# Patient Record
Sex: MALE | Race: WHITE | NOT HISPANIC OR LATINO | Employment: OTHER | URBAN - METROPOLITAN AREA
[De-identification: names, ages, dates, MRNs, and addresses within clinical notes are randomized per-mention and may not be internally consistent; named-entity substitution may affect disease eponyms.]

---

## 2017-01-10 DIAGNOSIS — F40.10 SOCIAL PHOBIA: Primary | ICD-10-CM

## 2017-01-24 ENCOUNTER — TELEPHONE (OUTPATIENT)
Dept: FAMILY MEDICINE | Facility: CLINIC | Age: 67
End: 2017-01-24

## 2017-01-24 NOTE — TELEPHONE ENCOUNTER
"Triaged call from patient who reports having a couple episodes lasting about 10 mins of his fingertips turning \"blue\" and cold . No pain with this. First episode was Saturday. He had another episode today.      He does have vascular risk factors. And COPD.  Denies any CP or SOB.  No other symptoms noted except that he states he recovered from \"flu\" about 5 days ago.  Stated at that time he had some chills, cough, tired and no appetite.      He has not been outside. Warm indoor temp he reports. Does not use oxygen.    Patient lives alone but has brother who could drive him to ED if he has another episode of fingers turning \"blue\"  or if he had any CP or SOB he is to call 911.    Plan appt for an evaluation in morning with .  He is not in any distress at present and the fingers appear \"normal\"  We reviewed that he does have some circulatory risk factors that warrant this being checked out.    Stacy Menon RN       "

## 2017-01-25 ENCOUNTER — OFFICE VISIT (OUTPATIENT)
Dept: FAMILY MEDICINE | Facility: CLINIC | Age: 67
End: 2017-01-25
Payer: COMMERCIAL

## 2017-01-25 VITALS
DIASTOLIC BLOOD PRESSURE: 82 MMHG | HEART RATE: 62 BPM | RESPIRATION RATE: 12 BRPM | BODY MASS INDEX: 25.17 KG/M2 | OXYGEN SATURATION: 99 % | SYSTOLIC BLOOD PRESSURE: 132 MMHG | TEMPERATURE: 98.3 F | WEIGHT: 165.5 LBS

## 2017-01-25 DIAGNOSIS — J44.9 CHRONIC OBSTRUCTIVE PULMONARY DISEASE, UNSPECIFIED COPD TYPE (H): ICD-10-CM

## 2017-01-25 DIAGNOSIS — D64.9 ANEMIA, UNSPECIFIED TYPE: Primary | ICD-10-CM

## 2017-01-25 DIAGNOSIS — R20.9 COLD EXTREMITIES: ICD-10-CM

## 2017-01-25 DIAGNOSIS — F17.210 CIGARETTE NICOTINE DEPENDENCE WITHOUT COMPLICATION: ICD-10-CM

## 2017-01-25 LAB
BASOPHILS # BLD AUTO: 0 10E9/L (ref 0–0.2)
BASOPHILS NFR BLD AUTO: 0.4 %
DIFFERENTIAL METHOD BLD: ABNORMAL
EOSINOPHIL # BLD AUTO: 0.2 10E9/L (ref 0–0.7)
EOSINOPHIL NFR BLD AUTO: 2.9 %
ERYTHROCYTE [DISTWIDTH] IN BLOOD BY AUTOMATED COUNT: 19.9 % (ref 10–15)
FOLATE SERPL-MCNC: 14.6 NG/ML
HCT VFR BLD AUTO: 34.4 % (ref 40–53)
HGB BLD-MCNC: 10.2 G/DL (ref 13.3–17.7)
IRON SATN MFR SERPL: 4 % (ref 15–46)
IRON SERPL-MCNC: 20 UG/DL (ref 35–180)
LDH SERPL L TO P-CCNC: 219 U/L (ref 85–227)
LYMPHOCYTES # BLD AUTO: 1.9 10E9/L (ref 0.8–5.3)
LYMPHOCYTES NFR BLD AUTO: 24 %
MCH RBC QN AUTO: 21.7 PG (ref 26.5–33)
MCHC RBC AUTO-ENTMCNC: 29.7 G/DL (ref 31.5–36.5)
MCV RBC AUTO: 73 FL (ref 78–100)
MONOCYTES # BLD AUTO: 0.7 10E9/L (ref 0–1.3)
MONOCYTES NFR BLD AUTO: 9.4 %
NEUTROPHILS # BLD AUTO: 4.9 10E9/L (ref 1.6–8.3)
NEUTROPHILS NFR BLD AUTO: 63.3 %
PLATELET # BLD AUTO: 330 10E9/L (ref 150–450)
RBC # BLD AUTO: 4.69 10E12/L (ref 4.4–5.9)
RETICS # AUTO: 38.7 10E9/L (ref 25–95)
RETICS/RBC NFR AUTO: 0.8 % (ref 0.5–2)
TIBC SERPL-MCNC: 450 UG/DL (ref 240–430)
TSH SERPL DL<=0.005 MIU/L-ACNC: 0.7 MU/L (ref 0.4–4)
VIT B12 SERPL-MCNC: 308 PG/ML (ref 193–986)
WBC # BLD AUTO: 7.7 10E9/L (ref 4–11)

## 2017-01-25 PROCEDURE — 82746 ASSAY OF FOLIC ACID SERUM: CPT | Performed by: FAMILY MEDICINE

## 2017-01-25 PROCEDURE — 99214 OFFICE O/P EST MOD 30 MIN: CPT | Performed by: FAMILY MEDICINE

## 2017-01-25 PROCEDURE — 85045 AUTOMATED RETICULOCYTE COUNT: CPT | Performed by: FAMILY MEDICINE

## 2017-01-25 PROCEDURE — 83540 ASSAY OF IRON: CPT | Performed by: FAMILY MEDICINE

## 2017-01-25 PROCEDURE — 84443 ASSAY THYROID STIM HORMONE: CPT | Performed by: FAMILY MEDICINE

## 2017-01-25 PROCEDURE — 82607 VITAMIN B-12: CPT | Performed by: FAMILY MEDICINE

## 2017-01-25 PROCEDURE — 83615 LACTATE (LD) (LDH) ENZYME: CPT | Performed by: FAMILY MEDICINE

## 2017-01-25 PROCEDURE — 36415 COLL VENOUS BLD VENIPUNCTURE: CPT | Performed by: FAMILY MEDICINE

## 2017-01-25 PROCEDURE — 83550 IRON BINDING TEST: CPT | Performed by: FAMILY MEDICINE

## 2017-01-25 PROCEDURE — 85025 COMPLETE CBC W/AUTO DIFF WBC: CPT | Performed by: FAMILY MEDICINE

## 2017-01-25 PROCEDURE — 85060 BLOOD SMEAR INTERPRETATION: CPT | Performed by: FAMILY MEDICINE

## 2017-01-25 NOTE — PROGRESS NOTES
SUBJECTIVE:                                                    Tevin Guidry is a 66 year old male who presents to clinic today for the following health issues:      Fingertips turn Blue       Duration: last Saturday, 2 episodes    Description (location/character/radiation): all of fingers    Intensity:  mild    Accompanying signs and symptoms: just turns blue indoor and outdoor, NO numbness or tingling     History (similar episodes/previous evaluation): None    Precipitating or alleviating factors: none    Therapies tried and outcome: None       Finger tips were blue. Happened last Saturday - both hands.  Yesterday - right hand.   No pain, may be somewhat colder.     Having some cold symptoms.     Mild copd and CAD as per patient.     Had blood transfusion in 2014 after heart surgery.     Quit smoking for 20 yrs and now for last one year smoking again. Smoking around 3-4 cigs per day. Dentist already did referral for quit smoking plan.     Problem list and histories reviewed & adjusted, as indicated.  Additional history: as documented    Problem list, Medication list, Allergies, and Medical/Social/Surgical histories reviewed in EPIC and updated as appropriate.      Social History     Social History     Marital Status: Single     Spouse Name: N/A     Number of Children: 0     Years of Education: N/A     Social History Main Topics     Smoking status: Former Smoker     Smokeless tobacco: Never Used      Comment: quit smoking over 20 years ago     Alcohol Use: No      Comment: quit 35 years ago     Drug Use: Yes      Comment: occionally use of pot     Sexual Activity:     Partners: Female     Other Topics Concern      Service No     Blood Transfusions No     Exercise No     Seat Belt Yes     Self-Exams No     Parent/Sibling W/ Cabg, Mi Or Angioplasty Before 65f 55m? No     Social History Narrative     Allergies   Allergen Reactions     Codeine      No Known Allergies      Patient Active Problem List    Diagnosis     Social phobia     Hypertension goal BP (blood pressure) < 140/90     HYPERLIPIDEMIA LDL GOAL <130     Tubular adenoma of colon     Advanced directives, counseling/discussion     CAD, multiple vessel     CAD (coronary artery disease)     Cigarette nicotine dependence without complication     Health Care Home     S/P CABG (coronary artery bypass graft)     Chronic obstructive pulmonary disease, unspecified COPD type (H)     Anemia, unspecified type     Reviewed medications, social history and  past medical and surgical history.    Review of system: for general, respiratory, CVS, GI and psychiatry negative except for noted above.     EXAM:  /82 mmHg  Pulse 62  Temp(Src) 98.3  F (36.8  C) (Oral)  Resp 12  Wt 165 lb 8 oz (75.07 kg)  SpO2 99%  Constitutional: healthy, alert and no distress   Psychiatric: mentation appears normal and affect normal/bright  JOINT/EXTREMITIES: both upper and lower extremities - fingers and toes are colder than rest of the body. Pulses intact and symmetrical. No skin color changes.     ASSESSMENT / PLAN:  (D64.9) Anemia, unspecified type  (primary encounter diagnosis)  Comment: unclear etiology. No previous workup. Can contribute to his concerns. Anemia workup today.   Plan: CBC with platelets and differential,         RETICULOCYTE COUNT, BLOOD MORPHOLOGY         PATHOLOGIST REVIEW, Iron and iron binding         capacity, Vitamin B12, Folate, Lactate         Dehydrogenase             (R68.89) Cold extremities  Comment:  Check thyroid and anemia workup as above. He has copd, cad. Peripheral arterial disease can not be ruled out and if symptoms persist, would refer him to vascular medicine department.   Plan: TSH with free T4 reflex             (J44.9) Chronic obstructive pulmonary disease, unspecified COPD type (H)  Comment: stable.   Plan:  Albuterol as needed.     (F17.210) Cigarette nicotine dependence without complication  Comment: restarted smokiing again.  Discussed due to CAD, copd and other health issues, not an ideal thing.   Plan: he understood. Working with his dentist and going to start quit plan.

## 2017-01-25 NOTE — NURSING NOTE
"Chief Complaint   Patient presents with     Finger     finger tips turning blue        Initial /82 mmHg  Pulse 62  Temp(Src) 98.3  F (36.8  C) (Oral)  Resp 12  Wt 165 lb 8 oz (75.07 kg)  SpO2 99% Estimated body mass index is 25.17 kg/(m^2) as calculated from the following:    Height as of 10/27/16: 5' 8\" (1.727 m).    Weight as of this encounter: 165 lb 8 oz (75.07 kg).  BP completed using cuff size: regular    SMA Geovanny    "

## 2017-01-25 NOTE — Clinical Note
[facility]   [address] [place]   [address]  [phone]    January 31, 2017      Tevin Lyons  [address] [facility] [address] [facility] [address]              Dear Mr. Lyons,    Please see me or Dr Quiroz in the clinic to discuss next step as your blood tests showed iron deficiency anemia and there is a concern of some blood loss. You may need even further workup to find out if there is any cause of it.  We can talk more about it when we see you in the clinic.    Results for orders placed or performed in visit on 01/25/17   CBC with platelets and differential   Result Value Ref Range    WBC 7.7 4.0 - 11.0 10e9/L    RBC Count 4.69 4.4 - 5.9 10e12/L    Hemoglobin 10.2 (L) 13.3 - 17.7 g/dL    Hematocrit 34.4 (L) 40.0 - 53.0 %    MCV 73 (L) 78 - 100 fl    MCH 21.7 (L) 26.5 - 33.0 pg    MCHC 29.7 (L) 31.5 - 36.5 g/dL    RDW 19.9 (H) 10.0 - 15.0 %    Platelet Count 330 150 - 450 10e9/L    Diff Method Automated Method     % Neutrophils 63.3 %    % Lymphocytes 24.0 %    % Monocytes 9.4 %    % Eosinophils 2.9 %    % Basophils 0.4 %    Absolute Neutrophil 4.9 1.6 - 8.3 10e9/L    Absolute Lymphocytes 1.9 0.8 - 5.3 10e9/L    Absolute Monocytes 0.7 0.0 - 1.3 10e9/L    Absolute Eosinophils 0.2 0.0 - 0.7 10e9/L    Absolute Basophils 0.0 0.0 - 0.2 10e9/L   RETICULOCYTE COUNT   Result Value Ref Range    % Retic 0.8 0.5 - 2.0 %    Absolute Retic 38.7 25 - 95 10e9/L   BLOOD MORPHOLOGY PATHOLOGIST REVIEW   Result Value Ref Range    Copath Report       Patient Name: TEVIN LYONS  MR#: [id number]  Specimen #: [id number]  Collected: 1/25/2017  Received: 1/26/2017  Reported: 1/26/2017 11:48  Ordering Phy(s): LEONARDO REBOLLAR    For improved result formatting, select 'View Enhanced Report Format'  under Linked Documents section.    TEST(S):  Peripheral Smear Morphology    FINAL DIAGNOSIS:  Peripheral Smear Morphology:  - Moderate microcytic hypochromic anemia with anisopoikilocytosis    COMMENT:  The findings  along with the results of iron studies are indicative of  iron deficiency.  Sources of low grade bleeding may need to be  investigated.    Electronically signed out by:    Jasson Corral M.D., PhD    CLINICAL HISTORY:  66 year old male.    PERIPHERAL BLOOD DATA:  PERIPHERAL BLOOD DATA (Date: 01/25/2017)  Patient Value (Reference Range >18 year old male)  7.77    WBC         (4.0-11.0 x 10*9/L)  4.63    RBC         (4.4-5.9 x 10*12/L)  10.2    HGB         (13.3-17.7 g/dL)  33.9    HCT         (40.0-53.0 %)  73.2    MCV          (78-100fL)  22.0    MCH         (26.5-33.0 pg)  30.1    MCHC       (31.5-36.5 g/dL)  19.6    RDW         (10.0-15.0 %)  33.10    PLT         (150-450 x 10*9/L)    PERIPHERAL BLOOD DIFFERENTIAL - Manual 200 cells.  Relative counts  59.5%  Neutrophils  31.0%  Lymphocytes  4.5%  Monocytes  4.0%  Eosinophils  1.0%  Basophils    Absolute counts  4.6   Neutrophils  (Ref normal 1.6 - 8.3 x 10*9/L)  2.4   Lymphocytes  (Ref normal 0.8 - 5.3 x 10*9/L)  0.3   Monocytes  (Ref normal 0 -1.3 x 10*9/L)  0.3   Eosinophils  (Ref normal 0 - 0.7 x 10*9/L)  0.07   Basophils  (Ref normal 0 - 0.2 x 10*9/L)    PERIPHERAL BLOOD MORPHOLOGY  The red blood cells are normal in number, microcytic and hypochromic.  There is anisopoikilocytosis with elliptocytes, irregularly contracted  cells, codocytes and rare spherocytes.  There is no increased  polychromasia.  No rouleaux formation is noted.  No intracellular  organisms are identified.    The white blood cells are normal in number, morphology and absolute  diff erential count.  No intracellular organisms are identified.    The platelets are normal in number with normal morphology and occasional  larger well granulated forms.    CPT Codes:  A: 05922-TGKX    TESTING LAB LOCATION:  [facility], [address] [place] [address] [phone]    COLLECTION SITE:  Client:  [facility],  [place]  Location:  [facility] (B)     Iron and iron binding capacity   Result Value Ref Range    Iron 20 (L) 35 - 180 ug/dL    Iron Binding Cap 450 (H) 240 - 430 ug/dL    Iron Saturation Index 4 (L) 15 - 46 %   Vitamin B12   Result Value Ref Range    Vitamin B12 308 193 - 986 pg/mL   Folate   Result Value Ref Range    Folate 14.6 >5.4 ng/mL   Lactate Dehydrogenase   Result Value Ref Range    Lactate Dehydrogenase 219 85 - 227 U/L   TSH with free T4 reflex   Result Value Ref Range    TSH 0.70 0.40 - 4.00 mU/L           Sincerely,    Joel Nair MD/nr

## 2017-01-26 LAB — COPATH REPORT: NORMAL

## 2017-02-21 ENCOUNTER — OFFICE VISIT (OUTPATIENT)
Dept: FAMILY MEDICINE | Facility: CLINIC | Age: 67
End: 2017-02-21
Payer: COMMERCIAL

## 2017-02-21 VITALS
WEIGHT: 164.25 LBS | OXYGEN SATURATION: 99 % | HEART RATE: 56 BPM | DIASTOLIC BLOOD PRESSURE: 84 MMHG | BODY MASS INDEX: 24.89 KG/M2 | HEIGHT: 68 IN | TEMPERATURE: 97.7 F | SYSTOLIC BLOOD PRESSURE: 134 MMHG

## 2017-02-21 DIAGNOSIS — I10 HYPERTENSION GOAL BP (BLOOD PRESSURE) < 140/90: ICD-10-CM

## 2017-02-21 DIAGNOSIS — Z95.1 S/P CABG (CORONARY ARTERY BYPASS GRAFT): ICD-10-CM

## 2017-02-21 DIAGNOSIS — R82.998 PINK-COLORED URINE: ICD-10-CM

## 2017-02-21 DIAGNOSIS — I25.119 CORONARY ARTERY DISEASE INVOLVING NATIVE HEART WITH ANGINA PECTORIS, UNSPECIFIED VESSEL OR LESION TYPE (H): ICD-10-CM

## 2017-02-21 DIAGNOSIS — J44.9 CHRONIC OBSTRUCTIVE PULMONARY DISEASE, UNSPECIFIED COPD TYPE (H): ICD-10-CM

## 2017-02-21 DIAGNOSIS — E78.5 HYPERLIPIDEMIA LDL GOAL <130: ICD-10-CM

## 2017-02-21 DIAGNOSIS — R20.9 COLD EXTREMITIES: ICD-10-CM

## 2017-02-21 DIAGNOSIS — D50.9 IRON DEFICIENCY ANEMIA, UNSPECIFIED IRON DEFICIENCY ANEMIA TYPE: Primary | ICD-10-CM

## 2017-02-21 LAB
ALBUMIN UR-MCNC: NEGATIVE MG/DL
APPEARANCE UR: CLEAR
BILIRUB UR QL STRIP: NEGATIVE
COLOR UR AUTO: YELLOW
GLUCOSE UR STRIP-MCNC: NEGATIVE MG/DL
HGB UR QL STRIP: NEGATIVE
KETONES UR STRIP-MCNC: NEGATIVE MG/DL
LEUKOCYTE ESTERASE UR QL STRIP: NEGATIVE
NITRATE UR QL: NEGATIVE
PH UR STRIP: 6.5 PH (ref 5–7)
SP GR UR STRIP: 1.01 (ref 1–1.03)
URN SPEC COLLECT METH UR: NORMAL
UROBILINOGEN UR STRIP-ACNC: 0.2 EU/DL (ref 0.2–1)

## 2017-02-21 PROCEDURE — 81003 URINALYSIS AUTO W/O SCOPE: CPT | Performed by: FAMILY MEDICINE

## 2017-02-21 PROCEDURE — 99214 OFFICE O/P EST MOD 30 MIN: CPT | Performed by: FAMILY MEDICINE

## 2017-02-21 NOTE — PROGRESS NOTES
SUBJECTIVE:                                                    Tevin Guidry is a 66 year old male who presents to clinic today for the following health issues:    Anemia results      Duration: January 2017    Description (location/character/radiation): fingertips were turning blue a few times and had lab work done and states he has anemia and would like to go over results.    Accompanying signs and symptoms: none    History (similar episodes/previous evaluation): mother has hx of anemia       Patient is working with his dentist and using nicorette gum to help him quit smoking. Currently he is smoking less than half a pack daily, which is down from his normal.     The last episode of his fingertips turning blue was a few weeks ago.  Denies blood or black tarry stools.  He has a few episodes where he has noticed pink urine.   Denies coughing up blood, no abdominal pain, upset stomach, acid reflux, or calf pain with exercise.   Patient used the Nitro once in the past for SOB without change in sx. Has not had chest pain.   Had colonoscopy last year with several polyps.     Problem list and histories reviewed & adjusted, as indicated.  Additional history: as documented    Patient Active Problem List   Diagnosis     Social phobia     Hypertension goal BP (blood pressure) < 140/90     HYPERLIPIDEMIA LDL GOAL <130     Tubular adenoma of colon     Advanced directives, counseling/discussion     CAD, multiple vessel     CAD (coronary artery disease)     Cigarette nicotine dependence without complication     Health Care Home     S/P CABG (coronary artery bypass graft)     Chronic obstructive pulmonary disease, unspecified COPD type (H)     Anemia, unspecified type     Past Surgical History   Procedure Laterality Date     Surgical history of -        right clavicular fracture with ORIF     Hc colonoscopy thru stoma, diagnostic  2010     polyps due 2011     Davinci bypass artery coronary N/A 9/29/2014     Procedure: DAVINCI  BYPASS ARTERY CORONARY;  Surgeon: Lam Solis MD;  Location:  OR       Social History   Substance Use Topics     Smoking status: Former Smoker     Smokeless tobacco: Never Used      Comment: quit smoking over 20 years ago     Alcohol use No      Comment: quit 35 years ago     Family History   Problem Relation Age of Onset     CANCER Mother      esog/stomach     CANCER Father      lung     Family History Negative Brother      Hypertension Brother          Current Outpatient Prescriptions   Medication Sig Dispense Refill     albuterol (PROAIR HFA, PROVENTIL HFA, VENTOLIN HFA) 108 (90 BASE) MCG/ACT inhaler Inhale 2 puffs into the lungs every 6 hours as needed for shortness of breath / dyspnea or wheezing 1 Inhaler 1     lisinopril (PRINIVIL,ZESTRIL) 10 MG tablet Take 1 tablet (10 mg) by mouth daily 90 tablet 3     metoprolol (TOPROL-XL) 50 MG 24 hr tablet Take 1 tablet (50 mg) by mouth daily 90 tablet 3     atorvastatin (LIPITOR) 40 MG tablet Take 1 tablet (40 mg) by mouth daily 90 tablet 3     PARoxetine (PAXIL) 40 MG tablet Take 1 tablet (40 mg) by mouth every morning 90 tablet 1     IBUPROFEN PO        aspirin 81 MG tablet Take 1 tablet (81 mg) by mouth daily 90 tablet 3     nitroglycerin (NITROSTAT) 0.4 MG SL tablet Place 1 tablet (0.4 mg) under the tongue every 5 minutes as needed for chest pain 25 tablet 3     Multiple Vitamin (MULTI-VITAMIN) per tablet Take 1 tablet by mouth daily.         Allergies   Allergen Reactions     Codeine      No Known Allergies      Recent Labs   Lab Test  01/25/17   0909  10/27/16   1028  04/25/16   1050  10/23/15   0850   09/30/14   0427   09/29/14   1735   09/28/14   0709   07/11/12   1503   A1C   --    --    --    --    --    --    --    --    --   5.9   --   6.0   LDL   --   75   --   85   --   39   --    --    --    --    < >   --    HDL   --   55   --   43   --   51   --    --    --    --    < >   --    TRIG   --   98   --   137   --   97   --    --    --    --    " < >   --    ALT   --    --   17  15   --    --    --   15   --   16   < >  10   CR   --    --   0.84  0.84   < >  0.84   < >  0.83   < >  0.76   < >  0.82   GFRESTIMATED   --    --   >90  Non  GFR Calc    >90  Non  GFR Calc     < >  >90  Non  GFR Calc     < >  >90  Non  GFR Calc     < >  >90  Non  GFR Calc     < >  >90   GFRESTBLACK   --    --   >90   GFR Calc    >90   GFR Calc     < >  >90   GFR Calc     < >  >90   GFR Calc     < >  >90   GFR Calc     < >  >90   POTASSIUM   --    --   4.6  4.2   < >  4.3   < >  3.9   < >  3.8   < >  3.6   TSH  0.70   --    --    --    --    --    --    --    --    --    --    --     < > = values in this interval not displayed.      BP Readings from Last 3 Encounters:   02/21/17 134/84   01/25/17 132/82   10/27/16 120/70    Wt Readings from Last 3 Encounters:   02/21/17 74.5 kg (164 lb 4 oz)   01/25/17 75.1 kg (165 lb 8 oz)   10/27/16 73.9 kg (163 lb)            ROS:  See above.    This document serves as a record of the services and decisions personally performed and made by Preethi Quiroz MD. It was created on his/her behalf by Shakila Tyler, trained medical scribe. The creation of this document is based the provider's statements to the medical scribes.    Scribe Shakila Tyler, February 21, 2017  OBJECTIVE:                                                    /84 (BP Location: Left arm, Patient Position: Chair, Cuff Size: Adult Regular)  Pulse 56  Temp 97.7  F (36.5  C) (Oral)  Ht 1.727 m (5' 8\")  Wt 74.5 kg (164 lb 4 oz)  SpO2 99%  BMI 24.97 kg/m2  Body mass index is 24.97 kg/(m^2).  GENERAL:  alert and no distress  RESP: lungs clear to auscultation - no rales, rhonchi or wheezes  CV: regular rate and rhythm, normal S1 S2, no S3 or S4, no murmur, click or rub  .  Diagnostic Test Results:  Results for orders " placed or performed in visit on 01/25/17   CBC with platelets and differential   Result Value Ref Range    WBC 7.7 4.0 - 11.0 10e9/L    RBC Count 4.69 4.4 - 5.9 10e12/L    Hemoglobin 10.2 (L) 13.3 - 17.7 g/dL    Hematocrit 34.4 (L) 40.0 - 53.0 %    MCV 73 (L) 78 - 100 fl    MCH 21.7 (L) 26.5 - 33.0 pg    MCHC 29.7 (L) 31.5 - 36.5 g/dL    RDW 19.9 (H) 10.0 - 15.0 %    Platelet Count 330 150 - 450 10e9/L    Diff Method Automated Method     % Neutrophils 63.3 %    % Lymphocytes 24.0 %    % Monocytes 9.4 %    % Eosinophils 2.9 %    % Basophils 0.4 %    Absolute Neutrophil 4.9 1.6 - 8.3 10e9/L    Absolute Lymphocytes 1.9 0.8 - 5.3 10e9/L    Absolute Monocytes 0.7 0.0 - 1.3 10e9/L    Absolute Eosinophils 0.2 0.0 - 0.7 10e9/L    Absolute Basophils 0.0 0.0 - 0.2 10e9/L   RETICULOCYTE COUNT   Result Value Ref Range    % Retic 0.8 0.5 - 2.0 %    Absolute Retic 38.7 25 - 95 10e9/L   BLOOD MORPHOLOGY PATHOLOGIST REVIEW   Result Value Ref Range    Copath Report       Patient Name: DICK LYONS  MR#: [id number]  Specimen #: [id number]  Collected: 1/25/2017  Received: 1/26/2017  Reported: 1/26/2017 11:48  Ordering Phy(s): LEONARDO REBOLLAR    For improved result formatting, select 'View Enhanced Report Format'  under Linked Documents section.    TEST(S):  Peripheral Smear Morphology    FINAL DIAGNOSIS:  Peripheral Smear Morphology:  - Moderate microcytic hypochromic anemia with anisopoikilocytosis    COMMENT:  The findings along with the results of iron studies are indicative of  iron deficiency.  Sources of low grade bleeding may need to be  investigated.    Electronically signed out by:    Jasson Corral M.D., PhD    CLINICAL HISTORY:  66 year old male.    PERIPHERAL BLOOD DATA:  PERIPHERAL BLOOD DATA (Date: 01/25/2017)  Patient Value (Reference Range >18 year old male)  7.77    WBC         (4.0-11.0 x 10*9/L)  4.63    RBC         (4.4-5.9 x 10*12/L)  10.2    HGB         (13.3-17.7 g/dL)  33.9    HCT         (40.0-53.0  %)  73.2    MCV          (78-100fL)  22.0    MCH         (26.5-33.0 pg)  30.1    MCHC       (31.5-36.5 g/dL)  19.6    RDW         (10.0-15.0 %)  33.10    PLT         (150-450 x 10*9/L)    PERIPHERAL BLOOD DIFFERENTIAL - Manual 200 cells.  Relative counts  59.5%  Neutrophils  31.0%  Lymphocytes  4.5%  Monocytes  4.0%  Eosinophils  1.0%  Basophils    Absolute counts  4.6   Neutrophils  (Ref normal 1.6 - 8.3 x 10*9/L)  2.4   Lymphocytes  (Ref normal 0.8 - 5.3 x 10*9/L)  0.3   Monocytes  (Ref normal 0 -1.3 x 10*9/L)  0.3   Eosinophils  (Ref normal 0 - 0.7 x 10*9/L)  0.07   Basophils  (Ref normal 0 - 0.2 x 10*9/L)    PERIPHERAL BLOOD MORPHOLOGY  The red blood cells are normal in number, microcytic and hypochromic.  There is anisopoikilocytosis with elliptocytes, irregularly contracted  cells, codocytes and rare spherocytes.  There is no increased  polychromasia.  No rouleaux formation is noted.  No intracellular  organisms are identified.    The white blood cells are normal in number, morphology and absolute  diff erential count.  No intracellular organisms are identified.    The platelets are normal in number with normal morphology and occasional  larger well granulated forms.    CPT Codes:  A: 92432-EGDS    TESTING LAB LOCATION:  [address] [address] [phone]    COLLECTION SITE:  Client:  [facility]  Location:  [facility] (B)     Iron and iron binding capacity   Result Value Ref Range    Iron 20 (L) 35 - 180 ug/dL    Iron Binding Cap 450 (H) 240 - 430 ug/dL    Iron Saturation Index 4 (L) 15 - 46 %   Vitamin B12   Result Value Ref Range    Vitamin B12 308 193 - 986 pg/mL   Folate   Result Value Ref Range    Folate 14.6 >5.4 ng/mL   Lactate Dehydrogenase   Result Value Ref Range    Lactate Dehydrogenase 219 85 - 227 U/L   TSH with free T4 reflex   Result Value Ref Range    TSH 0.70 0.40 - 4.00 mU/L         ASSESSMENT/PLAN:                                                    1. Iron deficiency anemia, unspecified iron deficiency anemia type  unclear etiology with uncertain prognosis, requires further workup. He will schedule a colonoscopy and EGD to evaluate for GI source. UA today to evaluate for urinary source given his history of pink urine.  Also, will schedule with hematology for further evaluation. He will change to enteric coated ASA as this could be causing some gastritis, which could contribute to his anemia.   - GASTROENTEROLOGY ADULT REF PROCEDURE ONLY  - ONC/HEME ADULT REFERRAL  - *UA reflex to Microscopic and Culture ([facility] (except [place] and [place])    2. Cold extremities  Anemia, cold weather, smoking could have been contributing. No recurrent episodes. No claudication sx.     3. Coronary artery disease involving native heart with angina pectoris, unspecified vessel or lesion type (H)  Stable. Not needing Nitro. Follow up with cardiology scheduled. Will change to enteric coated ASA.   - aspirin 81 MG EC tablet; Take 1 tablet (81 mg) by mouth daily  Dispense: 90 tablet; Refill: 3    4. S/P CABG (coronary artery bypass graft)  Stable.    5. Hypertension goal BP (blood pressure) < 140/90  Stable. Continues on lisinopril.     6. Hyperlipidemia LDL goal <130  Stable. Continues on atorvastatin.     7. Chronic obstructive pulmonary disease, unspecified COPD type (H)  He is enrolled in a program at the  of  with his dentist for smoking cessation. Currently using nicorette gum. Does not find albuterol helpful. Denies significant shortness of breath or frequent episodes of bronchitis.     8. Pink-colored urine  Previous episodes of pink-colored urine. Checking urine sample today for hematuria.   - *UA reflex to Microscopic and Culture ([facility] (except [place] and [place])      Patient Instructions   1. Schedule a colonoscopy and upper  endoscopy.  2. Schedule with Hematology  3. You may need to see your cardiologist before completing the colonoscopy/upper endoscopy.   4. Change from the regular baby aspirin to the EC Aspirin, which will help provide some more protection for your stomach lining.             The information in this document, created by the medical scribe for me, accurately reflects the services I personally performed and the decisions made by me. I have reviewed and approved this document for accuracy prior to leaving the patient care area. 02/21/17    Preethi Quiroz MD  [facility] [place]

## 2017-02-21 NOTE — MR AVS SNAPSHOT
After Visit Summary   2/21/2017    Tevin Guidry    MRN: [id number]           Patient Information     Date Of Birth          1950        Visit Information        Provider Department      2/21/2017 11:20 AM Preethi Quiroz MD [facility]        Today's Diagnoses     Iron deficiency anemia, unspecified iron deficiency anemia type    -  1    Cold extremities        Coronary artery disease involving native heart with angina pectoris, unspecified vessel or lesion type (H)        S/P CABG (coronary artery bypass graft)        Hypertension goal BP (blood pressure) < 140/90        Hyperlipidemia LDL goal <130        Chronic obstructive pulmonary disease, unspecified COPD type (H)        Pink-colored urine          Care Instructions    1. Schedule a colonoscopy and upper endoscopy.  2. Schedule with Hematology  3. You may need to see your cardiologist before completing the colonoscopy/upper endoscopy.   4. Change from the regular baby aspirin to the EC Aspirin, which will help provide some more protection for your stomach lining.                 Follow-ups after your visit        Additional Services     GASTROENTEROLOGY ADULT REF PROCEDURE ONLY       Last Lab Result: Creatinine (mg/dL)       Date                     Value                 04/25/2016               0.84             ----------  There is no height or weight on file to calculate BMI.      Patient will be contacted to schedule procedure.     Please be aware that coverage of these services is subject to the terms and limitations of your health insurance plan.  Call member services at your health plan with any benefit or coverage questions.  Any procedures must be performed at a [facility] facility OR coordinated by your clinic's referral office.    Please bring the following with you to your appointment:    (1) Any X-Rays, CTs or MRIs which have been performed.  Contact the facility where they were done to arrange for  prior  to your scheduled appointment.    (2) List of current medications   (3) This referral request   (4) Any documents/labs given to you for this referral            ONC/HEME ADULT REFERRAL       Your provider has referred you to: [facility]: [facility] [phone]   [email or web address]    Please be aware that coverage of these services is subject to the terms and limitations of your health insurance plan.  Call member services at your health plan with any benefit or coverage questions.      Please bring the following with you to your appointment:    (1) Any X-Rays, CTs or MRIs which have been performed.  Contact the facility where they were done to arrange for  prior to your scheduled appointment.   (2) List of current medications  (3) This referral request   (4) Any documents/labs given to you for this referral                  Your next 10 appointments already scheduled     Mar 28, 2017 10:00 AM CDT   (Arrive by 9:45 AM)   Return Visit with Casa Evans MD   [facility] ([facility] [place])    [address] [place] [address] [phone]              Who to contact     If you have questions or need follow up information about today's clinic visit or your schedule please contact [facility] directly at [phone].  Normal or non-critical lab and imaging results will be communicated to you by MyChart, letter or phone within 4 business days after the clinic has received the results. If you do not hear from us within 7 days, please contact the clinic through MyChart or phone. If you have a critical or abnormal lab result, we will notify you by phone as soon as possible.  Submit refill requests through [employer] or call your pharmacy and they will forward the refill request to us. Please allow 3 business days for your refill to be completed.  "         Additional Information About Your Visit        MyChart Information     Thatgamecompany lets you send messages to your doctor, view your test results, renew your prescriptions, schedule appointments and more. To sign up, go to [email or web address] . Click on \"Log in\" on the left side of the screen, which will take you to the Welcome page. Then click on \"Sign up Now\" on the right side of the page.     You will be asked to enter the access code listed below, as well as some personal information. Please follow the directions to create your username and password.     Your access code is: [email or web address]  Expires: 2017 11:56 AM     Your access code will  in 90 days. If you need help or a new code, please call your [facility] clinic or [phone].        Care EveryWhere ID     This is your Care EveryWhere ID. This could be used by other organizations to access your [facility] medical records  [id number]        Your Vitals Were     Pulse Temperature Height Pulse Oximetry BMI (Body Mass Index)       56 97.7  F (36.5  C) (Oral) 5' 8\" (1.727 m) 99% 24.97 kg/m2        Blood Pressure from Last 3 Encounters:   17 134/84   17 132/82   10/27/16 120/70    Weight from Last 3 Encounters:   17 164 lb 4 oz (74.5 kg)   17 165 lb 8 oz (75.1 kg)   10/27/16 163 lb (73.9 kg)              We Performed the Following     *UA reflex to Microscopic and Culture ([facility] (except [place] and [place])     GASTROENTEROLOGY ADULT REF PROCEDURE ONLY     ONC/HEME ADULT REFERRAL          Today's Medication Changes          These changes are accurate as of: 17 11:56 AM.  If you have any questions, ask your nurse or doctor.               These medicines have changed or have updated prescriptions.        Dose/Directions    * aspirin 81 MG tablet   This may have changed:  Another medication with the same name was added. Make sure you understand how and when to take each.   Used for:  " Coronary artery disease involving native artery of transplanted heart without angina pectoris   Changed by:  Casa Evans MD        Dose:  81 mg   Take 1 tablet (81 mg) by mouth daily   Quantity:  90 tablet   Refills:  3       * aspirin 81 MG EC tablet   This may have changed:  You were already taking a medication with the same name, and this prescription was added. Make sure you understand how and when to take each.   Used for:  Coronary artery disease involving native heart with angina pectoris, unspecified vessel or lesion type (H)   Changed by:  Preethi Quiroz MD        Dose:  81 mg   Take 1 tablet (81 mg) by mouth daily   Quantity:  90 tablet   Refills:  3       * Notice:  This list has 2 medication(s) that are the same as other medications prescribed for you. Read the directions carefully, and ask your doctor or other care provider to review them with you.         Where to get your medicines      These medications were sent to [facility] [address]  [address] [facility] [address]     Phone:  [phone]     aspirin 81 MG EC tablet                Primary Care Provider Office Phone # Fax #    Preethi Quiroz [phone]       [facility] [address]  [facility] [address]        Thank you!     Thank you for choosing [facility]  for your care. Our goal is always to provide you with excellent care. Hearing back from our patients is one way we can continue to improve our services. Please take a few minutes to complete the written survey that you may receive in the mail after your visit with us. Thank you!             Your Updated Medication List - Protect others around you: Learn how to safely use, store and throw away your medicines at www.disposemymeds.org.          This list is accurate as of: 2/21/17 11:56 AM.  Always use your most recent med list.                   Brand Name Dispense Instructions for use     albuterol 108 (90 BASE) MCG/ACT Inhaler    PROAIR HFA/PROVENTIL HFA/VENTOLIN HFA    1 Inhaler    Inhale 2 puffs into the lungs every 6 hours as needed for shortness of breath / dyspnea or wheezing       * aspirin 81 MG tablet     90 tablet    Take 1 tablet (81 mg) by mouth daily       * aspirin 81 MG EC tablet     90 tablet    Take 1 tablet (81 mg) by mouth daily       atorvastatin 40 MG tablet    LIPITOR    90 tablet    Take 1 tablet (40 mg) by mouth daily       IBUPROFEN PO          lisinopril 10 MG tablet    PRINIVIL/ZESTRIL    90 tablet    Take 1 tablet (10 mg) by mouth daily       metoprolol 50 MG 24 hr tablet    TOPROL-XL    90 tablet    Take 1 tablet (50 mg) by mouth daily       Multi-vitamin Tabs tablet   Generic drug:  multivitamin, therapeutic with minerals      Take 1 tablet by mouth daily.       nitroglycerin 0.4 MG sublingual tablet    NITROSTAT    25 tablet    Place 1 tablet (0.4 mg) under the tongue every 5 minutes as needed for chest pain       PARoxetine 40 MG tablet    PAXIL    90 tablet    Take 1 tablet (40 mg) by mouth every morning       * Notice:  This list has 2 medication(s) that are the same as other medications prescribed for you. Read the directions carefully, and ask your doctor or other care provider to review them with you.

## 2017-02-21 NOTE — NURSING NOTE
"Chief Complaint   Patient presents with     Results     Anemia       Initial /84 (BP Location: Left arm, Patient Position: Chair, Cuff Size: Adult Regular)  Pulse 56  Temp 97.7  F (36.5  C) (Oral)  Ht 5' 8\" (1.727 m)  Wt 164 lb 4 oz (74.5 kg)  SpO2 99%  BMI 24.97 kg/m2 Estimated body mass index is 24.97 kg/(m^2) as calculated from the following:    Height as of this encounter: 5' 8\" (1.727 m).    Weight as of this encounter: 164 lb 4 oz (74.5 kg).  Medication Reconciliation: complete     Angie Collins CMA      "

## 2017-02-21 NOTE — PATIENT INSTRUCTIONS
1. Schedule a colonoscopy and upper endoscopy.  2. Schedule with Hematology  3. You may need to see your cardiologist before completing the colonoscopy/upper endoscopy.   4. Change from the regular baby aspirin to the EC Aspirin, which will help provide some more protection for your stomach lining.

## 2017-02-21 NOTE — LETTER
[facility]   [address] [place] [address]  [phone]      February 24, 2017      Tevin Guidry  [address] [facility] [address]          Dear Mr. Guidry,    The results of your recent lab tests were within normal limits. Enclosed is a copy of these results.  If you have any further questions or problems, please contact our office.    Results for orders placed or performed in visit on 02/21/17   *UA reflex to Microscopic and Culture ([facility] and [facility] (except [place] and [place])   Result Value Ref Range    Color Urine Yellow     Appearance Urine Clear     Glucose Urine Negative NEG mg/dL    Bilirubin Urine Negative NEG    Ketones Urine Negative NEG mg/dL    Specific Gravity Urine 1.010 1.003 - 1.035    Blood Urine Negative NEG    pH Urine 6.5 5.0 - 7.0 pH    Protein Albumin Urine Negative NEG mg/dL    Urobilinogen Urine 0.2 0.2 - 1.0 EU/dL    Nitrite Urine Negative NEG    Leukocyte Esterase Urine Negative NEG    Source Midstream Urine        Sincerely,      Preethi Quiroz MD/nr

## 2017-03-01 ENCOUNTER — CARE COORDINATION (OUTPATIENT)
Dept: ONCOLOGY | Facility: CLINIC | Age: 67
End: 2017-03-01

## 2017-03-01 NOTE — PROGRESS NOTES
I called and spoke with the patient introducing myself and my role at care coordinator. Patient di not have any questions and was familiar with Jessica and knew our location. Will follow as needed for care coordination. Tran Lan

## 2017-03-02 ENCOUNTER — HOSPITAL ENCOUNTER (OUTPATIENT)
Facility: CLINIC | Age: 67
Setting detail: SPECIMEN
Discharge: HOME | End: 2017-03-02
Attending: INTERNAL MEDICINE | Admitting: INTERNAL MEDICINE
Payer: COMMERCIAL

## 2017-03-02 ENCOUNTER — ONCOLOGY VISIT (OUTPATIENT)
Dept: ONCOLOGY | Facility: CLINIC | Age: 67
End: 2017-03-02
Attending: INTERNAL MEDICINE
Payer: COMMERCIAL

## 2017-03-02 VITALS
SYSTOLIC BLOOD PRESSURE: 130 MMHG | RESPIRATION RATE: 16 BRPM | OXYGEN SATURATION: 97 % | HEART RATE: 62 BPM | BODY MASS INDEX: 26.03 KG/M2 | TEMPERATURE: 97.4 F | WEIGHT: 171.2 LBS | DIASTOLIC BLOOD PRESSURE: 66 MMHG

## 2017-03-02 DIAGNOSIS — D50.9 IRON DEFICIENCY ANEMIA, UNSPECIFIED IRON DEFICIENCY ANEMIA TYPE: ICD-10-CM

## 2017-03-02 DIAGNOSIS — D64.9 ANEMIA, UNSPECIFIED TYPE: Primary | ICD-10-CM

## 2017-03-02 LAB
BILIRUB SERPL-MCNC: 0.2 MG/DL (ref 0.2–1.3)
CREAT SERPL-MCNC: 0.73 MG/DL (ref 0.66–1.25)
FERRITIN SERPL-MCNC: 5 NG/ML (ref 26–388)
GFR SERPL CREATININE-BSD FRML MDRD: NORMAL ML/MIN/1.7M2
LDH SERPL L TO P-CCNC: 178 U/L (ref 85–227)

## 2017-03-02 PROCEDURE — 83615 LACTATE (LD) (LDH) ENZYME: CPT | Performed by: INTERNAL MEDICINE

## 2017-03-02 PROCEDURE — 82728 ASSAY OF FERRITIN: CPT | Performed by: INTERNAL MEDICINE

## 2017-03-02 PROCEDURE — 82565 ASSAY OF CREATININE: CPT | Performed by: INTERNAL MEDICINE

## 2017-03-02 PROCEDURE — 84165 PROTEIN E-PHORESIS SERUM: CPT | Performed by: INTERNAL MEDICINE

## 2017-03-02 PROCEDURE — 82668 ASSAY OF ERYTHROPOIETIN: CPT | Performed by: INTERNAL MEDICINE

## 2017-03-02 PROCEDURE — 99203 OFFICE O/P NEW LOW 30 MIN: CPT | Performed by: INTERNAL MEDICINE

## 2017-03-02 PROCEDURE — 83010 ASSAY OF HAPTOGLOBIN QUANT: CPT | Performed by: INTERNAL MEDICINE

## 2017-03-02 PROCEDURE — 00000402 ZZHCL STATISTIC TOTAL PROTEIN: Performed by: INTERNAL MEDICINE

## 2017-03-02 PROCEDURE — 82247 BILIRUBIN TOTAL: CPT | Performed by: INTERNAL MEDICINE

## 2017-03-02 ASSESSMENT — PAIN SCALES - GENERAL: PAINLEVEL: NO PAIN (0)

## 2017-03-02 NOTE — PROGRESS NOTES
"Tevin Guidry is a 66 year old male who presents for:  Chief Complaint   Patient presents with     Oncology Clinic Visit     MICHELLE        Initial Vitals:  /66 (BP Location: Left arm, Patient Position: Chair, Cuff Size: Adult Regular)  Pulse 62  Temp 97.4  F (36.3  C) (Oral)  Resp 16  Wt 77.7 kg (171 lb 3.2 oz)  SpO2 97%  BMI 26.03 kg/m2 Estimated body mass index is 26.03 kg/(m^2) as calculated from the following:    Height as of 2/21/17: 1.727 m (5' 8\").    Weight as of this encounter: 77.7 kg (171 lb 3.2 oz).. Body surface area is 1.93 meters squared. BP completed using cuff size: regular  No Pain (0) No LMP for male patient. Allergies and medications reviewed.     Medications: Medication refills not needed today.  Pharmacy name entered into ClassLink:    [facility] [address]    Comments: New patient MICHELLE    5 minutes for nursing intake (face to face time)   Zee Wu MA    DISCHARGE PLAN:  Next appointments: See patient instruction section-- Jill  Departure Mode: Ambulatory  Accompanied by: Self  15 minutes for nursing discharge (face to face time)   Vielka Black, RN    Make sure EGD and colonoscopy is scheduled at [facility].--Called [facility] & was able to schedule Pt while he was here in clinic.  Labs today.--drawn by MARIAMA Malin  Follow up after EGD and colonoscopy.     Endoscopy Department  [facility]  Floor 1, [address], Room [address] [place] [address]  Appointments: [phone]        4/6/2017 Thu 11:00 AM 11:00 A 30 [facility] [[id number]] MARGRET COLLIER [[id number]] RETURN [157] Return - Review Scans     "

## 2017-03-02 NOTE — PROGRESS NOTES
Mr. Guidry,    Ferritin is low. Ferritin is the storage form of iron. Other blood tests are normal.    Josette Rubin MD

## 2017-03-02 NOTE — PROGRESS NOTES
Medical Assistant Note:  Tevin Guidry presents today for labs.    Patient seen by provider today: Yes: .   present during visit today: Not Applicable.    Concerns: No Concerns.    Procedure:  Lab draw site: LAC, Needle type: BF, Gauge: 21.    Post Assessment:  Labs drawn without difficulty: Yes.    Discharge Plan:  Patient discharged in stable condition accompanied by: self.  Departure Mode: Ambulatory.    Dea Barrios

## 2017-03-02 NOTE — MR AVS SNAPSHOT
After Visit Summary   3/2/2017    Tevin Guidry    MRN: [id number]           Patient Information     Date Of Birth          1950        Visit Information        Provider Department      3/2/2017 11:00 AM Josette Rubin MD [facility]        Today's Diagnoses     Anemia, unspecified type    -  1    Iron deficiency anemia, unspecified iron deficiency anemia type          Care Instructions    Make sure EGD and colonoscopy is scheduled at [facility].  Labs today.  Follow up after EGD and colonoscopy.    Thursday, March 30, 2017 check-in at 12:30pm for 2pm  Endoscopy Department  [facility]  Floor 1, [address], Room 1[address]301  [address] [place], [place] ?[address]  Appointments: [phone]            Follow-ups after your visit        Your next 10 appointments already scheduled     Mar 28, 2017 10:00 AM CDT   (Arrive by 9:45 AM)   Return Visit with Casa Evans MD   [facility] ([facility] [place])    [address]  3rd Floor  [place] [facility] [address] [phone]            Mar 30, 2017   Procedure with Jie Onofre MD   [facility] [place], Endoscopy ([facility])    [address] [address]   [phone]           The [facility] is located on the corner of [address] and [address] on the [facility]. It is easily accessible from virtually any point in the [place] [facility] [place] area, via [address] and [address].            Apr 06, 2017 11:00 AM CDT   Return Visit with Josette Rubin MD   [facility] ([facility])    [facility]  [address]  [facility] [address]   [phone]              Who to contact     If you have questions or need follow up information about today's clinic visit or your schedule please contact [facility] directly  "at [phone].  Normal or non-critical lab and imaging results will be communicated to you by MyChart, letter or phone within 4 business days after the clinic has received the results. If you do not hear from us within 7 days, please contact the clinic through ciValuehart or phone. If you have a critical or abnormal lab result, we will notify you by phone as soon as possible.  Submit refill requests through LoopIt or call your pharmacy and they will forward the refill request to us. Please allow 3 business days for your refill to be completed.          Additional Information About Your Visit        ciValuehart Information     LoopIt lets you send messages to your doctor, view your test results, renew your prescriptions, schedule appointments and more. To sign up, go to [email or web address] . Click on \"Log in\" on the left side of the screen, which will take you to the Welcome page. Then click on \"Sign up Now\" on the right side of the page.     You will be asked to enter the access code listed below, as well as some personal information. Please follow the directions to create your username and password.     Your access code is: [email or web address]  Expires: 2017 11:56 AM     Your access code will  in 90 days. If you need help or a new code, please call your [facility] clinic or [phone].        Care EveryWhere ID     This is your Care EveryWhere ID. This could be used by other organizations to access your [facility] medical records  [id number]        Your Vitals Were     Pulse Temperature Respirations Pulse Oximetry BMI (Body Mass Index)       62 97.4  F (36.3  C) (Oral) 16 97% 26.03 kg/m2        Blood Pressure from Last 3 Encounters:   17 130/66   17 134/84   17 132/82    Weight from Last 3 Encounters:   17 77.7 kg (171 lb 3.2 oz)   17 74.5 kg (164 lb 4 oz)   17 75.1 kg (165 lb 8 oz)              We Performed the Following     Bilirubin  total     Creatinine     Erythropoietin "     Ferritin     Haptoglobin     Lactate Dehydrogenase     Protein electrophoresis        Primary Care Provider Office Phone # Fax #    Preethi Quiroz [phone]       [facility] [address] [facility] [address]        Thank you!     Thank you for choosing [facility]  for your care. Our goal is always to provide you with excellent care. Hearing back from our patients is one way we can continue to improve our services. Please take a few minutes to complete the written survey that you may receive in the mail after your visit with us. Thank you!             Your Updated Medication List - Protect others around you: Learn how to safely use, store and throw away your medicines at www.disposemymeds.org.          This list is accurate as of: 3/2/17 12:17 PM.  Always use your most recent med list.                   Brand Name Dispense Instructions for use    albuterol 108 (90 BASE) MCG/ACT Inhaler    PROAIR HFA/PROVENTIL HFA/VENTOLIN HFA    1 Inhaler    Inhale 2 puffs into the lungs every 6 hours as needed for shortness of breath / dyspnea or wheezing       aspirin 81 MG tablet     90 tablet    Take 1 tablet (81 mg) by mouth daily       atorvastatin 40 MG tablet    LIPITOR    90 tablet    Take 1 tablet (40 mg) by mouth daily       lisinopril 10 MG tablet    PRINIVIL/ZESTRIL    90 tablet    Take 1 tablet (10 mg) by mouth daily       metoprolol 50 MG 24 hr tablet    TOPROL-XL    90 tablet    Take 1 tablet (50 mg) by mouth daily       Multi-vitamin Tabs tablet   Generic drug:  multivitamin, therapeutic with minerals      Take 1 tablet by mouth daily.       nitroglycerin 0.4 MG sublingual tablet    NITROSTAT    25 tablet    Place 1 tablet (0.4 mg) under the tongue every 5 minutes as needed for chest pain       PARoxetine 40 MG tablet    PAXIL    90 tablet    Take 1 tablet (40 mg) by mouth every morning

## 2017-03-02 NOTE — PATIENT INSTRUCTIONS
Make sure EGD and colonoscopy is scheduled at [facility].  Labs today.  Follow up after EGD and colonoscopy.    Thursday, March 30, 2017 check-in at 12:30pm for 2pm  Endoscopy Department  [facility]  Floor 1, [address], Room [address] [place] [address]  Appointments: [phone]

## 2017-03-03 LAB
ALBUMIN SERPL ELPH-MCNC: 3.8 G/DL (ref 3.7–5.1)
ALPHA1 GLOB SERPL ELPH-MCNC: 0.3 G/DL (ref 0.2–0.4)
ALPHA2 GLOB SERPL ELPH-MCNC: 0.7 G/DL (ref 0.5–0.9)
B-GLOBULIN SERPL ELPH-MCNC: 0.9 G/DL (ref 0.6–1)
EPO SERPL-ACNC: 42
GAMMA GLOB SERPL ELPH-MCNC: 0.8 G/DL (ref 0.7–1.6)
HAPTOGLOB SERPL-MCNC: 228 MG/DL (ref 35–175)
M PROTEIN SERPL ELPH-MCNC: 0 G/DL
PROT PATTERN SERPL ELPH-IMP: NORMAL

## 2017-03-03 NOTE — PROGRESS NOTES
This consult has been requested by Dr. Preethi Quiroz for anemia.      Mr. Tevin Guidry is a 66-year-old gentleman with multiple medical problems, including coronary artery disease and COPD, who has chronic anemia. His labs are reviewed and summarized below.   1.  On 2008, hemoglobin of 14.2 with MCV of 90.   2.  On 2014, hemoglobin of 11.3 with MCV of 79.  Since then, multiple CBCs have revealed anemia.    3.   On 2017:  -Hemoglobin of 10.2 with MCV of 73.  White count of 7.7 and platelets of 330.    -Retic count 0.8%.   -Peripheral blood smear reveals moderate microcytic hypochromic anemia with anisopoikilocytosis.   -Iron of 20 with percent saturation of 4%.   -Folate of 14.6.   -Vitamin B12 of 308.   -TSH of 0.70.   4. Colonoscopy on 2016 had revealed multiple colon polyps.  Pathology revealed tubular adenoma.      The patient says that in 2017, he had a few episodes where his fingers turned blue.  At that time he was not having any infection.  I asked the patient if he was out in the cold.  He does not remember being out in the cold.  Anyway, his symptoms resolved after a few minutes on their own.  He has not had those episodes in February or March.      REVIEW OF SYSTEMS:  Overall, he feels pretty well.  No headache.  No dizziness.  No neck pain.  No chest pain.  No shortness of breath.  No nausea or vomiting.  Appetite is fairly good.  No fever.  No recent weight loss.      ALLERGIES:  Reviewed.      MEDICATIONS:  Reviewed.      PAST MEDICAL HISTORY:   1.  COPD.   2.  Coronary artery disease, status post bypass surgery and stent placement.   3.  Hyperlipidemia.   4.  Hypertension.   5.  Social phobia/social anxiety.      SOCIAL HISTORY:   -He has been a smoker for the last 50 years.  The patient says that he has cut down significantly.    -He quit alcohol 30 years ago.      FAMILY HISTORY:    -Father  of lung cancer at the age of 59.  He was a smoker.    -Mother  of stomach  cancer at the age of 60.    -He has 2 brothers, no sisters.      PHYSICAL EXAMINATION:   GENERAL:  He is alert and oriented x3.   VITAL SIGNS:  Reviewed.   EYES:  No icterus.   THROAT:  No ulcer or thrush.   NECK:  Supple.  No lymphadenopathy or thyromegaly.   AXILLAE:  No lymphadenopathy.   LUNGS:  Good air entry bilaterally.  No wheezing.   HEART:  Regular.   ABDOMEN:  Soft and nontender.  No mass.   EXTREMITIES:  No edema.  No calf swelling or tenderness.   SKIN:  No rash.      LABORATORY DATA:  Reviewed.      ASSESSMENT:   1.  A 66-year-old gentleman with microcytic anemia secondary to iron deficiency.   2.  Iron deficiency.   3.  Multiple other medical problems, including coronary artery disease and chronic obstructive pulmonary disease.      RECOMMENDATIONS:    1. I discussed with him regarding anemia.  Labs were reviewed.  I explained to the patient that he has been anemic for the last few years.  He has microcytic anemia.  Labs reveal iron deficiency.  Iron deficiency is causing microcytic anemia.   Discussed regarding iron deficiency.  Typically this happens with decreased oral iron intake, decreased absorption of iron or bleeding.  I asked about his eating habits.  The patient eats fairly normally. He eats red meat and other iron-rich foods.  Discussed any abdominal problems.  The patient denies having any abdominal symptoms.  He never had any abdominal surgery.  Based on the history, his iron absorption should be normal.  Discussed regarding any bleeding.  Denies any bleeding.     I told the patient that we should rule out GI bleed because of iron deficiency.  We will get an EGD and colonoscopy.  This will be scheduled at the [facility].  If those come back normal, we will get a capsule endoscopy.  The patient is agreeable for this plan.      He is on baby aspirin.  That can sometimes cause gastric erosion and GI bleed.      2.  Discussed regarding treatment.  Treatment would be  iron replacement.  He has never been on oral iron.  We can start him on oral iron.  I told the patient that I want the GI workup to be done before they start the oral iron.  He is agreeable for it.      3.  He had a few questions, which were all answered.  I will see him back in the clinic after the GI workup.  Today we will get some labs, including ferritin, LDH, creatinine and bilirubin.      Thanks for the consult.         MARGRET COLLIER MD             D: 2017 15:12   T: 2017 16:40   MT: LIZBETH      Name:     DICK LYONS   MRN:      [id number]        Account:      [id number]   :      1950           Visit Date:   2017      Document: [id number]

## 2017-03-27 ENCOUNTER — OFFICE VISIT (OUTPATIENT)
Dept: FAMILY MEDICINE | Facility: CLINIC | Age: 67
End: 2017-03-27
Payer: COMMERCIAL

## 2017-03-27 ENCOUNTER — PRE VISIT (OUTPATIENT)
Dept: CARDIOLOGY | Facility: CLINIC | Age: 67
End: 2017-03-27

## 2017-03-27 ENCOUNTER — TELEPHONE (OUTPATIENT)
Dept: GASTROENTEROLOGY | Facility: CLINIC | Age: 67
End: 2017-03-27

## 2017-03-27 VITALS
SYSTOLIC BLOOD PRESSURE: 116 MMHG | OXYGEN SATURATION: 95 % | WEIGHT: 167 LBS | TEMPERATURE: 98.1 F | DIASTOLIC BLOOD PRESSURE: 72 MMHG | BODY MASS INDEX: 25.39 KG/M2 | HEART RATE: 48 BPM

## 2017-03-27 DIAGNOSIS — D12.6 TUBULAR ADENOMA OF COLON: ICD-10-CM

## 2017-03-27 DIAGNOSIS — E78.5 HYPERLIPIDEMIA LDL GOAL <130: ICD-10-CM

## 2017-03-27 DIAGNOSIS — R79.89 LOW SERUM CALCIUM: ICD-10-CM

## 2017-03-27 DIAGNOSIS — I10 HYPERTENSION GOAL BP (BLOOD PRESSURE) < 140/90: ICD-10-CM

## 2017-03-27 DIAGNOSIS — I25.119 CORONARY ARTERY DISEASE INVOLVING NATIVE HEART WITH ANGINA PECTORIS, UNSPECIFIED VESSEL OR LESION TYPE (H): ICD-10-CM

## 2017-03-27 DIAGNOSIS — J44.9 CHRONIC OBSTRUCTIVE PULMONARY DISEASE, UNSPECIFIED COPD TYPE (H): ICD-10-CM

## 2017-03-27 DIAGNOSIS — Z95.1 S/P CABG (CORONARY ARTERY BYPASS GRAFT): Primary | ICD-10-CM

## 2017-03-27 DIAGNOSIS — D50.9 IRON DEFICIENCY ANEMIA, UNSPECIFIED IRON DEFICIENCY ANEMIA TYPE: ICD-10-CM

## 2017-03-27 DIAGNOSIS — Z01.818 PREOPERATIVE EXAMINATION: Primary | ICD-10-CM

## 2017-03-27 DIAGNOSIS — Z12.11 ENCOUNTER FOR SCREENING COLONOSCOPY: Primary | ICD-10-CM

## 2017-03-27 DIAGNOSIS — Z95.1 S/P CABG (CORONARY ARTERY BYPASS GRAFT): ICD-10-CM

## 2017-03-27 LAB — HGB BLD-MCNC: 10.3 G/DL (ref 13.3–17.7)

## 2017-03-27 PROCEDURE — 99215 OFFICE O/P EST HI 40 MIN: CPT | Performed by: FAMILY MEDICINE

## 2017-03-27 PROCEDURE — 80048 BASIC METABOLIC PNL TOTAL CA: CPT | Performed by: FAMILY MEDICINE

## 2017-03-27 PROCEDURE — 36415 COLL VENOUS BLD VENIPUNCTURE: CPT | Performed by: FAMILY MEDICINE

## 2017-03-27 PROCEDURE — 93000 ELECTROCARDIOGRAM COMPLETE: CPT | Performed by: FAMILY MEDICINE

## 2017-03-27 PROCEDURE — 85018 HEMOGLOBIN: CPT | Performed by: FAMILY MEDICINE

## 2017-03-27 ASSESSMENT — ANXIETY QUESTIONNAIRES
2. NOT BEING ABLE TO STOP OR CONTROL WORRYING: NOT AT ALL
1. FEELING NERVOUS, ANXIOUS, OR ON EDGE: NOT AT ALL
GAD7 TOTAL SCORE: 0
5. BEING SO RESTLESS THAT IT IS HARD TO SIT STILL: NOT AT ALL
3. WORRYING TOO MUCH ABOUT DIFFERENT THINGS: NOT AT ALL
IF YOU CHECKED OFF ANY PROBLEMS ON THIS QUESTIONNAIRE, HOW DIFFICULT HAVE THESE PROBLEMS MADE IT FOR YOU TO DO YOUR WORK, TAKE CARE OF THINGS AT HOME, OR GET ALONG WITH OTHER PEOPLE: NOT DIFFICULT AT ALL
7. FEELING AFRAID AS IF SOMETHING AWFUL MIGHT HAPPEN: NOT AT ALL
6. BECOMING EASILY ANNOYED OR IRRITABLE: NOT AT ALL

## 2017-03-27 ASSESSMENT — PATIENT HEALTH QUESTIONNAIRE - PHQ9: 5. POOR APPETITE OR OVEREATING: NOT AT ALL

## 2017-03-27 NOTE — PROGRESS NOTES
"[facility] [place]  [address] [place] [id number] [phone]  Dept: [phone]    PRE-OP EVALUATION:  Today's date: 3/27/2017    Tevin Guidry (: 1950) presents for pre-operative evaluation assessment as requested by Dr. Onofre.  He requires evaluation and anesthesia risk assessment prior to undergoing surgery/procedure for treatment of anemia.  Proposed procedure: Colonscopy and Endoscopy     Date of Surgery/ Procedure: 17  Time of Surgery/ Procedure: 12:30 pm  Hospital/Surgical Facility: [facility]   Primary Physician: Preethi Quiroz  Type of Anesthesia Anticipated: General    Patient has a Health Care Directive or Living Will:  NO    1. YES - Do you have a history of heart attack, stroke, stent, bypass or surgery on an artery in the head, neck, heart or legs? multivessel CAD ( 2 vessel at least ) S/p MI in 2014 S/p Hybrid robotic assisted CABG with LIMa to LAD and PCI with ALENA to RCA distal to proximal, completed one yr of plavix in 2015 and put on asa 325 mg, seen by PCP for preventive care in oct 2015 and noted to have increased ELENA so seen by regular cardiologist in dec 2015 and assessed to have no indication of further ischemic evaluation at the time, PFT'S showed mild COPD . Reports has \" 6 stents\"   2. NO - Do you ever have any pain or discomfort in your chest?  3. NO - Do you have a history of  Heart Failure?  4. NO - Are you troubled by shortness of breath when: walking on the level, up a slight hill or at night?  5. NO - Do you currently have a cold, bronchitis or other respiratory infection?  6. NO - Do you have a cough, shortness of breath or wheezing?  7. NO - Do you sometimes get pains in the calves of your legs when you walk?  8. NO - Do you or anyone in your family have previous history of blood clots?  9. NO - Do you or does anyone in your family have a serious bleeding problem such as prolonged bleeding following surgeries or cuts?  10. " YES - Have you ever had problems with anemia or been told to take iron pills?  11. NO - Have you had any abnormal blood loss such as black, tarry or bloody stools, or abnormal vaginal bleeding?  12. YES - Have you ever had a blood transfusion?  13. NO - Have you or any of your relatives ever had problems with anesthesia?  14. NO - Do you have sleep apnea, excessive snoring or daytime drowsiness?  15. NO - Do you have any prosthetic heart valves?  16. NO - Do you have prosthetic joints?  17. NO - Is there any chance that you may be pregnant?      HPI:                                                      Brief HPI related to upcoming procedure: Tevin Guidry is a 66 year old male who presents today for pre-op for EGD and colonoscopy to evaluate his anemia further.       HYPERTENSION - Patient has HTN, currently denies any symptoms referable to elevated blood pressure. Specifically denies chest pain, palpitations, dyspnea, orthopnea, PND or peripheral edema. Blood pressure readings have been in normal range. Current medication regimen is as listed below. Patient denies any side effects of medication.                                                                                                                                                               HYPERLIPIDEMIA - Patient has  Hyperlipidemia requiring medication for treatment with recent good control. Patient reports no problems or side effects with the medication.                                                                                                                                                       .  Lab Results   Component Value Date    CHOL 150 10/27/2016     Lab Results   Component Value Date    HDL 55 10/27/2016     Lab Results   Component Value Date    LDL 75 10/27/2016     Lab Results   Component Value Date    TRIG 98 10/27/2016     Lab Results   Component Value Date    CHOLHDLRATIO 3.6 10/23/2015        COPD - Patient has COPD. Patient  has been doing well overall.                                                                                                    .  CAD - Patient has CAD. Patient denies recent chest pain or NTG use.    He spoke with his cardiologist, who recommended he stay on the ASA 81mg daily prior to his procedure.      MEDICAL HISTORY:                                                      Patient Active Problem List    Diagnosis Date Noted     Health Care Home 03/13/2017     Priority: Medium     No longer active with [facility] case management effective 5/31/16.         Anemia, unspecified type 01/25/2017     Priority: Medium     Chronic obstructive pulmonary disease, unspecified COPD type (H) 12/08/2015     Priority: Medium     PFTs 12/8/15       S/P CABG (coronary artery bypass graft) 10/22/2015     Priority: Medium     Cigarette nicotine dependence without complication 10/14/2014     Priority: Medium     CAD (coronary artery disease) 09/30/2014     Priority: Medium     CAD, multiple vessel 09/26/2014     Priority: Medium     Advanced directives, counseling/discussion 08/20/2013     Priority: Medium     Tubular adenoma of colon 07/11/2012     Priority: Medium     9/6/13 colonoscopy revealed 4 polyps. 2 year follow up colonoscopy recommended       HYPERLIPIDEMIA LDL GOAL <130 05/09/2010     Priority: Medium     Hypertension goal BP (blood pressure) < 140/90      Priority: Medium     Social phobia 03/02/2005     Priority: Medium      Past Medical History:   Diagnosis Date     Colon polyps     multiple may need colectomy (adenoma)     Hyperlipidemia LDL goal <130      Hypertension goal BP (blood pressure) < 140/90      Social phobia      Past Surgical History:   Procedure Laterality Date     DAVINCI BYPASS ARTERY CORONARY N/A 9/29/2014    Procedure: DAVINCI BYPASS ARTERY CORONARY;  Surgeon: Lam Solis MD;  Location: [facility] OR      COLONOSCOPY THRU STOMA, DIAGNOSTIC  2010    polyps due 2011      SURGICAL HISTORY OF -       right clavicular fracture with ORIF     Current Outpatient Prescriptions   Medication Sig Dispense Refill     albuterol (PROAIR HFA, PROVENTIL HFA, VENTOLIN HFA) 108 (90 BASE) MCG/ACT inhaler Inhale 2 puffs into the lungs every 6 hours as needed for shortness of breath / dyspnea or wheezing 1 Inhaler 1     lisinopril (PRINIVIL,ZESTRIL) 10 MG tablet Take 1 tablet (10 mg) by mouth daily 90 tablet 3     metoprolol (TOPROL-XL) 50 MG 24 hr tablet Take 1 tablet (50 mg) by mouth daily 90 tablet 3     atorvastatin (LIPITOR) 40 MG tablet Take 1 tablet (40 mg) by mouth daily 90 tablet 3     PARoxetine (PAXIL) 40 MG tablet Take 1 tablet (40 mg) by mouth every morning 90 tablet 1     aspirin 81 MG tablet Take 1 tablet (81 mg) by mouth daily 90 tablet 3     nitroglycerin (NITROSTAT) 0.4 MG SL tablet Place 1 tablet (0.4 mg) under the tongue every 5 minutes as needed for chest pain 25 tablet 3     Multiple Vitamin (MULTI-VITAMIN) per tablet Take 1 tablet by mouth daily.         OTC products: None, except as noted above    Allergies   Allergen Reactions     Codeine      No Known Allergies       Latex Allergy: NO    Social History   Substance Use Topics     Smoking status: Former Smoker     Smokeless tobacco: Never Used      Comment: quit smoking over 20 years ago     Alcohol use No      Comment: quit 35 years ago     History   Drug Use     Yes     Comment: occionally use of pot       REVIEW OF SYSTEMS:                                                    10 point ROS is negative except as noted above.    This document serves as a record of the services and decisions personally performed and made by Preethi Quiroz MD. It was created on her behalf by Morena Fabian, a trained medical scribe. The creation of this document is based on the provider's statements to the medical scribe.  Morena Fabian March 27, 2017 3:02 PM    EXAM:                                                    /72  Pulse (!) 48   Temp 98.1  F (36.7  C) (Oral)  Wt 167 lb (75.8 kg)  SpO2 95%  BMI 25.39 kg/m2    GENERAL APPEARANCE: healthy, alert and no distress     EYES: EOMI,  PERRL     HENT: ear canals and TM's normal and nose and mouth without ulcers or lesions     NECK: no adenopathy, no asymmetry, masses, or scars and thyroid normal to palpation     RESP: lungs clear to auscultation - no rales, rhonchi or wheezes     CV: regular rates and rhythm, normal S1 S2, no S3 or S4 and no murmur, click or rub     ABDOMEN:  soft, nontender, no HSM or masses and bowel sounds normal     MS: extremities normal- no gross deformities noted      PSYCH: mentation appears normal. and affect normal/bright     LYMPHATICS: No axillary, cervical, or supraclavicular nodes    DIAGNOSTICS:                                                      EKG: sinus bradycardia, precordial T waves unchanged from 4/16 EKG, unchanged from previous tracings  Labs Resulted Today:   Results for orders placed or performed in visit on 03/02/17   Ferritin   Result Value Ref Range    Ferritin 5 (L) 26 - 388 ng/mL   Erythropoietin   Result Value Ref Range    Erythropoietin 42 (H)    Lactate Dehydrogenase   Result Value Ref Range    Lactate Dehydrogenase 178 85 - 227 U/L   Haptoglobin   Result Value Ref Range    Haptoglobin 228 (H) 35 - 175 mg/dL   Protein electrophoresis   Result Value Ref Range    Albumin Fraction 3.8 3.7 - 5.1 g/dL    Alpha 1 Fraction 0.3 0.2 - 0.4 g/dL    Alpha 2 Fraction 0.7 0.5 - 0.9 g/dL    Beta Fraction 0.9 0.6 - 1.0 g/dL    Gamma Fraction 0.8 0.7 - 1.6 g/dL    Monoclonal Peak 0.0 0.0 g/dL    ELP Interpretation:       Essentially normal electrophoretic pattern.  No monoclonal protein seen.   Pathologic significance requires clinical correlation.  Francoise Maxwell M.D., Ph.D.       Creatinine   Result Value Ref Range    Creatinine 0.73 0.66 - 1.25 mg/dL    GFR Estimate >90  Non  GFR Calc   >60 mL/min/1.7m2    GFR Estimate If Black >90  African  American GFR Calc   >60 mL/min/1.7m2   Bilirubin  total   Result Value Ref Range    Bilirubin Total 0.2 0.2 - 1.3 mg/dL       Recent Labs   Lab Test  03/02/17   1150  01/25/17   0909  04/25/16   1050  10/23/15   0850   10/07/14   1319   09/29/14   1735   09/28/14   0709   07/11/12   1503   HGB   --   10.2*  10.4*   --    < >   --    < >  10.4*   < >  11.7*   < >   --    PLT   --   330  341   --    < >   --    < >  278   < >  284   < >   --    INR   --    --    --    --    --   1.11   --   1.24*   --   0.98   < >   --    NA   --    --   143  141   < >   --    < >  141   < >  138   < >  145*   POTASSIUM   --    --   4.6  4.2   < >   --    < >  3.9   < >  3.8   < >  3.6   CR  0.73   --   0.84  0.84   < >   --    < >  0.83   < >  0.76   < >  0.82   A1C   --    --    --    --    --    --    --    --    --   5.9   --   6.0    < > = values in this interval not displayed.      IMPRESSION:                                                    Reason for surgery/procedure: Anemia, iron deficiency   Diagnosis/reason for consult: Pre-op EGD and colonoscopy    The proposed surgical procedure is considered LOW risk.    REVISED CARDIAC RISK INDEX  The patient has the following serious cardiovascular risks for perioperative complications such as (MI, PE, VFib and 3  AV Block):  No serious cardiac risks  INTERPRETATION: 1 risks: Class II (low risk - 0.9% complication rate)    The patient has the following additional risks for perioperative complications:  No identified additional risks      ICD-10-CM    1. Preoperative examination Z01.818 EKG 12-lead complete w/read - Clinics     Basic metabolic panel  (Ca, Cl, CO2, Creat, Gluc, K, Na, BUN)     Hemoglobin   2. Iron deficiency anemia, unspecified iron deficiency anemia type D50.9 Plan for EGD and colonoscopy. If no bleeding source, will f/u with hematology and may complete pill endoscopy for further eval.    3. Tubular adenoma of colon D12.6    4. Hypertension goal BP (blood pressure) <  140/90 I10 Controlled    5. Chronic obstructive pulmonary disease, unspecified COPD type (H) J44.9 stable   6. S/P CABG (coronary artery bypass graft) Z95.1 Stable, sees cardiology tomorrow    7. Hyperlipidemia LDL goal <130 E78.5 stable   8. Coronary artery disease involving native heart with angina pectoris, unspecified vessel or lesion type (H) I25.119 stable             RECOMMENDATIONS:                                                      --Consult hospital rounder / IM to assist post-op medical management    Cardiovascular Risk  Patient is already on a Beta Blocker. Continue Betablocker therapy after surgery, using Beta blocker order set as necessary for NPO status.  He will be seeing his cardiologist tomorrow. He does have mild sinus bradycardia and will defer to cardiology whether he should reduce metoprolol dose prior to his procedure. Will continue current dose for now.       Pulmonary Risk  Incentive spirometry post op  Respiratory Therapy (Respiratory Care IP Consult)  post op  NG tube decompression if abdominal distension or significant vomiting       Anemia  Anemia and does not require treatment prior to surgery.  Monitor Hemoglobin postoperatively.      --Patient is to take all scheduled medications on the day of surgery EXCEPT for modifications listed below.    Calcium level was slightly low. I have sent an order for ionized calcium to be added to his labs. If this is unable to be completed, he should return for lab draw to check ionized calcium.     APPROVAL GIVEN to proceed with proposed procedure, without further diagnostic evaluation     The information in this document, created by the medical scribe for me, accurately reflects the services I personally performed and the decisions made by me. I have reviewed and approved this document for accuracy prior to leaving the patient care area.  3/27/2017 3:07 PM         Signed Electronically by: Preethi Quiroz MD, MD    Copy of this evaluation report is  provided to requesting physician.    [facility] Preop Guidelines

## 2017-03-27 NOTE — TELEPHONE ENCOUNTER
Patient scheduled for EGD and Colonoscopy    Indication for procedure. Evaluate for possible source of blood loss anemia    Referring Provider. Preethi Quiroz MD    ? Not Needed    Arrival time verified? Yes, 1230    Facility location verified? Yes, [address] [facility]    Instructions given regarding prep and procedure    Prep Type NPO and Golytely    Are you taking any anticoagulants or blood thinners? Aspirin    Instructions given? Per cardiologist is to stay on 81mg aspirin    Electronic implanted devices? No    Barriers to learning? No    Pre procedure teaching completed? Yes    Transportation from procedure? Brother, Brother will stay with patient after procedure    H&P / Pre op physical completed? Completed on 03/27/17

## 2017-03-27 NOTE — LETTER
[facility]   [address] [place]   [address]  [phone]    March 30, 2017      Tevin Guidry  [address] [facility] [address]              Dear Mr. Guidry,    Your blood sugar was a little high, but likely normal since it was not fasting. Your calcium was just slightly low. I have asked the lab to recheck your calcium level. If they are unable to do so, I recommend returning to clinic for a non-fasting lab visit to recheck your calcium.  Your hemoglobin was about the same as the last check two months ago. Your kidney and liver function were stable.       Results for orders placed or performed in visit on 03/27/17   Basic metabolic panel  (Ca, Cl, CO2, Creat, Gluc, K, Na, BUN)   Result Value Ref Range    Sodium 143 133 - 144 mmol/L    Potassium 4.2 3.4 - 5.3 mmol/L    Chloride 106 94 - 109 mmol/L    Carbon Dioxide 32 20 - 32 mmol/L    Anion Gap 5 3 - 14 mmol/L    Glucose 118 (H) 70 - 99 mg/dL    Urea Nitrogen 16 7 - 30 mg/dL    Creatinine 0.86 0.66 - 1.25 mg/dL    GFR Estimate 88 >60 mL/min/1.7m2    GFR Estimate If Black >90   GFR Calc   >60 mL/min/1.7m2    Calcium 8.4 (L) 8.5 - 10.1 mg/dL   Hemoglobin   Result Value Ref Range    Hemoglobin 10.3 (L) 13.3 - 17.7 g/dL         Sincerely,    Preethi Quiroz MD/nr

## 2017-03-27 NOTE — NURSING NOTE
"Chief Complaint   Patient presents with     Pre-Op Exam       Initial /72  Pulse (!) 48  Temp 98.1  F (36.7  C) (Oral)  Wt 167 lb (75.8 kg)  SpO2 95%  BMI 25.39 kg/m2 Estimated body mass index is 25.39 kg/(m^2) as calculated from the following:    Height as of 2/21/17: 5' 8\" (1.727 m).    Weight as of this encounter: 167 lb (75.8 kg).  Medication Reconciliation: complete       Homa Gore MA     "

## 2017-03-27 NOTE — MR AVS SNAPSHOT
After Visit Summary   3/27/2017    Tevin Guidry    MRN: [id number]           Patient Information     Date Of Birth          1950        Visit Information        Provider Department      3/27/2017 2:40 PM Preethi Quiroz MD [facility]        Today's Diagnoses     Preoperative examination    -  1    Iron deficiency anemia, unspecified iron deficiency anemia type        Tubular adenoma of colon        Hypertension goal BP (blood pressure) < 140/90        Chronic obstructive pulmonary disease, unspecified COPD type (H)        S/P CABG (coronary artery bypass graft)        Hyperlipidemia LDL goal <130        Coronary artery disease involving native heart with angina pectoris, unspecified vessel or lesion type (H)        Preop general physical exam          Care Instructions      Before Your Surgery      Call your surgeon if there is any change in your health. This includes signs of a cold or flu (such as a sore throat, runny nose, cough, rash or fever).    Do not smoke, drink alcohol or take over the counter medicine (unless your surgeon or primary care doctor tells you to) for the 24 hours before and after surgery.    If you take prescribed drugs: Follow your doctor s orders about which medicines to take and which to stop until after surgery.    Eating and drinking prior to surgery: follow the instructions from your surgeon    Take a shower or bath the night before surgery. Use the soap your surgeon gave you to gently clean your skin. If you do not have soap from your surgeon, use your regular soap. Do not shave or scrub the surgery site.  Wear clean pajamas and have clean sheets on your bed.         Follow-ups after your visit        Your next 10 appointments already scheduled     Mar 28, 2017 10:00 AM CDT   (Arrive by 9:45 AM)   Return Visit with Casa Evans MD   [facility] ([facility])    [address] [place] [address]  "[id number]   [phone]            Mar 30, 2017   Procedure with Jie Onofre MD   [facility] [place], Endoscopy ([facility])    [address]  [place] [address] [phone]           The [facility] is located on the corner of [facility] and [facility] on the [facility]. It is easily accessible from virtually any point in the [facility] area, via [address] and [address].            Apr 06, 2017 11:00 AM CDT   Return Visit with Josette Rubin MD   [facility] ([facility])    [facility]  [address]  [facility] [id number] [phone]              Who to contact     If you have questions or need follow up information about today's clinic visit or your schedule please contact [facility] directly at [phone].  Normal or non-critical lab and imaging results will be communicated to you by [employer], letter or phone within 4 business days after the clinic has received the results. If you do not hear from us within 7 days, please contact the clinic through [employer] or phone. If you have a critical or abnormal lab result, we will notify you by phone as soon as possible.  Submit refill requests through [employer] or call your pharmacy and they will forward the refill request to us. Please allow 3 business days for your refill to be completed.          Additional Information About Your Visit        [employer] Information     [employer] lets you send messages to your doctor, view your test results, renew your prescriptions, schedule appointments and more. To sign up, go to [email or web address] . Click on \"Log in\" on the left side of the screen, which will take you to the Welcome page. Then click on \"Sign up Now\" on the right side of the page.     You will be asked to enter the access code listed below, as well as some personal information. Please follow " the directions to create your username and password.     Your access code is: [email or web address]  Expires: 2017 12:56 PM     Your access code will  in 90 days. If you need help or a new code, please call your [facility] clinic or [phone].        Care EveryWhere ID     This is your Care EveryWhere ID. This could be used by other organizations to access your [facility] medical records  [id number]        Your Vitals Were     Pulse Temperature Pulse Oximetry BMI (Body Mass Index)          48 98.1  F (36.7  C) (Oral) 95% 25.39 kg/m2         Blood Pressure from Last 3 Encounters:   17 116/72   17 130/66   17 134/84    Weight from Last 3 Encounters:   17 167 lb (75.8 kg)   17 171 lb 3.2 oz (77.7 kg)   17 164 lb 4 oz (74.5 kg)              We Performed the Following     Basic metabolic panel  (Ca, Cl, CO2, Creat, Gluc, K, Na, BUN)     EKG 12-lead complete w/read - Clinics     Hemoglobin        Primary Care Provider Office Phone # Fax #    Preethi Quiroz [phone]       [facility] [address] [facility] [address]        Thank you!     Thank you for choosing [facility]  for your care. Our goal is always to provide you with excellent care. Hearing back from our patients is one way we can continue to improve our services. Please take a few minutes to complete the written survey that you may receive in the mail after your visit with us. Thank you!             Your Updated Medication List - Protect others around you: Learn how to safely use, store and throw away your medicines at www.disposemymeds.org.          This list is accurate as of: 3/27/17  3:31 PM.  Always use your most recent med list.                   Brand Name Dispense Instructions for use    albuterol 108 (90 BASE) MCG/ACT Inhaler    PROAIR HFA/PROVENTIL HFA/VENTOLIN HFA    1 Inhaler    Inhale 2 puffs into the lungs every 6 hours as needed for shortness of breath / dyspnea or  wheezing       aspirin 81 MG tablet     90 tablet    Take 1 tablet (81 mg) by mouth daily       atorvastatin 40 MG tablet    LIPITOR    90 tablet    Take 1 tablet (40 mg) by mouth daily       lisinopril 10 MG tablet    PRINIVIL/ZESTRIL    90 tablet    Take 1 tablet (10 mg) by mouth daily       metoprolol 50 MG 24 hr tablet    TOPROL-XL    90 tablet    Take 1 tablet (50 mg) by mouth daily       Multi-vitamin Tabs tablet   Generic drug:  multivitamin, therapeutic with minerals      Take 1 tablet by mouth daily.       nitroglycerin 0.4 MG sublingual tablet    NITROSTAT    25 tablet    Place 1 tablet (0.4 mg) under the tongue every 5 minutes as needed for chest pain       PARoxetine 40 MG tablet    PAXIL    90 tablet    Take 1 tablet (40 mg) by mouth every morning

## 2017-03-28 ENCOUNTER — OFFICE VISIT (OUTPATIENT)
Dept: CARDIOLOGY | Facility: CLINIC | Age: 67
End: 2017-03-28
Attending: INTERNAL MEDICINE
Payer: COMMERCIAL

## 2017-03-28 VITALS
HEIGHT: 68 IN | WEIGHT: 169.1 LBS | BODY MASS INDEX: 25.63 KG/M2 | DIASTOLIC BLOOD PRESSURE: 76 MMHG | OXYGEN SATURATION: 95 % | SYSTOLIC BLOOD PRESSURE: 124 MMHG | HEART RATE: 52 BPM

## 2017-03-28 DIAGNOSIS — E78.5 HYPERLIPIDEMIA LDL GOAL <70: ICD-10-CM

## 2017-03-28 LAB
ANION GAP SERPL CALCULATED.3IONS-SCNC: 5 MMOL/L (ref 3–14)
BUN SERPL-MCNC: 16 MG/DL (ref 7–30)
CALCIUM SERPL-MCNC: 8.4 MG/DL (ref 8.5–10.1)
CHLORIDE SERPL-SCNC: 106 MMOL/L (ref 94–109)
CO2 SERPL-SCNC: 32 MMOL/L (ref 20–32)
CREAT SERPL-MCNC: 0.86 MG/DL (ref 0.66–1.25)
GFR SERPL CREATININE-BSD FRML MDRD: 88 ML/MIN/1.7M2
GLUCOSE SERPL-MCNC: 118 MG/DL (ref 70–99)
POTASSIUM SERPL-SCNC: 4.2 MMOL/L (ref 3.4–5.3)
SODIUM SERPL-SCNC: 143 MMOL/L (ref 133–144)

## 2017-03-28 PROCEDURE — 99212 OFFICE O/P EST SF 10 MIN: CPT

## 2017-03-28 PROCEDURE — 99213 OFFICE O/P EST LOW 20 MIN: CPT | Mod: ZP | Performed by: INTERNAL MEDICINE

## 2017-03-28 RX ORDER — ATORVASTATIN CALCIUM 80 MG/1
80 TABLET, FILM COATED ORAL DAILY
Qty: 90 TABLET | Refills: 3 | Status: SHIPPED | OUTPATIENT
Start: 2017-03-28 | End: 2018-06-24

## 2017-03-28 ASSESSMENT — PAIN SCALES - GENERAL: PAINLEVEL: NO PAIN (0)

## 2017-03-28 ASSESSMENT — ANXIETY QUESTIONNAIRES: GAD7 TOTAL SCORE: 0

## 2017-03-28 ASSESSMENT — PATIENT HEALTH QUESTIONNAIRE - PHQ9: SUM OF ALL RESPONSES TO PHQ QUESTIONS 1-9: 0

## 2017-03-28 NOTE — PROGRESS NOTES
CARDIOLOGY NEW OFFICE VISIT    HPI:   Mr. Tevin Guidry is a 65 year old man with PMH significant for CAD s/p hybrid robotic-assisted CABG (LIMA - LAD) and PCI with ALENA to RCA (distal to prox), HTN, and HPL who presents to clinic for routine follow-up appointment.  In September 2014 the patient endorsed ongoing chest pain while mowing the lawn. He was referred for a stress echocardiogram test which revealed a decrease in the EF with stress as well as extensive inferolateral akinesis. He was also found to have ST depression in the lateral leads and ST elevation in AVL. The patient was subsequently referred for a coronary angiogram via the R Femoral Artery which revealed diffuse disease of the RCA (85% in mid RCA and 95% in the distal RCA) and moderate stenosis of the LAD with diffuse plaque. CTS was consulted and he subsequently had a hybrid robotic-assisted CABG (LIMA - LAD) and PCI with ALENA to RCA (distal to prox) in October 2014.    In the interim since last clinic visit the patient was diagnosed with anemia. He is scheduled to undergo EGD/Colonoscopy later this week. He is doing well from a cardiac standpoint. He denies any headaches, dizziness, syncope, angina, significant dyspnea at rest or with exertion, palpitations, orthopnea, PND, abdominal pain, pedal edema, claudication, or any new numbness/weakness.    PAST MEDICAL HISTORY:  Past Medical History:   Diagnosis Date     Colon polyps     multiple may need colectomy (adenoma)     Hyperlipidemia LDL goal <130      Hypertension goal BP (blood pressure) < 140/90      Social phobia        CURRENT MEDICATIONS:  Current Outpatient Prescriptions   Medication Sig Dispense Refill     albuterol (PROAIR HFA, PROVENTIL HFA, VENTOLIN HFA) 108 (90 BASE) MCG/ACT inhaler Inhale 2 puffs into the lungs every 6 hours as needed for shortness of breath / dyspnea or wheezing 1 Inhaler 1     lisinopril (PRINIVIL,ZESTRIL) 10 MG tablet Take 1 tablet (10 mg) by mouth daily 90 tablet  3     metoprolol (TOPROL-XL) 50 MG 24 hr tablet Take 1 tablet (50 mg) by mouth daily 90 tablet 3     atorvastatin (LIPITOR) 40 MG tablet Take 1 tablet (40 mg) by mouth daily 90 tablet 3     PARoxetine (PAXIL) 40 MG tablet Take 1 tablet (40 mg) by mouth every morning 90 tablet 1     aspirin 81 MG tablet Take 1 tablet (81 mg) by mouth daily 90 tablet 3     nitroglycerin (NITROSTAT) 0.4 MG SL tablet Place 1 tablet (0.4 mg) under the tongue every 5 minutes as needed for chest pain 25 tablet 3     Multiple Vitamin (MULTI-VITAMIN) per tablet Take 1 tablet by mouth daily.           PAST SURGICAL HISTORY:  Past Surgical History:   Procedure Laterality Date     DAVINCI BYPASS ARTERY CORONARY N/A 9/29/2014    Procedure: DAVINCI BYPASS ARTERY CORONARY;  Surgeon: Lam Solis MD;  Location: [facility] OR      COLONOSCOPY THRU STOMA, DIAGNOSTIC  2010    polyps due 2011     SURGICAL HISTORY OF -       right clavicular fracture with ORIF       ALLERGIES  Codeine and No known allergies    FAMILY HX:  Family History   Problem Relation Age of Onset     CANCER Mother      esog/stomach     CANCER Father      lung     Family History Negative Brother      Hypertension Brother        SOCIAL HX:  History     Social History     Marital Status: Single     Spouse Name: N/A     Number of Children: 0     Years of Education: N/A     Social History Main Topics     Smoking status: Former Smoker     Smokeless tobacco: Never Used      Comment: quit smoking over 20 years ago     Alcohol Use: No      Comment: quit 35 years ago     Drug Use: Yes      Comment: pot smoked yesterday     Sexual Activity:     Partners: Female     Other Topics Concern      Service No     Blood Transfusions No     Exercise No     Seat Belt Yes     Self-Exams No     Parent/Sibling W/ Cabg, Mi Or Angioplasty Before 65f 55m? No     Social History Narrative       ROS:  Constitutional: No fever, chills, or sweats. No weight gain/loss.   HEENT: No visual  "disturbance  Respiratory: No cough, hemoptysis.   Cardiovascular: As per HPI.   GI: No nausea, vomiting, hematemesis, melena, or hematochezia.   : No urinary frequency, dysuria, or hematuria.   Neuro: No speech disturbance, focal sensory or motor deficit.   Endocrinology: No polyuria / polyphagia.   Musculoskeletal: No myalgia.    PHYSICAL EXAM  /76  Pulse 52  Ht 1.727 m (5' 8\")  Wt 76.7 kg (169 lb 1.6 oz)  SpO2 95%  BMI 25.71 kg/m2  General: alert and in no acute distress  Neck: no audible bruits; no JVD  Cardiovascular: RRR; nl S1 and S2; no audible murmurs, rubs or gallops  Respiratory:  CTA b/l with no rhonchi, rales or wheezing  Gastrointestinal: +BS; abdomen soft, non-tender; no organomegaly, guarding or rebound tenderness  Extremities: no lower extremity edema  Pulses: 2+ DP and PT bilaterally  Skin: no suspicious lesions or rashes  Neurologic: Gross motor and sensory intact    LABS  CBC RESULTS:   Recent Labs   Lab Test  03/27/17   1537  01/25/17   0909   WBC   --   7.7   RBC   --   4.69   HGB  10.3*  10.2*   HCT   --   34.4*   MCV   --   73*   MCH   --   21.7*   MCHC   --   29.7*   RDW   --   19.9*   PLT   --   330       Recent Labs   Lab Test  10/27/16   1028  10/23/15   0850  09/30/14   0427   CHOL  150  155  109   HDL  55  43  51   LDL  75  85  39   TRIG  98  137  97   CHOLHDLRATIO   --   3.6  2.1         Coronary angiogram with PCI (9/29/2014)  LIMA graft injected after the PCI per request of Dr. Solis after the PCI. It has two stenotic areas at the touchdown of mLAD with LOKESH 3 flow flow distally. Possible edematous change from the operation per Dr. Solis    Impression:   1. Successful PCI with ALENA from distal to prox segments of RCA.   2. Good flow of LIMA to LAD with stenosis as noted above.    Davinci Hybrid Coronary Artery Bypass Graft of Left inferior Mamary Artery to LAD(9/29/2014)  Coronary Angiogram (9/26/2014)  LMT with mild to moderate ostial disease.  LAD type 3 vessel, gives rise " to small D1 & large D2. LAD has moderate stenosis with diffuse plaque.   LCX gives rise to small OM1 & OM2, with minimal disease.   LCA gives collaterals to rPL and rPDA.   RCA (dominant) gives rise to PL & PDA. RCA has 85% stenosis in the mid and 95% lesion in the distal RCA.   PL brunch flow is to and fro against the collaterals from the left. PL is diffusely diseased.   Heparin was given for FFR/iFR measurement ([facility]).   iFR(LAD distal) was .83, which was consistent with positive FFR(cutoff line is 0.86).   Impression:   1. Two Vessel Disease (RCA, LAD)   Plan:   1. CABG, LIMA to LAD, SV to dRCA  NICS (9/26/2014)  No hemodynamically significant stenosis of the carotid arteries  US for abdominal Aorta (9/26/2014)  FINDINGS: Aortic dimensions are as follows (AP x transverse):   Suprarenal aorta: 3 cm   Mid infrarenal: 2.4 cm   Inferior infrarenal: 1.7 cm   Right common iliac artery: 1.1 cm   Left common iliac artery: 1.2 cm     ASSESSMENT AND PLAN:   Mr. Tevin Guidry is a 65 year old man with PMH significant for CAD s/p hybrid robotic-assisted CABG (LIMA - LAD) and PCI with ALENA to RCA (distal to prox), HTN, and HPL who presents to clinic for routine follow-up appointment.    CAD  - s/p hybrid robotic-assisted CABG (LIMA - LAD) and PCI with ALENA to RCA (distal to prox)  - no further ischemic evaluation warranted at this time  - continue ASA   - switch Atorvastatin 40 mg daily  - Metoprolol 50mg XL daily  - continue Lisinopril 10mg daily    HTN  - well-controlled on Metoprolol and Lisinopril    HPL  - continue atorvastatin    Signature:  Trevon Talavera MD  Cardiovascular Diseases Fellow    ATTENDING NOTE: Patient has been seen and e me on 3/28/2017. I have reviewed the note. Please refer to it for additional details. I have reviewed today's vital signs, medications, labs, and imaging results. I have reviewed and edited, as necessary, the history, review of systems, physical examination, and assessment and plan. I  have discussed my assessment and plan with the cardiology fellow. Tevin Guidry is a 64 year old male with risk factor profile (+) HTN, (-) DM, (+) hypercholesterolemia, (-) tobacco use, (-) fam Hx premature CAD, no prior documented Hx CAD, reported stable Class I angina, was referred for exercise stress test (2014). He developed ST depression in the lateral leads and ST elevation in aVL during the stress study prompting cessation of the test. He had no symptomsvaluated by. With stress, the LV dilated and there was extensive inferolateral akinesis with a fall in LVEF from 60% to 50%. He was referred directly for coronary angiography (2014) and was found to have moderate ostial LMCA disease, moderate disease in the LAD, mild disease in the LCx, LCx collaterals to RCA, 85% mid RCA, 95% distal RCA. Heparin was given for FFR/iFR measurement ([facility]). The iFR(LAD distal) was 0.83, which was consistent with positive FFR (cutoff line is 0.86).  He underwent CABG by Dr. Solis with AUGUSTE-LAD (Oct 2014) and subsequent PCI of Rt PDA.  The patient is no longer experiencing chest pain but does report ELENA.  This is chronic and may be related to tobacco use.  PFTs showed FVC and FEV1 are within normal limits; FEV1/FVC ratio was reduced.  Patient doing well, no cardiopulmonary symptoms.  Physical exam as documented above.  There is a Grade I/VI KYLIE, early peaking without associated pulsus tardus/brevis consistent with aortic sclerosis, unchanged.  This was identified on ECHO.  His LVEF was 60-65% on ECHO.  Continue ASA, beta blocker, ACE-I, and statin (switch from Lovastatin to Lipitor 40 mg qd).  Patient undergoing GI workup for presumed Fe deficiency anemia.    Casa Evans MD     Cardiovascular Division    ADDENDUM (July 7, 2018)   Recent Labs   Lab Test  06/29/18   1005  10/30/17   1121   10/23/15   0850  09/30/14   0427   CHOL  138  146   < >  155  109   HDL  50  56   < >  43  51   LDL  73  68   < >  85  39    TRIG  76  109   < >  137  97   CHOLHDLRATIO   --    --    --   3.6  2.1    < > = values in this interval not displayed.     Continue Lipitor at 80 mg daily    CC  MY STERN

## 2017-03-28 NOTE — NURSING NOTE
Chief Complaint   Patient presents with     Follow Up For     manage CAD/last visit December 2015/FLP October 2016

## 2017-03-28 NOTE — NURSING NOTE
Chief Complaint   Patient presents with     Follow Up For     manage CAD/last visit December 2015/FLP October 2016       1. Follow up with Dr. Evans in 2 years with and ECHO  2. Maintain a cardiac healthy diet  3. New medication changes       Increase the Lipitor (atorvastatin) to 80 mg every day    Call me if you have muscle cramping   FLP in six weeks, sometime after May 9

## 2017-03-28 NOTE — MR AVS SNAPSHOT
After Visit Summary   3/28/2017    Tevin Guidry    MRN: [id number]           Patient Information     Date Of Birth          1950        Visit Information        Provider Department      3/28/2017 10:00 AM Casa Evans MD [facility]        Today's Diagnoses     Hyperlipidemia LDL goal <70          Care Instructions    PATIENT INSTRUCTIONS:  1. Follow up with Dr. Evans in 2 years with and ECHO  2. Maintain a cardiac healthy diet  3. New medication changes       Increase the Lipitor (atorvastatin) to 80 mg every day    Call me if you have muscle cramping   FLP in six weeks, sometime after May 9    Felicia Rust RN  Nurse Coordinator  Phone number [phone]  Please use [employer] to communicate with your HealthCare Provider      If you have an urgent need after hours (8:00 am to 4:30 pm) please call [phone] and ask for the cardiology fellow on call.              Follow-ups after your visit        Follow-up notes from your care team     Return in about 2 years (around 3/28/2019).      Your next 10 appointments already scheduled     Mar 30, 2017   Procedure with Jie Onofre MD   [facility], [place] ([facility], [facility])    [address] [place] [address]   [phone]           The [facility] is located on the corner of [address] and [address] on the [facility]. It is easily accessible from virtually any point in the [place] area, via [address] and [address].            Apr 06, 2017 11:00 AM CDT   Return Visit with Josette Rubin MD   [facility] ([facility])    [facility]  [address]  [facility] [address]   [phone]              Future tests that were ordered for you today     Open Future Orders        Priority Expected Expires Ordered    Lipid panel reflex to direct LDL Routine 5/9/2017 3/28/2018 3/28/2017  "   ALT Routine 2017 3/28/2018 3/28/2017    AST Routine 2017 3/28/2018 3/28/2017            Who to contact     If you have questions or need follow up information about today's clinic visit or your schedule please contact [facility] directly at [phone].  Normal or non-critical lab and imaging results will be communicated to you by MyChart, letter or phone within 4 business days after the clinic has received the results. If you do not hear from us within 7 days, please contact the clinic through [employer] or phone. If you have a critical or abnormal lab result, we will notify you by phone as soon as possible.  Submit refill requests through [employer] or call your pharmacy and they will forward the refill request to us. Please allow 3 business days for your refill to be completed.          Additional Information About Your Visit        MyChart Information     [employer] lets you send messages to your doctor, view your test results, renew your prescriptions, schedule appointments and more. To sign up, go to [email or web address] . Click on \"Log in\" on the left side of the screen, which will take you to the Welcome page. Then click on \"Sign up Now\" on the right side of the page.     You will be asked to enter the access code listed below, as well as some personal information. Please follow the directions to create your username and password.     Your access code is: [email or web address]  Expires: 2017 12:56 PM     Your access code will  in 90 days. If you need help or a new code, please call your [facility] clinic or [phone].        Care EveryWhere ID     This is your Care EveryWhere ID. This could be used by other organizations to access your [facility] medical records  [id number]        Your Vitals Were     Pulse Height Pulse Oximetry BMI (Body Mass Index)          52 1.727 m (5' 8\") 95% 25.71 kg/m2         Blood Pressure from Last 3 Encounters:   17 124/76   17 116/72   17 " 130/66    Weight from Last 3 Encounters:   03/28/17 76.7 kg (169 lb 1.6 oz)   03/27/17 75.8 kg (167 lb)   03/02/17 77.7 kg (171 lb 3.2 oz)                 Today's Medication Changes          These changes are accurate as of: 3/28/17 10:24 AM.  If you have any questions, ask your nurse or doctor.               These medicines have changed or have updated prescriptions.        Dose/Directions    atorvastatin 80 MG tablet   Commonly known as:  LIPITOR   This may have changed:    - medication strength  - how much to take   Used for:  Hyperlipidemia LDL goal <70   Changed by:  Casa Evans MD        Dose:  80 mg   Take 1 tablet (80 mg) by mouth daily   Quantity:  90 tablet   Refills:  3            Where to get your medicines      These medications were sent to [facility] [address]  [address] [address]     Phone:  [phone]     atorvastatin 80 MG tablet                Primary Care Provider Office Phone # Fax #    Preethi Quiroz [phone]       [facility] [address]  [facility] [address]        Thank you!     Thank you for choosing [facility]  for your care. Our goal is always to provide you with excellent care. Hearing back from our patients is one way we can continue to improve our services. Please take a few minutes to complete the written survey that you may receive in the mail after your visit with us. Thank you!             Your Updated Medication List - Protect others around you: Learn how to safely use, store and throw away your medicines at www.disposemymeds.org.          This list is accurate as of: 3/28/17 10:24 AM.  Always use your most recent med list.                   Brand Name Dispense Instructions for use    albuterol 108 (90 BASE) MCG/ACT Inhaler    PROAIR HFA/PROVENTIL HFA/VENTOLIN HFA    1 Inhaler    Inhale 2 puffs into the lungs every 6 hours as needed for shortness of breath / dyspnea or  wheezing       aspirin 81 MG tablet     90 tablet    Take 1 tablet (81 mg) by mouth daily       atorvastatin 80 MG tablet    LIPITOR    90 tablet    Take 1 tablet (80 mg) by mouth daily       lisinopril 10 MG tablet    PRINIVIL/ZESTRIL    90 tablet    Take 1 tablet (10 mg) by mouth daily       metoprolol 50 MG 24 hr tablet    TOPROL-XL    90 tablet    Take 1 tablet (50 mg) by mouth daily       Multi-vitamin Tabs tablet   Generic drug:  multivitamin, therapeutic with minerals      Take 1 tablet by mouth daily.       nitroglycerin 0.4 MG sublingual tablet    NITROSTAT    25 tablet    Place 1 tablet (0.4 mg) under the tongue every 5 minutes as needed for chest pain       PARoxetine 40 MG tablet    PAXIL    90 tablet    Take 1 tablet (40 mg) by mouth every morning

## 2017-03-28 NOTE — LETTER
3/28/2017      RE: Tevin Guidry  [address] [address]       Dear Colleague,    Thank you for the opportunity to participate in the care of your patient, Tevin Guidry, at the [facility]. Please see a copy of my visit note below.    CARDIOLOGY NEW OFFICE VISIT    HPI:   Mr. Tevin Guidry is a 65 year old man with PMH significant for CAD s/p hybrid robotic-assisted CABG (LIMA - LAD) and PCI with ALENA to RCA (distal to prox), HTN, and HPL who presents to clinic for routine follow-up appointment.  In September 2014 the patient endorsed ongoing chest pain while mowing the lawn. He was referred for a stress echocardiogram test which revealed a decrease in the EF with stress as well as extensive inferolateral akinesis. He was also found to have ST depression in the lateral leads and ST elevation in AVL. The patient was subsequently referred for a coronary angiogram via the R Femoral Artery which revealed diffuse disease of the RCA (85% in mid RCA and 95% in the distal RCA) and moderate stenosis of the LAD with diffuse plaque. CTS was consulted and he subsequently had a hybrid robotic-assisted CABG (LIMA - LAD) and PCI with ALENA to RCA (distal to prox) in October 2014.    In the interim since last clinic visit the patient was diagnosed with anemia. He is scheduled to undergo EGD/Colonoscopy later this week. He is doing well from a cardiac standpoint. He denies any headaches, dizziness, syncope, angina, significant dyspnea at rest or with exertion, palpitations, orthopnea, PND, abdominal pain, pedal edema, claudication, or any new numbness/weakness.    PAST MEDICAL HISTORY:  Past Medical History:   Diagnosis Date     Colon polyps     multiple may need colectomy (adenoma)     Hyperlipidemia LDL goal <130      Hypertension goal BP (blood pressure) < 140/90      Social phobia        CURRENT MEDICATIONS:  Current Outpatient Prescriptions    Medication Sig Dispense Refill     albuterol (PROAIR HFA, PROVENTIL HFA, VENTOLIN HFA) 108 (90 BASE) MCG/ACT inhaler Inhale 2 puffs into the lungs every 6 hours as needed for shortness of breath / dyspnea or wheezing 1 Inhaler 1     lisinopril (PRINIVIL,ZESTRIL) 10 MG tablet Take 1 tablet (10 mg) by mouth daily 90 tablet 3     metoprolol (TOPROL-XL) 50 MG 24 hr tablet Take 1 tablet (50 mg) by mouth daily 90 tablet 3     atorvastatin (LIPITOR) 40 MG tablet Take 1 tablet (40 mg) by mouth daily 90 tablet 3     PARoxetine (PAXIL) 40 MG tablet Take 1 tablet (40 mg) by mouth every morning 90 tablet 1     aspirin 81 MG tablet Take 1 tablet (81 mg) by mouth daily 90 tablet 3     nitroglycerin (NITROSTAT) 0.4 MG SL tablet Place 1 tablet (0.4 mg) under the tongue every 5 minutes as needed for chest pain 25 tablet 3     Multiple Vitamin (MULTI-VITAMIN) per tablet Take 1 tablet by mouth daily.           PAST SURGICAL HISTORY:  Past Surgical History:   Procedure Laterality Date     DAVINCI BYPASS ARTERY CORONARY N/A 9/29/2014    Procedure: DAVINCI BYPASS ARTERY CORONARY;  Surgeon: Lam Solis MD;  Location: [facility] OR      COLONOSCOPY THRU STOMA, DIAGNOSTIC  2010    polyps due 2011     SURGICAL HISTORY OF -       right clavicular fracture with ORIF       ALLERGIES  Codeine and No known allergies    FAMILY HX:  Family History   Problem Relation Age of Onset     CANCER Mother      esog/stomach     CANCER Father      lung     Family History Negative Brother      Hypertension Brother        SOCIAL HX:  History     Social History     Marital Status: Single     Spouse Name: N/A     Number of Children: 0     Years of Education: N/A     Social History Main Topics     Smoking status: Former Smoker     Smokeless tobacco: Never Used      Comment: quit smoking over 20 years ago     Alcohol Use: No      Comment: quit 35 years ago     Drug Use: Yes      Comment: pot smoked yesterday     Sexual Activity:     Partners: Female  "    Other Topics Concern      Service No     Blood Transfusions No     Exercise No     Seat Belt Yes     Self-Exams No     Parent/Sibling W/ Cabg, Mi Or Angioplasty Before 65f 55m? No     Social History Narrative       ROS:  Constitutional: No fever, chills, or sweats. No weight gain/loss.   HEENT: No visual disturbance  Respiratory: No cough, hemoptysis.   Cardiovascular: As per HPI.   GI: No nausea, vomiting, hematemesis, melena, or hematochezia.   : No urinary frequency, dysuria, or hematuria.   Neuro: No speech disturbance, focal sensory or motor deficit.   Endocrinology: No polyuria / polyphagia.   Musculoskeletal: No myalgia.    PHYSICAL EXAM  /76  Pulse 52  Ht 1.727 m (5' 8\")  Wt 76.7 kg (169 lb 1.6 oz)  SpO2 95%  BMI 25.71 kg/m2  General: alert and in no acute distress  Neck: no audible bruits; no JVD  Cardiovascular: RRR; nl S1 and S2; no audible murmurs, rubs or gallops  Respiratory:  CTA b/l with no rhonchi, rales or wheezing  Gastrointestinal: +BS; abdomen soft, non-tender; no organomegaly, guarding or rebound tenderness  Extremities: no lower extremity edema  Pulses: 2+ DP and PT bilaterally  Skin: no suspicious lesions or rashes  Neurologic: Gross motor and sensory intact    LABS  CBC RESULTS:   Recent Labs   Lab Test  03/27/17   1537  01/25/17   0909   WBC   --   7.7   RBC   --   4.69   HGB  10.3*  10.2*   HCT   --   34.4*   MCV   --   73*   MCH   --   21.7*   MCHC   --   29.7*   RDW   --   19.9*   PLT   --   330       Recent Labs   Lab Test  10/27/16   1028  10/23/15   0850  09/30/14   0427   CHOL  150  155  109   HDL  55  43  51   LDL  75  85  39   TRIG  98  137  97   CHOLHDLRATIO   --   3.6  2.1         Coronary angiogram with PCI (9/29/2014)  LIMA graft injected after the PCI per request of Dr. Solis after the PCI. It has two stenotic areas at the touchdown of mLAD with LOKESH 3 flow flow distally. Possible edematous change from the operation per Dr. Solis    Impression:   1. " Successful PCI with ALENA from distal to prox segments of RCA.   2. Good flow of LIMA to LAD with stenosis as noted above.    Davinci Hybrid Coronary Artery Bypass Graft of Left inferior Mamary Artery to LAD(9/29/2014)  Coronary Angiogram (9/26/2014)  LMT with mild to moderate ostial disease.  LAD type 3 vessel, gives rise to small D1 & large D2. LAD has moderate stenosis with diffuse plaque.   LCX gives rise to small OM1 & OM2, with minimal disease.   LCA gives collaterals to rPL and rPDA.   RCA (dominant) gives rise to PL & PDA. RCA has 85% stenosis in the mid and 95% lesion in the distal RCA.   PL brunch flow is to and fro against the collaterals from the left. PL is diffusely diseased.   Heparin was given for FFR/iFR measurement ([facility]cano).   iFR(LAD distal) was .83, which was consistent with positive FFR(cutoff line is 0.86).   Impression:   1. Two Vessel Disease (RCA, LAD)   Plan:   1. CABG, LIMA to LAD, SV to dRCA  NICS (9/26/2014)  No hemodynamically significant stenosis of the carotid arteries  US for abdominal Aorta (9/26/2014)  FINDINGS: Aortic dimensions are as follows (AP x transverse):   Suprarenal aorta: 3 cm   Mid infrarenal: 2.4 cm   Inferior infrarenal: 1.7 cm   Right common iliac artery: 1.1 cm   Left common iliac artery: 1.2 cm     ASSESSMENT AND PLAN:   Mr. Tevin Guidry is a 65 year old man with PMH significant for CAD s/p hybrid robotic-assisted CABG (LIMA - LAD) and PCI with ALENA to RCA (distal to prox), HTN, and HPL who presents to clinic for routine follow-up appointment.    CAD  - s/p hybrid robotic-assisted CABG (LIMA - LAD) and PCI with ALENA to RCA (distal to prox)  - no further ischemic evaluation warranted at this time  - continue ASA   - switch Atorvastatin 40 mg daily  - Metoprolol 50mg XL daily  - continue Lisinopril 10mg daily    HTN  - well-controlled on Metoprolol and Lisinopril    HPL  - continue atorvastatin    Signature:  Trevon Talavera MD  Cardiovascular Diseases  Fellow    ATTENDING NOTE: Patient has been seen and e me on 3/28/2017. I have reviewed the note. Please refer to it for additional details. I have reviewed today's vital signs, medications, labs, and imaging results. I have reviewed and edited, as necessary, the history, review of systems, physical examination, and assessment and plan. I have discussed my assessment and plan with the cardiology fellow. Tevin Guidry is a 64 year old male with risk factor profile (+) HTN, (-) DM, (+) hypercholesterolemia, (-) tobacco use, (-) fam Hx premature CAD, no prior documented Hx CAD, reported stable Class I angina, was referred for exercise stress test (2014). He developed ST depression in the lateral leads and ST elevation in aVL during the stress study prompting cessation of the test. He had no symptomsvaluated by. With stress, the LV dilated and there was extensive inferolateral akinesis with a fall in LVEF from 60% to 50%. He was referred directly for coronary angiography (2014) and was found to have moderate ostial LMCA disease, moderate disease in the LAD, mild disease in the LCx, LCx collaterals to RCA, 85% mid RCA, 95% distal RCA. Heparin was given for FFR/iFR measurement ([facility]cano). The iFR(LAD distal) was 0.83, which was consistent with positive FFR (cutoff line is 0.86).  He underwent CABG by Dr. Solis with AUGUSTE-LAD (Oct 2014) and subsequent PCI of Rt PDA.  The patient is no longer experiencing chest pain but does report ELENA.  This is chronic and may be related to tobacco use.  PFTs showed FVC and FEV1 are within normal limits; FEV1/FVC ratio was reduced.  Patient doing well, no cardiopulmonary symptoms.  Physical exam as documented above.  There is a Grade I/VI KYLIE, early peaking without associated pulsus tardus/brevis consistent with aortic sclerosis, unchanged.  This was identified on ECHO.  His LVEF was 60-65% on ECHO.  Continue ASA, beta blocker, ACE-I, and statin (switch from Lovastatin to Lipitor 40 mg qd).   Patient undergoing GI workup for presumed Fe deficiency anemia.    Casa Evans MD     Cardiovascular Division    [facility]  [facility]

## 2017-03-28 NOTE — PATIENT INSTRUCTIONS
PATIENT INSTRUCTIONS:  1. Follow up with Dr. Evans in 2 years with and ECHO  2. Maintain a cardiac healthy diet  3. New medication changes       Increase the Lipitor (atorvastatin) to 80 mg every day    Call me if you have muscle cramping   FLP in six weeks, sometime after May 9    Felicia Rust RN  Nurse Coordinator  Phone number [phone]  Please use [employer] to communicate with your HealthCare Provider      If you have an urgent need after hours (8:00 am to 4:30 pm) please call [phone] and ask for the cardiology fellow on call.

## 2017-03-29 ENCOUNTER — TELEPHONE (OUTPATIENT)
Dept: FAMILY MEDICINE | Facility: CLINIC | Age: 67
End: 2017-03-29

## 2017-03-29 DIAGNOSIS — R79.89 LOW SERUM CALCIUM: Primary | ICD-10-CM

## 2017-03-29 NOTE — TELEPHONE ENCOUNTER
RN -- pt's labs were stable with exception to slightly low calcium. Please see if the lab is able to add ionized calcium ( I placed the order). If not, please ask Hola to return for a lab visit.  Thanks, Preethi Quiroz M.D.        Results for orders placed or performed in visit on 03/27/17   Basic metabolic panel  (Ca, Cl, CO2, Creat, Gluc, K, Na, BUN)   Result Value Ref Range    Sodium 143 133 - 144 mmol/L    Potassium 4.2 3.4 - 5.3 mmol/L    Chloride 106 94 - 109 mmol/L    Carbon Dioxide 32 20 - 32 mmol/L    Anion Gap 5 3 - 14 mmol/L    Glucose 118 (H) 70 - 99 mg/dL    Urea Nitrogen 16 7 - 30 mg/dL    Creatinine 0.86 0.66 - 1.25 mg/dL    GFR Estimate 88 >60 mL/min/1.7m2    GFR Estimate If Black >90   GFR Calc   >60 mL/min/1.7m2    Calcium 8.4 (L) 8.5 - 10.1 mg/dL   Hemoglobin   Result Value Ref Range    Hemoglobin 10.3 (L) 13.3 - 17.7 g/dL

## 2017-03-29 NOTE — TELEPHONE ENCOUNTER
Per Sarai in laboratory, ionized calcium requires a special tube, so patient needs to return for lab draw and please place ionized calcium as future order.    Calcium ionized placed as future order.    Called patient and reviewed message per below from Dr. Quiroz regarding results and that he will need ot return for lab draw.    Patient verbalized understanding and in agreement with plan.    Lab only appt scheduled for 3/331/17.  Appt date, time and location confirmed with patient.    SARINA CaraballoN, RN

## 2017-03-30 ENCOUNTER — ANESTHESIA EVENT (OUTPATIENT)
Dept: GASTROENTEROLOGY | Facility: CLINIC | Age: 67
End: 2017-03-30
Payer: COMMERCIAL

## 2017-03-30 ENCOUNTER — ANESTHESIA (OUTPATIENT)
Dept: GASTROENTEROLOGY | Facility: CLINIC | Age: 67
End: 2017-03-30
Payer: COMMERCIAL

## 2017-03-30 ENCOUNTER — SURGERY (OUTPATIENT)
Age: 67
End: 2017-03-30

## 2017-03-30 ENCOUNTER — HOSPITAL ENCOUNTER (OUTPATIENT)
Facility: CLINIC | Age: 67
Discharge: HOME | End: 2017-03-30
Attending: INTERNAL MEDICINE | Admitting: INTERNAL MEDICINE
Payer: COMMERCIAL

## 2017-03-30 VITALS
DIASTOLIC BLOOD PRESSURE: 80 MMHG | HEIGHT: 69 IN | WEIGHT: 170.2 LBS | RESPIRATION RATE: 19 BRPM | BODY MASS INDEX: 25.21 KG/M2 | OXYGEN SATURATION: 95 % | TEMPERATURE: 97.5 F | HEART RATE: 71 BPM | SYSTOLIC BLOOD PRESSURE: 124 MMHG

## 2017-03-30 LAB
COLONOSCOPY: NORMAL
UPPER GI ENDOSCOPY: NORMAL

## 2017-03-30 PROCEDURE — 45385 COLONOSCOPY W/LESION REMOVAL: CPT | Performed by: INTERNAL MEDICINE

## 2017-03-30 PROCEDURE — 88342 IMHCHEM/IMCYTCHM 1ST ANTB: CPT | Performed by: INTERNAL MEDICINE

## 2017-03-30 PROCEDURE — 25800025 ZZH RX 258: Performed by: NURSE ANESTHETIST, CERTIFIED REGISTERED

## 2017-03-30 PROCEDURE — 88341 IMHCHEM/IMCYTCHM EA ADD ANTB: CPT | Performed by: INTERNAL MEDICINE

## 2017-03-30 PROCEDURE — 43239 EGD BIOPSY SINGLE/MULTIPLE: CPT | Performed by: INTERNAL MEDICINE

## 2017-03-30 PROCEDURE — 25000128 H RX IP 250 OP 636: Performed by: NURSE ANESTHETIST, CERTIFIED REGISTERED

## 2017-03-30 PROCEDURE — 43235 EGD DIAGNOSTIC BRUSH WASH: CPT | Performed by: INTERNAL MEDICINE

## 2017-03-30 PROCEDURE — 25000125 ZZHC RX 250: Performed by: NURSE ANESTHETIST, CERTIFIED REGISTERED

## 2017-03-30 PROCEDURE — 37000008 ZZH ANESTHESIA TECHNICAL FEE, 1ST 30 MIN: Performed by: INTERNAL MEDICINE

## 2017-03-30 PROCEDURE — 88305 TISSUE EXAM BY PATHOLOGIST: CPT | Performed by: INTERNAL MEDICINE

## 2017-03-30 PROCEDURE — 27210995 ZZH RX 272

## 2017-03-30 PROCEDURE — 45378 DIAGNOSTIC COLONOSCOPY: CPT | Performed by: INTERNAL MEDICINE

## 2017-03-30 PROCEDURE — 40000556 ZZH STATISTIC PERIPHERAL IV START W US GUIDANCE

## 2017-03-30 PROCEDURE — 25000132 ZZH RX MED GY IP 250 OP 250 PS 637: Performed by: NURSE ANESTHETIST, CERTIFIED REGISTERED

## 2017-03-30 PROCEDURE — 37000009 ZZH ANESTHESIA TECHNICAL FEE, EACH ADDTL 15 MIN: Performed by: INTERNAL MEDICINE

## 2017-03-30 RX ORDER — PROPOFOL 10 MG/ML
INJECTION, EMULSION INTRAVENOUS PRN
Status: DISCONTINUED | OUTPATIENT
Start: 2017-03-30 | End: 2017-03-30

## 2017-03-30 RX ORDER — SODIUM CHLORIDE, SODIUM LACTATE, POTASSIUM CHLORIDE, CALCIUM CHLORIDE 600; 310; 30; 20 MG/100ML; MG/100ML; MG/100ML; MG/100ML
INJECTION, SOLUTION INTRAVENOUS CONTINUOUS
Status: DISCONTINUED | OUTPATIENT
Start: 2017-03-30 | End: 2017-03-30 | Stop reason: HOSPADM

## 2017-03-30 RX ORDER — ONDANSETRON 2 MG/ML
INJECTION INTRAMUSCULAR; INTRAVENOUS PRN
Status: DISCONTINUED | OUTPATIENT
Start: 2017-03-30 | End: 2017-03-30

## 2017-03-30 RX ORDER — GLYCOPYRROLATE 0.2 MG/ML
INJECTION, SOLUTION INTRAMUSCULAR; INTRAVENOUS PRN
Status: DISCONTINUED | OUTPATIENT
Start: 2017-03-30 | End: 2017-03-30

## 2017-03-30 RX ORDER — LIDOCAINE HYDROCHLORIDE 20 MG/ML
INJECTION, SOLUTION INFILTRATION; PERINEURAL PRN
Status: DISCONTINUED | OUTPATIENT
Start: 2017-03-30 | End: 2017-03-30

## 2017-03-30 RX ORDER — MEPERIDINE HYDROCHLORIDE 25 MG/ML
12.5 INJECTION INTRAMUSCULAR; INTRAVENOUS; SUBCUTANEOUS
Status: DISCONTINUED | OUTPATIENT
Start: 2017-03-30 | End: 2017-03-30 | Stop reason: HOSPADM

## 2017-03-30 RX ORDER — NALOXONE HYDROCHLORIDE 0.4 MG/ML
.1-.4 INJECTION, SOLUTION INTRAMUSCULAR; INTRAVENOUS; SUBCUTANEOUS
Status: DISCONTINUED | OUTPATIENT
Start: 2017-03-30 | End: 2017-03-30 | Stop reason: HOSPADM

## 2017-03-30 RX ORDER — ONDANSETRON 2 MG/ML
4 INJECTION INTRAMUSCULAR; INTRAVENOUS EVERY 30 MIN PRN
Status: DISCONTINUED | OUTPATIENT
Start: 2017-03-30 | End: 2017-03-30 | Stop reason: HOSPADM

## 2017-03-30 RX ORDER — ONDANSETRON 4 MG/1
4 TABLET, ORALLY DISINTEGRATING ORAL EVERY 30 MIN PRN
Status: DISCONTINUED | OUTPATIENT
Start: 2017-03-30 | End: 2017-03-30 | Stop reason: HOSPADM

## 2017-03-30 RX ORDER — PROPOFOL 10 MG/ML
INJECTION, EMULSION INTRAVENOUS CONTINUOUS PRN
Status: DISCONTINUED | OUTPATIENT
Start: 2017-03-30 | End: 2017-03-30

## 2017-03-30 RX ORDER — EPHEDRINE SULFATE 50 MG/ML
INJECTION, SOLUTION INTRAMUSCULAR; INTRAVENOUS; SUBCUTANEOUS PRN
Status: DISCONTINUED | OUTPATIENT
Start: 2017-03-30 | End: 2017-03-30

## 2017-03-30 RX ORDER — SODIUM CHLORIDE, SODIUM LACTATE, POTASSIUM CHLORIDE, CALCIUM CHLORIDE 600; 310; 30; 20 MG/100ML; MG/100ML; MG/100ML; MG/100ML
INJECTION, SOLUTION INTRAVENOUS CONTINUOUS PRN
Status: DISCONTINUED | OUTPATIENT
Start: 2017-03-30 | End: 2017-03-30

## 2017-03-30 RX ADMIN — LIDOCAINE HYDROCHLORIDE 60 MG: 20 INJECTION, SOLUTION INFILTRATION; PERINEURAL at 13:28

## 2017-03-30 RX ADMIN — PROPOFOL 20 MG: 10 INJECTION, EMULSION INTRAVENOUS at 13:56

## 2017-03-30 RX ADMIN — MIDAZOLAM HYDROCHLORIDE 1 MG: 1 INJECTION, SOLUTION INTRAMUSCULAR; INTRAVENOUS at 13:26

## 2017-03-30 RX ADMIN — GLYCOPYRROLATE 0.2 MG: 0.2 INJECTION, SOLUTION INTRAMUSCULAR; INTRAVENOUS at 13:28

## 2017-03-30 RX ADMIN — SODIUM CHLORIDE, POTASSIUM CHLORIDE, SODIUM LACTATE AND CALCIUM CHLORIDE: 600; 310; 30; 20 INJECTION, SOLUTION INTRAVENOUS at 13:26

## 2017-03-30 RX ADMIN — BENZOCAINE 1 EACH: 220 SPRAY, METERED PERIODONTAL at 13:28

## 2017-03-30 RX ADMIN — ONDANSETRON 4 MG: 2 INJECTION INTRAMUSCULAR; INTRAVENOUS at 13:26

## 2017-03-30 RX ADMIN — PROPOFOL 30 MG: 10 INJECTION, EMULSION INTRAVENOUS at 13:28

## 2017-03-30 RX ADMIN — Medication 5 MG: at 13:56

## 2017-03-30 RX ADMIN — Medication 5 MG: at 13:50

## 2017-03-30 RX ADMIN — PROPOFOL 40 MG: 10 INJECTION, EMULSION INTRAVENOUS at 13:31

## 2017-03-30 RX ADMIN — PROPOFOL 100 MCG/KG/MIN: 10 INJECTION, EMULSION INTRAVENOUS at 13:28

## 2017-03-30 NOTE — OR NURSING
EGD w/ biopsies done  Colonoscopy done with polypectomies   Both done under MAC with VS and medications per CRNA

## 2017-03-30 NOTE — LETTER
April 7, 2017       TO: Tevin Lyons  [address] [place] [address]       Dear Mr. Lyons,    We are writing to inform you of your test results.    Biopsies from the stomach show only inflammation. No infection. No cancer.   Small intestine is normal.   Colons shows 3 adenomatous polyps. Repeat colonoscopy in 5 yr.   These findings are unlikely to account for the iron deficiency. Consider capsule. Recommend follow up with referrnig doctor.       The polyp tissue removed from your colon showed no evidence of cancer, but was identified as adenomatous.  These types of polyps can progress to cancer if left in the colon.    Although your polyp tissue was completely removed, we know that you may develop more polyps in the future.  The results and recommendations regarding a follow-up colonoscopy were sent to your primary physician.  He or she will review our recommendation in the context of your other medical problems.  Please remember that our recommendation is only for individuals who have no symptoms.  If you experience a persistent change in bowel habits, or develop new abdominal pain or bleeding in your stools, please consult your primary care physician.    If you have questions, please feel free to make an appointment with your primary care physician or gastroenterology clinic.      Resulted Orders   Surgical pathology exam   Result Value Ref Range    Copath Report       Patient Name: TEVIN LYONS  MR#: [id number]  Specimen #: [id number]  Collected: 3/30/2017  Received: 3/30/2017  Reported: 4/6/2017 15:05  Ordering Phy(s): ANTONIO REYES    For improved result formatting, select 'View Enhanced Report Format'  under Linked Documents section.    SPECIMEN(S):  A: Stomach body biopsy  B: Stomach biopsy, thickened foldes  C: Duodenal biopsy  D: Sigmoid colon polyp  E: Rectal polyp    FINAL DIAGNOSIS:  A. STOMACH, ANTRUM, BIOPSIES:  - Gastric antral-type mucosa with features of reactive  "gastropathy  - No Helicobacter pylori identified by immunohistochemistry (control  reacts appropriately)  - Negative for intestinal metaplasia or dysplasia    B. STOMACH, FUNDUS, ENLARGED GASTRIC FOLDS, BIOPSIES:  - Gastric body-type mucosa with minimal chronic inflammation, reactive  changes and lamina propria edema  - No Helicobacter pylori identified by immunohistochemistry (control  reacts appropriately)  - Negative for intestinal metaplasia or dysplasia     C. SMALL INTESTINE, DUODENUM, BIOPSIES:  - Duodenal mucosa with no significant histologic abnormality  - No evidence of celiac sprue    D. COLON, SIGMOID POLYP x 3, POLYPECTOMY:  - Tubular adenomas x3  - Negative for high-grade dysplasia and invasive malignancy    E. COLON, RECTAL POLYP, POLYPECTOMY:  - Tubulovillous adenoma  - Negative for high-grade dysplasia and invasive malignancy    I have personally reviewed all specimens and or slides, including the  listed special stains, and used them with my medical judgement to  determine the final diagnosis.    Electronically signed out by:  Liliane Ta M.D., [facility], [place]    CLINICAL HISTORY:  Iron-deficiency anemia    GROSS:  A: The specimen is received in formalin with proper patient  identification, labeled \"stomach body, evaluate for gastritis\".  The  specimen consists of two irregular tan soft tissues, 0.1 cm and 0.3 cm  in greatest dimension which are entirely submitted in cassette A1.    B: The specimen is received in formalin w ith proper patient  identification, labeled \"stomach, thickened folds\".  The specimen  consists of four irregular tan soft tissues averaging 0.2 cm in greatest  dimension which are entirely submitted in cassette B1.    C: The specimen is received in formalin with proper patient  identification, labeled \"duodenum, evaluate for anemia\".  The specimen  consists of two irregular tan soft tissues averaging 0.2 cm in greatest  dimension which are entirely submitted in cassette " "C1.    D: The specimen is received in formalin with proper patient  identification, labeled \"sigmoid colon\".  The specimen consists of three  polypoid tan soft tissues averaging 0.3 cm in greatest dimension which  are entirely submitted in cassette D1.    E: The specimen is received in formalin with proper patient  identification, labeled \"rectal polyp\".  The specimen consists of a 0.7  x 0.5 x 0.5 cm tan-pink polyp with no attached stalk.  Also received is  a 0.3 cm in greatest dimension irregular tan soft tissue.  Th e specimen  is entirely submitted.    Summary of Sections:  E1 - polyp bisected  E2 - soft tissue    (Dictated by: Theodore Messina 3/30/2017 05:02 PM)    MICROSCOPIC:  Microscopic examination was performed.  Synaptophysin stain was  performed on specimen A, highlighting the endocrine cells in the antral  glands.  Control reacts appropriately.    CPT Codes:  A: 45497-MW3, 37983-EMO, 64123-MIN, 30166-ZOWZJ.T, 56819-FHY.P  B: 43123-OL5, 09448-WHZ  C: 35153-JV7  D: 84604-QR7  E: 61518-MG8    TESTING LAB LOCATION:  [facility] [address] [place]   [address] [phone]    COLLECTION SITE:  Client: [facility]  Location: [facility] ([facility])                  It was a pleasure to see you at your recent visit. Please let me know if you have any questions or concerns.     Clinic Staff - [phone] option 3     Sincerely,     Jie Onofre MD  [address] [place] [address], Mail [id number]  [place]  [address].    "

## 2017-03-30 NOTE — ANESTHESIA POSTPROCEDURE EVALUATION
Patient: Tevin VALLEJO Guidry    Procedure(s):   - Wound Class: II-Clean Contaminated   - Wound Class: II-Clean Contaminated    Diagnosis:ANEMIA  Diagnosis Additional Information: No value filed.    Anesthesia Type:  MAC    Note:  Anesthesia Post Evaluation    Patient location during evaluation: PACU  Patient participation: Able to fully participate in evaluation  Level of consciousness: awake  Pain management: adequate  Airway patency: patent  Cardiovascular status: acceptable  Respiratory status: acceptable  Hydration status: acceptable     Anesthetic complications: None          Last vitals:  Vitals:    03/30/17 1242 03/30/17 1436 03/30/17 1438   BP: 141/80 (!) 87/62 106/68   Pulse:   71   Resp: 10 19 17   Temp: 36.9  C (98.4  F)  36.4  C (97.5  F)   SpO2: 98% 94% 96%         Electronically Signed By: Margarito Mario MD  March 30, 2017  3:13 PM

## 2017-03-30 NOTE — ANESTHESIA PREPROCEDURE EVALUATION
Anesthesia Evaluation         Anesthesia Plan      History & Physical Review  History and physical reviewed and following examination; no interval change.    ASA Status:  3 .    NPO Status:  > 6 hours    Plan for MAC with Intravenous induction. Maintenance will be TIVA.  Reason for MAC:  Difficulty with conscious sedation (QS)         Postoperative Care      Consents          ANESTHESIA PREOP EVALUATION    NPO Status:   Yes, NPO  Procedure: Procedure(s):   - Wound Class: II-Clean Contaminated   - Wound Class: II-Clean Contaminated    HPI: Presents for upper and lower GI endoscopy     PMHx/PSHx/ROS:  PAST MEDICAL HISTORY:   Past Medical History:   Diagnosis Date     Colon polyps     multiple may need colectomy (adenoma)     Hyperlipidemia LDL goal <130      Hypertension goal BP (blood pressure) < 140/90      Social phobia        PAST SURGICAL HISTORY:   Past Surgical History:   Procedure Laterality Date     DAVINCI BYPASS ARTERY CORONARY N/A 9/29/2014    Procedure: DAVINCI BYPASS ARTERY CORONARY;  Surgeon: Lam Solis MD;  Location: [facility] OR     HC COLONOSCOPY THRU STOMA, DIAGNOSTIC  2010    polyps due 2011     SURGICAL HISTORY OF -       right clavicular fracture with ORIF       FAMILY HISTORY:   Family History   Problem Relation Age of Onset     CANCER Mother      esog/stomach     CANCER Father      lung     Family History Negative Brother      Hypertension Brother          Past Anes Hx: No personal or family h/o anesthesia problems    Soc Hx:   Tobacco:   EtOH:     Allergies:   Allergies   Allergen Reactions     Codeine      No Known Allergies        Meds:   Prescriptions Prior to Admission   Medication Sig Dispense Refill Last Dose     atorvastatin (LIPITOR) 80 MG tablet Take 1 tablet (80 mg) by mouth daily 90 tablet 3 3/29/2017     albuterol (PROAIR HFA, PROVENTIL HFA, VENTOLIN HFA) 108 (90 BASE) MCG/ACT inhaler Inhale 2 puffs into the lungs every 6 hours as needed for shortness of breath / dyspnea  "or wheezing 1 Inhaler 1 3/30/2017     lisinopril (PRINIVIL,ZESTRIL) 10 MG tablet Take 1 tablet (10 mg) by mouth daily 90 tablet 3 3/30/2017     metoprolol (TOPROL-XL) 50 MG 24 hr tablet Take 1 tablet (50 mg) by mouth daily 90 tablet 3 3/30/2017     PARoxetine (PAXIL) 40 MG tablet Take 1 tablet (40 mg) by mouth every morning 90 tablet 1 3/30/2017     [] polyethylene glycol (GOLYTELY/NULYTELY) 236 G suspension Take 4,000 mLs (4 L) by mouth once for 1 dose Refer to \"Getting Ready for a Colonoscopy\" instruction handout 4000 mL 0      aspirin 81 MG tablet Take 1 tablet (81 mg) by mouth daily 90 tablet 3 3/27/2017     nitroglycerin (NITROSTAT) 0.4 MG SL tablet Place 1 tablet (0.4 mg) under the tongue every 5 minutes as needed for chest pain 25 tablet 3 Taking     Multiple Vitamin (MULTI-VITAMIN) per tablet Take 1 tablet by mouth daily.     Taking       No current outpatient prescriptions on file.       Physical Exam:  VS: T 98.4, P Data Unavailable, /80, R 10, SpO2 98% Weight   Wt Readings from Last 2 Encounters:   17 77.2 kg (170 lb 3.2 oz)   17 76.7 kg (169 lb 1.6 oz)         Airway: MP 2, TM>3FB, Neck full ROM  Dentition: no loose teeth  Heart: RRR  Lungs: CTAB      BMP:  Lab Results   Component Value Date     2017      Lab Results   Component Value Date    POTASSIUM 4.2 2017     Lab Results   Component Value Date    CHLORIDE 106 2017     Lab Results   Component Value Date    TAMMIE 8.4 2017     Lab Results   Component Value Date    CO2 32 2017     Lab Results   Component Value Date    BUN 16 2017     Lab Results   Component Value Date    CR 0.86 2017     Lab Results   Component Value Date     2017        CBC:  Lab Results   Component Value Date    WBC 7.7 2017     Lab Results   Component Value Date    HGB 10.3 2017     Lab Results   Component Value Date    HCT 34.4 2017     Lab Results   Component Value Date     " 01/25/2017        Coags/Type and Screen  Lab Results   Component Value Date    INR 1.11 10/07/2014     No results found for: PT  Type and Screen:      Assessment/Plan:  - ASA 3  - MAC with standard ASA monitors, IV induction, balanced anesthetic  - PIV  - Antibiotics per surgery  - PONV prophylaxis  - Blood products available, possible administration discussed with patient  Margarito Mario MD    3/30/2017  1:09 PM

## 2017-03-30 NOTE — IP AVS SNAPSHOT
MRN:[id number]                      After Visit Summary   3/30/2017    Tevin Guidry    MRN: [id number]           Thank you!     Thank you for choosing [facility] for your care. Our goal is always to provide you with excellent care. Hearing back from our patients is one way we can continue to improve our services. Please take a few minutes to complete the written survey that you may receive in the mail after you visit with us. Thank you!        Patient Information     Date Of Birth          1950        About your hospital stay     You were admitted on:  March 30, 2017 You last received care in the:  [facility], Endoscopy    You were discharged on:  March 30, 2017       Who to Call     For medical emergencies, please call 911.  For non-urgent questions about your medical care, please call your primary care provider or clinic, [phone]  For questions related to your surgery, please call your surgery clinic        Attending Provider     Provider Jie Harris MD Internal Medicine       Primary Care Provider Office Phone # Fax #    Preethi Quiroz [phone]       [address] [address]        Your next 10 appointments already scheduled     Mar 31, 2017 11:45 AM CDT   LAB with [facility]   [facility] ([facility])    [address] [place] [address] [phone]           Patient must bring picture ID.  Patient should be prepared to give a urine specimen  Please do not eat 10-12 hours before your appointment if you are coming in fasting for labs on lipids, cholesterol, or glucose (sugar).  Pregnant women should follow their Care Team instructions. Water with medications is okay. Do not drink coffee or other fluids.   If you have concerns about taking  your medications, please ask at office or if scheduling via [employer], send a message by clicking on Secure Messaging,  Message Your Care Team.            Apr 06, 2017 11:00 AM CDT   Return Visit with Josette Rubin MD   [facility] ([facility])    [facility]  [address]  [place] [id number] [phone]              Further instructions from your care team       [facility] Colonoscopy/Flexible Sigmoidoscopy with Monitored Anesthesia Care  For 24 hours after your procedure  Sedation:  1. Get plenty of rest. A responsible adult must stay with you for at least 24 hours after you leave the hospital.   2. Do not drive or use heavy equipment. If you have weakness or tingling, don't drive or use heavy equipment until this feeling goes away.  3. Do not drink alcohol.  4. Avoid strenuous or risky activities. Ask for help when climbing stairs.   5. You may feel lightheaded. IF so, sit for a few minutes before standing. Have someone help you get up.   6. If you have nausea (feel sick to your stomach): Drink only clear liquids such as apple juice, ginger ale, broth or 7-Up. Rest may also help. Be sure to drink enough fluids. Move to a regular diet as you feel able.  7. You may have a slight fever. Call the doctor if your fever is over 100 F (37.7 C) (taken under the tongue) or lasts longer than 24 hours.  8. You may have a dry mouth, a sore throat, muscle aches or trouble sleeping. These should go away after 24 hours.  9. Do not make important or legal decisions.   Procedural:  1. You may return to your normal diet and medicines.  2. Air was placed in your colon during the exam in order to see it. Walking helps to pass the air.  3. You may take Tylenol (acetaminophen) for pain unless your doctor has told you not to.   Do not take aspirin or ibuprofen (Advil, Motrin, or other anti-inflammatory  drugs) for _____ days.  Call your doctor for any of the following  1. Unusual pain in belly or chest pain not relieved with passing air.  2. More than 1 to 2 Tablespoons of bleeding from your  rectum.  3. Signs of infection (fever, growing tenderness at the surgery site, a large amount of drainage or bleeding, severe pain, foul-smelling drainage, redness, swelling).  4. It has been over 8 to 10 hours since surgery and you are still not able to urinate (pass water).  5. Headache for over 24 hours.  6.   Follow-up:  __x__ We tookr polyps to study. Your doctor will call you with the results within two weeks.  If you have severe pain, bleeding, or shortness of breath, go to an emergency room.  If you have questions, call:  Endoscopy: Monday to Friday, 7 a.m. to 4:30 p.m. [phone] (We may have to call you back)  After hours: Hospital  [phone] (Ask for the GI fellow on call)    [facility] Upper Endoscopy (EGD) with Monitored Anesthesia Care  For 24 hours after your procedure  Sedation:  10. Get plenty of rest. A responsible adult must stay with you for at least 24 hours after you leave the hospital.   11. Do not drive or use heavy equipment. If you have weakness or tingling, don't drive or use heavy equipment until this feeling goes away.  12. Do not drink alcohol.  13. Avoid strenuous or risky activities. Ask for help when climbing stairs.   14. You may feel lightheaded. IF so, sit for a few minutes before standing. Have someone help you get up.   15. If you have nausea (feel sick to your stomach): Drink only clear liquids such as apple juice, ginger ale, broth or 7-Up. Rest may also help. Be sure to drink enough fluids. Move to a regular diet as you feel able.  16. You may have a slight fever. Call the doctor if your fever is over 100 F (37.7 C) (taken under the tongue) or lasts longer than 24 hours.  17. You may have a dry mouth, a sore throat, muscle aches or trouble sleeping. These should go away after 24 hours.  18. Do not make important or legal decisions.   Procedural:  4. Wait one hour before eating or drinking. Start with sips of water. When your gag reflex has returned, you may return to  your normal diet, medicines, and light exercise.  5. Some bloating is normal. You may have large burps or pass air.  6. You may have a sore throat for 2 to 3 days. If so, it may help to:    Avoid hot liquids for 24 hours.    Use sore throat lozenges.    Gargle as need with salt water up to 4 times a day. Mix 1 cup of warm water with 1 teaspoon of salt. Do not swallow.  7. You may take Tylenol (acetaminophen) for pain unless your doctor has told you not to.   Do not take aspirin or ibuprofen (Advil, Motrin, or other anti-inflammatory  drugs) for _____ days.  Call right away if you have:  7. Unusual pain in belly or chest pain not relieved with passing air.  8. Severe throat pain or trouble swallowing.  9. Black stools (tar-like looking bowel movement).  10. Signs of infection (fever, growing tenderness at the surgery site, a large amount of drainage or bleeding, severe pain, foul-smelling drainage, redness, swelling).  11. It has been over 8 to 10 hours since surgery and you are still not able to urinate (pass water).  12. Headache for over 24 hours.  13. Numbness, tingling or weakness the day after surgery (if you had spinal anesthesia).  Follow-up:  ____ We took small tissue or fluid samples. Your doctor will call you with the results within two weeks.  ____If you have severe pain, bleeding, vomit blood, or shortness of breath, go to an emergency room.  If you have questions, call:  Endoscopy: Monday to Friday, 7 a.m. to 4:30 p.m. [phone] (We may have to call you back)  After hours: Hospital  [phone] (Ask for the GI fellow on call)              Pending Results     No orders found from 3/28/2017 to 3/31/2017.            Admission Information     Date & Time Provider Department Dept. Phone    3/30/2017 Jie Onofre MD [facility] [place], Endoscopy [phone]      Your Vitals Were     Blood Pressure Temperature Respirations Height Weight Pulse Oximetry    141/80 98.4  F (36.9  C) (Oral)  "10 1.753 m (5' 9\") 77.2 kg (170 lb 3.2 oz) 98%    BMI (Body Mass Index)                   25.13 kg/m2           [employer] Information     [employer] lets you send messages to your doctor, view your test results, renew your prescriptions, schedule appointments and more. To sign up, go to [email or web address] . Click on \"Log in\" on the left side of the screen, which will take you to the Welcome page. Then click on \"Sign up Now\" on the right side of the page.     You will be asked to enter the access code listed below, as well as some personal information. Please follow the directions to create your username and password.     Your access code is: [email or web address]  Expires: 2017 12:56 PM     Your access code will  in 90 days. If you need help or a new code, please call your [place] clinic or [phone].        Care EveryWhere ID     This is your Care EveryWhere ID. This could be used by other organizations to access your [facility] medical records  [id number]           Review of your medicines      UNREVIEWED medicines. Ask your doctor about these medicines        Dose / Directions    albuterol 108 (90 BASE) MCG/ACT Inhaler   Commonly known as:  PROAIR HFA/PROVENTIL HFA/VENTOLIN HFA   Used for:  Chronic obstructive pulmonary disease, unspecified COPD type (H)        Dose:  2 puff   Inhale 2 puffs into the lungs every 6 hours as needed for shortness of breath / dyspnea or wheezing   Quantity:  1 Inhaler   Refills:  1       aspirin 81 MG tablet   Used for:  Coronary artery disease involving native artery of transplanted heart without angina pectoris        Dose:  81 mg   Take 1 tablet (81 mg) by mouth daily   Quantity:  90 tablet   Refills:  3       atorvastatin 80 MG tablet   Commonly known as:  LIPITOR   Used for:  Hyperlipidemia LDL goal <70        Dose:  80 mg   Take 1 tablet (80 mg) by mouth daily   Quantity:  90 tablet   Refills:  3       lisinopril 10 MG tablet   Commonly known as:  PRINIVIL/ZESTRIL   Used " "for:  Essential hypertension with goal blood pressure less than 140/90        Dose:  10 mg   Take 1 tablet (10 mg) by mouth daily   Quantity:  90 tablet   Refills:  3       metoprolol 50 MG 24 hr tablet   Commonly known as:  TOPROL-XL   Used for:  CAD, multiple vessel        Dose:  50 mg   Take 1 tablet (50 mg) by mouth daily   Quantity:  90 tablet   Refills:  3       Multi-vitamin Tabs tablet   Generic drug:  multivitamin, therapeutic with minerals        Dose:  1 tablet   Take 1 tablet by mouth daily.   Refills:  0       nitroglycerin 0.4 MG sublingual tablet   Commonly known as:  NITROSTAT   Used for:  CAD, multiple vessel        Dose:  0.4 mg   Place 1 tablet (0.4 mg) under the tongue every 5 minutes as needed for chest pain   Quantity:  25 tablet   Refills:  3       PARoxetine 40 MG tablet   Commonly known as:  PAXIL   Used for:  Social phobia        Dose:  40 mg   Take 1 tablet (40 mg) by mouth every morning   Quantity:  90 tablet   Refills:  1       polyethylene glycol 236 G suspension   Commonly known as:  GoLYTELY/NuLYTELY   Used for:  Encounter for screening colonoscopy   Ask about: Should I take this medication?        Dose:  4 L   Take 4,000 mLs (4 L) by mouth once for 1 dose Refer to \"Getting Ready for a Colonoscopy\" instruction handout   Quantity:  4000 mL   Refills:  0                Protect others around you: Learn how to safely use, store and throw away your medicines at www.disposemymeds.org.             Medication List: This is a list of all your medications and when to take them. Check marks below indicate your daily home schedule. Keep this list as a reference.      Medications           Morning Afternoon Evening Bedtime As Needed    albuterol 108 (90 BASE) MCG/ACT Inhaler   Commonly known as:  PROAIR HFA/PROVENTIL HFA/VENTOLIN HFA   Inhale 2 puffs into the lungs every 6 hours as needed for shortness of breath / dyspnea or wheezing                                aspirin 81 MG tablet   Take 1 " "tablet (81 mg) by mouth daily                                atorvastatin 80 MG tablet   Commonly known as:  LIPITOR   Take 1 tablet (80 mg) by mouth daily                                lisinopril 10 MG tablet   Commonly known as:  PRINIVIL/ZESTRIL   Take 1 tablet (10 mg) by mouth daily                                metoprolol 50 MG 24 hr tablet   Commonly known as:  TOPROL-XL   Take 1 tablet (50 mg) by mouth daily                                Multi-vitamin Tabs tablet   Take 1 tablet by mouth daily.   Generic drug:  multivitamin, therapeutic with minerals                                nitroglycerin 0.4 MG sublingual tablet   Commonly known as:  NITROSTAT   Place 1 tablet (0.4 mg) under the tongue every 5 minutes as needed for chest pain                                PARoxetine 40 MG tablet   Commonly known as:  PAXIL   Take 1 tablet (40 mg) by mouth every morning                                  ASK your doctor about these medications           Morning Afternoon Evening Bedtime As Needed    polyethylene glycol 236 G suspension   Commonly known as:  GoLYTELY/NuLYTELY   Take 4,000 mLs (4 L) by mouth once for 1 dose Refer to \"Getting Ready for a Colonoscopy\" instruction handout   Ask about: Should I take this medication?                                  "

## 2017-03-30 NOTE — ANESTHESIA CARE TRANSFER NOTE
Patient: Tevin VALLEJO Guidry    Procedure(s):   - Wound Class: II-Clean Contaminated   - Wound Class: II-Clean Contaminated    Diagnosis: ANEMIA  Diagnosis Additional Information: No value filed.    Anesthesia Type:   MAC     Note:  Airway :Room Air  Patient transferred to:Phase II  Comments: Transferred to: Phase II Endo    Patient vital signs: stable    Airway: none    Spontaneous breathing. Alert and conversing. Monitors applied.    Report to RN.        Vitals: (Last set prior to Anesthesia Care Transfer)    CRNA VITALS  3/30/2017 1351 - 3/30/2017 1425      3/30/2017             Pulse: 78    Ht Rate: 80    SpO2: 99 %    Resp Rate (set): 10                Electronically Signed By: FRANCISCA Cardoza CRNA  March 30, 2017  2:25 PM

## 2017-03-30 NOTE — IP AVS SNAPSHOT
[facility] [place], Endoscopy    [address] [address]    Phone:  [phone]                                       After Visit Summary   3/30/2017    Tevin Guidry    MRN: [id number]           After Visit Summary Signature Page     I have received my discharge instructions, and my questions have been answered. I have discussed any challenges I see with this plan with the nurse or doctor.    ..........................................................................................................................................  Patient/Patient Representative Signature      ..........................................................................................................................................  Patient Representative Print Name and Relationship to Patient    ..................................................               ................................................  Date                                            Time    ..........................................................................................................................................  Reviewed by Signature/Title    ...................................................              ..............................................  Date                                                            Time

## 2017-03-30 NOTE — DISCHARGE INSTRUCTIONS
[facility] Colonoscopy/Flexible Sigmoidoscopy with Monitored Anesthesia Care  For 24 hours after your procedure  Sedation:  1. Get plenty of rest. A responsible adult must stay with you for at least 24 hours after you leave the hospital.   2. Do not drive or use heavy equipment. If you have weakness or tingling, don't drive or use heavy equipment until this feeling goes away.  3. Do not drink alcohol.  4. Avoid strenuous or risky activities. Ask for help when climbing stairs.   5. You may feel lightheaded. IF so, sit for a few minutes before standing. Have someone help you get up.   6. If you have nausea (feel sick to your stomach): Drink only clear liquids such as apple juice, ginger ale, broth or 7-Up. Rest may also help. Be sure to drink enough fluids. Move to a regular diet as you feel able.  7. You may have a slight fever. Call the doctor if your fever is over 100 F (37.7 C) (taken under the tongue) or lasts longer than 24 hours.  8. You may have a dry mouth, a sore throat, muscle aches or trouble sleeping. These should go away after 24 hours.  9. Do not make important or legal decisions.   Procedural:  1. You may return to your normal diet and medicines.  2. Air was placed in your colon during the exam in order to see it. Walking helps to pass the air.  3. You may take Tylenol (acetaminophen) for pain unless your doctor has told you not to.   Do not take aspirin or ibuprofen (Advil, Motrin, or other anti-inflammatory  drugs) for _____ days.  Call your doctor for any of the following  1. Unusual pain in belly or chest pain not relieved with passing air.  2. More than 1 to 2 Tablespoons of bleeding from your rectum.  3. Signs of infection (fever, growing tenderness at the surgery site, a large amount of drainage or bleeding, severe pain, foul-smelling drainage, redness, swelling).  4. It has been over 8 to 10 hours since surgery and you are still not able to urinate (pass water).  5. Headache for over 24  hours.  6.   Follow-up:  __x__ We tookr polyps to study. Your doctor will call you with the results within two weeks.  If you have severe pain, bleeding, or shortness of breath, go to an emergency room.  If you have questions, call:  Endoscopy: Monday to Friday, 7 a.m. to 4:30 p.m. [phone] (We may have to call you back)  After hours: Hospital  [phone] (Ask for the GI fellow on call)    [facility] Upper Endoscopy (EGD) with Monitored Anesthesia Care  For 24 hours after your procedure  Sedation:  10. Get plenty of rest. A responsible adult must stay with you for at least 24 hours after you leave the hospital.   11. Do not drive or use heavy equipment. If you have weakness or tingling, don't drive or use heavy equipment until this feeling goes away.  12. Do not drink alcohol.  13. Avoid strenuous or risky activities. Ask for help when climbing stairs.   14. You may feel lightheaded. IF so, sit for a few minutes before standing. Have someone help you get up.   15. If you have nausea (feel sick to your stomach): Drink only clear liquids such as apple juice, ginger ale, broth or 7-Up. Rest may also help. Be sure to drink enough fluids. Move to a regular diet as you feel able.  16. You may have a slight fever. Call the doctor if your fever is over 100 F (37.7 C) (taken under the tongue) or lasts longer than 24 hours.  17. You may have a dry mouth, a sore throat, muscle aches or trouble sleeping. These should go away after 24 hours.  18. Do not make important or legal decisions.   Procedural:  4. Wait one hour before eating or drinking. Start with sips of water. When your gag reflex has returned, you may return to your normal diet, medicines, and light exercise.  5. Some bloating is normal. You may have large burps or pass air.  6. You may have a sore throat for 2 to 3 days. If so, it may help to:    Avoid hot liquids for 24 hours.    Use sore throat lozenges.    Gargle as need with salt water up to 4 times a  day. Mix 1 cup of warm water with 1 teaspoon of salt. Do not swallow.  7. You may take Tylenol (acetaminophen) for pain unless your doctor has told you not to.   Do not take aspirin or ibuprofen (Advil, Motrin, or other anti-inflammatory  drugs) for _____ days.  Call right away if you have:  7. Unusual pain in belly or chest pain not relieved with passing air.  8. Severe throat pain or trouble swallowing.  9. Black stools (tar-like looking bowel movement).  10. Signs of infection (fever, growing tenderness at the surgery site, a large amount of drainage or bleeding, severe pain, foul-smelling drainage, redness, swelling).  11. It has been over 8 to 10 hours since surgery and you are still not able to urinate (pass water).  12. Headache for over 24 hours.  13. Numbness, tingling or weakness the day after surgery (if you had spinal anesthesia).  Follow-up:  ____ We took small tissue or fluid samples. Your doctor will call you with the results within two weeks.  ____If you have severe pain, bleeding, vomit blood, or shortness of breath, go to an emergency room.  If you have questions, call:  Endoscopy: Monday to Friday, 7 a.m. to 4:30 p.m. [phone] (We may have to call you back)  After hours: Hospital  [phone] (Ask for the GI fellow on call)

## 2017-03-31 DIAGNOSIS — R79.89 LOW SERUM CALCIUM: ICD-10-CM

## 2017-03-31 LAB — CA-I BLD-MCNC: 4.7 MG/DL (ref 4.4–5.2)

## 2017-03-31 PROCEDURE — 36415 COLL VENOUS BLD VENIPUNCTURE: CPT | Performed by: FAMILY MEDICINE

## 2017-03-31 PROCEDURE — 82330 ASSAY OF CALCIUM: CPT | Performed by: FAMILY MEDICINE

## 2017-03-31 NOTE — LETTER
[facility]   [address] [place] [address]  [phone]      April 4, 2017      Tevin VALLEJO Brea  [address] [address]          Dear Mr. Guidry,    The results of your recent lab tests were within normal limits. Enclosed is a copy of these results.  If you have any further questions or problems, please contact our office.    Results for orders placed or performed in visit on 03/31/17   Calcium ionized whole blood   Result Value Ref Range    Calcium Ionized Whole Blood 4.7 4.4 - 5.2 mg/dL         Sincerely,      Preethi Quiroz MD/nr

## 2017-04-06 ENCOUNTER — ONCOLOGY VISIT (OUTPATIENT)
Dept: ONCOLOGY | Facility: CLINIC | Age: 67
End: 2017-04-06
Attending: INTERNAL MEDICINE
Payer: COMMERCIAL

## 2017-04-06 ENCOUNTER — HOSPITAL ENCOUNTER (OUTPATIENT)
Facility: CLINIC | Age: 67
End: 2017-04-06
Attending: INTERNAL MEDICINE | Admitting: INTERNAL MEDICINE

## 2017-04-06 VITALS
WEIGHT: 174.4 LBS | RESPIRATION RATE: 16 BRPM | BODY MASS INDEX: 25.75 KG/M2 | HEART RATE: 61 BPM | SYSTOLIC BLOOD PRESSURE: 126 MMHG | OXYGEN SATURATION: 95 % | DIASTOLIC BLOOD PRESSURE: 81 MMHG | TEMPERATURE: 97.8 F

## 2017-04-06 DIAGNOSIS — D36.9 TUBULAR ADENOMA: ICD-10-CM

## 2017-04-06 DIAGNOSIS — E61.1 IRON DEFICIENCY: ICD-10-CM

## 2017-04-06 DIAGNOSIS — D64.9 ANEMIA, UNSPECIFIED TYPE: Primary | ICD-10-CM

## 2017-04-06 PROCEDURE — 99213 OFFICE O/P EST LOW 20 MIN: CPT | Performed by: INTERNAL MEDICINE

## 2017-04-06 RX ORDER — FERROUS SULFATE 325(65) MG
325 TABLET ORAL 2 TIMES DAILY
Qty: 60 TABLET | Refills: 3 | Status: SHIPPED | OUTPATIENT
Start: 2017-04-06 | End: 2017-09-12

## 2017-04-06 ASSESSMENT — PAIN SCALES - GENERAL: PAINLEVEL: NO PAIN (0)

## 2017-04-06 NOTE — PROGRESS NOTES
"Tevin Guidry is a 66 year old male who presents for:  Chief Complaint   Patient presents with     Oncology Clinic Visit     Tubular Adenoma of colon         Initial Vitals:  /81 (BP Location: Left arm, Patient Position: Chair, Cuff Size: Adult Regular)  Pulse 61  Temp 97.8  F (36.6  C) (Oral)  Resp 16  Wt 79.1 kg (174 lb 6.4 oz)  SpO2 95%  BMI 25.75 kg/m2 Estimated body mass index is 25.75 kg/(m^2) as calculated from the following:    Height as of 3/30/17: 1.753 m (5' 9\").    Weight as of this encounter: 79.1 kg (174 lb 6.4 oz).. Body surface area is 1.96 meters squared. BP completed using cuff size: regular  No Pain (0) No LMP for male patient. Allergies and medications reviewed.     Medications: Medication refills not needed today.  Pharmacy name entered into Med[employer]:    [facility] [address]    Comments: Follow up Tubular Adenoma of colon     5 minutes for nursing intake (face to face time)   Drake Ruiz MA      DISCHARGE PLAN:  1.) Patient to be scheduled for capsule endoscopy at the [facility]. Patient instructed to hold his Aspirin.   2.) Patient to be scheduled for labs and follow up with Dr. Rubin in mid May.   Next appointments: See patient instruction section  Departure Mode: Ambulatory  Accompanied by: self  8 minutes for nursing discharge (face to face time)   Marlene Walden RN      "

## 2017-04-06 NOTE — MR AVS SNAPSHOT
After Visit Summary   4/6/2017    Tevin Guidry    MRN: [id number]           Patient Information     Date Of Birth          1950        Visit Information        Provider Department      4/6/2017 11:00 AM Josette Rubin MD [facility]        Today's Diagnoses     Anemia, unspecified type    -  1    Iron deficiency        Tubular adenoma          Care Instructions    Capsule endoscopy.-[facility]  Start ferrous sulfate twice a day after capsule endoscopy.  FU mid-may with labs.      You are scheduled for a Capsule Endoscopy at [facility] location on April 19, 2017 @ 2:30pm  Check in at 1:30pm  They will mail information packet to you    [phone]         Follow-ups after your visit        Additional Services     GASTROENTEROLOGY ADULT REF PROCEDURE ONLY       Last Lab Result: Creatinine (mg/dL)       Date                     Value                 03/27/2017               0.86             ----------  Body mass index is 25.75 kg/(m^2).     Needed:  No  Language:  English    Patient will be contacted to schedule procedure.     Please be aware that coverage of these services is subject to the terms and limitations of your health insurance plan.  Call member services at your health plan with any benefit or coverage questions.  Any procedures must be performed at a [facility] facility OR coordinated by your clinic's referral office.    Please bring the following with you to your appointment:    (1) Any X-Rays, CTs or MRIs which have been performed.  Contact the facility where they were done to arrange for  prior to your scheduled appointment.    (2) List of current medications   (3) This referral request   (4) Any documents/labs given to you for this referral                  Your next 10 appointments already scheduled     May 22, 2017 11:00 AM CDT   Return Visit with  Oncology Nurse   [facility] ([facility])    [facility]  "[address]  [address]  [place] [address]   [phone]            May 25, 2017 10:30 AM CDT   Return Visit with Josette Rubin MD   [facility] ([facility])    [facility] [place]  [address]  [place] [address]   [phone]              Future tests that were ordered for you today     Open Future Orders        Priority Expected Expires Ordered    Reticulocyte count Routine 5/15/2017 7/6/2017 4/6/2017    Iron and iron binding capacity Routine 5/15/2017 7/6/2017 4/6/2017    Ferritin Routine 5/15/2017 7/6/2017 4/6/2017    CBC with platelets differential Routine 5/15/2017 7/6/2017 4/6/2017            Who to contact     If you have questions or need follow up information about today's clinic visit or your schedule please contact [facility] directly at [phone].  Normal or non-critical lab and imaging results will be communicated to you by 6Roomshart, letter or phone within 4 business days after the clinic has received the results. If you do not hear from us within 7 days, please contact the clinic through Validast or phone. If you have a critical or abnormal lab result, we will notify you by phone as soon as possible.  Submit refill requests through CaseRails or call your pharmacy and they will forward the refill request to us. Please allow 3 business days for your refill to be completed.          Additional Information About Your Visit        CaseRails Information     CaseRails lets you send messages to your doctor, view your test results, renew your prescriptions, schedule appointments and more. To sign up, go to [email or web address] . Click on \"Log in\" on the left side of the screen, which will take you to the Welcome page. Then click on \"Sign up Now\" on the right side of the page.     You will be asked to enter the access code listed below, as well as some personal information. Please follow the directions to create your " username and password.     Your access code is: [email or web address]  Expires: 2017 12:56 PM     Your access code will  in 90 days. If you need help or a new code, please call your [facility] or [phone].        Care EveryWhere ID     This is your Care EveryWhere ID. This could be used by other organizations to access your [facility] medical records  [id number]        Your Vitals Were     Pulse Temperature Respirations Pulse Oximetry BMI (Body Mass Index)       61 97.8  F (36.6  C) (Oral) 16 95% 25.75 kg/m2        Blood Pressure from Last 3 Encounters:   17 126/81   17 124/80   17 124/76    Weight from Last 3 Encounters:   17 79.1 kg (174 lb 6.4 oz)   17 77.2 kg (170 lb 3.2 oz)   17 76.7 kg (169 lb 1.6 oz)              We Performed the Following     GASTROENTEROLOGY ADULT REF PROCEDURE ONLY          Today's Medication Changes          These changes are accurate as of: 17 12:24 PM.  If you have any questions, ask your nurse or doctor.               Start taking these medicines.        Dose/Directions    ferrous sulfate 325 (65 FE) MG tablet   Commonly known as:  IRON   Used for:  Anemia, unspecified type, Iron deficiency        Dose:  325 mg   Take 1 tablet (325 mg) by mouth 2 times daily   Quantity:  60 tablet   Refills:  3            Where to get your medicines      These medications were sent to [facility] [address]  [address] [address]     Phone:  [phone]     ferrous sulfate 325 (65 FE) MG tablet                Primary Care Provider Office Phone # Fax #    Preethi Quiroz [phone]       [facility] [address]  [facility] [address]        Thank you!     Thank you for choosing [facility]  for your care. Our goal is always to provide you with excellent care. Hearing back from our patients is one way we can continue to improve our services. Please take a few  minutes to complete the written survey that you may receive in the mail after your visit with us. Thank you!             Your Updated Medication List - Protect others around you: Learn how to safely use, store and throw away your medicines at www.disposemymeds.org.          This list is accurate as of: 4/6/17 12:24 PM.  Always use your most recent med list.                   Brand Name Dispense Instructions for use    albuterol 108 (90 BASE) MCG/ACT Inhaler    PROAIR HFA/PROVENTIL HFA/VENTOLIN HFA    1 Inhaler    Inhale 2 puffs into the lungs every 6 hours as needed for shortness of breath / dyspnea or wheezing       aspirin 81 MG tablet     90 tablet    Take 1 tablet (81 mg) by mouth daily       atorvastatin 80 MG tablet    LIPITOR    90 tablet    Take 1 tablet (80 mg) by mouth daily       ferrous sulfate 325 (65 FE) MG tablet    IRON    60 tablet    Take 1 tablet (325 mg) by mouth 2 times daily       lisinopril 10 MG tablet    PRINIVIL/ZESTRIL    90 tablet    Take 1 tablet (10 mg) by mouth daily       metoprolol 50 MG 24 hr tablet    TOPROL-XL    90 tablet    Take 1 tablet (50 mg) by mouth daily       Multi-vitamin Tabs tablet   Generic drug:  multivitamin, therapeutic with minerals      Take 1 tablet by mouth daily.       nitroglycerin 0.4 MG sublingual tablet    NITROSTAT    25 tablet    Place 1 tablet (0.4 mg) under the tongue every 5 minutes as needed for chest pain       PARoxetine 40 MG tablet    PAXIL    90 tablet    Take 1 tablet (40 mg) by mouth every morning

## 2017-04-06 NOTE — PATIENT INSTRUCTIONS
Capsule endoscopy.-[facility]  Start ferrous sulfate twice a day after capsule endoscopy.  FU mid-may with labs.      You are scheduled for a Capsule Endoscopy at [facility] location on April 19, 2017 @ 2:30pm  Check in at 1:30pm  They will mail information packet to you    [phone]

## 2017-04-07 LAB — COPATH REPORT: NORMAL

## 2017-04-07 NOTE — PROGRESS NOTES
HEMATOLOGY HISTORY: Mr. Tevin Guidry is a gentleman with chronic anemia.   1. On 02/12/2008, hemoglobin of 14.2 with MCV of 90.   2. On 09/26/2014, hemoglobin of 11.3 with MCV of 79. Since then, multiple CBCs have revealed anemia.   3. On 01/25/2017:  -Hemoglobin of 10.2 with MCV of 73. White count of 7.7 and platelets of 330.   -Retic count 0.8%.   -Peripheral blood smear reveals moderate microcytic hypochromic anemia with anisopoikilocytosis.   -Iron of 20 with percent saturation of 4%.   -Folate of 14.6.   -Vitamin B12 of 308.   -TSH of 0.70.   4. Colonoscopy on 04/28/2016 had revealed multiple colon polyps. Pathology revealed tubular adenoma.   5. On 03/02/2017:  -Ferritin of 5.    -Erythropoietin of 42.    -LDH of 178.    -SPEP did not reveal any monoclonal protein.  6. EGD and colonoscopy was done on 03/30/2017.    -EGD reveals normal esophagus, gastric mucosal atrophy, large gastric fold and normal duodenum. Biopsy of the stomach reveals minimal chronic inflammation.  No dysplasia.  No H. pylori.  Small intestine biopsy does not reveal any celiac sprue.   -Colonoscopy revealed polyps and diverticulosis.   Rectal polyp is tubulovillous adenoma without high-grade dysplasia or invasive carcinoma.  Other polyps are tubular adenoma.     SUBJECTIVE:  Mr. Tevin Guidry is a 66-year-old gentleman who was recently evaluated for anemia.  He has microcytic anemia secondary to iron deficiency.  GI workup was recommended.      EGD and colonoscopy was done on 03/30/2017.  EGD reveals normal esophagus.  There is gastric mucosal atrophy.  There is a large gastric fold.  Normal duodenum.  Colonoscopy revealed polyps and diverticulosis.  Biopsy of the stomach reveals minimal chronic inflammation.  No dysplasia.  No H. pylori.  The small intestine biopsy does not reveal any celiac sprue.  Rectal polyp is tubulovillous adenoma without high-grade dysplasia or invasive carcinoma.  Other polyps are tubular adenoma.      Patient  had multiple labs done on 2017.  Ferritin of 5.  Erythropoietin of 42.  LDH of 178.  Serum protein electrophoresis did not reveal any monoclonal protein.  On 2017, hemoglobin of 10.3.      Overall, he is stable.  No excessive fatigue.  No chest pain.  No shortness of breath.  No bleeding.      ASSESSMENT:   1.  A 66-year-old gentleman with microcytic anemia secondary to iron deficiency.   2.  Iron deficiency.      PLAN:   1.  Labs were reviewed with him.  EGD and colonoscopy reviewed.  GI workup so far does not reveal any evidence of bleeding.   2.  Because he has iron deficiency, discussed regarding capsule endoscopy, as EGD and colonoscopy did not reveal any bleeding.  He is agreeable for it.  Will set him up for capsule endoscopy.   3.  Discussed regarding oral iron.  After capsule endoscopy is done, he will start on oral ferrous sulfate 325 mg twice a day.   4.  Side effects of oral iron including abdominal discomfort, constipation and dark stools were reviewed.   5.  I will see him back in mid May with a CBC and iron studies.  He will return sooner if he has any worsening weakness, chest pain, shortness of breath, bleeding or any other concerns.         MARGRET COLLIER MD          D: 2017 16:52   T: 2017 21:57   MT: MILO      Name:     DICK LYONS   MRN:      [id number]        Account:      [id number]   :      1950           Visit Date:   2017      Document: [id number]

## 2017-04-17 ENCOUNTER — TELEPHONE (OUTPATIENT)
Dept: GASTROENTEROLOGY | Facility: CLINIC | Age: 67
End: 2017-04-17

## 2017-04-17 NOTE — TELEPHONE ENCOUNTER
Patient and ordering provider Dr. Rubin notified that insurance is not going to cover [facility] Endoscopy. Per ordering provider Dr. Rubin, ok to cancel procedure.

## 2017-04-26 ENCOUNTER — TELEPHONE (OUTPATIENT)
Dept: ONCOLOGY | Facility: CLINIC | Age: 67
End: 2017-04-26

## 2017-04-26 NOTE — TELEPHONE ENCOUNTER
Tevin called stating he started taking his iron pills again and wanted to make sure Dr. Rubin is ok with that.  Dr. Rubin would like him to keep taking them and follow up in 1 month with labs prior ..  This is already arranged.

## 2017-05-22 ENCOUNTER — ONCOLOGY VISIT (OUTPATIENT)
Dept: ONCOLOGY | Facility: CLINIC | Age: 67
End: 2017-05-22
Attending: INTERNAL MEDICINE
Payer: COMMERCIAL

## 2017-05-22 ENCOUNTER — HOSPITAL ENCOUNTER (OUTPATIENT)
Facility: CLINIC | Age: 67
Setting detail: SPECIMEN
Discharge: HOME | End: 2017-05-22
Attending: INTERNAL MEDICINE | Admitting: INTERNAL MEDICINE
Payer: COMMERCIAL

## 2017-05-22 DIAGNOSIS — D64.9 ANEMIA, UNSPECIFIED TYPE: ICD-10-CM

## 2017-05-22 DIAGNOSIS — E61.1 IRON DEFICIENCY: ICD-10-CM

## 2017-05-22 LAB
BASOPHILS # BLD AUTO: 0.2 10E9/L (ref 0–0.2)
BASOPHILS NFR BLD AUTO: 2.2 %
DIFFERENTIAL METHOD BLD: ABNORMAL
EOSINOPHIL # BLD AUTO: 0.5 10E9/L (ref 0–0.7)
EOSINOPHIL NFR BLD AUTO: 6.1 %
ERYTHROCYTE [DISTWIDTH] IN BLOOD BY AUTOMATED COUNT: 24.8 % (ref 10–15)
FERRITIN SERPL-MCNC: 19 NG/ML (ref 26–388)
HCT VFR BLD AUTO: 41.4 % (ref 40–53)
HGB BLD-MCNC: 13 G/DL (ref 13.3–17.7)
IMM GRANULOCYTES # BLD: 0 10E9/L (ref 0–0.4)
IMM GRANULOCYTES NFR BLD: 0.1 %
IRON SATN MFR SERPL: 13 % (ref 15–46)
IRON SERPL-MCNC: 49 UG/DL (ref 35–180)
LYMPHOCYTES # BLD AUTO: 2 10E9/L (ref 0.8–5.3)
LYMPHOCYTES NFR BLD AUTO: 27.6 %
MCH RBC QN AUTO: 25.9 PG (ref 26.5–33)
MCHC RBC AUTO-ENTMCNC: 31.4 G/DL (ref 31.5–36.5)
MCV RBC AUTO: 83 FL (ref 78–100)
MONOCYTES # BLD AUTO: 0.6 10E9/L (ref 0–1.3)
MONOCYTES NFR BLD AUTO: 8.3 %
NEUTROPHILS # BLD AUTO: 4.1 10E9/L (ref 1.6–8.3)
NEUTROPHILS NFR BLD AUTO: 55.7 %
NRBC # BLD AUTO: 0 10*3/UL
NRBC BLD AUTO-RTO: 0 /100
PLATELET # BLD AUTO: 249 10E9/L (ref 150–450)
RBC # BLD AUTO: 5.02 10E12/L (ref 4.4–5.9)
RETICS # AUTO: 27.6 10E9/L (ref 25–95)
RETICS/RBC NFR AUTO: 0.6 % (ref 0.5–2)
TIBC SERPL-MCNC: 370 UG/DL (ref 240–430)
WBC # BLD AUTO: 7.4 10E9/L (ref 4–11)

## 2017-05-22 PROCEDURE — 82728 ASSAY OF FERRITIN: CPT | Performed by: INTERNAL MEDICINE

## 2017-05-22 PROCEDURE — 83550 IRON BINDING TEST: CPT | Performed by: INTERNAL MEDICINE

## 2017-05-22 PROCEDURE — 85045 AUTOMATED RETICULOCYTE COUNT: CPT | Performed by: INTERNAL MEDICINE

## 2017-05-22 PROCEDURE — 83540 ASSAY OF IRON: CPT | Performed by: INTERNAL MEDICINE

## 2017-05-22 PROCEDURE — 85025 COMPLETE CBC W/AUTO DIFF WBC: CPT | Performed by: INTERNAL MEDICINE

## 2017-05-22 PROCEDURE — 36415 COLL VENOUS BLD VENIPUNCTURE: CPT

## 2017-05-22 NOTE — PROGRESS NOTES
Medical Assistant Note:  Tevin GENEVIEVE Guidry presents today for lab visit.    Patient seen by provider today: No.   present during visit today: Not Applicable.    Concerns: No Concerns.    Procedure:  Lab draw site: LAC, Needle type: BF, Gauge: 21 g gauze and coban applied.    Post Assessment:  Labs drawn without difficulty: Yes.    Discharge Plan:  Departure Mode: Ambulatory.    Face to Face Time: 5.    Zee Wu MA

## 2017-05-22 NOTE — MR AVS SNAPSHOT
"              After Visit Summary   5/22/2017    Tevin Guidry    MRN: [id number]           Patient Information     Date Of Birth          1950        Visit Information        Provider Department      5/22/2017 11:00 AM Nurse, [facility]        Today's Diagnoses     Anemia, unspecified type        Iron deficiency           Follow-ups after your visit        Your next 10 appointments already scheduled     May 25, 2017 10:30 AM CDT   Return Visit with Josette Rubin MD   [facility] ([facility])    [facility]  [address]  [facility] [id number] [phone]              Who to contact     If you have questions or need follow up information about today's clinic visit or your schedule please contact [facility] directly at [phone].  Normal or non-critical lab and imaging results will be communicated to you by SaveMeetinghart, letter or phone within 4 business days after the clinic has received the results. If you do not hear from us within 7 days, please contact the clinic through MyChart or phone. If you have a critical or abnormal lab result, we will notify you by phone as soon as possible.  Submit refill requests through Ometria or call your pharmacy and they will forward the refill request to us. Please allow 3 business days for your refill to be completed.          Additional Information About Your Visit        SaveMeetinghart Information     Ometria lets you send messages to your doctor, view your test results, renew your prescriptions, schedule appointments and more. To sign up, go to [email or web address] . Click on \"Log in\" on the left side of the screen, which will take you to the Welcome page. Then click on \"Sign up Now\" on the right side of the page.     You will be asked to enter the access code listed below, as well as some personal information. Please follow the directions to create your username and " password.     Your access code is: [email or web address]  Expires: 2017 12:56 PM     Your access code will  in 90 days. If you need help or a new code, please call your [facility] or [phone].        Care EveryWhere ID     This is your Care EveryWhere ID. This could be used by other organizations to access your [place] medical records  [id number]         Blood Pressure from Last 3 Encounters:   17 126/81   17 124/80   17 124/76    Weight from Last 3 Encounters:   17 79.1 kg (174 lb 6.4 oz)   17 77.2 kg (170 lb 3.2 oz)   17 76.7 kg (169 lb 1.6 oz)              We Performed the Following     CBC with platelets differential     Ferritin     Iron and iron binding capacity     Reticulocyte count        Primary Care Provider Office Phone # Fax #    Preethi Quiroz [phone]       [facility] [address] [facility] [address]        Thank you!     Thank you for choosing [facility]  for your care. Our goal is always to provide you with excellent care. Hearing back from our patients is one way we can continue to improve our services. Please take a few minutes to complete the written survey that you may receive in the mail after your visit with us. Thank you!             Your Updated Medication List - Protect others around you: Learn how to safely use, store and throw away your medicines at www.disposemymeds.org.          This list is accurate as of: 17 11:11 AM.  Always use your most recent med list.                   Brand Name Dispense Instructions for use    albuterol 108 (90 BASE) MCG/ACT Inhaler    PROAIR HFA/PROVENTIL HFA/VENTOLIN HFA    1 Inhaler    Inhale 2 puffs into the lungs every 6 hours as needed for shortness of breath / dyspnea or wheezing       aspirin 81 MG tablet     90 tablet    Take 1 tablet (81 mg) by mouth daily       atorvastatin 80 MG tablet    LIPITOR    90 tablet    Take 1 tablet (80 mg) by mouth daily        ferrous sulfate 325 (65 FE) MG tablet    IRON    60 tablet    Take 1 tablet (325 mg) by mouth 2 times daily       lisinopril 10 MG tablet    PRINIVIL/ZESTRIL    90 tablet    Take 1 tablet (10 mg) by mouth daily       metoprolol 50 MG 24 hr tablet    TOPROL-XL    90 tablet    Take 1 tablet (50 mg) by mouth daily       Multi-vitamin Tabs tablet   Generic drug:  multivitamin, therapeutic with minerals      Take 1 tablet by mouth daily.       nitroglycerin 0.4 MG sublingual tablet    NITROSTAT    25 tablet    Place 1 tablet (0.4 mg) under the tongue every 5 minutes as needed for chest pain       PARoxetine 40 MG tablet    PAXIL    90 tablet    Take 1 tablet (40 mg) by mouth every morning

## 2017-05-23 ENCOUNTER — HOSPITAL ENCOUNTER (OUTPATIENT)
Facility: CLINIC | Age: 67
Setting detail: SPECIMEN
End: 2017-05-23
Attending: INTERNAL MEDICINE

## 2017-05-25 ENCOUNTER — ONCOLOGY VISIT (OUTPATIENT)
Dept: ONCOLOGY | Facility: CLINIC | Age: 67
End: 2017-05-25
Attending: INTERNAL MEDICINE
Payer: COMMERCIAL

## 2017-05-25 VITALS
WEIGHT: 170 LBS | OXYGEN SATURATION: 97 % | DIASTOLIC BLOOD PRESSURE: 87 MMHG | BODY MASS INDEX: 25.1 KG/M2 | SYSTOLIC BLOOD PRESSURE: 140 MMHG | TEMPERATURE: 97.7 F | HEART RATE: 53 BPM | RESPIRATION RATE: 16 BRPM

## 2017-05-25 DIAGNOSIS — D64.9 ANEMIA, UNSPECIFIED TYPE: Primary | ICD-10-CM

## 2017-05-25 DIAGNOSIS — E61.1 IRON DEFICIENCY: ICD-10-CM

## 2017-05-25 PROCEDURE — 99211 OFF/OP EST MAY X REQ PHY/QHP: CPT

## 2017-05-25 PROCEDURE — 99213 OFFICE O/P EST LOW 20 MIN: CPT | Performed by: INTERNAL MEDICINE

## 2017-05-25 ASSESSMENT — PAIN SCALES - GENERAL: PAINLEVEL: NO PAIN (0)

## 2017-05-25 NOTE — PATIENT INSTRUCTIONS
Continue oral iron.  Follow up in about 3 months with labs.     Pt will call clinic in August to schedule 3 month f/u. Pt will have labs done at [facility]. Drake Ruiz MA

## 2017-05-25 NOTE — MR AVS SNAPSHOT
After Visit Summary   5/25/2017    Tevin Guidry    MRN: [id number]           Patient Information     Date Of Birth          1950        Visit Information        Provider Department      5/25/2017 10:30 AM Josette Rubin MD [facility]        Today's Diagnoses     Anemia, unspecified type    -  1    Iron deficiency          Care Instructions    Continue oral iron.  Follow up in about 3 months with labs.     Pt will call clinic in August to schedule 3 month f/u. Pt will have labs done at primary [facility]. Drake Ruiz MA            Follow-ups after your visit        Your next 10 appointments already scheduled     Jun 13, 2017 10:40 AM CDT   Office Visit with Preethi Quiroz MD   [facility]    [address] [place] [id number] [phone]           Bring a current list of meds and any records pertaining to this visit.  For Physicals, please bring immunization records and any forms needing to be filled out.  Please arrive 10 minutes early to complete paperwork.              Who to contact     If you have questions or need follow up information about today's clinic visit or your schedule please contact [facility] directly at [phone].  Normal or non-critical lab and imaging results will be communicated to you by MyChart, letter or phone within 4 business days after the clinic has received the results. If you do not hear from us within 7 days, please contact the clinic through Atria Brindavan Powerhart or phone. If you have a critical or abnormal lab result, we will notify you by phone as soon as possible.  Submit refill requests through ReClaims or call your pharmacy and they will forward the refill request to us. Please allow 3 business days for your refill to be completed.          Additional Information About Your Visit        ReClaims Information     ReClaims lets you send messages to your doctor, view your test  "results, renew your prescriptions, schedule appointments and more. To sign up, go to [email or web address] . Click on \"Log in\" on the left side of the screen, which will take you to the Welcome page. Then click on \"Sign up Now\" on the right side of the page.     You will be asked to enter the access code listed below, as well as some personal information. Please follow the directions to create your username and password.     Your access code is: [email or web address]  Expires: 2017 11:15 PM     Your access code will  in 90 days. If you need help or a new code, please call your [place] clinic or [phone].        Care EveryWhere ID     This is your Care EveryWhere ID. This could be used by other organizations to access your [place] medical records  [id number]        Your Vitals Were     Pulse Temperature Respirations Pulse Oximetry BMI (Body Mass Index)       53 97.7  F (36.5  C) (Oral) 16 97% 25.1 kg/m2        Blood Pressure from Last 3 Encounters:   No data found for BP    Weight from Last 3 Encounters:   No data found for Wt              Today, you had the following     No orders found for display       Primary Care Provider Office Phone # Fax #    Preethi uQiroz [phone]       [facility] [address] [place] [address]        Thank you!     Thank you for choosing [facility]  for your care. Our goal is always to provide you with excellent care. Hearing back from our patients is one way we can continue to improve our services. Please take a few minutes to complete the written survey that you may receive in the mail after your visit with us. Thank you!             Your Updated Medication List - Protect others around you: Learn how to safely use, store and throw away your medicines at www.disposemymeds.org.          This list is accurate as of: 17 11:59 PM.  Always use your most recent med list.                   Brand Name Dispense Instructions for use "    albuterol 108 (90 BASE) MCG/ACT Inhaler    PROAIR HFA/PROVENTIL HFA/VENTOLIN HFA    1 Inhaler    Inhale 2 puffs into the lungs every 6 hours as needed for shortness of breath / dyspnea or wheezing       aspirin 81 MG tablet     90 tablet    Take 1 tablet (81 mg) by mouth daily       atorvastatin 80 MG tablet    LIPITOR    90 tablet    Take 1 tablet (80 mg) by mouth daily       ferrous sulfate 325 (65 FE) MG tablet    IRON    60 tablet    Take 1 tablet (325 mg) by mouth 2 times daily       lisinopril 10 MG tablet    PRINIVIL/ZESTRIL    90 tablet    Take 1 tablet (10 mg) by mouth daily       metoprolol 50 MG 24 hr tablet    TOPROL-XL    90 tablet    Take 1 tablet (50 mg) by mouth daily       Multi-vitamin Tabs tablet   Generic drug:  multivitamin, therapeutic with minerals      Take 1 tablet by mouth daily.       nitroglycerin 0.4 MG sublingual tablet    NITROSTAT    25 tablet    Place 1 tablet (0.4 mg) under the tongue every 5 minutes as needed for chest pain       PARoxetine 40 MG tablet    PAXIL    90 tablet    Take 1 tablet (40 mg) by mouth every morning

## 2017-05-25 NOTE — PROGRESS NOTES
"Oncology Rooming Note    May 25, 2017 10:37 AM   Tevin Guidry is a 66 year old male who presents for:    Chief Complaint   Patient presents with     Oncology Clinic Visit     Tubular Adenoma of Colon     Initial Vitals: /87 (BP Location: Left arm, Patient Position: Chair, Cuff Size: Adult Regular)  Pulse 53  Temp 97.7  F (36.5  C) (Oral)  Resp 16  Wt 77.1 kg (170 lb)  SpO2 97%  BMI 25.1 kg/m2 Estimated body mass index is 25.1 kg/(m^2) as calculated from the following:    Height as of 3/30/17: 1.753 m (5' 9\").    Weight as of this encounter: 77.1 kg (170 lb). Body surface area is 1.94 meters squared.  No Pain (0) Comment: Data Unavailable   No LMP for male patient.  Allergies reviewed: Yes  Medications reviewed: Yes    Medications: Medication refills not needed today.  Pharmacy name entered into [facility]:    [facility] [address]    Clinical concerns: Follow-Up Dr. Rubin  was notified.    5 minutes for nursing intake (face to face time)     Drake Ruiz MA            DISCHARGE PLAN:  Next appointments: Pt will call clinic in August to schedule 3 month f/u. Pt will have labs done at primary [facility].   Departure Mode: Ambulatory  Accompanied by: self  5 minutes for nursing discharge (face to face time)   Drake Ruiz MA    "

## 2017-05-26 NOTE — PROGRESS NOTES
HEMATOLOGY HISTORY: Mr. Tevin Guidry is a gentleman with chronic anemia.   1. On 02/12/2008, hemoglobin of 14.2. On 09/26/2014, hemoglobin of 11.3. Since then, multiple CBC have revealed anemia.   2. On 01/25/2017:  -Hemoglobin of 10.2 with MCV of 73. White count of 7.7 and platelets of 330. Retic count 0.8%.   -Peripheral blood smear reveals moderate microcytic hypochromic anemia with anisopoikilocytosis.   -Iron of 20 with percent saturation of 4%.   -Folate of 14.6.   -Vitamin B12 of 308.   -TSH of 0.70.   3. Colonoscopy on 04/28/2016 had revealed multiple colon polyps. Pathology revealed tubular adenoma.   4. On 03/02/2017:  -Ferritin of 5.    -Erythropoietin of 42.    -LDH of 178.    -SPEP did not reveal any monoclonal protein.  5. EGD and colonoscopy was done on 03/30/2017.    -EGD reveals normal esophagus, gastric mucosal atrophy, large gastric fold and normal duodenum. Biopsy of the stomach reveals minimal chronic inflammation.  No dysplasia.  No H. pylori.  Small intestine biopsy does not reveal any celiac sprue.   -Colonoscopy revealed polyps and diverticulosis.   Rectal polyp is tubulovillous adenoma without high-grade dysplasia or invasive carcinoma.  Other polyps are tubular adenoma.   6. Capsule endoscopy not approved by insurance.  7. Iron deficiency anemia responded to oral iron.    SUBJECTIVE:  Mr. Tevin Guidry is a 66-year-old gentleman with iron deficiency anemia.  He had EGD and colonoscopy which did not reveal any evidence of bleeding.  Capsule endoscopy was recommended.  It was not approved by his insurance company.      The patient is currently on oral iron.  He takes it twice a day.  Overall he is tolerating it well.  No abdominal discomfort.  No constipation.  His stools are dark.      The patient says that he has been feeling better since he is on oral iron.  He feels stronger.  No headache.  No dizziness.  No chest pain.  No shortness of breath.  Some nausea.  No vomiting.  No bleeding.       PHYSICAL EXAMINATION:   GENERAL:  The patient was alert and oriented x3.   VITAL SIGNS:  Reviewed.    The rest of the systems not examined.      LABORATORY DATA:  Multiple labs were done on 2017.  Hemoglobin better at 13.  MCV normal at 83.  Iron of 49, percent saturation of 13%, ferritin of 19.      ASSESSMENT:   1.  A 66-year-old gentleman with microcytic anemia secondary to iron deficiency which is responding to oral iron.   2.  Iron deficiency which is improving.  Cause of iron deficiency was not clear.  Esophagogastroduodenoscopy and colonoscopy normal.      PLAN:   1.  Labs were reviewed.  He was happy to know that his hemoglobin, iron and ferritin are all better.  Clinically, he is better. I told the patient he is still mildly anemic and iron deficient.  He will continue on oral iron twice a day.  I will see him in 3 months' time with repeat CBC and iron studies.      2.  I would still recommend doing a capsule endoscopy in the future if approved by his insurance company.      3.  He had a few questions, which were all answered.  I advised him to see a physician if he has blood in the stool, abdominal pain, weight loss or any other concerns.         MARGRET COLLIER MD             D: 2017 11:14   T: 2017 08:20   MT: TAVARES      Name:     DICK LYONS   MRN:      [id number]        Account:      [id number]   :      1950           Visit Date:   2017      Document: [id number]

## 2017-06-19 ENCOUNTER — OFFICE VISIT (OUTPATIENT)
Dept: FAMILY MEDICINE | Facility: CLINIC | Age: 67
End: 2017-06-19
Payer: COMMERCIAL

## 2017-06-19 VITALS
TEMPERATURE: 97.8 F | SYSTOLIC BLOOD PRESSURE: 134 MMHG | BODY MASS INDEX: 24.96 KG/M2 | HEART RATE: 43 BPM | DIASTOLIC BLOOD PRESSURE: 79 MMHG | RESPIRATION RATE: 10 BRPM | OXYGEN SATURATION: 99 % | WEIGHT: 169 LBS

## 2017-06-19 DIAGNOSIS — J44.9 CHRONIC OBSTRUCTIVE PULMONARY DISEASE, UNSPECIFIED COPD TYPE (H): ICD-10-CM

## 2017-06-19 DIAGNOSIS — I10 HYPERTENSION GOAL BP (BLOOD PRESSURE) < 140/90: ICD-10-CM

## 2017-06-19 DIAGNOSIS — F40.10 SOCIAL PHOBIA: ICD-10-CM

## 2017-06-19 DIAGNOSIS — I25.10 CAD, MULTIPLE VESSEL: ICD-10-CM

## 2017-06-19 DIAGNOSIS — E78.5 HYPERLIPIDEMIA LDL GOAL <130: ICD-10-CM

## 2017-06-19 DIAGNOSIS — D64.89 ANEMIA DUE TO OTHER CAUSE: Primary | ICD-10-CM

## 2017-06-19 PROCEDURE — 99214 OFFICE O/P EST MOD 30 MIN: CPT | Performed by: FAMILY MEDICINE

## 2017-06-19 PROCEDURE — 99207 C PAF COMPLETED  NO CHARGE: CPT | Performed by: FAMILY MEDICINE

## 2017-06-19 RX ORDER — NITROGLYCERIN 0.4 MG/1
0.4 TABLET SUBLINGUAL EVERY 5 MIN PRN
Qty: 25 TABLET | Refills: 3 | Status: SHIPPED | OUTPATIENT
Start: 2017-06-19 | End: 2021-09-14

## 2017-06-19 RX ORDER — PAROXETINE 40 MG/1
40 TABLET, FILM COATED ORAL EVERY MORNING
Qty: 90 TABLET | Refills: 1 | Status: SHIPPED | OUTPATIENT
Start: 2017-06-19 | End: 2017-10-30

## 2017-06-19 NOTE — PATIENT INSTRUCTIONS
Schedule a lab visit in August to complete your labs for me, Dr. Rubin and Dr. Evans.   Your next preventive physical is due in November.

## 2017-06-19 NOTE — NURSING NOTE
"Chief Complaint   Patient presents with     RECHECK     Anemia        Initial /79  Pulse (!) 43  Temp 97.8  F (36.6  C) (Oral)  Resp 10  Wt 169 lb (76.7 kg)  SpO2 99%  BMI 24.96 kg/m2 Estimated body mass index is 24.96 kg/(m^2) as calculated from the following:    Height as of 3/30/17: 5' 9\" (1.753 m).    Weight as of this encounter: 169 lb (76.7 kg).  Medication Reconciliation: complete       Homa Gore MA     "

## 2017-06-19 NOTE — MR AVS SNAPSHOT
"              After Visit Summary   6/19/2017    Tevin Guidry    MRN: [id number]           Patient Information     Date Of Birth          1950        Visit Information        Provider Department      6/19/2017 10:20 AM Preethi Quiroz MD [facility]        Today's Diagnoses     Anemia due to other cause    -  1    Hyperlipidemia LDL goal <130        Hypertension goal BP (blood pressure) < 140/90        Social phobia        CAD, multiple vessel        Chronic obstructive pulmonary disease, unspecified COPD type (H)          Care Instructions    Schedule a lab visit in August to complete your labs for me, Dr. Rubin and Dr. Evans.   Your next preventive physical is due in November.           Follow-ups after your visit        Future tests that were ordered for you today     Open Future Orders        Priority Expected Expires Ordered    Comprehensive metabolic panel Routine  6/19/2018 6/19/2017            Who to contact     If you have questions or need follow up information about today's clinic visit or your schedule please contact [facility] directly at [phone].  Normal or non-critical lab and imaging results will be communicated to you by Dot VNhart, letter or phone within 4 business days after the clinic has received the results. If you do not hear from us within 7 days, please contact the clinic through UpCloot or phone. If you have a critical or abnormal lab result, we will notify you by phone as soon as possible.  Submit refill requests through Solum or call your pharmacy and they will forward the refill request to us. Please allow 3 business days for your refill to be completed.          Additional Information About Your Visit        Dot VNhart Information     Solum lets you send messages to your doctor, view your test results, renew your prescriptions, schedule appointments and more. To sign up, go to [email or web address] . Click on \"Log in\" on the left side of the " "screen, which will take you to the Welcome page. Then click on \"Sign up Now\" on the right side of the page.     You will be asked to enter the access code listed below, as well as some personal information. Please follow the directions to create your username and password.     Your access code is: [email or web address]  Expires: 2017 11:15 PM     Your access code will  in 90 days. If you need help or a new code, please call your [facility] or [phone].        Care EveryWhere ID     This is your Care EveryWhere ID. This could be used by other organizations to access your [place] medical records  [id number]        Your Vitals Were     Pulse Temperature Respirations Pulse Oximetry BMI (Body Mass Index)       43 97.8  F (36.6  C) (Oral) 10 99% 24.96 kg/m2        Blood Pressure from Last 3 Encounters:   17 134/79   17 140/87   17 126/81    Weight from Last 3 Encounters:   17 169 lb (76.7 kg)   17 170 lb (77.1 kg)   17 174 lb 6.4 oz (79.1 kg)              We Performed the Following     COPD ACTION PLAN          Where to get your medicines      These medications were sent to [facility] [place] [address]  [address] [id number]     Phone:  [phone]     nitroglycerin 0.4 MG sublingual tablet    PARoxetine 40 MG tablet          Primary Care Provider Office Phone # Fax #    Preethi Quiroz [phone]       [facility] [address]  [facility] [address]        Thank you!     Thank you for choosing [facility]  for your care. Our goal is always to provide you with excellent care. Hearing back from our patients is one way we can continue to improve our services. Please take a few minutes to complete the written survey that you may receive in the mail after your visit with us. Thank you!             Your Updated Medication List - Protect others around you: Learn how to safely use, store " and throw away your medicines at www.disposemymeds.org.          This list is accurate as of: 6/19/17 11:16 AM.  Always use your most recent med list.                   Brand Name Dispense Instructions for use    albuterol 108 (90 BASE) MCG/ACT Inhaler    PROAIR HFA/PROVENTIL HFA/VENTOLIN HFA    1 Inhaler    Inhale 2 puffs into the lungs every 6 hours as needed for shortness of breath / dyspnea or wheezing       aspirin 81 MG tablet     90 tablet    Take 1 tablet (81 mg) by mouth daily       atorvastatin 80 MG tablet    LIPITOR    90 tablet    Take 1 tablet (80 mg) by mouth daily       ferrous sulfate 325 (65 FE) MG tablet    IRON    60 tablet    Take 1 tablet (325 mg) by mouth 2 times daily       lisinopril 10 MG tablet    PRINIVIL/ZESTRIL    90 tablet    Take 1 tablet (10 mg) by mouth daily       metoprolol 50 MG 24 hr tablet    TOPROL-XL    90 tablet    Take 1 tablet (50 mg) by mouth daily       Multi-vitamin Tabs tablet   Generic drug:  multivitamin, therapeutic with minerals      Take 1 tablet by mouth daily.       nitroglycerin 0.4 MG sublingual tablet    NITROSTAT    25 tablet    Place 1 tablet (0.4 mg) under the tongue every 5 minutes as needed for chest pain       PARoxetine 40 MG tablet    PAXIL    90 tablet    Take 1 tablet (40 mg) by mouth every morning

## 2017-06-19 NOTE — PROGRESS NOTES
"  SUBJECTIVE:                                                    Tevin Guidry is a 66 year old male who presents to clinic today for the following health issues:      He is doing well. Tolerating the ferrous sulfate and seeing some improvement in his ferritin. Denies any blood in stool or melena. Notes darker stool since starting iron. Due to see Dr. Collier in August and will have labs then.     5/25/17 clinic visit with Dr. Collier:  ASSESSMENT:   1.  A 66-year-old gentleman with microcytic anemia secondary to iron deficiency which is responding to oral iron.   2.  Iron deficiency which is improving.  Cause of iron deficiency was not clear.  Esophagogastroduodenoscopy and colonoscopy normal.       PLAN:   1.  Labs were reviewed.  He was happy to know that his hemoglobin, iron and ferritin are all better.  Clinically, he is better. I told the patient he is still mildly anemic and iron deficient.  He will continue on oral iron twice a day.  I will see him in 3 months' time with repeat CBC and iron studies.       2.  I would still recommend doing a capsule endoscopy in the future if approved by his insurance company.       3.  He had a few questions, which were all answered.  I advised him to see a physician if he has blood in the stool, abdominal pain, weight loss or any other concerns.           MARGRET COLLIER MD    Clinic on 4/6/17 Dr. Collier:  \"PLAN:   1.  Labs were reviewed with him.  EGD and colonoscopy reviewed.  GI workup so far does not reveal any evidence of bleeding.   2.  Because he has iron deficiency, discussed regarding capsule endoscopy, as EGD and colonoscopy did not reveal any bleeding.  He is agreeable for it.  Will set him up for capsule endoscopy.   3.  Discussed regarding oral iron.  After capsule endoscopy is done, he will start on oral ferrous sulfate 325 mg twice a day.   4.  Side effects of oral iron including abdominal discomfort, constipation and dark stools were reviewed.   5.  I will see " "him back in mid May with a CBC and iron studies.  He will return sooner if he has any worsening weakness, chest pain, shortness of breath, bleeding or any other concerns.\"                       Problem list and histories reviewed & adjusted, as indicated.  Additional history: as documented  Patient Active Problem List   Diagnosis     Social phobia     Hypertension goal BP (blood pressure) < 140/90     HYPERLIPIDEMIA LDL GOAL <130     Tubular adenoma of colon     Advanced directives, counseling/discussion     CAD, multiple vessel     CAD (coronary artery disease)     Cigarette nicotine dependence without complication     S/P CABG (coronary artery bypass graft)     Chronic obstructive pulmonary disease, unspecified COPD type (H)     Anemia, unspecified type     Health Care Home     Past Surgical History:   Procedure Laterality Date     COLONOSCOPY N/A 3/30/2017    Procedure: COLONOSCOPY;  Surgeon: Jie Onofre MD;  Location: [facility]     DAVINCI BYPASS ARTERY CORONARY N/A 9/29/2014    Procedure: DAVINCI BYPASS ARTERY CORONARY;  Surgeon: Lam Solis MD;  Location: [facility]     ESOPHAGOSCOPY, GASTROSCOPY, DUODENOSCOPY (EGD), COMBINED N/A 3/30/2017    Procedure: COMBINED ESOPHAGOSCOPY, GASTROSCOPY, DUODENOSCOPY (EGD);  Surgeon: Jie Onofre MD;  Location: [facility]     HC COLONOSCOPY THRU STOMA, DIAGNOSTIC  2010    polyps due 2011     SURGICAL HISTORY OF -       right clavicular fracture with ORIF       Social History   Substance Use Topics     Smoking status: Former Smoker     Smokeless tobacco: Never Used      Comment: quit smoking over 20 years ago     Alcohol use No      Comment: quit 35 years ago     Family History   Problem Relation Age of Onset     CANCER Mother      esog/stomach     CANCER Father      lung     Family History Negative Brother      Hypertension Brother          Current Outpatient Prescriptions   Medication Sig Dispense Refill     ferrous sulfate (IRON) 325 (65 FE) " MG tablet Take 1 tablet (325 mg) by mouth 2 times daily 60 tablet 3     atorvastatin (LIPITOR) 80 MG tablet Take 1 tablet (80 mg) by mouth daily 90 tablet 3     albuterol (PROAIR HFA, PROVENTIL HFA, VENTOLIN HFA) 108 (90 BASE) MCG/ACT inhaler Inhale 2 puffs into the lungs every 6 hours as needed for shortness of breath / dyspnea or wheezing 1 Inhaler 1     lisinopril (PRINIVIL,ZESTRIL) 10 MG tablet Take 1 tablet (10 mg) by mouth daily 90 tablet 3     metoprolol (TOPROL-XL) 50 MG 24 hr tablet Take 1 tablet (50 mg) by mouth daily 90 tablet 3     PARoxetine (PAXIL) 40 MG tablet Take 1 tablet (40 mg) by mouth every morning 90 tablet 1     aspirin 81 MG tablet Take 1 tablet (81 mg) by mouth daily 90 tablet 3     nitroglycerin (NITROSTAT) 0.4 MG SL tablet Place 1 tablet (0.4 mg) under the tongue every 5 minutes as needed for chest pain 25 tablet 3     Multiple Vitamin (MULTI-VITAMIN) per tablet Take 1 tablet by mouth daily.         Allergies   Allergen Reactions     Codeine      No Known Allergies      Recent Labs   Lab Test  03/27/17   1537  03/02/17   1150  01/25/17   0909  10/27/16   1028  04/25/16   1050  10/23/15   0850   09/30/14   0427   09/29/14   1735   09/28/14   0709   07/11/12   1503   A1C   --    --    --    --    --    --    --    --    --    --    --   5.9   --   6.0   LDL   --    --    --   75   --   85   --   39   --    --    --    --    < >   --    HDL   --    --    --   55   --   43   --   51   --    --    --    --    < >   --    TRIG   --    --    --   98   --   137   --   97   --    --    --    --    < >   --    ALT   --    --    --    --   17  15   --    --    --   15   --   16   < >  10   CR  0.86  0.73   --    --   0.84  0.84   < >  0.84   < >  0.83   < >  0.76   < >  0.82   GFRESTIMATED  88  >90  Non  GFR Calc     --    --   >90  Non  GFR Calc    >90  Non  GFR Calc     < >  >90  Non  GFR Calc     < >  >90  Non  GFR  Calc     < >  >90  Non  GFR Calc     < >  >90   GFRESTBLACK  >90   GFR Calc    >90   GFR Calc     --    --   >90   GFR Calc    >90   GFR Calc     < >  >90   GFR Calc     < >  >90   GFR Calc     < >  >90   GFR Calc     < >  >90   POTASSIUM  4.2   --    --    --   4.6  4.2   < >  4.3   < >  3.9   < >  3.8   < >  3.6   TSH   --    --   0.70   --    --    --    --    --    --    --    --    --    --    --     < > = values in this interval not displayed.      BP Readings from Last 3 Encounters:   06/19/17 134/79   05/25/17 140/87   04/06/17 126/81    Wt Readings from Last 3 Encounters:   06/19/17 169 lb (76.7 kg)   05/25/17 170 lb (77.1 kg)   04/06/17 174 lb 6.4 oz (79.1 kg)        Reviewed and updated as needed this visit by clinical staff  Reviewed and updated as needed this visit by Provider    ROS:  Denies chest pain or shortness of breath.       OBJECTIVE:                                                    /79  Pulse (!) 43  Temp 97.8  F (36.6  C) (Oral)  Resp 10  Wt 169 lb (76.7 kg)  SpO2 99%  BMI 24.96 kg/m2  Body mass index is 24.96 kg/(m^2).  GENERAL: healthy, alert and no distress  RESP: lungs clear to auscultation - no rales, rhonchi or wheezes  CV: regular rate and rhythm, normal S1 S2, no S3 or S4, no murmur, click or rub     Diagnostic Test Results:  No results found for this or any previous visit (from the past 24 hour(s)).     ASSESSMENT/PLAN:                                                    1. Anemia due to other cause  Insurance did not cover pill endoscopy  He denies any episodes of bleeding.   Colonoscopy and EGD were without evidence of source of bleeding.  He has more energy since he started iron supplementation  He will f/u with Dr. Rubin in August.     2. Hyperlipidemia LDL goal <130  Stable on atorvastatin 80mg daily, due for lipid recheck, will return in  August fasting for labs   - Comprehensive metabolic panel; Future    3. Hypertension goal BP (blood pressure) < 140/90  Controlled, no changes today  Continue lisinopril and metoprolol   Mildly bradycardic on metoprolol at the 50mg dose, but feeling well, no complaints. No changes today  - Comprehensive metabolic panel; Future    4. Social phobia  Stable   - PARoxetine (PAXIL) 40 MG tablet; Take 1 tablet (40 mg) by mouth every morning  Dispense: 90 tablet; Refill: 1    5. CAD, multiple vessel  Stable, has not needed to take nitro ever, but bottle   Followed by cardiology  On asa  - nitroglycerin (NITROSTAT) 0.4 MG sublingual tablet; Place 1 tablet (0.4 mg) under the tongue every 5 minutes as needed for chest pain  Dispense: 25 tablet; Refill: 3    6. Chronic obstructive pulmonary disease, unspecified COPD type (H)  Mild, Stable, improved energy since starting ferritin. Does not notice a difference with albuterol.   - COPD ACTION PLAN    Patient Instructions   Schedule a lab visit in August to complete your labs for me, Dr. Rubin and Dr. Evans.   Your next preventive physical is due in November.         Preethi Quiroz MD  [facility]

## 2017-06-19 NOTE — LETTER
My COPD Action Plan   Name: Tevin Guidry    YOB: 1950   Date: 6/19/2017    My doctor: Preethi Quiroz MD, MD   My clinic: [address] [address] [phone]  My Controller Medicine: none   Dose:       My Rescue Medicine: Albuterol (Proair/Ventolin/Proventil) inhaler   Dose:         My COPD Severity: Mild = FeV1 > 80%     Use of Oxygen: Oxygen Not Prescribed         GREEN ZONE       Doing well today      Usual level of activity and exercise    Usual amount of cough and mucus    No shortness of breath    Usual level of health (thinking clearly, sleeping well, feel like eating) Actions:      Take daily medicines    Use oxygen as prescribed    Follow regular exercise and diet plan    Avoid cigarette smoke and other irritants that harm the lungs           YELLOW ZONE          Having a bad day or flare up      Short of breath more than usual    A lot more sputum (mucus) than usual    Sputum looks yellow, green, tan, brown or bloody    More coughing or wheezing    Fever or chills    Less energy; trouble completing activities    Trouble thinking or focusing    Using quick relief inhaler or nebulizer more often    Poor sleep; symptoms wake me up    Do not feel like eating Actions:      Get plenty of rest    Take daily medicines    Use quick relief inhaler every 4 hours    If you use oxygen, call you doctor to see if you should adjust your oxygen    Do breathing exercises or other things to help you relax    Let a loved one, friend or neighbor know you are feeling worse    Call your care team if you have 2 or more symptoms.  Start taking steroids or antibiotics if directed by your care team           RED ZONE       Need medical care now      Severe shortness of breath (feel you can't breathe)    Fever, chills    Not enough breath to do any activity    Trouble coughing up mucus, walking or talking    Blood in mucus    Frequent coughing   Rescue  medicines are not working    Not able to sleep because of breathing    Feel confused or drowsy    Chest pain    Actions:      Call your health care team.  If you cannot reach your care team, call 911 or go to the emergency room.        Electronically signed by: Preethi Quiroz MD, June 19, 2017  Annual Reminders:  Meet with Care Team, Flu Shot every Fall and Pneumonia Shot at least once  Pharmacy:    [facility] [place], [place] - [address] [place] [address]

## 2017-09-06 DIAGNOSIS — I10 HYPERTENSION GOAL BP (BLOOD PRESSURE) < 140/90: ICD-10-CM

## 2017-09-06 DIAGNOSIS — E78.5 HYPERLIPIDEMIA LDL GOAL <130: ICD-10-CM

## 2017-09-06 DIAGNOSIS — D64.9 ANEMIA, UNSPECIFIED TYPE: ICD-10-CM

## 2017-09-06 LAB
ALBUMIN SERPL-MCNC: 3.8 G/DL (ref 3.4–5)
ALP SERPL-CCNC: 113 U/L (ref 40–150)
ALT SERPL W P-5'-P-CCNC: 25 U/L (ref 0–70)
ANION GAP SERPL CALCULATED.3IONS-SCNC: 5 MMOL/L (ref 3–14)
AST SERPL W P-5'-P-CCNC: 19 U/L (ref 0–45)
BILIRUB SERPL-MCNC: 0.5 MG/DL (ref 0.2–1.3)
BUN SERPL-MCNC: 11 MG/DL (ref 7–30)
CALCIUM SERPL-MCNC: 9.3 MG/DL (ref 8.5–10.1)
CHLORIDE SERPL-SCNC: 105 MMOL/L (ref 94–109)
CO2 SERPL-SCNC: 32 MMOL/L (ref 20–32)
CREAT SERPL-MCNC: 0.72 MG/DL (ref 0.66–1.25)
ERYTHROCYTE [DISTWIDTH] IN BLOOD BY AUTOMATED COUNT: 13.8 % (ref 10–15)
FERRITIN SERPL-MCNC: 38 NG/ML (ref 26–388)
GFR SERPL CREATININE-BSD FRML MDRD: >90 ML/MIN/1.7M2
GLUCOSE SERPL-MCNC: 98 MG/DL (ref 70–99)
HCT VFR BLD AUTO: 46.1 % (ref 40–53)
HGB BLD-MCNC: 15.3 G/DL (ref 13.3–17.7)
IRON SATN MFR SERPL: 16 % (ref 15–46)
IRON SERPL-MCNC: 57 UG/DL (ref 35–180)
MCH RBC QN AUTO: 31.2 PG (ref 26.5–33)
MCHC RBC AUTO-ENTMCNC: 33.2 G/DL (ref 31.5–36.5)
MCV RBC AUTO: 94 FL (ref 78–100)
PLATELET # BLD AUTO: 268 10E9/L (ref 150–450)
POTASSIUM SERPL-SCNC: 4.6 MMOL/L (ref 3.4–5.3)
PROT SERPL-MCNC: 7 G/DL (ref 6.8–8.8)
RBC # BLD AUTO: 4.9 10E12/L (ref 4.4–5.9)
SODIUM SERPL-SCNC: 142 MMOL/L (ref 133–144)
TIBC SERPL-MCNC: 356 UG/DL (ref 240–430)
WBC # BLD AUTO: 9.3 10E9/L (ref 4–11)

## 2017-09-06 PROCEDURE — 83540 ASSAY OF IRON: CPT | Performed by: FAMILY MEDICINE

## 2017-09-06 PROCEDURE — 36415 COLL VENOUS BLD VENIPUNCTURE: CPT | Performed by: FAMILY MEDICINE

## 2017-09-06 PROCEDURE — 85027 COMPLETE CBC AUTOMATED: CPT | Performed by: FAMILY MEDICINE

## 2017-09-06 PROCEDURE — 83550 IRON BINDING TEST: CPT | Performed by: FAMILY MEDICINE

## 2017-09-06 PROCEDURE — 82728 ASSAY OF FERRITIN: CPT | Performed by: FAMILY MEDICINE

## 2017-09-06 PROCEDURE — 80053 COMPREHEN METABOLIC PANEL: CPT | Performed by: FAMILY MEDICINE

## 2017-09-12 ENCOUNTER — ONCOLOGY VISIT (OUTPATIENT)
Dept: ONCOLOGY | Facility: CLINIC | Age: 67
End: 2017-09-12
Attending: INTERNAL MEDICINE
Payer: COMMERCIAL

## 2017-09-12 VITALS
SYSTOLIC BLOOD PRESSURE: 132 MMHG | HEART RATE: 50 BPM | TEMPERATURE: 98 F | BODY MASS INDEX: 24.87 KG/M2 | WEIGHT: 168.4 LBS | RESPIRATION RATE: 16 BRPM | DIASTOLIC BLOOD PRESSURE: 77 MMHG | OXYGEN SATURATION: 95 %

## 2017-09-12 DIAGNOSIS — E61.1 IRON DEFICIENCY: ICD-10-CM

## 2017-09-12 DIAGNOSIS — D64.9 ANEMIA, UNSPECIFIED TYPE: Primary | ICD-10-CM

## 2017-09-12 PROCEDURE — 99211 OFF/OP EST MAY X REQ PHY/QHP: CPT

## 2017-09-12 PROCEDURE — 99213 OFFICE O/P EST LOW 20 MIN: CPT | Performed by: INTERNAL MEDICINE

## 2017-09-12 RX ORDER — FERROUS SULFATE 325(65) MG
325 TABLET ORAL
Qty: 60 TABLET | Refills: 3 | COMMUNITY
Start: 2017-09-12 | End: 2018-03-13

## 2017-09-12 ASSESSMENT — PAIN SCALES - GENERAL: PAINLEVEL: NO PAIN (0)

## 2017-09-12 NOTE — PATIENT INSTRUCTIONS
Decrease oral iron to once a day.  Follow up in 6 months with labs.  Done - Mona    AVS given to patient

## 2017-09-12 NOTE — PROGRESS NOTES
"Oncology Rooming Note    September 12, 2017 10:56 AM   Tevin Guidry is a 67 year old male who presents for:    Chief Complaint   Patient presents with     Oncology Clinic Visit     Tubular adenoma of colon     Initial Vitals: /77 (BP Location: Left arm, Patient Position: Sitting, Cuff Size: Adult Regular)  Pulse 50  Temp 98  F (36.7  C) (Oral)  Resp 16  Wt 76.4 kg (168 lb 6.4 oz)  SpO2 95%  BMI 24.87 kg/m2 Estimated body mass index is 24.87 kg/(m^2) as calculated from the following:    Height as of 3/30/17: 1.753 m (5' 9\").    Weight as of this encounter: 76.4 kg (168 lb 6.4 oz). Body surface area is 1.93 meters squared.  No Pain (0) Comment: Data Unavailable   No LMP for male patient.  Allergies reviewed: Yes  Medications reviewed: Yes    Medications: MEDICATION REFILLS NEEDED TODAY. Provider was notified.  MEDICATION REFILL NEEDED ON FERROUS SULFATE  Pharmacy name entered into [facility]:    [facility] [place], [place] - [address]      Clinical concerns: None        4 minutes for nursing intake (face to face time)     Zee Wu MA            "

## 2017-09-12 NOTE — PROGRESS NOTES
HEMATOLOGY HISTORY: Mr. Tevin Guidry is a gentleman with chronic anemia.   1. On 02/12/2008, hemoglobin of 14.2. On 09/26/2014, hemoglobin of 11.3. Since then, multiple CBC have revealed anemia.   2. On 01/25/2017:  -Hemoglobin of 10.2 with MCV of 73. White count of 7.7 and platelets of 330. Retic count 0.8%.   -Peripheral blood smear reveals moderate microcytic hypochromic anemia with anisopoikilocytosis.   -Iron of 20 with percent saturation of 4%.   -Folate of 14.6.   -Vitamin B12 of 308.   -TSH of 0.70.   3. Colonoscopy on 04/28/2016 had revealed multiple colon polyps. Pathology revealed tubular adenoma.   4. On 03/02/2017:  -Ferritin of 5.    -Erythropoietin of 42.    -LDH of 178.    -SPEP did not reveal any monoclonal protein.  5. EGD and colonoscopy was done on 03/30/2017.    -EGD reveals normal esophagus, gastric mucosal atrophy, large gastric fold and normal duodenum. Biopsy of the stomach reveals minimal chronic inflammation.  No dysplasia.  No H. pylori.  Small intestine biopsy does not reveal any celiac sprue.   -Colonoscopy revealed polyps and diverticulosis.   Rectal polyp is tubulovillous adenoma without high-grade dysplasia or invasive carcinoma.  Other polyps are tubular adenoma.   6. Capsule endoscopy not approved by insurance.  7. Iron deficiency anemia responded to oral iron.  On 09/06/2017:  -Hemoglobin of 15.3 with MCV of 94.  -Iron of 57 and ferritin of 38.     SUBJECTIVE:    Mr. Tevin Guidry is a 67-year-old gentleman with iron deficiency anemia.  He had EGD and colonoscopy which did not reveal any evidence of bleeding.  Capsule endoscopy was recommended.  It was not approved by his insurance company.       Patient is on oral iron twice a day.  Overall he is tolerating it well.  No abdominal discomfort.  No constipation.  His stools are dark.       Patient is doing well.  No excessive fatigue. No headache.  No dizziness.  No chest pain.  No shortness of breath.  No vomiting.  No bleeding.        PHYSICAL EXAMINATION:   GENERAL: Alert and oriented x3.   VITAL SIGNS:  Reviewed.    The rest of the systems not examined.       LABORATORY DATA:  Multiple labs were done on 09/06/2017.  -Hemoglobin of 15.3 with MCV of 94.  Normal WBC and platelet.  -Iron of 57 and ferritin of 38.  -CMP is normal.      ASSESSMENT:   A 67-year-old gentleman with iron deficiency anemia which has resolved with oral iron. Cause of iron deficiency not clear.        PLAN:   1.  Patient is doing well.  He is pretty asymptomatic.  Labs were reviewed with him.  CBC and iron studies are normal.  He is not anemic.  There is no iron deficiency. He is on oral iron twice a day.  I'll decrease it to once a day.    2.  I would still recommend doing a capsule endoscopy in the future if approved by his insurance company.       3.  He had a few questions, which were all answered.  I will see him in six months time with labs.  Advised him to see a physicians sooner if he has worsening weakness, chest pain, shortness of breath, abdominal pain, bleeding or any other concerns.

## 2017-09-12 NOTE — MR AVS SNAPSHOT
After Visit Summary   9/12/2017    Tevin Guidry    MRN: [id number]           Patient Information     Date Of Birth          1950        Visit Information        Provider Department      9/12/2017 10:30 AM Josette Rubin MD [facility]        Today's Diagnoses     Anemia, unspecified type    -  1    Iron deficiency          Care Instructions    Decrease oral iron to once a day.  Follow up in 6 months with labs.          Follow-ups after your visit        Your next 10 appointments already scheduled     Mar 06, 2018 10:00 AM CST   Return Visit with  Oncology Nurse   [facility] ([facility])    [facility]  [address]  [place] [address]   [phone]            Mar 13, 2018 10:00 AM CDT   Return Visit with Josette Rubin MD   [facility] ([facility])    [facility]  [address]  [place] [address]   [phone]              Future tests that were ordered for you today     Open Future Orders        Priority Expected Expires Ordered    CBC with platelets Routine 3/12/2018 6/12/2018 9/12/2017    Iron and iron binding capacity Routine 3/12/2018 6/12/2018 9/12/2017    Ferritin Routine 3/12/2018 6/12/2018 9/12/2017            Who to contact     If you have questions or need follow up information about today's clinic visit or your schedule please contact [facility] directly at [phone].  Normal or non-critical lab and imaging results will be communicated to you by MyChart, letter or phone within 4 business days after the clinic has received the results. If you do not hear from us within 7 days, please contact the clinic through [employer] or phone. If you have a critical or abnormal lab result, we will notify you by phone as soon as possible.  Submit refill requests through [employer] or call your pharmacy and they will forward the refill request to  "us. Please allow 3 business days for your refill to be completed.          Additional Information About Your Visit        [employer] Information     [employer] lets you send messages to your doctor, view your test results, renew your prescriptions, schedule appointments and more. To sign up, go to [email or web address] . Click on \"Log in\" on the left side of the screen, which will take you to the Welcome page. Then click on \"Sign up Now\" on the right side of the page.     You will be asked to enter the access code listed below, as well as some personal information. Please follow the directions to create your username and password.     Your access code is: [email or web address]  Expires: 2017 11:22 AM     Your access code will  in 90 days. If you need help or a new code, please call your [facility] clinic or [phone].        Care EveryWhere ID     This is your [facility] EveryWhere ID. This could be used by other organizations to access your [facility] medical records  [id number]        Your Vitals Were     Pulse Temperature Respirations Pulse Oximetry BMI (Body Mass Index)       50 98  F (36.7  C) (Oral) 16 95% 24.87 kg/m2        Blood Pressure from Last 3 Encounters:   17 132/77   17 134/79   17 140/87    Weight from Last 3 Encounters:   17 76.4 kg (168 lb 6.4 oz)   17 76.7 kg (169 lb)   17 77.1 kg (170 lb)                 Today's Medication Changes          These changes are accurate as of: 17 11:22 AM.  If you have any questions, ask your nurse or doctor.               These medicines have changed or have updated prescriptions.        Dose/Directions    ferrous sulfate 325 (65 FE) MG tablet   Commonly known as:  IRON   This may have changed:  when to take this   Used for:  Anemia, unspecified type, Iron deficiency        Dose:  325 mg   Take 1 tablet (325 mg) by mouth daily (with breakfast)   Quantity:  60 tablet   Refills:  3                Primary Care Provider Office Phone # " Fax #    Preethi Quiroz [phone]       [address]  [facility] [address]        Equal Access to Services     DRU FLANAGAN : Hadii leandro chaudhry alberto Espinoza, elmo johnjuan pablo, juan fta katasha renteria, mary dorman [address]. So [facility] [phone].    ATENCIÓN: Si habla español, tiene a gordon disposición servicios gratuitos de asistencia lingüística. Llame al [phone].    We comply with applicable federal civil rights laws and [place] laws. We do not discriminate on the basis of race, color, national origin, age, disability sex, sexual orientation or gender identity.            Thank you!     Thank you for choosing [facility]  for your care. Our goal is always to provide you with excellent care. Hearing back from our patients is one way we can continue to improve our services. Please take a few minutes to complete the written survey that you may receive in the mail after your visit with us. Thank you!             Your Updated Medication List - Protect others around you: Learn how to safely use, store and throw away your medicines at www.disposemymeds.org.          This list is accurate as of: 9/12/17 11:22 AM.  Always use your most recent med list.                   Brand Name Dispense Instructions for use Diagnosis    albuterol 108 (90 BASE) MCG/ACT Inhaler    PROAIR HFA/PROVENTIL HFA/VENTOLIN HFA    1 Inhaler    Inhale 2 puffs into the lungs every 6 hours as needed for shortness of breath / dyspnea or wheezing    Chronic obstructive pulmonary disease, unspecified COPD type (H)       aspirin 81 MG tablet     90 tablet    Take 1 tablet (81 mg) by mouth daily    Coronary artery disease involving native artery of transplanted heart without angina pectoris       atorvastatin 80 MG tablet    LIPITOR    90 tablet    Take 1 tablet (80 mg) by mouth daily    Hyperlipidemia LDL goal <70       ferrous sulfate 325 (65 FE) MG tablet    IRON    60 tablet    Take 1 tablet  (325 mg) by mouth daily (with breakfast)    Anemia, unspecified type, Iron deficiency       lisinopril 10 MG tablet    PRINIVIL/ZESTRIL    90 tablet    Take 1 tablet (10 mg) by mouth daily    Essential hypertension with goal blood pressure less than 140/90       metoprolol 50 MG 24 hr tablet    TOPROL-XL    90 tablet    Take 1 tablet (50 mg) by mouth daily    CAD, multiple vessel       Multi-vitamin Tabs tablet   Generic drug:  multivitamin, therapeutic with minerals      Take 1 tablet by mouth daily.        nitroGLYcerin 0.4 MG sublingual tablet    NITROSTAT    25 tablet    Place 1 tablet (0.4 mg) under the tongue every 5 minutes as needed for chest pain    CAD, multiple vessel       PARoxetine 40 MG tablet    PAXIL    90 tablet    Take 1 tablet (40 mg) by mouth every morning    Social phobia

## 2017-10-30 ENCOUNTER — OFFICE VISIT (OUTPATIENT)
Dept: FAMILY MEDICINE | Facility: CLINIC | Age: 67
End: 2017-10-30
Payer: COMMERCIAL

## 2017-10-30 VITALS
WEIGHT: 173 LBS | HEART RATE: 57 BPM | RESPIRATION RATE: 10 BRPM | TEMPERATURE: 98 F | DIASTOLIC BLOOD PRESSURE: 84 MMHG | SYSTOLIC BLOOD PRESSURE: 148 MMHG | OXYGEN SATURATION: 98 % | HEIGHT: 68 IN | BODY MASS INDEX: 26.22 KG/M2

## 2017-10-30 DIAGNOSIS — D64.9 ANEMIA, UNSPECIFIED TYPE: ICD-10-CM

## 2017-10-30 DIAGNOSIS — F17.210 CIGARETTE NICOTINE DEPENDENCE WITHOUT COMPLICATION: ICD-10-CM

## 2017-10-30 DIAGNOSIS — I25.10 CAD, MULTIPLE VESSEL: ICD-10-CM

## 2017-10-30 DIAGNOSIS — J44.9 CHRONIC OBSTRUCTIVE PULMONARY DISEASE, UNSPECIFIED COPD TYPE (H): ICD-10-CM

## 2017-10-30 DIAGNOSIS — F40.10 SOCIAL PHOBIA: ICD-10-CM

## 2017-10-30 DIAGNOSIS — E78.5 HYPERLIPIDEMIA LDL GOAL <130: ICD-10-CM

## 2017-10-30 DIAGNOSIS — I10 HYPERTENSION GOAL BP (BLOOD PRESSURE) < 140/90: ICD-10-CM

## 2017-10-30 DIAGNOSIS — I25.119 CORONARY ARTERY DISEASE INVOLVING NATIVE HEART WITH ANGINA PECTORIS, UNSPECIFIED VESSEL OR LESION TYPE (H): ICD-10-CM

## 2017-10-30 DIAGNOSIS — Z00.00 MEDICARE ANNUAL WELLNESS VISIT, SUBSEQUENT: Primary | ICD-10-CM

## 2017-10-30 DIAGNOSIS — I10 ESSENTIAL HYPERTENSION WITH GOAL BLOOD PRESSURE LESS THAN 140/90: ICD-10-CM

## 2017-10-30 DIAGNOSIS — Z23 NEED FOR PROPHYLACTIC VACCINATION AND INOCULATION AGAINST INFLUENZA: ICD-10-CM

## 2017-10-30 PROCEDURE — 36415 COLL VENOUS BLD VENIPUNCTURE: CPT | Performed by: FAMILY MEDICINE

## 2017-10-30 PROCEDURE — G0439 PPPS, SUBSEQ VISIT: HCPCS | Performed by: FAMILY MEDICINE

## 2017-10-30 PROCEDURE — 90662 IIV NO PRSV INCREASED AG IM: CPT | Performed by: FAMILY MEDICINE

## 2017-10-30 PROCEDURE — G0008 ADMIN INFLUENZA VIRUS VAC: HCPCS | Performed by: FAMILY MEDICINE

## 2017-10-30 PROCEDURE — 80061 LIPID PANEL: CPT | Performed by: FAMILY MEDICINE

## 2017-10-30 RX ORDER — LISINOPRIL 10 MG/1
10 TABLET ORAL DAILY
Qty: 90 TABLET | Refills: 3 | Status: SHIPPED | OUTPATIENT
Start: 2017-10-30 | End: 2018-12-10

## 2017-10-30 RX ORDER — METOPROLOL SUCCINATE 50 MG/1
50 TABLET, EXTENDED RELEASE ORAL DAILY
Qty: 90 TABLET | Refills: 3 | Status: SHIPPED | OUTPATIENT
Start: 2017-10-30 | End: 2018-11-11

## 2017-10-30 RX ORDER — PAROXETINE 40 MG/1
40 TABLET, FILM COATED ORAL EVERY MORNING
Qty: 90 TABLET | Refills: 3 | Status: SHIPPED | OUTPATIENT
Start: 2017-10-30 | End: 2018-11-11

## 2017-10-30 ASSESSMENT — ANXIETY QUESTIONNAIRES
IF YOU CHECKED OFF ANY PROBLEMS ON THIS QUESTIONNAIRE, HOW DIFFICULT HAVE THESE PROBLEMS MADE IT FOR YOU TO DO YOUR WORK, TAKE CARE OF THINGS AT HOME, OR GET ALONG WITH OTHER PEOPLE: NOT DIFFICULT AT ALL
7. FEELING AFRAID AS IF SOMETHING AWFUL MIGHT HAPPEN: NOT AT ALL
GAD7 TOTAL SCORE: 0
2. NOT BEING ABLE TO STOP OR CONTROL WORRYING: NOT AT ALL
6. BECOMING EASILY ANNOYED OR IRRITABLE: NOT AT ALL
3. WORRYING TOO MUCH ABOUT DIFFERENT THINGS: NOT AT ALL
1. FEELING NERVOUS, ANXIOUS, OR ON EDGE: NOT AT ALL
5. BEING SO RESTLESS THAT IT IS HARD TO SIT STILL: NOT AT ALL

## 2017-10-30 ASSESSMENT — PATIENT HEALTH QUESTIONNAIRE - PHQ9
SUM OF ALL RESPONSES TO PHQ QUESTIONS 1-9: 0
5. POOR APPETITE OR OVEREATING: NOT AT ALL

## 2017-10-30 NOTE — MR AVS SNAPSHOT
After Visit Summary   10/30/2017    Tevin Guidry    MRN: [id number]           Patient Information     Date Of Birth          1950        Visit Information        Provider Department      10/30/2017 10:40 AM Preethi Quiroz MD [facility] [place]        Today's Diagnoses     Medicare annual wellness visit, subsequent    -  1    Hypertension goal BP (blood pressure) < 140/90        Hyperlipidemia LDL goal <130        Chronic obstructive pulmonary disease, unspecified COPD type (H)        Coronary artery disease involving native heart with angina pectoris, unspecified vessel or lesion type (H)        Cigarette nicotine dependence without complication        Anemia, unspecified type        Need for prophylactic vaccination and inoculation against influenza        Social phobia        Essential hypertension with goal blood pressure less than 140/90        CAD, multiple vessel          Care Instructions      Preventive Health Recommendations:   Male Ages 65 and over    Yearly exam:             See your health care provider every year in order to  o   Review health changes.   o   Discuss preventive care.    o   Review your medicines if your doctor has prescribed any.    Talk with your health care provider about whether you should have a test to screen for prostate cancer (PSA).    Every 3 years, have a diabetes test (fasting glucose). If you are at risk for diabetes, you should have this test more often.    Every 5 years, have a cholesterol test. Have this test more often if you are at risk for high cholesterol or heart disease.     Every 10 years, have a colonoscopy. Or, have a yearly FIT test (stool test). These exams will check for colon cancer.    Talk to with your health care provider about screening for Abdominal Aortic Aneurysm if you have a family history of AAA or have a history of smoking.    Shots:     Get a flu shot each year.     Get a tetanus shot every 10 years.     Talk to your  doctor about your pneumonia vaccines. There are now two you should receive - Pneumovax (PPSV 23) and Prevnar (PCV 13).     Talk to your doctor about a shingles vaccine.     Talk to your doctor about the hepatitis B vaccine.  Nutrition:     Eat at least 5 servings of fruits and vegetables each day.     Eat whole-grain bread, whole-wheat pasta and brown rice instead of white grains and rice.     Talk to your provider about Calcium and Vitamin D.   Lifestyle    Exercise for at least 150 minutes a week (30 minutes a day, 5 days a week). This will help you control your weight and prevent disease.     Limit alcohol to one drink per day.     No smoking.     Wear sunscreen to prevent skin cancer.     See your dentist every six months for an exam and cleaning.     See your eye doctor every 1 to 2 years to screen for conditions such as glaucoma, macular degeneration, cataracts, etc   Preventive Health Recommendations:       Male Ages 65 and over    Yearly exam:             See your health care provider every year in order to  o   Review health changes.   o   Discuss preventive care.    o   Review your medicines if your doctor has prescribed any.  Talk with your health care provider about whether you should have a test to screen for prostate cancer (PSA).  Every 3 years, have a diabetes test (fasting glucose). If you are at risk for diabetes, you should have this test more often.  Every 5 years, have a cholesterol test. Have this test more often if you are at risk for high cholesterol or heart disease.   Every 10 years, have a colonoscopy. Or, have a yearly FIT test (stool test). These exams will check for colon cancer.  Talk to with your health care provider about screening for Abdominal Aortic Aneurysm if you have a family history of AAA or have a history of smoking.  Shots:   Get a flu shot each year.   Get a tetanus shot every 10 years.   Talk to your doctor about your pneumonia vaccines. There are now two you should  receive - Pneumovax (PPSV 23) and Prevnar (PCV 13).  Talk to your doctor about a shingles vaccine.   Talk to your doctor about the hepatitis B vaccine.  Nutrition:   Eat at least 5 servings of fruits and vegetables each day.   Eat whole-grain bread, whole-wheat pasta and brown rice instead of white grains and rice.   Talk to your doctor about Calcium and Vitamin D.   Lifestyle  Exercise for at least 150 minutes a week (30 minutes a day, 5 days a week). This will help you control your weight and prevent disease.   Limit alcohol to one drink per day.   No smoking.   Wear sunscreen to prevent skin cancer.   See your dentist every six months for an exam and cleaning.   See your eye doctor every 1 to 2 years to screen for conditions such as glaucoma, macular degeneration and cataracts.          Follow-ups after your visit        Your next 10 appointments already scheduled     Mar 06, 2018 10:00 AM CST   Return Visit with  Oncology Nurse   [facility] ([facility]    [facility] [place]  [address] [facility] [address]  [facility] [address]   [phone]            Mar 13, 2018 10:00 AM CDT   Return Visit with Josette Rubin MD   [facility])    [facility]  [address]  [facility] [address]   [phone]              Who to contact     If you have questions or need follow up information about today's clinic visit or your schedule please contact [facility] directly at [phone].  Normal or non-critical lab and imaging results will be communicated to you by MyChart, letter or phone within 4 business days after the clinic has received the results. If you do not hear from us within 7 days, please contact the clinic through [facility] or phone. If you have a critical or abnormal lab result, we will notify you by phone as soon as possible.  Submit refill requests through [email or web address] or call your pharmacy  "and they will forward the refill request to us. Please allow 3 business days for your refill to be completed.          Additional Information About Your Visit        MyChart Information     [employer] lets you send messages to your doctor, view your test results, renew your prescriptions, schedule appointments and more. To sign up, go to [email or web address] . Click on \"Log in\" on the left side of the screen, which will take you to the Welcome page. Then click on \"Sign up Now\" on the right side of the page.     You will be asked to enter the access code listed below, as well as some personal information. Please follow the directions to create your username and password.     Your access code is: [email or web address]  Expires: 2017 11:22 AM     Your access code will  in 90 days. If you need help or a new code, please call your [facility] clinic or [phone].        Care EveryWhere ID     This is your Care EveryWhere ID. This could be used by other organizations to access your [facility] medical records  [id number]        Your Vitals Were     Pulse Temperature Respirations Height Pulse Oximetry BMI (Body Mass Index)    57 98  F (36.7  C) (Oral) 10 5' 8\" (1.727 m) 98% 26.3 kg/m2       Blood Pressure from Last 3 Encounters:   10/30/17 154/84   17 132/77   17 134/79    Weight from Last 3 Encounters:   10/30/17 173 lb (78.5 kg)   17 168 lb 6.4 oz (76.4 kg)   17 169 lb (76.7 kg)              We Performed the Following     FLU VACCINE, INCREASED ANTIGEN, PRESV FREE, AGE 65+ [54580]     Lipid panel reflex to direct LDL Fasting     Vaccine Administration, Initial [80734]          Where to get your medicines      These medications were sent to [facility] [place], [place] - [address]  [address], [facility] [address]     Phone:  [phone]     lisinopril 10 MG tablet    metoprolol 50 MG 24 hr tablet    PARoxetine 40 MG tablet          Primary Care Provider Office Phone # " Fax #    Preethi Quiroz [phone]       [address]  [facility] [address]        Equal Access to Services     DRU FLANAGAN : Hadii leandro chaudhry tapaneloy Giannaemeli, waalisiada johnjuan pablo, qalata kaluz marinada myra, mary dorman labraedentano lane. So [facility] [phone].    ATENCIÓN: Si habla español, tiene a gordon disposición servicios gratuitos de asistencia lingüística. Llame al [phone].    We comply with applicable federal civil rights laws and [place] laws. We do not discriminate on the basis of race, color, national origin, age, disability, sex, sexual orientation, or gender identity.            Thank you!     Thank you for choosing [facility]  for your care. Our goal is always to provide you with excellent care. Hearing back from our patients is one way we can continue to improve our services. Please take a few minutes to complete the written survey that you may receive in the mail after your visit with us. Thank you!             Your Updated Medication List - Protect others around you: Learn how to safely use, store and throw away your medicines at www.disposemymeds.org.          This list is accurate as of: 10/30/17 11:11 AM.  Always use your most recent med list.                   Brand Name Dispense Instructions for use Diagnosis    albuterol 108 (90 BASE) MCG/ACT Inhaler    PROAIR HFA/PROVENTIL HFA/VENTOLIN HFA    1 Inhaler    Inhale 2 puffs into the lungs every 6 hours as needed for shortness of breath / dyspnea or wheezing    Chronic obstructive pulmonary disease, unspecified COPD type (H)       aspirin 81 MG tablet     90 tablet    Take 1 tablet (81 mg) by mouth daily    Coronary artery disease involving native artery of transplanted heart without angina pectoris       atorvastatin 80 MG tablet    LIPITOR    90 tablet    Take 1 tablet (80 mg) by mouth daily    Hyperlipidemia LDL goal <70       ferrous sulfate 325 (65 FE) MG tablet    IRON    60 tablet    Take 1  tablet (325 mg) by mouth daily (with breakfast)    Anemia, unspecified type, Iron deficiency       lisinopril 10 MG tablet    PRINIVIL/ZESTRIL    90 tablet    Take 1 tablet (10 mg) by mouth daily    Essential hypertension with goal blood pressure less than 140/90       metoprolol 50 MG 24 hr tablet    TOPROL-XL    90 tablet    Take 1 tablet (50 mg) by mouth daily    CAD, multiple vessel       Multi-vitamin Tabs tablet   Generic drug:  multivitamin, therapeutic with minerals      Take 1 tablet by mouth daily.        nitroGLYcerin 0.4 MG sublingual tablet    NITROSTAT    25 tablet    Place 1 tablet (0.4 mg) under the tongue every 5 minutes as needed for chest pain    CAD, multiple vessel       PARoxetine 40 MG tablet    PAXIL    90 tablet    Take 1 tablet (40 mg) by mouth every morning    Social phobia

## 2017-10-30 NOTE — PROGRESS NOTES
SUBJECTIVE:   Tevin Guidry is a 67 year old male who presents for Preventive Visit.  Are you in the first 12 months of your Medicare coverage?  No    Physical   Annual:     Getting at least 3 servings of Calcium per day::  NO    Bi-annual eye exam::  NO    Dental care twice a year::  Yes    Sleep apnea or symptoms of sleep apnea::  None    Diet::  Regular (no restrictions)    Frequency of exercise::  2-3 days/week    Duration of exercise::  Less than 15 minutes    Taking medications regularly::  Yes    Medication side effects::  None    Additional concerns today::  YES    COGNITIVE SCREEN  1) Repeat 3 items (Banana, Sunrise, Chair)    2) Clock draw: NORMAL  3) 3 item recall: Recalls 3 objects  Results: 3 items recalled: COGNITIVE IMPAIRMENT LESS LIKELY    Mini-CogTM Copyright CHAGO Baez. Licensed by the author for use in [facility]; reprinted with permission (kiara@[email or web address]). All rights reserved.      Reviewed and updated as needed this visit by clinical staff  Tobacco  Allergies  Meds  Med Hx  Surg Hx  Fam Hx  Soc Hx      Reviewed and updated as needed this visit by Provider        Social History   Substance Use Topics     Smoking status: Former Smoker     Smokeless tobacco: Never Used      Comment: quit smoking over 20 years ago     Alcohol use No      Comment: quit 35 years ago       The patient does not drink >3 drinks per day nor >7 drinks per week.      Today's PHQ-2 Score:   PHQ-2 ( 1999 Pfizer) 10/30/2017   Q1: Little interest or pleasure in doing things 0   Q2: Feeling down, depressed or hopeless 0   PHQ-2 Score 0   Q1: Little interest or pleasure in doing things Not at all   Q2: Feeling down, depressed or hopeless Not at all   PHQ-2 Score 0   Answers for HPI/ROS submitted by the patient on 10/30/2017   PHQ-2 Score: 0      Pt states that he is continuing to have issues with urge incontinence. He says that it is the same as it was previously and currently denies a referral to  urology.      Do you feel safe in your environment - Yes    Do you have a Health Care Directive?: Yes: Patient states has Advance Directive and will bring in a copy to clinic.    Current providers sharing in care for this patient include:   Patient Care Team:  Preethi Quiroz MD as PCP - General (Family Practice)  Casa Evans MD as MD (Cardiology)      Hearing impairment: No    Ability to successfully perform activities of daily living: Yes, no assistance needed     Fall risk:       Home safety:  none identified  click delete button to remove this line now    The following health maintenance items are reviewed in Epic and correct as of today:  Health Maintenance   Topic Date Due     INFLUENZA VACCINE (SYSTEM ASSIGNED)  09/01/2017     PHQ-9 Q6 MONTHS  09/27/2017     LIPID MONITORING Q1 YEAR  10/27/2017     FALL RISK ASSESSMENT  10/27/2017     COPD ACTION PLAN Q1 YR  06/19/2018     ADVANCE DIRECTIVE PLANNING Q5 YRS  08/20/2018     CMP Q1 YR  09/06/2018     COLONOSCOPY Q2 YR  03/30/2019     TETANUS IMMUNIZATION (SYSTEM ASSIGNED)  05/26/2020     SPIROMETRY ONETIME  Completed     PNEUMOCOCCAL  Completed     AORTIC ANEURYSM SCREENING (SYSTEM ASSIGNED)  Completed     HEPATITIS C SCREENING  Completed     BP Readings from Last 3 Encounters:   10/30/17 148/84   09/12/17 132/77   06/19/17 134/79    Wt Readings from Last 3 Encounters:   10/30/17 173 lb (78.5 kg)   09/12/17 168 lb 6.4 oz (76.4 kg)   06/19/17 169 lb (76.7 kg)             Patient Active Problem List   Diagnosis     Social phobia     Hypertension goal BP (blood pressure) < 140/90     HYPERLIPIDEMIA LDL GOAL <130     Tubular adenoma of colon     Advanced directives, counseling/discussion     CAD, multiple vessel     CAD (coronary artery disease)     Cigarette nicotine dependence without complication     S/P CABG (coronary artery bypass graft)     Chronic obstructive pulmonary disease, unspecified COPD type (H)     Anemia, unspecified type     Health Care  Home     Past Surgical History:   Procedure Laterality Date     COLONOSCOPY N/A 3/30/2017    Procedure: COLONOSCOPY;  Surgeon: Jie Onofre MD;  Location: [facility]     DAVINCI BYPASS ARTERY CORONARY N/A 9/29/2014    Procedure: DAVINCI BYPASS ARTERY CORONARY;  Surgeon: Lam Solis MD;  Location: [facility]     ESOPHAGOSCOPY, GASTROSCOPY, DUODENOSCOPY (EGD), COMBINED N/A 3/30/2017    Procedure: COMBINED ESOPHAGOSCOPY, GASTROSCOPY, DUODENOSCOPY (EGD);  Surgeon: Jie Onofre MD;  Location: [facility]     HC COLONOSCOPY THRU STOMA, DIAGNOSTIC  2010    polyps due 2011     SURGICAL HISTORY OF -       right clavicular fracture with ORIF       Social History   Substance Use Topics     Smoking status: Former Smoker     Smokeless tobacco: Never Used      Comment: quit smoking over 20 years ago     Alcohol use No      Comment: quit 35 years ago     Family History   Problem Relation Age of Onset     CANCER Mother      esog/stomach     CANCER Father      lung     Family History Negative Brother      Hypertension Brother          Current Outpatient Prescriptions   Medication Sig Dispense Refill     PARoxetine (PAXIL) 40 MG tablet Take 1 tablet (40 mg) by mouth every morning 90 tablet 3     lisinopril (PRINIVIL/ZESTRIL) 10 MG tablet Take 1 tablet (10 mg) by mouth daily 90 tablet 3     metoprolol (TOPROL-XL) 50 MG 24 hr tablet Take 1 tablet (50 mg) by mouth daily 90 tablet 3     ferrous sulfate (IRON) 325 (65 FE) MG tablet Take 1 tablet (325 mg) by mouth daily (with breakfast) 60 tablet 3     nitroglycerin (NITROSTAT) 0.4 MG sublingual tablet Place 1 tablet (0.4 mg) under the tongue every 5 minutes as needed for chest pain 25 tablet 3     atorvastatin (LIPITOR) 80 MG tablet Take 1 tablet (80 mg) by mouth daily 90 tablet 3     albuterol (PROAIR HFA, PROVENTIL HFA, VENTOLIN HFA) 108 (90 BASE) MCG/ACT inhaler Inhale 2 puffs into the lungs every 6 hours as needed for shortness of breath / dyspnea or  wheezing 1 Inhaler 1     aspirin 81 MG tablet Take 1 tablet (81 mg) by mouth daily 90 tablet 3     Multiple Vitamin (MULTI-VITAMIN) per tablet Take 1 tablet by mouth daily.         [DISCONTINUED] PARoxetine (PAXIL) 40 MG tablet Take 1 tablet (40 mg) by mouth every morning 90 tablet 1     [DISCONTINUED] lisinopril (PRINIVIL,ZESTRIL) 10 MG tablet Take 1 tablet (10 mg) by mouth daily 90 tablet 3     [DISCONTINUED] metoprolol (TOPROL-XL) 50 MG 24 hr tablet Take 1 tablet (50 mg) by mouth daily 90 tablet 3     Allergies   Allergen Reactions     Codeine      No Known Allergies      Recent Labs   Lab Test  09/06/17   1004  03/27/17   1537   01/25/17   0909  10/27/16   1028  04/25/16   1050  10/23/15   0850   09/30/14   0427   09/28/14   0709   07/11/12   1503   A1C   --    --    --    --    --    --    --    --    --    --   5.9   --   6.0   LDL   --    --    --    --   75   --   85   --   39   --    --    < >   --    HDL   --    --    --    --   55   --   43   --   51   --    --    < >   --    TRIG   --    --    --    --   98   --   137   --   97   --    --    < >   --    ALT  25   --    --    --    --   17  15   --    --    < >  16   < >  10   CR  0.72  0.86   < >   --    --   0.84  0.84   < >  0.84   < >  0.76   < >  0.82   GFRESTIMATED  >90  88   < >   --    --   >90  Non  GFR Calc    >90  Non  GFR Calc     < >  >90  Non  GFR Calc     < >  >90  Non  GFR Calc     < >  >90   GFRESTBLACK  >90  >90  African American GFR Calc     < >   --    --   >90   GFR Calc    >90   GFR Calc     < >  >90   GFR Calc     < >  >90   GFR Calc     < >  >90   POTASSIUM  4.6  4.2   --    --    --   4.6  4.2   < >  4.3   < >  3.8   < >  3.6   TSH   --    --    --   0.70   --    --    --    --    --    --    --    --    --     < > = values in this interval not displayed.        Review of Systems   ROS: 10 point ROS neg  "other than the symptoms noted above in the HPI.    This document serves as a record of the services and decisions personally performed and made by Preethi Quiroz MD. It was created on his/her behalf by Kinsey Booth, trained medical scribe. The creation of this document is based the provider's statements to the medical scribes.    Scribe Kinsey Booth, October 30, 2017    OBJECTIVE:   /84  Pulse 57  Temp 98  F (36.7  C) (Oral)  Resp 10  Ht 5' 8\" (1.727 m)  Wt 173 lb (78.5 kg)  SpO2 98%  BMI 26.3 kg/m2 Estimated body mass index is 26.3 kg/(m^2) as calculated from the following:    Height as of this encounter: 5' 8\" (1.727 m).    Weight as of this encounter: 173 lb (78.5 kg).  Physical Exam  GENERAL: healthy, alert and no distress  EYES: Eyes grossly normal to inspection, PERRL and conjunctivae and sclerae normal  HENT: ear canals and TM's normal, nose and mouth without ulcers or lesions  NECK: no adenopathy, no asymmetry, masses, or scars and thyroid normal to palpation  RESP: lungs clear to auscultation - no rales, rhonchi or wheezes  CV: regular rate and rhythm, normal S1 S2, no S3 or S4, no murmur, click or rub, no peripheral edema and peripheral pulses strong  ABDOMEN: soft, nontender, no hepatosplenomegaly, no masses and bowel sounds normal  MS: no gross musculoskeletal defects noted, no edema  SKIN: no suspicious lesions or rashes  NEURO: Normal strength and tone, mentation intact and speech normal  PSYCH: mentation appears normal, affect normal/bright    ASSESSMENT / PLAN:   1. Medicare annual wellness visit, subsequent  Colonoscopy utd, due in 2018.    2. Hypertension goal BP (blood pressure) < 140/90  Mildly elevated, will have MA recheck. Continues on lisinopril 10mg daily, metoprolol 50mg daily .    3. Hyperlipidemia LDL goal <130  Stable. Continues on atorvastatin 80mg daily.  - Lipid panel reflex to direct LDL Fasting    4. Chronic obstructive pulmonary disease, unspecified COPD " "type (H)  Stable.    5. Coronary artery disease involving native heart with angina pectoris, unspecified vessel or lesion type (H)  Stable.followed by cardiology. On lisinopril, metoprolol, atorvastatin and ASA.     6. Cigarette nicotine dependence without complication  Reports that he quit smoking.    7. Anemia, unspecified type  Improved on iron replacement and feeling better. Energy is increased, sob resolved. Still unclear cause. His insurance would not cover pill endoscopy, which was recommended by Dr. Rubin.     8. Need for prophylactic vaccination and inoculation against influenza    - FLU VACCINE, INCREASED ANTIGEN, PRESV FREE, AGE 65+ [02757]  - Vaccine Administration, Initial [26943]    9. Social phobia  Stable.  - PARoxetine (PAXIL) 40 MG tablet; Take 1 tablet (40 mg) by mouth every morning  Dispense: 90 tablet; Refill: 3    10. CAD, multiple vessel  Stable.    End of Life Planning:  Patient currently has an advanced directive: No.  I have verified the patient's ablity to prepare an advanced directive/make health care decisions.  Literature was provided to assist patient in preparing an advanced directive.    COUNSELING:  Reviewed preventive health counseling, as reflected in patient instructions    Estimated body mass index is 26.3 kg/(m^2) as calculated from the following:    Height as of this encounter: 5' 8\" (1.727 m).    Weight as of this encounter: 173 lb (78.5 kg).  Weight management plan: Discussed healthy diet and exercise guidelines and patient will follow up in 12 months in clinic to re-evaluate.   reports that he has quit smoking. He has never used smokeless tobacco.    Appropriate preventive services were discussed with this patient, including applicable screening as appropriate for cardiovascular disease, diabetes, osteopenia/osteoporosis, and glaucoma.  As appropriate for age/gender, discussed screening for colorectal cancer, prostate cancer, breast cancer, and cervical cancer. Checklist " reviewing preventive services available has been given to the patient.    Reviewed patients plan of care and provided an AVS. The Intermediate Care Plan ( asthma action plan, low back pain action plan, and migraine action plan) for Tevin meets the Care Plan requirement. This Care Plan has been established and reviewed with the Patient.    Counseling Resources:  ATP IV Guidelines  Pooled Cohorts Equation Calculator  Breast Cancer Risk Calculator  FRAX Risk Assessment  ICSI Preventive Guidelines  Dietary Guidelines for Americans, 2010  USDA's MyPlate  ASA Prophylaxis  Lung CA Screening  The information in this document, created by the medical scribe for me, accurately reflects the services I personally performed and the decisions made by me. I have reviewed and approved this document for accuracy. 10/30/17    The information in this document, created by the medical scribe for me, accurately reflects the services I personally performed and the decisions made by me. I have reviewed and approved this document for accuracy. 10/30/17    Preethi Quiroz MD  [facility]      Injectable Influenza Immunization Documentation    1.  Is the person to be vaccinated sick today?   No    2. Does the person to be vaccinated have an allergy to a component   of the vaccine?   No  Egg Allergy Algorithm Link    3. Has the person to be vaccinated ever had a serious reaction   to influenza vaccine in the past?   No    4. Has the person to be vaccinated ever had Guillain-Barré syndrome?   No    Form completed by Homa Gore MA

## 2017-10-30 NOTE — PATIENT INSTRUCTIONS
Preventive Health Recommendations:   Male Ages 65 and over    Yearly exam:             See your health care provider every year in order to  o   Review health changes.   o   Discuss preventive care.    o   Review your medicines if your doctor has prescribed any.    Talk with your health care provider about whether you should have a test to screen for prostate cancer (PSA).    Every 3 years, have a diabetes test (fasting glucose). If you are at risk for diabetes, you should have this test more often.    Every 5 years, have a cholesterol test. Have this test more often if you are at risk for high cholesterol or heart disease.     Every 10 years, have a colonoscopy. Or, have a yearly FIT test (stool test). These exams will check for colon cancer.    Talk to with your health care provider about screening for Abdominal Aortic Aneurysm if you have a family history of AAA or have a history of smoking.    Shots:     Get a flu shot each year.     Get a tetanus shot every 10 years.     Talk to your doctor about your pneumonia vaccines. There are now two you should receive - Pneumovax (PPSV 23) and Prevnar (PCV 13).     Talk to your doctor about a shingles vaccine.     Talk to your doctor about the hepatitis B vaccine.  Nutrition:     Eat at least 5 servings of fruits and vegetables each day.     Eat whole-grain bread, whole-wheat pasta and brown rice instead of white grains and rice.     Talk to your provider about Calcium and Vitamin D.   Lifestyle    Exercise for at least 150 minutes a week (30 minutes a day, 5 days a week). This will help you control your weight and prevent disease.     Limit alcohol to one drink per day.     No smoking.     Wear sunscreen to prevent skin cancer.     See your dentist every six months for an exam and cleaning.     See your eye doctor every 1 to 2 years to screen for conditions such as glaucoma, macular degeneration, cataracts, etc   Preventive Health Recommendations:       Male Ages 65  and over    Yearly exam:             See your health care provider every year in order to  o   Review health changes.   o   Discuss preventive care.    o   Review your medicines if your doctor has prescribed any.  Talk with your health care provider about whether you should have a test to screen for prostate cancer (PSA).  Every 3 years, have a diabetes test (fasting glucose). If you are at risk for diabetes, you should have this test more often.  Every 5 years, have a cholesterol test. Have this test more often if you are at risk for high cholesterol or heart disease.   Every 10 years, have a colonoscopy. Or, have a yearly FIT test (stool test). These exams will check for colon cancer.  Talk to with your health care provider about screening for Abdominal Aortic Aneurysm if you have a family history of AAA or have a history of smoking.  Shots:   Get a flu shot each year.   Get a tetanus shot every 10 years.   Talk to your doctor about your pneumonia vaccines. There are now two you should receive - Pneumovax (PPSV 23) and Prevnar (PCV 13).  Talk to your doctor about a shingles vaccine.   Talk to your doctor about the hepatitis B vaccine.  Nutrition:   Eat at least 5 servings of fruits and vegetables each day.   Eat whole-grain bread, whole-wheat pasta and brown rice instead of white grains and rice.   Talk to your doctor about Calcium and Vitamin D.   Lifestyle  Exercise for at least 150 minutes a week (30 minutes a day, 5 days a week). This will help you control your weight and prevent disease.   Limit alcohol to one drink per day.   No smoking.   Wear sunscreen to prevent skin cancer.   See your dentist every six months for an exam and cleaning.   See your eye doctor every 1 to 2 years to screen for conditions such as glaucoma, macular degeneration and cataracts.

## 2017-10-30 NOTE — NURSING NOTE
"Chief Complaint   Patient presents with     Medicare Visit       Initial /84  Pulse 57  Temp 98  F (36.7  C) (Oral)  Resp 10  Ht 5' 8\" (1.727 m)  Wt 173 lb (78.5 kg)  SpO2 98%  BMI 26.3 kg/m2 Estimated body mass index is 26.3 kg/(m^2) as calculated from the following:    Height as of this encounter: 5' 8\" (1.727 m).    Weight as of this encounter: 173 lb (78.5 kg).  Medication Reconciliation: complete     Homa Gore MA     "

## 2017-10-30 NOTE — LETTER
November 1, 2017      Tevin Guidry  [address] [facility]   [facility] [address]        Dear ,    We are writing to inform you of your test results.    Normal results.     Resulted Orders   Lipid panel reflex to direct LDL Fasting   Result Value Ref Range    Cholesterol 146 <200 mg/dL    Triglycerides 109 <150 mg/dL      Comment:      Non Fasting    HDL Cholesterol 56 >39 mg/dL    LDL Cholesterol Calculated 68 <100 mg/dL      Comment:      Desirable:       <100 mg/dl    Non HDL Cholesterol 90 <130 mg/dL       If you have any questions or concerns, please call the clinic at the number listed above.       Sincerely,        Preethi Quiroz MD/nr

## 2017-10-31 LAB
CHOLEST SERPL-MCNC: 146 MG/DL
HDLC SERPL-MCNC: 56 MG/DL
LDLC SERPL CALC-MCNC: 68 MG/DL
NONHDLC SERPL-MCNC: 90 MG/DL
TRIGL SERPL-MCNC: 109 MG/DL

## 2017-10-31 ASSESSMENT — ANXIETY QUESTIONNAIRES: GAD7 TOTAL SCORE: 0

## 2018-03-06 ENCOUNTER — HOSPITAL ENCOUNTER (OUTPATIENT)
Facility: CLINIC | Age: 68
Setting detail: SPECIMEN
Discharge: HOME | End: 2018-03-06
Attending: INTERNAL MEDICINE | Admitting: INTERNAL MEDICINE
Payer: COMMERCIAL

## 2018-03-06 ENCOUNTER — ONCOLOGY VISIT (OUTPATIENT)
Dept: ONCOLOGY | Facility: CLINIC | Age: 68
End: 2018-03-06
Attending: INTERNAL MEDICINE
Payer: COMMERCIAL

## 2018-03-06 DIAGNOSIS — D64.9 ANEMIA, UNSPECIFIED TYPE: ICD-10-CM

## 2018-03-06 DIAGNOSIS — E61.1 IRON DEFICIENCY: ICD-10-CM

## 2018-03-06 LAB
ERYTHROCYTE [DISTWIDTH] IN BLOOD BY AUTOMATED COUNT: 13.2 % (ref 10–15)
FERRITIN SERPL-MCNC: 36 NG/ML (ref 26–388)
HCT VFR BLD AUTO: 46.2 % (ref 40–53)
HGB BLD-MCNC: 15.3 G/DL (ref 13.3–17.7)
IRON SATN MFR SERPL: 13 % (ref 15–46)
IRON SERPL-MCNC: 50 UG/DL (ref 35–180)
MCH RBC QN AUTO: 30.8 PG (ref 26.5–33)
MCHC RBC AUTO-ENTMCNC: 33.1 G/DL (ref 31.5–36.5)
MCV RBC AUTO: 93 FL (ref 78–100)
PLATELET # BLD AUTO: 281 10E9/L (ref 150–450)
RBC # BLD AUTO: 4.97 10E12/L (ref 4.4–5.9)
TIBC SERPL-MCNC: 372 UG/DL (ref 240–430)
WBC # BLD AUTO: 7.9 10E9/L (ref 4–11)

## 2018-03-06 PROCEDURE — 36415 COLL VENOUS BLD VENIPUNCTURE: CPT

## 2018-03-06 PROCEDURE — 83540 ASSAY OF IRON: CPT | Performed by: INTERNAL MEDICINE

## 2018-03-06 PROCEDURE — 85027 COMPLETE CBC AUTOMATED: CPT | Performed by: INTERNAL MEDICINE

## 2018-03-06 PROCEDURE — 83550 IRON BINDING TEST: CPT | Performed by: INTERNAL MEDICINE

## 2018-03-06 PROCEDURE — 82728 ASSAY OF FERRITIN: CPT | Performed by: INTERNAL MEDICINE

## 2018-03-06 NOTE — MR AVS SNAPSHOT
"              After Visit Summary   3/6/2018    Tevin Guidry    MRN: [id number]           Patient Information     Date Of Birth          1950        Visit Information        Provider Department      3/6/2018 10:00 AM Nurse, [facility]        Today's Diagnoses     Anemia, unspecified type        Iron deficiency           Follow-ups after your visit        Your next 10 appointments already scheduled     Mar 13, 2018 10:00 AM CDT   Return Visit with Josette Rubin MD   [facility]    [facility]  [address]  [facility] [id number] [phone]              Who to contact     If you have questions or need follow up information about today's clinic visit or your schedule please contact [facility] directly at [phone].  Normal or non-critical lab and imaging results will be communicated to you by [email or web address], letter or phone within 4 business days after the clinic has received the results. If you do not hear from us within 7 days, please contact the clinic through MyChart or phone. If you have a critical or abnormal lab result, we will notify you by phone as soon as possible.  Submit refill requests through [employer] or call your pharmacy and they will forward the refill request to us. Please allow 3 business days for your refill to be completed.          Additional Information About Your Visit        [email or web address] Information     [employer] lets you send messages to your doctor, view your test results, renew your prescriptions, schedule appointments and more. To sign up, go to [email or web address] . Click on \"Log in\" on the left side of the screen, which will take you to the Welcome page. Then click on \"Sign up Now\" on the right side of the page.     You will be asked to enter the access code listed below, as well as some personal information. Please follow the directions to create your username and " password.     Your access code is: [email or web address]  Expires: 2018 10:21 AM     Your access code will  in 90 days. If you need help or a new code, please call your [facility] or [phone].        Care EveryWhere ID     This is your Care EveryWhere ID. This could be used by other organizations to access your [facility] medical records  [id number]         Blood Pressure from Last 3 Encounters:   10/30/17 148/84   17 132/77   17 134/79    Weight from Last 3 Encounters:   10/30/17 78.5 kg (173 lb)   17 76.4 kg (168 lb 6.4 oz)   17 76.7 kg (169 lb)              We Performed the Following     CBC with platelets     Ferritin     Iron and iron binding capacity        Primary Care Provider Office Phone # Fax #    Preethi Quiroz [phone]       [address]  [facility] [address]        Equal Access to Services     BRANDY [facility] CenterGENEVIEVE : Hadii aad ku hadasho Soomaali, waaxda luqadaha, qaybta kaalmada adeegyada, waxay idiin haymyrnan natacha echols . So [facility] [phone].    ATENCIÓN: Si habla español, tiene a gordon disposición servicios gratuitos de asistencia lingüística. Llame al [phone].    We comply with applicable federal civil rights laws and [place] laws. We do not discriminate on the basis of race, color, national origin, age, disability, sex, sexual orientation, or gender identity.            Thank you!     Thank you for choosing [facility]  for your care. Our goal is always to provide you with excellent care. Hearing back from our patients is one way we can continue to improve our services. Please take a few minutes to complete the written survey that you may receive in the mail after your visit with us. Thank you!             Your Updated Medication List - Protect others around you: Learn how to safely use, store and throw away your medicines at www.disposemymeds.org.          This list is accurate as of 3/6/18 10:21 AM.  Always use your most  recent med list.                   Brand Name Dispense Instructions for use Diagnosis    albuterol 108 (90 BASE) MCG/ACT Inhaler    PROAIR HFA/PROVENTIL HFA/VENTOLIN HFA    1 Inhaler    Inhale 2 puffs into the lungs every 6 hours as needed for shortness of breath / dyspnea or wheezing    Chronic obstructive pulmonary disease, unspecified COPD type (H)       aspirin 81 MG tablet     90 tablet    Take 1 tablet (81 mg) by mouth daily    Coronary artery disease involving native artery of transplanted heart without angina pectoris       atorvastatin 80 MG tablet    LIPITOR    90 tablet    Take 1 tablet (80 mg) by mouth daily    Hyperlipidemia LDL goal <70       ferrous sulfate 325 (65 FE) MG tablet    IRON    60 tablet    Take 1 tablet (325 mg) by mouth daily (with breakfast)    Anemia, unspecified type, Iron deficiency       lisinopril 10 MG tablet    PRINIVIL/ZESTRIL    90 tablet    Take 1 tablet (10 mg) by mouth daily    Essential hypertension with goal blood pressure less than 140/90       metoprolol succinate 50 MG 24 hr tablet    TOPROL-XL    90 tablet    Take 1 tablet (50 mg) by mouth daily    CAD, multiple vessel       Multi-vitamin Tabs tablet   Generic drug:  multivitamin, therapeutic with minerals      Take 1 tablet by mouth daily.        nitroGLYcerin 0.4 MG sublingual tablet    NITROSTAT    25 tablet    Place 1 tablet (0.4 mg) under the tongue every 5 minutes as needed for chest pain    CAD, multiple vessel       PARoxetine 40 MG tablet    PAXIL    90 tablet    Take 1 tablet (40 mg) by mouth every morning    Social phobia

## 2018-03-06 NOTE — PROGRESS NOTES
Medical Assistant Note:  Tevin GENEVIEVE Guidry presents today for lab only.    Patient seen by provider today: No.   present during visit today: Not Applicable.    Concerns: No Concerns.    Procedure:  Lab draw site: LAC, Needle type: BF, Gauge: 21.    Post Assessment:  Labs drawn without difficulty: Yes.    Discharge Plan:  Departure Mode: Ambulatory.    Face to Face Time: 5 minutes.    Carmenza Grady MA

## 2018-03-06 NOTE — LETTER
3/6/2018         RE: Tevin Guidry  [address] [facility]   [facility] [address]        Dear Colleague,    Thank you for referring your patient, Tevin Guidry, to the [facility]. Please see a copy of my visit note below.    Medical Assistant Note:  Tevin Guidry presents today for lab only.    Patient seen by provider today: No.   present during visit today: Not Applicable.    Concerns: No Concerns.    Procedure:  Lab draw site: LAC, Needle type: BF, Gauge: 21.    Post Assessment:  Labs drawn without difficulty: Yes.    Discharge Plan:  Departure Mode: Ambulatory.    Face to Face Time: 5 minutes.    Carmenza Grady MA              Again, thank you for allowing me to participate in the care of your patient.        Sincerely,        Oncology Nurse

## 2018-03-13 ENCOUNTER — ONCOLOGY VISIT (OUTPATIENT)
Dept: ONCOLOGY | Facility: CLINIC | Age: 68
End: 2018-03-13
Attending: INTERNAL MEDICINE
Payer: COMMERCIAL

## 2018-03-13 VITALS
WEIGHT: 171.6 LBS | HEART RATE: 73 BPM | OXYGEN SATURATION: 95 % | RESPIRATION RATE: 16 BRPM | BODY MASS INDEX: 26.09 KG/M2 | TEMPERATURE: 98.5 F | SYSTOLIC BLOOD PRESSURE: 148 MMHG | DIASTOLIC BLOOD PRESSURE: 85 MMHG

## 2018-03-13 DIAGNOSIS — E61.1 IRON DEFICIENCY: ICD-10-CM

## 2018-03-13 DIAGNOSIS — D64.9 ANEMIA, UNSPECIFIED TYPE: Primary | ICD-10-CM

## 2018-03-13 PROCEDURE — 99213 OFFICE O/P EST LOW 20 MIN: CPT | Performed by: INTERNAL MEDICINE

## 2018-03-13 PROCEDURE — G0463 HOSPITAL OUTPT CLINIC VISIT: HCPCS

## 2018-03-13 RX ORDER — FERROUS SULFATE 325(65) MG
325 TABLET ORAL EVERY MORNING
Qty: 60 TABLET | Refills: 3 | COMMUNITY
Start: 2018-03-13 | End: 2021-09-14

## 2018-03-13 ASSESSMENT — PAIN SCALES - GENERAL: PAINLEVEL: NO PAIN (0)

## 2018-03-13 NOTE — PATIENT INSTRUCTIONS
Labs (CBC, iron and ferritin) in November 2018. ORDERS IN EPIC  Follow up with PCP.  Take oral iron once every other day.

## 2018-03-13 NOTE — MR AVS SNAPSHOT
"              After Visit Summary   3/13/2018    Tevin Guidry    MRN: [id number]           Patient Information     Date Of Birth          1950        Visit Information        Provider Department      3/13/2018 10:00 AM Josette Rubin MD [facility]        Today's Diagnoses     Anemia, unspecified type    -  1    Iron deficiency          Care Instructions    Labs (CBC, iron and ferritin) in 2018. ORDERS IN EPIC  Follow up with PCP.  Take oral iron once every other day.          Follow-ups after your visit        Who to contact     If you have questions or need follow up information about today's clinic visit or your schedule please contact [facility] directly at [phone].  Normal or non-critical lab and imaging results will be communicated to you by MyChart, letter or phone within 4 business days after the clinic has received the results. If you do not hear from us within 7 days, please contact the clinic through [employer] or phone. If you have a critical or abnormal lab result, we will notify you by phone as soon as possible.  Submit refill requests through [address] or call your pharmacy and they will forward the refill request to us. Please allow 3 business days for your refill to be completed.          Additional Information About Your Visit        MyChart Information     [address] lets you send messages to your doctor, view your test results, renew your prescriptions, schedule appointments and more. To sign up, go to [email or web address] . Click on \"Log in\" on the left side of the screen, which will take you to the Welcome page. Then click on \"Sign up Now\" on the right side of the page.     You will be asked to enter the access code listed below, as well as some personal information. Please follow the directions to create your username and password.     Your access code is: [email or web address]  Expires: 2018 11:21 AM     Your access code will  in 90 days. If you " need help or a new code, please call your [place] clinic or [phone].        Care EveryWhere ID     This is your Care EveryWhere ID. This could be used by other organizations to access your [place] medical records  [id number]        Your Vitals Were     Pulse Temperature Respirations Pulse Oximetry BMI (Body Mass Index)       73 98.5  F (36.9  C) (Oral) 16 95% 26.09 kg/m2        Blood Pressure from Last 3 Encounters:   03/13/18 148/85   10/30/17 148/84   09/12/17 132/77    Weight from Last 3 Encounters:   03/13/18 77.8 kg (171 lb 9.6 oz)   10/30/17 78.5 kg (173 lb)   09/12/17 76.4 kg (168 lb 6.4 oz)                 Today's Medication Changes          These changes are accurate as of 3/13/18 11:59 PM.  If you have any questions, ask your nurse or doctor.               These medicines have changed or have updated prescriptions.        Dose/Directions    ferrous sulfate 325 (65 FE) MG tablet   Commonly known as:  IRON   This may have changed:    - how much to take  - how to take this  - when to take this  - additional instructions   Used for:  Anemia, unspecified type, Iron deficiency        Take one pill every other day.   Quantity:  60 tablet   Refills:  3                Primary Care Provider Office Phone # Fax #    Preethi Quiroz [phone]       [address]  [facility] [address]        Equal Access to Services     DRU FLANAGAN : Hadii leandro phelano Soemeli, waaxda luqadaha, qaybta kaalmada myra, mary lane. So [facility] [phone].    ATENCIÓN: Si habla español, tiene a gordon disposición servicios gratuitos de asistencia lingüística. Llame al [phone].    We comply with applicable federal civil rights laws and [place] laws. We do not discriminate on the basis of race, color, national origin, age, disability, sex, sexual orientation, or gender identity.            Thank you!     Thank you for choosing [facility]  for your care. Our  goal is always to provide you with excellent care. Hearing back from our patients is one way we can continue to improve our services. Please take a few minutes to complete the written survey that you may receive in the mail after your visit with us. Thank you!             Your Updated Medication List - Protect others around you: Learn how to safely use, store and throw away your medicines at www.disposemymeds.org.          This list is accurate as of 3/13/18 11:59 PM.  Always use your most recent med list.                   Brand Name Dispense Instructions for use Diagnosis    albuterol 108 (90 BASE) MCG/ACT Inhaler    PROAIR HFA/PROVENTIL HFA/VENTOLIN HFA    1 Inhaler    Inhale 2 puffs into the lungs every 6 hours as needed for shortness of breath / dyspnea or wheezing    Chronic obstructive pulmonary disease, unspecified COPD type (H)       aspirin 81 MG tablet     90 tablet    Take 1 tablet (81 mg) by mouth daily    Coronary artery disease involving native artery of transplanted heart without angina pectoris       atorvastatin 80 MG tablet    LIPITOR    90 tablet    Take 1 tablet (80 mg) by mouth daily    Hyperlipidemia LDL goal <70       ferrous sulfate 325 (65 FE) MG tablet    IRON    60 tablet    Take one pill every other day.    Anemia, unspecified type, Iron deficiency       lisinopril 10 MG tablet    PRINIVIL/ZESTRIL    90 tablet    Take 1 tablet (10 mg) by mouth daily    Essential hypertension with goal blood pressure less than 140/90       metoprolol succinate 50 MG 24 hr tablet    TOPROL-XL    90 tablet    Take 1 tablet (50 mg) by mouth daily    CAD, multiple vessel       Multi-vitamin Tabs tablet   Generic drug:  multivitamin, therapeutic with minerals      Take 1 tablet by mouth daily.        nitroGLYcerin 0.4 MG sublingual tablet    NITROSTAT    25 tablet    Place 1 tablet (0.4 mg) under the tongue every 5 minutes as needed for chest pain    CAD, multiple vessel       PARoxetine 40 MG tablet    PAXIL     90 tablet    Take 1 tablet (40 mg) by mouth every morning    Social phobia

## 2018-03-13 NOTE — PROGRESS NOTES
"Oncology Rooming Note    March 13, 2018 10:09 AM   Tevin Guidry is a 67 year old male who presents for:    Chief Complaint   Patient presents with     Oncology Clinic Visit     Tubular adenoma of colon     Initial Vitals: /85 (BP Location: Left arm, Patient Position: Sitting, Cuff Size: Adult Regular)  Pulse 73  Temp 98.5  F (36.9  C) (Oral)  Resp 16  Wt 77.8 kg (171 lb 9.6 oz)  SpO2 95%  BMI 26.09 kg/m2 Estimated body mass index is 26.09 kg/(m^2) as calculated from the following:    Height as of 10/30/17: 1.727 m (5' 8\").    Weight as of this encounter: 77.8 kg (171 lb 9.6 oz). Body surface area is 1.93 meters squared.  No Pain (0) Comment: Data Unavailable   No LMP for male patient.  Allergies reviewed: Yes  Medications reviewed: Yes    Medications: Medication refills not needed today.  Pharmacy name entered into [facility]:    [facility] [place], [place] - [address] [place] [address]    Clinical concerns: None                 4 minutes for nursing intake (face to face time)     Zee Wu MA            "

## 2018-03-13 NOTE — PROGRESS NOTES
Visit Date:   03/13/2018     HEMATOLOGY HISTORY: Mr. Tevin Guidry is a gentleman with chronic anemia.   1. On 02/12/2008, hemoglobin of 14.2. On 09/26/2014, hemoglobin of 11.3.    2. On 01/25/2017:  -Hemoglobin of 10.2 with MCV of 73. White count of 7.7 and platelets of 330. Retic count 0.8%.   -Peripheral blood smear reveals moderate microcytic hypochromic anemia with anisopoikilocytosis.   -Iron of 20 with percent saturation of 4%.   -Normal folate, vitamin B12 and TSH.   3. On 03/02/2017:  -Ferritin of 5.    -Normal erythropoietin, LDH and SPEP.  4. On 03/30/2017.    -EGD revealed normal esophagus, gastric mucosal atrophy, large gastric fold and normal duodenum. Biopsy of the stomach reveals minimal chronic inflammation.  No dysplasia.  No H. pylori.  Small intestine biopsy does not reveal any celiac sprue.   -Colonoscopy revealed polyps and diverticulosis.   Rectal polyp is tubulovillous adenoma without high-grade dysplasia or invasive carcinoma.  Other polyps are tubular adenoma.   -Capsule endoscopy not approved by insurance.  5. Iron deficiency anemia responded to oral iron.  On 09/06/2017:  -Hemoglobin of 15.3 with MCV of 94.  -Iron of 57 and ferritin of 38.     SUBJECTIVE:    Mr. Guidry is a 67-year-old gentleman who is here for followup regarding iron deficiency anemia. Patient was found to have iron deficiency anemia in 2017.  EGD and colonoscopy did not reveal any evidence of bleeding.  Capsule endoscopy was not approved by his insurance company.      Patient has been on oral iron.  He has responded.  His anemia has resolved.  He is tolerating oral iron well.      REVIEW OF SYSTEMS:    Patient is doing well.  No headache or dizziness.  No chest pain or difficulty breathing.  No abdominal pain, nausea or vomiting.  No excessive fatigue.  No bleeding.      LABORATORY STUDIES:  Reviewed.   -Hemoglobin normal at 15.3.  MCV normal at 93.   -Iron of 50 and ferritin of 36.      ASSESSMENT:  A 67-year-old  gentleman with iron deficiency anemia which has resolved.      PLAN:   1.  Labs were reviewed with the patient.  He was happy to know that hemoglobin, iron and ferritin are all normal.  Percent saturation is still mildly low.   Patient is on oral iron once a day.  I told him to take oral iron every other day.   2.  We discussed regarding capsule endoscopy.  I would like it to be done if it gets approved by his insurance company.  He plans to talk to his insurance company.   3.  Discussed regarding followup.  Patient follows up with Dr. Quiroz. She will monitor the labs including hemoglobin.  If in the future he becomes anemic or iron deficient, he can come back to Hematology/Oncology Clinic.  Patient is going to have annual physical in November.  At that time, I will get some labs including hemogram, iron and ferritin. Patient advised to see a physician sooner if he has worsening weakness, chest pain, shortness of breath, dizziness, bleeding or any other concerns.       MARGRET COLLIER MD        D: 2018   T: 2018   MT: ANISHA      Name:     DICK LYONS   MRN:      [id number]        Account:      [id number]   :      1950           Visit Date:   2018      Document: [id number]

## 2018-03-13 NOTE — LETTER
"    3/13/2018         RE: Tevin Guidry  [address]  [address]   [facility] [address]        Dear Colleague,    Thank you for referring your patient, Tevin Guidry, to the [facility]. Please see a copy of my visit note below.    Oncology Rooming Note    March 13, 2018 10:09 AM   Tevin Guidry is a 67 year old male who presents for:    Chief Complaint   Patient presents with     Oncology Clinic Visit     Tubular adenoma of colon     Initial Vitals: /85 (BP Location: Left arm, Patient Position: Sitting, Cuff Size: Adult Regular)  Pulse 73  Temp 98.5  F (36.9  C) (Oral)  Resp 16  Wt 77.8 kg (171 lb 9.6 oz)  SpO2 95%  BMI 26.09 kg/m2 Estimated body mass index is 26.09 kg/(m^2) as calculated from the following:    Height as of 10/30/17: 1.727 m (5' 8\").    Weight as of this encounter: 77.8 kg (171 lb 9.6 oz). Body surface area is 1.93 meters squared.  No Pain (0) Comment: Data Unavailable   No LMP for male patient.  Allergies reviewed: Yes  Medications reviewed: Yes    Medications: Medication refills not needed today.  Pharmacy name entered into [facility]:    [facility] [place], [place] - [address] [place] [address]    Clinical concerns: None                 4 minutes for nursing intake (face to face time)     Zee Wu MA              Visit Date:   03/13/2018      SUBJECTIVE:  Mr. Guidry is a 67-year-old gentleman who is here for followup regarding iron deficiency anemia.  The patient was found to have iron deficiency anemia in 2017.  EGD and colonoscopy did not reveal any evidence of bleeding.  Capsule endoscopy was not approved by his insurance company.      The patient has been on oral iron.  He has responded.  His anemia has resolved.  He is tolerating oral iron well.      REVIEW OF SYSTEMS:  The patient is doing well.  No headache or dizziness.  No chest pain, difficulty breathing.  No abdominal pain, nausea or vomiting.  No excessive " fatigue.  No bleeding.      LABORATORY STUDIES:  Reviewed.   1.  Hemoglobin normal at 15.3.  MCV normal at 93.   2.  Iron of 50 and ferritin of 36.      ASSESSMENT:  A 67-year-old gentleman with iron deficiency anemia which has resolved.      PLAN:   1.  Labs were reviewed with the patient.  He was happy to know that hemoglobin, iron and ferritin are all normal.  Percent saturation is still mildly low.   2.  The patient is on oral iron once a day.  I told him to take oral iron every other day.   3.  We discussed regarding capsule endoscopy.  I would like it to be done if it gets approved by his insurance company.  He plans to talk to his insurance company.   4.  Discussed regarding followup.  The patient gets annual physical with Dr. Quiroz.  I told the patient to keep seeing Dr. Quiroz.  She will monitor the labs including hemoglobin.  If in the future he becomes anemic or iron deficient, he can come back to Hematology/Oncology Clinic.  The patient is going to have annual physical in November.  At that time, I will get some labs including hemogram, iron and ferritin.  The patient advised to see a physician sooner if he has worsening weakness, chest pain, shortness of breath, dizziness, bleeding or any other concerns.         MARGRET COLLIER MD             D: 2018   T: 2018   MT: ANISHA      Name:     DICK LYONS   MRN:      [id number]        Account:      [id number]   :      1950           Visit Date:   2018      Document: [id number]       Again, thank you for allowing me to participate in the care of your patient.        Sincerely,        Margret Collier MD

## 2018-06-24 DIAGNOSIS — E78.5 HYPERLIPIDEMIA LDL GOAL <70: ICD-10-CM

## 2018-06-26 RX ORDER — ATORVASTATIN CALCIUM 80 MG/1
TABLET, FILM COATED ORAL
Qty: 90 TABLET | Refills: 3 | Status: SHIPPED | OUTPATIENT
Start: 2018-06-26 | End: 2018-12-10

## 2018-06-26 NOTE — TELEPHONE ENCOUNTER
No LDL on file for over a year. Extended order for FLP, ALT and AST. Patient states he has had no issues with myalgias and is going into have his FLP drawn at the end of the week.

## 2018-06-29 DIAGNOSIS — E78.5 HYPERLIPIDEMIA LDL GOAL <70: ICD-10-CM

## 2018-06-29 LAB
ALT SERPL W P-5'-P-CCNC: 29 U/L (ref 0–70)
AST SERPL W P-5'-P-CCNC: 23 U/L (ref 0–45)
CHOLEST SERPL-MCNC: 138 MG/DL
HDLC SERPL-MCNC: 50 MG/DL
LDLC SERPL CALC-MCNC: 73 MG/DL
NONHDLC SERPL-MCNC: 88 MG/DL
TRIGL SERPL-MCNC: 76 MG/DL

## 2018-06-29 PROCEDURE — 84460 ALANINE AMINO (ALT) (SGPT): CPT | Performed by: INTERNAL MEDICINE

## 2018-06-29 PROCEDURE — 80061 LIPID PANEL: CPT | Performed by: INTERNAL MEDICINE

## 2018-06-29 PROCEDURE — 84450 TRANSFERASE (AST) (SGOT): CPT | Performed by: INTERNAL MEDICINE

## 2018-06-29 PROCEDURE — 36415 COLL VENOUS BLD VENIPUNCTURE: CPT | Performed by: INTERNAL MEDICINE

## 2018-08-09 ENCOUNTER — TELEPHONE (OUTPATIENT)
Dept: FAMILY MEDICINE | Facility: CLINIC | Age: 68
End: 2018-08-09

## 2018-08-14 ENCOUNTER — ALLIED HEALTH/NURSE VISIT (OUTPATIENT)
Dept: NURSING | Facility: CLINIC | Age: 68
End: 2018-08-14

## 2018-08-14 VITALS — HEART RATE: 56 BPM | DIASTOLIC BLOOD PRESSURE: 85 MMHG | SYSTOLIC BLOOD PRESSURE: 145 MMHG

## 2018-08-14 DIAGNOSIS — I10 HYPERTENSION GOAL BP (BLOOD PRESSURE) < 140/90: Primary | ICD-10-CM

## 2018-08-14 PROCEDURE — 99207 ZZC NO CHARGE NURSE ONLY: CPT

## 2018-08-14 NOTE — NURSING NOTE
Tevin Guidry is a 68 year old patient who comes in today for a Blood Pressure check.  Initial BP:  BP (!) 138/95 (BP Location: Left arm, Patient Position: Chair, Cuff Size: Adult Regular)  Pulse 56     56  Disposition: BP elevated.  Triage RN notified, patient asked to wait.    Amy Granados MA

## 2018-08-14 NOTE — Clinical Note
Tevin Guidry is a 68 year old patient who comes in today for a Blood Pressure check. Initial BP:  BP (!) 138/95 (BP Location: Left arm, Patient Position: Chair, Cuff Size: Adult Regular)  Pulse 56    56 Disposition: BP elevated.  Triage RN notified, patient asked to wait.

## 2018-08-14 NOTE — MR AVS SNAPSHOT
After Visit Summary   8/14/2018    Tevin Guidry    MRN: [id number]           Patient Information     Date Of Birth          1950        Visit Information        Provider Department      8/14/2018 3:00 PM [facility]        Today's Diagnoses     Hypertension goal BP (blood pressure) < 140/90    -  1       Follow-ups after your visit        Follow-up notes from your care team     Return for BP Recheck.      Who to contact     If you have questions or need follow up information about today's clinic visit or your schedule please contact [facility] directly at [phone].  Normal or non-critical lab and imaging results will be communicated to you by MyChart, letter or phone within 4 business days after the clinic has received the results. If you do not hear from us within 7 days, please contact the clinic through MyChart or phone. If you have a critical or abnormal lab result, we will notify you by phone as soon as possible.  Submit refill requests through [employer] or call your pharmacy and they will forward the refill request to us. Please allow 3 business days for your refill to be completed.          Additional Information About Your Visit        Care EveryWhere ID     This is your Care EveryWhere ID. This could be used by other organizations to access your [place] medical records  [id number]        Your Vitals Were     Pulse                   56            Blood Pressure from Last 3 Encounters:   08/14/18 145/85   03/13/18 148/85   10/30/17 148/84    Weight from Last 3 Encounters:   03/13/18 77.8 kg (171 lb 9.6 oz)   10/30/17 78.5 kg (173 lb)   09/12/17 76.4 kg (168 lb 6.4 oz)              Today, you had the following     No orders found for display       Primary Care Provider Office Phone # Fax #    Preethi Quiroz [phone] [address] [place] [address]        Equal Access to Services     DRU FLANAGAN AH:  Hadii aad ku hadnahomyeloy Soparkerali, waaxda luqadaha, qaybta kaalguero renteria, mary haydeein hayaatano cooperli dorman labraedentano ariana. So [facility] [phone].    ATENCIÓN: Si apollo arita, tiene a gordon disposición servicios gratuitos de asistencia lingüística. Llame al [phone].    We comply with applicable federal civil rights laws and [place] laws. We do not discriminate on the basis of race, color, national origin, age, disability, sex, sexual orientation, or gender identity.            Thank you!     Thank you for choosing [facility]  for your care. Our goal is always to provide you with excellent care. Hearing back from our patients is one way we can continue to improve our services. Please take a few minutes to complete the written survey that you may receive in the mail after your visit with us. Thank you!             Your Updated Medication List - Protect others around you: Learn how to safely use, store and throw away your medicines at www.disposemymeds.org.          This list is accurate as of 8/14/18  3:58 PM.  Always use your most recent med list.                   Brand Name Dispense Instructions for use Diagnosis    albuterol 108 (90 Base) MCG/ACT inhaler    PROAIR HFA/PROVENTIL HFA/VENTOLIN HFA    1 Inhaler    Inhale 2 puffs into the lungs every 6 hours as needed for shortness of breath / dyspnea or wheezing    Chronic obstructive pulmonary disease, unspecified COPD type (H)       aspirin 81 MG tablet     90 tablet    Take 1 tablet (81 mg) by mouth daily    Coronary artery disease involving native artery of transplanted heart without angina pectoris       atorvastatin 80 MG tablet    LIPITOR    90 tablet    TAKE ONE TABLET BY MOUTH EVERY DAY    Hyperlipidemia LDL goal <70       ferrous sulfate 325 (65 Fe) MG tablet    IRON    60 tablet    Take one pill every other day.    Anemia, unspecified type, Iron deficiency       lisinopril 10 MG tablet    PRINIVIL/ZESTRIL    90 tablet    Take 1 tablet (10 mg)  by mouth daily    Essential hypertension with goal blood pressure less than 140/90       metoprolol succinate 50 MG 24 hr tablet    TOPROL-XL    90 tablet    Take 1 tablet (50 mg) by mouth daily    CAD, multiple vessel       Multi-vitamin Tabs tablet   Generic drug:  multivitamin, therapeutic with minerals      Take 1 tablet by mouth daily.        nitroGLYcerin 0.4 MG sublingual tablet    NITROSTAT    25 tablet    Place 1 tablet (0.4 mg) under the tongue every 5 minutes as needed for chest pain    CAD, multiple vessel       PARoxetine 40 MG tablet    PAXIL    90 tablet    Take 1 tablet (40 mg) by mouth every morning    Social phobia

## 2018-08-14 NOTE — NURSING NOTE
Pt has been out of his BP meds for 2 days. His BPs have been consistently elevated. Nurse took manual BP of 143/85. Pt denies any HA, SOB, chest pain, lightheaded or dizziness. Nurse reviewed Lisinopril and metoprolol dose with pt. He is going to  his BP meds right now. Nurse explained the importance of taking his medications as prescribed. Nurse encouraged pt to eat a healthier, low sodium diet and increase his physical activity. Pt is due for an annual at the end of October. I advised pt to make another MA BP appt to f/u in 1-2 weeks. If his readings continue to be high, I advised him to f/u with PCP.     Patient in agreement with plan and verbalizes understanding.   Vielka Rasmussen RN, BSN

## 2018-10-27 NOTE — NURSING NOTE
Per orders of Dr. Quiroz, injection of flu shot  given by RICKI BOLANOS Patient instructed to remain in clinic for 15 minutes afterwards, and to report any adverse reaction to me immediately.     85 year old female with acute CVA. GI consulted for dysphagia.

## 2018-11-11 DIAGNOSIS — I25.10 CAD, MULTIPLE VESSEL: ICD-10-CM

## 2018-11-11 DIAGNOSIS — F40.10 SOCIAL PHOBIA: ICD-10-CM

## 2018-11-12 NOTE — TELEPHONE ENCOUNTER
"Requested Prescriptions   Pending Prescriptions Disp Refills     PARoxetine (PAXIL) 40 MG tablet [Pharmacy Med Name: PAROXETINE HCL 40MG TABS]  Last Written Prescription Date:  10/30/2017  Last Fill Quantity: 90 tablet,  # refills: 3   Last Office Visit: 10/30/2017   Future Office Visit:      90 tablet 3     Sig: TAKE ONE TABLET BY MOUTH EVERY MORNING    SSRIs Protocol Failed    11/11/2018 10:26 AM       Failed - Recent (12 mo) or future (30 days) visit within the authorizing provider's specialty    Patient had office visit in the last 12 months or has a visit in the next 30 days with authorizing provider or within the authorizing provider's specialty.  See \"Patient Info\" tab in inbasket, or \"Choose Columns\" in Meds & Orders section of the refill encounter.           Passed - Patient is age 18 or older     _________________________________________________________________________________________________________________________       metoprolol succinate (TOPROL-XL) 50 MG 24 hr tablet [Pharmacy Med Name: METOPROLOL SUCCINATE ER 50MG TB24]  Last Written Prescription Date:  10/30/2017  Last Fill Quantity: 90 tablet,  # refills: 3   Last Office Visit: 10/30/2017   Future Office Visit:      90 tablet 3     Sig: TAKE ONE TABLET BY MOUTH EVERY DAY    Beta-Blockers Protocol Failed    11/11/2018 10:26 AM       Failed - Blood pressure under 140/90 in past 12 months    BP Readings from Last 3 Encounters:   08/14/18 145/85   03/13/18 148/85   10/30/17 148/84          Failed - Recent (12 mo) or future (30 days) visit within the authorizing provider's specialty    Patient had office visit in the last 12 months or has a visit in the next 30 days with authorizing provider or within the authorizing provider's specialty.  See \"Patient Info\" tab in [employer], or \"Choose Columns\" in Meds & Orders section of the refill encounter.           Passed - Patient is age 6 or older          "

## 2018-11-13 ENCOUNTER — PATIENT OUTREACH (OUTPATIENT)
Dept: GERIATRIC MEDICINE | Facility: CLINIC | Age: 68
End: 2018-11-13

## 2018-11-13 RX ORDER — PAROXETINE 40 MG/1
TABLET, FILM COATED ORAL
Qty: 30 TABLET | Refills: 0 | Status: SHIPPED | OUTPATIENT
Start: 2018-11-13 | End: 2018-12-10

## 2018-11-13 RX ORDER — METOPROLOL SUCCINATE 50 MG/1
TABLET, EXTENDED RELEASE ORAL
Qty: 30 TABLET | Refills: 0 | Status: SHIPPED | OUTPATIENT
Start: 2018-11-13 | End: 2018-12-10

## 2018-11-13 NOTE — PROGRESS NOTES
[facility] Care Coordination Contact    Member became effective with  [employer] on 11/1/18 with Freddie [employer]ZA.  Previous Health Plan: MA/Fee For Service  Previous Care System: n/a  Previous care coordinators name and number: n/a  Waiver Type: N/A  Last MMIS Entry: Date 12/23/2015 and Type refusal  MMIS visit date if different from above: n/a  Services Listed in MMIS:   Member currently receiving the following home care services:     Member currently receiving the following community resources:    RUST received: No: Requested on n/a   Rosaura Karimi  Case Management Specialist  [facility]  [phone]

## 2018-11-13 NOTE — LETTER
November 13, 2018    TEVIN LYONS  [address] [facility] [address]    Dear Tevin,    Welcome to [facility] ([facility]) ([facility]) health program. My name is Lovely Medina RN, and I am your [facility] .     I will call you soon to see how you are doing and determine what needs you may have. My job is to help connect you to services, complete an assessment, and develop a care plan with you. There is no charge to you for the case management and assessment services. Our goal is to keep you as healthy and independent as possible.     [facility] combines the benefits you may already receive from Medical Assistance, Medicare and the Prescription Drug Coverage Program.    Soon you will receive a new [facility] member identification (ID) card from [facility]. You must show this card whenever you get health services.    If you have questions, please feel free to call me at [phone]. If you reach my voice mail, please leave a message and your phone number. If you are hearing impaired, please call the [place] Relay at [phone] or [phone] (glglzw-zg-yvruzc relay service).    Sincerely,      Lovely Medina RN    [employer]  [email or web address]    [[facility] members only:  [facility] is a health plan that contracts with both Medicare and the [place] Medical Assistance (Medicaid) program to provide benefits of both programs to enrollees. Enrollment in [facility] depends on contract renewal.]    MSC+ [id number] DHS Approved ([id number])    (12/16)

## 2018-11-13 NOTE — TELEPHONE ENCOUNTER
Routing refill request to provider for review/approval because:  Patient needs to be seen because it has been more than 1 year since last office visit.  B/P not in range     Pending Prescriptions:                       Disp   Refills    PARoxetine (PAXIL) 40 MG tablet [Pharmacy*30 tab*0            Sig: TAKE ONE TABLET BY MOUTH EVERY MORNING    metoprolol succinate (TOPROL-XL) 50 MG 24*30 tab*0            Sig: TAKE ONE TABLET BY MOUTH EVERY DAY    Pended 30 day supply and put note on refill that patient needs to be seen prior to further refills     PHQ-9 SCORE 10/27/2016 3/27/2017 10/30/2017   Total Score - - -   Total Score 1 0 0     CLIF-7 SCORE 10/27/2016 3/27/2017 10/30/2017   Total Score - - -   Total Score 1 0 0       Patient has an appointment scheduled with pcp for physical on 11/27/18 - note placed on appointment to complete PHQ and CLIF     Maria Fernanda Choi Registered Nurse   [facility]

## 2018-11-26 ENCOUNTER — PATIENT OUTREACH (OUTPATIENT)
Dept: GERIATRIC MEDICINE | Facility: CLINIC | Age: 68
End: 2018-11-26

## 2018-11-26 NOTE — LETTER
December 3, 2018    TEVIN LYONS   [address] [address]  [facility] [address]    Dear Tevin,     I have been unable to reach you by telephone. I am writing to ask you or a family member to call me at [phone]. If you reach my voicemail, please leave a message with your daytime telephone number and a date and time that I can call you. If you are hearing impaired, please call the [place] Relay at 030 or [phone] (zfnpad-bl-rvnfqh relay service).     The reason I am trying to reach you is:    [] Six (6) month check-in  [] To schedule your annual assessment  [x] Other:  To introduce myself and offer a health risk assessment     Please call me as soon as you receive this letter. I look forward to speaking with you.    Sincerely,      Lovely Medina RN  [employer]  [phone]  [email or web address]    [facility] is a health plan that contracts with both Medicare and the [place] Medical Assistance (Medicaid) program to provide benefits of both programs to enrollees. Enrollment in [facility] depends on contract renewal.    MSC+ [id number] ([id number])      (11/16)

## 2018-11-26 NOTE — PROGRESS NOTES
[facility] Care Coordination Contact    Call to member on 11/21/18 to introduce myself.  Left VM.  Called member again 11/26/19 and again left VM.      Called again 11/27 and 28 and 11/30 and left VM.  Will send [facility] letter.    Lovely Medina RN  [facility]   [phone]

## 2018-12-03 NOTE — PROGRESS NOTES
"Per CC, mailed client an \"Unable to Contact\" letter.    Rosaura Karimi  Case Management Specialist  [facility]  [phone]      "

## 2018-12-07 NOTE — PROGRESS NOTES
Per CC, continues to be unable to contact member for assessment, MMIS entered.  Rosaura Karimi  Case Management Specialist  [facility]  [phone]

## 2018-12-10 ENCOUNTER — OFFICE VISIT (OUTPATIENT)
Dept: FAMILY MEDICINE | Facility: CLINIC | Age: 68
End: 2018-12-10
Payer: COMMERCIAL

## 2018-12-10 VITALS
OXYGEN SATURATION: 98 % | RESPIRATION RATE: 16 BRPM | TEMPERATURE: 97.8 F | SYSTOLIC BLOOD PRESSURE: 128 MMHG | BODY MASS INDEX: 25.62 KG/M2 | DIASTOLIC BLOOD PRESSURE: 76 MMHG | HEART RATE: 62 BPM | WEIGHT: 168.5 LBS

## 2018-12-10 DIAGNOSIS — I25.10 CAD, MULTIPLE VESSEL: ICD-10-CM

## 2018-12-10 DIAGNOSIS — F40.10 SOCIAL PHOBIA: ICD-10-CM

## 2018-12-10 DIAGNOSIS — D12.6 TUBULAR ADENOMA OF COLON: ICD-10-CM

## 2018-12-10 DIAGNOSIS — J44.9 CHRONIC OBSTRUCTIVE PULMONARY DISEASE, UNSPECIFIED COPD TYPE (H): Chronic | ICD-10-CM

## 2018-12-10 DIAGNOSIS — I10 ESSENTIAL HYPERTENSION WITH GOAL BLOOD PRESSURE LESS THAN 140/90: ICD-10-CM

## 2018-12-10 DIAGNOSIS — D64.9 ANEMIA, UNSPECIFIED TYPE: ICD-10-CM

## 2018-12-10 DIAGNOSIS — Z95.1 S/P CABG (CORONARY ARTERY BYPASS GRAFT): Chronic | ICD-10-CM

## 2018-12-10 DIAGNOSIS — Z23 NEED FOR PROPHYLACTIC VACCINATION AND INOCULATION AGAINST INFLUENZA: ICD-10-CM

## 2018-12-10 DIAGNOSIS — Z12.83 SKIN EXAM, SCREENING FOR CANCER: ICD-10-CM

## 2018-12-10 DIAGNOSIS — E78.5 HYPERLIPIDEMIA LDL GOAL <70: ICD-10-CM

## 2018-12-10 DIAGNOSIS — Z00.00 WELLNESS EXAMINATION: Primary | ICD-10-CM

## 2018-12-10 DIAGNOSIS — E61.1 IRON DEFICIENCY: ICD-10-CM

## 2018-12-10 LAB
ERYTHROCYTE [DISTWIDTH] IN BLOOD BY AUTOMATED COUNT: 12.9 % (ref 10–15)
HCT VFR BLD AUTO: 44.9 % (ref 40–53)
HGB BLD-MCNC: 14.9 G/DL (ref 13.3–17.7)
MCH RBC QN AUTO: 31 PG (ref 26.5–33)
MCHC RBC AUTO-ENTMCNC: 33.2 G/DL (ref 31.5–36.5)
MCV RBC AUTO: 94 FL (ref 78–100)
PLATELET # BLD AUTO: 253 10E9/L (ref 150–450)
RBC # BLD AUTO: 4.8 10E12/L (ref 4.4–5.9)
WBC # BLD AUTO: 10.5 10E9/L (ref 4–11)

## 2018-12-10 PROCEDURE — G0008 ADMIN INFLUENZA VIRUS VAC: HCPCS | Performed by: FAMILY MEDICINE

## 2018-12-10 PROCEDURE — G0439 PPPS, SUBSEQ VISIT: HCPCS | Performed by: FAMILY MEDICINE

## 2018-12-10 PROCEDURE — 85027 COMPLETE CBC AUTOMATED: CPT | Performed by: FAMILY MEDICINE

## 2018-12-10 PROCEDURE — 36415 COLL VENOUS BLD VENIPUNCTURE: CPT | Performed by: FAMILY MEDICINE

## 2018-12-10 PROCEDURE — 83550 IRON BINDING TEST: CPT | Performed by: FAMILY MEDICINE

## 2018-12-10 PROCEDURE — 90662 IIV NO PRSV INCREASED AG IM: CPT | Performed by: FAMILY MEDICINE

## 2018-12-10 PROCEDURE — 83540 ASSAY OF IRON: CPT | Performed by: FAMILY MEDICINE

## 2018-12-10 PROCEDURE — 80053 COMPREHEN METABOLIC PANEL: CPT | Performed by: FAMILY MEDICINE

## 2018-12-10 PROCEDURE — 82728 ASSAY OF FERRITIN: CPT | Performed by: FAMILY MEDICINE

## 2018-12-10 RX ORDER — PAROXETINE 40 MG/1
40 TABLET, FILM COATED ORAL EVERY MORNING
Qty: 90 TABLET | Refills: 3 | Status: SHIPPED | OUTPATIENT
Start: 2018-12-10 | End: 2019-07-03

## 2018-12-10 RX ORDER — METOPROLOL SUCCINATE 50 MG/1
50 TABLET, EXTENDED RELEASE ORAL DAILY
Qty: 90 TABLET | Refills: 3 | Status: SHIPPED | OUTPATIENT
Start: 2018-12-10 | End: 2019-07-03

## 2018-12-10 RX ORDER — ATORVASTATIN CALCIUM 80 MG/1
80 TABLET, FILM COATED ORAL DAILY
Qty: 90 TABLET | Refills: 3 | Status: SHIPPED | OUTPATIENT
Start: 2018-12-10 | End: 2019-07-03

## 2018-12-10 RX ORDER — LISINOPRIL 10 MG/1
10 TABLET ORAL DAILY
Qty: 90 TABLET | Refills: 3 | Status: SHIPPED | OUTPATIENT
Start: 2018-12-10 | End: 2018-12-10 | Stop reason: DRUGHIGH

## 2018-12-10 RX ORDER — LISINOPRIL 20 MG/1
20 TABLET ORAL DAILY
Qty: 90 TABLET | Refills: 3 | Status: SHIPPED | OUTPATIENT
Start: 2018-12-10 | End: 2019-07-03

## 2018-12-10 ASSESSMENT — ENCOUNTER SYMPTOMS
ABDOMINAL PAIN: 0
PALPITATIONS: 0
JOINT SWELLING: 0
FREQUENCY: 0
SORE THROAT: 0
DIARRHEA: 0
SHORTNESS OF BREATH: 0
HEMATURIA: 1
HEARTBURN: 0
FEVER: 0
EYE PAIN: 0
DIZZINESS: 0
CONSTIPATION: 0
NAUSEA: 0
MYALGIAS: 0
ARTHRALGIAS: 0
CHILLS: 0
NERVOUS/ANXIOUS: 0
DYSURIA: 0
HEADACHES: 0
COUGH: 0
WEAKNESS: 0
HEMATOCHEZIA: 0
PARESTHESIAS: 0

## 2018-12-10 ASSESSMENT — PATIENT HEALTH QUESTIONNAIRE - PHQ9: SUM OF ALL RESPONSES TO PHQ QUESTIONS 1-9: 1

## 2018-12-10 ASSESSMENT — ACTIVITIES OF DAILY LIVING (ADL): CURRENT_FUNCTION: NO ASSISTANCE NEEDED

## 2018-12-10 NOTE — PROGRESS NOTES

## 2018-12-10 NOTE — PROGRESS NOTES
"SUBJECTIVE:   Tevin Guidry is a 68 year old male who presents for Preventive Visit.    Are you in the first 12 months of your Medicare coverage?  No    Annual Wellness Visit     In general, how would you rate your overall health?  Good    Frequency of exercise:  None    Do you usually eat at least 4 servings of fruit and vegetables a day, include whole grains    & fiber and avoid regularly eating high fat or \"junk\" foods?  Yes    Taking medications regularly:  Yes    Medication side effects:  None    Ability to successfully perform activities of daily living:  No assistance needed    Home Safety:  No safety concerns identified    Hearing Impairment:  Difficulty following a conversation in a noisy restaurant or crowded room, find that men's voices are easier to understand than woman's and difficulty understanding soft or whispered speech    In the past 6 months, have you been bothered by leaking of urine? Yes    In general, how would you rate your overall mental or emotional health?  Good    PHQ-2 Total Score: 0    Additional concerns today:  No    Do you feel safe in your environment? Yes    Do you have a Health Care Directive? Yes: Patient states has Advance Directive and will bring in a copy to clinic.      Fall risk  Fallen 2 or more times in the past year?: No  Any fall with injury in the past year?: No    Cognitive Screening   1) Repeat 3 items (Leader, Season, Table)    2) Clock draw: NORMAL  3) 3 item recall: Recalls 3 objects  Results: 3 items recalled: COGNITIVE IMPAIRMENT LESS LIKELY    Mini-CogTM Copyright CHAGO Baez. Licensed by the author for use in [facility]; reprinted with permission (kiara@.[facility]). All rights reserved.          Reviewed and updated as needed this visit by clinical staff  Tobacco  Allergies  Meds  Med Hx  Surg Hx  Fam Hx  Soc Hx        Reviewed and updated as needed this visit by Provider        Social History     Tobacco Use     Smoking status: Former Smoker     " Smokeless tobacco: Never Used     Tobacco comment: quit smoking over 20 years ago   Substance Use Topics     Alcohol use: No     Comment: quit 35 years ago       Alcohol Use 12/10/2018   If you drink alcohol do you typically have greater than 3 drinks per day OR greater than 7 drinks per week? Yes   AUDIT SCORE  0               Current providers sharing in care for this patient include:   Patient Care Team:  Preethi Quiroz MD as PCP - General (Family Practice)  Casa Evans MD as MD (Cardiology)  Libia Mosqueda as Case Management Specialist  Lovely Medina RN as Lead Care Coordinator (Primary Care - CC)  Kristy Redman as Case Management Specialist (Primary Care - CC)    The following health maintenance items are reviewed in Epic and correct as of today:  Health Maintenance   Topic Date Due     DTAP/TDAP/TD IMMUNIZATION (1 - Tdap) 08/13/1957     ZOSTER IMMUNIZATION (1 of 2) 08/13/1998     PNEUMOVAX IMMUNIZATION 65+ HIGH/HIGHEST RISK (1 of 2 - PCV13) 08/13/2013     FALL RISK ASSESSMENT  10/27/2017     PHQ-9 Q6 MONTHS  04/30/2018     COPD ACTION PLAN Q1 YR  06/19/2018     ADVANCE DIRECTIVE PLANNING Q5 YRS  08/20/2018     INFLUENZA VACCINE (1) 09/01/2018     CMP Q1 YR  09/06/2018     COLONOSCOPY Q2 YR  03/30/2019     LIPID MONITORING Q1 YEAR  06/29/2019     SPIROMETRY ONETIME  Completed     AORTIC ANEURYSM SCREENING (SYSTEM ASSIGNED)  Completed     HEPATITIS C SCREENING  Completed     IPV IMMUNIZATION  Aged Out     MENINGITIS IMMUNIZATION  Aged Out     BP Readings from Last 3 Encounters:   12/10/18 137/90   08/14/18 145/85   03/13/18 148/85    Wt Readings from Last 3 Encounters:   12/10/18 76.4 kg (168 lb 8 oz)   03/13/18 77.8 kg (171 lb 9.6 oz)   10/30/17 78.5 kg (173 lb)                  Patient Active Problem List   Diagnosis     Social phobia     Hypertension goal BP (blood pressure) < 140/90     Hyperlipidemia LDL goal <130     Tubular adenoma of colon     Advanced directives, counseling/discussion      CAD, multiple vessel     Cigarette nicotine dependence without complication     S/P CABG (coronary artery bypass graft)     Chronic obstructive pulmonary disease, unspecified COPD type (H)     Anemia, unspecified type     Health Care Home     Past Surgical History:   Procedure Laterality Date     COLONOSCOPY N/A 3/30/2017    Procedure: COLONOSCOPY;  Surgeon: Jie Onofre MD;  Location: [facility]     DAVINCI BYPASS ARTERY CORONARY N/A 9/29/2014    Procedure: DAVINCI BYPASS ARTERY CORONARY;  Surgeon: Lam Solis MD;  Location: [facility]     ESOPHAGOSCOPY, GASTROSCOPY, DUODENOSCOPY (EGD), COMBINED N/A 3/30/2017    Procedure: COMBINED ESOPHAGOSCOPY, GASTROSCOPY, DUODENOSCOPY (EGD);  Surgeon: Jie Onofre MD;  [facility]     HC COLONOSCOPY THRU STOMA, DIAGNOSTIC  2010    polyps due 2011     SURGICAL HISTORY OF -       right clavicular fracture with ORIF       Social History     Tobacco Use     Smoking status: Former Smoker     Smokeless tobacco: Never Used     Tobacco comment: quit smoking over 20 years ago   Substance Use Topics     Alcohol use: No     Comment: quit 35 years ago     Family History   Problem Relation Age of Onset     Cancer Mother         esog/stomach     Cancer Father         lung     Family History Negative Brother      Hypertension Brother          Current Outpatient Medications   Medication Sig Dispense Refill     albuterol (PROAIR HFA, PROVENTIL HFA, VENTOLIN HFA) 108 (90 BASE) MCG/ACT inhaler Inhale 2 puffs into the lungs every 6 hours as needed for shortness of breath / dyspnea or wheezing 1 Inhaler 1     aspirin 81 MG tablet Take 1 tablet (81 mg) by mouth daily 90 tablet 3     atorvastatin (LIPITOR) 80 MG tablet TAKE ONE TABLET BY MOUTH EVERY DAY 90 tablet 3     ferrous sulfate (IRON) 325 (65 FE) MG tablet Take one pill every other day. 60 tablet 3     lisinopril (PRINIVIL/ZESTRIL) 10 MG tablet Take 1 tablet (10 mg) by mouth daily 90 tablet 3      metoprolol succinate (TOPROL-XL) 50 MG 24 hr tablet TAKE ONE TABLET BY MOUTH EVERY DAY 30 tablet 0     Multiple Vitamin (MULTI-VITAMIN) per tablet Take 1 tablet by mouth daily.         nitroglycerin (NITROSTAT) 0.4 MG sublingual tablet Place 1 tablet (0.4 mg) under the tongue every 5 minutes as needed for chest pain 25 tablet 3     PARoxetine (PAXIL) 40 MG tablet TAKE ONE TABLET BY MOUTH EVERY MORNING 30 tablet 0     Allergies   Allergen Reactions     Codeine      No Known Allergies      Recent Labs   Lab Test 06/29/18  1005 10/30/17  1121 09/06/17  1004 03/27/17  1537  01/25/17  0909 10/27/16  1028 04/25/16  1050  09/28/14  0709  07/11/12  1503   A1C  --   --   --   --   --   --   --   --   --  5.9  --  6.0   LDL 73 68  --   --   --   --  75  --    < >  --    < >  --    HDL 50 56  --   --   --   --  55  --    < >  --    < >  --    TRIG 76 109  --   --   --   --  98  --    < >  --    < >  --    ALT 29  --  25  --   --   --   --  17   < > 16   < > 10   CR  --   --  0.72 0.86   < >  --   --  0.84   < > 0.76   < > 0.82   GFRESTIMATED  --   --  >90 88   < >  --   --  >90  Non  GFR Calc     < > >90  Non  GFR Calc     < > >90   GFRESTBLACK  --   --  >90 >90  African American GFR Calc     < >  --   --  >90  African American GFR Calc     < > >90   GFR Calc     < > >90   POTASSIUM  --   --  4.6 4.2  --   --   --  4.6   < > 3.8   < > 3.6   TSH  --   --   --   --   --  0.70  --   --   --   --   --   --     < > = values in this interval not displayed.          Review of Systems   Constitutional: Negative for chills and fever.   HENT: Positive for hearing loss. Negative for congestion, ear pain and sore throat.    Eyes: Negative for pain and visual disturbance.   Respiratory: Negative for cough and shortness of breath.    Cardiovascular: Negative for chest pain, palpitations and peripheral edema.   Gastrointestinal: Negative for abdominal pain, constipation, diarrhea, heartburn,  "hematochezia and nausea.   Genitourinary: Positive for hematuria. Negative for discharge, dysuria, frequency, genital sores, impotence and urgency.   Musculoskeletal: Negative for arthralgias, joint swelling and myalgias.   Skin: Negative for rash.   Neurological: Negative for dizziness, weakness, headaches and paresthesias.   Psychiatric/Behavioral: Negative for mood changes. The patient is not nervous/anxious.    went to have hearing test a couple of years ago. He has not noticed changes recently.       OBJECTIVE:   /90 (BP Location: Left arm, Patient Position: Chair, Cuff Size: Adult Regular)   Pulse 62   Temp 97.8  F (36.6  C) (Oral)   Resp 16   Wt 76.4 kg (168 lb 8 oz)   SpO2 98%   BMI 25.62 kg/m   Estimated body mass index is 25.62 kg/m  as calculated from the following:    Height as of 10/30/17: 1.727 m (5' 8\").    Weight as of this encounter: 76.4 kg (168 lb 8 oz).  Physical Exam  GENERAL: healthy, alert and no distress  EYES: Eyes grossly normal to inspection, PERRL and conjunctivae and sclerae normal  HENT: ear canals and TM's normal, nose and mouth without ulcers or lesions  NECK: no adenopathy, no asymmetry, masses, or scars and thyroid normal to palpation  RESP: lungs clear to auscultation - no rales, rhonchi or wheezes  CV: regular rate and rhythm, normal S1 S2, no S3 or S4, no murmur, click or rub, no peripheral edema and peripheral pulses strong  ABDOMEN: soft, nontender, no hepatosplenomegaly, no masses and bowel sounds normal  MS: no gross musculoskeletal defects noted, no edema  SKIN: no suspicious lesions or rashes  NEURO: Normal strength and tone, mentation intact and speech normal  PSYCH: mentation appears normal, affect normal/bright    Diagnostic Test Results:  Results for orders placed or performed in visit on 06/29/18   Lipid panel reflex to direct LDL   Result Value Ref Range    Cholesterol 138 <200 mg/dL    Triglycerides 76 <150 mg/dL    HDL Cholesterol 50 >39 mg/dL    LDL " Cholesterol Calculated 73 <100 mg/dL    Non HDL Cholesterol 88 <130 mg/dL   ALT   Result Value Ref Range    ALT 29 0 - 70 U/L   AST   Result Value Ref Range    AST 23 0 - 45 U/L       ASSESSMENT / PLAN:   1. Wellness examination   colonoscopy due in March he will schedule   He will schedule skin exam with derm.    2. Hyperlipidemia LDL goal <70  Controlled   - atorvastatin (LIPITOR) 80 MG tablet; Take 1 tablet (80 mg) by mouth daily  Dispense: 90 tablet; Refill: 3  - Comprehensive metabolic panel    3. Anemia, unspecified type  Taking iron supplement and his hgb and iron studies normalized. Had seen hematology. Due for recheck.   - Ferritin  - Iron and iron binding capacity  - CBC with platelets    4. Iron deficiency     - Ferritin  - Iron and iron binding capacity  - CBC with platelets    5. Essential hypertension with goal blood pressure less than 140/90  Mildly high today. Will increase lisinopril to 20mg daily. He has been taking 20mg on his own intermittently. Continues metoprolol 50mg daily.   - Comprehensive metabolic panel  - lisinopril (PRINIVIL/ZESTRIL) 20 MG tablet; Take 1 tablet (20 mg) by mouth daily  Dispense: 90 tablet; Refill: 3    6. CAD, multiple vessel   S/P CABG (coronary artery bypass graft)   Stable continues ASA 81mg daily and statin  - metoprolol succinate ER (TOPROL-XL) 50 MG 24 hr tablet; Take 1 tablet (50 mg) by mouth daily  Dispense: 90 tablet; Refill: 3    7. Social phobia   stable   - PARoxetine (PAXIL) 40 MG tablet; Take 1 tablet (40 mg) by mouth every morning  Dispense: 90 tablet; Refill: 3    8.  Chronic obstructive pulmonary disease, unspecified COPD type (H)  Stable. Not smoking. Feeling well. Denies SOB     9. Tubular adenoma of colon  Will schedule colonoscopy in March     10. Cigarette nicotine dependence without complication  Quit smoking.       End of Life Planning:  Patient currently has an advanced directive: Yes.  Practitioner is supportive of  "decision.    COUNSELING:  Reviewed preventive health counseling, as reflected in patient instructions    BP Readings from Last 1 Encounters:   12/10/18 137/90     Estimated body mass index is 25.62 kg/m  as calculated from the following:    Height as of 10/30/17: 1.727 m (5' 8\").    Weight as of this encounter: 76.4 kg (168 lb 8 oz).            reports that he has quit smoking. he has never used smokeless tobacco.      Appropriate preventive services were discussed with this patient, including applicable screening as appropriate for cardiovascular disease, diabetes, osteopenia/osteoporosis, and glaucoma.  As appropriate for age/gender, discussed screening for colorectal cancer, prostate cancer, breast cancer, and cervical cancer. Checklist reviewing preventive services available has been given to the patient.    Reviewed patients plan of care and provided an AVS. The Intermediate Care Plan ( asthma action plan, low back pain action plan, and migraine action plan) for Tevin meets the Care Plan requirement. This Care Plan has been established and reviewed with the Patient.    Counseling Resources:  ATP IV Guidelines  Pooled Cohorts Equation Calculator  Breast Cancer Risk Calculator  FRAX Risk Assessment  ICSI Preventive Guidelines  Dietary Guidelines for Americans, 2010  Next Caller's MyPlate  ASA Prophylaxis  Lung CA Screening    Preethi Quiroz MD, MD  [facility]  "

## 2018-12-10 NOTE — PATIENT INSTRUCTIONS
1) we will increase your lisinopril dose to 20mg daily. Follow up for MA blood pressure check in two weeks.   2) I recommend getting the new shingles vaccine, Shingrix.  You can call your insurance company to find out if this vaccine is covered.  You may also ask our pharmacy to check if your insurance covers Shingrix if given at the pharmacy.    3) Schedule your colonoscopy in March.   You need a colonoscopy:  This is a lifesaving screening test - please call to set up an appointment today.  [phone] ([facility] [place])

## 2018-12-11 LAB
ALBUMIN SERPL-MCNC: 4 G/DL (ref 3.4–5)
ALP SERPL-CCNC: 107 U/L (ref 40–150)
ALT SERPL W P-5'-P-CCNC: 23 U/L (ref 0–70)
ANION GAP SERPL CALCULATED.3IONS-SCNC: 6 MMOL/L (ref 3–14)
AST SERPL W P-5'-P-CCNC: 19 U/L (ref 0–45)
BILIRUB SERPL-MCNC: 0.5 MG/DL (ref 0.2–1.3)
BUN SERPL-MCNC: 11 MG/DL (ref 7–30)
CALCIUM SERPL-MCNC: 9.2 MG/DL (ref 8.5–10.1)
CHLORIDE SERPL-SCNC: 103 MMOL/L (ref 94–109)
CO2 SERPL-SCNC: 30 MMOL/L (ref 20–32)
CREAT SERPL-MCNC: 0.84 MG/DL (ref 0.66–1.25)
FERRITIN SERPL-MCNC: 60 NG/ML (ref 26–388)
GFR SERPL CREATININE-BSD FRML MDRD: >90 ML/MIN/1.7M2
GLUCOSE SERPL-MCNC: 90 MG/DL (ref 70–99)
IRON SATN MFR SERPL: 28 % (ref 15–46)
IRON SERPL-MCNC: 93 UG/DL (ref 35–180)
POTASSIUM SERPL-SCNC: 4.7 MMOL/L (ref 3.4–5.3)
PROT SERPL-MCNC: 6.9 G/DL (ref 6.8–8.8)
SODIUM SERPL-SCNC: 139 MMOL/L (ref 133–144)
TIBC SERPL-MCNC: 337 UG/DL (ref 240–430)

## 2018-12-21 ENCOUNTER — PATIENT OUTREACH (OUTPATIENT)
Dept: GERIATRIC MEDICINE | Facility: CLINIC | Age: 68
End: 2018-12-21

## 2018-12-21 NOTE — LETTER
December 21, 2018      TEVIN LYONS   [address] [address]  [place] [facility] [address]        Dear Tevin:     Your Care Coordinator has been unable to reach you by telephone. I am writing to ask you or a family member to call me at [phone]. If you reach my voicemail, please leave a message with your daytime telephone number and a date and time that I can call you. If you are hearing impaired, please call the [place] Relay at 923 or [phone] (kecafp-og-xnebhy relay service).     The reason I am trying to reach you is:    [] Six (6) month check-in  [] To schedule your annual assessment  [x] Other: Introduce myself and offer a health risk assessment    Please call me as soon as you receive this letter. I look forward to speaking with you.    Sincerely,    Lovely Medina RN    E-mail: [email or web address]  Phone: [phone]      [facility] ([facility]) is a health plan that contracts with both Medicare and the [place] Medical Assistance (Medicaid) program to provide benefits of both programs to enrollees. Enrollment in [facility] depends on contract renewal.    [id number])    [id number] (11/18)

## 2018-12-21 NOTE — PROGRESS NOTES
Have called member 12/5/18,12/6/18,12/10/18, and 12/18/18 and left voice messages.  member had preventative physical.  Called again on 12/21/18 and again left message.         Lovely Medina RN  [facility]   [phone]

## 2019-01-14 ENCOUNTER — PATIENT OUTREACH (OUTPATIENT)
Dept: GERIATRIC MEDICINE | Facility: CLINIC | Age: 69
End: 2019-01-14

## 2019-01-14 PROBLEM — Z76.89 HEALTH CARE HOME: Status: ACTIVE | Noted: 2017-03-13

## 2019-01-14 NOTE — PROGRESS NOTES
[facility] Care Coordination Contact    [facility] Health Plan or Product Change    CC received notification that member's health plan or health plan product changed from are [facility] to [facility] MSC+ effective 1/1/2019.  CC contacted member and discussed change and face-to-face visit was offered. Member declined need for home visit. CC reviewed previous Health Risk Assessment/LTCC and POC with member and no changes noted. Member had not picked up the phone for the 4 phone calls when he came on to the program in Dec.  So Unable to Contact was complete.  Today he picked up first time.  He said he did not know how he got changed to this MSC+ but didn't really care.    Called member and introduced self as member s new CC. Confirmed with member that the welcome letter with writer's name and contact information has been received.  Reviewed LTCC/Health Risk Assessment (HRA) and POC with member. No changes noted.  Transitional HRA completed. Care Plan Summary updated and reflects current services.  Required referral authorization information communicated to CMS: Yes  MMIS entry completed by: Care Coordinator  Writer reviewed the following with member:  ER visits: No  Hospitalizations: No  TCU stays: No  Significant health status changes: none  Falls/Injuries: No  ADL/IADL changes: No  Changes in services: No    Follow-Up Plan: Member informed of future contact, plan to f/u with member with at next regularly scheduled contact.  Contact information shared with member and family, encouraged member to call with any questions or concerns.  Reports being in good health, in no pain and needing nothing but will call if he does and thanked this care coordinator for calling,    Lovely Medina RN  [facility]   [phone]

## 2019-01-14 NOTE — PROGRESS NOTES
[facility] Care Coordination Contact    Per [facility] enrollment, member had an ins change 1/1/19.  WL sent.  Rosaura Karimi  Case Management Specialist  [facility]  [phone]

## 2019-01-14 NOTE — LETTER
January 14, 2019    TEVIN LYONS  [address] [address]  [facility] [address]    Dear Tevin,    Welcome to [facility] s [place] [facility] ([facility] health program. My name is Lovely Medina RN, and I am your MSC+ .     I will call you soon to see how you are doing and determine what needs you may have. My job is to help connect you to services, complete an assessment, and develop a care plan with you. There is no charge to you for the case management and assessment services. Our goal is to keep you as healthy and independent as possible.     [facility] includes the benefits you may receive from Medical Assistance.    Soon, you will receive a new [facility] member identification (ID) card from [facility]. When you receive it, please use this card along with your [place] Health Care Programs card and Prescription Drug Coverage Program card. You must show all of these cards whenever you get health services. If you have Medicare, you will need to show your Medicare card when you get health services.    If you have questions, please feel free to call me at [phone]. If you reach my voice mail, please leave a message and your phone number. If you are hearing impaired, please call the [place] Relay at 144 or [phone] (otbycz-gb-cflqtb relay service).    Sincerely,    Lovely Medina RN    E-mail: [email or web address]  Phone: [phone]      [facility]+ [id number] DHS Approved ([id number])    (12/16)

## 2019-02-06 ENCOUNTER — ALLIED HEALTH/NURSE VISIT (OUTPATIENT)
Dept: NURSING | Facility: CLINIC | Age: 69
End: 2019-02-06
Payer: COMMERCIAL

## 2019-02-06 VITALS — SYSTOLIC BLOOD PRESSURE: 136 MMHG | HEART RATE: 62 BPM | DIASTOLIC BLOOD PRESSURE: 74 MMHG

## 2019-02-06 DIAGNOSIS — Z01.30 BP CHECK: Primary | ICD-10-CM

## 2019-02-06 PROCEDURE — 99207 ZZC NO CHARGE NURSE ONLY: CPT

## 2019-02-06 NOTE — PROGRESS NOTES
Tevin Guidry is a 68 year old patient who comes in today for a Blood Pressure check.  Initial BP:  /74 (BP Location: Left arm, Patient Position: Chair, Cuff Size: Adult Regular)   Pulse 62      62  Disposition: follow-up as previously indicated by provider and results routed to provider    Michelle Griffith CMA

## 2019-02-06 NOTE — Clinical Note
Tevin Guidry is a 68 year old patient who comes in today for a Blood Pressure check.Initial BP:  /74 (BP Location: Left arm, Patient Position: Chair, Cuff Size: Adult Regular)   Pulse 62    62Disposition: follow-up as previously indicated by Marcy Griffith CMA

## 2019-02-11 ENCOUNTER — APPOINTMENT (OUTPATIENT)
Age: 69
Setting detail: DERMATOLOGY
End: 2019-03-17

## 2019-02-11 ENCOUNTER — TRANSFERRED RECORDS (OUTPATIENT)
Dept: HEALTH INFORMATION MANAGEMENT | Facility: CLINIC | Age: 69
End: 2019-02-11

## 2019-02-11 DIAGNOSIS — L57.0 ACTINIC KERATOSIS: ICD-10-CM

## 2019-02-11 DIAGNOSIS — L82.1 OTHER SEBORRHEIC KERATOSIS: ICD-10-CM

## 2019-02-11 DIAGNOSIS — B35.3 TINEA PEDIS: ICD-10-CM

## 2019-02-11 DIAGNOSIS — D22 MELANOCYTIC NEVI: ICD-10-CM

## 2019-02-11 DIAGNOSIS — D18.0 HEMANGIOMA: ICD-10-CM

## 2019-02-11 DIAGNOSIS — Z71.89 OTHER SPECIFIED COUNSELING: ICD-10-CM

## 2019-02-11 DIAGNOSIS — B35.1 TINEA UNGUIUM: ICD-10-CM

## 2019-02-11 DIAGNOSIS — L81.4 OTHER MELANIN HYPERPIGMENTATION: ICD-10-CM

## 2019-02-11 PROBLEM — D22.39 MELANOCYTIC NEVI OF OTHER PARTS OF FACE: Status: ACTIVE | Noted: 2019-02-11

## 2019-02-11 PROBLEM — D22.5 MELANOCYTIC NEVI OF TRUNK: Status: ACTIVE | Noted: 2019-02-11

## 2019-02-11 PROBLEM — D18.01 HEMANGIOMA OF SKIN AND SUBCUTANEOUS TISSUE: Status: ACTIVE | Noted: 2019-02-11

## 2019-02-11 PROCEDURE — 99203 OFFICE O/P NEW LOW 30 MIN: CPT | Mod: 25

## 2019-02-11 PROCEDURE — OTHER DIAGNOSIS COMMENT: OTHER

## 2019-02-11 PROCEDURE — 17000 DESTRUCT PREMALG LESION: CPT

## 2019-02-11 PROCEDURE — OTHER LIQUID NITROGEN: OTHER

## 2019-02-11 PROCEDURE — 11441 EXC FACE-MM B9+MARG 0.6-1 CM: CPT | Mod: 59

## 2019-02-11 PROCEDURE — OTHER COUNSELING: OTHER

## 2019-02-11 PROCEDURE — OTHER SUNSCREEN TREATMENT REGIMEN: OTHER

## 2019-02-11 PROCEDURE — 17003 DESTRUCT PREMALG LES 2-14: CPT

## 2019-02-11 PROCEDURE — OTHER EXCISION: OTHER

## 2019-02-11 PROCEDURE — OTHER MIPS QUALITY: OTHER

## 2019-02-11 PROCEDURE — OTHER TREATMENT REGIMEN: OTHER

## 2019-02-11 ASSESSMENT — LOCATION SIMPLE DESCRIPTION DERM
LOCATION SIMPLE: RIGHT 2ND TOE
LOCATION SIMPLE: LEFT CHEEK
LOCATION SIMPLE: RIGHT CHEEK
LOCATION SIMPLE: RIGHT ANTERIOR NECK
LOCATION SIMPLE: LEFT PLANTAR SURFACE
LOCATION SIMPLE: LOWER BACK
LOCATION SIMPLE: RIGHT EAR
LOCATION SIMPLE: LEFT 2ND TOE
LOCATION SIMPLE: RIGHT 5TH TOE
LOCATION SIMPLE: LEFT UPPER BACK
LOCATION SIMPLE: RIGHT THIGH
LOCATION SIMPLE: RIGHT PLANTAR SURFACE
LOCATION SIMPLE: LEFT GREAT TOE
LOCATION SIMPLE: LEFT ZYGOMA
LOCATION SIMPLE: RIGHT GREAT TOE

## 2019-02-11 ASSESSMENT — LOCATION ZONE DERM
LOCATION ZONE: TRUNK
LOCATION ZONE: TOE
LOCATION ZONE: TOENAIL
LOCATION ZONE: LEG
LOCATION ZONE: EAR
LOCATION ZONE: FACE
LOCATION ZONE: NECK
LOCATION ZONE: FEET

## 2019-02-11 ASSESSMENT — LOCATION DETAILED DESCRIPTION DERM
LOCATION DETAILED: RIGHT 2ND TOENAIL
LOCATION DETAILED: RIGHT MEDIAL PLANTAR MIDFOOT
LOCATION DETAILED: RIGHT ANTERIOR DISTAL THIGH
LOCATION DETAILED: LEFT LATERAL UPPER BACK
LOCATION DETAILED: RIGHT CLAVICULAR NECK
LOCATION DETAILED: LEFT DORSAL GREAT TOE
LOCATION DETAILED: LEFT SUPERIOR UPPER BACK
LOCATION DETAILED: SUPERIOR LUMBAR SPINE
LOCATION DETAILED: LEFT 2ND TOENAIL
LOCATION DETAILED: LEFT PLANTAR FOREFOOT OVERLYING 2ND METATARSAL
LOCATION DETAILED: LEFT SUPERIOR MEDIAL MALAR CHEEK
LOCATION DETAILED: LEFT LATERAL ZYGOMA
LOCATION DETAILED: RIGHT SUPERIOR CENTRAL MALAR CHEEK
LOCATION DETAILED: RIGHT SUPERIOR HELIX
LOCATION DETAILED: RIGHT 5TH TOENAIL
LOCATION DETAILED: RIGHT DORSAL GREAT TOE

## 2019-02-11 NOTE — PROCEDURE: MIPS QUALITY
Detail Level: Detailed
Quality 226: Preventive Care And Screening: Tobacco Use: Screening And Cessation Intervention: Patient screened for tobacco and is a smoker AND received Cessation Counseling
Quality 131: Pain Assessment And Follow-Up: Pain assessment using a standardized tool is documented as negative, no follow-up plan required
Quality 130: Documentation Of Current Medications In The Medical Record: Current Medications Documented
Quality 110: Preventive Care And Screening: Influenza Immunization: Influenza Immunization previously received during influenza season
Quality 265: Biopsy Follow-Up: Biopsy results reviewed, communicated, tracked, and documented
Quality 431: Preventive Care And Screening: Unhealthy Alcohol Use - Screening: Patient screened for unhealthy alcohol use using a single question and scores less than 2 times per year

## 2019-02-11 NOTE — PROCEDURE: EXCISION
Validate Note Data (See Information Below): Yes
O-Z Plasty Text: The defect edges were debeveled with a #15 scalpel blade.  Given the location of the defect, shape of the defect and the proximity to free margins an O-Z plasty (double transposition flap) was deemed most appropriate.  Using a sterile surgical marker, the appropriate transposition flaps were drawn incorporating the defect and placing the expected incisions within the relaxed skin tension lines where possible.    The area thus outlined was incised deep to adipose tissue with a #15 scalpel blade.  The skin margins were undermined to an appropriate distance in all directions utilizing iris scissors.  Hemostasis was achieved with electrocautery.  The flaps were then transposed into place, one clockwise and the other counterclockwise, and anchored with interrupted buried subcutaneous sutures.
Estimated Blood Loss (Cc): minimal
H Plasty Text: Given the location of the defect, shape of the defect and the proximity to free margins a H-plasty was deemed most appropriate for repair.  Using a sterile surgical marker, the appropriate advancement arms of the H-plasty were drawn incorporating the defect and placing the expected incisions within the relaxed skin tension lines where possible. The area thus outlined was incised deep to adipose tissue with a #15 scalpel blade. The skin margins were undermined to an appropriate distance in all directions utilizing iris scissors.  The opposing advancement arms were then advanced into place in opposite direction and anchored with interrupted buried subcutaneous sutures.
Epidermal Closure Graft Donor Site (Optional): simple interrupted
Intermediate / Complex Repair - Final Wound Length In Cm: 0
Island Pedicle Flap Text: The defect edges were debeveled with a #15 scalpel blade.  Given the location of the defect, shape of the defect and the proximity to free margins an island pedicle advancement flap was deemed most appropriate.  Using a sterile surgical marker, an appropriate advancement flap was drawn incorporating the defect, outlining the appropriate donor tissue and placing the expected incisions within the relaxed skin tension lines where possible.    The area thus outlined was incised deep to adipose tissue with a #15 scalpel blade.  The skin margins were undermined to an appropriate distance in all directions around the primary defect and laterally outward around the island pedicle utilizing iris scissors.  There was minimal undermining beneath the pedicle flap.
Lazy S Intermediate Repair Preamble Text (Leave Blank If You Do Not Want): Undermining was performed with blunt dissection.
Bilobed Flap Text: The defect edges were debeveled with a #15 scalpel blade.  Given the location of the defect and the proximity to free margins a bilobe flap was deemed most appropriate.  Using a sterile surgical marker, an appropriate bilobe flap drawn around the defect.    The area thus outlined was incised deep to adipose tissue with a #15 scalpel blade.  The skin margins were undermined to an appropriate distance in all directions utilizing iris scissors.
Hatchet Flap Text: The defect edges were debeveled with a #15 scalpel blade.  Given the location of the defect, shape of the defect and the proximity to free margins a hatchet flap was deemed most appropriate.  Using a sterile surgical marker, an appropriate hatchet flap was drawn incorporating the defect and placing the expected incisions within the relaxed skin tension lines where possible.    The area thus outlined was incised deep to adipose tissue with a #15 scalpel blade.  The skin margins were undermined to an appropriate distance in all directions utilizing iris scissors.
O-L Flap Text: The defect edges were debeveled with a #15 scalpel blade.  Given the location of the defect, shape of the defect and the proximity to free margins an O-L flap was deemed most appropriate.  Using a sterile surgical marker, an appropriate advancement flap was drawn incorporating the defect and placing the expected incisions within the relaxed skin tension lines where possible.    The area thus outlined was incised deep to adipose tissue with a #15 scalpel blade.  The skin margins were undermined to an appropriate distance in all directions utilizing iris scissors.
Complex Repair And Epidermal Autograft Text: The defect edges were debeveled with a #15 scalpel blade.  The primary defect was closed partially with a complex linear closure.  Given the location of the defect, shape of the defect and the proximity to free margins an epidermal autograft was deemed most appropriate to repair the remaining defect.  The graft was trimmed to fit the size of the remaining defect.  The graft was then placed in the primary defect, oriented appropriately, and sutured into place.
Bi-Rhombic Flap Text: The defect edges were debeveled with a #15 scalpel blade.  Given the location of the defect and the proximity to free margins a bi-rhombic flap was deemed most appropriate.  Using a sterile surgical marker, an appropriate rhombic flap was drawn incorporating the defect. The area thus outlined was incised deep to adipose tissue with a #15 scalpel blade.  The skin margins were undermined to an appropriate distance in all directions utilizing iris scissors.
Complex Repair And Rotation Flap Text: The defect edges were debeveled with a #15 scalpel blade.  The primary defect was closed partially with a complex linear closure.  Given the location of the remaining defect, shape of the defect and the proximity to free margins a rotation flap was deemed most appropriate for complete closure of the defect.  Using a sterile surgical marker, an appropriate advancement flap was drawn incorporating the defect and placing the expected incisions within the relaxed skin tension lines where possible.    The area thus outlined was incised deep to adipose tissue with a #15 scalpel blade.  The skin margins were undermined to an appropriate distance in all directions utilizing iris scissors.
Information: Selecting Yes will display possible errors in your note based on the variables you have selected. This validation is only offered as a suggestion for you. PLEASE NOTE THAT THE VALIDATION TEXT WILL BE REMOVED WHEN YOU FINALIZE YOUR NOTE. IF YOU WANT TO FAX A PRELIMINARY NOTE YOU WILL NEED TO TOGGLE THIS TO 'NO' IF YOU DO NOT WANT IT IN YOUR FAXED NOTE.
Interpolation Flap Text: A decision was made to reconstruct the defect utilizing an interpolation axial flap and a staged reconstruction.  A telfa template was made of the defect.  This telfa template was then used to outline the interpolation flap.  The donor area for the pedicle flap was then injected with anesthesia.  The flap was excised through the skin and subcutaneous tissue down to the layer of the underlying musculature.  The interpolation flap was carefully excised within this deep plane to maintain its blood supply.  The edges of the donor site were undermined.   The donor site was closed in a primary fashion.  The pedicle was then rotated into position and sutured.  Once the tube was sutured into place, adequate blood supply was confirmed with blanching and refill.  The pedicle was then wrapped with xeroform gauze and dressed appropriately with a telfa and gauze bandage to ensure continued blood supply and protect the attached pedicle.
Ftsg Text: The defect edges were debeveled with a #15 scalpel blade.  Given the location of the defect, shape of the defect and the proximity to free margins a full thickness skin graft was deemed most appropriate.  Using a sterile surgical marker, the primary defect shape was transferred to the donor site. The area thus outlined was incised deep to adipose tissue with a #15 scalpel blade.  The harvested graft was then trimmed of adipose tissue until only dermis and epidermis was left.  The skin margins of the secondary defect were undermined to an appropriate distance in all directions utilizing iris scissors.  The secondary defect was closed with interrupted buried subcutaneous sutures.  The skin edges were then re-apposed with running  sutures.  The skin graft was then placed in the primary defect and oriented appropriately.
Tissue Cultured Epidermal Autograft Text: The defect edges were debeveled with a #15 scalpel blade.  Given the location of the defect, shape of the defect and the proximity to free margins a tissue cultured epidermal autograft was deemed most appropriate.  The graft was then trimmed to fit the size of the defect.  The graft was then placed in the primary defect and oriented appropriately.
Epidermal Autograft Text: The defect edges were debeveled with a #15 scalpel blade.  Given the location of the defect, shape of the defect and the proximity to free margins an epidermal autograft was deemed most appropriate.  Using a sterile surgical marker, the primary defect shape was transferred to the donor site. The epidermal graft was then harvested.  The skin graft was then placed in the primary defect and oriented appropriately.
Melolabial Transposition Flap Text: The defect edges were debeveled with a #15 scalpel blade.  Given the location of the defect and the proximity to free margins a melolabial flap was deemed most appropriate.  Using a sterile surgical marker, an appropriate melolabial transposition flap was drawn incorporating the defect.    The area thus outlined was incised deep to adipose tissue with a #15 scalpel blade.  The skin margins were undermined to an appropriate distance in all directions utilizing iris scissors.
Purse String (Simple) Text: Given the location of the defect and the characteristics of the surrounding skin a purse string simple closure was deemed most appropriate.  Undermining was performed circumferentially around the surgical defect.  A purse string suture was then placed and tightened.
Anesthesia Volume In Cc: 0.2
Crescentic Advancement Flap Text: The defect edges were debeveled with a #15 scalpel blade.  Given the location of the defect and the proximity to free margins a crescentic advancement flap was deemed most appropriate.  Using a sterile surgical marker, the appropriate advancement flap was drawn incorporating the defect and placing the expected incisions within the relaxed skin tension lines where possible.    The area thus outlined was incised deep to adipose tissue with a #15 scalpel blade.  The skin margins were undermined to an appropriate distance in all directions utilizing iris scissors.
S Plasty Text: Given the location and shape of the defect, and the orientation of relaxed skin tension lines, an S-plasty was deemed most appropriate for repair.  Using a sterile surgical marker, the appropriate outline of the S-plasty was drawn, incorporating the defect and placing the expected incisions within the relaxed skin tension lines where possible.  The area thus outlined was incised deep to adipose tissue with a #15 scalpel blade.  The skin margins were undermined to an appropriate distance in all directions utilizing iris scissors. The skin flaps were advanced over the defect.  The opposing margins were then approximated with interrupted buried subcutaneous sutures.
Validate Previous Accession (Can Hide Previous Accession In Settings Tab): No
Suture Removal: 14 days
Billing Type: Third-Party Bill
Complex Repair And M Plasty Text: The defect edges were debeveled with a #15 scalpel blade.  The primary defect was closed partially with a complex linear closure.  Given the location of the remaining defect, shape of the defect and the proximity to free margins an M plasty was deemed most appropriate for complete closure of the defect.  Using a sterile surgical marker, an appropriate advancement flap was drawn incorporating the defect and placing the expected incisions within the relaxed skin tension lines where possible.    The area thus outlined was incised deep to adipose tissue with a #15 scalpel blade.  The skin margins were undermined to an appropriate distance in all directions utilizing iris scissors.
Consent was obtained from the patient. The risks and benefits to therapy were discussed in detail. Specifically, the risks of infection, scarring, bleeding, prolonged wound healing, incomplete removal, allergy to anesthesia, nerve injury and recurrence were addressed. Prior to the procedure, the treatment site was clearly identified and confirmed by the patient. All components of Universal Protocol/PAUSE Rule completed.
Medical Necessity Information: It is in your best interest to select a reason for this procedure from the list below. All of these items fulfill various CMS LCD requirements except lesion extends to a margin.
Lazy S Complex Repair Preamble Text (Leave Blank If You Do Not Want): Extensive wide undermining was performed.
Complex Repair And O-L Flap Text: The defect edges were debeveled with a #15 scalpel blade.  The primary defect was closed partially with a complex linear closure.  Given the location of the remaining defect, shape of the defect and the proximity to free margins an O-L flap was deemed most appropriate for complete closure of the defect.  Using a sterile surgical marker, an appropriate flap was drawn incorporating the defect and placing the expected incisions within the relaxed skin tension lines where possible.    The area thus outlined was incised deep to adipose tissue with a #15 scalpel blade.  The skin margins were undermined to an appropriate distance in all directions utilizing iris scissors.
Complex Repair And Xenograft Text: The defect edges were debeveled with a #15 scalpel blade.  The primary defect was closed partially with a complex linear closure.  Given the location of the defect, shape of the defect and the proximity to free margins an tissue cultured epidermal autograft was deemed most appropriate to repair the remaining defect.  The graft was trimmed to fit the size of the remaining defect.  The graft was then placed in the primary defect, oriented appropriately, and sutured into place.
Island Pedicle Flap-Requiring Vessel Identification Text: The defect edges were debeveled with a #15 scalpel blade.  Given the location of the defect, shape of the defect and the proximity to free margins an island pedicle advancement flap was deemed most appropriate.  Using a sterile surgical marker, an appropriate advancement flap was drawn, based on the axial vessel mentioned above, incorporating the defect, outlining the appropriate donor tissue and placing the expected incisions within the relaxed skin tension lines where possible.    The area thus outlined was incised deep to adipose tissue with a #15 scalpel blade.  The skin margins were undermined to an appropriate distance in all directions around the primary defect and laterally outward around the island pedicle utilizing iris scissors.  There was minimal undermining beneath the pedicle flap.
Anesthesia Type: 1% lidocaine with epinephrine and a 1:10 solution of 8.4% sodium bicarbonate
Xenograft Text: The defect edges were debeveled with a #15 scalpel blade.  Given the location of the defect, shape of the defect and the proximity to free margins a xenograft was deemed most appropriate.  The graft was then trimmed to fit the size of the defect.  The graft was then placed in the primary defect and oriented appropriately.
Repair Performed By Another Provider Text (Leave Blank If You Do Not Want): After the tissue was excised the defect was repaired by another provider.
Path Notes (To The Dermatopathologist): Please check margins.
O-T Advancement Flap Text: The defect edges were debeveled with a #15 scalpel blade.  Given the location of the defect, shape of the defect and the proximity to free margins an O-T advancement flap was deemed most appropriate.  Using a sterile surgical marker, an appropriate advancement flap was drawn incorporating the defect and placing the expected incisions within the relaxed skin tension lines where possible.    The area thus outlined was incised deep to adipose tissue with a #15 scalpel blade.  The skin margins were undermined to an appropriate distance in all directions utilizing iris scissors.
Cheek Interpolation Flap Text: A decision was made to reconstruct the defect utilizing an interpolation axial flap and a staged reconstruction.  A telfa template was made of the defect.  This telfa template was then used to outline the Cheek Interpolation flap.  The donor area for the pedicle flap was then injected with anesthesia.  The flap was excised through the skin and subcutaneous tissue down to the layer of the underlying musculature.  The interpolation flap was carefully excised within this deep plane to maintain its blood supply.  The edges of the donor site were undermined.   The donor site was closed in a primary fashion.  The pedicle was then rotated into position and sutured.  Once the tube was sutured into place, adequate blood supply was confirmed with blanching and refill.  The pedicle was then wrapped with xeroform gauze and dressed appropriately with a telfa and gauze bandage to ensure continued blood supply and protect the attached pedicle.
W Plasty Text: The lesion was extirpated to the level of the fat with a #15 scalpel blade.  Given the location of the defect, shape of the defect and the proximity to free margins a W-plasty was deemed most appropriate for repair.  Using a sterile surgical marker, the appropriate transposition arms of the W-plasty were drawn incorporating the defect and placing the expected incisions within the relaxed skin tension lines where possible.    The area thus outlined was incised deep to adipose tissue with a #15 scalpel blade.  The skin margins were undermined to an appropriate distance in all directions utilizing iris scissors.  The opposing transposition arms were then transposed into place in opposite direction and anchored with interrupted buried subcutaneous sutures.
O-T Plasty Text: The defect edges were debeveled with a #15 scalpel blade.  Given the location of the defect, shape of the defect and the proximity to free margins an O-T plasty was deemed most appropriate.  Using a sterile surgical marker, an appropriate O-T plasty was drawn incorporating the defect and placing the expected incisions within the relaxed skin tension lines where possible.    The area thus outlined was incised deep to adipose tissue with a #15 scalpel blade.  The skin margins were undermined to an appropriate distance in all directions utilizing iris scissors.
Complex Repair And A-T Advancement Flap Text: The defect edges were debeveled with a #15 scalpel blade.  The primary defect was closed partially with a complex linear closure.  Given the location of the remaining defect, shape of the defect and the proximity to free margins an A-T advancement flap was deemed most appropriate for complete closure of the defect.  Using a sterile surgical marker, an appropriate advancement flap was drawn incorporating the defect and placing the expected incisions within the relaxed skin tension lines where possible.    The area thus outlined was incised deep to adipose tissue with a #15 scalpel blade.  The skin margins were undermined to an appropriate distance in all directions utilizing iris scissors.
Modified Advancement Flap Text: The defect edges were debeveled with a #15 scalpel blade.  Given the location of the defect, shape of the defect and the proximity to free margins a modified advancement flap was deemed most appropriate.  Using a sterile surgical marker, an appropriate advancement flap was drawn incorporating the defect and placing the expected incisions within the relaxed skin tension lines where possible.    The area thus outlined was incised deep to adipose tissue with a #15 scalpel blade.  The skin margins were undermined to an appropriate distance in all directions utilizing iris scissors.
Burow's Advancement Flap Text: The defect edges were debeveled with a #15 scalpel blade.  Given the location of the defect and the proximity to free margins a Burow's advancement flap was deemed most appropriate.  Using a sterile surgical marker, the appropriate advancement flap was drawn incorporating the defect and placing the expected incisions within the relaxed skin tension lines where possible.    The area thus outlined was incised deep to adipose tissue with a #15 scalpel blade.  The skin margins were undermined to an appropriate distance in all directions utilizing iris scissors.
Complex Repair And Modified Advancement Flap Text: The defect edges were debeveled with a #15 scalpel blade.  The primary defect was closed partially with a complex linear closure.  Given the location of the remaining defect, shape of the defect and the proximity to free margins a modified advancement flap was deemed most appropriate for complete closure of the defect.  Using a sterile surgical marker, an appropriate advancement flap was drawn incorporating the defect and placing the expected incisions within the relaxed skin tension lines where possible.    The area thus outlined was incised deep to adipose tissue with a #15 scalpel blade.  The skin margins were undermined to an appropriate distance in all directions utilizing iris scissors.
Lip Wedge Excision Repair Text: Given the location of the defect and the proximity to free margins a full thickness wedge repair was deemed most appropriate.  Using a sterile surgical marker, the appropriate repair was drawn incorporating the defect and placing the expected incisions perpendicular to the vermillion border.  The vermillion border was also meticulously outlined to ensure appropriate reapproximation during the repair.  The area thus outlined was incised through and through with a #15 scalpel blade.  The muscularis and dermis were reaproximated with deep sutures following hemostasis. Care was taken to realign the vermillion border before proceeding with the superficial closure.  Once the vermillion was realigned the superfical and mucosal closure was finished.
Rotation Flap Text: The defect edges were debeveled with a #15 scalpel blade.  Given the location of the defect, shape of the defect and the proximity to free margins a rotation flap was deemed most appropriate.  Using a sterile surgical marker, an appropriate rotation flap was drawn incorporating the defect and placing the expected incisions within the relaxed skin tension lines where possible.    The area thus outlined was incised deep to adipose tissue with a #15 scalpel blade.  The skin margins were undermined to an appropriate distance in all directions utilizing iris scissors.
Complex Repair And Double M Plasty Text: The defect edges were debeveled with a #15 scalpel blade.  The primary defect was closed partially with a complex linear closure.  Given the location of the remaining defect, shape of the defect and the proximity to free margins a double M plasty was deemed most appropriate for complete closure of the defect.  Using a sterile surgical marker, an appropriate advancement flap was drawn incorporating the defect and placing the expected incisions within the relaxed skin tension lines where possible.    The area thus outlined was incised deep to adipose tissue with a #15 scalpel blade.  The skin margins were undermined to an appropriate distance in all directions utilizing iris scissors.
Paramedian Forehead Flap Text: A decision was made to reconstruct the defect utilizing an interpolation axial flap and a staged reconstruction.  A telfa template was made of the defect.  This telfa template was then used to outline the paramedian forehead pedicle flap.  The donor area for the pedicle flap was then injected with anesthesia.  The flap was excised through the skin and subcutaneous tissue down to the layer of the underlying musculature.  The pedicle flap was carefully excised within this deep plane to maintain its blood supply.  The edges of the donor site were undermined.   The donor site was closed in a primary fashion.  The pedicle was then rotated into position and sutured.  Once the tube was sutured into place, adequate blood supply was confirmed with blanching and refill.  The pedicle was then wrapped with xeroform gauze and dressed appropriately with a telfa and gauze bandage to ensure continued blood supply and protect the attached pedicle.
Complex Repair And Split-Thickness Skin Graft Text: The defect edges were debeveled with a #15 scalpel blade.  The primary defect was closed partially with a complex linear closure.  Given the location of the defect, shape of the defect and the proximity to free margins a split thickness skin graft was deemed most appropriate to repair the remaining defect.  The graft was trimmed to fit the size of the remaining defect.  The graft was then placed in the primary defect, oriented appropriately, and sutured into place.
Purse String (Intermediate) Text: Given the location of the defect and the characteristics of the surrounding skin a pursestring intermediate closure was deemed most appropriate.  Undermining was performed circumfirentially around the surgical defect.  A purstring suture was then placed and tightened.
Advancement Flap (Single) Text: The defect edges were debeveled with a #15 scalpel blade.  Given the location of the defect and the proximity to free margins a single advancement flap was deemed most appropriate.  Using a sterile surgical marker, an appropriate advancement flap was drawn incorporating the defect and placing the expected incisions within the relaxed skin tension lines where possible.    The area thus outlined was incised deep to adipose tissue with a #15 scalpel blade.  The skin margins were undermined to an appropriate distance in all directions utilizing iris scissors.
Ear Star Wedge Flap Text: The defect edges were debeveled with a #15 blade scalpel.  Given the location of the defect and the proximity to free margins (helical rim) an ear star wedge flap was deemed most appropriate.  Using a sterile surgical marker, the appropriate flap was drawn incorporating the defect and placing the expected incisions between the helical rim and antihelix where possible.  The area thus outlined was incised through and through with a #15 scalpel blade.
Star Wedge Flap Text: The defect edges were debeveled with a #15 scalpel blade.  Given the location of the defect, shape of the defect and the proximity to free margins a star wedge flap was deemed most appropriate.  Using a sterile surgical marker, an appropriate rotation flap was drawn incorporating the defect and placing the expected incisions within the relaxed skin tension lines where possible. The area thus outlined was incised deep to adipose tissue with a #15 scalpel blade.  The skin margins were undermined to an appropriate distance in all directions utilizing iris scissors.
Bilateral Helical Rim Advancement Flap Text: The defect edges were debeveled with a #15 blade scalpel.  Given the location of the defect and the proximity to free margins (helical rim) a bilateral helical rim advancement flap was deemed most appropriate.  Using a sterile surgical marker, the appropriate advancement flaps were drawn incorporating the defect and placing the expected incisions between the helical rim and antihelix where possible.  The area thus outlined was incised through and through with a #15 scalpel blade.  With a skin hook and iris scissors, the flaps were gently and sharply undermined and freed up.
Double O-Z Plasty Text: The defect edges were debeveled with a #15 scalpel blade.  Given the location of the defect, shape of the defect and the proximity to free margins a Double O-Z plasty (double transposition flap) was deemed most appropriate.  Using a sterile surgical marker, the appropriate transposition flaps were drawn incorporating the defect and placing the expected incisions within the relaxed skin tension lines where possible. The area thus outlined was incised deep to adipose tissue with a #15 scalpel blade.  The skin margins were undermined to an appropriate distance in all directions utilizing iris scissors.  Hemostasis was achieved with electrocautery.  The flaps were then transposed into place, one clockwise and the other counterclockwise, and anchored with interrupted buried subcutaneous sutures.
Saucerization Excision Additional Text (Leave Blank If You Do Not Want): The margin was drawn around the clinically apparent lesion.  Incisions were then made along these lines, in a tangential fashion, to the appropriate tissue plane and the lesion was extirpated.
Keystone Flap Text: The defect edges were debeveled with a #15 scalpel blade.  Given the location of the defect, shape of the defect a keystone flap was deemed most appropriate.  Using a sterile surgical marker, an appropriate keystone flap was drawn incorporating the defect, outlining the appropriate donor tissue and placing the expected incisions within the relaxed skin tension lines where possible. The area thus outlined was incised deep to adipose tissue with a #15 scalpel blade.  The skin margins were undermined to an appropriate distance in all directions around the primary defect and laterally outward around the flap utilizing iris scissors.
V-Y Flap Text: The defect edges were debeveled with a #15 scalpel blade.  Given the location of the defect, shape of the defect and the proximity to free margins a V-Y flap was deemed most appropriate.  Using a sterile surgical marker, an appropriate advancement flap was drawn incorporating the defect and placing the expected incisions within the relaxed skin tension lines where possible.    The area thus outlined was incised deep to adipose tissue with a #15 scalpel blade.  The skin margins were undermined to an appropriate distance in all directions utilizing iris scissors.
Melolabial Interpolation Flap Text: A decision was made to reconstruct the defect utilizing an interpolation axial flap and a staged reconstruction.  A telfa template was made of the defect.  This telfa template was then used to outline the melolabial interpolation flap.  The donor area for the pedicle flap was then injected with anesthesia.  The flap was excised through the skin and subcutaneous tissue down to the layer of the underlying musculature.  The pedicle flap was carefully excised within this deep plane to maintain its blood supply.  The edges of the donor site were undermined.   The donor site was closed in a primary fashion.  The pedicle was then rotated into position and sutured.  Once the tube was sutured into place, adequate blood supply was confirmed with blanching and refill.  The pedicle was then wrapped with xeroform gauze and dressed appropriately with a telfa and gauze bandage to ensure continued blood supply and protect the attached pedicle.
Scalpel Size: double edge Personna blade
A-T Advancement Flap Text: The defect edges were debeveled with a #15 scalpel blade.  Given the location of the defect, shape of the defect and the proximity to free margins an A-T advancement flap was deemed most appropriate.  Using a sterile surgical marker, an appropriate advancement flap was drawn incorporating the defect and placing the expected incisions within the relaxed skin tension lines where possible.    The area thus outlined was incised deep to adipose tissue with a #15 scalpel blade.  The skin margins were undermined to an appropriate distance in all directions utilizing iris scissors.
Spiral Flap Text: The defect edges were debeveled with a #15 scalpel blade.  Given the location of the defect, shape of the defect and the proximity to free margins a spiral flap was deemed most appropriate.  Using a sterile surgical marker, an appropriate rotation flap was drawn incorporating the defect and placing the expected incisions within the relaxed skin tension lines where possible. The area thus outlined was incised deep to adipose tissue with a #15 scalpel blade.  The skin margins were undermined to an appropriate distance in all directions utilizing iris scissors.
Z Plasty Text: The lesion was extirpated to the level of the fat with a #15 scalpel blade.  Given the location of the defect, shape of the defect and the proximity to free margins a Z-plasty was deemed most appropriate for repair.  Using a sterile surgical marker, the appropriate transposition arms of the Z-plasty were drawn incorporating the defect and placing the expected incisions within the relaxed skin tension lines where possible.    The area thus outlined was incised deep to adipose tissue with a #15 scalpel blade.  The skin margins were undermined to an appropriate distance in all directions utilizing iris scissors.  The opposing transposition arms were then transposed into place in opposite direction and anchored with interrupted buried subcutaneous sutures.
Body Location Override (Optional - Billing Will Still Be Based On Selected Body Map Location If Applicable): L zygoma
Complex Repair And Skin Substitute Graft Text: The defect edges were debeveled with a #15 scalpel blade.  The primary defect was closed partially with a complex linear closure.  Given the location of the remaining defect, shape of the defect and the proximity to free margins a skin substitute graft was deemed most appropriate to repair the remaining defect.  The graft was trimmed to fit the size of the remaining defect.  The graft was then placed in the primary defect, oriented appropriately, and sutured into place.
Dorsal Nasal Flap Text: The defect edges were debeveled with a #15 scalpel blade.  Given the location of the defect and the proximity to free margins a dorsal nasal flap was deemed most appropriate.  Using a sterile surgical marker, an appropriate dorsal nasal flap was drawn around the defect.    The area thus outlined was incised deep to adipose tissue with a #15 scalpel blade.  The skin margins were undermined to an appropriate distance in all directions utilizing iris scissors.
Excision Depth: adipose tissue
Complex Repair And Dermal Autograft Text: The defect edges were debeveled with a #15 scalpel blade.  The primary defect was closed partially with a complex linear closure.  Given the location of the defect, shape of the defect and the proximity to free margins an dermal autograft was deemed most appropriate to repair the remaining defect.  The graft was trimmed to fit the size of the remaining defect.  The graft was then placed in the primary defect, oriented appropriately, and sutured into place.
Excision Method: Saucerization
Muscle Hinge Flap Text: The defect edges were debeveled with a #15 scalpel blade.  Given the size, depth and location of the defect and the proximity to free margins a muscle hinge flap was deemed most appropriate.  Using a sterile surgical marker, an appropriate hinge flap was drawn incorporating the defect. The area thus outlined was incised with a #15 scalpel blade.  The skin margins were undermined to an appropriate distance in all directions utilizing iris scissors.
Eliptical Excision Additional Text (Leave Blank If You Do Not Want): The margin was drawn around the clinically apparent lesion.  An elliptical shape was then drawn on the skin incorporating the lesion and margins.  Incisions were then made along these lines to the appropriate tissue plane and the lesion was extirpated.
Mucosal Advancement Flap Text: Given the location of the defect, shape of the defect and the proximity to free margins a mucosal advancement flap was deemed most appropriate. Incisions were made with a 15 blade scalpel in the appropriate fashion along the cutaneous vermillion border and the mucosal lip. The remaining actinically damaged mucosal tissue was excised.  The mucosal advancement flap was then elevated to the gingival sulcus with care taken to preserve the neurovascular structures and advanced into the primary defect. Care was taken to ensure that precise realignment of the vermillion border was achieved.
Fusiform Excision Additional Text (Leave Blank If You Do Not Want): The margin was drawn around the clinically apparent lesion.  A fusiform shape was then drawn on the skin incorporating the lesion and margins.  Incisions were then made along these lines to the appropriate tissue plane and the lesion was extirpated.
Detail Level: Detailed
Posterior Auricular Interpolation Flap Text: A decision was made to reconstruct the defect utilizing an interpolation axial flap and a staged reconstruction.  A telfa template was made of the defect.  This telfa template was then used to outline the posterior auricular interpolation flap.  The donor area for the pedicle flap was then injected with anesthesia.  The flap was excised through the skin and subcutaneous tissue down to the layer of the underlying musculature.  The pedicle flap was carefully excised within this deep plane to maintain its blood supply.  The edges of the donor site were undermined.   The donor site was closed in a primary fashion.  The pedicle was then rotated into position and sutured.  Once the tube was sutured into place, adequate blood supply was confirmed with blanching and refill.  The pedicle was then wrapped with xeroform gauze and dressed appropriately with a telfa and gauze bandage to ensure continued blood supply and protect the attached pedicle.
Complex Repair And Rhombic Flap Text: The defect edges were debeveled with a #15 scalpel blade.  The primary defect was closed partially with a complex linear closure.  Given the location of the remaining defect, shape of the defect and the proximity to free margins a rhombic flap was deemed most appropriate for complete closure of the defect.  Using a sterile surgical marker, an appropriate advancement flap was drawn incorporating the defect and placing the expected incisions within the relaxed skin tension lines where possible.    The area thus outlined was incised deep to adipose tissue with a #15 scalpel blade.  The skin margins were undermined to an appropriate distance in all directions utilizing iris scissors.
Dermal Autograft Text: The defect edges were debeveled with a #15 scalpel blade.  Given the location of the defect, shape of the defect and the proximity to free margins a dermal autograft was deemed most appropriate.  Using a sterile surgical marker, the primary defect shape was transferred to the donor site. The area thus outlined was incised deep to adipose tissue with a #15 scalpel blade.  The harvested graft was then trimmed of adipose and epidermal tissue until only dermis was left.  The skin graft was then placed in the primary defect and oriented appropriately.
Wound Care: Vaseline
Island Pedicle Flap With Canthal Suspension Text: The defect edges were debeveled with a #15 scalpel blade.  Given the location of the defect, shape of the defect and the proximity to free margins an island pedicle advancement flap was deemed most appropriate.  Using a sterile surgical marker, an appropriate advancement flap was drawn incorporating the defect, outlining the appropriate donor tissue and placing the expected incisions within the relaxed skin tension lines where possible. The area thus outlined was incised deep to adipose tissue with a #15 scalpel blade.  The skin margins were undermined to an appropriate distance in all directions around the primary defect and laterally outward around the island pedicle utilizing iris scissors.  There was minimal undermining beneath the pedicle flap. A suspension suture was placed in the canthal tendon to prevent tension and prevent ectropion.
Complex Repair And V-Y Plasty Text: The defect edges were debeveled with a #15 scalpel blade.  The primary defect was closed partially with a complex linear closure.  Given the location of the remaining defect, shape of the defect and the proximity to free margins a V-Y plasty was deemed most appropriate for complete closure of the defect.  Using a sterile surgical marker, an appropriate advancement flap was drawn incorporating the defect and placing the expected incisions within the relaxed skin tension lines where possible.    The area thus outlined was incised deep to adipose tissue with a #15 scalpel blade.  The skin margins were undermined to an appropriate distance in all directions utilizing iris scissors.
Repair Type: None (Simple)
Bilobed Transposition Flap Text: The defect edges were debeveled with a #15 scalpel blade.  Given the location of the defect and the proximity to free margins a bilobed transposition flap was deemed most appropriate.  Using a sterile surgical marker, an appropriate bilobe flap drawn around the defect.    The area thus outlined was incised deep to adipose tissue with a #15 scalpel blade.  The skin margins were undermined to an appropriate distance in all directions utilizing iris scissors.
Complex Repair And O-T Advancement Flap Text: The defect edges were debeveled with a #15 scalpel blade.  The primary defect was closed partially with a complex linear closure.  Given the location of the remaining defect, shape of the defect and the proximity to free margins an O-T advancement flap was deemed most appropriate for complete closure of the defect.  Using a sterile surgical marker, an appropriate advancement flap was drawn incorporating the defect and placing the expected incisions within the relaxed skin tension lines where possible.    The area thus outlined was incised deep to adipose tissue with a #15 scalpel blade.  The skin margins were undermined to an appropriate distance in all directions utilizing iris scissors.
Complex Repair And Double Advancement Flap Text: The defect edges were debeveled with a #15 scalpel blade.  The primary defect was closed partially with a complex linear closure.  Given the location of the remaining defect, shape of the defect and the proximity to free margins a double advancement flap was deemed most appropriate for complete closure of the defect.  Using a sterile surgical marker, an appropriate advancement flap was drawn incorporating the defect and placing the expected incisions within the relaxed skin tension lines where possible.    The area thus outlined was incised deep to adipose tissue with a #15 scalpel blade.  The skin margins were undermined to an appropriate distance in all directions utilizing iris scissors.
Complex Repair And Transposition Flap Text: The defect edges were debeveled with a #15 scalpel blade.  The primary defect was closed partially with a complex linear closure.  Given the location of the remaining defect, shape of the defect and the proximity to free margins a transposition flap was deemed most appropriate for complete closure of the defect.  Using a sterile surgical marker, an appropriate advancement flap was drawn incorporating the defect and placing the expected incisions within the relaxed skin tension lines where possible.    The area thus outlined was incised deep to adipose tissue with a #15 scalpel blade.  The skin margins were undermined to an appropriate distance in all directions utilizing iris scissors.
Rhomboid Transposition Flap Text: The defect edges were debeveled with a #15 scalpel blade.  Given the location of the defect and the proximity to free margins a rhomboid transposition flap was deemed most appropriate.  Using a sterile surgical marker, an appropriate rhomboid flap was drawn incorporating the defect.    The area thus outlined was incised deep to adipose tissue with a #15 scalpel blade.  The skin margins were undermined to an appropriate distance in all directions utilizing iris scissors.
Transposition Flap Text: The defect edges were debeveled with a #15 scalpel blade.  Given the location of the defect and the proximity to free margins a transposition flap was deemed most appropriate.  Using a sterile surgical marker, an appropriate transposition flap was drawn incorporating the defect.    The area thus outlined was incised deep to adipose tissue with a #15 scalpel blade.  The skin margins were undermined to an appropriate distance in all directions utilizing iris scissors.
Complex Repair And Z Plasty Text: The defect edges were debeveled with a #15 scalpel blade.  The primary defect was closed partially with a complex linear closure.  Given the location of the remaining defect, shape of the defect and the proximity to free margins a Z plasty was deemed most appropriate for complete closure of the defect.  Using a sterile surgical marker, an appropriate advancement flap was drawn incorporating the defect and placing the expected incisions within the relaxed skin tension lines where possible.    The area thus outlined was incised deep to adipose tissue with a #15 scalpel blade.  The skin margins were undermined to an appropriate distance in all directions utilizing iris scissors.
Split-Thickness Skin Graft Text: The defect edges were debeveled with a #15 scalpel blade.  Given the location of the defect, shape of the defect and the proximity to free margins a split thickness skin graft was deemed most appropriate.  Using a sterile surgical marker, the primary defect shape was transferred to the donor site. The split thickness graft was then harvested.  The skin graft was then placed in the primary defect and oriented appropriately.
Composite Graft Text: The defect edges were debeveled with a #15 scalpel blade.  Given the location of the defect, shape of the defect, the proximity to free margins and the fact the defect was full thickness a composite graft was deemed most appropriate.  The defect was outline and then transferred to the donor site.  A full thickness graft was then excised from the donor site. The graft was then placed in the primary defect, oriented appropriately and then sutured into place.  The secondary defect was then repaired using a primary closure.
Hemostasis: Electrocautery
Complex Repair And Single Advancement Flap Text: The defect edges were debeveled with a #15 scalpel blade.  The primary defect was closed partially with a complex linear closure.  Given the location of the remaining defect, shape of the defect and the proximity to free margins a single advancement flap was deemed most appropriate for complete closure of the defect.  Using a sterile surgical marker, an appropriate advancement flap was drawn incorporating the defect and placing the expected incisions within the relaxed skin tension lines where possible.    The area thus outlined was incised deep to adipose tissue with a #15 scalpel blade.  The skin margins were undermined to an appropriate distance in all directions utilizing iris scissors.
Complex Repair And Dorsal Nasal Flap Text: The defect edges were debeveled with a #15 scalpel blade.  The primary defect was closed partially with a complex linear closure.  Given the location of the remaining defect, shape of the defect and the proximity to free margins a dorsal nasal flap was deemed most appropriate for complete closure of the defect.  Using a sterile surgical marker, an appropriate flap was drawn incorporating the defect and placing the expected incisions within the relaxed skin tension lines where possible.    The area thus outlined was incised deep to adipose tissue with a #15 scalpel blade.  The skin margins were undermined to an appropriate distance in all directions utilizing iris scissors.
Graft Donor Site Bandage (Optional-Leave Blank If You Don't Want In Note): Steri-strips and a pressure bandage were applied to the donor site.
Complex Repair And Melolabial Flap Text: The defect edges were debeveled with a #15 scalpel blade.  The primary defect was closed partially with a complex linear closure.  Given the location of the remaining defect, shape of the defect and the proximity to free margins a melolabial flap was deemed most appropriate for complete closure of the defect.  Using a sterile surgical marker, an appropriate advancement flap was drawn incorporating the defect and placing the expected incisions within the relaxed skin tension lines where possible.    The area thus outlined was incised deep to adipose tissue with a #15 scalpel blade.  The skin margins were undermined to an appropriate distance in all directions utilizing iris scissors.
Mastoid Interpolation Flap Text: A decision was made to reconstruct the defect utilizing an interpolation axial flap and a staged reconstruction.  A telfa template was made of the defect.  This telfa template was then used to outline the mastoid interpolation flap.  The donor area for the pedicle flap was then injected with anesthesia.  The flap was excised through the skin and subcutaneous tissue down to the layer of the underlying musculature.  The pedicle flap was carefully excised within this deep plane to maintain its blood supply.  The edges of the donor site were undermined.   The donor site was closed in a primary fashion.  The pedicle was then rotated into position and sutured.  Once the tube was sutured into place, adequate blood supply was confirmed with blanching and refill.  The pedicle was then wrapped with xeroform gauze and dressed appropriately with a telfa and gauze bandage to ensure continued blood supply and protect the attached pedicle.
Cheek-To-Nose Interpolation Flap Text: A decision was made to reconstruct the defect utilizing an interpolation axial flap and a staged reconstruction.  A telfa template was made of the defect.  This telfa template was then used to outline the Cheek-To-Nose Interpolation flap.  The donor area for the pedicle flap was then injected with anesthesia.  The flap was excised through the skin and subcutaneous tissue down to the layer of the underlying musculature.  The interpolation flap was carefully excised within this deep plane to maintain its blood supply.  The edges of the donor site were undermined.   The donor site was closed in a primary fashion.  The pedicle was then rotated into position and sutured.  Once the tube was sutured into place, adequate blood supply was confirmed with blanching and refill.  The pedicle was then wrapped with xeroform gauze and dressed appropriately with a telfa and gauze bandage to ensure continued blood supply and protect the attached pedicle.
Alar Island Pedicle Flap Text: The defect edges were debeveled with a #15 scalpel blade.  Given the location of the defect, shape of the defect and the proximity to the alar rim an island pedicle advancement flap was deemed most appropriate.  Using a sterile surgical marker, an appropriate advancement flap was drawn incorporating the defect, outlining the appropriate donor tissue and placing the expected incisions within the nasal ala running parallel to the alar rim. The area thus outlined was incised with a #15 scalpel blade.  The skin margins were undermined minimally to an appropriate distance in all directions around the primary defect and laterally outward around the island pedicle utilizing iris scissors.  There was minimal undermining beneath the pedicle flap.
Advancement Flap (Double) Text: The defect edges were debeveled with a #15 scalpel blade.  Given the location of the defect and the proximity to free margins a double advancement flap was deemed most appropriate.  Using a sterile surgical marker, the appropriate advancement flaps were drawn incorporating the defect and placing the expected incisions within the relaxed skin tension lines where possible.    The area thus outlined was incised deep to adipose tissue with a #15 scalpel blade.  The skin margins were undermined to an appropriate distance in all directions utilizing iris scissors.
Post-Care Instructions: I reviewed with the patient in detail post-care instructions. Patient is not to engage in any heavy lifting, exercise, or swimming for the next 14 days. Should the patient develop any fevers, chills, bleeding, severe pain patient will contact the office immediately.
Banner Transposition Flap Text: The defect edges were debeveled with a #15 scalpel blade.  Given the location of the defect and the proximity to free margins a Banner transposition flap was deemed most appropriate.  Using a sterile surgical marker, an appropriate flap drawn around the defect. The area thus outlined was incised deep to adipose tissue with a #15 scalpel blade.  The skin margins were undermined to an appropriate distance in all directions utilizing iris scissors.
Rhombic Flap Text: The defect edges were debeveled with a #15 scalpel blade.  Given the location of the defect and the proximity to free margins a rhombic flap was deemed most appropriate.  Using a sterile surgical marker, an appropriate rhombic flap was drawn incorporating the defect.    The area thus outlined was incised deep to adipose tissue with a #15 scalpel blade.  The skin margins were undermined to an appropriate distance in all directions utilizing iris scissors.
Size Of Lesion In Cm: 0.3
Epidermal Sutures: 5-0 Surgipro
Perilesional Excision Additional Text (Leave Blank If You Do Not Want): The margin was drawn around the clinically apparent lesion. Incisions were then made along these lines to the appropriate tissue plane and the lesion was extirpated.
Trilobed Flap Text: The defect edges were debeveled with a #15 scalpel blade.  Given the location of the defect and the proximity to free margins a trilobed flap was deemed most appropriate.  Using a sterile surgical marker, an appropriate trilobed flap drawn around the defect.    The area thus outlined was incised deep to adipose tissue with a #15 scalpel blade.  The skin margins were undermined to an appropriate distance in all directions utilizing iris scissors.
Dressing: pressure dressing with telfa
Complex Repair And W Plasty Text: The defect edges were debeveled with a #15 scalpel blade.  The primary defect was closed partially with a complex linear closure.  Given the location of the remaining defect, shape of the defect and the proximity to free margins a W plasty was deemed most appropriate for complete closure of the defect.  Using a sterile surgical marker, an appropriate advancement flap was drawn incorporating the defect and placing the expected incisions within the relaxed skin tension lines where possible.    The area thus outlined was incised deep to adipose tissue with a #15 scalpel blade.  The skin margins were undermined to an appropriate distance in all directions utilizing iris scissors.
Complex Repair And Ftsg Text: The defect edges were debeveled with a #15 scalpel blade.  The primary defect was closed partially with a complex linear closure.  Given the location of the defect, shape of the defect and the proximity to free margins a full thickness skin graft was deemed most appropriate to repair the remaining defect.  The graft was trimmed to fit the size of the remaining defect.  The graft was then placed in the primary defect, oriented appropriately, and sutured into place.
Slit Excision Additional Text (Leave Blank If You Do Not Want): A linear line was drawn on the skin overlying the lesion. An incision was made slowly until the lesion was visualized.  Once visualized, the lesion was removed with blunt dissection.
V-Y Plasty Text: The defect edges were debeveled with a #15 scalpel blade.  Given the location of the defect, shape of the defect and the proximity to free margins an V-Y advancement flap was deemed most appropriate.  Using a sterile surgical marker, an appropriate advancement flap was drawn incorporating the defect and placing the expected incisions within the relaxed skin tension lines where possible.    The area thus outlined was incised deep to adipose tissue with a #15 scalpel blade.  The skin margins were undermined to an appropriate distance in all directions utilizing iris scissors.
No Repair - Repaired With Adjacent Surgical Defect Text (Leave Blank If You Do Not Want): After the excision the defect was repaired concurrently with another surgical defect which was in close approximation.
Skin Substitute Text: The defect edges were debeveled with a #15 scalpel blade.  Given the location of the defect, shape of the defect and the proximity to free margins a skin substitute graft was deemed most appropriate.  The graft material was trimmed to fit the size of the defect. The graft was then placed in the primary defect and oriented appropriately.
Helical Rim Advancement Flap Text: The defect edges were debeveled with a #15 blade scalpel.  Given the location of the defect and the proximity to free margins (helical rim) a double helical rim advancement flap was deemed most appropriate.  Using a sterile surgical marker, the appropriate advancement flaps were drawn incorporating the defect and placing the expected incisions between the helical rim and antihelix where possible.  The area thus outlined was incised through and through with a #15 scalpel blade.  With a skin hook and iris scissors, the flaps were gently and sharply undermined and freed up.
Complex Repair And Bilobe Flap Text: The defect edges were debeveled with a #15 scalpel blade.  The primary defect was closed partially with a complex linear closure.  Given the location of the remaining defect, shape of the defect and the proximity to free margins a bilobe flap was deemed most appropriate for complete closure of the defect.  Using a sterile surgical marker, an appropriate advancement flap was drawn incorporating the defect and placing the expected incisions within the relaxed skin tension lines where possible.    The area thus outlined was incised deep to adipose tissue with a #15 scalpel blade.  The skin margins were undermined to an appropriate distance in all directions utilizing iris scissors.
Medical Necessity Clause: This procedure was medically necessary because the lesion that was treated was: Atypical
Double Island Pedicle Flap Text: The defect edges were debeveled with a #15 scalpel blade.  Given the location of the defect, shape of the defect and the proximity to free margins a double island pedicle advancement flap was deemed most appropriate.  Using a sterile surgical marker, an appropriate advancement flap was drawn incorporating the defect, outlining the appropriate donor tissue and placing the expected incisions within the relaxed skin tension lines where possible.    The area thus outlined was incised deep to adipose tissue with a #15 scalpel blade.  The skin margins were undermined to an appropriate distance in all directions around the primary defect and laterally outward around the island pedicle utilizing iris scissors.  There was minimal undermining beneath the pedicle flap.
Cartilage Graft Text: The defect edges were debeveled with a #15 scalpel blade.  Given the location of the defect, shape of the defect, the fact the defect involved a full thickness cartilage defect a cartilage graft was deemed most appropriate.  An appropriate donor site was identified, cleansed, and anesthetized. The cartilage graft was then harvested and transferred to the recipient site, oriented appropriately and then sutured into place.  The secondary defect was then repaired using a primary closure.
Mercedes Flap Text: The defect edges were debeveled with a #15 scalpel blade.  Given the location of the defect, shape of the defect and the proximity to free margins a Mercedes flap was deemed most appropriate.  Using a sterile surgical marker, an appropriate advancement flap was drawn incorporating the defect and placing the expected incisions within the relaxed skin tension lines where possible. The area thus outlined was incised deep to adipose tissue with a #15 scalpel blade.  The skin margins were undermined to an appropriate distance in all directions utilizing iris scissors.

## 2019-02-11 NOTE — PROCEDURE: TREATMENT REGIMEN
Detail Level: Simple
Plan: Apply Terbinafine to both lower legs and feet daily for 2 months.\\nKeep toenails trimmed.
Otc Regimen: Terbinafine

## 2019-02-11 NOTE — PROCEDURE: LIQUID NITROGEN
Render Post-Care Instructions In Note?: yes
Consent: The patient's consent was obtained including but not limited to risks of crusting, scabbing, blistering, scarring, darker or lighter pigmentary change, recurrence, incomplete removal and infection.
Number Of Freeze-Thaw Cycles: 1 freeze-thaw cycle
Detail Level: Detailed
Total Number Of Aks Treated: 2
Post-Care Instructions: I reviewed with the patient in detail post-care instructions. (Written instructions given) Patient is to wear sun protection, and avoid picking at any of the treated lesions. Pt may apply Vaseline to crusted or scabbing areas.
Duration Of Freeze Thaw-Cycle (Seconds): 10

## 2019-02-11 NOTE — PROCEDURE: DIAGNOSIS COMMENT
Detail Level: Simple
Comment: Procedure performed by Dr. Ling Weaver, DNP APRN NP-C
Comment: Procedure performed by Dr. Ling Weaver, DNP APRN NP-C

## 2019-03-26 NOTE — PROGRESS NOTES
"[facility] Care Coordination Contact  Per CC, mailed client an \"Unable to Contact\" letter.  MMIS entered.      Rosaura Karimi  Case Management Specialist  [facility]  [phone]    "

## 2019-05-23 ENCOUNTER — PATIENT OUTREACH (OUTPATIENT)
Dept: GERIATRIC MEDICINE | Facility: CLINIC | Age: 69
End: 2019-05-23

## 2019-05-23 NOTE — PROGRESS NOTES
[facility] Care Coordination Contact    Completed 4 attempts to reach client with no response.  Member is officially unable to contact effective today for a 6 months telephone update.  Completed MMIS entry.  Completed health plan required [facility] POC.    Follow-up Plan: CC will attempt to reach member in six months.    Lovely Medina RN  [facility]   [phone]

## 2019-05-23 NOTE — PROGRESS NOTES
"[facility] Care Coordination Contact    Per CC, mailed client an \"Unable to Contact\" letter.    Rosaura Karimi  Case Management Specialist  [facility]  [phone]      "

## 2019-05-23 NOTE — LETTER
May 23, 2019    TEVIN LYONS   [address] [address]  [place] [address]    Dear Tevin:     Your Care Coordinator has been unable to reach you by telephone. I am writing to ask you or a family member to call me at [phone]. If you reach my voicemail, please leave a message with your daytime telephone number and a date and time that I can call you. If you are hearing impaired, please call the [place] Relay at 502 or [phone] (wedgdd-wp-ejsgte relay service).     The reason I am trying to reach you is:     [x] Six (6) month check-in  [] To schedule your annual assessment  [] Other:      Please call me as soon as you receive this letter. I look forward to speaking with you.    Sincerely,    Lovely Medina RN    E-mail: [email or web address]  Phone: [phone]      [facility]+ [id number] ([id number])    [id number] (11/18)

## 2019-06-10 ENCOUNTER — PATIENT OUTREACH (OUTPATIENT)
Dept: GERIATRIC MEDICINE | Facility: CLINIC | Age: 69
End: 2019-06-10

## 2019-06-10 NOTE — PROGRESS NOTES
"[facility] Care Coordination Contact      [facility] Six-Month Telephone Assessment    6 month telephone assessment completed on 6/10/19.    ER visits: No  Hospitalizations: No  TCU stays: No  Significant health status changes: no  Falls/Injuries: No  ADL/IADL changes: No  Changes in services: No, member is not open to EW.  No services.  member said he does not look at his messages so was not aware that care coordinator called so many times.  Said he would put care coordinator contact information in his phone so he would recognize the number when care coordinator called again.  Again, declined a face to face visit.  Said he is living with his brother, his wife and their dog.  If this care coordinator came over he would have to \"clean the house\".  Reports that he is doing well and up to date on all preventative care.  Saw his primary for a preventative well visit.      Caregiver Assessment follow up:  none    Goals: See POC in chart for goal progress documentation.  Will call care coordinator if there is any change in condition.    Will see member in 6 months for an annual health risk assessment.   Encouraged member to call CC with any questions or concerns in the meantime.     Lovely Medina RN  [facility]   [phone]          "

## 2019-06-13 NOTE — PROGRESS NOTES
[facility] Care Coordination Contact    [facility] Health Plan or Product Change    CC received notification that member's health plan or health plan product changed from are MSC+ to are M[employer]O effective 6/1/19.  CC contacted member and discussed change and face-to-face visit was offered. Member declined need for home visit. CC reviewed previous Health Risk Assessment/LTCC and POC with member and no changes noted.    Called member and introduced self as member s new CC. Confirmed with member that the welcome letter with writer's name and contact information has been received.  Reviewed LTCC/Health Risk Assessment (HRA) and POC with member. No changes noted.  Transitional HRA completed. Care Plan Summary updated and reflects current services.  Required referral authorization information communicated to CMS: No  MMIS entry completed by: Care Coordinator  Writer reviewed the following with member:  ER visits: No  Hospitalizations: No  TCU stays: No  Significant health status changes: no  Falls/Injuries: No  ADL/IADL changes: No  Changes in services: No    Follow-Up Plan: Member informed of future contact, plan to f/u with member with at next regularly scheduled contact.  Contact information shared with member and family, encouraged member to call with any questions or concerns.

## 2019-06-14 ENCOUNTER — PATIENT OUTREACH (OUTPATIENT)
Dept: GERIATRIC MEDICINE | Facility: CLINIC | Age: 69
End: 2019-06-14

## 2019-06-14 NOTE — LETTER
June 14, 2019    TEVIN LYONS  [address] [address]  [place] [place] [address]      Dear Tevin,    Welcome to [facility] ([facility]) health program. My name is Lovely Medina RN. I am your [facility] care coordinator.     I will call you soon to see how you are doing and determine what needs you may have. My job is to help connect you to services, complete an assessment, and develop a care plan with you. There is no charge to you for the care coordination and assessment services. Our goal is to keep you as healthy and independent as possible.     [facility] combines the benefits you may already receive from Medical Assistance, Medicare and the Prescription Drug Coverage Program.    Soon you will receive a new [facility] member identification (ID) card from [facility]. When you receive it, please use this card where you get your health services.]    If you have questions, please call me at [phone]. If you reach my voice mail, leave a message and your phone number. If you are hearing impaired, please call the [place] Relay at 379 or [phone] (ihpmei-ks-bmsuet relay service).    Sincerely,    Lovely Medina RN    E-mail: [email or web address]  Phone: [phone]      [facility] ([facility]) is a health plan that contracts with both Medicare and the [place] Medical Assistance (Medicaid) program to provide benefits of both programs to enrollees. Enrollment in [facility] depends on contract renewal.    [facility] [place] [id number] DHS Approved ([id number])  [id number] (11/18)

## 2019-06-14 NOTE — PROGRESS NOTES
Member [facility] [place] are [facility] effective 6/1/19. Welcome letter sent.     Luz Marina Moreno  Care Management Specialist  [facility]  [phone]    6/18/19 call to member.  Does not want a visit and no changes.    Lovely Medina RN  [facility]   [phone]

## 2019-07-03 ENCOUNTER — OFFICE VISIT (OUTPATIENT)
Dept: FAMILY MEDICINE | Facility: CLINIC | Age: 69
End: 2019-07-03
Payer: COMMERCIAL

## 2019-07-03 VITALS
SYSTOLIC BLOOD PRESSURE: 119 MMHG | DIASTOLIC BLOOD PRESSURE: 81 MMHG | BODY MASS INDEX: 23.7 KG/M2 | HEART RATE: 68 BPM | TEMPERATURE: 96.8 F | OXYGEN SATURATION: 95 % | WEIGHT: 160 LBS | HEIGHT: 69 IN | RESPIRATION RATE: 16 BRPM

## 2019-07-03 DIAGNOSIS — R31.0 GROSS HEMATURIA: Primary | ICD-10-CM

## 2019-07-03 DIAGNOSIS — F17.200 TOBACCO DEPENDENCE: ICD-10-CM

## 2019-07-03 DIAGNOSIS — I10 ESSENTIAL HYPERTENSION WITH GOAL BLOOD PRESSURE LESS THAN 140/90: ICD-10-CM

## 2019-07-03 DIAGNOSIS — E78.5 HYPERLIPIDEMIA LDL GOAL <70: ICD-10-CM

## 2019-07-03 DIAGNOSIS — J44.9 CHRONIC OBSTRUCTIVE PULMONARY DISEASE, UNSPECIFIED COPD TYPE (H): ICD-10-CM

## 2019-07-03 DIAGNOSIS — F40.10 SOCIAL PHOBIA: ICD-10-CM

## 2019-07-03 DIAGNOSIS — I25.10 CAD, MULTIPLE VESSEL: ICD-10-CM

## 2019-07-03 DIAGNOSIS — I25.119 CORONARY ARTERY DISEASE INVOLVING NATIVE HEART WITH ANGINA PECTORIS, UNSPECIFIED VESSEL OR LESION TYPE (H): ICD-10-CM

## 2019-07-03 LAB
ALBUMIN UR-MCNC: 30 MG/DL
APPEARANCE UR: ABNORMAL
BACTERIA #/AREA URNS HPF: ABNORMAL /HPF
BILIRUB UR QL STRIP: ABNORMAL
COLOR UR AUTO: ABNORMAL
GLUCOSE UR STRIP-MCNC: NEGATIVE MG/DL
HGB UR QL STRIP: ABNORMAL
KETONES UR STRIP-MCNC: ABNORMAL MG/DL
LEUKOCYTE ESTERASE UR QL STRIP: NEGATIVE
NITRATE UR QL: NEGATIVE
PH UR STRIP: 6 PH (ref 5–7)
RBC #/AREA URNS AUTO: >100 /HPF
SOURCE: ABNORMAL
SP GR UR STRIP: >1.03 (ref 1–1.03)
UROBILINOGEN UR STRIP-ACNC: 1 EU/DL (ref 0.2–1)
WBC #/AREA URNS AUTO: ABNORMAL /HPF

## 2019-07-03 PROCEDURE — 99214 OFFICE O/P EST MOD 30 MIN: CPT | Performed by: FAMILY MEDICINE

## 2019-07-03 PROCEDURE — 80061 LIPID PANEL: CPT | Performed by: FAMILY MEDICINE

## 2019-07-03 PROCEDURE — 81001 URINALYSIS AUTO W/SCOPE: CPT | Performed by: FAMILY MEDICINE

## 2019-07-03 PROCEDURE — 36415 COLL VENOUS BLD VENIPUNCTURE: CPT | Performed by: FAMILY MEDICINE

## 2019-07-03 PROCEDURE — 80048 BASIC METABOLIC PNL TOTAL CA: CPT | Performed by: FAMILY MEDICINE

## 2019-07-03 PROCEDURE — 99207 C PAF COMPLETED  NO CHARGE: CPT | Performed by: FAMILY MEDICINE

## 2019-07-03 RX ORDER — PAROXETINE 40 MG/1
40 TABLET, FILM COATED ORAL EVERY MORNING
Qty: 90 TABLET | Refills: 3 | Status: SHIPPED | OUTPATIENT
Start: 2019-07-03 | End: 2020-01-08

## 2019-07-03 RX ORDER — NICOTINE 21 MG/24HR
1 PATCH, TRANSDERMAL 24 HOURS TRANSDERMAL EVERY 24 HOURS
Qty: 30 PATCH | Refills: 1 | Status: SHIPPED | OUTPATIENT
Start: 2019-07-03 | End: 2019-09-03

## 2019-07-03 RX ORDER — ATORVASTATIN CALCIUM 80 MG/1
80 TABLET, FILM COATED ORAL DAILY
Qty: 90 TABLET | Refills: 3 | Status: SHIPPED | OUTPATIENT
Start: 2019-07-03 | End: 2020-01-08

## 2019-07-03 RX ORDER — LISINOPRIL 20 MG/1
20 TABLET ORAL DAILY
Qty: 90 TABLET | Refills: 3 | Status: SHIPPED | OUTPATIENT
Start: 2019-07-03 | End: 2020-01-08

## 2019-07-03 RX ORDER — METOPROLOL SUCCINATE 50 MG/1
50 TABLET, EXTENDED RELEASE ORAL DAILY
Qty: 90 TABLET | Refills: 3 | Status: SHIPPED | OUTPATIENT
Start: 2019-07-03 | End: 2020-01-08

## 2019-07-03 RX ORDER — ALBUTEROL SULFATE 90 UG/1
2 AEROSOL, METERED RESPIRATORY (INHALATION) EVERY 6 HOURS PRN
Qty: 1 INHALER | Refills: 1 | Status: SHIPPED | OUTPATIENT
Start: 2019-07-03 | End: 2021-09-14

## 2019-07-03 ASSESSMENT — ANXIETY QUESTIONNAIRES
5. BEING SO RESTLESS THAT IT IS HARD TO SIT STILL: NOT AT ALL
GAD7 TOTAL SCORE: 0
3. WORRYING TOO MUCH ABOUT DIFFERENT THINGS: NOT AT ALL
7. FEELING AFRAID AS IF SOMETHING AWFUL MIGHT HAPPEN: NOT AT ALL
2. NOT BEING ABLE TO STOP OR CONTROL WORRYING: NOT AT ALL
1. FEELING NERVOUS, ANXIOUS, OR ON EDGE: NOT AT ALL
6. BECOMING EASILY ANNOYED OR IRRITABLE: NOT AT ALL

## 2019-07-03 ASSESSMENT — PATIENT HEALTH QUESTIONNAIRE - PHQ9: 5. POOR APPETITE OR OVEREATING: NOT AT ALL

## 2019-07-03 ASSESSMENT — MIFFLIN-ST. JEOR: SCORE: 1472.17

## 2019-07-03 NOTE — PROGRESS NOTES
Subjective     Tevin Guidry is a 68 year old male who presents to clinic today for the following health issues:    HPI   Hyperlipidemia Follow-Up      Are you having any of the following symptoms? (Select all that apply)  No complaints of shortness of breath, chest pain or pressure.  No increased sweating or nausea with activity.  No left-sided neck or arm pain.  No complaints of pain in calves when walking 1-2 blocks.    Are you regularly taking any medication or supplement to lower your cholesterol?   Yes- atorvastatin    Are you having muscle aches or other side effects that you think could be caused by your cholesterol lowering medication?  No      Hypertension Follow-up      Do you check your blood pressure regularly outside of the clinic? Yes     Are you following a low salt diet? No    Are your blood pressures ever more than 140 on the top number (systolic) OR more   than 90 on the bottom number (diastolic), for example 140/90? No  Anxiety Follow-Up    How are you doing with your anxiety since your last visit? No change    Are you having other symptoms that might be associated with anxiety? No    Have you had a significant life event? No     Are you feeling depressed? No    Do you have any concerns with your use of alcohol or other drugs? No    Social History     Tobacco Use     Smoking status: Former Smoker     Smokeless tobacco: Never Used     Tobacco comment: quit smoking over 20 years ago   Substance Use Topics     Alcohol use: No     Comment: quit 35 years ago     Drug use: Yes     Comment: occionally use of pot     CLIF-7 SCORE 10/27/2016 3/27/2017 10/30/2017   Total Score - - -   Total Score 1 0 0     PHQ 3/27/2017 10/30/2017 12/10/2018   PHQ-9 Total Score 0 0 1   Q9: Thoughts of better off dead/self-harm past 2 weeks Not at all Not at all Not at all         Last week he had two days of red urine. Then it turned pink and has now cleared up. He denies dysuria. Denies flank pain or back pain. Denies  any trauma. No fevers. Denies frequent urination. He says he had noticed a little pink urine in the past or a emilee color at the end of urine occasionally in the past year.     He still smokes and has been unable to quit. He has used nicotine lozenges.          Problem list and histories reviewed & adjusted, as indicated.  Additional history: as documented    Patient Active Problem List   Diagnosis     Social phobia     Hypertension goal BP (blood pressure) < 140/90     Hyperlipidemia LDL goal <130     Tubular adenoma of colon     Advanced directives, counseling/discussion     CAD, multiple vessel     Cigarette nicotine dependence without complication     S/P CABG (coronary artery bypass graft)     Chronic obstructive pulmonary disease, unspecified COPD type (H)     Anemia, unspecified type     Health Care Home     Past Surgical History:   Procedure Laterality Date     COLONOSCOPY N/A 3/30/2017    Procedure: COLONOSCOPY;  Surgeon: Jie Onofre MD;  Location: [facility]     DAVINCI BYPASS ARTERY CORONARY N/A 9/29/2014    Procedure: DAVINCI BYPASS ARTERY CORONARY;  Surgeon: Lam Solis MD;  Location:  OR     ESOPHAGOSCOPY, GASTROSCOPY, DUODENOSCOPY (EGD), COMBINED N/A 3/30/2017    Procedure: COMBINED ESOPHAGOSCOPY, GASTROSCOPY, DUODENOSCOPY (EGD);  Surgeon: Jie Onofre MD;  Location: [facility]     HC COLONOSCOPY THRU STOMA, DIAGNOSTIC  2010    polyps due 2011     SURGICAL HISTORY OF -       right clavicular fracture with ORIF       Social History     Tobacco Use     Smoking status: Former Smoker     Smokeless tobacco: Never Used     Tobacco comment: quit smoking over 20 years ago   Substance Use Topics     Alcohol use: No     Comment: quit 35 years ago     Family History   Problem Relation Age of Onset     Cancer Mother         esog/stomach     Cancer Father         lung     Family History Negative Brother      Hypertension Brother          Current Outpatient Medications    Medication Sig Dispense Refill     albuterol (PROAIR HFA/PROVENTIL HFA/VENTOLIN HFA) 108 (90 Base) MCG/ACT inhaler Inhale 2 puffs into the lungs every 6 hours as needed for shortness of breath / dyspnea or wheezing 1 Inhaler 1     aspirin 81 MG tablet Take 1 tablet (81 mg) by mouth daily 90 tablet 3     atorvastatin (LIPITOR) 80 MG tablet Take 1 tablet (80 mg) by mouth daily 90 tablet 3     ferrous sulfate (IRON) 325 (65 FE) MG tablet Take one pill every other day. 60 tablet 3     lisinopril (PRINIVIL/ZESTRIL) 20 MG tablet Take 1 tablet (20 mg) by mouth daily 90 tablet 3     metoprolol succinate ER (TOPROL-XL) 50 MG 24 hr tablet Take 1 tablet (50 mg) by mouth daily 90 tablet 3     Multiple Vitamin (MULTI-VITAMIN) per tablet Take 1 tablet by mouth daily.         nicotine (NICODERM CQ) 14 MG/24HR 24 hr patch Place 1 patch onto the skin every 24 hours 30 patch 1     nicotine (NICODERM CQ) 21 MG/24HR 24 hr patch Place 1 patch onto the skin every 24 hours 30 patch 1     nicotine (NICODERM CQ) 7 MG/24HR 24 hr patch Place 1 patch onto the skin every 24 hours 30 patch 1     nitroglycerin (NITROSTAT) 0.4 MG sublingual tablet Place 1 tablet (0.4 mg) under the tongue every 5 minutes as needed for chest pain 25 tablet 3     PARoxetine (PAXIL) 40 MG tablet Take 1 tablet (40 mg) by mouth every morning 90 tablet 3     Allergies   Allergen Reactions     Codeine      No Known Allergies      Recent Labs   Lab Test 12/10/18  1231 06/29/18  1005 10/30/17  1121 09/06/17  1004  01/25/17  0909 10/27/16  1028  09/28/14  0709  07/11/12  1503   A1C  --   --   --   --   --   --   --   --  5.9  --  6.0   LDL  --  73 68  --   --   --  75   < >  --    < >  --    HDL  --  50 56  --   --   --  55   < >  --    < >  --    TRIG  --  76 109  --   --   --  98   < >  --    < >  --    ALT 23 29  --  25  --   --   --    < > 16   < > 10   CR 0.84  --   --  0.72   < >  --   --    < > 0.76   < > 0.82   GFRESTIMATED >90  --   --  >90   < >  --   --    < >  ">90  Non  GFR Calc     < > >90   GFRESTBLACK >90  --   --  >90   < >  --   --    < > >90   GFR Calc     < > >90   POTASSIUM 4.7  --   --  4.6   < >  --   --    < > 3.8   < > 3.6   TSH  --   --   --   --   --  0.70  --   --   --   --   --     < > = values in this interval not displayed.      BP Readings from Last 3 Encounters:   07/03/19 119/81   02/06/19 136/74   12/10/18 128/76    Wt Readings from Last 3 Encounters:   07/03/19 72.6 kg (160 lb)   12/10/18 76.4 kg (168 lb 8 oz)   03/13/18 77.8 kg (171 lb 9.6 oz)              Reviewed and updated as needed this visit by clinical staff  Tobacco  Allergies  Meds  Med Hx  Surg Hx  Fam Hx  Soc Hx      Reviewed and updated as needed this visit by Provider         ROS:  As above    OBJECTIVE:     /81 (BP Location: Right arm, Patient Position: Sitting, Cuff Size: Adult Large)   Pulse 68   Temp 96.8  F (36  C) (Tympanic)   Resp 16   Ht 1.746 m (5' 8.75\")   Wt 72.6 kg (160 lb)   SpO2 95%   BMI 23.80 kg/m    Body mass index is 23.8 kg/m .  GENERAL: healthy, alert and no distress    Diagnostic Test Results:  Results for orders placed or performed in visit on 07/03/19 (from the past 24 hour(s))   *UA reflex to Microscopic and Culture ([place] and [facility] (except [place] and [place])   Result Value Ref Range    Color Urine Brown     Appearance Urine Slightly Cloudy     Glucose Urine Negative NEG^Negative mg/dL    Bilirubin Urine Small (A) NEG^Negative    Ketones Urine Trace (A) NEG^Negative mg/dL    Specific Gravity Urine >1.030 1.003 - 1.035    Blood Urine Large (A) NEG^Negative    pH Urine 6.0 5.0 - 7.0 pH    Protein Albumin Urine 30 (A) NEG^Negative mg/dL    Urobilinogen Urine 1.0 0.2 - 1.0 EU/dL    Nitrite Urine Negative NEG^Negative    Leukocyte Esterase Urine Negative NEG^Negative    Source Midstream Urine    Urine Microscopic   Result Value Ref Range    WBC Urine 0 - 5 OTO5^0 - 5 /HPF    RBC Urine >100 (A) OTO2^O " - 2 /HPF    Bacteria Urine Few (A) NEG^Negative /HPF       ASSESSMENT/PLAN:          1. Gross hematuria  unclear etiology with uncertain prognosis, requires further workup    He is a smoker. I have recommended that he schedule with urology.   - *UA reflex to Microscopic and Culture ([place] and [facility] (except [place] and [place])  - UROLOGY ADULT REFERRAL  - Urine Microscopic    2. Hyperlipidemia LDL goal <70  Controlled   - atorvastatin (LIPITOR) 80 MG tablet; Take 1 tablet (80 mg) by mouth daily  Dispense: 90 tablet; Refill: 3  - Lipid panel reflex to direct LDL Fasting    3. Essential hypertension with goal blood pressure less than 140/90  Controlled, continues lisinopril 20mg daily   - lisinopril (PRINIVIL/ZESTRIL) 20 MG tablet; Take 1 tablet (20 mg) by mouth daily  Dispense: 90 tablet; Refill: 3  - Basic metabolic panel    4. CAD, multiple vessel s/p CABG   Stable continues ASA 81mg daily and statin. Has not needed to take his nitroglycerin   - metoprolol succinate ER (TOPROL-XL) 50 MG 24 hr tablet; Take 1 tablet (50 mg) by mouth daily  Dispense: 90 tablet; Refill: 3    5. Social phobia  Stable   - PARoxetine (PAXIL) 40 MG tablet; Take 1 tablet (40 mg) by mouth every morning  Dispense: 90 tablet; Refill: 3    6. Tobacco dependence  He expresses a desire to quit smoking and is interested in nicotine patches   - nicotine (NICODERM CQ) 21 MG/24HR 24 hr patch; Place 1 patch onto the skin every 24 hours  Dispense: 30 patch; Refill: 1  - nicotine (NICODERM CQ) 14 MG/24HR 24 hr patch; Place 1 patch onto the skin every 24 hours  Dispense: 30 patch; Refill: 1  - nicotine (NICODERM CQ) 7 MG/24HR 24 hr patch; Place 1 patch onto the skin every 24 hours  Dispense: 30 patch; Refill: 1    7. Chronic obstructive pulmonary disease, unspecified COPD type (H)   stable.Rare cough or use of albuterol.   - albuterol (PROAIR HFA/PROVENTIL HFA/VENTOLIN HFA) 108 (90 Base) MCG/ACT inhaler; Inhale 2 puffs into the lungs every  6 hours as needed for shortness of breath / dyspnea or wheezing  Dispense: 1 Inhaler; Refill: 1           Patient Instructions   1) Start the nicotine patches (start with the 21mg dose) and do not smoke while you have the patch on.   2) Schedule with Urology for the episodes of blood in your urine.   3) See me in 6 months and earlier as needed.  4) Schedule your colonoscopy        Preethi Quiroz M.D.        [facility]

## 2019-07-03 NOTE — PATIENT INSTRUCTIONS
1) Start the nicotine patches (start with the 21mg dose) and do not smoke while you have the patch on.   2) Schedule with Urology for the episodes of blood in your urine.   3) See me in 6 months and earlier as needed.  4) Schedule your colonoscopy

## 2019-07-04 LAB
ANION GAP SERPL CALCULATED.3IONS-SCNC: 6 MMOL/L (ref 3–14)
BUN SERPL-MCNC: 11 MG/DL (ref 7–30)
CALCIUM SERPL-MCNC: 8.3 MG/DL (ref 8.5–10.1)
CHLORIDE SERPL-SCNC: 109 MMOL/L (ref 94–109)
CHOLEST SERPL-MCNC: 129 MG/DL
CO2 SERPL-SCNC: 29 MMOL/L (ref 20–32)
CREAT SERPL-MCNC: 0.8 MG/DL (ref 0.66–1.25)
GFR SERPL CREATININE-BSD FRML MDRD: >90 ML/MIN/{1.73_M2}
GLUCOSE SERPL-MCNC: 95 MG/DL (ref 70–99)
HDLC SERPL-MCNC: 50 MG/DL
LDLC SERPL CALC-MCNC: 60 MG/DL
NONHDLC SERPL-MCNC: 79 MG/DL
POTASSIUM SERPL-SCNC: 4.8 MMOL/L (ref 3.4–5.3)
SODIUM SERPL-SCNC: 144 MMOL/L (ref 133–144)
TRIGL SERPL-MCNC: 93 MG/DL

## 2019-07-04 ASSESSMENT — ANXIETY QUESTIONNAIRES: GAD7 TOTAL SCORE: 0

## 2019-07-05 ENCOUNTER — PRE VISIT (OUTPATIENT)
Dept: UROLOGY | Facility: CLINIC | Age: 69
End: 2019-07-05

## 2019-07-08 NOTE — RESULT ENCOUNTER NOTE
Please send results letter:  Your basic metabolic panel results (blood salts, blood sugar, and kidney function) are ok and your lipid panel (cholesterol) results are excellent.   Preethi Moreira MD for Dr. Preethi Quiroz

## 2019-07-17 ENCOUNTER — PRE VISIT (OUTPATIENT)
Dept: UROLOGY | Facility: CLINIC | Age: 69
End: 2019-07-17

## 2019-07-17 NOTE — TELEPHONE ENCOUNTER
Chief Complaint : Gross hematuria    Records/Orders: records are in epic     Pt Contacted: no    At Rooming: urine

## 2019-07-30 ENCOUNTER — OFFICE VISIT (OUTPATIENT)
Dept: UROLOGY | Facility: CLINIC | Age: 69
End: 2019-07-30
Attending: FAMILY MEDICINE

## 2019-07-30 VITALS
HEART RATE: 58 BPM | BODY MASS INDEX: 23.7 KG/M2 | HEIGHT: 69 IN | WEIGHT: 160 LBS | DIASTOLIC BLOOD PRESSURE: 84 MMHG | SYSTOLIC BLOOD PRESSURE: 130 MMHG

## 2019-07-30 DIAGNOSIS — R31.0 GROSS HEMATURIA: ICD-10-CM

## 2019-07-30 DIAGNOSIS — R31.0 GROSS HEMATURIA: Primary | ICD-10-CM

## 2019-07-30 LAB
ALBUMIN UR-MCNC: NEGATIVE MG/DL
APPEARANCE UR: CLEAR
BILIRUB UR QL STRIP: NEGATIVE
COLOR UR AUTO: YELLOW
GLUCOSE UR STRIP-MCNC: NEGATIVE MG/DL
HGB UR QL STRIP: ABNORMAL
HYALINE CASTS #/AREA URNS LPF: 3 /LPF (ref 0–2)
KETONES UR STRIP-MCNC: NEGATIVE MG/DL
LEUKOCYTE ESTERASE UR QL STRIP: NEGATIVE
NITRATE UR QL: NEGATIVE
PH UR STRIP: 6 PH (ref 5–7)
PSA SERPL-MCNC: 0.59 UG/L (ref 0–4)
RBC #/AREA URNS AUTO: 25 /HPF (ref 0–2)
SOURCE: ABNORMAL
SP GR UR STRIP: 1.01 (ref 1–1.03)
UROBILINOGEN UR STRIP-MCNC: 0 MG/DL (ref 0–2)
WBC #/AREA URNS AUTO: 1 /HPF (ref 0–5)

## 2019-07-30 ASSESSMENT — PATIENT HEALTH QUESTIONNAIRE - PHQ9
SUM OF ALL RESPONSES TO PHQ QUESTIONS 1-9: 2
10. IF YOU CHECKED OFF ANY PROBLEMS, HOW DIFFICULT HAVE THESE PROBLEMS MADE IT FOR YOU TO DO YOUR WORK, TAKE CARE OF THINGS AT HOME, OR GET ALONG WITH OTHER PEOPLE: NOT DIFFICULT AT ALL
SUM OF ALL RESPONSES TO PHQ QUESTIONS 1-9: 2

## 2019-07-30 ASSESSMENT — PAIN SCALES - GENERAL: PAINLEVEL: NO PAIN (0)

## 2019-07-30 ASSESSMENT — MIFFLIN-ST. JEOR: SCORE: 1476.14

## 2019-07-30 NOTE — PROGRESS NOTES
"CC: gross hematuria.    HPI: It is a pleasure to see . Tevin Guidry, a very pleasant 68 year old male seen today in the urology clinic in consultation from Dr. Quiroz for evaluation of gross hematuria. He reports intermittent \"pink or rust\" colored urine off and on over the past 1-2 years. However, about a month ago, he had 2 days of bright red urine. PCP checked a UA on 7/3/19 which showed >100 RBC but was not suggestive for infection. He is referred to urology for ongoing evaluation and management. About 2 weeks ago, he had another episode of painless bright red blood in the urine accompanied by passage of a few small clots. Immediately following this he noticed some urinary incontinence, but the hematuria and incontinence have since resolved.    He reports intermittent urgency and rare urge incontinence at times, mainly when he drinks too much coffee or water. He otherwise denies any dysuria, frequency, hesitancy, intermittency, feelings of incomplete emptying, flank pain, abdominal pain, or any recent history of urinary tract infections or stones.    Reports that he recently quit smoking cigarettes. Wears a nicotine patch.     Denies family history of prostate, bladder, or kidney cancer.    Review of Diagnostics:  7/3/19 - UA: small bilirubin, trace ketones, large blood, 30 protein, >100 RBC, few bacteria    Hematuria Risk Factors:  Age >40: yes  Smoking history: yes  Occupational exposure to chemicals or dyes (ie, benzenes, aromatic amines): no  History of urologic disorder or disease: no  History of irritative voiding symptoms: intermittent urgency  History of urinary tract infection: no  Analgesic abuse: no  History of pelvic irradiation: no    Past Medical History:   Diagnosis Date     Colon polyps     multiple may need colectomy (adenoma)     Hyperlipidemia LDL goal <130      Hypertension goal BP (blood pressure) < 140/90      Social phobia      Past Surgical History:   Procedure Laterality Date     " COLONOSCOPY N/A 3/30/2017    Procedure: COLONOSCOPY;  Surgeon: Jie Onofre MD;  Location: [facility]     DAVINCI BYPASS ARTERY CORONARY N/A 9/29/2014    Procedure: DAVINCI BYPASS ARTERY CORONARY;  Surgeon: Lam Solis MD;  Location: [facility]     ESOPHAGOSCOPY, GASTROSCOPY, DUODENOSCOPY (EGD), COMBINED N/A 3/30/2017    Procedure: COMBINED ESOPHAGOSCOPY, GASTROSCOPY, DUODENOSCOPY (EGD);  Surgeon: Jie Onofre MD;  Location: [facility]     HC COLONOSCOPY THRU STOMA, DIAGNOSTIC  2010    polyps due 2011     SURGICAL HISTORY OF -       right clavicular fracture with ORIF     Current Outpatient Medications   Medication Sig Dispense Refill     albuterol (PROAIR HFA/PROVENTIL HFA/VENTOLIN HFA) 108 (90 Base) MCG/ACT inhaler Inhale 2 puffs into the lungs every 6 hours as needed for shortness of breath / dyspnea or wheezing 1 Inhaler 1     aspirin 81 MG tablet Take 1 tablet (81 mg) by mouth daily 90 tablet 3     atorvastatin (LIPITOR) 80 MG tablet Take 1 tablet (80 mg) by mouth daily 90 tablet 3     ferrous sulfate (IRON) 325 (65 FE) MG tablet Take one pill every other day. 60 tablet 3     lisinopril (PRINIVIL/ZESTRIL) 20 MG tablet Take 1 tablet (20 mg) by mouth daily 90 tablet 3     metoprolol succinate ER (TOPROL-XL) 50 MG 24 hr tablet Take 1 tablet (50 mg) by mouth daily 90 tablet 3     Multiple Vitamin (MULTI-VITAMIN) per tablet Take 1 tablet by mouth daily.         nicotine (NICODERM CQ) 14 MG/24HR 24 hr patch Place 1 patch onto the skin every 24 hours 30 patch 1     nicotine (NICODERM CQ) 21 MG/24HR 24 hr patch Place 1 patch onto the skin every 24 hours 30 patch 1     nicotine (NICODERM CQ) 7 MG/24HR 24 hr patch Place 1 patch onto the skin every 24 hours 30 patch 1     nitroglycerin (NITROSTAT) 0.4 MG sublingual tablet Place 1 tablet (0.4 mg) under the tongue every 5 minutes as needed for chest pain 25 tablet 3     PARoxetine (PAXIL) 40 MG tablet Take 1 tablet (40 mg) by mouth every  "morning 90 tablet 3     Allergies   Allergen Reactions     Codeine      No Known Allergies      FAMILY HISTORY: There is no reported history of genitourinary carcinoma.  There is no history of urolithiasis.      SOCIAL HISTORY: The patient recently quit smoking cigarettes, no EtOH and intermittent marijuana use.    ROS: A comprehensive 14 point ROS was obtained and was negative except for that outlined above in the HPI.    PHYSICAL EXAM:   Vitals:    07/30/19 1351   BP: 130/84   Pulse: 58   Weight: 72.6 kg (160 lb)   Height: 1.753 m (5' 9\")     GENERAL: Well groomed, well developed, well nourished male in NAD.  HEENT: AT, NC, EOMI bilaterally.  SKIN: Warm to touch, dry.  No visible rashes or lesions on examined areas.  RESP: No increased respiratory effort.  MS: Full ROM in extremities.  : Deferred for now.  BRIDGER: Deferred for now.  NEURO: Alert and oriented x 3.  PSYCH: Normal mood and affect, pleasant and agreeable during interview and exam.    LABS:   PSA   Date Value Ref Range Status   10/27/2016 0.41 0 - 4 ug/L Final     Comment:     Assay Method:  Chemiluminescence using Siemens Vista analyzer       Creatinine   Date Value Ref Range Status   07/03/2019 0.80 0.66 - 1.25 mg/dL Final       IMAGING: none    ASSESSMENT and PLAN:    Mr. Tevin Guidry is a 68 year old male with painless gross hematuria.  The differential diagnoses at this point include stone disease, infection, BPH, prostatitis, urothelial malignancy, renal disorder versus another yet unknown diagnosis.    Recommend proceeding with comprehensive hematuria evaluation to include:  - Urinalysis and urine culture to rule out an acute urinary tract infection.   - Urine cytology to look for cells concerning for malignancy.  - PSA today for up to date prostate cancer screening.  - CT urogram for upper tract imaging.  - Cystoscopy with the first available urologist to evaluate the interior of the bladder.     Follow up for hematuria as recommended by " urologist performing cystoscopic evaluation.    Thank you for allowing me to participate in Mr. Guidry's care.      About 20 minutes were spent with the patient today, > 50% in counseling and coordination of care.    Roxanna Stroud PA-C  Department of Urology

## 2019-07-30 NOTE — LETTER
"7/30/2019       RE: Tevin Guidry  [address] [id number]  [facility] [address]     Dear Colleague,    Thank you for referring your patient, Tevin Guidry, to the [facility] UROLOGY AND [facility] PROST[facility] AND UROLOGIC [facility]CERS at [facility]. Please see a copy of my visit note below.    CC: gross hematuria.    HPI: It is a pleasure to see . Tevin Guidry, a very pleasant 68 year old male seen today in the urology clinic in consultation from Dr. Quiroz for evaluation of gross hematuria. He reports intermittent \"pink or rust\" colored urine off and on over the past 1-2 years. However, about a month ago, he had 2 days of bright red urine. PCP checked a UA on 7/3/19 which showed >100 RBC but was not suggestive for infection. He is referred to urology for ongoing evaluation and management. About 2 weeks ago, he had another episode of painless bright red blood in the urine accompanied by passage of a few small clots. Immediately following this he noticed some urinary incontinence, but the hematuria and incontinence have since resolved.    He reports intermittent urgency and rare urge incontinence at times, mainly when he drinks too much coffee or water. He otherwise denies any dysuria, frequency, hesitancy, intermittency, feelings of incomplete emptying, flank pain, abdominal pain, or any recent history of urinary tract infections or stones.    Reports that he recently quit smoking cigarettes. Wears a nicotine patch.     Denies family history of prostate, bladder, or kidney cancer.    Review of Diagnostics:  7/3/19 - UA: small bilirubin, trace ketones, large blood, 30 protein, >100 RBC, few bacteria    Hematuria Risk Factors:  Age >40: yes  Smoking history: yes  Occupational exposure to chemicals or dyes (ie, benzenes, aromatic amines): no  History of urologic disorder or disease: no  History of irritative voiding symptoms: intermittent urgency  History of urinary tract infection: no  Analgesic " abuse: no  History of pelvic irradiation: no    Past Medical History:   Diagnosis Date     Colon polyps     multiple may need colectomy (adenoma)     Hyperlipidemia LDL goal <130      Hypertension goal BP (blood pressure) < 140/90      Social phobia      Past Surgical History:   Procedure Laterality Date     COLONOSCOPY N/A 3/30/2017    Procedure: COLONOSCOPY;  Surgeon: Jie Onofre MD;  Location: [facility]     DAVINCI BYPASS ARTERY CORONARY N/A 9/29/2014    Procedure: DAVINCI BYPASS ARTERY CORONARY;  Surgeon: Lam Solis MD;  [facility]: [facility]     ESOPHAGOSCOPY, GASTROSCOPY, DUODENOSCOPY (EGD), COMBINED N/A 3/30/2017    Procedure: COMBINED ESOPHAGOSCOPY, GASTROSCOPY, DUODENOSCOPY (EGD);  Surgeon: Jie Onofre MD;  Location: [facility]     HC COLONOSCOPY THRU STOMA, DIAGNOSTIC  2010    polyps due 2011     SURGICAL HISTORY OF -       right clavicular fracture with ORIF     Current Outpatient Medications   Medication Sig Dispense Refill     albuterol (PROAIR HFA/PROVENTIL HFA/VENTOLIN HFA) 108 (90 Base) MCG/ACT inhaler Inhale 2 puffs into the lungs every 6 hours as needed for shortness of breath / dyspnea or wheezing 1 Inhaler 1     aspirin 81 MG tablet Take 1 tablet (81 mg) by mouth daily 90 tablet 3     atorvastatin (LIPITOR) 80 MG tablet Take 1 tablet (80 mg) by mouth daily 90 tablet 3     ferrous sulfate (IRON) 325 (65 FE) MG tablet Take one pill every other day. 60 tablet 3     lisinopril (PRINIVIL/ZESTRIL) 20 MG tablet Take 1 tablet (20 mg) by mouth daily 90 tablet 3     metoprolol succinate ER (TOPROL-XL) 50 MG 24 hr tablet Take 1 tablet (50 mg) by mouth daily 90 tablet 3     Multiple Vitamin (MULTI-VITAMIN) per tablet Take 1 tablet by mouth daily.         nicotine (NICODERM CQ) 14 MG/24HR 24 hr patch Place 1 patch onto the skin every 24 hours 30 patch 1     nicotine (NICODERM CQ) 21 MG/24HR 24 hr patch Place 1 patch onto the skin every 24 hours 30 patch 1     nicotine  "(NICODERM CQ) 7 MG/24HR 24 hr patch Place 1 patch onto the skin every 24 hours 30 patch 1     nitroglycerin (NITROSTAT) 0.4 MG sublingual tablet Place 1 tablet (0.4 mg) under the tongue every 5 minutes as needed for chest pain 25 tablet 3     PARoxetine (PAXIL) 40 MG tablet Take 1 tablet (40 mg) by mouth every morning 90 tablet 3     Allergies   Allergen Reactions     Codeine      No Known Allergies      FAMILY HISTORY: There is no reported history of genitourinary carcinoma.  There is no history of urolithiasis.      SOCIAL HISTORY: The patient recently quit smoking cigarettes, no EtOH and intermittent marijuana use.    ROS: A comprehensive 14 point ROS was obtained and was negative except for that outlined above in the HPI.    PHYSICAL EXAM:   Vitals:    07/30/19 1351   BP: 130/84   Pulse: 58   Weight: 72.6 kg (160 lb)   Height: 1.753 m (5' 9\")     GENERAL: Well groomed, well developed, well nourished male in NAD.  HEENT: AT, NC, EOMI bilaterally.  SKIN: Warm to touch, dry.  No visible rashes or lesions on examined areas.  RESP: No increased respiratory effort.  MS: Full ROM in extremities.  : Deferred for now.  BRIDGER: Deferred for now.  NEURO: Alert and oriented x 3.  PSYCH: Normal mood and affect, pleasant and agreeable during interview and exam.    LABS:   PSA   Date Value Ref Range Status   10/27/2016 0.41 0 - 4 ug/L Final     Comment:     Assay Method:  Chemiluminescence using Siemens Vista analyzer       Creatinine   Date Value Ref Range Status   07/03/2019 0.80 0.66 - 1.25 mg/dL Final       IMAGING: none    ASSESSMENT and PLAN:    Mr. Tevin Guidry is a 68 year old male with painless gross hematuria.  The differential diagnoses at this point include stone disease, infection, BPH, prostatitis, urothelial malignancy, renal disorder versus another yet unknown diagnosis.    Recommend proceeding with comprehensive hematuria evaluation to include:  - Urinalysis and urine culture to rule out an acute urinary " tract infection.   - Urine cytology to look for cells concerning for malignancy.  - PSA today for up to date prostate cancer screening.  - CT urogram for upper tract imaging.  - Cystoscopy with the first available urologist to evaluate the interior of the bladder.     Follow up for hematuria as recommended by urologist performing cystoscopic evaluation.    Thank you for allowing me to participate in Mr. Guidry's care.      About 20 minutes were spent with the patient today, > 50% in counseling and coordination of care.    Roxanna Stroud PA-C  Department of Urology

## 2019-07-30 NOTE — LETTER
Tevin Lyons    [address] [address]  [facility] [address]        Dear Tevin     I am writing you concerning your recent test results:    Your urine analysis continues to show blood, your urine culture is negative (no infection), and urine cytology is negative (no cancer cells detected).     Please follow up for the CT scan and cystoscopy as scheduled.    Results for orders placed or performed in visit on 07/30/19   Routine UA with microscopic - No culture   Result Value Ref Range    Color Urine Yellow     Appearance Urine Clear     Glucose Urine Negative NEG^Negative mg/dL    Bilirubin Urine Negative NEG^Negative    Ketones Urine Negative NEG^Negative mg/dL    Specific Gravity Urine 1.011 1.003 - 1.035    Blood Urine Moderate (A) NEG^Negative    pH Urine 6.0 5.0 - 7.0 pH    Protein Albumin Urine Negative NEG^Negative mg/dL    Urobilinogen mg/dL 0.0 0.0 - 2.0 mg/dL    Nitrite Urine Negative NEG^Negative    Leukocyte Esterase Urine Negative NEG^Negative    Source Midstream Urine     WBC Urine 1 0 - 5 /HPF    RBC Urine 25 (H) 0 - 2 /HPF    Hyaline Casts 3 (H) 0 - 2 /LPF   Cytology non gyn   Result Value Ref Range    Copath Report       Patient Name: TEVIN LYONS  MR#: [id number]  Specimen #: [id number]  Collected: 7/30/2019  Received: 7/31/2019  Reported: 8/1/2019 16:01  Ordering Phy(s): JEANNA HAMMOND  Additional Phy(s): JEANNINE MCFARLAND    For improved result formatting, select 'View Enhanced Report Format' under   Linked Documents section.    SPECIMEN/STAIN PROCESS:  Urine, voided       Pap-Cyto x 1    ----------------------------------------------------------------    CYTOLOGIC INTERPRETATION:    Urine, voided:  - Negative for High-Grade Urothelial Carcinoma  Specimen Adequacy: Satisfactory for evaluation.    I have personally reviewed all specimens and/or slides, including the   listed special stains, and used them  with my medical judgement to determine or confirm the final diagnosis.    Electronically signed  out by:  Liliane Ta M.D., [facility]    CLINICAL HISTORY:  Gross hematuria.    ,    GROSS:  Urine, voided:  Received 40 ml of yellow, clear fluid, processed as 1 Pap   stained Autocyte..    MI CROSCOPIC:  Microscopic examination is performed.    MD Liliane Reyes MD    CPT Codes:  A: 19289-[id number]    COLLECTION SITE:  Client:  [facility]  Location:  UROP (B)    The technical component of this testing was completed at the [facility], with the professional component performed   at the [facility], [address] [place] [phone] ([phone])    Resident  [email or web address]     Urine Culture Aerobic Bacterial   Result Value Ref Range    Specimen Description Midstream Urine     Special Requests Specimen received in preservative     Culture Micro No growth      Resulted Orders   Routine UA with microscopic - No culture   Result Value Ref Range    Color Urine Yellow     Appearance Urine Clear     Glucose Urine Negative NEG^Negative mg/dL    Bilirubin Urine Negative NEG^Negative    Ketones Urine Negative NEG^Negative mg/dL    Specific Gravity Urine 1.011 1.003 - 1.035    Blood Urine Moderate (A) NEG^Negative    pH Urine 6.0 5.0 - 7.0 pH    Protein Albumin Urine Negative NEG^Negative mg/dL    Urobilinogen mg/dL 0.0 0.0 - 2.0 mg/dL    Nitrite Urine Negative NEG^Negative    Leukocyte Esterase Urine Negative NEG^Negative    Source Midstream Urine     WBC Urine 1 0 - 5 /HPF    RBC Urine 25 (H) 0 - 2 /HPF    Hyaline Casts 3 (H) 0 - 2 /LPF   Urine Culture Aerobic Bacterial   Result Value Ref Range    Specimen Description Midstream Urine     Special Requests Specimen received in preservative     Culture Micro No growth    Cytology non gyn   Result Value Ref Range    Copath Report       Patient Name: DICK LYONS  MR#:  [id number]  Specimen #: [id number]  Collected: 7/30/2019  Received: 7/31/2019  Reported: 8/1/2019 16:01  Ordering Phy(s): JEANNA STROUD  Additional Phy(s): JEANNINE MCFARLAND    For improved result formatting, select 'View Enhanced Report Format' under   Linked Documents section.    SPECIMEN/STAIN PROCESS:  Urine, voided       Pap-Cyto x 1    ----------------------------------------------------------------    CYTOLOGIC INTERPRETATION:    Urine, voided:  - Negative for High-Grade Urothelial Carcinoma  Specimen Adequacy: Satisfactory for evaluation.    I have personally reviewed all specimens and/or slides, including the   listed special stains, and used them  with my medical judgement to determine or confirm the final diagnosis.    Electronically signed out by:  Liliane Ta M.D., [facility]    CLINICAL HISTORY:  Gross hematuria.    ,    GROSS:  Urine, voided:  Received 40 ml of yellow, clear fluid, processed as 1 Pap   stained Autocyte..    MI CROSCOPIC:  Microscopic examination is performed.    MD Liliane Reyes MD    CPT Codes:  A: 84119-QIQRRZP    COLLECTION SITE:  Client:  [facility]  Location:  UCUROP (B)    The technical component of this testing was completed at the [facility], with the professional component performed   at the [facility], [address] [place] [address] ([phone])    Resident  [email or web address]           I would be happy to see you back in clinic to discuss results in depth.  Please let me know if you have any questions.      Sincerely,      Jeanna Stroud PA-C        [facility] UROLOGY AND [facility]  [address] [address]  Phone: [phone]  Fax: [phone]

## 2019-07-30 NOTE — PATIENT INSTRUCTIONS
UROLOGY CLINIC VISIT PATIENT INSTRUCTIONS    1) Go to the lab today for a PSA blood test.    2) Schedule a CT urogram scan next available.    3) Schedule cystoscopy with next available urologist.    CYSTOSCOPY    What is a Cystoscopy?  This is a procedure done to check for problems inside the bladder.  Problems may include polyps (growths), tumors, inflammation (swelling and redness) and other concerns.    The doctor inserts a thin tube (called a cystoscope) into the bladder.  The tube is about the size of a pencil.  We will give you numbing medicine to reduce the pain or discomfort you may feel.    The tube allows the doctor to:  The doctor will be able to see inside the bladder by filling the bladder with water.  The water makes it easier to see any problems that may be present.    If needed, the doctor may use the tube to:  The doctor is able to take tissue samples (biopsies).  Samples are sent to the lab for testing.  The doctor can also burn off any small growths or tumors that are found.  This is call fulguration.    How should I get ready for the exam?  To prepare, stop taking any medications as instructed. Ask whether you should avoid eating or drinking anything after midnight before the procedure. Follow any other instructions your doctor gives you.    If you are having this procedure done at the clinic, you will be there for up to an hour.  You will receive care before and after the procedure.    Please tell your doctor if:  - You have a history of urinary tract infections.  - You know that you have a tumor in your bladder.  - You have bleeding problems.  - You have any allergies.  - You are or may be pregnant.      What happens after the exam?  You may go back to your normal diet and activity as you feel ready, unless your doctor tells you not to.    For the next two days, you may notice:  - Some blood in your urine.  - Some burning when you urinate (use the toilet).  - An urge to urinate more  often.  - Bladder spasms.    These are normal after the procedure. They should go away on their own after a day or two.      - You can help to relieve the above listed symptoms by:  - Drinking 6 to 8 large glasses of water each day (includes drinks at meals).  This will help clear the urine.  - Take warm baths to relieve pain and bladder spasms.  Do not add anything to the bath water.  - Your doctor may prescribe pain medicine.  You may also take Tylenol (acetaminophen) for pain.    When should I call my doctor?  - A fever over 100.0 F (38 C) for more than a day.  (Before you call the doctor, check your temperature under your tongue.)  - Chills.  - Failure to urinate: No urine comes out when you try to use the toilet.  (Try soaking in a bathtub full of warm water.  If still no urine, call your doctor.)  - A lot of blood in the urine or blood clots larger than a nickel.  - Pain in the back or abdomen (belly / stomach area).  - Pain or spasms that are not relieved by warm tub baths and pain medicine.  - Severe pain, burning or other problems while passing urine.  - Pain that gets worse after two days.      If you have any issues, questions or concerns in the meantime, do not hesitate to contact us at [phone] or via [email or web address].     It was a pleasure meeting with you today.  Thank you for allowing me and my team the privilege of caring for you today.  YOU are the reason we are here, and I truly hope we provided you with the excellent service you deserve.  Please let us know if there is anything else we can do for you so that we can be sure you are leaving completely satisfied with your care experience.

## 2019-07-31 LAB
BACTERIA SPEC CULT: NO GROWTH
Lab: NORMAL
SPECIMEN SOURCE: NORMAL

## 2019-08-01 LAB — COPATH REPORT: NORMAL

## 2019-08-21 ENCOUNTER — PRE VISIT (OUTPATIENT)
Dept: UROLOGY | Facility: CLINIC | Age: 69
End: 2019-08-21

## 2019-08-21 NOTE — TELEPHONE ENCOUNTER
Reason for Visit: Cystoscopy    Diagnosis: Gross hematuria    Orders/Procedures/Records: Records available, CT (9/3) and PSA (7/30)    Contact Patient: N/A    Rooming Requirements: Cystoscopy with urine      Sarah Murray  08/21/19  8:02 AM

## 2019-09-03 ENCOUNTER — ANCILLARY PROCEDURE (OUTPATIENT)
Dept: CT IMAGING | Facility: CLINIC | Age: 69
End: 2019-09-03
Attending: PHYSICIAN ASSISTANT

## 2019-09-03 ENCOUNTER — OFFICE VISIT (OUTPATIENT)
Dept: SURGERY | Facility: CLINIC | Age: 69
End: 2019-09-03

## 2019-09-03 ENCOUNTER — PRE VISIT (OUTPATIENT)
Dept: SURGERY | Facility: CLINIC | Age: 69
End: 2019-09-03

## 2019-09-03 ENCOUNTER — OFFICE VISIT (OUTPATIENT)
Dept: UROLOGY | Facility: CLINIC | Age: 69
End: 2019-09-03

## 2019-09-03 ENCOUNTER — ANESTHESIA EVENT (OUTPATIENT)
Dept: SURGERY | Facility: AMBULATORY SURGERY CENTER | Age: 69
End: 2019-09-03

## 2019-09-03 VITALS
OXYGEN SATURATION: 97 % | DIASTOLIC BLOOD PRESSURE: 84 MMHG | WEIGHT: 166.5 LBS | BODY MASS INDEX: 24.66 KG/M2 | TEMPERATURE: 98.2 F | HEIGHT: 69 IN | RESPIRATION RATE: 19 BRPM | HEART RATE: 51 BPM | SYSTOLIC BLOOD PRESSURE: 145 MMHG

## 2019-09-03 VITALS
WEIGHT: 160 LBS | HEIGHT: 69 IN | DIASTOLIC BLOOD PRESSURE: 86 MMHG | BODY MASS INDEX: 23.7 KG/M2 | SYSTOLIC BLOOD PRESSURE: 155 MMHG | HEART RATE: 65 BPM

## 2019-09-03 DIAGNOSIS — Z85.51 PERSONAL HISTORY OF MALIGNANT NEOPLASM OF BLADDER: Primary | ICD-10-CM

## 2019-09-03 DIAGNOSIS — R31.0 GROSS HEMATURIA: ICD-10-CM

## 2019-09-03 DIAGNOSIS — Z01.818 PREOP EXAMINATION: Primary | ICD-10-CM

## 2019-09-03 LAB
CREAT BLD-MCNC: 0.8 MG/DL (ref 0.66–1.25)
GFR SERPL CREATININE-BSD FRML MDRD: >90 ML/MIN/{1.73_M2}

## 2019-09-03 RX ORDER — IOPAMIDOL 755 MG/ML
99 INJECTION, SOLUTION INTRAVASCULAR ONCE
Status: COMPLETED | OUTPATIENT
Start: 2019-09-03 | End: 2019-09-03

## 2019-09-03 RX ADMIN — IOPAMIDOL 99 ML: 755 INJECTION, SOLUTION INTRAVASCULAR at 10:09

## 2019-09-03 ASSESSMENT — PAIN SCALES - GENERAL
PAINLEVEL: NO PAIN (0)
PAINLEVEL: NO PAIN (0)

## 2019-09-03 ASSESSMENT — COPD QUESTIONNAIRES
COPD: 1
CAT_SEVERITY: MILD

## 2019-09-03 ASSESSMENT — MIFFLIN-ST. JEOR
SCORE: 1471.14
SCORE: 1500.62

## 2019-09-03 ASSESSMENT — LIFESTYLE VARIABLES: TOBACCO_USE: 1

## 2019-09-03 NOTE — TELEPHONE ENCOUNTER
FUTURE VISIT INFORMATION      SURGERY INFORMATION:    Date: 9/5/19 transurethral resection of bladder tumor    Location: [facility] OR     Surgeon:  Almas Sunshine MD     Anesthesia Type:  General    RECORDS REQUESTED FROM:       Primary Care Provider: Dr Preethi Quiroz @ [facility]     Pertinent Medical History: COPD, tubular adenoma of colon, coronary artery disease, anemia     Most recent EKG+ Tracing: 3/27/17 EKG with tracing in [facility]    Most recent ECHO: 9/26/14 ECHO in [facility]    Most recent Cardiac Stress Test: 9/30/14 Cardia Cath in [facility]    Most recent Coronary Angiogram: Coronary angiogram with PCI (9/29/2014) in [facility]    Most recent PFT's: 12/8/15 PFT in [facility]    Most recent Sleep Study: N/A

## 2019-09-03 NOTE — NURSING NOTE
"Chief Complaint   Patient presents with     Cystoscopy     Gross hematuria       Blood pressure (!) 155/86, pulse 65, height 1.753 m (5' 9\"), weight 72.6 kg (160 lb). Body mass index is 23.63 kg/m .    Patient Active Problem List   Diagnosis     Social phobia     Hypertension goal BP (blood pressure) < 140/90     Hyperlipidemia LDL goal <130     Tubular adenoma of colon     Advanced directives, counseling/discussion     Coronary artery disease involving native heart with angina pectoris, unspecified vessel or lesion type (H)     Cigarette nicotine dependence without complication     S/P CABG (coronary artery bypass graft)     Chronic obstructive pulmonary disease, unspecified COPD type (H)     Anemia, unspecified type     Health Care Home       Allergies   Allergen Reactions     Codeine      No Known Allergies        Current Outpatient Medications   Medication Sig Dispense Refill     albuterol (PROAIR HFA/PROVENTIL HFA/VENTOLIN HFA) 108 (90 Base) MCG/ACT inhaler Inhale 2 puffs into the lungs every 6 hours as needed for shortness of breath / dyspnea or wheezing 1 Inhaler 1     aspirin 81 MG tablet Take 1 tablet (81 mg) by mouth daily 90 tablet 3     atorvastatin (LIPITOR) 80 MG tablet Take 1 tablet (80 mg) by mouth daily 90 tablet 3     ferrous sulfate (IRON) 325 (65 FE) MG tablet Take one pill every other day. 60 tablet 3     lisinopril (PRINIVIL/ZESTRIL) 20 MG tablet Take 1 tablet (20 mg) by mouth daily 90 tablet 3     metoprolol succinate ER (TOPROL-XL) 50 MG 24 hr tablet Take 1 tablet (50 mg) by mouth daily 90 tablet 3     Multiple Vitamin (MULTI-VITAMIN) per tablet Take 1 tablet by mouth daily.         nicotine (NICODERM CQ) 14 MG/24HR 24 hr patch Place 1 patch onto the skin every 24 hours 30 patch 1     nicotine (NICODERM CQ) 21 MG/24HR 24 hr patch Place 1 patch onto the skin every 24 hours 30 patch 1     nicotine (NICODERM CQ) 7 MG/24HR 24 hr patch Place 1 patch onto the skin every 24 hours 30 patch 1     " nitroglycerin (NITROSTAT) 0.4 MG sublingual tablet Place 1 tablet (0.4 mg) under the tongue every 5 minutes as needed for chest pain 25 tablet 3     PARoxetine (PAXIL) 40 MG tablet Take 1 tablet (40 mg) by mouth every morning 90 tablet 3       Social History     Tobacco Use     Smoking status: Former Smoker     Smokeless tobacco: Never Used     Tobacco comment: quit smoking over 20 years ago   Substance Use Topics     Alcohol use: No     Comment: quit 35 years ago     Drug use: Yes     Comment: occionally use of pot       Invasive Procedure Safety Checklist:    Procedure: Cystoscopy    Action: Complete sections and checkboxes as appropriate.    Pre-procedure:  1. Patient ID Verified with 2 identifiers (Mary and  or MRN) : YES    2. Procedure and site verified with patient/designee (when able) : YES    3. Accurate consent documentation in medical record : YES    4. H&P (or appropriate assessment) documented in medical record : N/A  H&P must be up to 30 days prior to procedure an updated within 24 hours of                 Procedure as applicable.     5. Relevant diagnostic and radiology test results appropriately labeled and displayed as applicable : YES    6. Blood products, implants, devices, and/or special equipment available for the procedure as applicable : YES    7. Procedure site(s) marked with provider initials [Exclusions: none] : NO    8. Marking not required. Reason : Yes  Procedure does not require site marking    Time Out:     Time-Out performed immediately prior to starting procedure, including verbal and active participation of all team members addressing: YES    1. Correct patient identity.  2. Confirmed that the correct side and site are marked.  3. An accurate procedure to be done.  4. Agreement on the procedure to be done.  5. Correct patient position.  6. Relevant images and results are properly labeled and appropriately displayed.  7. The need to administer antibiotics or fluids for irrigation  purposes during the procedure as applicable.  8. Safety precautions based on patient history or medication use.    During Procedure: Verification of correct person, site, and procedure occurs any time the responsibility for care of the patient is transferred to another member of the care team.    The following medication was given:     MEDICATION:  Lidocaine without epinephrine 2% jelly  ROUTE: Urethral  SITE: Urethra via the meatus  DOSE: 10 mL  LOT #: [id number]  : [employer]  EXPIRATION DATE: 4-2021  NDC#: [address]-92   Was there drug waste? No    Prior to med admin, verified patient identity using patient's name and date of birth.  Due to med administration, patient instructed to remain in clinic for 15 minutes  afterwards, and to report any adverse reaction to me immediately.    Drug Amount Wasted:  None.  Vial/Syringe: Syringe      Sarah Murray  9/3/2019  1:41 PM

## 2019-09-03 NOTE — PROGRESS NOTES
Urology Clinic    Almas Sunshine MD  Date of Service: 2019     Name: Tevin Guidry  MRN: [id number]  Age: 69 year old  : 1950  Referring provider: Roxanna Stroud     Assessment and Plan:  Assessment:  Tevin Guidry is a 69 year old male with gross hematuria. Cystoscopy today shows a bladder tumor.    Plan:  He will follow up in the OR for TURBT. We discussed risks of bladder injury, bleeding, and infection. He may require a catheter for 1-2 days postoperatively.     Urine was sent for culture today.   ______________________________________________________________________    HPI  Tevin Guidry is a 69 year old male who presents for cystoscopy for evaluation of gross hematuria. He previously saw Roxanna Stroud and noted a 1-2 year history of intermittent pink/rust colored urine and 2 days of bright red blood in 2019 as well as another episode of bright red blood with small clots and brief incontinence in mid-July. He also reported intermittent urgency and rare urge incontinence at times, mainly when he drinks too much coffee or water. He otherwise denied any dysuria, frequency, hesitancy, intermittency, feelings of incomplete emptying, flank pain, abdominal pain, or any recent history of urinary tract infections or stones.     He recently quit smoking cigarettes. Wears a nicotine patch.      Denies family history of prostate, bladder, or kidney cancer.    Hematuria Risk Factors:  Age >40: yes  Smoking history: yes  Occupational exposure to chemicals or dyes (ie, benzenes, aromatic amines): no  History of urologic disorder or disease: no  History of irritative voiding symptoms: intermittent urgency  History of urinary tract infection: no  Analgesic abuse: no  History of pelvic irradiation: no     Today he denies any recent bleeding, chills, chills, or dysuria. He does have occasional urinary incontinence.     Review of Systems:   Pertinent items are noted in HPI or as below, remainder  of complete ROS is negative.      Cystoscopy:   After obtaining informed consent, the patient was prepped and draped in the standard sterile fashion.  The 15 Czech flexible cystoscope was inserted through the urethral meatus.      The anterior urethra was: normal without stricture.    The external sphincter was appropriately coapted.   The prostatic urethra demonstrated mild bilobar hypertrophy.    The bladder neck was nonocclusive.    The bladder showed 2.5 cm left lateral wall polypoid appearing bladder tumor that appeared to be on a stalk and was district from the ureteral orifice.   The ureteral orifices were identified on each side in orthotopic position with efflux of clear urine.   There were no trabeculations.    On retroflexion there was the usual bladder neck hyperemia.    There was no intravesical protrusion of the prostate.      The patient tolerated the procedure well without complication.      Laboratory:   I personally reviewed all applicable laboratory data and went over findings with patient  Significant for:    CBC RESULTS:  Recent Labs   Lab Test 12/10/18  1231 03/06/18  1015 09/06/17  1004 05/22/17  1108   WBC 10.5 7.9 9.3 7.4   HGB 14.9 15.3 15.3 13.0*    281 268 249        BMP RESULTS:  Recent Labs   Lab Test 09/03/19  1008 07/03/19  1155 12/10/18  1231 09/06/17  1004 03/27/17  1537   NA  --  144 139 142 143   POTASSIUM  --  4.8 4.7 4.6 4.2   CHLORIDE  --  109 103 105 106   CO2  --  29 30 32 32   ANIONGAP  --  6 6 5 5   GLC  --  95 90 98 118*   BUN  --  11 11 11 16   CR  --  0.80 0.84 0.72 0.86   GFRESTIMATED >90 >90 >90 >90 88   GFRESTBLACK >90 >90 >90 >90 >90  African American GFR Calc     TAMMIE  --  8.3* 9.2 9.3 8.4*       UA RESULTS:   Recent Labs   Lab Test 07/30/19  1355 07/03/19  1155 02/21/17  1348 10/27/16  1028   SG 1.011 >1.030 1.010 1.025   URINEPH 6.0 6.0 6.5 6.5   NITRITE Negative Negative Negative Negative   RBCU 25* >100*  --  O - 2   WBCU 1 0 - 5  --  O - 2       CALCIUM  RESULTS  Lab Results   Component Value Date    TAMMIE 8.3 07/03/2019    TAMMIE 9.2 12/10/2018    TAMMIE 9.3 09/06/2017       PSA RESULTS  PSA   Date Value Ref Range Status   07/30/2019 0.59 0 - 4 ug/L Final     Comment:     Assay Method:  Chemiluminescence using Siemens Vista analyzer   10/27/2016 0.41 0 - 4 ug/L Final     Comment:     Assay Method:  Chemiluminescence using Siemens Vista analyzer   10/23/2015 0.55 0 - 4 ug/L Final   08/20/2013 0.63 0 - 4 ug/L Final   04/26/2006 0.72 0 - 4 ug/L Final   07/29/2003 0.7 0 - 4 ug/L Final     INR  Recent Labs   Lab Test 10/07/14  1319 09/29/14  1735 09/28/14  0709   INR 1.11 1.24* 0.98     Imaging:   I personally reviewed all applicable imaging and went over the below findings with patient.    Results for orders placed or performed in visit on 09/03/19   CT Urogram wo & w Contrast    Narrative    CT UROGRAM WITH AND WITHOUT CONTRAST September 3, 2019 10:32 AM     HISTORY: Abnormal findings in urine. Gross hematuria.    TECHNIQUE: Axial images are obtained from the lung bases to the iliac  crests without IV contrast. Following the administration of 99 mL of  Isovue 370, additional axial images are obtained from the lung bases  to the symphysis during corticomedullary and excretory phases. Coronal  and sagittal reformatted images are also generated. Radiation dose for  this scan was reduced using automated exposure control, adjustment of  the mA and/or kV according to patient size, or iterative  reconstruction technique.    FINDINGS: Mild dependent atelectasis is present at the lung bases.  Calcified granuloma posterior right costophrenic angle is noted.    Abdomen: Initial noncontrast images demonstrate calcified granulomas  in the liver and spleen. No evidence of fatty infiltration of the  liver. No calcified gallstones. Arterial vascular calcifications are  scattered throughout the aorta and branch vessels. No evidence of  aortic aneurysm. Following contrast administration, the  liver, spleen,  gallbladder, pancreas and adrenal glands are within normal limits. No  enlarged lymph nodes. No evidence of bowel obstruction.    Right urinary tract: No renal masses or cysts are present. No  collecting system or ureteral stones are present. There is no  hydronephrosis. Delayed imaging demonstrates a normal right renal  collecting system and ureter. Ureter is normal in caliber and course.    Left urinary tract: No renal masses or cysts are present. No  collecting system or ureteral stones are present. There is no  hydronephrosis. Delayed imaging demonstrates a normal left renal  collecting system and ureter. Ureter is normal in caliber and course.    Bladder: Bladder is well-distended with an enhancing left lateral  bladder wall mass measuring 2.3 x 1.8 cm. This does not appear to  extend beyond the bladder wall itself. No other bladder wall lesions  are appreciated. No bladder calculi are evident. Delayed imaging  demonstrates a filling defect caused by the enhancing left lateral  bladder wall mass. No other filling defects are appreciated.    Pelvis: Prostate gland and rectum are unremarkable. No enlarged pelvic  or inguinal lymph nodes. No free pelvic fluid. Degenerative spine  changes are noted. No aggressive appearing bone lesions are  appreciated.      Impression    IMPRESSION:    1. 2.3 cm left lateral bladder wall mass. No associated pelvic or  retroperitoneal lymph node enlargement. No definite evidence of  metastatic disease in the abdomen or pelvis.  2. No urinary tract calculi or hydronephrosis. No renal cyst or mass.  Collecting systems and ureters are unremarkable.  3. Calcified atherosclerotic disease in the aorta and branch vessels.  4. Evidence of old granulomatous disease.         Scribe Disclosure:  Carelen ANN, am serving as a scribe to document services personally performed by Almas Sunshine MD at this visit, based upon the provider's statements to me. All  documentation has been reviewed by the aforementioned provider prior to being entered into the official medical record.

## 2019-09-03 NOTE — DISCHARGE INSTRUCTIONS

## 2019-09-03 NOTE — PATIENT INSTRUCTIONS
Preparing for Your Surgery      Name:  Tevin Guidry   MRN:  [id number]   :  1950   Today's Date:  9/3/2019     Arriving for surgery:  Surgery date:  19  Arrival time:  9:05AM  Please come to:     [facility]  [address] [place] [id number]     Parking is available in front of the Clinics and Surgery Center building from 5:30AM to 8:00PM.  -  Proceed to the 5th floor to check into the Ambulatory Surgery Center.              >> There will be patient concierges on the 1st and 5th floor, for assistance or an escort, if you would like.              >> Please call [phone] with any questions.    What can I eat or drink?  -  You may have solid food or milk products until 8 hours prior to your surgery. 19, 2:35AM  -  You may have water, apple juice or 7up/Sprite until 2 hours prior to your surgery. 19, 8:35AM    Which medicines can I take?  Stop Aspirin, vitamins and supplements one week prior to surgery.  Hold Ibuprofen for 24 hours and/or Naproxen for 48 hours prior to surgery.   -  Do NOT take these medications in the morning, the day of surgery:    Lisinopril  Iron    -  Please take these medications the day of surgery:    Metoprolol  Paxil    Albuterol as needed and bring the day of the procedure    How do I prepare myself?  -  Take two showers: one the night before surgery; and one the morning of surgery.         Use Scrubcare or Hibiclens to wash from neck down, leave soap on your skin for up to one minute.  Do not get soap in your eyes or ears.  You may use your own shampoo and conditioner; no other hair products.   -  Do NOT use lotion, powder, deodorant, or antiperspirant the day of your surgery.  -  Do NOT wear jewelry.  - Do not bring your own medications to the hospital, except for inhalers and eye   drops.  -  Bring your ID and insurance card.    -If you are scheduled to go home the Same Day as surgery you must have a responsible adult as a   and to stay with you overnight the first 24 hours after surgery.     Questions or Concerns:    -If you are scheduled at the Ambulatory Surgery Center and have questions or concerns regarding the day of surgery please call [phone].    -For questions after surgery please call your surgeons office.

## 2019-09-03 NOTE — H&P
Pre-Operative H & P     CC:  Preoperative exam to assess for increased cardiopulmonary risk while undergoing surgery and anesthesia.    Date of Encounter: 9/3/2019  Primary Care Physician:  Preethi Quiroz  Reason for Visit: Personal history of malignant neoplasm of bladder [Z85.51]  - Primary      HPI  Tevin Guidry is a 70 y/o male who presents for pre-operative H&P in preparation for transurethral resection of bladder tumor with Almas Sunshine MD on 9/5/19 at [facility] [place] for treatment of a bladder mass.    Mr. Guidry was evaluated by Dr. Sunshine today for gross hematuria. Cystoscopy today shows a bladder tumor, likely low grade. He also endorses intermittent urgency and rare urge incontinence at times, mainly when he drinks too much coffee or water. He otherwise denied any dysuria, frequency, hesitancy, intermittency, feelings of incomplete emptying, flank pain, abdominal pain, or any recent history of urinary tract infections or stones. He recently quit smoking cigarettes on 8/1/19 after a 45 pk yr hx. He now presents for the above procedure.    PMH is significant for CAD s/p hybrid robotic-assisted CABG (LIMA - LAD) and PCI with ALENA to RCA (distal to prox), HTN, and HLD. He last saw his cardiologist on 3/28/17. Review of this clinic note states:    The patient is no longer experiencing chest pain but does report ELENA.  This is chronic and may be related to tobacco use.  PFTs showed FVC and FEV1 are within normal limits; FEV1/FVC ratio was reduced.  Patient doing well, no cardiopulmonary symptoms.  Physical exam as documented above.  There is a Grade I/VI KYLIE, early peaking without associated pulsus tardus/brevis consistent with aortic sclerosis, unchanged.  This was identified on ECHO.  His LVEF was 60-65% on ECHO.  Continue ASA, beta blocker, ACE-I, and statin (switch from Lovastatin to Lipitor 40 mg qd).      PMH is also significant for COPD, anxiety, iron deficiency anemia. He is  anticoagulated with ASA 81 mg. He is active and walks his 3 y/o Portuguese encinas dog daily. Denies CP, angina, ELENA.    History was obtained from patient & chart review.     Past Medical History  Past Medical History:   Diagnosis Date     Colon polyps     multiple may need colectomy (adenoma)     Hyperlipidemia LDL goal <130      Hypertension goal BP (blood pressure) < 140/90      Social phobia        Past Surgical History  Past Surgical History:   Procedure Laterality Date     CLAVICLE SURGERY Right     fracture in childhood     COLONOSCOPY N/A 3/30/2017    Procedure: COLONOSCOPY;  Surgeon: Jie Onofre MD;  Location: [facility] GI     DAVINCI BYPASS ARTERY CORONARY N/A 9/29/2014    Procedure: DAVINCI BYPASS ARTERY CORONARY;  Surgeon: Lam Solis MD;  Location: [facility] OR     ESOPHAGOSCOPY, GASTROSCOPY, DUODENOSCOPY (EGD), COMBINED N/A 3/30/2017    Procedure: COMBINED ESOPHAGOSCOPY, GASTROSCOPY, DUODENOSCOPY (EGD);  Surgeon: Jie Onofre MD;  Location:  GI     HC COLONOSCOPY THRU STOMA, DIAGNOSTIC  2010    polyps due 2011     SURGICAL HISTORY OF -       right clavicular fracture with ORIF       Hx of Blood transfusions/reactions: yes, 2014. No reactions per pt.     Hx of abnormal bleeding or anti-platelet use: on ASA 81 mg    Menstrual history: No LMP for male patient.: N/A    Steroid use in the last year: no    Personal or FH with difficulty with Anesthesia:  no    Prior to Admission Medications  Current Outpatient Medications   Medication Sig Dispense Refill     atorvastatin (LIPITOR) 80 MG tablet Take 1 tablet (80 mg) by mouth daily (Patient taking differently: Take 80 mg by mouth At Bedtime ) 90 tablet 3     ferrous sulfate (IRON) 325 (65 FE) MG tablet Take 325 mg by mouth every morning  60 tablet 3     lisinopril (PRINIVIL/ZESTRIL) 20 MG tablet Take 1 tablet (20 mg) by mouth daily (Patient taking differently: Take 20 mg by mouth At Bedtime ) 90 tablet 3     metoprolol succinate  ER (TOPROL-XL) 50 MG 24 hr tablet Take 1 tablet (50 mg) by mouth daily (Patient taking differently: Take 50 mg by mouth At Bedtime ) 90 tablet 3     PARoxetine (PAXIL) 40 MG tablet Take 1 tablet (40 mg) by mouth every morning 90 tablet 3     albuterol (PROAIR HFA/PROVENTIL HFA/VENTOLIN HFA) 108 (90 Base) MCG/ACT inhaler Inhale 2 puffs into the lungs every 6 hours as needed for shortness of breath / dyspnea or wheezing 1 Inhaler 1     aspirin 81 MG tablet Take 1 tablet (81 mg) by mouth daily (Patient taking differently: Take 81 mg by mouth At Bedtime Pt. Last took 2019) 90 tablet 3     nitroglycerin (NITROSTAT) 0.4 MG sublingual tablet Place 1 tablet (0.4 mg) under the tongue every 5 minutes as needed for chest pain 25 tablet 3       Allergies  Allergies   Allergen Reactions     Codeine        Social History  Social History     Socioeconomic History     Marital status: Single     Spouse name: Not on file     Number of children: 0     Years of education: Not on file     Highest education level: Not on file   Occupational History     Not on file   Social Needs     Financial resource strain: Not on file     Food insecurity:     Worry: Not on file     Inability: Not on file     Transportation needs:     Medical: Not on file     Non-medical: Not on file   Tobacco Use     Smoking status: Former Smoker     Packs/day: 1.50     Years: 30.00     Pack years: 45.00     Last attempt to quit: 2019     Years since quittin.0     Smokeless tobacco: Never Used   Substance and Sexual Activity     Alcohol use: No     Comment: quit 35 years ago     Drug use: Yes     Comment: occionally use of pot     Sexual activity: Yes     Partners: Female   Lifestyle     Physical activity:     Days per week: Not on file     Minutes per session: Not on file     Stress: Not on file   Relationships     Social connections:     Talks on phone: Not on file     Gets together: Not on file     Attends [employer] service: Not on file     Active  member of club or organization: Not on file     Attends meetings of clubs or organizations: Not on file     Relationship status: Not on file     Intimate partner violence:     Fear of current or ex partner: Not on file     Emotionally abused: Not on file     Physically abused: Not on file     Forced sexual activity: Not on file   Other Topics Concern      Service No     Blood Transfusions No     Caffeine Concern Not Asked     Occupational Exposure Not Asked     Hobby Hazards Not Asked     Sleep Concern Not Asked     Stress Concern Not Asked     Weight Concern Not Asked     Special Diet Not Asked     Back Care Not Asked     Exercise No     Bike Helmet Not Asked     Seat Belt Yes     Self-Exams No     Parent/sibling w/ CABG, MI or angioplasty before 65F 55M? No   Social History Narrative     Not on file       Family History  Family History   Problem Relation Age of Onset     Cancer Mother         esog/stomach     Cancer Father         lung     Family History Negative Brother      Hypertension Brother      Anesthesia Reaction No family hx of      Deep Vein Thrombosis (DVT) No family hx of      ROS/MED HX  The complete review of systems is negative other than noted in the HPI or here.  Patient denies recent illness, fever and respiratory infection during past month.  Pt denies steroid use during past year.    ENT/Pulmonary: Comment: Albuterol doesn't seem to help per pt report.    PFT 12/8/15:  Although the FVC and FEV1 are within normal limits, the FEV1/FVC ratio is reduced.  Patient effort was erratic, but the normal FEV1 indicates no significant obstruction.  The inspiratory flow rates are within normal limits.  Lung volumes are within normal limits.  Following administration of bronchodilators, there is no significant response.  The diffusing capacity is normal.  However, the diffusing capacity was not corrected for the patient's hemoglobin.  Mild airway obstruction is present.  IMPRESSION:  Mild Airflow  Obstruction   No significant change with bronchodilator  Normal lung volumes  Normal diffusion capacity        (+)tobacco use, Past use 45 pk yr hx, quit 8/1/19 packs/day  Intermittent asthma Treatment: Inhaler prn,  mild COPD, , . .    Neurologic:  - neg neurologic ROS     Cardiovascular:     (+) Dyslipidemia, hypertension--CAD, -CABG-date: 9/2014 robotic, stent,9/2014  1 Drug Eluting Stent .. Taking blood thinners : Instructions Given to patient: last dose ASA 9/2/19. . . :. . Previous cardiac testing Echodate:9/27/14results:Interpretation Summary  Global and regional left ventricular function is normal with an EF of 60-65%.   Right ventricular function, chamber size, wall motion, and thickness are   normal. Pulmonary artery systolic pressure cannot be assessed. The inferior   vena cava is normal. No pericardial effusion is present.    date: results: date: results:Cath date: 9/29/14 results:Successful PCI with ALENA from distal to prox segments of RCA. Good flow of LIMA to LAD with stenosis as noted.          METS/Exercise Tolerance: Comment: Walks 1 y/o Greek encinas dog daily. Able to ascend stairs w/ mild SOB, denies CP & angina. >4 METS   Hematologic: Comments: Iron deficiency anemia    (+) Anemia, History of Transfusion no previous transfusion reaction -      Musculoskeletal:  - neg musculoskeletal ROS       GI/Hepatic:  - neg GI/hepatic ROS       Renal/Genitourinary:  - ROS Renal section negative       Endo:  - neg endo ROS       Psychiatric:     (+) psychiatric history anxiety      Infectious Disease:  - neg infectious disease ROS       Malignancy:   (+) Malignancy History of Other  Other CA bladder Active status post         Other:    (+) No chance of pregnancy C-spine cleared: N/A, no H/O Chronic Pain,             PHYSICAL EXAM:   Mental Status/Neuro: A/A/O; Age Appropriate   Airway: Facies: Feasible  Mallampati: II  Mouth/Opening: Full  TM distance: > 6 cm  Neck ROM: Limited   Respiratory: Auscultation:  "CTAB     Resp. Rate: Normal     Resp. Effort: Normal      CV: Rhythm: Regular  Rate: Age appropriate  Heart: Normal Sounds  Edema: None   Comments:      Dental: Normal Dentition              Preop Vitals    BP Readings from Last 3 Encounters:   09/03/19 (!) 145/84   09/03/19 (!) 155/86   07/30/19 130/84    Pulse Readings from Last 3 Encounters:   09/03/19 51   09/03/19 65   07/30/19 58      Resp Readings from Last 3 Encounters:   09/03/19 19   07/03/19 16   12/10/18 16    SpO2 Readings from Last 3 Encounters:   09/03/19 97%   07/03/19 95%   12/10/18 98%      Temp Readings from Last 1 Encounters:   09/03/19 98.2  F (36.8  C) (Oral)    Ht Readings from Last 1 Encounters:   09/03/19 1.753 m (5' 9\")      Wt Readings from Last 1 Encounters:   09/03/19 75.5 kg (166 lb 8 oz)    Estimated body mass index is 24.59 kg/m  as calculated from the following:    Height as of this encounter: 1.753 m (5' 9\").    Weight as of this encounter: 75.5 kg (166 lb 8 oz).       Temp: 98.2  F (36.8  C) Temp src: Oral BP: (!) 145/84 Pulse: 51   Resp: 19 SpO2: 97 %         166 lbs 8 oz  5' 9\"   Body mass index is 24.59 kg/m .    Physical Exam  Constitutional: Awake, alert, cooperative, no apparent distress, and appears stated age.  Eyes: Pupils equal, round and reactive to light, extra ocular muscles intact, sclera clear, conjunctiva normal.  HENT: Normocephalic, oral pharynx with moist mucus membranes, good dentition. No goiter appreciated. No removable dental hardware.  Respiratory: Clear to auscultation bilaterally, no crackles or wheezing. No SOB when supine.  Cardiovascular: Regular rate and rhythm, normal S1 and S2, and no murmur noted.  Carotids +2, no bruits. No edema. Palpable pulses to radial, DP and PT arteries.   GI: Normal bowel sounds, soft, non-distended, non-tender, no masses palpated, no hepatomegaly.    Lymph/Hematologic: No cervical lymphadenopathy and no supraclavicular lymphadenopathy.  Genitourinary:  deferred  Skin: Warm " and dry.  No rashes at anticipated surgical site.   Musculoskeletal: mildly limited extension ROM of neck. There is no redness, warmth, or swelling of the joints. Gross motor strength is normal.    Neurologic: Awake, alert, oriented to name, place and time. Cranial nerves II-XII are grossly intact. Gait is normal. Ambulates from chair to exam table, seats self, lies supine and sits back up w/o assistance.  Neuropsychiatric: Calm, cooperative. Normal affect. Pleasant. Answers questions appropriately, follows commands w/o difficulty.    PRIOR LABS/DIAGNOSTIC STUDIES:  All labs and imaging personally reviewed    PFT 12/8/15:  FVC-Pred 4.06    L  12/08/2015  2:04    FVC-Pre 5.28    L  12/08/2015  2:04    FVC-%Pred-Pre 130    %  12/08/2015  2:04    FEV1-Pre 3.31    L  12/08/2015  2:04    FEV1-%Pred-Pre 106    %  12/08/2015  2:04    FEV1FVC-Pred 77    %  12/08/2015  2:04    FEV1FVC-Pre 63    %  12/08/2015  2:04    FEFMax-Pred 8.17    L/sec  12/08/2015  2:04    FEFMax-Pre 8.97    L/sec  12/08/2015  2:04    FEFMax-%Pred-Pre 109    %  12/08/2015  2:04    FEF2575-Pred 2.46    L/sec  12/08/2015  2:04    FEF2575-Pre 1.37    L/sec  12/08/2015  2:04    MUZ8598-%Pred-Pre 55    %  12/08/2015  2:04    FEF2575-Post 1.39    L/sec  12/08/2015  2:04    NWV2444-%Pred-Post 56    %  12/08/2015  2:04    ExpTime-Pre 9.83    sec  12/08/2015  2:04    FIFMax-Pre 7.31    L/sec  12/08/2015  2:04    VC-Pred 4.47    L  12/08/2015  2:04    VC-Pre 5.40    L  12/08/2015  2:04    VC-%Pred-Pre 120    %  12/08/2015  2:04    IC-Pred 3.27    L  12/08/2015  2:04    IC-Pre 3.26    L  12/08/2015  2:04    IC-%Pred-Pre 99    %  12/08/2015  2:04    ERV-Pred 1.20    L  12/08/2015  2:04    ERV-Pre 2.14    L  12/08/2015  2:04    ERV-%Pred-Pre 178    %  12/08/2015  2:04    FEV1FEV6-Pred 78    %  12/08/2015  2:04     FEV1FEV6-Pre 67    %  12/08/2015  2:04    FRCPleth-Pred 3.56    L  12/08/2015  2:04    FRCPleth-Pre 4.18    L  12/08/2015  2:04    FRCPleth-%Pred-Pre 117    %  12/08/2015  2:04    RVPleth-Pred 2.51    L  12/08/2015  2:04    RVPleth-Pre 2.04    L  12/08/2015  2:04    RVPleth-%Pred-Pre 81    %  12/08/2015  2:04    TLCPleth-Pred 6.72    L  12/08/2015  2:04    TLCPleth-Pre 7.44    L  12/08/2015  2:04    TLCPleth-%Pred-Pre 110    %  12/08/2015  2:04    DLCOunc-Pred 25.92    ml/min/mmHg  12/08/2015  2:04    DLCOunc-Pre 20.16    ml/min/mmHg  12/08/2015  2:04    DLCOunc-%Pred-Pre 77    %  12/08/2015  2:04    VA-Pre 7.22    L  12/08/2015  2:04    VA-%Pred-Pre 112    %  12/08/2015  2:04    FEV1SVC-Pred 70    %  12/08/2015  2:04    FEV1SVC-Pre 61    %  12/08/2015  2:04    Although the FVC and FEV1 are within normal limits, the FEV1/FVC ratio is reduced.  Patient effort was erratic, but the normal FEV1 indicates no significant obstruction.  The inspiratory flow rates are within normal limits.  Lung volumes are within normal limits.  Following administration of bronchodilators, there is no significant response.  The diffusing capacity is normal.  However, the diffusing capacity was not corrected for the patient's hemoglobin.  Mild airway obstruction is present.  IMPRESSION:  Mild Airflow Obstruction   No significant change with bronchodilator  Normal lung volumes  Normal diffusion capacity  This preliminary report should not be used clinically unless reviewed and signed by a physician.     ECHOCARDIOGRAM 9/27/14:  Interpretation Summary  Global and regional left ventricular function is normal with an EF of 60-65%.   Right ventricular function, chamber size, wall motion, and thickness are   normal. Pulmonary artery systolic pressure cannot be assessed. The inferior   vena cava  is normal. No pericardial effusion is  present.    LABS:  CBC:   Lab Results   Component Value Date    WBC 10.5 12/10/2018    WBC 7.9 03/06/2018    HGB 14.9 12/10/2018    HGB 15.3 03/06/2018    HCT 44.9 12/10/2018    HCT 46.2 03/06/2018     12/10/2018     03/06/2018     BMP:   Lab Results   Component Value Date     07/03/2019     12/10/2018    POTASSIUM 4.8 07/03/2019    POTASSIUM 4.7 12/10/2018    CHLORIDE 109 07/03/2019    CHLORIDE 103 12/10/2018    CO2 29 07/03/2019    CO2 30 12/10/2018    BUN 11 07/03/2019    BUN 11 12/10/2018    CR 0.80 07/03/2019    CR 0.84 12/10/2018    GLC 95 07/03/2019    GLC 90 12/10/2018     COTHER:   Lab Results   Component Value Date                      TAMMIE 8.3 (L) 07/03/2019                ALBUMIN 4.0 12/10/2018    PROTTOTAL 6.9 12/10/2018    ALT 23 12/10/2018    AST 19 12/10/2018    ALKPHOS 107 12/10/2018    BILITOTAL 0.5 12/10/2018              Labs today:   Creatinine 0.8   0.66 - 1.25 mg/dL Final 09/03/2019 10:08    GFR Estimate >90   >60 mL/min/ Final 09/03/2019 10:08    GFR Estimate If Black >90   >60 mL/min/ Final 09/03/2019 10:08      Urine Culture Aerobic Bacterial (results pending)    ASSESSMENT and PLAN  Tevin Guidry is a 69 year old male scheduled to undergo transurethral resection of bladder tumor with Almas Sunshine MD on 9/5/19 at [facility] for treatment of a bladder mass.    Mr. Guidry was evaluated by Dr. Sunshine today for gross hematuria. Cystoscopy today shows a bladder tumor, likely low grade. He also endorses intermittent urgency and rare urge incontinence at times, mainly when he drinks too much coffee or water. He otherwise denied any dysuria, frequency, hesitancy, intermittency, feelings of incomplete emptying, flank pain, abdominal pain, or any recent history of urinary tract infections or stones. He recently quit smoking cigarettes on 8/1/19 after a 45 pk yr hx. He now presents for the above procedure.     He has the  following specific operative considerations:     Pt has had prior anesthetic. Type: General and MAC - no complications per pt  - Anesthesia considerations:  Refer to PAC assessment in anesthesia records  - Risk of PONV score = 1.  If > 2, anti-emetic intervention recommended.    CARDIAC: METS >4,  Walks 1 y/o Ghanaian encinas dog daily. Able to ascend stairs w/ mild SOB, denies CP & angina.      - RCRI : No serious cardiac risks.  0.4% risk of major adverse cardiac event.    - CAD s/p hybrid robotic-assisted CABG (LIMA - LAD) and PCI with ALENA to RCA (distal to prox) - see HPI    -  HTN, taking lisinopril & metoprolol     -  HLD    PULMONARY:     - MICHELLE 3/8 = intermediate risk    - Former smoker, 45 pk yr hx, quit 8/1/19     - Mild COPD, doesn't use albuterol    GI: no GERD      ENDO: BMI 24    - No DM    HEME: VTE risk: 3%  - On ASA 81 mg, last dose on 9/2/19    ORTHO: mildly limited neck extension ROM, no TMJ    NEURO/PSYCH:  Anxiety stable on Paxil     Patient was discussed with Dr Washington. Patient is optimized and is acceptable candidate for the proposed procedure, provided labs today are within acceptable range. No further diagnostic evaluation is needed.    Arrival time, NPO, shower and medication instructions provided by nursing staff today.  Preparing For Your Surgery handout given.      Becky Cardona PA-C  Preoperative Assessment Center  [facility]  Clinic and Surgery Center  Phone: [phone]  Fax: [phone]

## 2019-09-03 NOTE — LETTER
9/3/2019       RE: Tevin Guidry  [address] [address]  [facility] [address]     Dear Colleague,    Thank you for referring your patient, Tevin Guidry, to the [facility] AND [facility] PROSTATE AND UROLOGIC CANCERS at [facility]. Please see a copy of my visit note below.      Urology Clinic    Almas Sunshine MD  Date of Service: 2019     Name: Tevin Guidry  MRN: [id number]  Age: 69 year old  : 1950  Referring provider: Roxanna Stroud     Assessment and Plan:  Assessment:  Tevin Guidry is a 69 year old male with gross hematuria. Cystoscopy today shows a bladder tumor.    Plan:  He will follow up in the OR for TURBT. We discussed risks of bladder injury, bleeding, and infection. He may require a catheter for 1-2 days postoperatively.     Urine was sent for culture today.   ______________________________________________________________________    HPI  Tevin Guidry is a 69 year old male who presents for cystoscopy for evaluation of gross hematuria. He previously saw Roxanna Stroud and noted a 1-2 year history of intermittent pink/rust colored urine and 2 days of bright red blood in 2019 as well as another episode of bright red blood with small clots and brief incontinence in mid-July. He also reported intermittent urgency and rare urge incontinence at times, mainly when he drinks too much coffee or water. He otherwise denied any dysuria, frequency, hesitancy, intermittency, feelings of incomplete emptying, flank pain, abdominal pain, or any recent history of urinary tract infections or stones.     He recently quit smoking cigarettes. Wears a nicotine patch.      Denies family history of prostate, bladder, or kidney cancer.    Hematuria Risk Factors:  Age >40: yes  Smoking history: yes  Occupational exposure to chemicals or dyes (ie, benzenes, aromatic amines): no  History of urologic disorder or disease: no  History of irritative voiding symptoms:  intermittent urgency  History of urinary tract infection: no  Analgesic abuse: no  History of pelvic irradiation: no     Today he denies any recent bleeding, chills, chills, or dysuria. He does have occasional urinary incontinence.     Review of Systems:   Pertinent items are noted in HPI or as below, remainder of complete ROS is negative.      Cystoscopy:   After obtaining informed consent, the patient was prepped and draped in the standard sterile fashion.  The 15 Estonian flexible cystoscope was inserted through the urethral meatus.      The anterior urethra was: normal without stricture.    The external sphincter was appropriately coapted.   The prostatic urethra demonstrated mild bilobar hypertrophy.    The bladder neck was nonocclusive.    The bladder showed 2.5 cm left lateral wall polypoid appearing bladder tumor that appeared to be on a stalk and was district from the ureteral orifice.   The ureteral orifices were identified on each side in orthotopic position with efflux of clear urine.   There were no trabeculations.    On retroflexion there was the usual bladder neck hyperemia.    There was no intravesical protrusion of the prostate.      The patient tolerated the procedure well without complication.      Laboratory:   I personally reviewed all applicable laboratory data and went over findings with patient  Significant for:    CBC RESULTS:  Recent Labs   Lab Test 12/10/18  1231 03/06/18  1015 09/06/17  1004 05/22/17  1108   WBC 10.5 7.9 9.3 7.4   HGB 14.9 15.3 15.3 13.0*    281 268 249        BMP RESULTS:  Recent Labs   Lab Test 09/03/19  1008 07/03/19  1155 12/10/18  1231 09/06/17  1004 03/27/17  1537   NA  --  144 139 142 143   POTASSIUM  --  4.8 4.7 4.6 4.2   CHLORIDE  --  109 103 105 106   CO2  --  29 30 32 32   ANIONGAP  --  6 6 5 5   GLC  --  95 90 98 118*   BUN  --  11 11 11 16   CR  --  0.80 0.84 0.72 0.86   GFRESTIMATED >90 >90 >90 >90 88   GFRESTBLACK >90 >90 >90 >90 >90    GFR Calc     TAMMIE  --  8.3* 9.2 9.3 8.4*       UA RESULTS:   Recent Labs   Lab Test 07/30/19  1355 07/03/19  1155 02/21/17  1348 10/27/16  1028   SG 1.011 >1.030 1.010 1.025   URINEPH 6.0 6.0 6.5 6.5   NITRITE Negative Negative Negative Negative   RBCU 25* >100*  --  O - 2   WBCU 1 0 - 5  --  O - 2       CALCIUM RESULTS  Lab Results   Component Value Date    TAMMIE 8.3 07/03/2019    TAMMIE 9.2 12/10/2018    TAMMIE 9.3 09/06/2017       PSA RESULTS  PSA   Date Value Ref Range Status   07/30/2019 0.59 0 - 4 ug/L Final     Comment:     Assay Method:  Chemiluminescence using Siemens Vista analyzer   10/27/2016 0.41 0 - 4 ug/L Final     Comment:     Assay Method:  Chemiluminescence using Siemens Vista analyzer   10/23/2015 0.55 0 - 4 ug/L Final   08/20/2013 0.63 0 - 4 ug/L Final   04/26/2006 0.72 0 - 4 ug/L Final   07/29/2003 0.7 0 - 4 ug/L Final     INR  Recent Labs   Lab Test 10/07/14  1319 09/29/14  1735 09/28/14  0709   INR 1.11 1.24* 0.98     Imaging:   I personally reviewed all applicable imaging and went over the below findings with patient.    Results for orders placed or performed in visit on 09/03/19   CT Urogram wo & w Contrast    Narrative    CT UROGRAM WITH AND WITHOUT CONTRAST September 3, 2019 10:32 AM     HISTORY: Abnormal findings in urine. Gross hematuria.    TECHNIQUE: Axial images are obtained from the lung bases to the iliac  crests without IV contrast. Following the administration of 99 mL of  Isovue 370, additional axial images are obtained from the lung bases  to the symphysis during corticomedullary and excretory phases. Coronal  and sagittal reformatted images are also generated. Radiation dose for  this scan was reduced using automated exposure control, adjustment of  the mA and/or kV according to patient size, or iterative  reconstruction technique.    FINDINGS: Mild dependent atelectasis is present at the lung bases.  Calcified granuloma posterior right costophrenic angle is noted.    Abdomen: Initial  noncontrast images demonstrate calcified granulomas  in the liver and spleen. No evidence of fatty infiltration of the  liver. No calcified gallstones. Arterial vascular calcifications are  scattered throughout the aorta and branch vessels. No evidence of  aortic aneurysm. Following contrast administration, the liver, spleen,  gallbladder, pancreas and adrenal glands are within normal limits. No  enlarged lymph nodes. No evidence of bowel obstruction.    Right urinary tract: No renal masses or cysts are present. No  collecting system or ureteral stones are present. There is no  hydronephrosis. Delayed imaging demonstrates a normal right renal  collecting system and ureter. Ureter is normal in caliber and course.    Left urinary tract: No renal masses or cysts are present. No  collecting system or ureteral stones are present. There is no  hydronephrosis. Delayed imaging demonstrates a normal left renal  collecting system and ureter. Ureter is normal in caliber and course.    Bladder: Bladder is well-distended with an enhancing left lateral  bladder wall mass measuring 2.3 x 1.8 cm. This does not appear to  extend beyond the bladder wall itself. No other bladder wall lesions  are appreciated. No bladder calculi are evident. Delayed imaging  demonstrates a filling defect caused by the enhancing left lateral  bladder wall mass. No other filling defects are appreciated.    Pelvis: Prostate gland and rectum are unremarkable. No enlarged pelvic  or inguinal lymph nodes. No free pelvic fluid. Degenerative spine  changes are noted. No aggressive appearing bone lesions are  appreciated.      Impression    IMPRESSION:    1. 2.3 cm left lateral bladder wall mass. No associated pelvic or  retroperitoneal lymph node enlargement. No definite evidence of  metastatic disease in the abdomen or pelvis.  2. No urinary tract calculi or hydronephrosis. No renal cyst or mass.  Collecting systems and ureters are unremarkable.  3. Calcified  atherosclerotic disease in the aorta and branch vessels.  4. Evidence of old granulomatous disease.         Scribe Disclosure:  I, Carleen Vasquez, am serving as a scribe to document services personally performed by Almas Sunshine MD at this visit, based upon the provider's statements to me. All documentation has been reviewed by the aforementioned provider prior to being entered into the official medical record.     Again, thank you for allowing me to participate in the care of your patient.      Sincerely,    Almas Sunshine MD

## 2019-09-04 LAB
BACTERIA SPEC CULT: NORMAL
Lab: NORMAL
SPECIMEN SOURCE: NORMAL

## 2019-09-05 ENCOUNTER — HOSPITAL ENCOUNTER (OUTPATIENT)
Facility: AMBULATORY SURGERY CENTER | Age: 69
End: 2019-09-05
Attending: UROLOGY

## 2019-09-05 ENCOUNTER — ANESTHESIA (OUTPATIENT)
Dept: SURGERY | Facility: AMBULATORY SURGERY CENTER | Age: 69
End: 2019-09-05

## 2019-09-05 VITALS
BODY MASS INDEX: 24.71 KG/M2 | OXYGEN SATURATION: 96 % | WEIGHT: 166.8 LBS | HEIGHT: 69 IN | TEMPERATURE: 97.5 F | RESPIRATION RATE: 16 BRPM | SYSTOLIC BLOOD PRESSURE: 126 MMHG | DIASTOLIC BLOOD PRESSURE: 71 MMHG

## 2019-09-05 DIAGNOSIS — N32.89 BLADDER MASS: Primary | ICD-10-CM

## 2019-09-05 RX ORDER — OXYCODONE HYDROCHLORIDE 5 MG/1
5 TABLET ORAL EVERY 4 HOURS PRN
Status: DISCONTINUED | OUTPATIENT
Start: 2019-09-05 | End: 2019-09-06 | Stop reason: HOSPADM

## 2019-09-05 RX ORDER — PROPOFOL 10 MG/ML
INJECTION, EMULSION INTRAVENOUS CONTINUOUS PRN
Status: DISCONTINUED | OUTPATIENT
Start: 2019-09-05 | End: 2019-09-05

## 2019-09-05 RX ORDER — OXYBUTYNIN CHLORIDE 5 MG/1
5 TABLET ORAL 3 TIMES DAILY
Qty: 9 TABLET | Refills: 0 | Status: SHIPPED | OUTPATIENT
Start: 2019-09-05 | End: 2019-10-08

## 2019-09-05 RX ORDER — SODIUM CHLORIDE, SODIUM LACTATE, POTASSIUM CHLORIDE, CALCIUM CHLORIDE 600; 310; 30; 20 MG/100ML; MG/100ML; MG/100ML; MG/100ML
INJECTION, SOLUTION INTRAVENOUS CONTINUOUS
Status: DISCONTINUED | OUTPATIENT
Start: 2019-09-05 | End: 2019-09-06 | Stop reason: HOSPADM

## 2019-09-05 RX ORDER — NALOXONE HYDROCHLORIDE 0.4 MG/ML
.1-.4 INJECTION, SOLUTION INTRAMUSCULAR; INTRAVENOUS; SUBCUTANEOUS
Status: DISCONTINUED | OUTPATIENT
Start: 2019-09-05 | End: 2019-09-06 | Stop reason: HOSPADM

## 2019-09-05 RX ORDER — ONDANSETRON 2 MG/ML
INJECTION INTRAMUSCULAR; INTRAVENOUS PRN
Status: DISCONTINUED | OUTPATIENT
Start: 2019-09-05 | End: 2019-09-05

## 2019-09-05 RX ORDER — SODIUM CHLORIDE, SODIUM LACTATE, POTASSIUM CHLORIDE, CALCIUM CHLORIDE 600; 310; 30; 20 MG/100ML; MG/100ML; MG/100ML; MG/100ML
INJECTION, SOLUTION INTRAVENOUS CONTINUOUS PRN
Status: DISCONTINUED | OUTPATIENT
Start: 2019-09-05 | End: 2019-09-05

## 2019-09-05 RX ORDER — ONDANSETRON 4 MG/1
4 TABLET, ORALLY DISINTEGRATING ORAL EVERY 30 MIN PRN
Status: DISCONTINUED | OUTPATIENT
Start: 2019-09-05 | End: 2019-09-06 | Stop reason: HOSPADM

## 2019-09-05 RX ORDER — OXYCODONE HYDROCHLORIDE 5 MG/1
5 TABLET ORAL EVERY 6 HOURS PRN
Qty: 12 TABLET | Refills: 0 | Status: SHIPPED | OUTPATIENT
Start: 2019-09-05 | End: 2020-01-08

## 2019-09-05 RX ORDER — GLYCOPYRROLATE 0.2 MG/ML
INJECTION, SOLUTION INTRAMUSCULAR; INTRAVENOUS PRN
Status: DISCONTINUED | OUTPATIENT
Start: 2019-09-05 | End: 2019-09-05

## 2019-09-05 RX ORDER — FENTANYL CITRATE 50 UG/ML
INJECTION, SOLUTION INTRAMUSCULAR; INTRAVENOUS PRN
Status: DISCONTINUED | OUTPATIENT
Start: 2019-09-05 | End: 2019-09-05

## 2019-09-05 RX ORDER — PROPOFOL 10 MG/ML
INJECTION, EMULSION INTRAVENOUS PRN
Status: DISCONTINUED | OUTPATIENT
Start: 2019-09-05 | End: 2019-09-05

## 2019-09-05 RX ORDER — ONDANSETRON 2 MG/ML
4 INJECTION INTRAMUSCULAR; INTRAVENOUS EVERY 30 MIN PRN
Status: DISCONTINUED | OUTPATIENT
Start: 2019-09-05 | End: 2019-09-06 | Stop reason: HOSPADM

## 2019-09-05 RX ORDER — LIDOCAINE HYDROCHLORIDE 20 MG/ML
INJECTION, SOLUTION INFILTRATION; PERINEURAL PRN
Status: DISCONTINUED | OUTPATIENT
Start: 2019-09-05 | End: 2019-09-05

## 2019-09-05 RX ORDER — CEFAZOLIN SODIUM 1 G/50ML
1 SOLUTION INTRAVENOUS SEE ADMIN INSTRUCTIONS
Status: DISCONTINUED | OUTPATIENT
Start: 2019-09-05 | End: 2019-09-05 | Stop reason: HOSPADM

## 2019-09-05 RX ORDER — HYDROMORPHONE HYDROCHLORIDE 1 MG/ML
.3-.5 INJECTION, SOLUTION INTRAMUSCULAR; INTRAVENOUS; SUBCUTANEOUS EVERY 10 MIN PRN
Status: DISCONTINUED | OUTPATIENT
Start: 2019-09-05 | End: 2019-09-06 | Stop reason: HOSPADM

## 2019-09-05 RX ORDER — MEPERIDINE HYDROCHLORIDE 25 MG/ML
12.5 INJECTION INTRAMUSCULAR; INTRAVENOUS; SUBCUTANEOUS
Status: DISCONTINUED | OUTPATIENT
Start: 2019-09-05 | End: 2019-09-06 | Stop reason: HOSPADM

## 2019-09-05 RX ORDER — DEXAMETHASONE SODIUM PHOSPHATE 4 MG/ML
INJECTION, SOLUTION INTRA-ARTICULAR; INTRALESIONAL; INTRAMUSCULAR; INTRAVENOUS; SOFT TISSUE PRN
Status: DISCONTINUED | OUTPATIENT
Start: 2019-09-05 | End: 2019-09-05

## 2019-09-05 RX ORDER — FENTANYL CITRATE 50 UG/ML
25-50 INJECTION, SOLUTION INTRAMUSCULAR; INTRAVENOUS
Status: DISCONTINUED | OUTPATIENT
Start: 2019-09-05 | End: 2019-09-05 | Stop reason: HOSPADM

## 2019-09-05 RX ORDER — CEFAZOLIN SODIUM 2 G/50ML
2 SOLUTION INTRAVENOUS
Status: COMPLETED | OUTPATIENT
Start: 2019-09-05 | End: 2019-09-05

## 2019-09-05 RX ORDER — SODIUM CHLORIDE, SODIUM LACTATE, POTASSIUM CHLORIDE, CALCIUM CHLORIDE 600; 310; 30; 20 MG/100ML; MG/100ML; MG/100ML; MG/100ML
INJECTION, SOLUTION INTRAVENOUS CONTINUOUS
Status: DISCONTINUED | OUTPATIENT
Start: 2019-09-05 | End: 2019-09-05 | Stop reason: HOSPADM

## 2019-09-05 RX ORDER — LIDOCAINE 40 MG/G
CREAM TOPICAL
Status: DISCONTINUED | OUTPATIENT
Start: 2019-09-05 | End: 2019-09-05 | Stop reason: HOSPADM

## 2019-09-05 RX ORDER — EPHEDRINE SULFATE 50 MG/ML
INJECTION, SOLUTION INTRAMUSCULAR; INTRAVENOUS; SUBCUTANEOUS PRN
Status: DISCONTINUED | OUTPATIENT
Start: 2019-09-05 | End: 2019-09-05

## 2019-09-05 RX ORDER — ACETAMINOPHEN 325 MG/1
975 TABLET ORAL ONCE
Status: COMPLETED | OUTPATIENT
Start: 2019-09-05 | End: 2019-09-05

## 2019-09-05 RX ORDER — FENTANYL CITRATE 50 UG/ML
25-50 INJECTION, SOLUTION INTRAMUSCULAR; INTRAVENOUS
Status: DISCONTINUED | OUTPATIENT
Start: 2019-09-05 | End: 2019-09-06 | Stop reason: HOSPADM

## 2019-09-05 RX ADMIN — SODIUM CHLORIDE, SODIUM LACTATE, POTASSIUM CHLORIDE, CALCIUM CHLORIDE: 600; 310; 30; 20 INJECTION, SOLUTION INTRAVENOUS at 13:20

## 2019-09-05 RX ADMIN — GLYCOPYRROLATE 0.2 MG: 0.2 INJECTION, SOLUTION INTRAMUSCULAR; INTRAVENOUS at 12:08

## 2019-09-05 RX ADMIN — EPHEDRINE SULFATE 10 MG: 50 INJECTION, SOLUTION INTRAMUSCULAR; INTRAVENOUS; SUBCUTANEOUS at 12:06

## 2019-09-05 RX ADMIN — FENTANYL CITRATE 50 MCG: 50 INJECTION, SOLUTION INTRAMUSCULAR; INTRAVENOUS at 12:04

## 2019-09-05 RX ADMIN — ACETAMINOPHEN 975 MG: 325 TABLET ORAL at 10:22

## 2019-09-05 RX ADMIN — PROPOFOL 100 MCG/KG/MIN: 10 INJECTION, EMULSION INTRAVENOUS at 12:14

## 2019-09-05 RX ADMIN — SODIUM CHLORIDE, SODIUM LACTATE, POTASSIUM CHLORIDE, CALCIUM CHLORIDE: 600; 310; 30; 20 INJECTION, SOLUTION INTRAVENOUS at 10:23

## 2019-09-05 RX ADMIN — EPHEDRINE SULFATE 5 MG: 50 INJECTION, SOLUTION INTRAMUSCULAR; INTRAVENOUS; SUBCUTANEOUS at 12:26

## 2019-09-05 RX ADMIN — DEXAMETHASONE SODIUM PHOSPHATE 4 MG: 4 INJECTION, SOLUTION INTRA-ARTICULAR; INTRALESIONAL; INTRAMUSCULAR; INTRAVENOUS; SOFT TISSUE at 12:35

## 2019-09-05 RX ADMIN — EPHEDRINE SULFATE 5 MG: 50 INJECTION, SOLUTION INTRAMUSCULAR; INTRAVENOUS; SUBCUTANEOUS at 12:51

## 2019-09-05 RX ADMIN — FENTANYL CITRATE 25 MCG: 50 INJECTION, SOLUTION INTRAMUSCULAR; INTRAVENOUS at 12:23

## 2019-09-05 RX ADMIN — EPHEDRINE SULFATE 5 MG: 50 INJECTION, SOLUTION INTRAMUSCULAR; INTRAVENOUS; SUBCUTANEOUS at 12:52

## 2019-09-05 RX ADMIN — Medication 100 MCG: at 12:50

## 2019-09-05 RX ADMIN — SODIUM CHLORIDE, SODIUM LACTATE, POTASSIUM CHLORIDE, CALCIUM CHLORIDE: 600; 310; 30; 20 INJECTION, SOLUTION INTRAVENOUS at 11:52

## 2019-09-05 RX ADMIN — ONDANSETRON 4 MG: 2 INJECTION INTRAMUSCULAR; INTRAVENOUS at 12:35

## 2019-09-05 RX ADMIN — CEFAZOLIN SODIUM 2 G: 2 SOLUTION INTRAVENOUS at 12:15

## 2019-09-05 RX ADMIN — PROPOFOL 200 MG: 10 INJECTION, EMULSION INTRAVENOUS at 12:04

## 2019-09-05 RX ADMIN — EPHEDRINE SULFATE 10 MG: 50 INJECTION, SOLUTION INTRAMUSCULAR; INTRAVENOUS; SUBCUTANEOUS at 12:11

## 2019-09-05 RX ADMIN — Medication 0.06 MCG/KG/MIN: at 12:41

## 2019-09-05 RX ADMIN — LIDOCAINE HYDROCHLORIDE 80 MG: 20 INJECTION, SOLUTION INFILTRATION; PERINEURAL at 12:04

## 2019-09-05 ASSESSMENT — MIFFLIN-ST. JEOR: SCORE: 1501.98

## 2019-09-05 NOTE — ANESTHESIA CARE TRANSFER NOTE
Patient: Tevin Guidry    Procedure(s):  cystoscopy,Transurethral Resection Of Bladder Tumor    Diagnosis: Bladder Tumor  Diagnosis Additional Information: No value filed.    Anesthesia Type:   General     Note:  Airway :Face Mask  Patient transferred to:PACU  Handoff Report: Identifed the Patient, Identified the Reponsible Provider, Reviewed the pertinent medical history, Discussed the surgical course, Reviewed Intra-OP anesthesia mangement and issues during anesthesia, Set expectations for post-procedure period and Allowed opportunity for questions and acknowledgement of understanding      Vitals: (Last set prior to Anesthesia Care Transfer)    CRNA VITALS  9/5/2019 1244 - 9/5/2019 1321      9/5/2019             Resp Rate (observed):  6  (Abnormal)                 Electronically Signed By: FRANCISCA Jules CRNA  September 5, 2019  1:21 PM

## 2019-09-05 NOTE — DISCHARGE INSTRUCTIONS
"[facility]  Home Care Following Anesthesia  For 24 hours after surgery:  1. Get plenty of rest.  A responsible adult must stay with you for at least 24 hours after you leave the surgery center.  2. Do not drive or use heavy equipment.  If you have weakness or tingling, don't drive or use heavy equipment until this feeling goes away.   3. Do not drink alcohol.   4. Avoid strenuous or risky activities.  Ask for help when climbing stairs.  5. You may feel lightheaded.  IF so, sit for a few minutes before standing.  Have someone help you get up.   6. If you have nausea (feel sick to your stomach): Drink only clear liquids such as apple juice, ginger ale, broth or 7-Up.  Rest may also help.  Be sure to drink enough fluids.  Move to a regular diet as you feel able.   7. You may have a slight fever.  Call the doctor if your fever is over 100 F (37.7 C) (taken under the tongue) or lasts longer than 24 hours.  8. You may have a dry mouth, a sore throat, muscle aches or trouble sleeping. These should go away after 24 hours.  9. Do not make important or legal decisions.        Today you received a Marcaine or bupivacaine block to numb the nerves near your surgery site.  This is a block using local anesthetic or \"numbing\" medication injected around the nerves to anesthetize or \"numb\" the area supplied by those nerves.  This block is injected into the muscle layer near your surgical site.  The medication may numb the location where you had surgery for 6-18 hours, but may last up to 24 hours.  If your surgical site is an arm or leg you should be careful with your affected limb, since it is possible to injure your limb without being aware of it due to the numbing.  Until full feeling returns, you should guard against bumping or hitting your limb, and avoid extreme hot or cold temperatures on the skin.  As the block wears off, the feeling will return as a tingling or prickly sensation near your " surgical site.  You will experience more discomfort from your incision as the feeling returns.  You may want to take a pain pill (a narcotic or Tylenol if this was prescribed by your surgeon) when you start to experience mild pain before the pain beccomes more severe.  If your pain medications do not control your pain you should notifiy your surgeon.    Tips for taking pain medications  To get the best pain relief possible, remember these points:    Take pain medications as directed, before pain becomes severe.    Pain medication can upset your stomach: taking it with food may help.    Constipation is a common side effect of pain medication. Drink plenty of  fluids.    Eat foods high in fiber. Take a stool softener if recommended by your doctor or pharmacist.    Do not drink alcohol, drive or operate machinery while taking pain medications.    Ask about other ways to control pain, such as with heat, ice or relaxation.    Tylenol/Acetaminophen Consumption  To help encourage the safe use of acetaminophen, the makers of TYLENOL  have lowered the maximum daily dose for single-ingredient Extra Strength TYLENOL  (acetaminophen) products sold in the [place] from 8 pills per day (4,000 mg) to 6 pills per day (3,000 mg). The dosing interval has also changed from 2 pills every 4-6 hours to 2 pills every 6 hours.    If you feel your pain relief is insufficient, you may take Tylenol/Acetaminophen in addition to your narcotic pain medication.     Be careful not to exceed 3,000 mg of Tylenol/Acetaminophen in a 24 hour period from all sources.    If you are taking extra strength Tylenol/acetaminophen (500 mg), the maximum dose is 6 tablets in 24 hours.    If you are taking regular strength acetaminophen (325 mg), the maximum dose is 9 tablets in 24 hours.    **975 mg Tylenol given at 10:22, can take again at 4:22 PM    Call a doctor for any of the followin. Signs of infection (fever, growing tenderness at the surgery site, a  large amount of drainage or bleeding, severe pain, foul-smelling drainage, redness, swelling).  2. It has been over 8 to 10 hours since surgery and you are still not able to urinate (pass water).  3. Headache for over 24 hours.  Your doctor is:  Dr. Almas Sunshine, Prostate and Urology: [phone]                  Or dial [phone] and ask for the resident on call for:  Prostate Urology  For emergency care, call the:  [place] [facility]:  [phone] (TTY for hearing impaired: [phone])    Care of Indwelling Echavarria Catheter  Cleanliness is VERY important!  10. Wash well around catheter with soap and water and rinse well.  Do this every morning and before bedtime.  11. Empty leg bag or bed bag into toilet whenever it becomes half full.  12. When disconnecting and reconnecting, wipe both the catheter end and tubing tip with alcohol. (You may use commercially prepared alcohol wipes or regular cotton balls soaked in alcohol.)  13. Rinse leg bag and bed bag inside and out after each use. You can soak leg bag and/or bed bag in two to three ounces of white vinegar when not in use. Rinse thoroughly before reconnecting to catheter.  14. Drink lots of fluids (at least eight to ten cups/glasses per day) and take two to four grams of Vitamin C (optional) per day.      15. Watch for sign of catheter-associated urinary tract infection which include:      Cloudy urine, sediment in urine (may look like sand particles or white flakes), foul smelling urine    A burning feeling, pressure or pain in your lower abdomen    A burning feeling in the urethra or genitalia    Aching in the back (by the kidney)

## 2019-09-05 NOTE — BRIEF OP NOTE
[facility]    Brief Operative Note    Pre-operative diagnosis: Bladder Tumor  Post-operative diagnosis * No post-op diagnosis entered *  Procedure: Procedure(s):  Left Transurethral Resection Of Bladder Tumor  Surgeon: Surgeon(s) and Role:     * Almas Sunshine MD - Primary  Anesthesia: General   Estimated blood loss: 5cc  Drains: 18Fr catheter  Specimens:   ID Type Source Tests Collected by Time Destination   A : Left lateral bladder tumor superficial Tissue Bladder SURGICAL PATHOLOGY EXAM Almas Sunshine MD 9/5/2019 12:38 PM    B : left lateral wall bladder tumor deep Tissue Bladder SURGICAL PATHOLOGY EXAM Almas Sunshine MD 9/5/2019 12:51 PM      Findings:   large tumor at left lateral wall fully resected and fulgurated. Good hemostasis. No additional tumors detected. .  Complications: None.  Implants:  * No implants in log *       Home with [place] [employer] on Monday 9/9

## 2019-09-05 NOTE — ANESTHESIA POSTPROCEDURE EVALUATION
Anesthesia POST Procedure Evaluation    Patient: Tevin Guidry   MRN:     [id number] Gender:   male   Age:    69 year old :      1950        Preoperative Diagnosis: Bladder Tumor   Procedure(s):  cystoscopy,Transurethral Resection Of Bladder Tumor   Postop Comments: No value filed.       Anesthesia Type:  Not documented  General    Reportable Event: NO     PAIN: Uncomplicated   Sign Out status: Comfortable, Well controlled pain     PONV: No PONV   Sign Out status:  No Nausea or Vomiting     Neuro/Psych: Uneventful perioperative course   Sign Out Status: Preoperative baseline; Age appropriate mentation     Airway/Resp.: Uneventful perioperative course   Sign Out Status: Non labored breathing, age appropriate RR; Resp. Status within EXPECTED Parameters     CV: Uneventful perioperative course   Sign Out status: Appropriate BP and perfusion indices; Appropriate HR/Rhythm     Disposition:   Sign Out in:  PACU  Disposition:  Phase II; Home  Recovery Course: Uneventful  Follow-Up: Not required           Last Anesthesia Record Vitals:  CRNA VITALS  2019 1244 - 2019 1344      2019             EKG:  Sinus bradycardia          Last PACU Vitals:  Vitals Value Taken Time   /71 2019  1:25 PM   Temp 36.1  C (97  F) 2019  1:25 PM   Pulse     Resp 16 2019  1:25 PM   SpO2 96 % 2019  1:25 PM   Temp src     NIBP     Pulse     SpO2     Resp     Temp     Ht Rate     Temp 2           Electronically Signed By: Damian Palm MD, 2019, 2:00 PM

## 2019-09-06 LAB — COPATH REPORT: NORMAL

## 2019-09-09 ENCOUNTER — PATIENT OUTREACH (OUTPATIENT)
Dept: UROLOGY | Facility: CLINIC | Age: 69
End: 2019-09-09

## 2019-09-10 ENCOUNTER — PRE VISIT (OUTPATIENT)
Dept: UROLOGY | Facility: CLINIC | Age: 69
End: 2019-09-10

## 2019-09-10 ENCOUNTER — ALLIED HEALTH/NURSE VISIT (OUTPATIENT)
Dept: UROLOGY | Facility: CLINIC | Age: 69
End: 2019-09-10

## 2019-09-10 DIAGNOSIS — Z85.51 PERSONAL HISTORY OF MALIGNANT NEOPLASM OF BLADDER: Primary | ICD-10-CM

## 2019-09-10 RX ORDER — CIPROFLOXACIN 500 MG/1
500 TABLET, FILM COATED ORAL ONCE
Status: COMPLETED | OUTPATIENT
Start: 2019-09-10 | End: 2019-09-10

## 2019-09-10 RX ADMIN — CIPROFLOXACIN 500 MG: 500 TABLET, FILM COATED ORAL at 10:32

## 2019-09-10 NOTE — PROGRESS NOTES
Tevni Guidry comes into clinic today at the request of  Ordering Provider for TOV (trial of void).        This service provided today was under the supervising provider of the day Quyen Mejia, who was available if needed.    Preethi Ramos CMA    Patient presents to the clinic today for a trial of void, catheter removal.  Patient is Tevin Gomes by:     Cipro 500 mg PO administered prior to procedure.    Approx 220 mL of sterile water instilled into the bladder via catheter.  18 Turkish indwelling urethral Catheter then removed with out difficulty  10mL water removed from balloon, balloon intact  Patient voided approx 310mL of yellow urine.   Patient tolerated procedure well.        Preethi Ramos CMA MA    The following medication was given:     MEDICATION:  Cipro   ROUTE: PO  SITE: Medication was given orally   DOSE: 500mg  LOT #: [id number]  : Apprity   EXPIRATION DATE: 10/20  NDC#: [id number]   Was there drug waste? No      Preethi Ramos CMA  September 10, 2019

## 2019-09-10 NOTE — TELEPHONE ENCOUNTER
Reason for Visit: Pathology review, procedure (9/5)    Diagnosis: Bladder tumor    Orders/Procedures/Records: Records available    Contact Patient: N/A    Rooming Requirements: Print pathology      Sarah Murray  09/10/19  3:26 PM

## 2019-09-11 NOTE — PROGRESS NOTES
Tevin Guidry,     Spoke with patient while in clinic for TOV yesterday. He passed his TOV.    How are you doing after your surgical procedure with Dr. Sunshine. Good    Any fever, chills, nausea or vomiting? no  How is your appetite? normal  Are you drinking enough fluids? yes  Have you had a bowel movement since you have been home? yes  Are you having any difficulties with urination? Just had catheter removed in clinic and passed his TOV  Any problems with your catheter? No, just had it removed  How does your incision look? NA  How is your pain? Just irritation from the catheter       Do you have any questions or concerns? No    We have your post-operative appointment scheduled for Sept 24th with Dr. Sunshine.    Please feel free to reply via [employer] or contact the clinic.  [phone], option #3 to speak to the nurse

## 2019-09-18 ENCOUNTER — DOCUMENTATION ONLY (OUTPATIENT)
Dept: CARE COORDINATION | Facility: CLINIC | Age: 69
End: 2019-09-18

## 2019-09-24 ENCOUNTER — OFFICE VISIT (OUTPATIENT)
Dept: UROLOGY | Facility: CLINIC | Age: 69
End: 2019-09-24

## 2019-09-24 ENCOUNTER — ALLIED HEALTH/NURSE VISIT (OUTPATIENT)
Dept: UROLOGY | Facility: CLINIC | Age: 69
End: 2019-09-24

## 2019-09-24 VITALS
WEIGHT: 165 LBS | HEART RATE: 59 BPM | BODY MASS INDEX: 24.44 KG/M2 | HEIGHT: 69 IN | DIASTOLIC BLOOD PRESSURE: 84 MMHG | SYSTOLIC BLOOD PRESSURE: 138 MMHG

## 2019-09-24 DIAGNOSIS — C67.2 MALIGNANT NEOPLASM OF LATERAL WALL OF URINARY BLADDER (H): Primary | ICD-10-CM

## 2019-09-24 DIAGNOSIS — R31.0 GROSS HEMATURIA: Primary | ICD-10-CM

## 2019-09-24 RX ORDER — NICOTINE 14 MG/24H
PATCH, EXTENDED RELEASE TRANSDERMAL
COMMUNITY
Start: 2019-07-03 | End: 2019-10-22

## 2019-09-24 ASSESSMENT — MIFFLIN-ST. JEOR: SCORE: 1493.82

## 2019-09-24 ASSESSMENT — PAIN SCALES - GENERAL: PAINLEVEL: NO PAIN (0)

## 2019-09-24 NOTE — PROGRESS NOTES
Urology Clinic    Almas Sunshine MD  Date of Service: 2019     Name: Tevin Guidry  MRN: [id number]  Age: 69 year old  : 1950  Referring provider: Preethi Quiroz     Assessment and Plan:  Assessment:  Tevin Guidry  is a 69 year old male with non-invasive high grade urothelial carcinoma.     Plan:  We had an in depth discussion about non-invasive bladder cancer.  We discussed the fact that once urothelial cancer invades the bladder muscle layer it can potentially invade the blood vessels and lymphatic channels and spread throughout the body, but as of this point we do not have any evidence that this has happened.      We discussed that BCG is sometimes used to reduce the risk of progression and recurrence. We discussed side effects of the treatment, including malaise and rarely a systemic response that may require reducing the dose or stopping treatment. He will start BCG treatment (once weekly for 6 weeks).     He was counseled on smoking cessation and offered a referral. He is using patches and is trying to quit. We discussed that if he quits now, he will have a lower risk or recurrence.     Follow up in 3 months with cystoscopy.     ______________________________________________________________________    HPI  Tevin Guidry is a 69 year old male with a history of gross hematuria and bladder tumor s/p TURBT on 2019.     Surgical pathology showed:   A. Left lateral bladder tumor superficial:   - Non-invasive high grade urothelial carcinoma     B. Left lateral bladder tumor deep:   - Non-invasive high grade urothelial carcinoma   - Small fragments of uninvolved muscularis propria are identified     He has a 1-2 year history of intermittent pink/rust colored urine and 2 days of bright red blood in 2019 as well as another episode of bright red blood with small clots and brief incontinence in mid-July. He also reported intermittent urgency and rare urge incontinence at times, mainly  "when he drinks too much coffee or water. He otherwise denied any dysuria, frequency, hesitancy, intermittency, feelings of incomplete emptying, flank pain, abdominal pain, or any recent history of urinary tract infections or stones.     He recently quit smoking cigarettes. Wears a nicotine patch.      Denies family history of prostate, bladder, or kidney cancer.    Today he reports some urgency and frequency but is no longer having hematuria.     Review of Systems:   Pertinent items are noted in HPI or as below, remainder of complete ROS is negative.      Physical Exam:   Patient is a 69 year old  male   Vitals: Blood pressure 138/84, pulse 59, height 1.753 m (5' 9\"), weight 74.8 kg (165 lb).  Notable Findings on Exam: Well-nourished male in no apparent distress     Laboratory:   I personally reviewed all applicable laboratory data and went over findings with patient  Significant for:    CBC RESULTS:  Recent Labs   Lab Test 12/10/18  1231 03/06/18  1015 09/06/17  1004 05/22/17  1108   WBC 10.5 7.9 9.3 7.4   HGB 14.9 15.3 15.3 13.0*    281 268 249        BMP RESULTS:  Recent Labs   Lab Test 09/03/19  1008 07/03/19  1155 12/10/18  1231 09/06/17  1004 03/27/17  1537   NA  --  144 139 142 143   POTASSIUM  --  4.8 4.7 4.6 4.2   CHLORIDE  --  109 103 105 106   CO2  --  29 30 32 32   ANIONGAP  --  6 6 5 5   GLC  --  95 90 98 118*   BUN  --  11 11 11 16   CR  --  0.80 0.84 0.72 0.86   GFRESTIMATED >90 >90 >90 >90 88   GFRESTBLACK >90 >90 >90 >90 >90  African American GFR Calc     TAMMIE  --  8.3* 9.2 9.3 8.4*       UA RESULTS:   Recent Labs   Lab Test 07/30/19  1355 07/03/19  1155 02/21/17  1348 10/27/16  1028   SG 1.011 >1.030 1.010 1.025   URINEPH 6.0 6.0 6.5 6.5   NITRITE Negative Negative Negative Negative   RBCU 25* >100*  --  O - 2   WBCU 1 0 - 5  --  O - 2       CALCIUM RESULTS  Lab Results   Component Value Date    TAMMIE 8.3 07/03/2019    TAMMIE 9.2 12/10/2018    TAMMIE 9.3 09/06/2017       PSA RESULTS  PSA   Date Value " Ref Range Status   07/30/2019 0.59 0 - 4 ug/L Final     Comment:     Assay Method:  Chemiluminescence using Siemens Vista analyzer   10/27/2016 0.41 0 - 4 ug/L Final     Comment:     Assay Method:  Chemiluminescence using Siemens Vista analyzer   10/23/2015 0.55 0 - 4 ug/L Final   08/20/2013 0.63 0 - 4 ug/L Final   04/26/2006 0.72 0 - 4 ug/L Final   07/29/2003 0.7 0 - 4 ug/L Final       INR  Recent Labs   Lab Test 10/07/14  1319 09/29/14  1735 09/28/14  0709   INR 1.11 1.24* 0.98     Imaging:   I personally reviewed all applicable imaging and went over the below findings with patient.    Results for orders placed or performed in visit on 09/03/19   CT Urogram wo & w Contrast    Narrative    CT UROGRAM WITH AND WITHOUT CONTRAST September 3, 2019 10:32 AM     HISTORY: Abnormal findings in urine. Gross hematuria.    TECHNIQUE: Axial images are obtained from the lung bases to the iliac  crests without IV contrast. Following the administration of 99 mL of  Isovue 370, additional axial images are obtained from the lung bases  to the symphysis during corticomedullary and excretory phases. Coronal  and sagittal reformatted images are also generated. Radiation dose for  this scan was reduced using automated exposure control, adjustment of  the mA and/or kV according to patient size, or iterative  reconstruction technique.    FINDINGS: Mild dependent atelectasis is present at the lung bases.  Calcified granuloma posterior right costophrenic angle is noted.    Abdomen: Initial noncontrast images demonstrate calcified granulomas  in the liver and spleen. No evidence of fatty infiltration of the  liver. No calcified gallstones. Arterial vascular calcifications are  scattered throughout the aorta and branch vessels. No evidence of  aortic aneurysm. Following contrast administration, the liver, spleen,  gallbladder, pancreas and adrenal glands are within normal limits. No  enlarged lymph nodes. No evidence of bowel  obstruction.    Right urinary tract: No renal masses or cysts are present. No  collecting system or ureteral stones are present. There is no  hydronephrosis. Delayed imaging demonstrates a normal right renal  collecting system and ureter. Ureter is normal in caliber and course.    Left urinary tract: No renal masses or cysts are present. No  collecting system or ureteral stones are present. There is no  hydronephrosis. Delayed imaging demonstrates a normal left renal  collecting system and ureter. Ureter is normal in caliber and course.    Bladder: Bladder is well-distended with an enhancing left lateral  bladder wall mass measuring 2.3 x 1.8 cm. This does not appear to  extend beyond the bladder wall itself. No other bladder wall lesions  are appreciated. No bladder calculi are evident. Delayed imaging  demonstrates a filling defect caused by the enhancing left lateral  bladder wall mass. No other filling defects are appreciated.    Pelvis: Prostate gland and rectum are unremarkable. No enlarged pelvic  or inguinal lymph nodes. No free pelvic fluid. Degenerative spine  changes are noted. No aggressive appearing bone lesions are  appreciated.      Impression    IMPRESSION:    1. 2.3 cm left lateral bladder wall mass. No associated pelvic or  retroperitoneal lymph node enlargement. No definite evidence of  metastatic disease in the abdomen or pelvis.  2. No urinary tract calculi or hydronephrosis. No renal cyst or mass.  Collecting systems and ureters are unremarkable.  3. Calcified atherosclerotic disease in the aorta and branch vessels.  4. Evidence of old granulomatous disease.    JAMIE PERSAUD MD       Scribe Disclosure:  Carleen ANN, am serving as a scribe to document services personally performed by Almas Sunshine MD at this visit, based upon the provider's statements to me. All documentation has been reviewed by the aforementioned provider prior to being entered into the official medical record.

## 2019-09-24 NOTE — NURSING NOTE
"Chief Complaint   Patient presents with     Follow Up     Pathology review, procedure 9/5       Blood pressure 138/84, pulse 59, height 1.753 m (5' 9\"), weight 74.8 kg (165 lb). Body mass index is 24.37 kg/m .    Patient Active Problem List   Diagnosis     Social phobia     Hypertension goal BP (blood pressure) < 140/90     Hyperlipidemia LDL goal <130     Tubular adenoma of colon     Advanced directives, counseling/discussion     Coronary artery disease involving native heart with angina pectoris, unspecified vessel or lesion type (H)     Cigarette nicotine dependence without complication     S/P CABG (coronary artery bypass graft)     Chronic obstructive pulmonary disease, unspecified COPD type (H)     Anemia, unspecified type     Health Care Home       Allergies   Allergen Reactions     Codeine        Current Outpatient Medications   Medication Sig Dispense Refill     albuterol (PROAIR HFA/PROVENTIL HFA/VENTOLIN HFA) 108 (90 Base) MCG/ACT inhaler Inhale 2 puffs into the lungs every 6 hours as needed for shortness of breath / dyspnea or wheezing 1 Inhaler 1     aspirin 81 MG tablet Take 1 tablet (81 mg) by mouth daily (Patient taking differently: Take 81 mg by mouth At Bedtime Pt. Last took 09/02/2019) 90 tablet 3     atorvastatin (LIPITOR) 80 MG tablet Take 1 tablet (80 mg) by mouth daily (Patient taking differently: Take 80 mg by mouth At Bedtime ) 90 tablet 3     ferrous sulfate (IRON) 325 (65 FE) MG tablet Take 325 mg by mouth every morning  60 tablet 3     lisinopril (PRINIVIL/ZESTRIL) 20 MG tablet Take 1 tablet (20 mg) by mouth daily (Patient taking differently: Take 20 mg by mouth At Bedtime ) 90 tablet 3     metoprolol succinate ER (TOPROL-XL) 50 MG 24 hr tablet Take 1 tablet (50 mg) by mouth daily (Patient taking differently: Take 50 mg by mouth At Bedtime ) 90 tablet 3     nitroglycerin (NITROSTAT) 0.4 MG sublingual tablet Place 1 tablet (0.4 mg) under the tongue every 5 minutes as needed for chest pain " 25 tablet 3     oxyCODONE (ROXICODONE) 5 MG tablet Take 1 tablet (5 mg) by mouth every 6 hours as needed 12 tablet 0     PARoxetine (PAXIL) 40 MG tablet Take 1 tablet (40 mg) by mouth every morning 90 tablet 3     SM NICOTINE 14 MG/24HR 24 hr patch          Social History     Tobacco Use     Smoking status: Former Smoker     Packs/day: 1.50     Years: 30.00     Pack years: 45.00     Last attempt to quit: 2019     Years since quittin.1     Smokeless tobacco: Never Used   Substance Use Topics     Alcohol use: No     Comment: quit 35 years ago     Drug use: Yes     Comment: occionally use of pot       Sarah Murray, EMT  2019  10:52 AM

## 2019-09-24 NOTE — LETTER
2019       RE: Tevin Guidry  [address] [id number]  [facility] [address]     Dear Colleague,    Thank you for referring your patient, Tevin Guidry, to the [facility] UROLOGY AND [facility] FOR PROSTATE AND UROLOGIC CANCERS at [facility]. Please see a copy of my visit note below.      Urology Clinic    Almas Sunshine MD  Date of Service: 2019     Name: Tevin Guidry  MRN: [id number]  Age: 69 year old  : 1950  Referring provider: Preethi Quiroz     Assessment and Plan:  Assessment:  Tevin Guidry  is a 69 year old male with non-invasive high grade urothelial carcinoma.     Plan:  We had an in depth discussion about non-invasive bladder cancer.  We discussed the fact that once urothelial cancer invades the bladder muscle layer it can potentially invade the blood vessels and lymphatic channels and spread throughout the body, but as of this point we do not have any evidence that this has happened.      We discussed that BCG is sometimes used to reduce the risk of progression and recurrence. We discussed side effects of the treatment, including malaise and rarely a systemic response that may require reducing the dose or stopping treatment. He will start BCG treatment (once weekly for 6 weeks).     He was counseled on smoking cessation and offered a referral. He is using patches and is trying to quit. We discussed that if he quits now, he will have a lower risk or recurrence.     Follow up in 3 months with cystoscopy.     ______________________________________________________________________    HPI  Tevin Guidry is a 69 year old male with a history of gross hematuria and bladder tumor s/p TURBT on 2019.     Surgical pathology showed:   A. Left lateral bladder tumor superficial:   - Non-invasive high grade urothelial carcinoma     B. Left lateral bladder tumor deep:   - Non-invasive high grade urothelial carcinoma   - Small fragments of uninvolved muscularis  "propria are identified     He has a 1-2 year history of intermittent pink/rust colored urine and 2 days of bright red blood in 06/2019 as well as another episode of bright red blood with small clots and brief incontinence in mid-July. He also reported intermittent urgency and rare urge incontinence at times, mainly when he drinks too much coffee or water. He otherwise denied any dysuria, frequency, hesitancy, intermittency, feelings of incomplete emptying, flank pain, abdominal pain, or any recent history of urinary tract infections or stones.     He recently quit smoking cigarettes. Wears a nicotine patch.      Denies family history of prostate, bladder, or kidney cancer.    Today he reports some urgency and frequency but is no longer having hematuria.     Review of Systems:   Pertinent items are noted in HPI or as below, remainder of complete ROS is negative.      Physical Exam:   Patient is a 69 year old  male   Vitals: Blood pressure 138/84, pulse 59, height 1.753 m (5' 9\"), weight 74.8 kg (165 lb).  Notable Findings on Exam: Well-nourished male in no apparent distress     Laboratory:   I personally reviewed all applicable laboratory data and went over findings with patient  Significant for:    CBC RESULTS:  Recent Labs   Lab Test 12/10/18  1231 03/06/18  1015 09/06/17  1004 05/22/17  1108   WBC 10.5 7.9 9.3 7.4   HGB 14.9 15.3 15.3 13.0*    281 268 249        BMP RESULTS:  Recent Labs   Lab Test 09/03/19  1008 07/03/19  1155 12/10/18  1231 09/06/17  1004 03/27/17  1537   NA  --  144 139 142 143   POTASSIUM  --  4.8 4.7 4.6 4.2   CHLORIDE  --  109 103 105 106   CO2  --  29 30 32 32   ANIONGAP  --  6 6 5 5   GLC  --  95 90 98 118*   BUN  --  11 11 11 16   CR  --  0.80 0.84 0.72 0.86   GFRESTIMATED >90 >90 >90 >90 88   GFRESTBLACK >90 >90 >90 >90 >90  African American GFR Calc     ATMMIE  --  8.3* 9.2 9.3 8.4*       UA RESULTS:   Recent Labs   Lab Test 07/30/19  1355 07/03/19  1155 02/21/17  1348 " 10/27/16  1028   SG 1.011 >1.030 1.010 1.025   URINEPH 6.0 6.0 6.5 6.5   NITRITE Negative Negative Negative Negative   RBCU 25* >100*  --  O - 2   WBCU 1 0 - 5  --  O - 2       CALCIUM RESULTS  Lab Results   Component Value Date    TAMMIE 8.3 07/03/2019    TAMMIE 9.2 12/10/2018    TAMMIE 9.3 09/06/2017       PSA RESULTS  PSA   Date Value Ref Range Status   07/30/2019 0.59 0 - 4 ug/L Final     Comment:     Assay Method:  Chemiluminescence using Siemens Vista analyzer   10/27/2016 0.41 0 - 4 ug/L Final     Comment:     Assay Method:  Chemiluminescence using Siemens Vista analyzer   10/23/2015 0.55 0 - 4 ug/L Final   08/20/2013 0.63 0 - 4 ug/L Final   04/26/2006 0.72 0 - 4 ug/L Final   07/29/2003 0.7 0 - 4 ug/L Final       INR  Recent Labs   Lab Test 10/07/14  1319 09/29/14  1735 09/28/14  0709   INR 1.11 1.24* 0.98     Imaging:   I personally reviewed all applicable imaging and went over the below findings with patient.    Results for orders placed or performed in visit on 09/03/19   CT Urogram wo & w Contrast    Narrative    CT UROGRAM WITH AND WITHOUT CONTRAST September 3, 2019 10:32 AM     HISTORY: Abnormal findings in urine. Gross hematuria.    TECHNIQUE: Axial images are obtained from the lung bases to the iliac  crests without IV contrast. Following the administration of 99 mL of  Isovue 370, additional axial images are obtained from the lung bases  to the symphysis during corticomedullary and excretory phases. Coronal  and sagittal reformatted images are also generated. Radiation dose for  this scan was reduced using automated exposure control, adjustment of  the mA and/or kV according to patient size, or iterative  reconstruction technique.    FINDINGS: Mild dependent atelectasis is present at the lung bases.  Calcified granuloma posterior right costophrenic angle is noted.    Abdomen: Initial noncontrast images demonstrate calcified granulomas  in the liver and spleen. No evidence of fatty infiltration of the  liver.  No calcified gallstones. Arterial vascular calcifications are  scattered throughout the aorta and branch vessels. No evidence of  aortic aneurysm. Following contrast administration, the liver, spleen,  gallbladder, pancreas and adrenal glands are within normal limits. No  enlarged lymph nodes. No evidence of bowel obstruction.    Right urinary tract: No renal masses or cysts are present. No  collecting system or ureteral stones are present. There is no  hydronephrosis. Delayed imaging demonstrates a normal right renal  collecting system and ureter. Ureter is normal in caliber and course.    Left urinary tract: No renal masses or cysts are present. No  collecting system or ureteral stones are present. There is no  hydronephrosis. Delayed imaging demonstrates a normal left renal  collecting system and ureter. Ureter is normal in caliber and course.    Bladder: Bladder is well-distended with an enhancing left lateral  bladder wall mass measuring 2.3 x 1.8 cm. This does not appear to  extend beyond the bladder wall itself. No other bladder wall lesions  are appreciated. No bladder calculi are evident. Delayed imaging  demonstrates a filling defect caused by the enhancing left lateral  bladder wall mass. No other filling defects are appreciated.    Pelvis: Prostate gland and rectum are unremarkable. No enlarged pelvic  or inguinal lymph nodes. No free pelvic fluid. Degenerative spine  changes are noted. No aggressive appearing bone lesions are  appreciated.      Impression    IMPRESSION:    1. 2.3 cm left lateral bladder wall mass. No associated pelvic or  retroperitoneal lymph node enlargement. No definite evidence of  metastatic disease in the abdomen or pelvis.  2. No urinary tract calculi or hydronephrosis. No renal cyst or mass.  Collecting systems and ureters are unremarkable.  3. Calcified atherosclerotic disease in the aorta and branch vessels.  4. Evidence of old granulomatous disease.    JAMIE PERSAUD MD        Scribe Disclosure:  I, Carleen Pedro, am serving as a scribe to document services personally performed by Almas Sunshine MD at this visit, based upon the provider's statements to me. All documentation has been reviewed by the aforementioned provider prior to being entered into the official medical record.     Again, thank you for allowing me to participate in the care of your patient.      Sincerely,    Almas Sunshine MD

## 2019-09-25 DIAGNOSIS — C67.9 BLADDER CANCER (H): ICD-10-CM

## 2019-09-26 NOTE — PROGRESS NOTES
BCG teaching and instructions given to this patient. I will call him later in the week with the dates and times for the instillations.

## 2019-09-30 ENCOUNTER — PATIENT OUTREACH (OUTPATIENT)
Dept: UROLOGY | Facility: CLINIC | Age: 69
End: 2019-09-30

## 2019-09-30 NOTE — PROGRESS NOTES
I called and spoke with patient and gave him times and dates for his BCG treatments. Tuesday's at 2:00pm starting October 8th on the 2nd floor. Reviewed instructions including the bleach and antibiotics (Levaquin) after the procedure. I asked the patient to give me a call if he thinks of any other questions. Agreed with plan. Carissa Barnes RN

## 2019-10-07 DIAGNOSIS — C67.2 MALIGNANT NEOPLASM OF LATERAL WALL OF URINARY BLADDER (H): ICD-10-CM

## 2019-10-07 RX ORDER — LIDOCAINE HYDROCHLORIDE 20 MG/ML
10 JELLY TOPICAL
Status: CANCELLED | OUTPATIENT
Start: 2019-10-15

## 2019-10-07 RX ORDER — LIDOCAINE HYDROCHLORIDE 20 MG/ML
10 JELLY TOPICAL
Status: CANCELLED | OUTPATIENT
Start: 2019-10-08

## 2019-10-07 RX ORDER — LIDOCAINE HYDROCHLORIDE 20 MG/ML
10 JELLY TOPICAL
Status: CANCELLED | OUTPATIENT
Start: 2019-11-12

## 2019-10-07 RX ORDER — LIDOCAINE HYDROCHLORIDE 20 MG/ML
10 JELLY TOPICAL
Status: CANCELLED | OUTPATIENT
Start: 2019-11-05

## 2019-10-07 RX ORDER — LIDOCAINE HYDROCHLORIDE 20 MG/ML
10 JELLY TOPICAL
Status: CANCELLED | OUTPATIENT
Start: 2019-10-22

## 2019-10-07 RX ORDER — LIDOCAINE HYDROCHLORIDE 20 MG/ML
10 JELLY TOPICAL
Status: CANCELLED | OUTPATIENT
Start: 2019-10-29

## 2019-10-08 ENCOUNTER — INFUSION THERAPY VISIT (OUTPATIENT)
Dept: ONCOLOGY | Facility: CLINIC | Age: 69
End: 2019-10-08
Attending: UROLOGY
Payer: COMMERCIAL

## 2019-10-08 VITALS
DIASTOLIC BLOOD PRESSURE: 89 MMHG | SYSTOLIC BLOOD PRESSURE: 162 MMHG | OXYGEN SATURATION: 96 % | HEART RATE: 58 BPM | TEMPERATURE: 98.9 F | RESPIRATION RATE: 16 BRPM

## 2019-10-08 DIAGNOSIS — C67.2 MALIGNANT NEOPLASM OF LATERAL WALL OF URINARY BLADDER (H): Primary | ICD-10-CM

## 2019-10-08 LAB
ALBUMIN UR-MCNC: NEGATIVE MG/DL
APPEARANCE UR: CLEAR
BILIRUB UR QL STRIP: NEGATIVE
COLOR UR AUTO: NORMAL
GLUCOSE UR STRIP-MCNC: NEGATIVE MG/DL
HGB UR QL STRIP: NEGATIVE
KETONES UR STRIP-MCNC: NEGATIVE MG/DL
LEUKOCYTE ESTERASE UR QL STRIP: NEGATIVE
NITRATE UR QL: NEGATIVE
PH UR STRIP: 7 PH (ref 5–7)
SOURCE: NORMAL
SP GR UR STRIP: 1.01 (ref 1–1.03)
UROBILINOGEN UR STRIP-MCNC: 0 MG/DL (ref 0–2)

## 2019-10-08 PROCEDURE — 81003 URINALYSIS AUTO W/O SCOPE: CPT | Performed by: UROLOGY

## 2019-10-08 PROCEDURE — G0463 HOSPITAL OUTPT CLINIC VISIT: HCPCS | Mod: 25

## 2019-10-08 PROCEDURE — 25000125 ZZHC RX 250: Mod: ZF | Performed by: UROLOGY

## 2019-10-08 PROCEDURE — 25000128 H RX IP 250 OP 636: Mod: ZF | Performed by: UROLOGY

## 2019-10-08 PROCEDURE — 51720 TREATMENT OF BLADDER LESION: CPT

## 2019-10-08 RX ORDER — LIDOCAINE HYDROCHLORIDE 20 MG/ML
10 JELLY TOPICAL
Status: DISCONTINUED | OUTPATIENT
Start: 2019-10-08 | End: 2019-10-08 | Stop reason: HOSPADM

## 2019-10-08 RX ORDER — LEVOFLOXACIN 500 MG/1
TABLET, FILM COATED ORAL
Qty: 12 TABLET | Refills: 0 | Status: SHIPPED | OUTPATIENT
Start: 2019-10-08 | End: 2020-03-04

## 2019-10-08 RX ADMIN — BACILLUS CALMETTE-GUERIN 50 MG: 50 POWDER, FOR SUSPENSION INTRAVESICAL at 15:06

## 2019-10-08 RX ADMIN — LIDOCAINE HYDROCHLORIDE 10 ML: 20 JELLY TOPICAL at 14:53

## 2019-10-08 ASSESSMENT — PAIN SCALES - GENERAL: PAINLEVEL: NO PAIN (0)

## 2019-10-08 NOTE — PROGRESS NOTES
Infusion Nursing Note:  Tevin Guidry presents today for BCG Induction.  Instillation number 1 of 6.   Patient seen by provider today: No   present during visit today: Not Applicable.    Note: Patient new to oncology infusion room and is receiving BCG for the first time. Pt oriented to infusion room and call light. Chemotherapy teaching done previously by Carissa Barnes RNCC.  Reinforced chemotherapy teaching/side effects and schedule during infusion visit.     Patient arrives to infusion feeling well. BP elevated but had not taken antihypertensives yet (pt also verbalized anxiousness). Denies dysuria, increased urgency, increased frequency, hematuria, fever, or chills today. Reports he does experience baseline urgency infrequently but also depends on how much he drinks. States he noticed blood in his urine about 3-4 days ago but has history of gross hematuria prior to diagnosis. TURBT performed on 9/5/19.     No visible blood in patient's urine sample today and appeared very diluted. Pt received Levaquin script today and reviewed medication schedule. Patient aware of when to take Levaquin today/tomorrow morning.    Copy of AVS reviewed with patient. Pt instructed to call care coordinator, urology triage (or MD on call if after hours/weekends) with chills/temp >=100.4, questions/concerns. Pt stated understanding of plan.       Treatment Conditions:   Patient states no concerns or issues at this time.  Ok to proceed with treatment.     Labs Reviewed:  Urine analysis.  UA RESULTS:  Lab Test 10/08/19  1420   COLOR Straw   APPEARANCE Clear   URINEGLC Negative   URINEBILI Negative   URINEKETONE Negative   SG 1.006   UBLD Negative   URINEPH 7.0   PROTEIN Negative   UROBILINOGEN  --    NITRITE Negative   LEUKEST Negative   RBCU  --    WBCU  --          Procedure:  16F Coude catheter placed.  Lidocaine urojet 2% 10ml used.  Catheter placed without trauma.  Clear/yellow urine drained from bladder.    Patient  turned every 15 minutes per protocol: Yes, turning to be completed at home per protocol.   Patient tolerated instillation without incident.    Patient instructed on the following and verbalized understanding:   Medication to remain in the bladder for 2 hours post instillation: YES  Post instillation side effects and precautions discussed: YES  Levaquin dose to be taken 6 hours post instillation and tomorrow morning: YES    Discharge Plan:   Prescription refills given for Levaquin. Medication given to patient by pharmacy staff with education.  Discharge instructions reviewed with: Patient.  Patient and/or family verbalized understanding of discharge instructions and all questions answered.  Copy of AVS reviewed with patient and/or family.  Patient will return 10/15 for next appointment.  Patient discharged in stable condition accompanied by: self.  Departure Mode: Ambulatory.  Face to Face time: 20.    Radha Marks RN

## 2019-10-08 NOTE — PATIENT INSTRUCTIONS
Home discharge instructions:  -To make sure each treatment has the best chance of working, follow these steps 2 hours after each treatment                        1. Lie on your back for 15 minues                        2. Lie on your left side for 15 mintues                        3. Lie on your right side for 15 minutes                        4. Get up but keep the medication  In your bladder for 60 more minutes- for a total of 2 hours                        5. Empty your bladder following the directions below (if you have difficulty holding your bladder it is ok to empty your bladder early)  -Wear pad if incontinence is a possibliity  -Wash hands throughly after using the bathroom  -For safety reasons remember to follow these directions for 6 hours after each treatment:                        1. Sit on the toilet seat to urinate                        2. Immediately after urinating- add 2 cups of undiluted chlorine bleach to the toilet                         3. Close lid and let the bleach sit for 15 minutes each time                        4. Drink plenty of water to flush any remaining medication out of your bladder     **These steps will help kill all the BCG bacteria and disinfect the toilet**     Please void BCG at 5:07pm     Take your antibiotic today at 9:07pm and tomorrow morning.        Please call urology triage line at [phone] or [phone] with fevers or chills, burning with urination, or blood in your urine which lasts more than a 48-72 hours or with any question or concerns.

## 2019-10-11 ENCOUNTER — PATIENT OUTREACH (OUTPATIENT)
Dept: GERIATRIC MEDICINE | Facility: CLINIC | Age: 69
End: 2019-10-11

## 2019-10-11 NOTE — PROGRESS NOTES
Have called member   10/2/19  10/4/19  10/5/19  10/10/19  Left messages with each call.  Member had been [facility] last year for annual assessment.    Lovely Medina RN  [facility]   [phone]

## 2019-10-11 NOTE — LETTER
October 14, 2019      TEVIN LYONS   [address] [id number]  [facility] [address]        Dear Tevin:     Your Care Coordinator has been unable to reach you by telephone. I am writing to ask you or a family member to call me at [phone]. If you reach my voicemail, please leave a message with your daytime telephone number and a date and time that I can call you. If you are hearing impaired, please call the [place] [employer] at 111 or [phone] ([id number]-[id number] relay service).     The reason I am trying to reach you is:    [] Six (6) month check-in  [x] To schedule your annual assessment  [] Other:      Please call me as soon as you receive this letter. I look forward to speaking with you.    Sincerely,    Lovely Medina RN    E-mail: [email or web address]  Phone: [phone]      [facility] ([facility]) is a health plan that contracts with both Medicare and the [place] Medical Assistance (Medicaid) program to provide benefits of both programs to enrollees. Enrollment in [facility] depends on contract renewal.    [id number])    [id number] (11/18)

## 2019-10-14 NOTE — PROGRESS NOTES
"[facility] Care Coordination Contact    Per CC, mailed client an \"Unable to Contact\" letter.  Rosaura Karimi  Case Management Specialist  [facility]  [phone]    "

## 2019-10-15 ENCOUNTER — INFUSION THERAPY VISIT (OUTPATIENT)
Dept: ONCOLOGY | Facility: CLINIC | Age: 69
End: 2019-10-15
Attending: UROLOGY
Payer: COMMERCIAL

## 2019-10-15 VITALS
HEART RATE: 65 BPM | OXYGEN SATURATION: 97 % | DIASTOLIC BLOOD PRESSURE: 77 MMHG | SYSTOLIC BLOOD PRESSURE: 127 MMHG | TEMPERATURE: 97.9 F | RESPIRATION RATE: 16 BRPM

## 2019-10-15 DIAGNOSIS — C67.2 MALIGNANT NEOPLASM OF LATERAL WALL OF URINARY BLADDER (H): Primary | ICD-10-CM

## 2019-10-15 LAB
ALBUMIN UR-MCNC: NEGATIVE MG/DL
APPEARANCE UR: CLEAR
BILIRUB UR QL STRIP: NEGATIVE
COLOR UR AUTO: NORMAL
GLUCOSE UR STRIP-MCNC: NEGATIVE MG/DL
HGB UR QL STRIP: NEGATIVE
KETONES UR STRIP-MCNC: NEGATIVE MG/DL
LEUKOCYTE ESTERASE UR QL STRIP: NEGATIVE
NITRATE UR QL: NEGATIVE
PH UR STRIP: 6 PH (ref 5–7)
SOURCE: NORMAL
SP GR UR STRIP: 1 (ref 1–1.03)
UROBILINOGEN UR STRIP-MCNC: 0 MG/DL (ref 0–2)

## 2019-10-15 PROCEDURE — 25000128 H RX IP 250 OP 636: Mod: ZF | Performed by: UROLOGY

## 2019-10-15 PROCEDURE — 25000125 ZZHC RX 250: Mod: ZF | Performed by: UROLOGY

## 2019-10-15 PROCEDURE — 51720 TREATMENT OF BLADDER LESION: CPT

## 2019-10-15 PROCEDURE — 81003 URINALYSIS AUTO W/O SCOPE: CPT | Performed by: UROLOGY

## 2019-10-15 RX ORDER — LIDOCAINE HYDROCHLORIDE 20 MG/ML
10 JELLY TOPICAL
Status: DISCONTINUED | OUTPATIENT
Start: 2019-10-15 | End: 2019-10-15 | Stop reason: HOSPADM

## 2019-10-15 RX ADMIN — LIDOCAINE HYDROCHLORIDE 10 ML: 20 JELLY TOPICAL at 14:36

## 2019-10-15 RX ADMIN — BACILLUS CALMETTE-GUERIN 50 MG: 50 POWDER, FOR SUSPENSION INTRAVESICAL at 14:54

## 2019-10-15 ASSESSMENT — PAIN SCALES - GENERAL: PAINLEVEL: NO PAIN (0)

## 2019-10-15 NOTE — PROGRESS NOTES
"Infusion Nursing Note:  Tevin Guidry presents today for BCG bladder instillation induction.  Instillation number 2 of 6.   Patient seen by provider today: No   present during visit today: Not Applicable.    Note: pt presents to infusion room today feeling well with no new complaints. Pt reports being able to hold BCG in bladder for about 1 hour and 40 minutes post last treatment. Pt then states \"I had to let a little out to relieve the pressure. And then I held it for about 20 more minutes.\" Pt states that total dwell time with last treatment was about two hours. Pt denies any new urinary symptoms post last treatment. Pt does continue with some baseline frequency and mild urgency, but these are no worse than usual. Pt denies fever, chills, dysuria, increased urgency or frequency, or gross hematuria today. No visible blood in today's urine specimen. Pt states having adequate supply of Levaquin at home.     Treatment Conditions:   Patient states no concerns or issues at this time.  Ok to proceed with treatment.     Labs Reviewed:  Urine analysis.  Results meet treatment conditions.   UA RESULTS:  Lab Test 10/15/19  1400   COLOR Straw   APPEARANCE Clear   URINEGLC Negative   URINEBILI Negative   URINEKETONE Negative   SG 1.004   UBLD Negative   URINEPH 6.0   PROTEIN Negative   UROBILINOGEN  --    NITRITE Negative   LEUKEST Negative   RBCU  --    WBCU  --          Pre-procedure Assessment:  Dysuria:  No  Hematuria:  No  Fever/chills:  No  Increased urinary urgency:  No  Increased urinary frequency:  No    Lidocaine urojet 2% 10 ml used: YES  Catheter placed using sterile technique: 16 Japanese coude Echavarria    Procedure:  Catheter placed without trauma.  Clear/yellow urine drained from bladder.    Patient turned every 15 minutes per protocol: Yes, turning to be completed at home per protocol.     Post-procedure Assessment:  Patient tolerated bladder instillation without incident.  Echavarria discontinued intact. "     Patient instructed on the following and verbalized understanding:   Medication to remain in the bladder for 2 hours post instillation: YES  Post instillation side effects and precautions discussed: YES    Discharge Plan:   Patient declined prescription refills.  Copy of AVS reviewed with patient and/or family.  Patient will return 10/22 for next appointment.  Patient discharged in stable condition accompanied by: self.  Departure Mode: Ambulatory.    DECLAN SWARTZ RN

## 2019-10-15 NOTE — PATIENT INSTRUCTIONS
Home discharge instructions:  -To make sure each treatment has the best chance of working, follow these steps 2 hours after each treatment   1. Lie on your back for 15 minues   2. Lie on your left side for 15 mintues   3. Lie on your right side for 15 minutes   4. Get up but keep the medication  In your bladder for 60 more minutes- for a total of 2 hours   5. Empty your bladder following the directions below (if you have difficulty holding your bladder it is ok to empty your bladder early)  -Wear pad if incontinence is a possibliity  -Wash hands throughly after using the bathroom  -For safety reasons remember to follow these directions for 6 hours after each treatment:  (for 6 hours after your empty the BCG from your bladder)   1. Sit on the toilet seat to urinate   2. Immediately after urinating- add 2 cups of undiluted chlorine bleach to the toilet    3. Close lid and let the bleach sit for 15 minutes each time   4. Drink plenty of water to flush any remaining medication out of your bladder    **These steps will help kill all the BCG bacteria and disinfect the toilet**    Please void BCG at 4:55pm    Take your antibiotic today at 8:55pm and tomorrow morning.      Please call urology triage line at [phone] or [phone] with fevers or chills, burning with urination, or blood in your urine which lasts more than a 48-72 hours or with any question or concerns.      October 2019 Sunday Monday Tuesday Wednesday Thursday Friday Saturday             1     2     3     4     5       6     7     8    [facility] ONC INFUSION 120   2:00 PM   (120 min.)    ONCOLOGY INFUSION   [facility] 9     10     11     12       13     14     15    [facility] ONC INFUSION 120   2:00 PM   (120 min.)    ONCOLOGY INFUSION   [facility] 16     17     18     19       20     21     22    [facility] ONC INFUSION 120   2:00 PM   (120 min.)    ONCOLOGY INFUSION   [facility] 23     24     25      26       27     28     29    [facility] ONC INFUSION 120   2:00 PM   (120 min.)    ONCOLOGY INFUSION   [facility] 30 31 November 2019 Sunday Monday Tuesday Wednesday Thursday Friday Saturday                            1     2       3     4     5    [facility] ONC INFUSION 120   2:00 PM   (120 min.)    ONCOLOGY INFUSION   [facility] 6     7     8     9       10     11     12    [facility] ONC INFUSION 120   8:00 AM   (120 min.)    ONCOLOGY INFUSION   [facility] 13     14     15     16       17     18     19     20     21     22     23       24     25     26     27     28     29     30

## 2019-10-22 ENCOUNTER — INFUSION THERAPY VISIT (OUTPATIENT)
Dept: ONCOLOGY | Facility: CLINIC | Age: 69
End: 2019-10-22
Attending: UROLOGY
Payer: COMMERCIAL

## 2019-10-22 VITALS
DIASTOLIC BLOOD PRESSURE: 82 MMHG | SYSTOLIC BLOOD PRESSURE: 155 MMHG | RESPIRATION RATE: 16 BRPM | HEART RATE: 60 BPM | TEMPERATURE: 98.2 F | OXYGEN SATURATION: 96 %

## 2019-10-22 DIAGNOSIS — C67.2 MALIGNANT NEOPLASM OF LATERAL WALL OF URINARY BLADDER (H): Primary | ICD-10-CM

## 2019-10-22 LAB
ALBUMIN UR-MCNC: NEGATIVE MG/DL
APPEARANCE UR: CLEAR
BILIRUB UR QL STRIP: NEGATIVE
COLOR UR AUTO: YELLOW
GLUCOSE UR STRIP-MCNC: NEGATIVE MG/DL
HGB UR QL STRIP: NEGATIVE
KETONES UR STRIP-MCNC: NEGATIVE MG/DL
LEUKOCYTE ESTERASE UR QL STRIP: NEGATIVE
NITRATE UR QL: NEGATIVE
PH UR STRIP: 5 PH (ref 5–7)
SOURCE: NORMAL
SP GR UR STRIP: 1.01 (ref 1–1.03)
UROBILINOGEN UR STRIP-MCNC: 2 MG/DL (ref 0–2)

## 2019-10-22 PROCEDURE — 81003 URINALYSIS AUTO W/O SCOPE: CPT | Performed by: UROLOGY

## 2019-10-22 PROCEDURE — 51720 TREATMENT OF BLADDER LESION: CPT

## 2019-10-22 PROCEDURE — 25000128 H RX IP 250 OP 636: Mod: ZF | Performed by: UROLOGY

## 2019-10-22 PROCEDURE — 25000125 ZZHC RX 250: Mod: ZF | Performed by: UROLOGY

## 2019-10-22 RX ORDER — LIDOCAINE HYDROCHLORIDE 20 MG/ML
10 JELLY TOPICAL
Status: DISCONTINUED | OUTPATIENT
Start: 2019-10-22 | End: 2019-10-22 | Stop reason: HOSPADM

## 2019-10-22 RX ADMIN — LIDOCAINE HYDROCHLORIDE 10 ML: 20 JELLY TOPICAL at 14:25

## 2019-10-22 RX ADMIN — BACILLUS CALMETTE-GUERIN 50 MG: 50 POWDER, FOR SUSPENSION INTRAVESICAL at 14:39

## 2019-10-22 ASSESSMENT — PAIN SCALES - GENERAL: PAINLEVEL: NO PAIN (0)

## 2019-10-22 NOTE — PROGRESS NOTES
Infusion Nursing Note:  Tevin Guidry presents today for BCG Induction.  Instillation number 3 of 6.   Patient seen by provider today: No   present during visit today: Not Applicable.    Note: Patient arrives to infusion feeling well. Following treatment last week, patient denied any urinary symptoms besides his baseline frequency and mild urgency. Patient was able to hold the BCG for about one hour and 50 minutes. Denies dysuria, increased urgency, increased frequency, hematuria, fever, or chills today. Patient took Levaquin as directed.     No visible blood in patient's urine sample today. Pt reports having an adequate supply of Levaquin for today and next treatment.       Treatment Conditions:   Patient states no concerns or issues at this time.  Ok to proceed with treatment.     Labs Reviewed:  Urine analysis.  UA RESULTS:  Lab Test 10/22/19  1400   COLOR Yellow   APPEARANCE Clear   URINEGLC Negative   URINEBILI Negative   URINEKETONE Negative   SG 1.015   UBLD Negative   URINEPH 5.0   PROTEIN Negative   UROBILINOGEN  --    NITRITE Negative   LEUKEST Negative   RBCU  --    WBCU  --        Results meet treatment conditions.     Procedure:  16F catheter placed (catheter supplied in kit).  Lidocaine urojet 2% 10ml used.  Catheter placed without trauma.  Clear/yellow urine drained from bladder.    Patient turned every 15 minutes per protocol: Yes, turning to be completed at home per protocol.   Patient tolerated instillation without incident.    Patient instructed on the following and verbalized understanding:   Medication to remain in the bladder for 2 hours post instillation: YES  Post instillation side effects and precautions discussed: YES  Levaquin dose to be taken 6 hours post instillation and tomorrow morning: YES    Discharge Plan:   Patient declined prescription refills.  Discharge instructions reviewed with: Patient.  Patient and/or family verbalized understanding of discharge instructions and  all questions answered.  Copy of AVS reviewed with patient and/or family.  Patient will return 10/29 for next appointment.  Patient discharged in stable condition accompanied by: self.  Departure Mode: Ambulatory.    Radha Marks RN

## 2019-10-22 NOTE — PATIENT INSTRUCTIONS
Home discharge instructions:  -To make sure each treatment has the best chance of working, follow these steps 2 hours after each treatment                        1. Lie on your back for 15 minues                        2. Lie on your left side for 15 mintues                        3. Lie on your right side for 15 minutes                        4. Get up but keep the medication  In your bladder for 60 more minutes- for a total of 2 hours                        5. Empty your bladder following the directions below (if you have difficulty holding your bladder it is ok to empty your bladder early)  -Wear pad if incontinence is a possibliity  -Wash hands throughly after using the bathroom  -For safety reasons remember to follow these directions for 6 hours after each treatment:                        1. Sit on the toilet seat to urinate                        2. Immediately after urinating- add 2 cups of undiluted chlorine bleach to the toilet                         3. Close lid and let the bleach sit for 15 minutes each time                        4. Drink plenty of water to flush any remaining medication out of your bladder     **These steps will help kill all the BCG bacteria and disinfect the toilet**     Please void BCG at 4:40pm     Take your antibiotic today at 8:40pm and tomorrow morning.        Please call urology triage line at [phone] or [phone] with fevers or chills, burning with urination, or blood in your urine which lasts more than a 48-72 hours or with any question or concerns.

## 2019-10-29 ENCOUNTER — INFUSION THERAPY VISIT (OUTPATIENT)
Dept: ONCOLOGY | Facility: CLINIC | Age: 69
End: 2019-10-29
Attending: UROLOGY
Payer: COMMERCIAL

## 2019-10-29 VITALS
RESPIRATION RATE: 16 BRPM | OXYGEN SATURATION: 96 % | SYSTOLIC BLOOD PRESSURE: 138 MMHG | HEART RATE: 57 BPM | TEMPERATURE: 97.6 F | DIASTOLIC BLOOD PRESSURE: 78 MMHG

## 2019-10-29 DIAGNOSIS — C67.2 MALIGNANT NEOPLASM OF LATERAL WALL OF URINARY BLADDER (H): Primary | ICD-10-CM

## 2019-10-29 LAB
ALBUMIN UR-MCNC: NEGATIVE MG/DL
APPEARANCE UR: CLEAR
BILIRUB UR QL STRIP: NEGATIVE
COLOR UR AUTO: YELLOW
GLUCOSE UR STRIP-MCNC: NEGATIVE MG/DL
HGB UR QL STRIP: NEGATIVE
KETONES UR STRIP-MCNC: NEGATIVE MG/DL
LEUKOCYTE ESTERASE UR QL STRIP: NEGATIVE
NITRATE UR QL: NEGATIVE
PH UR STRIP: 5 PH (ref 5–7)
SOURCE: NORMAL
SP GR UR STRIP: 1.01 (ref 1–1.03)
UROBILINOGEN UR STRIP-MCNC: 0 MG/DL (ref 0–2)

## 2019-10-29 PROCEDURE — 81003 URINALYSIS AUTO W/O SCOPE: CPT | Performed by: UROLOGY

## 2019-10-29 PROCEDURE — 25000125 ZZHC RX 250: Mod: ZF | Performed by: UROLOGY

## 2019-10-29 PROCEDURE — 51720 TREATMENT OF BLADDER LESION: CPT

## 2019-10-29 PROCEDURE — 25000128 H RX IP 250 OP 636: Mod: ZF | Performed by: UROLOGY

## 2019-10-29 RX ORDER — OXYBUTYNIN CHLORIDE 5 MG/1
TABLET ORAL
COMMUNITY
Start: 2019-09-05 | End: 2020-01-08

## 2019-10-29 RX ORDER — LIDOCAINE HYDROCHLORIDE 20 MG/ML
10 JELLY TOPICAL
Status: DISCONTINUED | OUTPATIENT
Start: 2019-10-29 | End: 2019-10-29 | Stop reason: HOSPADM

## 2019-10-29 RX ADMIN — BACILLUS CALMETTE-GUERIN 50 MG: 50 POWDER, FOR SUSPENSION INTRAVESICAL at 14:59

## 2019-10-29 RX ADMIN — LIDOCAINE HYDROCHLORIDE 10 ML: 20 JELLY TOPICAL at 14:30

## 2019-10-29 ASSESSMENT — PAIN SCALES - GENERAL: PAINLEVEL: NO PAIN (0)

## 2019-10-29 NOTE — PROGRESS NOTES
Infusion Nursing Note:  Tevin Guidry presents today for BCG Induction Instillation #4 of 6.   Patient seen by provider today: No    Note: Patient states he feels good today. Denies fevers, chills. States he held his urine for 2 hours and 40 minutes post instillation last week because he took a nap. He took his levofloxacin as prescribed, and he has enough tablets for the remainder of his treatments. Denies hematuria, frequency, urgency. States that he had stinging the first time he voided after his BCG treatment last week. No symptoms or concerns today.     Treatment Conditions:   No blood visualized in urine today. Patient states no concerns or issues at this time. Ok to proceed with treatment.     Labs Reviewed:  UA RESULTS:  Lab Test 10/29/19  1402   COLOR Yellow   APPEARANCE Clear   URINEGLC Negative   URINEBILI Negative   URINEKETONE Negative   SG 1.010   UBLD Negative   URINEPH 5.0   PROTEIN Negative   UROBILINOGEN  --    NITRITE Negative   LEUKEST Negative   RBCU  --    WBCU  --        Procedure:  Lidocaine urojet 2% 10ml used.  16F Coude Echavarria catheter placed.  Catheter placed without trauma.  Clear/yellow urine drained from bladder.    Patient turned every 15 minutes per protocol: Yes, turning to be completed at home per protocol.   Patient tolerated instillation without incident.  Echavarria discontinued intact.      Patient instructed on the following and verbalized understanding:   Medication to remain in the bladder for 2 hours post instillation: YES  Post instillation side effects and precautions discussed: YES    Discharge Plan:   Patient declined prescription refills.  Discharge instructions reviewed with: Patient.  Patient verbalized understanding of discharge instructions and all questions answered.  Copy of AVS reviewed with patient. Patient will return 11/5/19 for next appointment.  Patient discharged in stable condition accompanied by: self.  Face to Face time: 0.    JEANNA CABALLERO RN

## 2019-10-29 NOTE — PATIENT INSTRUCTIONS
Home discharge instructions:  -To make sure each treatment has the best chance of working, follow these steps 2 hours after each treatment   1. Lie on your back for 15 minues   2. Lie on your left side for 15 mintues   3. Lie on your right side for 15 minutes   4. Get up but keep the medication  In your bladder for 60 more minutes- for a total of 2 hours   5. Empty your bladder following the directions below (if you have difficulty holding your bladder it is ok to empty your bladder early)  -Wear pad if incontinence is a possibliity  -Wash hands throughly after using the bathroom  -For safety reasons remember to follow these directions for 6 hours after each treatment:  (for 6 hours after your empty the BCG from your bladder)   1. Sit on the toilet seat to urinate   2. Immediately after urinating- add 2 cups of undiluted chlorine bleach to the toilet    3. Close lid and let the bleach sit for 15 minutes each time   4. Drink plenty of water to flush any remaining medication out of your bladder    **These steps will help kill all the BCG bacteria and disinfect the toilet**    Please void BCG at 5:00pm    Take your antibiotic today at 9:00 pm and tomorrow morning.      Please call urology triage line at [phone] or [phone] with fevers or chills, burning with urination, or blood in your urine which lasts more than a 48-72 hours or with any question or concerns.

## 2019-11-05 ENCOUNTER — INFUSION THERAPY VISIT (OUTPATIENT)
Dept: ONCOLOGY | Facility: CLINIC | Age: 69
End: 2019-11-05
Attending: UROLOGY
Payer: COMMERCIAL

## 2019-11-05 VITALS
RESPIRATION RATE: 18 BRPM | SYSTOLIC BLOOD PRESSURE: 138 MMHG | HEART RATE: 65 BPM | OXYGEN SATURATION: 96 % | DIASTOLIC BLOOD PRESSURE: 87 MMHG | TEMPERATURE: 97.9 F

## 2019-11-05 DIAGNOSIS — C67.2 MALIGNANT NEOPLASM OF LATERAL WALL OF URINARY BLADDER (H): Primary | ICD-10-CM

## 2019-11-05 LAB
ALBUMIN UR-MCNC: NEGATIVE MG/DL
APPEARANCE UR: NORMAL
BILIRUB UR QL STRIP: NEGATIVE
COLOR UR AUTO: YELLOW
GLUCOSE UR STRIP-MCNC: NEGATIVE MG/DL
HGB UR QL STRIP: NEGATIVE
KETONES UR STRIP-MCNC: NEGATIVE MG/DL
LEUKOCYTE ESTERASE UR QL STRIP: NEGATIVE
NITRATE UR QL: NEGATIVE
PH UR STRIP: 5 PH (ref 5–7)
SOURCE: NORMAL
SP GR UR STRIP: 1.01 (ref 1–1.03)
UROBILINOGEN UR STRIP-MCNC: 0 MG/DL (ref 0–2)

## 2019-11-05 PROCEDURE — 51720 TREATMENT OF BLADDER LESION: CPT

## 2019-11-05 PROCEDURE — 25000125 ZZHC RX 250: Mod: ZF | Performed by: UROLOGY

## 2019-11-05 PROCEDURE — 81003 URINALYSIS AUTO W/O SCOPE: CPT | Performed by: UROLOGY

## 2019-11-05 PROCEDURE — 25000128 H RX IP 250 OP 636: Mod: ZF | Performed by: UROLOGY

## 2019-11-05 RX ORDER — LIDOCAINE HYDROCHLORIDE 20 MG/ML
10 JELLY TOPICAL
Status: DISCONTINUED | OUTPATIENT
Start: 2019-11-05 | End: 2019-11-05 | Stop reason: HOSPADM

## 2019-11-05 RX ADMIN — LIDOCAINE HYDROCHLORIDE 10 ML: 20 JELLY TOPICAL at 14:30

## 2019-11-05 RX ADMIN — BACILLUS CALMETTE-GUERIN 50 MG: 50 POWDER, FOR SUSPENSION INTRAVESICAL at 15:00

## 2019-11-05 ASSESSMENT — PAIN SCALES - GENERAL: PAINLEVEL: NO PAIN (0)

## 2019-11-05 NOTE — PROGRESS NOTES
"Infusion Nursing Note:  Tevin Guidry presents today for BCG bladder instillation induction.  Instillation number 5 of 6.   Patient seen by provider today: No   present during visit today: Not Applicable.     Note: pt presents to infusion room today feeling well with no new complaints. Pt reports being able to hold BCG in bladder for about 1 hour post last treatment. Pt then states \"I had to let a little out to relieve the pressure. But then I held it for the full 2 hours.\" Pt denies any new urinary symptoms post last treatment. Pt does continue with some baseline frequency and mild urgency, but these are no worse than usual. Pt denies fever, chills, dysuria, increased urgency or frequency, or gross hematuria today. No visible blood in today's urine specimen. Pt states having adequate supply of Levaquin at home.      Treatment Conditions:   Patient states no concerns or issues at this time.  Ok to proceed with treatment.      Labs Reviewed:  Urine analysis.  Results meet treatment conditions.   UA RESULTS:  Lab Test 11/05/19  1355   COLOR Yellow   APPEARANCE Slightly Cloudy   URINEGLC Negative   URINEBILI Negative   URINEKETONE Negative   SG 1.015   UBLD Negative   URINEPH 5.0   PROTEIN Negative   UROBILINOGEN  --    NITRITE Negative   LEUKEST Negative   RBCU  --    WBCU  --         Pre-procedure Assessment:  Dysuria:  No  Hematuria:  No  Fever/chills:  No  Increased urinary urgency:  No  Increased urinary frequency:  No     Lidocaine urojet 2% 10 ml used: YES  Catheter placed using sterile technique: 16 Bulgarian coude Echavarria     Procedure:  Catheter placed without trauma.  Clear/yellow urine drained from bladder.    Patient turned every 15 minutes per protocol: Yes, turning to be completed at home per protocol.      Post-procedure Assessment:  Patient tolerated bladder instillation without incident.  Echavarria discontinued intact.      Patient instructed on the following and verbalized understanding: "   Medication to remain in the bladder for 2 hours post instillation: YES  Post instillation side effects and precautions discussed: YES     Discharge Plan:   Patient declined prescription refills.  Copy of AVS reviewed with patient and/or family.  Patient will return 11/12 for next appointment.  Patient discharged in stable condition accompanied by: self.  Departure Mode: Ambulatory.     DECLAN SWARTZ RN

## 2019-11-05 NOTE — PATIENT INSTRUCTIONS
Home discharge instructions:  -To make sure each treatment has the best chance of working, follow these steps 2 hours after each treatment   1. Lie on your back for 15 minues   2. Lie on your left side for 15 mintues   3. Lie on your right side for 15 minutes   4. Get up but keep the medication  In your bladder for 60 more minutes- for a total of 2 hours   5. Empty your bladder following the directions below (if you have difficulty holding your bladder it is ok to empty your bladder early)  -Wear pad if incontinence is a possibliity  -Wash hands throughly after using the bathroom  -For safety reasons remember to follow these directions for 6 hours after each treatment:  (for 6 hours after your empty the BCG from your bladder)   1. Sit on the toilet seat to urinate   2. Immediately after urinating- add 2 cups of undiluted chlorine bleach to the toilet    3. Close lid and let the bleach sit for 15 minutes each time   4. Drink plenty of water to flush any remaining medication out of your bladder    **These steps will help kill all the BCG bacteria and disinfect the toilet**    Please void BCG at 5:00pm    Take your antibiotic today at 9:00pm and tomorrow morning.      Please call urology triage line at [phone] or [phone] with fevers or chills, burning with urination, or blood in your urine which lasts more than a 48-72 hours or with any question or concerns.      November 2019 Sunday Monday Tuesday Wednesday Thursday Friday Saturday                            1     2       3     4     5    [facility] ONC INFUSION 120   2:00 PM   (120 min.)    ONCOLOGY INFUSION   [facility] 6     7     8     9       10     11     12    [facility] ONC INFUSION 120   8:00 AM   (120 min.)    ONCOLOGY INFUSION   [facility] 13     14     15     16       17     18     19     20     21     22     23       24     25     26     27     28     29     30                 December 2019 Sunday Monday  Tuesday Wednesday Thursday Friday Saturday   1     2     3     4     5     6     7       8     9     10     11     12     13     14       15     16     17     18     19     20     21       22     23     24     25     26     27     28       29     30     31

## 2019-11-06 ENCOUNTER — DOCUMENTATION ONLY (OUTPATIENT)
Dept: FAMILY MEDICINE | Facility: CLINIC | Age: 69
End: 2019-11-06

## 2019-11-06 DIAGNOSIS — Z12.11 SPECIAL SCREENING FOR MALIGNANT NEOPLASMS, COLON: Primary | ICD-10-CM

## 2019-11-06 NOTE — PROGRESS NOTES
[employer] community outreach is asking for a FIT test order for this pt. I have pended the order. Please sign if appropriate, or state reason why not.  Send message back to me when done.     Thanks,     lab

## 2019-11-12 ENCOUNTER — INFUSION THERAPY VISIT (OUTPATIENT)
Dept: ONCOLOGY | Facility: CLINIC | Age: 69
End: 2019-11-12
Attending: UROLOGY
Payer: COMMERCIAL

## 2019-11-12 VITALS
SYSTOLIC BLOOD PRESSURE: 132 MMHG | OXYGEN SATURATION: 98 % | RESPIRATION RATE: 18 BRPM | TEMPERATURE: 98.1 F | DIASTOLIC BLOOD PRESSURE: 83 MMHG | HEART RATE: 60 BPM

## 2019-11-12 DIAGNOSIS — C67.2 MALIGNANT NEOPLASM OF LATERAL WALL OF URINARY BLADDER (H): Primary | ICD-10-CM

## 2019-11-12 LAB
ALBUMIN UR-MCNC: NEGATIVE MG/DL
APPEARANCE UR: CLEAR
BILIRUB UR QL STRIP: NEGATIVE
COLOR UR AUTO: YELLOW
GLUCOSE UR STRIP-MCNC: NEGATIVE MG/DL
HGB UR QL STRIP: NEGATIVE
KETONES UR STRIP-MCNC: 5 MG/DL
LEUKOCYTE ESTERASE UR QL STRIP: NEGATIVE
NITRATE UR QL: NEGATIVE
PH UR STRIP: 6 PH (ref 5–7)
SOURCE: ABNORMAL
SP GR UR STRIP: 1.02 (ref 1–1.03)
UROBILINOGEN UR STRIP-MCNC: 2 MG/DL (ref 0–2)

## 2019-11-12 PROCEDURE — 25000125 ZZHC RX 250: Mod: ZF | Performed by: UROLOGY

## 2019-11-12 PROCEDURE — 51720 TREATMENT OF BLADDER LESION: CPT

## 2019-11-12 PROCEDURE — 88112 CYTOPATH CELL ENHANCE TECH: CPT | Performed by: UROLOGY

## 2019-11-12 PROCEDURE — 25000128 H RX IP 250 OP 636: Mod: ZF | Performed by: UROLOGY

## 2019-11-12 PROCEDURE — 81003 URINALYSIS AUTO W/O SCOPE: CPT | Performed by: UROLOGY

## 2019-11-12 PROCEDURE — 88120 CYTP URNE 3-5 PROBES EA SPEC: CPT | Performed by: UROLOGY

## 2019-11-12 RX ORDER — LIDOCAINE HYDROCHLORIDE 20 MG/ML
10 JELLY TOPICAL
Status: DISCONTINUED | OUTPATIENT
Start: 2019-11-12 | End: 2019-11-12 | Stop reason: HOSPADM

## 2019-11-12 RX ADMIN — BACILLUS CALMETTE-GUERIN 50 MG: 50 POWDER, FOR SUSPENSION INTRAVESICAL at 09:15

## 2019-11-12 RX ADMIN — LIDOCAINE HYDROCHLORIDE 10 ML: 20 JELLY TOPICAL at 08:49

## 2019-11-12 ASSESSMENT — PAIN SCALES - GENERAL: PAINLEVEL: NO PAIN (0)

## 2019-11-12 NOTE — PATIENT INSTRUCTIONS
Home discharge instructions:  -To make sure each treatment has the best chance of working, follow these steps 2 hours after each treatment                        1. Lie on your back for 15 minues                        2. Lie on your left side for 15 mintues                        3. Lie on your right side for 15 minutes                        4. Get up but keep the medication  In your bladder for 60 more minutes- for a total of 2 hours                        5. Empty your bladder following the directions below (if you have difficulty holding your bladder it is ok to empty your bladder early)  -Wear pad if incontinence is a possibliity  -Wash hands throughly after using the bathroom  -For safety reasons remember to follow these directions for 6 hours after each treatment:                        1. Sit on the toilet seat to urinate                        2. Immediately after urinating- add 2 cups of undiluted chlorine bleach to the toilet                         3. Close lid and let the bleach sit for 15 minutes each time                        4. Drink plenty of water to flush any remaining medication out of your bladder     **These steps will help kill all the BCG bacteria and disinfect the toilet**     Please void BCG at 1120.     Take your antibiotic today at 3:30pm and tomorrow morning.        Please call urology triage line at [phone] or [phone] with fevers or chills, burning with urination, or blood in your urine which lasts more than a 48-72 hours or with any question or concerns.

## 2019-11-12 NOTE — PROGRESS NOTES
Infusion Nursing Note:  Tevin Guidry presents today for BCG Induction.  Instillation number 6 of 6.   Patient seen by provider today: No   present during visit today: Not Applicable.    Note: Patient presents to infusion today stating he feels well. He states he was able to hold his urine for 1 hour 30 minutes last treatment. He had to void a small amount and then continued turning for the last 30 minutes prior to voiding again. He states he took his Levaquin as ordered and has two remaining tablets for today's treatment. Patient denies any hematuria, dysuria, urgency, frequency, fevers, or chills.     Treatment Conditions:   Patient states no concerns or issues at this time.    No visible blood noted in today's urine sample.   Ok to proceed with treatment.     Pre-procedure Assessment:  Dysuria: No  Hematuria: No  Fever/chills: No  Increased urinary urgency: No  Increased urinary frequency: No    Labs Reviewed:  Urine analysis.  Results meet treatment conditions.     UA RESULTS:  Lab Test 11/12/19  0802   COLOR Yellow   APPEARANCE Clear   URINEGLC Negative   URINEBILI Negative   URINEKETONE 5*   SG 1.019   UBLD Negative   URINEPH 6.0   PROTEIN Negative   UROBILINOGEN  --    NITRITE Negative   LEUKEST Negative   RBCU  --    WBCU  --        Procedure:  Lidocaine urojet 2% 10ml used.  16F Coude Echavarria catheter placed.  Catheter placed without trauma.  Clear/yellow urine drained from bladder.    Patient tolerated instillation without incident.  Patient turned every 15 minutes per protocol: Yes, turning to be completed at home per protocol.       Patient instructed on the following and verbalized understanding:   Medication to remain in the bladder for 2 hours post instillation: YES  Post instillation side effects and precautions discussed: YES    Discharge Plan:   Patient declined prescription refills.  Discharge instructions reviewed with: Patient.  Patient and/or family verbalized understanding of  discharge instructions and all questions answered.  Copy of AVS reviewed with patient and/or family.  Patient will return 1/7/19 for next appointment.  Patient discharged in stable condition accompanied by: self.  Departure Mode: Ambulatory.    Melisa Koch RN

## 2019-11-20 LAB — COPATH REPORT: NORMAL

## 2019-12-23 ENCOUNTER — PRE VISIT (OUTPATIENT)
Dept: UROLOGY | Facility: CLINIC | Age: 69
End: 2019-12-23

## 2019-12-23 NOTE — TELEPHONE ENCOUNTER
Reason for Visit: Cystoscopy- bladder tumor recheck    Orders/Procedures/Records: Records available    Contact Patient: N/A    Rooming Requirements: Cystoscopy, collect urine      Sarah Murray  12/23/19  11:34 AM

## 2020-01-07 ENCOUNTER — ALLIED HEALTH/NURSE VISIT (OUTPATIENT)
Dept: UROLOGY | Facility: CLINIC | Age: 70
End: 2020-01-07
Payer: COMMERCIAL

## 2020-01-07 ENCOUNTER — OFFICE VISIT (OUTPATIENT)
Dept: UROLOGY | Facility: CLINIC | Age: 70
End: 2020-01-07
Payer: COMMERCIAL

## 2020-01-07 VITALS
HEIGHT: 69 IN | HEART RATE: 55 BPM | BODY MASS INDEX: 23.99 KG/M2 | DIASTOLIC BLOOD PRESSURE: 81 MMHG | WEIGHT: 162 LBS | SYSTOLIC BLOOD PRESSURE: 126 MMHG

## 2020-01-07 DIAGNOSIS — Z85.51 PERSONAL HISTORY OF MALIGNANT NEOPLASM OF BLADDER: Primary | ICD-10-CM

## 2020-01-07 DIAGNOSIS — C67.2 MALIGNANT NEOPLASM OF LATERAL WALL OF URINARY BLADDER (H): Primary | ICD-10-CM

## 2020-01-07 DIAGNOSIS — N42.9 DISORDER OF PROSTATE, UNSPECIFIED: ICD-10-CM

## 2020-01-07 ASSESSMENT — PAIN SCALES - GENERAL: PAINLEVEL: NO PAIN (0)

## 2020-01-07 ASSESSMENT — MIFFLIN-ST. JEOR: SCORE: 1480.21

## 2020-01-07 NOTE — PROGRESS NOTES
Pre Op Teaching Flowsheet       Pre and Post op Patient Education  Relevant Diagnosis:  Bladder tumor      Motivation Level:  Asks Questions: Yes  Eager to Learn:  Yes  Cooperative: Yes  Receptive (willing/able to accept information):  Yes  Patient demonstrates understanding of the following:  Date and time of surgery:  Feb 6th  Location of surgery: [facility] [place]- 5th Floor  History and Physical and any other testing necessary prior to surgery: Yes, he is either going to have with his primary or go to PAC on Jan 23rd  Required time line for completion of History and Physical and any pre-op testing: Yes    NPO Guidelines: Nothing to eat 8 hours prior to surgery. Can have clear liquids up to 2 hours prior to sugery    Patient demonstrates understanding of the following:  Pre-op bowel prep: N/A  Pre-op showering/scrub information with Hibiclens Soap: Yes  Medications to take the day of surgery:  Per PCP  Blood thinner medications discussed and when to stop (if applicable):  Yes  Diabetes medication management (if applicable):  N/A  Discussed pain control after surgery: pain scale, pain medications and pain management techniques  Infection Prevention: Patient demonstrates understanding of the following:  Patient instructed on hand hygiene:  Yes  Surgical procedure site care taught: Yes  Signs and symptoms of infection taught:  Yes  Wound care will be taught at the time of discharge.  Central venous catheter care will be taught at the time of discharge (if applicable).    Post-op follow-up:  Discussed how to contact the hospital, nurse, and clinic scheduling staff if necessary.    Instructional materials used/given/mailed:  [facility] Booklet, post op teaching sheet, Map, Soap, and arrival/location information.    Surgical instructions given to patient in clinic: Yes.    Instructional Materials given:  Before your surgery packet , Medications to avoid before surgery , Showering or  Bathing instructions before surgery  and What to expect after surgery  Total time with patient: 10 minutes    Carissa Barnes RN

## 2020-01-07 NOTE — PROGRESS NOTES
Urology Clinic    Almas Sunshine MD  Date of Service: 2020     Name: Tevin Guidry  MRN: [id number]  Age: 69 year old  : 1950  Referring provider: Referred Self     Assessment and Plan:  Assessment:  Tevin Guidry  is a 69 year old male with non-invasive high grade urothelial carcinoma s/p induction BCG. Cystoscopy today shows a  1 cm tumor on the left anterior bladder neck.     Plan:  Follow up in the OR for TURBT. He will stop his aspirin 1 week prior to the procedure.   ______________________________________________________________________    HPI  Tevin Guidry is a 69 year old male with a history of gross hematuria and bladder tumor s/p TURBT on 2019. He completed BCG x 6 on 2019, which he tolerated well. He denies any hematuria today.      Surgical pathology showed:   A. Left lateral bladder tumor superficial:   - Non-invasive high grade urothelial carcinoma     B. Left lateral bladder tumor deep:   - Non-invasive high grade urothelial carcinoma   - Small fragments of uninvolved muscularis propria are identified     He has a 1-2 year history of intermittent pink/rust colored urine and 2 days of bright red blood in 2019 as well as another episode of bright red blood with small clots and brief incontinence in mid-July. He also reported intermittent urgency and rare urge incontinence at times, mainly when he drinks too much coffee or water. He otherwise denied any dysuria, frequency, hesitancy, intermittency, feelings of incomplete emptying, flank pain, abdominal pain, or any recent history of urinary tract infections or stones.     He recently quit smoking cigarettes. Wears a nicotine patch.      Denies family history of prostate, bladder, or kidney cancer.    Review of Systems:   Pertinent items are noted in HPI or as below, remainder of complete ROS is negative.      Cystoscopy:   After obtaining informed consent, the patient was prepped and draped in the standard  sterile fashion.  The 15 Lao flexible cystoscope was inserted through the urethral meatus.      The anterior urethra was: normal without stricture.    The external sphincter was appropriately coapted.   The prostatic urethra demonstrated mild bilobar hypertrophy.    The bladder neck was nonocclusive.    The bladder was remarkable for a 1 cm tumor on the left anterior bladder neck.   The ureteral orifices were identified on each side in orthotopic position with efflux of clear urine.   There were no trabeculations.    On retroflexion there was the usual bladder neck hyperemia.    There was no intravesical protrusion of the prostate.      The patient tolerated the procedure well without complication.      Laboratory:   I personally reviewed all applicable laboratory data and went over findings with patient  Significant for:    CBC RESULTS:  Recent Labs   Lab Test 12/10/18  1231 03/06/18  1015 09/06/17  1004 05/22/17  1108   WBC 10.5 7.9 9.3 7.4   HGB 14.9 15.3 15.3 13.0*    281 268 249        BMP RESULTS:  Recent Labs   Lab Test 09/03/19  1008 07/03/19  1155 12/10/18  1231 09/06/17  1004 03/27/17  1537   NA  --  144 139 142 143   POTASSIUM  --  4.8 4.7 4.6 4.2   CHLORIDE  --  109 103 105 106   CO2  --  29 30 32 32   ANIONGAP  --  6 6 5 5   GLC  --  95 90 98 118*   BUN  --  11 11 11 16   CR  --  0.80 0.84 0.72 0.86   GFRESTIMATED >90 >90 >90 >90 88   GFRESTBLACK >90 >90 >90 >90 >90  African American GFR Calc     TAMMIE  --  8.3* 9.2 9.3 8.4*       UA RESULTS:   Recent Labs   Lab Test 11/12/19  0802 11/05/19  1355 10/29/19  1402  07/30/19  1355 07/03/19  1155  10/27/16  1028   SG 1.019 1.015 1.010   < > 1.011 >1.030   < > 1.025   URINEPH 6.0 5.0 5.0   < > 6.0 6.0   < > 6.5   NITRITE Negative Negative Negative   < > Negative Negative   < > Negative   RBCU  --   --   --   --  25* >100*  --  O - 2   WBCU  --   --   --   --  1 0 - 5  --  O - 2    < > = values in this interval not displayed.       CALCIUM RESULTS  Lab  Results   Component Value Date    TAMMIE 8.3 07/03/2019    TAMMIE 9.2 12/10/2018    TAMMIE 9.3 09/06/2017       PSA RESULTS  PSA   Date Value Ref Range Status   07/30/2019 0.59 0 - 4 ug/L Final     Comment:     Assay Method:  Chemiluminescence using Siemens Vista analyzer   10/27/2016 0.41 0 - 4 ug/L Final     Comment:     Assay Method:  Chemiluminescence using Siemens Vista analyzer   10/23/2015 0.55 0 - 4 ug/L Final   08/20/2013 0.63 0 - 4 ug/L Final   04/26/2006 0.72 0 - 4 ug/L Final   07/29/2003 0.7 0 - 4 ug/L Final       INR  Recent Labs   Lab Test 10/07/14  1319 09/29/14  1735 09/28/14  0709   INR 1.11 1.24* 0.98       Imaging:   I personally reviewed all applicable imaging and went over the below findings with patient.    Results for orders placed or performed in visit on 09/03/19   CT Urogram wo & w Contrast    Narrative    CT UROGRAM WITH AND WITHOUT CONTRAST September 3, 2019 10:32 AM     HISTORY: Abnormal findings in urine. Gross hematuria.    TECHNIQUE: Axial images are obtained from the lung bases to the iliac  crests without IV contrast. Following the administration of 99 mL of  Isovue 370, additional axial images are obtained from the lung bases  to the symphysis during corticomedullary and excretory phases. Coronal  and sagittal reformatted images are also generated. Radiation dose for  this scan was reduced using automated exposure control, adjustment of  the mA and/or kV according to patient size, or iterative  reconstruction technique.    FINDINGS: Mild dependent atelectasis is present at the lung bases.  Calcified granuloma posterior right costophrenic angle is noted.    Abdomen: Initial noncontrast images demonstrate calcified granulomas  in the liver and spleen. No evidence of fatty infiltration of the  liver. No calcified gallstones. Arterial vascular calcifications are  scattered throughout the aorta and branch vessels. No evidence of  aortic aneurysm. Following contrast administration, the liver,  spleen,  gallbladder, pancreas and adrenal glands are within normal limits. No  enlarged lymph nodes. No evidence of bowel obstruction.    Right urinary tract: No renal masses or cysts are present. No  collecting system or ureteral stones are present. There is no  hydronephrosis. Delayed imaging demonstrates a normal right renal  collecting system and ureter. Ureter is normal in caliber and course.    Left urinary tract: No renal masses or cysts are present. No  collecting system or ureteral stones are present. There is no  hydronephrosis. Delayed imaging demonstrates a normal left renal  collecting system and ureter. Ureter is normal in caliber and course.    Bladder: Bladder is well-distended with an enhancing left lateral  bladder wall mass measuring 2.3 x 1.8 cm. This does not appear to  extend beyond the bladder wall itself. No other bladder wall lesions  are appreciated. No bladder calculi are evident. Delayed imaging  demonstrates a filling defect caused by the enhancing left lateral  bladder wall mass. No other filling defects are appreciated.    Pelvis: Prostate gland and rectum are unremarkable. No enlarged pelvic  or inguinal lymph nodes. No free pelvic fluid. Degenerative spine  changes are noted. No aggressive appearing bone lesions are  appreciated.      Impression    IMPRESSION:    1. 2.3 cm left lateral bladder wall mass. No associated pelvic or  retroperitoneal lymph node enlargement. No definite evidence of  metastatic disease in the abdomen or pelvis.  2. No urinary tract calculi or hydronephrosis. No renal cyst or mass.  Collecting systems and ureters are unremarkable.  3. Calcified atherosclerotic disease in the aorta and branch vessels.  4. Evidence of old granulomatous disease.    JAMIE PERSAUD MD       Scribe Disclosure:  Carleen ANN, am serving as a scribe to document services personally performed by Almas Sunshine MD at this visit, based upon the provider's statements to me. All  documentation has been reviewed by the aforementioned provider prior to being entered into the official medical record.

## 2020-01-07 NOTE — NURSING NOTE
"Chief Complaint   Patient presents with     Cystoscopy     bladder tumor recheck       Blood pressure 126/81, pulse 55, height 1.753 m (5' 9\"), weight 73.5 kg (162 lb). Body mass index is 23.92 kg/m .    Patient Active Problem List   Diagnosis     Social phobia     Hypertension goal BP (blood pressure) < 140/90     Hyperlipidemia LDL goal <130     Tubular adenoma of colon     Advanced directives, counseling/discussion     Coronary artery disease involving native heart with angina pectoris, unspecified vessel or lesion type (H)     Cigarette nicotine dependence without complication     S/P CABG (coronary artery bypass graft)     Chronic obstructive pulmonary disease, unspecified COPD type (H)     Anemia, unspecified type     Health Care Home     Bladder cancer (H)       Allergies   Allergen Reactions     Codeine Nausea and Vomiting       Current Outpatient Medications   Medication Sig Dispense Refill     albuterol (PROAIR HFA/PROVENTIL HFA/VENTOLIN HFA) 108 (90 Base) MCG/ACT inhaler Inhale 2 puffs into the lungs every 6 hours as needed for shortness of breath / dyspnea or wheezing 1 Inhaler 1     aspirin 81 MG tablet Take 1 tablet (81 mg) by mouth daily (Patient taking differently: Take 81 mg by mouth At Bedtime Pt. Last took 09/02/2019) 90 tablet 3     atorvastatin (LIPITOR) 80 MG tablet Take 1 tablet (80 mg) by mouth daily (Patient taking differently: Take 80 mg by mouth At Bedtime ) 90 tablet 3     ferrous sulfate (IRON) 325 (65 FE) MG tablet Take 325 mg by mouth every morning  60 tablet 3     levofloxacin (LEVAQUIN) 500 MG tablet Take 1 tablet 6 hours post treatment and 1 tablet each AM following each treatment. 12 tablet 0     lisinopril (PRINIVIL/ZESTRIL) 20 MG tablet Take 1 tablet (20 mg) by mouth daily (Patient taking differently: Take 20 mg by mouth At Bedtime ) 90 tablet 3     metoprolol succinate ER (TOPROL-XL) 50 MG 24 hr tablet Take 1 tablet (50 mg) by mouth daily (Patient taking differently: Take 50 mg " by mouth At Bedtime ) 90 tablet 3     nitroglycerin (NITROSTAT) 0.4 MG sublingual tablet Place 1 tablet (0.4 mg) under the tongue every 5 minutes as needed for chest pain 25 tablet 3     oxybutynin (DITROPAN) 5 MG tablet        PARoxetine (PAXIL) 40 MG tablet Take 1 tablet (40 mg) by mouth every morning 90 tablet 3     oxyCODONE (ROXICODONE) 5 MG tablet Take 1 tablet (5 mg) by mouth every 6 hours as needed (Patient not taking: Reported on 10/8/2019) 12 tablet 0       Social History     Tobacco Use     Smoking status: Former Smoker     Packs/day: 1.50     Years: 30.00     Pack years: 45.00     Last attempt to quit: 2019     Years since quittin.4     Smokeless tobacco: Never Used   Substance Use Topics     Alcohol use: No     Comment: quit 35 years ago     Drug use: Yes     Comment: occionally use of pot       Invasive Procedure Safety Checklist:    Procedure: Cystoscopy    Action: Complete sections and checkboxes as appropriate.    Pre-procedure:  1. Patient ID Verified with 2 identifiers (Mary and  or MRN) : YES    2. Procedure and site verified with patient/designee (when able) : YES    3. Accurate consent documentation in medical record : YES    4. H&P (or appropriate assessment) documented in medical record : N/A  H&P must be up to 30 days prior to procedure an updated within 24 hours of                 Procedure as applicable.     5. Relevant diagnostic and radiology test results appropriately labeled and displayed as applicable : YES    6. Blood products, implants, devices, and/or special equipment available for the procedure as applicable : YES    7. Procedure site(s) marked with provider initials [Exclusions: none] : NO    8. Marking not required. Reason : Yes  Procedure does not require site marking    Time Out:     Time-Out performed immediately prior to starting procedure, including verbal and active participation of all team members addressing: YES    1. Correct patient identity.  2. Confirmed  that the correct side and site are marked.  3. An accurate procedure to be done.  4. Agreement on the procedure to be done.  5. Correct patient position.  6. Relevant images and results are properly labeled and appropriately displayed.  7. The need to administer antibiotics or fluids for irrigation purposes during the procedure as applicable.  8. Safety precautions based on patient history or medication use.    During Procedure: Verification of correct person, site, and procedure occurs any time the responsibility for care of the patient is transferred to another member of the care team.    The following medication was given:     MEDICATION:  Lidocaine without epinephrine 2% jelly  ROUTE: Urethral  SITE: Urethra via the meatus  DOSE: 10 mL  LOT #: [id number]  : [employer]  EXPIRATION DATE: 9-2021  NDC#: [address]-83   Was there drug waste? No    Prior to med admin, verified patient identity using patient's name and date of birth.  Due to med administration, patient instructed to remain in clinic for 15 minutes  afterwards, and to report any adverse reaction to me immediately.    Drug Amount Wasted:  None.  Vial/Syringe: Syringe      Sarah Murray  1/7/2020  1:35 PM

## 2020-01-07 NOTE — LETTER
2020       RE: Tevin Guidry  [address] [id number]  [facility] [address]     Dear Colleague,    Thank you for referring your patient, Tevin Guidry, to the [facility]OLOG[facility] AND [facility]OLOG[facility] at [facility]. Please see a copy of my visit note below.      Urology Clinic    Almas Sunshine MD  Date of Service: 2020     Name: Tevin Guidry  MRN: [id number]  Age: 69 year old  : 1950  Referring provider: Referred Self     Assessment and Plan:  Assessment:  Tevin Guidry  is a 69 year old male with non-invasive high grade urothelial carcinoma s/p induction BCG. Cystoscopy today shows a  1 cm tumor on the left anterior bladder neck.     Plan:  Follow up in the OR for TURBT. He will stop his aspirin 1 week prior to the procedure.   ______________________________________________________________________    HPI  Tevin Guidry is a 69 year old male with a history of gross hematuria and bladder tumor s/p TURBT on 2019. He completed BCG x 6 on 2019, which he tolerated well. He denies any hematuria today.      Surgical pathology showed:   A. Left lateral bladder tumor superficial:   - Non-invasive high grade urothelial carcinoma     B. Left lateral bladder tumor deep:   - Non-invasive high grade urothelial carcinoma   - Small fragments of uninvolved muscularis propria are identified     He has a 1-2 year history of intermittent pink/rust colored urine and 2 days of bright red blood in 2019 as well as another episode of bright red blood with small clots and brief incontinence in mid-July. He also reported intermittent urgency and rare urge incontinence at times, mainly when he drinks too much coffee or water. He otherwise denied any dysuria, frequency, hesitancy, intermittency, feelings of incomplete emptying, flank pain, abdominal pain, or any recent history of urinary tract infections or stones.     He recently quit smoking  cigarettes. Wears a nicotine patch.      Denies family history of prostate, bladder, or kidney cancer.    Review of Systems:   Pertinent items are noted in HPI or as below, remainder of complete ROS is negative.      Cystoscopy:   After obtaining informed consent, the patient was prepped and draped in the standard sterile fashion.  The 15 Irish flexible cystoscope was inserted through the urethral meatus.      The anterior urethra was: normal without stricture.    The external sphincter was appropriately coapted.   The prostatic urethra demonstrated mild bilobar hypertrophy.    The bladder neck was nonocclusive.    The bladder was remarkable for a 1 cm tumor on the left anterior bladder neck.   The ureteral orifices were identified on each side in orthotopic position with efflux of clear urine.   There were no trabeculations.    On retroflexion there was the usual bladder neck hyperemia.    There was no intravesical protrusion of the prostate.      The patient tolerated the procedure well without complication.      Laboratory:   I personally reviewed all applicable laboratory data and went over findings with patient  Significant for:    CBC RESULTS:  Recent Labs   Lab Test 12/10/18  1231 03/06/18  1015 09/06/17  1004 05/22/17  1108   WBC 10.5 7.9 9.3 7.4   HGB 14.9 15.3 15.3 13.0*    281 268 249        BMP RESULTS:  Recent Labs   Lab Test 09/03/19  1008 07/03/19  1155 12/10/18  1231 09/06/17  1004 03/27/17  1537   NA  --  144 139 142 143   POTASSIUM  --  4.8 4.7 4.6 4.2   CHLORIDE  --  109 103 105 106   CO2  --  29 30 32 32   ANIONGAP  --  6 6 5 5   GLC  --  95 90 98 118*   BUN  --  11 11 11 16   CR  --  0.80 0.84 0.72 0.86   GFRESTIMATED >90 >90 >90 >90 88   GFRESTBLACK >90 >90 >90 >90 >90  African American GFR Calc     TAMMIE  --  8.3* 9.2 9.3 8.4*       UA RESULTS:   Recent Labs   Lab Test 11/12/19  0802 11/05/19  1355 10/29/19  1402  07/30/19  1355 07/03/19  1155  10/27/16  1028   SG 1.019 1.015 1.010   < >  1.011 >1.030   < > 1.025   URINEPH 6.0 5.0 5.0   < > 6.0 6.0   < > 6.5   NITRITE Negative Negative Negative   < > Negative Negative   < > Negative   RBCU  --   --   --   --  25* >100*  --  O - 2   WBCU  --   --   --   --  1 0 - 5  --  O - 2    < > = values in this interval not displayed.       CALCIUM RESULTS  Lab Results   Component Value Date    TAMMIE 8.3 07/03/2019    TAMMIE 9.2 12/10/2018    TAMMIE 9.3 09/06/2017       PSA RESULTS  PSA   Date Value Ref Range Status   07/30/2019 0.59 0 - 4 ug/L Final     Comment:     Assay Method:  Chemiluminescence using Siemens Vista analyzer   10/27/2016 0.41 0 - 4 ug/L Final     Comment:     Assay Method:  Chemiluminescence using Siemens Vista analyzer   10/23/2015 0.55 0 - 4 ug/L Final   08/20/2013 0.63 0 - 4 ug/L Final   04/26/2006 0.72 0 - 4 ug/L Final   07/29/2003 0.7 0 - 4 ug/L Final       INR  Recent Labs   Lab Test 10/07/14  1319 09/29/14  1735 09/28/14  0709   INR 1.11 1.24* 0.98       Imaging:   I personally reviewed all applicable imaging and went over the below findings with patient.    Results for orders placed or performed in visit on 09/03/19   CT Urogram wo & w Contrast    Narrative    CT UROGRAM WITH AND WITHOUT CONTRAST September 3, 2019 10:32 AM     HISTORY: Abnormal findings in urine. Gross hematuria.    TECHNIQUE: Axial images are obtained from the lung bases to the iliac  crests without IV contrast. Following the administration of 99 mL of  Isovue 370, additional axial images are obtained from the lung bases  to the symphysis during corticomedullary and excretory phases. Coronal  and sagittal reformatted images are also generated. Radiation dose for  this scan was reduced using automated exposure control, adjustment of  the mA and/or kV according to patient size, or iterative  reconstruction technique.    FINDINGS: Mild dependent atelectasis is present at the lung bases.  Calcified granuloma posterior right costophrenic angle is noted.    Abdomen: Initial  noncontrast images demonstrate calcified granulomas  in the liver and spleen. No evidence of fatty infiltration of the  liver. No calcified gallstones. Arterial vascular calcifications are  scattered throughout the aorta and branch vessels. No evidence of  aortic aneurysm. Following contrast administration, the liver, spleen,  gallbladder, pancreas and adrenal glands are within normal limits. No  enlarged lymph nodes. No evidence of bowel obstruction.    Right urinary tract: No renal masses or cysts are present. No  collecting system or ureteral stones are present. There is no  hydronephrosis. Delayed imaging demonstrates a normal right renal  collecting system and ureter. Ureter is normal in caliber and course.    Left urinary tract: No renal masses or cysts are present. No  collecting system or ureteral stones are present. There is no  hydronephrosis. Delayed imaging demonstrates a normal left renal  collecting system and ureter. Ureter is normal in caliber and course.    Bladder: Bladder is well-distended with an enhancing left lateral  bladder wall mass measuring 2.3 x 1.8 cm. This does not appear to  extend beyond the bladder wall itself. No other bladder wall lesions  are appreciated. No bladder calculi are evident. Delayed imaging  demonstrates a filling defect caused by the enhancing left lateral  bladder wall mass. No other filling defects are appreciated.    Pelvis: Prostate gland and rectum are unremarkable. No enlarged pelvic  or inguinal lymph nodes. No free pelvic fluid. Degenerative spine  changes are noted. No aggressive appearing bone lesions are  appreciated.      Impression    IMPRESSION:    1. 2.3 cm left lateral bladder wall mass. No associated pelvic or  retroperitoneal lymph node enlargement. No definite evidence of  metastatic disease in the abdomen or pelvis.  2. No urinary tract calculi or hydronephrosis. No renal cyst or mass.  Collecting systems and ureters are unremarkable.  3. Calcified  atherosclerotic disease in the aorta and branch vessels.  4. Evidence of old granulomatous disease.    JAMIE PERSAUD MD       Scribe Disclosure:  I, Carleenalice Vasquez, am serving as a scribe to document services personally performed by Almas Sunshine MD at this visit, based upon the provider's statements to me. All documentation has been reviewed by the aforementioned provider prior to being entered into the official medical record.       Again, thank you for allowing me to participate in the care of your patient.      Sincerely,    Almas Sunshine MD

## 2020-01-08 ENCOUNTER — OFFICE VISIT (OUTPATIENT)
Dept: FAMILY MEDICINE | Facility: CLINIC | Age: 70
End: 2020-01-08
Payer: COMMERCIAL

## 2020-01-08 VITALS
OXYGEN SATURATION: 98 % | HEART RATE: 47 BPM | DIASTOLIC BLOOD PRESSURE: 78 MMHG | SYSTOLIC BLOOD PRESSURE: 128 MMHG | HEIGHT: 69 IN | WEIGHT: 173.25 LBS | TEMPERATURE: 97.3 F | BODY MASS INDEX: 25.66 KG/M2

## 2020-01-08 DIAGNOSIS — J44.9 CHRONIC OBSTRUCTIVE PULMONARY DISEASE, UNSPECIFIED COPD TYPE (H): ICD-10-CM

## 2020-01-08 DIAGNOSIS — E78.5 HYPERLIPIDEMIA LDL GOAL <70: ICD-10-CM

## 2020-01-08 DIAGNOSIS — I10 ESSENTIAL HYPERTENSION WITH GOAL BLOOD PRESSURE LESS THAN 140/90: ICD-10-CM

## 2020-01-08 DIAGNOSIS — F40.10 SOCIAL PHOBIA: ICD-10-CM

## 2020-01-08 DIAGNOSIS — F17.200 TOBACCO DEPENDENCE: ICD-10-CM

## 2020-01-08 DIAGNOSIS — I25.10 CAD, MULTIPLE VESSEL: ICD-10-CM

## 2020-01-08 DIAGNOSIS — C67.9 MALIGNANT NEOPLASM OF URINARY BLADDER, UNSPECIFIED SITE (H): Primary | ICD-10-CM

## 2020-01-08 DIAGNOSIS — D12.6 TUBULAR ADENOMA OF COLON: ICD-10-CM

## 2020-01-08 LAB
BACTERIA SPEC CULT: NO GROWTH
Lab: NORMAL
SPECIMEN SOURCE: NORMAL

## 2020-01-08 PROCEDURE — 99214 OFFICE O/P EST MOD 30 MIN: CPT | Performed by: FAMILY MEDICINE

## 2020-01-08 RX ORDER — ATORVASTATIN CALCIUM 80 MG/1
80 TABLET, FILM COATED ORAL AT BEDTIME
Qty: 90 TABLET | Refills: 3 | Status: SHIPPED | OUTPATIENT
Start: 2020-01-08 | End: 2020-08-03

## 2020-01-08 RX ORDER — METOPROLOL SUCCINATE 50 MG/1
50 TABLET, EXTENDED RELEASE ORAL AT BEDTIME
Qty: 90 TABLET | Refills: 3 | Status: SHIPPED | OUTPATIENT
Start: 2020-01-08 | End: 2020-12-01

## 2020-01-08 RX ORDER — LISINOPRIL 20 MG/1
20 TABLET ORAL AT BEDTIME
Qty: 90 TABLET | Refills: 3 | Status: SHIPPED | OUTPATIENT
Start: 2020-01-08 | End: 2020-12-01

## 2020-01-08 RX ORDER — NICOTINE 21 MG/24HR
1 PATCH, TRANSDERMAL 24 HOURS TRANSDERMAL EVERY 24 HOURS
Qty: 28 PATCH | Refills: 3 | Status: SHIPPED | OUTPATIENT
Start: 2020-01-08 | End: 2020-08-03

## 2020-01-08 RX ORDER — PAROXETINE 40 MG/1
40 TABLET, FILM COATED ORAL EVERY MORNING
Qty: 90 TABLET | Refills: 3 | Status: SHIPPED | OUTPATIENT
Start: 2020-01-08 | End: 2020-08-03

## 2020-01-08 ASSESSMENT — MIFFLIN-ST. JEOR: SCORE: 1531.24

## 2020-01-08 NOTE — PROGRESS NOTES
SUBJECTIVE:   Tevin Guidry is a 69 year old male who presents to clinic today for the following health issues:      Hyperlipidemia Follow-Up      Are you regularly taking any medication or supplement to lower your cholesterol?   Yes- lipitor     Are you having muscle aches or other side effects that you think could be caused by your cholesterol lowering medication?  No    Hypertension Follow-up      Do you check your blood pressure regularly outside of the clinic? Yes     Are you following a low salt diet? No    Are your blood pressures ever more than 140 on the top number (systolic) OR more   than 90 on the bottom number (diastolic), for example 140/90? Yes a couple of times but that was due to him getting a treatment at the time of the bp check     Vascular Disease Follow-up      How often do you take nitroglycerin? Never    Do you take an aspirin every day? Yes      How many servings of fruits and vegetables do you eat daily?  2-3    On average, how many sweetened beverages do you drink each day (Examples: soda, juice, sweet tea, etc.  Do NOT count diet or artificially sweetened beverages)?   0    How many days per week do you miss taking your medication? 0      He underwent cystoscopy yesterday and a small tumor was found in a different spot from where his original bladder cancer was found.  He has surgery scheduled in early February and has a preop scheduled at the [facility].  He is otherwise feeling well.  He denies any hematuria.  He continues to smoke cigarettes, about three quarters of a pack daily.  He has attempted to quit, but has not been successful.  He does have nicotine patches to use, however he is out of the high-dose patches.    Problem list and histories reviewed & adjusted, as indicated.  Additional history: as documented    Patient Active Problem List   Diagnosis     Social phobia     Hypertension goal BP (blood pressure) < 140/90     Hyperlipidemia LDL goal <130     Tubular adenoma of  colon     Advanced directives, counseling/discussion     Coronary artery disease involving native heart with angina pectoris, unspecified vessel or lesion type (H)     Cigarette nicotine dependence without complication     S/P CABG (coronary artery bypass graft)     Chronic obstructive pulmonary disease, unspecified COPD type (H)     Anemia, unspecified type     Health Care Home     Bladder cancer (H)     Personal history of malignant neoplasm of bladder     Past Surgical History:   Procedure Laterality Date     CLAVICLE SURGERY Right     fracture in childhood     COLONOSCOPY N/A 3/30/2017    Procedure: COLONOSCOPY;  Surgeon: Jie Onofre MD;  Location: [facility]     CYSTOSCOPY, TRANSURETHRAL RESECTION (TUR) TUMOR BLADDER, COMBINED N/A 2019    Procedure: cystoscopy,Transurethral Resection Of Bladder Tumor;  Surgeon: Almas Sunshine MD;  [facility]     DAVINCI BYPASS ARTERY CORONARY N/A 2014    Procedure: DAVINCI BYPASS ARTERY CORONARY;  Surgeon: Lam Solis MD;  Location: [facility]     ESOPHAGOSCOPY, GASTROSCOPY, DUODENOSCOPY (EGD), COMBINED N/A 3/30/2017    Procedure: COMBINED ESOPHAGOSCOPY, GASTROSCOPY, DUODENOSCOPY (EGD);  Surgeon: Jie Onofre MD;  Location: [facility]     HC COLONOSCOPY THRU STOMA, DIAGNOSTIC      polyps due      SURGICAL HISTORY OF -       right clavicular fracture with ORIF       Social History     Tobacco Use     Smoking status: Former Smoker     Packs/day: 1.50     Years: 30.00     Pack years: 45.00     Last attempt to quit: 2019     Years since quittin.4     Smokeless tobacco: Never Used   Substance Use Topics     Alcohol use: No     Comment: quit 35 years ago     Family History   Problem Relation Age of Onset     Cancer Mother         esog/stomach     Cancer Father         lung     Family History Negative Brother      Hypertension Brother      Anesthesia Reaction No family hx of      Deep Vein Thrombosis (DVT) No family hx  of          Current Outpatient Medications   Medication Sig Dispense Refill     albuterol (PROAIR HFA/PROVENTIL HFA/VENTOLIN HFA) 108 (90 Base) MCG/ACT inhaler Inhale 2 puffs into the lungs every 6 hours as needed for shortness of breath / dyspnea or wheezing 1 Inhaler 1     aspirin 81 MG tablet Take 1 tablet (81 mg) by mouth daily (Patient taking differently: Take 81 mg by mouth At Bedtime Pt. Last took 09/02/2019) 90 tablet 3     atorvastatin (LIPITOR) 80 MG tablet Take 1 tablet (80 mg) by mouth At Bedtime 90 tablet 3     ferrous sulfate (IRON) 325 (65 FE) MG tablet Take 325 mg by mouth every morning  60 tablet 3     levofloxacin (LEVAQUIN) 500 MG tablet Take 1 tablet 6 hours post treatment and 1 tablet each AM following each treatment. 12 tablet 0     lisinopril (PRINIVIL/ZESTRIL) 20 MG tablet Take 1 tablet (20 mg) by mouth At Bedtime 90 tablet 3     metoprolol succinate ER (TOPROL-XL) 50 MG 24 hr tablet Take 1 tablet (50 mg) by mouth At Bedtime 90 tablet 3     nicotine (NICODERM CQ) 21 MG/24HR 24 hr patch Place 1 patch onto the skin every 24 hours 28 patch 3     nitroglycerin (NITROSTAT) 0.4 MG sublingual tablet Place 1 tablet (0.4 mg) under the tongue every 5 minutes as needed for chest pain 25 tablet 3     PARoxetine (PAXIL) 40 MG tablet Take 1 tablet (40 mg) by mouth every morning 90 tablet 3     Allergies   Allergen Reactions     Codeine Nausea and Vomiting     Recent Labs   Lab Test 09/03/19  1008 07/03/19  1155 12/10/18  1231 06/29/18  1005 10/30/17  1121 09/06/17  1004  01/25/17  0909  09/28/14  0709  07/11/12  1503   A1C  --   --   --   --   --   --   --   --   --  5.9  --  6.0   LDL  --  60  --  73 68  --   --   --    < >  --    < >  --    HDL  --  50  --  50 56  --   --   --    < >  --    < >  --    TRIG  --  93  --  76 109  --   --   --    < >  --    < >  --    ALT  --   --  23 29  --  25  --   --    < > 16   < > 10   CR  --  0.80 0.84  --   --  0.72   < >  --    < > 0.76   < > 0.82   GFRESTIMATED >90  ">90 >90  --   --  >90   < >  --    < > >90  Non  GFR Calc     < > >90   GFRESTBLACK >90 >90 >90  --   --  >90   < >  --    < > >90   GFR Calc     < > >90   POTASSIUM  --  4.8 4.7  --   --  4.6   < >  --    < > 3.8   < > 3.6   TSH  --   --   --   --   --   --   --  0.70  --   --   --   --     < > = values in this interval not displayed.      BP Readings from Last 3 Encounters:   01/08/20 128/78   01/07/20 126/81   11/12/19 132/83    Wt Readings from Last 3 Encounters:   01/08/20 78.6 kg (173 lb 4 oz)   01/07/20 73.5 kg (162 lb)   09/24/19 74.8 kg (165 lb)              Reviewed and updated as needed this visit by clinical staff  Tobacco  Allergies  Meds       Reviewed and updated as needed this visit by Provider         ROS:  As above     OBJECTIVE:     /78 (BP Location: Left arm, Patient Position: Sitting, Cuff Size: Adult Regular)   Pulse (!) 47   Temp 97.3  F (36.3  C) (Oral)   Ht 1.753 m (5' 9\")   Wt 78.6 kg (173 lb 4 oz)   SpO2 98%   BMI 25.58 kg/m    Body mass index is 25.58 kg/m .  GENERAL: healthy, alert and no distress    Diagnostic Test Results:  none     ASSESSMENT/PLAN:     1. Bladder cancer  Presented with gross hematuria in July 2019, evaluated by Urology and found to have non-invasive high grfade urothelial carcinoma s/p TURBT on 9/5/19. He completed BCG treatment weekly x 6 weeks with last dose on 11/21/19. Cystoscopy yesterday showed a 1cm tumor in the left anterior bladder neck. Plan is to follow up in the OR for TURBT next month. He has a preop scheduled. He will stop his aspirin one week prior to his procedure.     I encourage smoking cessation        2. Hyperlipidemia LDL goal <70  Controlled   - atorvastatin (LIPITOR) 80 MG tablet; Take 1 tablet (80 mg) by mouth daily  Dispense: 90 tablet; Refill: 3        3. Essential hypertension with goal blood pressure less than 140/90  Controlled, continues lisinopril 20mg daily   - lisinopril (PRINIVIL/ZESTRIL) " 20 MG tablet; Take 1 tablet (20 mg) by mouth daily  Dispense: 90 tablet; Refill: 3  - Basic metabolic panel     4. CAD, multiple vessel s/p CABG   Stable continues ASA 81mg daily and statin. Has not needed to take his nitroglycerin   - metoprolol succinate ER (TOPROL-XL) 50 MG 24 hr tablet; Take 1 tablet (50 mg) by mouth daily  Dispense: 90 tablet; Refill: 3     5. Social phobia  Stable   - PARoxetine (PAXIL) 40 MG tablet; Take 1 tablet (40 mg) by mouth every morning  Dispense: 90 tablet; Refill: 3     6. Tobacco dependence  He had quit, but is back to smoking about three quarters of a pack per day.  He is out of the high-dose nicotine patches.  I gave him a refill today and gave him the brochure for quit plan counseling.     7. Chronic obstructive pulmonary disease, unspecified COPD type (H)   stable.Rare cough or use of albuterol.         Patient Instructions   Follow up with me in 6 months.   Use the quitplan counseling line to help you quit smoking.       Preethi Quiroz M.D.        [facility]

## 2020-01-08 NOTE — TELEPHONE ENCOUNTER
FUTURE VISIT INFORMATION      SURGERY INFORMATION:    Date: 2/6/20    [facility]: [facility]    Surgeon:  Almas Sunshine    Anesthesia Type:  General    Procedure: CYSTOSCOPY, WITH TRANSURETHRAL RESECTION PROSTATE    Consult: OV 1/8/20 Almas Sunshine- Urology    RECORDS REQUESTED FROM:       Primary Care Provider: Preethi Quijano    Most recent EKG+ Tracing: 3/27/17    Most recent ECHO: 9/26/14    Most recent Cardiac Stress Test:    Most recent Coronary Angiogram: 9/26/14    Most recent PFT's: 12/8/15

## 2020-01-21 DIAGNOSIS — R30.0 DYSURIA: Primary | ICD-10-CM

## 2020-01-23 ENCOUNTER — OFFICE VISIT (OUTPATIENT)
Dept: SURGERY | Facility: CLINIC | Age: 70
End: 2020-01-23
Payer: COMMERCIAL

## 2020-01-23 ENCOUNTER — PRE VISIT (OUTPATIENT)
Dept: SURGERY | Facility: CLINIC | Age: 70
End: 2020-01-23

## 2020-01-23 ENCOUNTER — ANESTHESIA EVENT (OUTPATIENT)
Dept: SURGERY | Facility: AMBULATORY SURGERY CENTER | Age: 70
End: 2020-01-23

## 2020-01-23 VITALS
OXYGEN SATURATION: 97 % | HEART RATE: 52 BPM | WEIGHT: 164.3 LBS | HEIGHT: 69 IN | RESPIRATION RATE: 19 BRPM | SYSTOLIC BLOOD PRESSURE: 129 MMHG | TEMPERATURE: 97.8 F | BODY MASS INDEX: 24.34 KG/M2 | DIASTOLIC BLOOD PRESSURE: 80 MMHG

## 2020-01-23 DIAGNOSIS — C67.9 MALIGNANT NEOPLASM OF URINARY BLADDER, UNSPECIFIED SITE (H): ICD-10-CM

## 2020-01-23 DIAGNOSIS — Z01.818 PREOP EXAMINATION: Primary | ICD-10-CM

## 2020-01-23 DIAGNOSIS — R30.0 DYSURIA: ICD-10-CM

## 2020-01-23 DIAGNOSIS — Z01.818 PREOP EXAMINATION: ICD-10-CM

## 2020-01-23 LAB
ALBUMIN UR-MCNC: NEGATIVE MG/DL
APPEARANCE UR: ABNORMAL
BILIRUB UR QL STRIP: NEGATIVE
COLOR UR AUTO: YELLOW
ERYTHROCYTE [DISTWIDTH] IN BLOOD BY AUTOMATED COUNT: 12.1 % (ref 10–15)
GLUCOSE UR STRIP-MCNC: NEGATIVE MG/DL
HCT VFR BLD AUTO: 43.1 % (ref 40–53)
HGB BLD-MCNC: 14.3 G/DL (ref 13.3–17.7)
HGB UR QL STRIP: NEGATIVE
KETONES UR STRIP-MCNC: 5 MG/DL
LEUKOCYTE ESTERASE UR QL STRIP: NEGATIVE
MCH RBC QN AUTO: 31.3 PG (ref 26.5–33)
MCHC RBC AUTO-ENTMCNC: 33.2 G/DL (ref 31.5–36.5)
MCV RBC AUTO: 94 FL (ref 78–100)
MUCOUS THREADS #/AREA URNS LPF: PRESENT /LPF
NITRATE UR QL: NEGATIVE
PH UR STRIP: 6 PH (ref 5–7)
PLATELET # BLD AUTO: 306 10E9/L (ref 150–450)
RBC # BLD AUTO: 4.57 10E12/L (ref 4.4–5.9)
RBC #/AREA URNS AUTO: 1 /HPF (ref 0–2)
SOURCE: ABNORMAL
SP GR UR STRIP: 1.02 (ref 1–1.03)
SQUAMOUS #/AREA URNS AUTO: <1 /HPF (ref 0–1)
UROBILINOGEN UR STRIP-MCNC: 2 MG/DL (ref 0–2)
WBC # BLD AUTO: 9 10E9/L (ref 4–11)
WBC #/AREA URNS AUTO: 1 /HPF (ref 0–5)

## 2020-01-23 ASSESSMENT — PAIN SCALES - GENERAL: PAINLEVEL: NO PAIN (0)

## 2020-01-23 ASSESSMENT — COPD QUESTIONNAIRES
CAT_SEVERITY: MILD
COPD: 1

## 2020-01-23 ASSESSMENT — LIFESTYLE VARIABLES: TOBACCO_USE: 1

## 2020-01-23 ASSESSMENT — MIFFLIN-ST. JEOR: SCORE: 1490.64

## 2020-01-23 NOTE — PATIENT INSTRUCTIONS
Preparing for Your Surgery      Name:  Tevin Guidry   MRN:  [id number]   :  1950   Today's Date:  2020     Arriving for surgery:  Surgery date:  2020  Arrival time:  8:40AM  Please come to:     [facility]  [address] [place] [id number]     Parking is available in front of the Clinics and Surgery Center building from 5:30AM to 8:00PM.  -  Proceed to the 5th floor to check into the Ambulatory Surgery Center.              >> There will be patient concierges on the 1st and 5th floor, for assistance or an escort, if you would like.              >> Please call [phone] with any questions.    What can I eat or drink?  -  You may have solid food or milk products until 8 hours prior to your surgery.  -  You may have water, apple juice or 7up/Sprite until 2 hours prior to your surgery.    Which medicines can I take?  Stop Aspirin, Multivitamins and supplements one week prior to surgery.  Hold Ibuprofen for 24 hours and/or Naproxen for 48 hours prior to surgery.   -  Do NOT take these medications in the morning, the day of surgery:    Ferrous Sulfate(Iron)   Lisinopril    -  Please take these medications the day of surgery:    Paroxetine(Paxil)   Albuterol Inhaler, use as needed and bring the day of surgery    Metoprolol    Atorvastatin(Lipitor)    How do I prepare myself?  -  Take two showers: one the night before surgery; and one the morning of surgery.         Use Scrubcare or Hibiclens to wash from neck down, leave soap on your skin for up to one minute.  Do not get soap in your eyes or ears.  You may use your own shampoo and conditioner; no other hair products.   -  Do NOT use lotion, powder, deodorant, or antiperspirant the day of your surgery.  -  Do NOT wear jewelry.  - Do not bring your own medications to the hospital, except for inhalers and eye   drops.  -  Bring your ID and insurance card.    -If you are scheduled to go home the Same Day as  surgery you must have a responsible adult as a  and to stay with you overnight the first 24 hours after surgery.     Questions or Concerns:  -If you are scheduled at the Ambulatory Surgery Center and have questions or concerns regarding the day of surgery please call [phone].    -If you have health changes between today and your surgery please call your surgeon. For questions after surgery please call your surgeons office.

## 2020-01-23 NOTE — ANESTHESIA PREPROCEDURE EVALUATION
Anesthesia Pre-Procedure Evaluation    Patient: Tevin Guidry   MRN:     [id number] Gender:   male   Age:    69 year old :      1950        Preoperative Diagnosis: Personal history of malignant neoplasm of bladder [Z85.51]   Procedure(s):  CYSTOSCOPY, WITH TRANSURETHRAL RESECTION PROSTATE  CYSTOSCOPY, WITH TRANSURETHRAL RESECTION BLADDER TUMOR     Past Medical History:   Diagnosis Date     Colon polyps     multiple may need colectomy (adenoma)     Hyperlipidemia LDL goal <130      Hypertension goal BP (blood pressure) < 140/90      Social phobia       Past Surgical History:   Procedure Laterality Date     CLAVICLE SURGERY Right     fracture in childhood     COLONOSCOPY N/A 3/30/2017    Procedure: COLONOSCOPY;  Surgeon: Jie Onofre MD;  Location: [facility]     CYSTOSCOPY, TRANSURETHRAL RESECTION (TUR) TUMOR BLADDER, COMBINED N/A 2019    Procedure: cystoscopy,Transurethral Resection Of Bladder Tumor;  Surgeon: Almas Sunshine MD;  [facility]     DAVINCI BYPASS ARTERY CORONARY N/A 2014    Procedure: DAVINCI BYPASS ARTERY CORONARY;  Surgeon: Lam Solis MD;  Location: [facility]     ESOPHAGOSCOPY, GASTROSCOPY, DUODENOSCOPY (EGD), COMBINED N/A 3/30/2017    Procedure: COMBINED ESOPHAGOSCOPY, GASTROSCOPY, DUODENOSCOPY (EGD);  Surgeon: Jie Onofre MD;  Location: [facility]     HC COLONOSCOPY THRU STOMA, DIAGNOSTIC      polyps due      SURGICAL HISTORY OF -       right clavicular fracture with ORIF          Anesthesia Evaluation     . Pt has had prior anesthetic. Type: General and MAC    No history of anesthetic complications          ROS/MED HX    ENT/Pulmonary: Comment: Albuterol doesn't seem to help per pt report.    PFT 12/8/15:  Although the FVC and FEV1 are within normal limits, the FEV1/FVC ratio is reduced.  Patient effort was erratic, but the normal FEV1 indicates no significant obstruction.  The inspiratory flow rates are within normal limits.   Lung volumes are within normal limits.  Following administration of bronchodilators, there is no significant response.  The diffusing capacity is normal.  However, the diffusing capacity was not corrected for the patient's hemoglobin.  Mild airway obstruction is present.  IMPRESSION:  Mild Airflow Obstruction   No significant change with bronchodilator  Normal lung volumes  Normal diffusion capacity        (+)tobacco use, Past use 45 pk yr hx, quit 8/1/19 packs/day  Intermittent asthma Treatment: Inhaler prn,  mild COPD, , . .    Neurologic:  - neg neurologic ROS     Cardiovascular:     (+) Dyslipidemia, hypertension--CAD, -CABG-date: 9/2014 robotic, stent,9/2014  1 Drug Eluting Stent .. Taking blood thinners : Instructions Given to patient: last dose ASA 9/2/19. . . :. . Previous cardiac testing Echodate:9/27/14results:Interpretation Summary  Global and regional left ventricular function is normal with an EF of 60-65%.   Right ventricular function, chamber size, wall motion, and thickness are   normal. Pulmonary artery systolic pressure cannot be assessed. The inferior   vena cava is normal. No pericardial effusion is present.    date: results: date: results:Cath date: 9/29/14 results:Successful PCI with ALENA from distal to prox segments of RCA. Good flow of LIMA to LAD with stenosis as noted.          METS/Exercise Tolerance: Comment: Walks 1 y/o [place] encinas dog daily. Able to ascend stairs w/ mild SOB, denies CP & angina. >4 METS   Hematologic: Comments: Iron deficiency anemia    (+) Anemia, History of Transfusion no previous transfusion reaction -      Musculoskeletal:  - neg musculoskeletal ROS       GI/Hepatic:  - neg GI/hepatic ROS      (-) liver disease   Renal/Genitourinary:  - ROS Renal section negative       Endo:  - neg endo ROS       Psychiatric:     (+) psychiatric history anxiety      Infectious Disease:  - neg infectious disease ROS       Malignancy:   (+) Malignancy History of Other  Other CA  "bladder Active status post         Other: Comment: Pt reports severe headache, fever, chills, fatigue for several days last week. Had minimal dry cough. He feels he is \"95%\" back to baseline.   (+) No chance of pregnancy C-spine cleared: N/A, no H/O Chronic Pain,                       PHYSICAL EXAM:   Mental Status/Neuro: A/A/O; Age Appropriate   Airway: Facies: Feasible  Mallampati: II  Mouth/Opening: Full  TM distance: > 6 cm  Neck ROM: Limited   Respiratory: Auscultation: CTAB     Resp. Rate: Normal     Resp. Effort: Normal      CV: Rhythm: Regular  Rate: Age appropriate  Heart: Normal Sounds  Edema: None   Comments: Poor dentition, cracked upper right molar     Dental: Details                  LABS:  CBC:   Lab Results   Component Value Date    WBC 10.5 12/10/2018    WBC 7.9 03/06/2018    HGB 14.9 12/10/2018    HGB 15.3 03/06/2018    HCT 44.9 12/10/2018    HCT 46.2 03/06/2018     12/10/2018     03/06/2018     BMP:   Lab Results   Component Value Date     07/03/2019     12/10/2018    POTASSIUM 4.8 07/03/2019    POTASSIUM 4.7 12/10/2018    CHLORIDE 109 07/03/2019    CHLORIDE 103 12/10/2018    CO2 29 07/03/2019    CO2 30 12/10/2018    BUN 11 07/03/2019    BUN 11 12/10/2018    CR 0.80 07/03/2019    CR 0.84 12/10/2018    GLC 95 07/03/2019    GLC 90 12/10/2018     COAGS:   Lab Results   Component Value Date    PTT 31 10/07/2014    INR 1.11 10/07/2014     POC:   Lab Results   Component Value Date     (H) 10/01/2014     OTHER:   Lab Results   Component Value Date    PH 7.36 09/30/2014    LACT 0.6 (L) 09/29/2014    A1C 5.9 09/28/2014    TAMMIE 8.3 (L) 07/03/2019    PHOS 4.3 09/30/2014    MAG 1.9 10/09/2014    ALBUMIN 4.0 12/10/2018    PROTTOTAL 6.9 12/10/2018    ALT 23 12/10/2018    AST 19 12/10/2018    ALKPHOS 107 12/10/2018    BILITOTAL 0.5 12/10/2018    TSH 0.70 01/25/2017        Preop Vitals    BP Readings from Last 3 Encounters:   01/23/20 129/80   01/08/20 128/78   01/07/20 126/81    " "Pulse Readings from Last 3 Encounters:   01/23/20 52   01/08/20 (!) 47   01/07/20 55      Resp Readings from Last 3 Encounters:   01/23/20 19   11/12/19 18   11/05/19 18    SpO2 Readings from Last 3 Encounters:   01/23/20 97%   01/08/20 98%   11/12/19 98%      Temp Readings from Last 1 Encounters:   01/23/20 97.8  F (36.6  C) (Oral)    Ht Readings from Last 1 Encounters:   01/23/20 1.753 m (5' 9\")      Wt Readings from Last 1 Encounters:   01/23/20 74.5 kg (164 lb 4.8 oz)    Estimated body mass index is 24.26 kg/m  as calculated from the following:    Height as of this encounter: 1.753 m (5' 9\").    Weight as of this encounter: 74.5 kg (164 lb 4.8 oz).     LDA:  Urethral Catheter Coude 18 fr (Active)   Number of days: 140        Assessment:   ASA SCORE: 3    H&P: History and physical reviewed and following examination; no interval change.   Smoking Status:  Non-Smoker/Unknown   NPO Status: NPO Appropriate     Plan:   Anes. Type:  General   Pre-Medication: None   Induction:  IV (Standard)   Airway: LMA   Access/Monitoring: PIV   Maintenance: Balanced     Postop Plan:   Postop Pain: Opioids  Postop Sedation/Airway: Not planned  Disposition: Outpatient     PONV Management:   Adult Risk Factors:, Non-Smoker, Postop Opioids   Prevention: Ondansetron, Propofol     CONSENT: Direct conversation   Plan and risks discussed with: Patient   Blood Products: Consent Deferred (Minimal Blood Loss)                PAC Discussion and Assessment    ASA Classification: 2  Case is suitable for: ASC  Anesthetic techniques and relevant risks discussed: GA  Invasive monitoring and risk discussed: No  Types:   Possibility and Risk of blood transfusion discussed: No  NPO instructions given:   Additional anesthetic preparation and risks discussed:   Needs early admission to pre-op area:   Other:     PAC Resident/NP Anesthesia Assessment:  Tevin Guidry is a 69 year old male scheduled to undergo CYSTOSCOPY, WITH TRANSURETHRAL RESECTION " "PROSTATE & CYSTOSCOPY, WITH TRANSURETHRAL RESECTION BLADDER TUMOR  with Almas Sunshine MD on 2/6/20 at [facility] for treatment of history of malignant neoplasm of bladder.    Pt has had prior anesthetic. Type: General and MAC    No history of anesthetic complications    He has the following specific operative considerations:   # MICHELLE 2/8 = low risk  # VTE risk: 3%  # Risk of PONV score = 1.  If > 2, anti-emetic intervention recommended.  # Anesthesia considerations:  Refer to PAC assessment in anesthesia records    CARDIAC: METS >4,  Walks 1 y/o [place] encinas dog daily. Able to ascend stairs w/ mild SOB, denies CP & angina.     # RCRI : No serious cardiac risks.  0.4% risk of major adverse cardiac event.     # CAD, s/p hybrid robotic-assisted CABG (LIMA - LAD) and PCI with ALENA to RCA (distal to prox) in 2014. On lisinopril & metoprolol.     #  Echo 2014:  EF 60-65%, normal study     #  EKG 2017: sinus yokasta, ventr rate 48 bpm, otherwise normal    PULMONARY:     # Former smoker, 45 pk yr hx, quit 8/2019    # Intermittent Asthma, mild COPD      # PFT 2015: mild airflow obstruction    GI: no GERD      /RENAL:     # s/p TURBT 8/2019 for bladder tumor    ENDO: BMI 26    # No DM    HEME:   # On ASA 81, will stop 7d prior  # Iron defic anemia, on ferrous sulfate    ORTHO: limited neck extension ROM, no TMJ    GENERAL:    # Pt reports severe headache, fever, chills, fatigue for several days last week. Had minimal dry cough. He feels he is \"95%\" back to baseline.     Patient is optimized and is acceptable candidate for the proposed procedure. No further diagnostic evaluation is needed.          Reviewed and Signed by PAC Mid-Level Provider/Resident  Mid-Level Provider/Resident: Becky Cardona PA-C  Date: 1/23/20  Time: 1414    Attending Anesthesiologist Anesthesia Assessment:        Anesthesiologist:   Date:   Time:   Pass/Fail:   Disposition:     PAC Pharmacist Assessment:        Pharmacist: "   Date:   Time:    Becky Cardona PA-C

## 2020-01-23 NOTE — H&P
Pre-Operative H & P     CC:  Preoperative exam to assess for increased cardiopulmonary risk while undergoing surgery and anesthesia.    Date of Encounter: 1/23/2020  Primary Care Physician:  Preethi Quiroz  Reason for Visit: Personal history of malignant neoplasm of bladder     HPI  Tevin Guidry is a 68 y/o male who presents for pre-operative H&P in preparation for CYSTOSCOPY, WITH TRANSURETHRAL RESECTION PROSTATE & CYSTOSCOPY, WITH TRANSURETHRAL RESECTION BLADDER TUMOR  with Almas Sunshine MD on 2/6/20 at [facility] [place] for treatment of history of malignant neoplasm of bladder.     Mr. Guidry has a history of non-invasive high grade urothelial carcinoma. He is s/p TURBT in 9/2019, s/p induction BCG. He reports that he tolerated this w/o complication. Cystoscopy on 1/7/20 shows a  1 cm tumor on the left anterior bladder neck. He has been counseled on the above procedure.     PMH is significant for CAD s/p hybrid robotic-assisted CABG (LIMA - LAD) and PCI with ALENA to RCA (distal to prox), HTN, and HLD. He last saw his cardiologist on 3/28/17. Review of this clinic note states:   The patient is no longer experiencing chest pain but does report ELENA.  This is chronic and may be related to tobacco use.  PFTs showed FVC and FEV1 are within normal limits; FEV1/FVC ratio was reduced.  Patient doing well, no cardiopulmonary symptoms.  Physical exam as documented above.  There is a Grade I/VI KYLIE, early peaking without associated pulsus tardus/brevis consistent with aortic sclerosis, unchanged.  This was identified on ECHO.  His LVEF was 60-65% on ECHO.  Continue ASA, beta blocker, ACE-I, and statin (switch from Lovastatin to Lipitor 40 mg qd).       PMH is also significant for mild COPD, 45 pk yr smoking hx (quit 8/2019), anxiety, iron deficiency anemia. He is anticoagulated with ASA 81 mg. He is active and walks his 1 y/o [place] encinas dog daily. Denies CP, angina, ELENA.     History was obtained  from patient & chart review.      Past Medical History  Past Medical History:   Diagnosis Date     Colon polyps     multiple may need colectomy (adenoma)     Hyperlipidemia LDL goal <130      Hypertension goal BP (blood pressure) < 140/90      Social phobia        Past Surgical History  Past Surgical History:   Procedure Laterality Date     CLAVICLE SURGERY Right     fracture in childhood     COLONOSCOPY N/A 3/30/2017    Procedure: COLONOSCOPY;  Surgeon: Jie Onofre MD;  Location: [facility]     CYSTOSCOPY, TRANSURETHRAL RESECTION (TUR) TUMOR BLADDER, COMBINED N/A 9/5/2019    Procedure: cystoscopy,Transurethral Resection Of Bladder Tumor;  Surgeon: Almas Sunshine MD;  Location: [facility]     DAVINCI BYPASS ARTERY CORONARY N/A 9/29/2014    Procedure: DAVINCI BYPASS ARTERY CORONARY;  Surgeon: Lam Solis MD;  Location: [facility] OR     ESOPHAGOSCOPY, GASTROSCOPY, DUODENOSCOPY (EGD), COMBINED N/A 3/30/2017    Procedure: COMBINED ESOPHAGOSCOPY, GASTROSCOPY, DUODENOSCOPY (EGD);  Surgeon: Jie Onofre MD;  Location: [facility]     HC COLONOSCOPY THRU STOMA, DIAGNOSTIC  2010    polyps due 2011     SURGICAL HISTORY OF -       right clavicular fracture with ORIF       Hx of Blood transfusions/reactions: no     Hx of abnormal bleeding or anti-platelet use: on ASA 81 mg    Menstrual history: No LMP for male patient.: N/A    Steroid use in the last year: no    Personal or FH with difficulty with Anesthesia:  no    Prior to Admission Medications  Current Outpatient Medications   Medication Sig Dispense Refill     aspirin 81 MG tablet Take 1 tablet (81 mg) by mouth daily (Patient taking differently: Take 81 mg by mouth every morning ) 90 tablet 3     atorvastatin (LIPITOR) 80 MG tablet Take 1 tablet (80 mg) by mouth At Bedtime 90 tablet 3     ferrous sulfate (IRON) 325 (65 FE) MG tablet Take 325 mg by mouth every morning  60 tablet 3     lisinopril (PRINIVIL/ZESTRIL) 20 MG tablet Take 1 tablet (20  mg) by mouth At Bedtime 90 tablet 3     metoprolol succinate ER (TOPROL-XL) 50 MG 24 hr tablet Take 1 tablet (50 mg) by mouth At Bedtime 90 tablet 3     nicotine (NICODERM CQ) 21 MG/24HR 24 hr patch Place 1 patch onto the skin every 24 hours 28 patch 3     PARoxetine (PAXIL) 40 MG tablet Take 1 tablet (40 mg) by mouth every morning 90 tablet 3     albuterol (PROAIR HFA/PROVENTIL HFA/VENTOLIN HFA) 108 (90 Base) MCG/ACT inhaler Inhale 2 puffs into the lungs every 6 hours as needed for shortness of breath / dyspnea or wheezing (Patient taking differently: Inhale 2 puffs into the lungs every 6 hours as needed for shortness of breath / dyspnea or wheezing (Pt has not used in past 2 months 20) ) 1 Inhaler 1     levofloxacin (LEVAQUIN) 500 MG tablet Take 1 tablet 6 hours post treatment and 1 tablet each AM following each treatment. (Patient taking differently: Take 1 tablet 6 hours post treatment and 1 tablet each AM following each treatment.    Pt has not taken in past 2 months 20) 12 tablet 0     nitroglycerin (NITROSTAT) 0.4 MG sublingual tablet Place 1 tablet (0.4 mg) under the tongue every 5 minutes as needed for chest pain 25 tablet 3       Allergies  Allergies   Allergen Reactions     Codeine Nausea and Vomiting       Social History  Social History     Socioeconomic History     Marital status: Single     Spouse name: Not on file     Number of children: 0     Years of education: Not on file     Highest education level: Not on file   Occupational History     Not on file   Social Needs     Financial resource strain: Not on file     Food insecurity:     Worry: Not on file     Inability: Not on file     Transportation needs:     Medical: Not on file     Non-medical: Not on file   Tobacco Use     Smoking status: Former Smoker     Packs/day: 1.50     Years: 30.00     Pack years: 45.00     Last attempt to quit: 2019     Years since quittin.4     Smokeless tobacco: Never Used   Substance and Sexual Activity      Alcohol use: No     Comment: quit 35 years ago     Drug use: Yes     Comment: occionally use of pot     Sexual activity: Yes     Partners: Female   Lifestyle     Physical activity:     Days per week: Not on file     Minutes per session: Not on file     Stress: Not on file   Relationships     Social connections:     Talks on phone: Not on file     Gets together: Not on file     Attends [employer] service: Not on file     Active member of club or organization: Not on file     Attends meetings of clubs or organizations: Not on file     Relationship status: Not on file     Intimate partner violence:     Fear of current or ex partner: Not on file     Emotionally abused: Not on file     Physically abused: Not on file     Forced sexual activity: Not on file   Other Topics Concern      Service No     Blood Transfusions No     Caffeine Concern Not Asked     Occupational Exposure Not Asked     Hobby Hazards Not Asked     Sleep Concern Not Asked     Stress Concern Not Asked     Weight Concern Not Asked     Special Diet Not Asked     Back Care Not Asked     Exercise No     Bike Helmet Not Asked     Seat Belt Yes     Self-Exams No     Parent/sibling w/ CABG, MI or angioplasty before 65F 55M? No   Social History Narrative     Not on file       Family History  Family History   Problem Relation Age of Onset     Cancer Mother         esog/stomach     Cancer Father         lung     Family History Negative Brother      Hypertension Brother      Anesthesia Reaction No family hx of      Deep Vein Thrombosis (DVT) No family hx of        ROS/MED HX  The complete review of systems is negative other than noted in the HPI or here.  Pt denies steroid use during past year.    ENT/Pulmonary: Comment: Albuterol doesn't seem to help per pt report.    PFT 12/8/15:  Although the FVC and FEV1 are within normal limits, the FEV1/FVC ratio is reduced.  Patient effort was erratic, but the normal FEV1 indicates no significant obstruction.  The  inspiratory flow rates are within normal limits.  Lung volumes are within normal limits.  Following administration of bronchodilators, there is no significant response.  The diffusing capacity is normal.  However, the diffusing capacity was not corrected for the patient's hemoglobin.  Mild airway obstruction is present.  IMPRESSION:  Mild Airflow Obstruction   No significant change with bronchodilator  Normal lung volumes  Normal diffusion capacity        (+)tobacco use, Past use 45 pk yr hx, quit 8/1/19 packs/day  Intermittent asthma Treatment: Inhaler prn,  mild COPD, , . .    Neurologic:  - neg neurologic ROS     Cardiovascular:     (+) Dyslipidemia, hypertension--CAD, -CABG-date: 9/2014 robotic, stent,9/2014  1 Drug Eluting Stent .. Taking blood thinners : Instructions Given to patient: last dose ASA 9/2/19. . . :. . Previous cardiac testing Echodate:9/27/14results:Interpretation Summary  Global and regional left ventricular function is normal with an EF of 60-65%.   Right ventricular function, chamber size, wall motion, and thickness are   normal. Pulmonary artery systolic pressure cannot be assessed. The inferior   vena cava is normal. No pericardial effusion is present.    date: results: date: results:Cath date: 9/29/14 results:Successful PCI with ALENA from distal to prox segments of RCA. Good flow of LIMA to LAD with stenosis as noted.          METS/Exercise Tolerance: Comment: Walks 1 y/o [place] encinas dog daily. Able to ascend stairs w/ mild SOB, denies CP & angina. >4 METS   Hematologic: Comments: Iron deficiency anemia    (+) Anemia, History of Transfusion no previous transfusion reaction -      Musculoskeletal:  - neg musculoskeletal ROS       GI/Hepatic:  - neg GI/hepatic ROS      (-) liver disease   Renal/Genitourinary:  - ROS Renal section negative       Endo:  - neg endo ROS       Psychiatric:     (+) psychiatric history anxiety      Infectious Disease:  - neg infectious disease ROS      "  Malignancy:   (+) Malignancy History of Other  Other CA bladder Active status post         Other: Comment: Pt reports severe headache, fever, chills, fatigue for several days last week. Had minimal dry cough. He feels he is \"95%\" back to baseline.   (+) No chance of pregnancy C-spine cleared: N/A, no H/O Chronic Pain,             PHYSICAL EXAM:   Mental Status/Neuro: A/A/O; Age Appropriate   Airway: Facies: Feasible  Mallampati: II  Mouth/Opening: Full  TM distance: > 6 cm  Neck ROM: Limited   Respiratory: Auscultation: CTAB     Resp. Rate: Normal     Resp. Effort: Normal      CV: Rhythm: Regular  Rate: Age appropriate  Heart: Normal Sounds  Edema: None   Comments: Poor dentition, cracked upper right molar     Dental: Details                    Preop Vitals    BP Readings from Last 3 Encounters:   01/23/20 129/80   01/08/20 128/78   01/07/20 126/81    Pulse Readings from Last 3 Encounters:   01/23/20 52   01/08/20 (!) 47   01/07/20 55      Resp Readings from Last 3 Encounters:   01/23/20 19   11/12/19 18   11/05/19 18    SpO2 Readings from Last 3 Encounters:   01/23/20 97%   01/08/20 98%   11/12/19 98%      Temp Readings from Last 1 Encounters:   01/23/20 97.8  F (36.6  C) (Oral)    Ht Readings from Last 1 Encounters:   01/23/20 1.753 m (5' 9\")      Wt Readings from Last 1 Encounters:   01/23/20 74.5 kg (164 lb 4.8 oz)    Estimated body mass index is 24.26 kg/m  as calculated from the following:    Height as of this encounter: 1.753 m (5' 9\").    Weight as of this encounter: 74.5 kg (164 lb 4.8 oz).     Temp: 97.8  F (36.6  C) Temp src: Oral BP: 129/80 Pulse: 52   Resp: 19 SpO2: 97 %         164 lbs 4.8 oz  5' 9\"   Body mass index is 24.26 kg/m .     Physical Exam  Constitutional: Awake, alert, cooperative, no apparent distress, and appears stated age.  Eyes: Pupils equal, round and reactive to light, extra ocular muscles intact, sclera clear, conjunctiva normal.  HENT: Normocephalic, oral pharynx with moist mucus " membranes, fair dentition. No goiter appreciated. No removable dental hardware.  Respiratory: Clear to auscultation bilaterally, no crackles or wheezing. No SOB when supine.  Cardiovascular: Distant heart sounds, Regular rate and rhythm, normal S1 and S2, and no murmur noted.  Carotids +2, no bruits. No edema. Palpable pulses to radial, DP and PT arteries.   GI: Normal bowel sounds, soft, non-distended, non-tender, no masses palpated, no hepatomegaly.    Lymph/Hematologic: No cervical lymphadenopathy and no supraclavicular lymphadenopathy.  Genitourinary:  deferred  Skin: Warm and dry.  No rashes at anticipated surgical site.   Musculoskeletal: limited extension ROM of neck. There is no redness, warmth, or swelling of the joints. Gross motor strength is normal.    Neurologic: Awake, alert, oriented to name, place and time. Cranial nerves II-XII are grossly intact. Gait is normal. Ambulates from chair to exam table, seats self, lies supine and sits back up w/o assistance.  Neuropsychiatric: Calm, cooperative. Normal affect. Pleasant. Answers questions appropriately, follows commands w/o difficulty.    PRIOR LABS/DIAGNOSTIC STUDIES:  All labs and imaging personally reviewed     EKG 2017  Marked sinus  Bradycardia  -  Negative precordial T-waves  -May be normal -consider anteroseptal ischemia.  Ventricular rate 48 bpm     PFT 12/8/15:  FVC-Pred 4.06     L   12/08/2015  2:04    FVC-Pre 5.28     L   12/08/2015  2:04    FVC-%Pred-Pre 130     %   12/08/2015  2:04    FEV1-Pre 3.31     L   12/08/2015  2:04    FEV1-%Pred-Pre 106     %   12/08/2015  2:04    FEV1FVC-Pred 77     %   12/08/2015  2:04    FEV1FVC-Pre 63     %   12/08/2015  2:04    FEFMax-Pred 8.17     L/sec   12/08/2015  2:04    FEFMax-Pre 8.97     L/sec   12/08/2015  2:04    FEFMax-%Pred-Pre 109     %   12/08/2015  2:04    FEF2575-Pred 2.46     L/sec   12/08/2015  2:04    FEF2575-Pre 1.37     L/sec    12/08/2015  2:04    SQQ3149-%Pred-Pre 55     %   12/08/2015  2:04    FEF2575-Post 1.39     L/sec   12/08/2015  2:04    [id number]-%Pred-Post 56     %   12/08/2015  2:04    ExpTime-Pre 9.83     sec   12/08/2015  2:04    FIFMax-Pre 7.31     L/sec   12/08/2015  2:04    VC-Pred 4.47     L   12/08/2015  2:04    VC-Pre 5.40     L   12/08/2015  2:04    VC-%Pred-Pre 120     %   12/08/2015  2:04    IC-Pred 3.27     L   12/08/2015  2:04    IC-Pre 3.26     L   12/08/2015  2:04    IC-%Pred-Pre 99     %   12/08/2015  2:04    ERV-Pred 1.20     L   12/08/2015  2:04    ERV-Pre 2.14     L   12/08/2015  2:04    ERV-%Pred-Pre 178     %   12/08/2015  2:04    FEV1FEV6-Pred 78     %   12/08/2015  2:04    FEV1FEV6-Pre 67     %   12/08/2015  2:04    FRCPleth-Pred 3.56     L   12/08/2015  2:04    FRCPleth-Pre 4.18     L   12/08/2015  2:04    FRCPleth-%Pred-Pre 117     %   12/08/2015  2:04    RVPleth-Pred 2.51     L   12/08/2015  2:04    RVPleth-Pre 2.04     L   12/08/2015  2:04    RVPleth-%Pred-Pre 81     %   12/08/2015  2:04    TLCPleth-Pred 6.72     L   12/08/2015  2:04    TLCPleth-Pre 7.44     L   12/08/2015  2:04    TLCPleth-%Pred-Pre 110     %   12/08/2015  2:04    DLCOunc-Pred 25.92     ml/min/mmHg   12/08/2015  2:04    DLCOunc-Pre 20.16     ml/min/mmHg   12/08/2015  2:04    DLCOunc-%Pred-Pre 77     %   12/08/2015  2:04    VA-Pre 7.22     L   12/08/2015  2:04    VA-%Pred-Pre 112     %   12/08/2015  2:04    FEV1SVC-Pred 70     %   12/08/2015  2:04    FEV1SVC-Pre 61     %   12/08/2015  2:04    Although the FVC and FEV1 are within normal limits, the FEV1/FVC ratio is reduced.  Patient effort was erratic, but the normal FEV1 indicates no significant obstruction.  The inspiratory flow rates are within normal limits.  Lung volumes are within normal  limits.  Following administration of bronchodilators, there is no significant response.  The diffusing capacity is normal.  However, the diffusing capacity was not corrected for the patient's hemoglobin.  Mild airway obstruction is present.  IMPRESSION:  Mild Airflow Obstruction  No significant change with bronchodilator  Normal lung volumes  Normal diffusion capacity  This preliminary report should not be used clinically unless reviewed and signed by a physician.      ECHOCARDIOGRAM 9/27/14:  Interpretation Summary  Global and regional left ventricular function is normal with an EF of 60-65%.  Right ventricular function, chamber size, wall motion, and thickness are  normal. Pulmonary artery systolic pressure cannot be assessed. The inferior  vena cava  is normal. No pericardial effusion is present.     Labs today: (personally reviewed)   CBC, UA  WBC 9.0   4.0 - 11.0 10e9/L Final 01/23/2020  2:25 PM 1740   RBC Count 4.57   4.4 - 5.9 10e12/L Final 01/23/2020  2:25 PM 1740   Hemoglobin 14.3   13.3 - 17.7 g/dL Final 01/23/2020  2:25 PM 1740   Hematocrit 43.1   40.0 - 53.0 % Final 01/23/2020  2:25 PM 1740   MCV 94   78 - 100 fl Final 01/23/2020  2:25 PM 1740   MCH 31.3   26.5 - 33.0 pg Final 01/23/2020  2:25 PM 1740   MCHC 33.2   31.5 - 36.5 g/dL Final 01/23/2020  2:25 PM 1740   RDW 12.1   10.0 - 15.0 % Final 01/23/2020  2:25 PM 1740   Platelet Count 306   150 - 450 10e9/L Final 01/23/2020  2:25 PM 1740       Color Urine Yellow     Final 01/23/2020  1:30 PM 1740   Appearance Urine     Final 01/23/2020  1:30 PM 1740   Slightly Cloudy    Glucose Urine Negative   NEG^Negative mg/dL Final 01/23/2020  1:30 PM 1740   Bilirubin Urine Negative   NEG^Negative  Final 01/23/2020  1:30 PM 1740   Ketones Urine 5  Abnormal   NEG^Negative mg/dL Final 01/23/2020  1:30 PM 1740   Specific Gravity Urine 1.021   1.003 - 1.035  Final 01/23/2020  1:30 PM 1740   Blood Urine Negative   NEG^Negative  Final 01/23/2020  1:30 PM 1740   pH Urine  6.0   5.0 - 7.0 pH Final 01/23/2020  1:30 PM 1740   Protein Albumin Urine Negative   NEG^Negative mg/dL Final 01/23/2020  1:30 PM 1740   Urobilinogen mg/dL 2.0   0.0 - 2.0 mg/dL Final 01/23/2020  1:30 PM 1740   Nitrite Urine Negative   NEG^Negative  Final 01/23/2020  1:30 PM 1740   Leukocyte Esterase Urine Negative   NEG^Negative  Final 01/23/2020  1:30 PM 1740   Source     Final 01/23/2020  1:30 PM 1740   Midstream Urine    WBC Urine 1   0 - 5 /HPF Final 01/23/2020  1:30 PM 1740   RBC Urine 1   0 - 2 /HPF Final 01/23/2020  1:30 PM 1740   Squamous Epithelial /HPF Urine <1   0 - 1 /HPF Final 01/23/2020  1:30 PM 1740   Mucous Urine Present  Abnormal   NEG^Negative /LPF Final 01/23/2020  1:30 PM 1740         ASSESSMENT and PLAN  Tevin Guidry is a 69 year old male scheduled to undergo CYSTOSCOPY, WITH TRANSURETHRAL RESECTION PROSTATE & CYSTOSCOPY, WITH TRANSURETHRAL RESECTION BLADDER TUMOR  with Almas Sunshine MD on 2/6/20 at [facility] [place] for treatment of history of malignant neoplasm of bladder.    Pt has had prior anesthetic. Type: General and MAC    No history of anesthetic complications    He has the following specific operative considerations:   # MICHELLE 2/8 = low risk  # VTE risk: 3%  # Risk of PONV score = 1.  If > 2, anti-emetic intervention recommended.  # Anesthesia considerations:  Refer to PAC assessment in anesthesia records    CARDIAC: METS >4,  Walks 1 y/o [place] encinas dog daily. Able to ascend stairs w/ mild SOB, denies CP & angina.     # RCRI : No serious cardiac risks.  0.4% risk of major adverse cardiac event.     # CAD, s/p hybrid robotic-assisted CABG (LIMA - LAD) and PCI with ALENA to RCA (distal to prox) in 2014. On lisinopril & metoprolol.     #  Echo 2014:  EF 60-65%, normal study     #  EKG 2017: sinus yokasta, ventr rate 48 bpm, otherwise normal    PULMONARY:     # Former smoker, 45 pk yr hx, quit 8/2019    # Intermittent Asthma, mild COPD      # PFT 2015: mild airflow  "obstruction    GI: no GERD      /RENAL:     # s/p TURBT 8/2019 for bladder tumor    ENDO: BMI 26    # No DM    HEME:   # On ASA 81, will stop 7d prior  # Iron defic anemia, on ferrous sulfate    ORTHO: limited neck extension ROM, no TMJ    GENERAL:    # Pt reports severe headache, fever, chills, fatigue for several days last week. Had minimal dry cough. He feels he is \"95%\" back to baseline.     Patient is optimized and is acceptable candidate for the proposed procedure. No further diagnostic evaluation is needed.      Arrival time, NPO, shower and medication instructions provided by nursing staff today.  Preparing For Your Surgery handout given.    Becky Cardona PA-C  Preoperative Assessment Center  [facility]  [facility]  Phone: [phone]  Fax: [phone]  "

## 2020-01-24 LAB
BACTERIA SPEC CULT: NO GROWTH
Lab: NORMAL
SPECIMEN SOURCE: NORMAL

## 2020-02-06 ENCOUNTER — HOSPITAL ENCOUNTER (OUTPATIENT)
Facility: AMBULATORY SURGERY CENTER | Age: 70
End: 2020-02-06
Attending: UROLOGY
Payer: COMMERCIAL

## 2020-02-06 ENCOUNTER — ANESTHESIA (OUTPATIENT)
Dept: SURGERY | Facility: AMBULATORY SURGERY CENTER | Age: 70
End: 2020-02-06

## 2020-02-06 VITALS
BODY MASS INDEX: 23.7 KG/M2 | SYSTOLIC BLOOD PRESSURE: 115 MMHG | OXYGEN SATURATION: 95 % | RESPIRATION RATE: 12 BRPM | HEIGHT: 69 IN | DIASTOLIC BLOOD PRESSURE: 76 MMHG | TEMPERATURE: 98 F | WEIGHT: 160 LBS | HEART RATE: 52 BPM

## 2020-02-06 DIAGNOSIS — Z85.51 PERSONAL HISTORY OF MALIGNANT NEOPLASM OF BLADDER: ICD-10-CM

## 2020-02-06 RX ORDER — SODIUM CHLORIDE, SODIUM LACTATE, POTASSIUM CHLORIDE, CALCIUM CHLORIDE 600; 310; 30; 20 MG/100ML; MG/100ML; MG/100ML; MG/100ML
INJECTION, SOLUTION INTRAVENOUS CONTINUOUS
Status: DISCONTINUED | OUTPATIENT
Start: 2020-02-06 | End: 2020-02-06 | Stop reason: HOSPADM

## 2020-02-06 RX ORDER — SODIUM CHLORIDE, SODIUM LACTATE, POTASSIUM CHLORIDE, CALCIUM CHLORIDE 600; 310; 30; 20 MG/100ML; MG/100ML; MG/100ML; MG/100ML
INJECTION, SOLUTION INTRAVENOUS CONTINUOUS
Status: DISCONTINUED | OUTPATIENT
Start: 2020-02-06 | End: 2020-02-07 | Stop reason: HOSPADM

## 2020-02-06 RX ORDER — OXYCODONE HYDROCHLORIDE 5 MG/1
5 TABLET ORAL EVERY 4 HOURS PRN
Status: DISCONTINUED | OUTPATIENT
Start: 2020-02-06 | End: 2020-02-07 | Stop reason: HOSPADM

## 2020-02-06 RX ORDER — PROPOFOL 10 MG/ML
INJECTION, EMULSION INTRAVENOUS PRN
Status: DISCONTINUED | OUTPATIENT
Start: 2020-02-06 | End: 2020-02-06

## 2020-02-06 RX ORDER — DEXAMETHASONE SODIUM PHOSPHATE 4 MG/ML
INJECTION, SOLUTION INTRA-ARTICULAR; INTRALESIONAL; INTRAMUSCULAR; INTRAVENOUS; SOFT TISSUE PRN
Status: DISCONTINUED | OUTPATIENT
Start: 2020-02-06 | End: 2020-02-06

## 2020-02-06 RX ORDER — CEFAZOLIN SODIUM 1 G/50ML
1 SOLUTION INTRAVENOUS SEE ADMIN INSTRUCTIONS
Status: DISCONTINUED | OUTPATIENT
Start: 2020-02-06 | End: 2020-02-06 | Stop reason: HOSPADM

## 2020-02-06 RX ORDER — FENTANYL CITRATE 50 UG/ML
25-50 INJECTION, SOLUTION INTRAMUSCULAR; INTRAVENOUS
Status: DISCONTINUED | OUTPATIENT
Start: 2020-02-06 | End: 2020-02-06 | Stop reason: HOSPADM

## 2020-02-06 RX ORDER — MEPERIDINE HYDROCHLORIDE 25 MG/ML
12.5 INJECTION INTRAMUSCULAR; INTRAVENOUS; SUBCUTANEOUS
Status: DISCONTINUED | OUTPATIENT
Start: 2020-02-06 | End: 2020-02-07 | Stop reason: HOSPADM

## 2020-02-06 RX ORDER — ONDANSETRON 2 MG/ML
4 INJECTION INTRAMUSCULAR; INTRAVENOUS EVERY 30 MIN PRN
Status: DISCONTINUED | OUTPATIENT
Start: 2020-02-06 | End: 2020-02-07 | Stop reason: HOSPADM

## 2020-02-06 RX ORDER — ONDANSETRON 2 MG/ML
INJECTION INTRAMUSCULAR; INTRAVENOUS PRN
Status: DISCONTINUED | OUTPATIENT
Start: 2020-02-06 | End: 2020-02-06

## 2020-02-06 RX ORDER — ACETAMINOPHEN 325 MG/1
975 TABLET ORAL ONCE
Status: COMPLETED | OUTPATIENT
Start: 2020-02-06 | End: 2020-02-06

## 2020-02-06 RX ORDER — ONDANSETRON 4 MG/1
4 TABLET, ORALLY DISINTEGRATING ORAL EVERY 30 MIN PRN
Status: DISCONTINUED | OUTPATIENT
Start: 2020-02-06 | End: 2020-02-07 | Stop reason: HOSPADM

## 2020-02-06 RX ORDER — CEFAZOLIN SODIUM 2 G/50ML
2 SOLUTION INTRAVENOUS
Status: COMPLETED | OUTPATIENT
Start: 2020-02-06 | End: 2020-02-06

## 2020-02-06 RX ORDER — LIDOCAINE 40 MG/G
CREAM TOPICAL
Status: DISCONTINUED | OUTPATIENT
Start: 2020-02-06 | End: 2020-02-06 | Stop reason: HOSPADM

## 2020-02-06 RX ORDER — NALOXONE HYDROCHLORIDE 0.4 MG/ML
.1-.4 INJECTION, SOLUTION INTRAMUSCULAR; INTRAVENOUS; SUBCUTANEOUS
Status: DISCONTINUED | OUTPATIENT
Start: 2020-02-06 | End: 2020-02-07 | Stop reason: HOSPADM

## 2020-02-06 RX ORDER — FENTANYL CITRATE 50 UG/ML
INJECTION, SOLUTION INTRAMUSCULAR; INTRAVENOUS PRN
Status: DISCONTINUED | OUTPATIENT
Start: 2020-02-06 | End: 2020-02-06

## 2020-02-06 RX ORDER — EPHEDRINE SULFATE 50 MG/ML
INJECTION, SOLUTION INTRAMUSCULAR; INTRAVENOUS; SUBCUTANEOUS PRN
Status: DISCONTINUED | OUTPATIENT
Start: 2020-02-06 | End: 2020-02-06

## 2020-02-06 RX ORDER — TAMSULOSIN HYDROCHLORIDE 0.4 MG/1
0.4 CAPSULE ORAL DAILY
Qty: 14 CAPSULE | Refills: 0 | Status: SHIPPED | OUTPATIENT
Start: 2020-02-06 | End: 2020-04-14

## 2020-02-06 RX ORDER — PROPOFOL 10 MG/ML
INJECTION, EMULSION INTRAVENOUS CONTINUOUS PRN
Status: DISCONTINUED | OUTPATIENT
Start: 2020-02-06 | End: 2020-02-06

## 2020-02-06 RX ADMIN — PROPOFOL 150 MG: 10 INJECTION, EMULSION INTRAVENOUS at 12:36

## 2020-02-06 RX ADMIN — ONDANSETRON 4 MG: 2 INJECTION INTRAMUSCULAR; INTRAVENOUS at 12:29

## 2020-02-06 RX ADMIN — PROPOFOL 150 MCG/KG/MIN: 10 INJECTION, EMULSION INTRAVENOUS at 12:36

## 2020-02-06 RX ADMIN — EPHEDRINE SULFATE 5 MG: 50 INJECTION, SOLUTION INTRAMUSCULAR; INTRAVENOUS; SUBCUTANEOUS at 12:56

## 2020-02-06 RX ADMIN — CEFAZOLIN SODIUM 2 G: 2 SOLUTION INTRAVENOUS at 12:40

## 2020-02-06 RX ADMIN — FENTANYL CITRATE 50 MCG: 50 INJECTION, SOLUTION INTRAMUSCULAR; INTRAVENOUS at 12:53

## 2020-02-06 RX ADMIN — Medication 100 MCG: at 13:13

## 2020-02-06 RX ADMIN — SODIUM CHLORIDE, SODIUM LACTATE, POTASSIUM CHLORIDE, CALCIUM CHLORIDE: 600; 310; 30; 20 INJECTION, SOLUTION INTRAVENOUS at 09:28

## 2020-02-06 RX ADMIN — EPHEDRINE SULFATE 10 MG: 50 INJECTION, SOLUTION INTRAMUSCULAR; INTRAVENOUS; SUBCUTANEOUS at 12:41

## 2020-02-06 RX ADMIN — Medication 100 MCG: at 12:47

## 2020-02-06 RX ADMIN — Medication 100 MCG: at 12:44

## 2020-02-06 RX ADMIN — ACETAMINOPHEN 975 MG: 325 TABLET ORAL at 09:10

## 2020-02-06 RX ADMIN — Medication 100 MCG: at 13:10

## 2020-02-06 RX ADMIN — Medication 200 MCG: at 12:56

## 2020-02-06 RX ADMIN — FENTANYL CITRATE 50 MCG: 50 INJECTION, SOLUTION INTRAMUSCULAR; INTRAVENOUS at 12:36

## 2020-02-06 RX ADMIN — Medication 20 MG: at 12:59

## 2020-02-06 RX ADMIN — DEXAMETHASONE SODIUM PHOSPHATE 4 MG: 4 INJECTION, SOLUTION INTRA-ARTICULAR; INTRALESIONAL; INTRAMUSCULAR; INTRAVENOUS; SOFT TISSUE at 12:40

## 2020-02-06 ASSESSMENT — MIFFLIN-ST. JEOR: SCORE: 1471.14

## 2020-02-06 NOTE — ANESTHESIA POSTPROCEDURE EVALUATION
Anesthesia POST Procedure Evaluation    Patient: Tevin Guidry   MRN:     [id number] Gender:   male   Age:    69 year old :      1950        Preoperative Diagnosis: Personal history of malignant neoplasm of bladder [Z85.51]   Procedure(s):  CYSTOSCOPY, WITH TRANSURETHRAL RESECTION BLADDER TUMOR   Postop Comments: No value filed.       Anesthesia Type:  Not documented  General    Reportable Event: NO     PAIN: Uncomplicated   Sign Out status: Comfortable, Well controlled pain     PONV: No PONV   Sign Out status:  No Nausea or Vomiting     Neuro/Psych: Uneventful perioperative course   Sign Out Status: Preoperative baseline; Age appropriate mentation     Airway/Resp.: Uneventful perioperative course   Sign Out Status: Non labored breathing, age appropriate RR; Resp. Status within EXPECTED Parameters     CV: Uneventful perioperative course   Sign Out status: Appropriate BP and perfusion indices; Appropriate HR/Rhythm     Disposition:   Sign Out in:  PACU  Disposition:  Phase II; Home  Recovery Course: Uneventful  Follow-Up: Not required           Last Anesthesia Record Vitals:  CRNA VITALS  2020 1252 - 2020 1352      2020             Pulse:  57    SpO2:  99 %    Resp Rate (set):  10          Last PACU Vitals:  Vitals Value Taken Time   /65 2020  1:30 PM   Temp 36.7  C (98  F) 2020  1:30 PM   Pulse 53 2020  1:30 PM   Resp 17 2020  1:32 PM   SpO2 92 % 2020  1:32 PM   Temp src     NIBP     Pulse     SpO2     Resp     Temp     Ht Rate     Temp 2     Vitals shown include unvalidated device data.      Electronically Signed By: Paul Schmidt MD, 2020, 4:11 PM

## 2020-02-06 NOTE — OP NOTE
OPERATIVE REPORT    PREOPERATIVE DIAGNOSIS:   1) Bladder tumor (<2cm bladder neck)    POSTOPERATIVE DIAGNOSIS:  Same    PROCEDURE PERFORMED: Transurethral resection of Bladder Tumor <2 cm    ATTENDING SURGEON: Almas Sunshine, present for all key portions of the procedure    RESIDENT SURGEON: Topher Sykes MD  FINDINGS:  2 cm tumor located on the anterior bladder neck (photo taken)     ANESTHESIA: General   INTRAVENOUS FLUIDS: See dictated anesthesia record  ESTIMATED BLOOD LOSS: 3 cc mL.     SPECIMENS:   1. Bladder tumor superficial  2. Bladder tumor deep    DRAINS:   None     INDICATIONS FOR PROCEDURE: Tevin Guidry is a(n) 69 year old male who was seen in consultation for bladder cancer.  He was recently noted to have an anterior bladder neck low grade in appearance after 6 weeks of BCG. After discussion of the risks, benefits and alternatives of the procedure, the patient agreed to proceed with transurethral resection of his bladder tumor.     DESCRIPTION OF PROCEDURE: After verifying informed consent, the patient was taken to the operating room. Adequate anesthesia was induced, the patient was placed in the dorsal lithotomy position and prepped and draped in standard sterile fashion. A timeout was performed to verify correct patient and procedure. Pneumo boots and perioperative antibiotics were in place before the procedure commenced.     A 22-Libyan rigid cystoscope was inserted into a well lubricated urethra. We began by performing a white light cystourethroscopy. The urethra was unremarkable, and the prostate was mildly enlarged with no median lobe and moderate lateral lobe hypertrophy. The ureteral orifices were in their orthotopic positions bilaterally. The only tumor we identified was at the anterior bladder neck.  It appeared low grade.    We then introduced the 26-Libyan bipolar Gyrus resectoscope. We were able to transect the tumor with a single swipe of the bladder neck using cut electrocautery..  We sent this for pathology and then transected the underlying deep margin of the bladder neck which we sent separately.  There was minimal bleeding.   Once we had completed our dissection, we evacuated the specimens. We then reintroduced the resectoscope to ensure meticulous hemostasis. The urine was clear and we elected not to leave a jordan catheter.      POSTOPERATIVE PLAN:   1. Follow up in 1-2 weeks in clinic to review pathology

## 2020-02-06 NOTE — ANESTHESIA CARE TRANSFER NOTE
Patient: Tevin Guidry    Procedure(s):  CYSTOSCOPY, WITH TRANSURETHRAL RESECTION BLADDER TUMOR    Diagnosis: Personal history of malignant neoplasm of bladder [Z85.51]  Diagnosis Additional Information: No value filed.    Anesthesia Type:   General     Note:  Airway :Face Mask  Patient transferred to:PACU  Comments: Uneventful transport    Report to EVELYN King  Exchanging well; color natl  Pt responds appropriately to command  Pt comfortable  IV patent  Lips/teeth/dentition as preop status  Questions answered  BP 98/65  HR 51 sb  TAT 97.4  RR 16  Sat 99%  Handoff Report: Identifed the Patient, Identified the Reponsible Provider, Reviewed the pertinent medical history, Discussed the surgical course, Reviewed Intra-OP anesthesia mangement and issues during anesthesia, Set expectations for post-procedure period and Allowed opportunity for questions and acknowledgement of understanding      Vitals: (Last set prior to Anesthesia Care Transfer)    CRNA VITALS  2/6/2020 1252 - 2/6/2020 1329      2/6/2020             Pulse:  57    SpO2:  99 %    Resp Rate (set):  10                Electronically Signed By: FRANCISCA NEW CRNA  February 6, 2020  1:29 PM

## 2020-02-06 NOTE — DISCHARGE INSTRUCTIONS
[facility]  Home Care Following Anesthesia  For 24 hours after surgery:  1. Get plenty of rest.  A responsible adult must stay with you for at least 24 hours after you leave the surgery center.  2. Do not drive or use heavy equipment.  If you have weakness or tingling, don't drive or use heavy equipment until this feeling goes away.   3. Do not drink alcohol.   4. Avoid strenuous or risky activities.  Ask for help when climbing stairs.  5. You may feel lightheaded.  IF so, sit for a few minutes before standing.  Have someone help you get up.   6. If you have nausea (feel sick to your stomach): Drink only clear liquids such as apple juice, ginger ale, broth or 7-Up.  Rest may also help.  Be sure to drink enough fluids.  Move to a regular diet as you feel able.   7. You may have a slight fever.  Call the doctor if your fever is over 100 F (37.7 C) (taken under the tongue) or lasts longer than 24 hours.  8. You may have a dry mouth, a sore throat, muscle aches or trouble sleeping. These should go away after 24 hours.  9. Do not make important or legal decisions.               Tips for taking pain medications  To get the best pain relief possible, remember these points:    Take pain medications as directed, before pain becomes severe.    Pain medication can upset your stomach: taking it with food may help.    Constipation is a common side effect of pain medication. Drink plenty of  fluids.    Eat foods high in fiber. Take a stool softener if recommended by your doctor or pharmacist.    Do not drink alcohol, drive or operate machinery while taking pain medications.    Ask about other ways to control pain, such as with heat, ice or relaxation.    Tylenol/Acetaminophen Consumption  To help encourage the safe use of acetaminophen, the makers of TYLENOL  have lowered the maximum daily dose for single-ingredient Extra Strength TYLENOL  (acetaminophen) products sold in the [place] from 8  pills per day (4,000 mg) to 6 pills per day (3,000 mg). The dosing interval has also changed from 2 pills every 4-6 hours to 2 pills every 6 hours.    If you feel your pain relief is insufficient, you may take Tylenol/Acetaminophen in addition to your narcotic pain medication.     Be careful not to exceed 3,000 mg of Tylenol/Acetaminophen in a 24 hour period from all sources.    If you are taking extra strength Tylenol/acetaminophen (500 mg), the maximum dose is 6 tablets in 24 hours.    If you are taking regular strength acetaminophen (325 mg), the maximum dose is 9 tablets in 24 hours.    Call a doctor for any of the followin. Signs of infection (fever, growing tenderness at the surgery site, a large amount of drainage or bleeding, severe pain, foul-smelling drainage, redness, swelling).  2. It has been over 8 to 10 hours since surgery and you are still not able to urinate (pass water).  3. Headache for over 24 hours.  4. Numbness, tingling or weakness the day after surgery (if you had spinal anesthesia).  Your doctor is:  Dr. Almas Sunshine, Prostate and Urology: [phone]                    Or dial [phone] and ask for the resident on call for:  Prostate Urology  For emergency care, call the:  [facility]:  [phone] (TTY for hearing impaired: [phone])

## 2020-02-07 LAB — COPATH REPORT: NORMAL

## 2020-02-10 ENCOUNTER — PATIENT OUTREACH (OUTPATIENT)
Dept: UROLOGY | Facility: CLINIC | Age: 70
End: 2020-02-10

## 2020-02-27 ENCOUNTER — PATIENT OUTREACH (OUTPATIENT)
Dept: UROLOGY | Facility: CLINIC | Age: 70
End: 2020-02-27

## 2020-02-27 NOTE — PROGRESS NOTES
Called and spoke with patient and gave dates and times for BCG treatments. I also reviewed BCG teaching with patient.

## 2020-03-04 DIAGNOSIS — C67.2 MALIGNANT NEOPLASM OF LATERAL WALL OF URINARY BLADDER (H): ICD-10-CM

## 2020-03-04 DIAGNOSIS — Z85.51 PERSONAL HISTORY OF MALIGNANT NEOPLASM OF BLADDER: ICD-10-CM

## 2020-03-04 RX ORDER — LIDOCAINE HYDROCHLORIDE 20 MG/ML
10 JELLY TOPICAL
Status: CANCELLED | OUTPATIENT
Start: 2020-03-31

## 2020-03-04 RX ORDER — LIDOCAINE HYDROCHLORIDE 20 MG/ML
10 JELLY TOPICAL
Status: CANCELLED | OUTPATIENT
Start: 2020-04-07

## 2020-03-04 RX ORDER — LIDOCAINE HYDROCHLORIDE 20 MG/ML
10 JELLY TOPICAL
Status: CANCELLED | OUTPATIENT
Start: 2020-04-21

## 2020-03-04 RX ORDER — LIDOCAINE HYDROCHLORIDE 20 MG/ML
10 JELLY TOPICAL
Status: CANCELLED | OUTPATIENT
Start: 2020-04-14

## 2020-03-04 RX ORDER — LIDOCAINE HYDROCHLORIDE 20 MG/ML
10 JELLY TOPICAL
Status: CANCELLED | OUTPATIENT
Start: 2020-03-10

## 2020-03-04 RX ORDER — LIDOCAINE HYDROCHLORIDE 20 MG/ML
10 JELLY TOPICAL
Status: CANCELLED | OUTPATIENT
Start: 2020-03-17

## 2020-03-10 ENCOUNTER — INFUSION THERAPY VISIT (OUTPATIENT)
Dept: ONCOLOGY | Facility: CLINIC | Age: 70
End: 2020-03-10
Attending: UROLOGY
Payer: COMMERCIAL

## 2020-03-10 VITALS
RESPIRATION RATE: 16 BRPM | HEART RATE: 58 BPM | OXYGEN SATURATION: 97 % | SYSTOLIC BLOOD PRESSURE: 143 MMHG | DIASTOLIC BLOOD PRESSURE: 87 MMHG | TEMPERATURE: 97.4 F

## 2020-03-10 DIAGNOSIS — C67.2 MALIGNANT NEOPLASM OF LATERAL WALL OF URINARY BLADDER (H): Primary | ICD-10-CM

## 2020-03-10 DIAGNOSIS — Z85.51 PERSONAL HISTORY OF MALIGNANT NEOPLASM OF BLADDER: ICD-10-CM

## 2020-03-10 LAB
ALBUMIN UR-MCNC: NEGATIVE MG/DL
APPEARANCE UR: CLEAR
BILIRUB UR QL STRIP: NEGATIVE
COLOR UR AUTO: YELLOW
GLUCOSE UR STRIP-MCNC: NEGATIVE MG/DL
HGB UR QL STRIP: ABNORMAL
KETONES UR STRIP-MCNC: NEGATIVE MG/DL
LEUKOCYTE ESTERASE UR QL STRIP: NEGATIVE
NITRATE UR QL: NEGATIVE
PH UR STRIP: 5 PH (ref 5–7)
SOURCE: ABNORMAL
SP GR UR STRIP: 1 (ref 1–1.03)
UROBILINOGEN UR STRIP-MCNC: 0 MG/DL (ref 0–2)

## 2020-03-10 PROCEDURE — 81003 URINALYSIS AUTO W/O SCOPE: CPT | Performed by: UROLOGY

## 2020-03-10 PROCEDURE — 25000125 ZZHC RX 250: Mod: ZF | Performed by: UROLOGY

## 2020-03-10 PROCEDURE — 51720 TREATMENT OF BLADDER LESION: CPT

## 2020-03-10 PROCEDURE — 25000128 H RX IP 250 OP 636: Mod: ZF | Performed by: UROLOGY

## 2020-03-10 RX ORDER — LEVOFLOXACIN 500 MG/1
TABLET, FILM COATED ORAL
Qty: 12 TABLET | Refills: 0 | Status: SHIPPED | OUTPATIENT
Start: 2020-03-10 | End: 2020-08-31

## 2020-03-10 RX ORDER — LIDOCAINE HYDROCHLORIDE 20 MG/ML
10 JELLY TOPICAL
Status: DISCONTINUED | OUTPATIENT
Start: 2020-03-10 | End: 2020-03-10 | Stop reason: HOSPADM

## 2020-03-10 RX ADMIN — LIDOCAINE HYDROCHLORIDE 10 ML: 20 JELLY TOPICAL at 10:31

## 2020-03-10 RX ADMIN — BACILLUS CALMETTE-GUERIN 50 MG: 50 POWDER, FOR SUSPENSION INTRAVESICAL at 10:47

## 2020-03-10 ASSESSMENT — PAIN SCALES - GENERAL: PAINLEVEL: NO PAIN (0)

## 2020-03-10 NOTE — PROGRESS NOTES
Infusion Nursing Note:  Tevin Guidry presents today for Maintenance BCG.  Instillation number 1 of 6.   Patient seen by provider today: No   present during visit today: Not Applicable.    Note: Patient presents to infusion today stating he feels well. He endorses baseline urgency and burning with urination. He denies any fevers, chills, hematuria, or frequency. He has had BCG treatments in the past; procedure, medications, precautions, and discharge instructions reviewed with patient. He denies any questions or concerns. Patient denies any pain today.     Treatment Conditions:   Patient states no concerns or issues at this time.   No visible blood noted in today's urine sample.   Ok to proceed with treatment.     Pre-procedure Assessment:  Dysuria: No  Hematuria: No  Fever/chills: No  Increased urinary urgency: No  Increased urinary frequency: No    Labs Reviewed:  Urine analysis.  Results meet treatment conditions.     UA RESULTS:  Lab Test 03/10/20  1010   COLOR Yellow   APPEARANCE Clear   URINEGLC Negative   URINEBILI Negative   URINEKETONE Negative   SG 1.005   UBLD Large*   URINEPH 5.0   PROTEIN Negative   UROBILINOGEN  --    NITRITE Negative   LEUKEST Negative   RBCU  --    WBCU  --        Procedure:  Lidocaine urojet 2% 10ml used.  16F Coude Echavarria catheter placed.  Catheter placed without trauma.  Clear/yellow urine drained from bladder.    Patient tolerated instillation without incident.  Patient turned every 15 minutes per protocol: Yes, turning to be completed at home per protocol.       Patient instructed on the following and verbalized understanding:   Medication to remain in the bladder for 2 hours post instillation: YES  Post instillation side effects and precautions discussed: YES    Discharge Plan:   Prescription refills given for Levaquin.  Discharge instructions reviewed with: Patient.  Patient and/or family verbalized understanding of discharge instructions and all questions  answered.  Copy of AVS reviewed with patient and/or family.  Patient will return 3/17/20 for next appointment.  Patient discharged in stable condition accompanied by: self.  Departure Mode: Ambulatory.    Melisa Koch RN

## 2020-03-17 ENCOUNTER — INFUSION THERAPY VISIT (OUTPATIENT)
Dept: ONCOLOGY | Facility: CLINIC | Age: 70
End: 2020-03-17
Attending: UROLOGY
Payer: COMMERCIAL

## 2020-03-17 VITALS
DIASTOLIC BLOOD PRESSURE: 82 MMHG | RESPIRATION RATE: 18 BRPM | OXYGEN SATURATION: 94 % | TEMPERATURE: 97.7 F | SYSTOLIC BLOOD PRESSURE: 134 MMHG | HEART RATE: 55 BPM

## 2020-03-17 DIAGNOSIS — C67.2 MALIGNANT NEOPLASM OF LATERAL WALL OF URINARY BLADDER (H): ICD-10-CM

## 2020-03-17 DIAGNOSIS — Z85.51 PERSONAL HISTORY OF MALIGNANT NEOPLASM OF BLADDER: Primary | ICD-10-CM

## 2020-03-17 LAB
ALBUMIN UR-MCNC: NEGATIVE MG/DL
APPEARANCE UR: CLEAR
BILIRUB UR QL STRIP: NEGATIVE
COLOR UR AUTO: YELLOW
GLUCOSE UR STRIP-MCNC: NEGATIVE MG/DL
HGB UR QL STRIP: ABNORMAL
KETONES UR STRIP-MCNC: NEGATIVE MG/DL
LEUKOCYTE ESTERASE UR QL STRIP: NEGATIVE
NITRATE UR QL: NEGATIVE
PH UR STRIP: 5 PH (ref 5–7)
SOURCE: ABNORMAL
SP GR UR STRIP: 1.01 (ref 1–1.03)
UROBILINOGEN UR STRIP-MCNC: 2 MG/DL (ref 0–2)

## 2020-03-17 PROCEDURE — 25000128 H RX IP 250 OP 636: Mod: ZF | Performed by: UROLOGY

## 2020-03-17 PROCEDURE — 25000125 ZZHC RX 250: Mod: ZF | Performed by: UROLOGY

## 2020-03-17 PROCEDURE — 81003 URINALYSIS AUTO W/O SCOPE: CPT | Performed by: UROLOGY

## 2020-03-17 PROCEDURE — 51720 TREATMENT OF BLADDER LESION: CPT

## 2020-03-17 RX ORDER — LIDOCAINE HYDROCHLORIDE 20 MG/ML
10 JELLY TOPICAL
Status: DISCONTINUED | OUTPATIENT
Start: 2020-03-17 | End: 2020-03-17 | Stop reason: HOSPADM

## 2020-03-17 RX ADMIN — BACILLUS CALMETTE-GUERIN 50 MG: 50 POWDER, FOR SUSPENSION INTRAVESICAL at 12:43

## 2020-03-17 RX ADMIN — LIDOCAINE HYDROCHLORIDE 10 ML: 20 JELLY TOPICAL at 12:14

## 2020-03-17 ASSESSMENT — PAIN SCALES - GENERAL: PAINLEVEL: NO PAIN (0)

## 2020-03-17 NOTE — PROGRESS NOTES
"Infusion Nursing Note:  Tevin Guidry presents today for BCG.  Instillation number 2 of 6.   Patient seen by provider today: No   present during visit today: Not Applicable.    Note: Patient arrives with \"the sniffles\" for the past week. Denies fevers but hasn't taken temperature at home. Has intermittent cough with clear nasal drainage but symptoms have not worsened over the the past week. After last week's BCG, he said he had to wait at pharmacy to  his Levaquin and barely made it 30 minutes without having a spasm and leaking \"about 1/4 to 1/3 of the BCG\". He was not wearing a pad, and only long johns. He states this hasn't happened to this degree before. He then was able to hold the BCG for another hour before leaking again, and then tried to hold the rest (if any) for another 30 minutes. At baseline, he does endorse having frequency/urgency, but this was increased for the first 8 hours post instillation last week. By the next day, he states urgency improved and spasms resolved. Denied having hematuria, burning, fever or chills. Patient given disposable brief just in case and thermometer to take home. Patient wishes to dwell at home as he is confident he will tolerate treatment better as he can go straight home today. Also reviewed proper laundering of exposed clothing. Patient verbalized understanding.    He does have some leftover Oxybutynin at home from his cystoscopy that he would like to try using before his next BCG. I advised patient to take his Oxybutynin within 1.5 hours of his next BCG appointment. Dr. Sunshine/Sofia Palencia, RNCC made aware of this and patient's symptoms last week.    No visible urine noted in patient's urine sample today. Patient took Levaquin as directed and reports having adequate supply of Levaquin for today's treatment.      Treatment Conditions:   Patient states no concerns or issues at this time.  Ok to proceed with treatment.     Labs Reviewed:  Urine " analysis.  UA RESULTS:  Lab Test 03/17/20  1157   COLOR Yellow   APPEARANCE Clear   URINEGLC Negative   URINEBILI Negative   URINEKETONE Negative   SG 1.013   UBLD Small*   URINEPH 5.0   PROTEIN Negative   UROBILINOGEN  --    NITRITE Negative   LEUKEST Negative   RBCU  --    WBCU  --        Results meet treatment conditions.     Procedure:  16F Coude catheter placed.  Lidocaine urojet 2% 10ml used.  Catheter placed without trauma.  Clear/yellow urine drained from bladder.    Patient turned every 15 minutes per protocol: Yes, turning to be completed at home per protocol.   Patient tolerated instillation without incident.    Patient instructed on the following and verbalized understanding:   Medication to remain in the bladder for 2 hours post instillation: YES  Post instillation side effects and precautions discussed: YES  Levaquin dose to be taken 6 hours post instillation and tomorrow morning: YES    Discharge Plan:   Patient declined prescription refills.  Discharge instructions reviewed with: Patient.  Patient and/or family verbalized understanding of discharge instructions and all questions answered.  Copy of AVS reviewed with patient and/or family.  Patient will return 3/24 for next appointment.  Patient discharged in stable condition accompanied by: self.  Departure Mode: Ambulatory.    Radha Marks RN

## 2020-03-17 NOTE — PATIENT INSTRUCTIONS
Home discharge instructions:  -To make sure each treatment has the best chance of working, follow these steps 2 hours after each treatment                        1. Lie on your back for 15 minues                        2. Lie on your left side for 15 mintues                        3. Lie on your right side for 15 minutes                        4. Get up but keep the medication  In your bladder for 60 more minutes- for a total of 2 hours                        5. Empty your bladder following the directions below (if you have difficulty holding your bladder it is ok to empty your bladder early)  -Wear pad if incontinence is a possibliity  -Wash hands throughly after using the bathroom  -For safety reasons remember to follow these directions for 6 hours after each treatment:                        1. Sit on the toilet seat to urinate                        2. Immediately after urinating- add 2 cups of undiluted chlorine bleach to the toilet                         3. Close lid and let the bleach sit for 15 minutes each time                        4. Drink plenty of water to flush any remaining medication out of your bladder     **These steps will help kill all the BCG bacteria and disinfect the toilet**     Please void BCG at 2:45pm     Take your antibiotic today at 6:45pm and tomorrow morning.        Please call urology triage line at [phone] or [phone] with fevers or chills, burning with urination, or blood in your urine which lasts more than a 48-72 hours or with any question or concerns.

## 2020-03-25 ENCOUNTER — TELEPHONE (OUTPATIENT)
Dept: UROLOGY | Facility: CLINIC | Age: 70
End: 2020-03-25

## 2020-03-31 ENCOUNTER — INFUSION THERAPY VISIT (OUTPATIENT)
Dept: ONCOLOGY | Facility: CLINIC | Age: 70
End: 2020-03-31
Attending: UROLOGY
Payer: COMMERCIAL

## 2020-03-31 VITALS
OXYGEN SATURATION: 98 % | DIASTOLIC BLOOD PRESSURE: 86 MMHG | SYSTOLIC BLOOD PRESSURE: 146 MMHG | RESPIRATION RATE: 16 BRPM | HEART RATE: 50 BPM | TEMPERATURE: 97.7 F

## 2020-03-31 DIAGNOSIS — Z85.51 PERSONAL HISTORY OF MALIGNANT NEOPLASM OF BLADDER: Primary | ICD-10-CM

## 2020-03-31 DIAGNOSIS — C67.2 MALIGNANT NEOPLASM OF LATERAL WALL OF URINARY BLADDER (H): ICD-10-CM

## 2020-03-31 LAB
ALBUMIN UR-MCNC: NEGATIVE MG/DL
APPEARANCE UR: CLEAR
BILIRUB UR QL STRIP: NEGATIVE
COLOR UR AUTO: YELLOW
GLUCOSE UR STRIP-MCNC: NEGATIVE MG/DL
HGB UR QL STRIP: NEGATIVE
KETONES UR STRIP-MCNC: NEGATIVE MG/DL
LEUKOCYTE ESTERASE UR QL STRIP: NEGATIVE
NITRATE UR QL: NEGATIVE
PH UR STRIP: 7 PH (ref 5–7)
SOURCE: NORMAL
SP GR UR STRIP: 1.01 (ref 1–1.03)
UROBILINOGEN UR STRIP-MCNC: 0 MG/DL (ref 0–2)

## 2020-03-31 PROCEDURE — 25000128 H RX IP 250 OP 636: Mod: ZF | Performed by: UROLOGY

## 2020-03-31 PROCEDURE — 51720 TREATMENT OF BLADDER LESION: CPT

## 2020-03-31 PROCEDURE — 81003 URINALYSIS AUTO W/O SCOPE: CPT | Performed by: UROLOGY

## 2020-03-31 PROCEDURE — 25000125 ZZHC RX 250: Mod: ZF | Performed by: UROLOGY

## 2020-03-31 RX ORDER — LIDOCAINE HYDROCHLORIDE 20 MG/ML
10 JELLY TOPICAL
Status: DISCONTINUED | OUTPATIENT
Start: 2020-03-31 | End: 2020-03-31 | Stop reason: HOSPADM

## 2020-03-31 RX ADMIN — BACILLUS CALMETTE-GUERIN 50 MG: 50 POWDER, FOR SUSPENSION INTRAVESICAL at 14:21

## 2020-03-31 RX ADMIN — LIDOCAINE HYDROCHLORIDE 10 ML: 20 JELLY TOPICAL at 13:56

## 2020-03-31 ASSESSMENT — PAIN SCALES - GENERAL: PAINLEVEL: NO PAIN (0)

## 2020-03-31 NOTE — PROGRESS NOTES
"Infusion Nursing Note:  Tevin Guidry presents today for BCG Instillation dose #3 of 6.   Patient seen by provider today: No    Note: Patient states that his \"sniffles\", cough, nasal drainage from 3/17 have all resolved. States he was able to hold his urine for two hours after his last treatment. Denies any symptoms after his treatment. States he has baseline frequency every 2-3 hours. He took his levofloxacin as prescribed, and he has enough for the remainder of treatment. Denies any fevers, chills, hematuria, burning, urgency, or increase in frequency.     Treatment Conditions:   Patient states no concerns or issues at this time. No blood visualized in urine. Ok to proceed with treatment.     Labs Reviewed:  UA RESULTS:  Recent Labs   Lab Test 03/31/20  1334   COLOR Yellow   APPEARANCE Clear   URINEGLC Negative   URINEBILI Negative   URINEKETONE Negative   SG 1.011   UBLD Negative   URINEPH 7.0   PROTEIN Negative   UROBILINOGEN  --    NITRITE Negative   LEUKEST Negative   RBCU  --    WBCU  --     < > = values in this interval not displayed.     Procedure:  Lidocaine urojet 2% 10ml used.  16F Coude Echavarria catheter placed using sterile technique.  Catheter placed without trauma. Clear/yellow urine drained from bladder.    Patient turned every 15 minutes per protocol: Yes, turning to be completed at home per protocol.   Patient tolerated instillation without incident.  Echavarria discontinued intact.     Patient instructed on the following and verbalized understanding:   Medication to remain in the bladder for 2 hours post instillation: YES  Post instillation side effects and precautions discussed: YES    Discharge Plan:   Patient declined prescription refills.  Discharge instructions reviewed with: Patient.  Patient verbalized understanding of discharge instructions and all questions answered.  Copy of AVS reviewed with patient. Patient will return 4/7/20 for next appointment.  Patient discharged in stable condition " accompanied by: self.  Face to Face time: 0.    JEANNA CABALLERO RN

## 2020-03-31 NOTE — PATIENT INSTRUCTIONS
Home discharge instructions:  -To make sure each treatment has the best chance of working, follow these steps 2 hours after each treatment   1. Lie on your back for 15 minues   2. Lie on your left side for 15 mintues   3. Lie on your right side for 15 minutes   4. Get up but keep the medication  In your bladder for 60 more minutes- for a total of 2 hours   5. Empty your bladder following the directions below (if you have difficulty holding your bladder it is ok to empty your bladder early)  -Wear pad if incontinence is a possibliity  -Wash hands throughly after using the bathroom  -For safety reasons remember to follow these directions for 6 hours after each treatment:  (for 6 hours after your empty the BCG from your bladder)   1. Sit on the toilet seat to urinate   2. Immediately after urinating- add 2 cups of undiluted chlorine bleach to the toilet    3. Close lid and let the bleach sit for 15 minutes each time   4. Drink plenty of water to flush any remaining medication out of your bladder    **These steps will help kill all the BCG bacteria and disinfect the toilet**    Please void BCG at 4:25 pm    Take your antibiotic today at 8:25 pm and tomorrow morning.      Please call urology triage line at [phone] or [phone] with fevers or chills, burning with urination, or blood in your urine which lasts more than a 48-72 hours or with any question or concerns.

## 2020-04-07 ENCOUNTER — INFUSION THERAPY VISIT (OUTPATIENT)
Dept: ONCOLOGY | Facility: CLINIC | Age: 70
End: 2020-04-07
Attending: UROLOGY
Payer: COMMERCIAL

## 2020-04-07 VITALS
HEART RATE: 54 BPM | RESPIRATION RATE: 16 BRPM | SYSTOLIC BLOOD PRESSURE: 148 MMHG | DIASTOLIC BLOOD PRESSURE: 82 MMHG | TEMPERATURE: 97.9 F | OXYGEN SATURATION: 96 %

## 2020-04-07 DIAGNOSIS — Z85.51 PERSONAL HISTORY OF MALIGNANT NEOPLASM OF BLADDER: Primary | ICD-10-CM

## 2020-04-07 DIAGNOSIS — C67.2 MALIGNANT NEOPLASM OF LATERAL WALL OF URINARY BLADDER (H): ICD-10-CM

## 2020-04-07 LAB
ALBUMIN UR-MCNC: NEGATIVE MG/DL
APPEARANCE UR: CLEAR
BILIRUB UR QL STRIP: NEGATIVE
COLOR UR AUTO: YELLOW
GLUCOSE UR STRIP-MCNC: NEGATIVE MG/DL
HGB UR QL STRIP: NEGATIVE
KETONES UR STRIP-MCNC: NEGATIVE MG/DL
LEUKOCYTE ESTERASE UR QL STRIP: NEGATIVE
NITRATE UR QL: NEGATIVE
PH UR STRIP: 6 PH (ref 5–7)
SOURCE: NORMAL
SP GR UR STRIP: 1.02 (ref 1–1.03)
UROBILINOGEN UR STRIP-MCNC: 0 MG/DL (ref 0–2)

## 2020-04-07 PROCEDURE — 25000125 ZZHC RX 250: Mod: ZF | Performed by: UROLOGY

## 2020-04-07 PROCEDURE — 25000128 H RX IP 250 OP 636: Mod: ZF | Performed by: UROLOGY

## 2020-04-07 PROCEDURE — 51720 TREATMENT OF BLADDER LESION: CPT

## 2020-04-07 PROCEDURE — 81003 URINALYSIS AUTO W/O SCOPE: CPT | Performed by: UROLOGY

## 2020-04-07 RX ORDER — LIDOCAINE HYDROCHLORIDE 20 MG/ML
10 JELLY TOPICAL
Status: DISCONTINUED | OUTPATIENT
Start: 2020-04-07 | End: 2020-04-07 | Stop reason: HOSPADM

## 2020-04-07 RX ADMIN — LIDOCAINE HYDROCHLORIDE 10 ML: 20 JELLY TOPICAL at 14:28

## 2020-04-07 RX ADMIN — BACILLUS CALMETTE-GUERIN 50 MG: 50 POWDER, FOR SUSPENSION INTRAVESICAL at 14:45

## 2020-04-07 ASSESSMENT — PAIN SCALES - GENERAL: PAINLEVEL: NO PAIN (0)

## 2020-04-07 NOTE — PATIENT INSTRUCTIONS
Home discharge instructions:  -To make sure each treatment has the best chance of working, follow these steps 2 hours after each treatment   1. Lie on your back for 15 minues   2. Lie on your left side for 15 mintues   3. Lie on your right side for 15 minutes   4. Get up but keep the medication  In your bladder for 60 more minutes- for a total of 2 hours   5. Empty your bladder following the directions below (if you have difficulty holding your bladder it is ok to empty your bladder early)  -Wear pad if incontinence is a possibliity  -Wash hands throughly after using the bathroom  -For safety reasons remember to follow these directions for 6 hours after each treatment:  (for 6 hours after your empty the BCG from your bladder)   1. Sit on the toilet seat to urinate   2. Immediately after urinating- add 2 cups of undiluted chlorine bleach to the toilet    3. Close lid and let the bleach sit for 15 minutes each time   4. Drink plenty of water to flush any remaining medication out of your bladder    **These steps will help kill all the BCG bacteria and disinfect the toilet**    Please void BCG at 4:50pm    Take your antibiotic today at 8:50 pm and tomorrow morning.      Please call urology triage line at [phone] or [phone] with fevers or chills, burning with urination, or blood in your urine which lasts more than a 48-72 hours or with any question or concerns.

## 2020-04-07 NOTE — PROGRESS NOTES
Infusion Nursing Note:  Tevin Guidry presents today for BCG Instillation Dose #4 of 6.  Patient seen by provider today: No    Note: Patient states he feels good today. States he was able to hold his urine for 1 hour and 50 minutes after his treatment last week. He had burning and scant amount of blood in his urine for a couple of hours after his last treatment. Symptoms resolved within 24 hours of treatment. States he took his levofloxacin last week as prescribed and he has enough to take with the remainder of his treatments. Denies any fevers, chills, hematuria, burning, increase in frequency or urgency today.     Treatment Conditions:   Patient states no concerns or issues at this time. No Blood visualized in urine. Ok to proceed with treatment.     Labs Reviewed:  UA RESULTS:  Recent Labs   Lab Test 04/07/20  1400   COLOR Yellow   APPEARANCE Clear   URINEGLC Negative   URINEBILI Negative   URINEKETONE Negative   SG 1.017   UBLD Negative   URINEPH 6.0   PROTEIN Negative   UROBILINOGEN  --    NITRITE Negative   LEUKEST Negative   RBCU  --    WBCU  --     < > = values in this interval not displayed.       Procedure:  Lidocaine urojet 2% 10ml used.  16F Coude Echavarria catheter placed using sterile technique.  Catheter placed without trauma. Clear/yellow urine drained from bladder.    Patient turned every 15 minutes per protocol: Yes, turning to be completed at home per protocol.   Patient tolerated instillation without incident.  Echavarria discontinued intact.     Patient instructed on the following and verbalized understanding:   Medication to remain in the bladder for 2 hours post instillation: YES  Post instillation side effects and precautions discussed: YES    Discharge Plan:   Patient declined prescription refills.  Discharge instructions reviewed with: Patient.  Patient verbalized understanding of discharge instructions and all questions answered.  Copy of AVS reviewed with patient. Patient will return 4/14/20 for  next appointment.  Patient discharged in stable condition accompanied by: self.  Face to Face time: 0.    JEANNA CABALLERO RN

## 2020-04-08 ENCOUNTER — PATIENT OUTREACH (OUTPATIENT)
Dept: GERIATRIC MEDICINE | Facility: CLINIC | Age: 70
End: 2020-04-08

## 2020-04-08 NOTE — PROGRESS NOTES
"  [facility] Care Coordination Contact      [facility] Six-Month Telephone Assessment    6 month telephone assessment completed on 4/8/20.    ER visits: No  Hospitalizations: Yes -  [facility]  TCU stays: No  Significant health status changes: malignant bladder CA, currently undergoing treatment.  Reports having two more treatments to go.  Falls/Injuries: Yes: no injury but fell when walking the dog I bad weather.  ADL/IADL changes: No  Changes in services: No    Caregiver Assessment follow up:  na    Goals: See POC in chart for goal progress documentation.  Is working on getting through bladder CA treatments.  Reports feeling pain free and \"wouldn't even know I was sick\" except less energy    Will see member in 6 months for an annual health risk assessment.   Encouraged member to call CC with any questions or concerns in the meantime.     Lovely Medina RN  [facility]   [phone]          "

## 2020-04-14 ENCOUNTER — INFUSION THERAPY VISIT (OUTPATIENT)
Dept: ONCOLOGY | Facility: CLINIC | Age: 70
End: 2020-04-14
Attending: UROLOGY
Payer: COMMERCIAL

## 2020-04-14 VITALS
SYSTOLIC BLOOD PRESSURE: 125 MMHG | OXYGEN SATURATION: 97 % | DIASTOLIC BLOOD PRESSURE: 75 MMHG | TEMPERATURE: 97.5 F | HEART RATE: 56 BPM | RESPIRATION RATE: 16 BRPM

## 2020-04-14 DIAGNOSIS — C67.2 MALIGNANT NEOPLASM OF LATERAL WALL OF URINARY BLADDER (H): ICD-10-CM

## 2020-04-14 DIAGNOSIS — Z85.51 PERSONAL HISTORY OF MALIGNANT NEOPLASM OF BLADDER: Primary | ICD-10-CM

## 2020-04-14 LAB
ALBUMIN UR-MCNC: 10 MG/DL
APPEARANCE UR: CLEAR
BILIRUB UR QL STRIP: NEGATIVE
COLOR UR AUTO: YELLOW
GLUCOSE UR STRIP-MCNC: NEGATIVE MG/DL
HGB UR QL STRIP: NEGATIVE
KETONES UR STRIP-MCNC: NEGATIVE MG/DL
LEUKOCYTE ESTERASE UR QL STRIP: NEGATIVE
NITRATE UR QL: NEGATIVE
PH UR STRIP: 6 PH (ref 5–7)
SOURCE: ABNORMAL
SP GR UR STRIP: 1.02 (ref 1–1.03)
UROBILINOGEN UR STRIP-MCNC: 0.2 MG/DL (ref 0–2)

## 2020-04-14 PROCEDURE — 51720 TREATMENT OF BLADDER LESION: CPT

## 2020-04-14 PROCEDURE — 25000128 H RX IP 250 OP 636: Mod: ZF | Performed by: UROLOGY

## 2020-04-14 PROCEDURE — 81003 URINALYSIS AUTO W/O SCOPE: CPT | Performed by: UROLOGY

## 2020-04-14 PROCEDURE — 25000125 ZZHC RX 250: Mod: ZF | Performed by: UROLOGY

## 2020-04-14 RX ORDER — LIDOCAINE HYDROCHLORIDE 20 MG/ML
10 JELLY TOPICAL
Status: DISCONTINUED | OUTPATIENT
Start: 2020-04-14 | End: 2020-04-14 | Stop reason: HOSPADM

## 2020-04-14 RX ADMIN — BACILLUS CALMETTE-GUERIN 50 MG: 50 POWDER, FOR SUSPENSION INTRAVESICAL at 15:11

## 2020-04-14 RX ADMIN — LIDOCAINE HYDROCHLORIDE 10 ML: 20 JELLY TOPICAL at 14:42

## 2020-04-14 ASSESSMENT — PAIN SCALES - GENERAL: PAINLEVEL: NO PAIN (0)

## 2020-04-14 NOTE — PROGRESS NOTES
Infusion Nursing Note:  Tevin Guidry presents today for BCG Instillation Dose #5 of 6.  Patient seen by provider today: No    Note: Patient states that he was able to hold his urine for 1 hour and 50 minutes after treatment last week. He had some blood in his urine and burning with urination for a few hours after treatment. All symptoms resolved within 24 hours. Patient took his levofloxacin last week the morning before treatment because he put it in with the rest of his medications and the second dose the next morning. States that he did not take it this morning and verbalized understanding to take it six hours after instillation today and once tomorrow morning. States he has enough to take for the remainder of treatments. Patient denies fevers, chills, hematuria, burning, increased frequency, or urgency today.     Treatment Conditions:   Patient states no concerns or issues at this time. No blood visualized in urine. Ok to proceed with treatment.     Labs Reviewed:  UA RESULTS:  Recent Labs   Lab Test 04/14/20  1400   COLOR Yellow   APPEARANCE Clear   URINEGLC Negative   URINEBILI Negative   URINEKETONE Negative   SG 1.025   UBLD Negative   URINEPH 6.0   PROTEIN 10*   UROBILINOGEN  --    NITRITE Negative   LEUKEST Negative   RBCU  --    WBCU  --         Procedure:  Lidocaine urojet 2% 10ml used.  16F Coude Echavarria catheter placed using sterile technique.  Catheter placed without trauma. Clear/yellow urine drained from bladder.    Patient turned every 15 minutes per protocol: Yes, turning to be completed at home per protocol.   Patient tolerated instillation without incident.  Echavarria discontinued intact.     Patient instructed on the following and verbalized understanding:   Medication to remain in the bladder for 2 hours post instillation: YES  Post instillation side effects and precautions discussed: YES    Discharge Plan:   Patient declined prescription refills.  Discharge instructions reviewed with:  Patient.  Patient verbalized understanding of discharge instructions and all questions answered.  Copy of AVS reviewed with patient. Patient will return 4/21/20 for next appointment.  Patient discharged in stable condition accompanied by: self.  Face to Face time: 0.    JEANNA CABALLERO RN

## 2020-04-14 NOTE — PATIENT INSTRUCTIONS
Home discharge instructions:  -To make sure each treatment has the best chance of working, follow these steps 2 hours after each treatment   1. Lie on your back for 15 minues   2. Lie on your left side for 15 mintues   3. Lie on your right side for 15 minutes   4. Get up but keep the medication  In your bladder for 60 more minutes- for a total of 2 hours   5. Empty your bladder following the directions below (if you have difficulty holding your bladder it is ok to empty your bladder early)  -Wear pad if incontinence is a possibliity  -Wash hands throughly after using the bathroom  -For safety reasons remember to follow these directions for 6 hours after each treatment:  (for 6 hours after your empty the BCG from your bladder)   1. Sit on the toilet seat to urinate   2. Immediately after urinating- add 2 cups of undiluted chlorine bleach to the toilet    3. Close lid and let the bleach sit for 15 minutes each time   4. Drink plenty of water to flush any remaining medication out of your bladder    **These steps will help kill all the BCG bacteria and disinfect the toilet**    Please void BCG at 5:15pm    Take your antibiotic today at 9:15 pm and tomorrow morning.      Please call urology triage line at [phone] or [phone] with fevers or chills, burning with urination, or blood in your urine which lasts more than a 48-72 hours or with any question or concerns.

## 2020-04-21 ENCOUNTER — INFUSION THERAPY VISIT (OUTPATIENT)
Dept: ONCOLOGY | Facility: CLINIC | Age: 70
End: 2020-04-21
Attending: UROLOGY
Payer: COMMERCIAL

## 2020-04-21 VITALS
SYSTOLIC BLOOD PRESSURE: 143 MMHG | DIASTOLIC BLOOD PRESSURE: 82 MMHG | RESPIRATION RATE: 16 BRPM | OXYGEN SATURATION: 97 % | HEART RATE: 57 BPM | TEMPERATURE: 97.8 F

## 2020-04-21 DIAGNOSIS — C67.2 MALIGNANT NEOPLASM OF LATERAL WALL OF URINARY BLADDER (H): ICD-10-CM

## 2020-04-21 DIAGNOSIS — Z85.51 PERSONAL HISTORY OF MALIGNANT NEOPLASM OF BLADDER: Primary | ICD-10-CM

## 2020-04-21 PROCEDURE — 51720 TREATMENT OF BLADDER LESION: CPT

## 2020-04-21 PROCEDURE — 25000128 H RX IP 250 OP 636: Mod: ZF | Performed by: UROLOGY

## 2020-04-21 PROCEDURE — 81003 URINALYSIS AUTO W/O SCOPE: CPT | Performed by: UROLOGY

## 2020-04-21 PROCEDURE — 88112 CYTOPATH CELL ENHANCE TECH: CPT | Performed by: UROLOGY

## 2020-04-21 PROCEDURE — 88120 CYTP URNE 3-5 PROBES EA SPEC: CPT | Performed by: UROLOGY

## 2020-04-21 PROCEDURE — 25000125 ZZHC RX 250: Mod: ZF | Performed by: UROLOGY

## 2020-04-21 RX ORDER — LIDOCAINE HYDROCHLORIDE 20 MG/ML
10 JELLY TOPICAL
Status: DISCONTINUED | OUTPATIENT
Start: 2020-04-21 | End: 2020-04-21 | Stop reason: HOSPADM

## 2020-04-21 RX ADMIN — LIDOCAINE HYDROCHLORIDE 10 ML: 20 JELLY TOPICAL at 14:30

## 2020-04-21 RX ADMIN — BACILLUS CALMETTE-GUERIN 50 MG: 50 POWDER, FOR SUSPENSION INTRAVESICAL at 14:54

## 2020-04-21 ASSESSMENT — PAIN SCALES - GENERAL: PAINLEVEL: NO PAIN (0)

## 2020-04-21 NOTE — PATIENT INSTRUCTIONS
Home discharge instructions:  -To make sure each treatment has the best chance of working, follow these steps 2 hours after each treatment   1. Lie on your back for 15 minues   2. Lie on your left side for 15 mintues   3. Lie on your right side for 15 minutes   4. Get up but keep the medication  In your bladder for 60 more minutes- for a total of 2 hours   5. Empty your bladder following the directions below (if you have difficulty holding your bladder it is ok to empty your bladder early)  -Wear pad if incontinence is a possibliity  -Wash hands throughly after using the bathroom  -For safety reasons remember to follow these directions for 6 hours after each treatment:  (for 6 hours after your empty the BCG from your bladder)   1. Sit on the toilet seat to urinate   2. Immediately after urinating- add 2 cups of undiluted chlorine bleach to the toilet    3. Close lid and let the bleach sit for 15 minutes each time   4. Drink plenty of water to flush any remaining medication out of your bladder    **These steps will help kill all the BCG bacteria and disinfect the toilet**    Please void BCG at 4:55pm    Take your antibiotic today at 8:55 pm and tomorrow morning.      Please call urology triage line at [phone] or [phone] with fevers or chills, burning with urination, or blood in your urine which lasts more than a 48-72 hours or with any question or concerns.

## 2020-04-21 NOTE — PROGRESS NOTES
Infusion Nursing Note:  Tevin Guidry presents today for BCG Instillation Dose #6 of 6.  Patient seen by provider today: No     Note: Patient states that he was able to hold his urine for just under two hours after treatment last week. He had some blood in his urine, burning with urination, and increased frequency for a few hours after treatment. All symptoms resolved within 24 hours. Patient took his levofloxacin as prescribed, and he has enough to take it with this treatment. Patient denies fevers, chills, hematuria, burning, increased frequency, or urgency today.      Treatment Conditions:   Patient states no concerns or issues at this time. No blood visualized in urine. Ok to proceed with treatment.      Labs Reviewed:  UA RESULTS:  Recent Labs   Lab Test 04/21/20  1401   COLOR Yellow   APPEARANCE Clear   URINEGLC Negative   URINEBILI Negative   URINEKETONE Negative   SG 1.015   UBLD Negative   URINEPH 5.0   PROTEIN Negative   UROBILINOGEN  --    NITRITE Negative   LEUKEST Negative   RBCU  --    WBCU  --     < > = values in this interval not displayed.       Procedure:  Lidocaine urojet 2% 10ml used.  16F Coude Echavarria catheter placed using sterile technique.  Catheter placed without trauma. Clear/yellow urine drained from bladder.    Patient turned every 15 minutes per protocol: Yes, turning to be completed at home per protocol.   Patient tolerated instillation without incident.  Echavarria discontinued intact.      Patient instructed on the following and verbalized understanding:   Medication to remain in the bladder for 2 hours post instillation: YES  Post instillation side effects and precautions discussed: YES     Discharge Plan:   Patient declined prescription refills.  Discharge instructions reviewed with: Patient.  Patient verbalized understanding of discharge instructions and all questions answered.  Copy of AVS reviewed with patient. Pastora sent to Dr. Sunshine and Carissa Barnes RN to place appointment  request.   Patient discharged in stable condition accompanied by: self.  Face to Face time: 0.     JEANNA CABALLERO RN

## 2020-05-04 LAB — COPATH REPORT: NORMAL

## 2020-06-26 ENCOUNTER — TELEPHONE (OUTPATIENT)
Dept: UROLOGY | Facility: CLINIC | Age: 70
End: 2020-06-26

## 2020-06-26 NOTE — TELEPHONE ENCOUNTER
Called pt to reschedule appt on 6/30/20 with Bita to his next clinic day on 7/14/20. Unable to leave message.

## 2020-06-29 ENCOUNTER — TELEPHONE (OUTPATIENT)
Dept: UROLOGY | Facility: CLINIC | Age: 70
End: 2020-06-29

## 2020-07-07 ENCOUNTER — PRE VISIT (OUTPATIENT)
Dept: UROLOGY | Facility: CLINIC | Age: 70
End: 2020-07-07

## 2020-07-14 ENCOUNTER — OFFICE VISIT (OUTPATIENT)
Dept: UROLOGY | Facility: CLINIC | Age: 70
End: 2020-07-14
Payer: COMMERCIAL

## 2020-07-14 VITALS — HEIGHT: 69 IN | BODY MASS INDEX: 22.96 KG/M2 | WEIGHT: 155 LBS

## 2020-07-14 DIAGNOSIS — Z85.51 PERSONAL HISTORY OF MALIGNANT NEOPLASM OF BLADDER: Primary | ICD-10-CM

## 2020-07-14 DIAGNOSIS — C67.9 MALIGNANT NEOPLASM OF URINARY BLADDER, UNSPECIFIED SITE (H): ICD-10-CM

## 2020-07-14 RX ORDER — LIDOCAINE HYDROCHLORIDE 20 MG/ML
JELLY TOPICAL ONCE
Status: COMPLETED | OUTPATIENT
Start: 2020-07-14 | End: 2020-07-14

## 2020-07-14 RX ORDER — CIPROFLOXACIN 500 MG/1
500 TABLET, FILM COATED ORAL 2 TIMES DAILY
Qty: 4 TABLET | Refills: 0 | Status: SHIPPED | OUTPATIENT
Start: 2020-07-14 | End: 2020-08-03

## 2020-07-14 RX ORDER — CIPROFLOXACIN 500 MG/1
500 TABLET, FILM COATED ORAL ONCE
Status: COMPLETED | OUTPATIENT
Start: 2020-07-14 | End: 2020-07-14

## 2020-07-14 RX ORDER — LIDOCAINE HYDROCHLORIDE 10 MG/ML
10 INJECTION, SOLUTION EPIDURAL; INFILTRATION; INTRACAUDAL; PERINEURAL ONCE
Status: COMPLETED | OUTPATIENT
Start: 2020-07-14 | End: 2020-07-14

## 2020-07-14 RX ADMIN — LIDOCAINE HYDROCHLORIDE 10 ML: 10 INJECTION, SOLUTION EPIDURAL; INFILTRATION; INTRACAUDAL; PERINEURAL at 12:11

## 2020-07-14 RX ADMIN — LIDOCAINE HYDROCHLORIDE: 20 JELLY TOPICAL at 10:43

## 2020-07-14 RX ADMIN — CIPROFLOXACIN 500 MG: 500 TABLET, FILM COATED ORAL at 12:11

## 2020-07-14 ASSESSMENT — MIFFLIN-ST. JEOR: SCORE: 1448.46

## 2020-07-14 ASSESSMENT — PAIN SCALES - GENERAL: PAINLEVEL: NO PAIN (0)

## 2020-07-14 NOTE — NURSING NOTE
Cysto-  Bladder Cancer.  He denies any urinary pains or complaints.    Chief Complaint   Patient presents with     Cystoscopy     Bladder cancer follow u       There were no vitals taken for this visit. There is no height or weight on file to calculate BMI.    Patient Active Problem List   Diagnosis     Social phobia     Hypertension goal BP (blood pressure) < 140/90     Hyperlipidemia LDL goal <130     Tubular adenoma of colon     Advanced directives, counseling/discussion     Coronary artery disease involving native heart with angina pectoris, unspecified vessel or lesion type (H)     Cigarette nicotine dependence without complication     S/P CABG (coronary artery bypass graft)     Chronic obstructive pulmonary disease, unspecified COPD type (H)     Anemia, unspecified type     Health Care Home     Bladder cancer (H)     Personal history of malignant neoplasm of bladder       Allergies   Allergen Reactions     Codeine Nausea and Vomiting       Current Outpatient Medications   Medication Sig Dispense Refill     albuterol (PROAIR HFA/PROVENTIL HFA/VENTOLIN HFA) 108 (90 Base) MCG/ACT inhaler Inhale 2 puffs into the lungs every 6 hours as needed for shortness of breath / dyspnea or wheezing (Patient not taking: Reported on 3/17/2020) 1 Inhaler 1     aspirin 81 MG tablet Take 1 tablet (81 mg) by mouth daily (Patient taking differently: Take 81 mg by mouth every morning ) 90 tablet 3     atorvastatin (LIPITOR) 80 MG tablet Take 1 tablet (80 mg) by mouth At Bedtime (Patient taking differently: Take 80 mg by mouth every morning ) 90 tablet 3     ferrous sulfate (IRON) 325 (65 FE) MG tablet Take 325 mg by mouth every morning  60 tablet 3     levofloxacin (LEVAQUIN) 500 MG tablet Take 1 tablet 6 hours post treatment and 1 tablet each AM following each treatment. 12 tablet 0     lisinopril (PRINIVIL/ZESTRIL) 20 MG tablet Take 1 tablet (20 mg) by mouth At Bedtime (Patient taking differently: Take 20 mg by mouth every morning  ) 90 tablet 3     metoprolol succinate ER (TOPROL-XL) 50 MG 24 hr tablet Take 1 tablet (50 mg) by mouth At Bedtime (Patient taking differently: Take 50 mg by mouth every morning ) 90 tablet 3     nicotine (NICODERM CQ) 21 MG/24HR 24 hr patch Place 1 patch onto the skin every 24 hours (Patient not taking: Reported on 3/10/2020) 28 patch 3     nitroglycerin (NITROSTAT) 0.4 MG sublingual tablet Place 1 tablet (0.4 mg) under the tongue every 5 minutes as needed for chest pain 25 tablet 3     PARoxetine (PAXIL) 40 MG tablet Take 1 tablet (40 mg) by mouth every morning 90 tablet 3       Social History     Tobacco Use     Smoking status: Former Smoker     Packs/day: 1.50     Years: 30.00     Pack years: 45.00     Last attempt to quit: 2019     Years since quittin.9     Smokeless tobacco: Never Used   Substance Use Topics     Alcohol use: No     Comment: quit 35 years ago     Drug use: Yes     Comment: occionally use of pot       Invasive Procedure Safety Checklist:    Procedure: Cystoscopy    Action: Complete sections and checkboxes as appropriate.    Pre-procedure:  1. Patient ID Verified with 2 identifiers (Mary and  or MRN) : YES    2. Procedure and site verified with patient/designee (when able) : YES    3. Accurate consent documentation in medical record : YES    4. H&P (or appropriate assessment) documented in medical record : N/A  H&P must be up to 30 days prior to procedure an updated within 24 hours of                 Procedure as applicable.     5. Relevant diagnostic and radiology test results appropriately labeled and displayed as applicable : YES    6. Blood products, implants, devices, and/or special equipment available for the procedure as applicable : YES    7. Procedure site(s) marked with provider initials [Exclusions: none] : NO    8. Marking not required. Reason : Yes  Procedure does not require site marking    Time Out:     Time-Out performed immediately prior to starting procedure,  including verbal and active participation of all team members addressing: YES    1. Correct patient identity.  2. Confirmed that the correct side and site are marked.  3. An accurate procedure to be done.  4. Agreement on the procedure to be done.  5. Correct patient position.  6. Relevant images and results are properly labeled and appropriately displayed.  7. The need to administer antibiotics or fluids for irrigation purposes during the procedure as applicable.  8. Safety precautions based on patient history or medication use.    During Procedure: Verification of correct person, site, and procedure occurs any time the responsibility for care of the patient is transferred to another member of the care team.    The following medication was given:     MEDICATION: Lidocaine Uro-Jet 2% 200mg (20mg/mL)  ROUTE: Urethral   SITE: Urethra   DOSE: 10mL  LOT #: [id number]  : [employer]   EXPIRATION DATE: 3-22  NDC#: [address]-32   Was there drug waste? No    Prior to injection, verified patient identity using patient's name and date of birth.  Due to injection administration, patient instructed to remain in clinic for 15 minutes  afterwards, and to report any adverse reaction to me immediately.    Drug Amount Wasted:  None.  Vial/Syringe: Single dose vial      Sudeep Barnes, EMT  7/14/2020  10:25 AM

## 2020-07-14 NOTE — PATIENT INSTRUCTIONS
Aliciao in 3 Mo with Dr. Sunshine and BCG x3 also.    Sudeep Barnes, EMT    It was a pleasure meeting with you today.  Thank you for allowing me and my team the privilege of caring for you today.  YOU are the reason we are here, and I truly hope we provided you with the excellent service you deserve.  Please let us know if there is anything else we can do for you so that we can be sure you are leaving completely satisfied with your care experience.

## 2020-07-14 NOTE — LETTER
7/14/2020       RE: Tevin Guidry  [address] [id number]  [facility] [address]     Dear Colleague,    Thank you for referring your patient, Tevin Guidry, to the [facility] at [facility]. Please see a copy of my visit note below.    The following medication was given:     MEDICATION:  Ciprofloxacin   ROUTE: PO  SITE: Medication was given orally   DOSE: 500mg  LOT #: [id number]  : [employer]  EXPIRATION DATE: 08/21  NDC#: [address]-39   Was there drug waste? No    Drug Amount Wasted:  None.  Vial/Syringe: Single dose vial    The following medication was given:     MEDICATION:  Lidocaine without epinephrine 1%n 300mg per 30mL  ROUTE: Topical to the bladder  SITE: Bladder  DOSE: 10mL in 40mL sterile water  LOT #: [id number]  : [email or web address]  EXPIRATION DATE: 09/23  NDC#: [address]-09   Was there drug waste? Yes  Amount of drug waste (mL): 20..  Reason for waste:  Single use vial    Prior to injection, verified patient identity using patient's name and date of birth.  Due to injection, patient instructed to remain in clinic for 15 minutes  afterwards, and to report any adverse reaction to me immediately.    Drug Amount Wasted:  Yes: 10 mg/ml   Vial/Syringe: Single dose vial    RANDA Goss  July 14, 2020  11:50 AM        CYSTOSCOPY PROCEDURE NOTE    Reason for cystoscopy: 68 yo M with hx of bladder cancer    Brief History:  9/19 - Noninvasive HG bladder cancer  Underwent induction BCG  2/20 - Noninvasive LG bladder cancer  Underwent repeat induction BCG    Here now for routine follow-up cysto.  Voiding well, denies new episodes hematuria.      CYSTOSCOPY  After obtaining informed consent, the patient was prepped and draped in the standard sterile fashion.  The 15 [place] flexible cystoscope was inserted through the urethral meatus.      The anterior urethra was:  normal without stricture.    The  external sphincter was  appropriately coapted.   The prostatic urethra demonstrated  hypertrophy.    The bladder neck was  nonocclusive.    The bladder was notable for a 1 cm bladder tumor, low grade in appearance at the left lateral wall.  There was a focal point of erythema on the right lateral wall.  On retroflexion there was the usual bladder neck hyperemia.    There was no intravesical protrusion of the prostate.      At this point patient expressed interest in proceeding with biopsy and fulguration.  He was grounded and a biopsy of the tumor was taken using a flexible biopsy instrument.  There was minimal bleeding, the entirety of the tumor was removed with the biopsy instrument an the base was then cauterized using a bugbee.  The right lateral patch of erythema was fulgurated as well.  There was good hemostasis.    The patient tolerated the procedure well without complication.        Assessment/Plan: 70 yo M with recurrent bladder cancer, most recently low grade  -Await pathology, suspect low grade, will continue with maintenance BCG. Repeat cysto in 3 months    IAlmas saw and evaluated this patient and agree with the plan as stated above.  I personally performed all listed procedures.

## 2020-07-14 NOTE — PROGRESS NOTES
The following medication was given:     MEDICATION:  Ciprofloxacin   ROUTE: PO  SITE: Medication was given orally   DOSE: 500mg  LOT #: [id number]  : [employer]  EXPIRATION DATE: 08/21  NDC#: [address]-70   Was there drug waste? No    Drug Amount Wasted:  None.  Vial/Syringe: Single dose vial    The following medication was given:     MEDICATION:  Lidocaine without epinephrine 1%n 300mg per 30mL  ROUTE: Topical to the bladder  SITE: Bladder  DOSE: 10mL in 40mL sterile water  LOT #: [id number]  : [employer]  EXPIRATION DATE: 09/23  NDC#: [address]-35   Was there drug waste? Yes  Amount of drug waste (mL): 20..  Reason for waste:  Single use vial    Prior to injection, verified patient identity using patient's name and date of birth.  Due to injection, patient instructed to remain in clinic for 15 minutes  afterwards, and to report any adverse reaction to me immediately.    Drug Amount Wasted:  Yes: 10 mg/ml   Vial/Syringe: Single dose vial    RANDA Goss  July 14, 2020  11:50 AM

## 2020-07-14 NOTE — PROGRESS NOTES
CYSTOSCOPY PROCEDURE NOTE    Reason for cystoscopy: 70 yo M with hx of bladder cancer    Brief History:  9/19 - Noninvasive HG bladder cancer  Underwent induction BCG  2/20 - Noninvasive LG bladder cancer  Underwent repeat induction BCG    Here now for routine follow-up cysto.  Voiding well, denies new episodes hematuria.      CYSTOSCOPY  After obtaining informed consent, the patient was prepped and draped in the standard sterile fashion.  The 15 [place] flexible cystoscope was inserted through the urethral meatus.      The anterior urethra was:  normal without stricture.    The external sphincter was  appropriately coapted.   The prostatic urethra demonstrated  hypertrophy.    The bladder neck was  nonocclusive.    The bladder was notable for a 1 cm bladder tumor, low grade in appearance at the left lateral wall.  There was a focal point of erythema on the right lateral wall.  On retroflexion there was the usual bladder neck hyperemia.    There was no intravesical protrusion of the prostate.      At this point patient expressed interest in proceeding with biopsy and fulguration.  He was grounded and a biopsy of the tumor was taken using a flexible biopsy instrument.  There was minimal bleeding, the entirety of the tumor was removed with the biopsy instrument an the base was then cauterized using a bugbee.  The right lateral patch of erythema was fulgurated as well.  There was good hemostasis.    The patient tolerated the procedure well without complication.        Assessment/Plan: 70 yo M with recurrent bladder cancer, most recently low grade  -Await pathology, suspect low grade, will continue with maintenance BCG. Repeat cysto in 3 months    IAlmas saw and evaluated this patient and agree with the plan as stated above.  I personally performed all listed procedures.

## 2020-07-15 LAB — COPATH REPORT: NORMAL

## 2020-07-30 ENCOUNTER — PRE VISIT (OUTPATIENT)
Dept: UROLOGY | Facility: CLINIC | Age: 70
End: 2020-07-30

## 2020-07-30 NOTE — TELEPHONE ENCOUNTER
Chief Complaint : Cysto    Hx/Sx: Bladder cancer    Records/Orders: Available    Pt Contacted: N/a    At Rooming: Urine for cytology    Chilango Morillo, EMT

## 2020-08-03 ENCOUNTER — OFFICE VISIT (OUTPATIENT)
Dept: FAMILY MEDICINE | Facility: CLINIC | Age: 70
End: 2020-08-03
Payer: COMMERCIAL

## 2020-08-03 VITALS
SYSTOLIC BLOOD PRESSURE: 126 MMHG | OXYGEN SATURATION: 97 % | WEIGHT: 153.8 LBS | RESPIRATION RATE: 17 BRPM | HEIGHT: 69 IN | BODY MASS INDEX: 22.78 KG/M2 | DIASTOLIC BLOOD PRESSURE: 76 MMHG | HEART RATE: 73 BPM | TEMPERATURE: 97 F

## 2020-08-03 DIAGNOSIS — C67.9 MALIGNANT NEOPLASM OF URINARY BLADDER, UNSPECIFIED SITE (H): ICD-10-CM

## 2020-08-03 DIAGNOSIS — J44.9 CHRONIC OBSTRUCTIVE PULMONARY DISEASE, UNSPECIFIED COPD TYPE (H): ICD-10-CM

## 2020-08-03 DIAGNOSIS — I25.119 CORONARY ARTERY DISEASE INVOLVING NATIVE HEART WITH ANGINA PECTORIS, UNSPECIFIED VESSEL OR LESION TYPE (H): ICD-10-CM

## 2020-08-03 DIAGNOSIS — F40.10 SOCIAL PHOBIA: ICD-10-CM

## 2020-08-03 DIAGNOSIS — I25.10 CAD, MULTIPLE VESSEL: ICD-10-CM

## 2020-08-03 DIAGNOSIS — Z95.1 S/P CABG (CORONARY ARTERY BYPASS GRAFT): ICD-10-CM

## 2020-08-03 DIAGNOSIS — I10 ESSENTIAL HYPERTENSION WITH GOAL BLOOD PRESSURE LESS THAN 140/90: ICD-10-CM

## 2020-08-03 DIAGNOSIS — E78.5 HYPERLIPIDEMIA LDL GOAL <70: ICD-10-CM

## 2020-08-03 DIAGNOSIS — I10 HYPERTENSION GOAL BP (BLOOD PRESSURE) < 140/90: Primary | Chronic | ICD-10-CM

## 2020-08-03 PROCEDURE — 90715 TDAP VACCINE 7 YRS/> IM: CPT | Performed by: FAMILY MEDICINE

## 2020-08-03 PROCEDURE — 80061 LIPID PANEL: CPT | Performed by: FAMILY MEDICINE

## 2020-08-03 PROCEDURE — 90471 IMMUNIZATION ADMIN: CPT | Performed by: FAMILY MEDICINE

## 2020-08-03 PROCEDURE — 99214 OFFICE O/P EST MOD 30 MIN: CPT | Mod: 25 | Performed by: FAMILY MEDICINE

## 2020-08-03 PROCEDURE — 36415 COLL VENOUS BLD VENIPUNCTURE: CPT | Performed by: FAMILY MEDICINE

## 2020-08-03 PROCEDURE — 80053 COMPREHEN METABOLIC PANEL: CPT | Performed by: FAMILY MEDICINE

## 2020-08-03 RX ORDER — ATORVASTATIN CALCIUM 80 MG/1
80 TABLET, FILM COATED ORAL AT BEDTIME
Qty: 90 TABLET | Refills: 3 | Status: SHIPPED | OUTPATIENT
Start: 2020-08-03 | End: 2021-09-08

## 2020-08-03 RX ORDER — PAROXETINE 40 MG/1
40 TABLET, FILM COATED ORAL EVERY MORNING
Qty: 90 TABLET | Refills: 3 | Status: SHIPPED | OUTPATIENT
Start: 2020-08-03 | End: 2021-09-08

## 2020-08-03 ASSESSMENT — PATIENT HEALTH QUESTIONNAIRE - PHQ9: SUM OF ALL RESPONSES TO PHQ QUESTIONS 1-9: 1

## 2020-08-03 ASSESSMENT — MIFFLIN-ST. JEOR: SCORE: 1435.07

## 2020-08-03 NOTE — PATIENT INSTRUCTIONS
1) Schedule with the pharmacist for smoking cessation counseling.   2) Call your urologist about your urinary symptoms.   3) Schedule with an eye doctor to address your vision changes.

## 2020-08-03 NOTE — PROGRESS NOTES
Subjective     Tevin Guidry is a 69 year old male who presents to clinic today for the following health issues:    HPI       Hypertension Follow-up      Do you check your blood pressure regularly outside of the clinic? Yes     Are you following a low salt diet? Yes    Are your blood pressures ever more than 140 on the top number (systolic) OR more   than 90 on the bottom number (diastolic), for example 140/90? Yes    Vascular Disease Follow-up      How often do you take nitroglycerin? Never    Do you take an aspirin every day? Yes          How many servings of fruits and vegetables do you eat daily?  2-3    On average, how many sweetened beverages do you drink each day (Examples: soda, juice, sweet tea, etc.  Do NOT count diet or artificially sweetened beverages)?   0    How many days per week do you exercise enough to make your heart beat faster? 5    How many minutes a day do you exercise enough to make your heart beat faster? 9 or less  How many days per week do you miss taking your medication? 1    What makes it hard for you to take your medications?  remembering to take         Patient Active Problem List   Diagnosis     Social phobia     Hypertension goal BP (blood pressure) < 140/90     Hyperlipidemia LDL goal <130     Tubular adenoma of colon     Advanced directives, counseling/discussion     Coronary artery disease involving native heart with angina pectoris, unspecified vessel or lesion type (H)     Cigarette nicotine dependence without complication     S/P CABG (coronary artery bypass graft)     Chronic obstructive pulmonary disease, unspecified COPD type (H)     Anemia, unspecified type     Health Care Home     Bladder cancer (H)     Personal history of malignant neoplasm of bladder     Past Surgical History:   Procedure Laterality Date     CLAVICLE SURGERY Right     fracture in childhood     COLONOSCOPY N/A 3/30/2017    Procedure: COLONOSCOPY;  Surgeon: Jie Onofre MD;  Location:   GI     CYSTOSCOPY, TRANSURETHRAL RESECTION (TUR) TUMOR BLADDER, COMBINED N/A 2019    Procedure: cystoscopy,Transurethral Resection Of Bladder Tumor;  Surgeon: Almas Sunshine MD;  Location: [facility]     CYSTOSCOPY, TRANSURETHRAL RESECTION (TUR) TUMOR BLADDER, COMBINED N/A 2020    Procedure: CYSTOSCOPY, WITH TRANSURETHRAL RESECTION BLADDER TUMOR;  Surgeon: Almas Sunshine MD;  Location: [facility]     DAVINCI BYPASS ARTERY CORONARY N/A 2014    Procedure: DAVINCI BYPASS ARTERY CORONARY;  Surgeon: Lam Solis MD;  Location: [facility] OR     ESOPHAGOSCOPY, GASTROSCOPY, DUODENOSCOPY (EGD), COMBINED N/A 3/30/2017    Procedure: COMBINED ESOPHAGOSCOPY, GASTROSCOPY, DUODENOSCOPY (EGD);  Surgeon: Jie Onofre MD;  Location: [facility] GI     HC COLONOSCOPY THRU STOMA, DIAGNOSTIC      polyps due      SURGICAL HISTORY OF -       right clavicular fracture with ORIF       Social History     Tobacco Use     Smoking status: Light Tobacco Smoker     Packs/day: 1.50     Years: 30.00     Pack years: 45.00     Last attempt to quit: 2019     Years since quittin.0     Smokeless tobacco: Never Used   Substance Use Topics     Alcohol use: No     Comment: quit 35 years ago     Family History   Problem Relation Age of Onset     Cancer Mother         esog/stomach     Cancer Father         lung     Family History Negative Brother      Hypertension Brother      Anesthesia Reaction No family hx of      Deep Vein Thrombosis (DVT) No family hx of          Current Outpatient Medications   Medication Sig Dispense Refill     albuterol (PROAIR HFA/PROVENTIL HFA/VENTOLIN HFA) 108 (90 Base) MCG/ACT inhaler Inhale 2 puffs into the lungs every 6 hours as needed for shortness of breath / dyspnea or wheezing 1 Inhaler 1     aspirin 81 MG tablet Take 1 tablet (81 mg) by mouth daily (Patient taking differently: Take 81 mg by mouth every morning ) 90 tablet 3     atorvastatin (LIPITOR) 80 MG tablet Take 1 tablet (80  mg) by mouth At Bedtime 90 tablet 3     ferrous sulfate (IRON) 325 (65 FE) MG tablet Take 325 mg by mouth every morning  60 tablet 3     levofloxacin (LEVAQUIN) 500 MG tablet Take 1 tablet 6 hours post treatment and 1 tablet each AM following each treatment. 12 tablet 0     lisinopril (PRINIVIL/ZESTRIL) 20 MG tablet Take 1 tablet (20 mg) by mouth At Bedtime (Patient taking differently: Take 20 mg by mouth every morning ) 90 tablet 3     metoprolol succinate ER (TOPROL-XL) 50 MG 24 hr tablet Take 1 tablet (50 mg) by mouth At Bedtime (Patient taking differently: Take 50 mg by mouth every morning ) 90 tablet 3     nitroglycerin (NITROSTAT) 0.4 MG sublingual tablet Place 1 tablet (0.4 mg) under the tongue every 5 minutes as needed for chest pain 25 tablet 3     PARoxetine (PAXIL) 40 MG tablet Take 1 tablet (40 mg) by mouth every morning 90 tablet 3     Allergies   Allergen Reactions     Codeine Nausea and Vomiting     Recent Labs   Lab Test 09/03/19  1008 07/03/19  1155 12/10/18  1231 06/29/18  1005 10/30/17  1121 09/06/17  1004  01/25/17  0909  09/28/14  0709  07/11/12  1503   A1C  --   --   --   --   --   --   --   --   --  5.9  --  6.0   LDL  --  60  --  73 68  --   --   --    < >  --    < >  --    HDL  --  50  --  50 56  --   --   --    < >  --    < >  --    TRIG  --  93  --  76 109  --   --   --    < >  --    < >  --    ALT  --   --  23 29  --  25  --   --    < > 16   < > 10   CR  --  0.80 0.84  --   --  0.72   < >  --    < > 0.76   < > 0.82   GFRESTIMATED >90 >90 >90  --   --  >90   < >  --    < > >90  Non  GFR Calc     < > >90   GFRESTBLACK >90 >90 >90  --   --  >90   < >  --    < > >90   GFR Calc     < > >90   POTASSIUM  --  4.8 4.7  --   --  4.6   < >  --    < > 3.8   < > 3.6   TSH  --   --   --   --   --   --   --  0.70  --   --   --   --     < > = values in this interval not displayed.      BP Readings from Last 3 Encounters:   08/03/20 126/76   04/21/20 (!) 143/82   04/14/20  "125/75    Wt Readings from Last 3 Encounters:   08/03/20 69.8 kg (153 lb 12.8 oz)   07/14/20 70.3 kg (155 lb)   02/06/20 72.6 kg (160 lb)                    Reviewed and updated as needed this visit by Provider         Review of Systems   As above        Objective    /76 (BP Location: Left arm, Patient Position: Chair, Cuff Size: Adult Regular)   Pulse 73   Temp 97  F (36.1  C) (Tympanic)   Resp 17   Ht 1.74 m (5' 8.5\")   Wt 69.8 kg (153 lb 12.8 oz)   SpO2 97%   BMI 23.05 kg/m    Body mass index is 23.05 kg/m .  Physical Exam   GENERAL: healthy, alert and no distress       Diagnostic Test Results:  Labs reviewed in Epic        Assessment & Plan         1. Essential hypertension with goal blood pressure less than 140/90  Controlled, continues lisinopril 20mg daily, metoprolol 50mg daily   - lisinopril (PRINIVIL/ZESTRIL) 20 MG tablet; Take 1 tablet (20 mg) by mouth daily  Dispense: 90 tablet; Refill: 3  - Basic metabolic panel today          2. Hyperlipidemia LDL goal <70  Controlled. Will check lipids today   - atorvastatin (LIPITOR) 80 MG tablet; Take 1 tablet (80 mg) by mouth daily  Dispense: 90 tablet; Refill: 3        3. Bladder cancer  Following with Urology and Oncology  Presented with gross hematuria in July 2019, evaluated by Urology and found to have non-invasive high grfade urothelial carcinoma s/p TURBT on 9/5/19.  9/19 - Noninvasive HG bladder cancer  Underwent induction BCG  2/20 - Noninvasive LG bladder cancer  Underwent repeat induction BCG    Smoking about 1ppd. I strongly encouraged smoking cessation. He was interested in visiting with MTM. Will help him schedule.               4. CAD, multiple vessel s/p CABG   Stable continues ASA 81mg daily and statin. Has not needed to take his nitroglycerin   - metoprolol succinate ER (TOPROL-XL) 50 MG 24 hr tablet; Take 1 tablet (50 mg) by mouth daily  Dispense: 90 tablet; Refill: 3     5. Social phobia  Stable   - PARoxetine (PAXIL) 40 MG tablet; " Take 1 tablet (40 mg) by mouth every morning  Dispense: 90 tablet; Refill: 3     6. Tobacco dependence  Back to smoking about a pack per day and feeling guilty about it. He is open to meeting with MTM about smoking cessation. Will help him schedule.   He had quit, but is back to smoking about three quarters of a pack per day.  He is out of the high-dose nicotine patches.  I gave him a refill today and gave him the brochure for quit plan counseling.     7. Chronic obstructive pulmonary disease, unspecified COPD type (H)   stable.Rare cough or use of albuterol.       Tobacco Cessation: as above    reports that he has been smoking. He has a 45.00 pack-year smoking history. He has never used smokeless tobacco.    Due for tdap. tdap given today         Patient Instructions   1) Schedule with the pharmacist for smoking cessation counseling.   2) Call your urologist about your urinary symptoms.   3) Schedule with an eye doctor to address your vision changes.       Return in about 6 months (around 2/3/2021) for Follow up visit.    Preethi Quiroz MD   [facility]

## 2020-08-03 NOTE — NURSING NOTE
Prior to immunization administration, verified patients identity using patient s name and date of birth. Please see Immunization Activity for additional information.     Screening Questionnaire for Adult Immunization    Are you sick today?   No   Do you have allergies to medications, food, a vaccine component or latex?   No   Have you ever had a serious reaction after receiving a vaccination?   No   Do you have a long-term health problem with heart, lung, kidney, or metabolic disease (e.g., diabetes), asthma, a blood disorder, no spleen, complement component deficiency, a cochlear implant, or a spinal fluid leak?  Are you on long-term aspirin therapy?   No   Do you have cancer, leukemia, HIV/AIDS, or any other immune system problem?   Yes- bladder cancer   Do you have a parent, brother, or sister with an immune system problem?   No   In the past 3 months, have you taken medications that affect  your immune system, such as prednisone, other steroids, or anticancer drugs; drugs for the treatment of rheumatoid arthritis, Crohn s disease, or psoriasis; or have you had radiation treatments?   No   Have you had a seizure, or a brain or other nervous system problem?   No   During the past year, have you received a transfusion of blood or blood    products, or been given immune (gamma) globulin or antiviral drug?   No   For women: Are you pregnant or is there a chance you could become       pregnant during the next month?   No   Have you received any vaccinations in the past 4 weeks?   No     Immunization questionnaire was positive for at least one answer.  Notified  Dr. Quiroz.        Per orders of Dr. Quiroz, injection of tdap given by Amy Granados MA. Patient instructed to remain in clinic for 15 minutes afterwards, and to report any adverse reaction to me immediately.       Screening performed by Amy Granados MA on 8/3/2020 at 2:39 PM.

## 2020-08-04 LAB
ALBUMIN SERPL-MCNC: 3.6 G/DL (ref 3.4–5)
ALP SERPL-CCNC: 132 U/L (ref 40–150)
ALT SERPL W P-5'-P-CCNC: 18 U/L (ref 0–70)
ANION GAP SERPL CALCULATED.3IONS-SCNC: 4 MMOL/L (ref 3–14)
AST SERPL W P-5'-P-CCNC: 14 U/L (ref 0–45)
BILIRUB SERPL-MCNC: 0.3 MG/DL (ref 0.2–1.3)
BUN SERPL-MCNC: 13 MG/DL (ref 7–30)
CALCIUM SERPL-MCNC: 8.7 MG/DL (ref 8.5–10.1)
CHLORIDE SERPL-SCNC: 107 MMOL/L (ref 94–109)
CHOLEST SERPL-MCNC: 140 MG/DL
CO2 SERPL-SCNC: 30 MMOL/L (ref 20–32)
CREAT SERPL-MCNC: 0.86 MG/DL (ref 0.66–1.25)
GFR SERPL CREATININE-BSD FRML MDRD: 86 ML/MIN/{1.73_M2}
GLUCOSE SERPL-MCNC: 84 MG/DL (ref 70–99)
HDLC SERPL-MCNC: 59 MG/DL
LDLC SERPL CALC-MCNC: 64 MG/DL
NONHDLC SERPL-MCNC: 81 MG/DL
POTASSIUM SERPL-SCNC: 5 MMOL/L (ref 3.4–5.3)
PROT SERPL-MCNC: 7 G/DL (ref 6.8–8.8)
SODIUM SERPL-SCNC: 141 MMOL/L (ref 133–144)
TRIGL SERPL-MCNC: 84 MG/DL

## 2020-08-12 ENCOUNTER — ALLIED HEALTH/NURSE VISIT (OUTPATIENT)
Dept: PHARMACY | Facility: CLINIC | Age: 70
End: 2020-08-12
Payer: COMMERCIAL

## 2020-08-12 DIAGNOSIS — F17.200 TOBACCO DEPENDENCE: Primary | ICD-10-CM

## 2020-08-12 PROCEDURE — 99607 MTMS BY PHARM ADDL 15 MIN: CPT | Performed by: PHARMACIST

## 2020-08-12 PROCEDURE — 99605 MTMS BY PHARM NP 15 MIN: CPT | Performed by: PHARMACIST

## 2020-08-12 RX ORDER — NICOTINE 21 MG/24HR
1 PATCH, TRANSDERMAL 24 HOURS TRANSDERMAL EVERY 24 HOURS
Qty: 28 PATCH | Refills: 1 | Status: SHIPPED | OUTPATIENT
Start: 2020-08-12 | End: 2020-08-31

## 2020-08-12 NOTE — PATIENT INSTRUCTIONS
Recommendations from today's MTM visit:                                                    MTM (medication therapy management) is a service provided by a clinical pharmacist designed to help you get the most of out of your medicines.   Today we reviewed what your medicines are for, how to know if they are working, that your medicines are safe and how to make your medicine regimen as easy as possible.     1. Don't smoke when you are using the patches.  It could cause nicotine overdose.  It is okay to use the patch and gum together minimally as we discussed.    2. Come up with a list of things that you can do when you have the cravings to smoke.  You can do these things in place of smoking cigarettes.    3. Read through the smoking cessation packet I included with your paperwork.  It has other medication options for smoking cessation.  If you read through them and decide you would like to try one because the patch and gum are not working please let me know.    4. Contact Quit Partner at [email or web address] or online at [employer] to receive support on your quit journey.     I know you can do this, you are motivated!    You may want to check out the following for more information and help with quitting smoking:  - [facility] Exhale Group Class: meet with a [facility] Provider and [facility]  in a group setting to help get you started with quitting smoking  - American Lung Association [place] From Smoking: www.ffsonline.org; $15/3 months or $40/year for access to an online course to help you quit smoking and direct link to email the American Lung Association with questions  - Nicotine Anonymous: www.nicotine-anonymous.org to find a meeting near you; also provide phone and online meetings, email and pen-pal networking    It was great to speak with you today.  I value your experience and would be very thankful for your time with providing feedback on our clinic survey. You may receive a survey via email or text  message in the next few days.     Next MTM visit: 8/26/20 at 12:30    To schedule another MTM appointment, please call the clinic directly or you may call the MTM scheduling line at [phone] or toll-free at [phone].     My Clinical Pharmacist's contact information:                                                      It was a pleasure talking with you today!  Please feel free to contact me with any questions or concerns you have.      Nenita Beard, PharmD  Medication Therapy Management Pharmacist  [facility] - Monday and Wednesday 7:30 - 4:00  Phone: [phone] - direct clinic line

## 2020-08-12 NOTE — Clinical Note
Thanks for the referral.  He seems very motivated to quit smoking and would prefer to use the patches and I also suggested he use the gum for extreme cravings.  I sent prescriptions in for both of these and will check back with him in 2 weeks.  I also sent him a smoking cessation booklet with a lot of information as well as information on the quit partner program which has replaced the [place] quit plan program.  I do think he can do it but will need a lot of encouragement so I recommended he reach out to family and friends to be his support.  I will let you know how it goes and his progress.    Nenita

## 2020-08-12 NOTE — Clinical Note
Patient had MTM visit today for smoking cessation.  Please see note for details.    Nenita Beard, AdolfoD  Medication Therapy Management Pharmacist

## 2020-08-12 NOTE — PROGRESS NOTES
MTM ENCOUNTER  SUBJECTIVE/OBJECTIVE:                           Tevin Guidry is a 69 year old male called for an initial visit. He was referred to me from Dr. Preethi scott.      Patient consented to a telehealth visit: yes  Telemedicine Visit Details  Type of service:  Telephone visit  Start Time: 2:49 PM  End Time: 3:40 pm  Originating Location (pt. Location): Home  Distant Location (provider location):  [facility] MTM  Mode of Communication:  Telephone    Chief Complaint: would like to quit smoking.    Personal Healthcare Goals: did not discuss today    Allergies/ADRs: Reviewed in EHR  Tobacco:  reports that he has been smoking. He has a 45.00 pack-year smoking history. 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  PMH: Reviewed in EHR - pt reports he has CAD and he had 6 stents put in. He also reports a quique procedure. This was done in October 2014. He was seeing a cardiologist after this but no longer seeing one. He does have bladder cancer and recently started ECG treatments for this.    Medication Adherence/Access: Patient uses pill box(es).  Patient takes medications 1 time(s) per day.   Per patient, misses medication 1 times per week.     Smoking Cessation:  How much does he smoke:  1 ppd for the last 3 years, had quit prior to that for 10 years.  Why does he smoke: he doesn't know, doesn't have a good reason. He just enjoys it at times.  He feels guilty when he smokes, that he cant quit and the old lungs are going to give out.  Triggers for smoking include:  First thing in the morning, after eating, getting in the car. The first one might be the hardest to give up.  He can go a couple of days if he doesn't have  money to buy them.  How long has he been smoking:  Started 12 or 13  What is the most he ever smoked:  1.5 packs  What is the least he ever smoked:  0.5 pack  Tried quitting in the past: Yes.  How many times:  Many times and when he did quit he truly did want to.  For how long: 10 years.  What worked/didn't  work in the past: In the past he convinced himself to give up one of his vices.  He used the nicotine gum. He does have patches and uses them and smokes while he has the patch on.  Why does he want to quit (motivators for quitting): to improve health  What scares you about quitting? nothing   How important is it for you to quit on a scale of 0 - 10? 8  How confident are you that you can stop smoking on a scale of 0-10: 7. He is not sure if he trusts himself.  Medications used in the past:  He has used nicotine gum - doesn't work, lozenge - unsure if these work either and patches (21 mg) - provide some benefit. He leaves it on overnight and reports he doesn't notice that it effects his sleep.  He does drink coffee late at night so that might keep him up.  Is patient currently pregnant: N/A  History of seizures/bipolar disease: No    Last Comprehensive Metabolic Panel:  Sodium   Date Value Ref Range Status   08/03/2020 141 133 - 144 mmol/L Final     Potassium   Date Value Ref Range Status   08/03/2020 5.0 3.4 - 5.3 mmol/L Final     Chloride   Date Value Ref Range Status   08/03/2020 107 94 - 109 mmol/L Final     Carbon Dioxide   Date Value Ref Range Status   08/03/2020 30 20 - 32 mmol/L Final     Anion Gap   Date Value Ref Range Status   08/03/2020 4 3 - 14 mmol/L Final     Glucose   Date Value Ref Range Status   08/03/2020 84 70 - 99 mg/dL Final     Comment:     Non Fasting     Urea Nitrogen   Date Value Ref Range Status   08/03/2020 13 7 - 30 mg/dL Final     Creatinine   Date Value Ref Range Status   08/03/2020 0.86 0.66 - 1.25 mg/dL Final     GFR Estimate   Date Value Ref Range Status   08/03/2020 86 >60  "mL/min/[1.73_m2] Final     Comment:     Non  GFR Calc  Starting 12/18/2018, serum creatinine based estimated GFR (eGFR) will be   calculated using the Chronic Kidney Disease Epidemiology Collaboration   (CKD-EPI) equation.       Calcium   Date Value Ref Range Status   08/03/2020 8.7 8.5 - 10.1 mg/dL Final     Estimated Creatinine Clearance: 77.8 mL/min (based on SCr of 0.86 mg/dL).    Today's Vitals: There were no vitals taken for this visit.     BP Readings from Last 3 Encounters:   08/03/20 126/76   04/21/20 (!) 143/82   04/14/20 125/75     ASSESSMENT:                            Medication Adherence:good, encouraged him to get another pillbox to help with adherence    Smoking cessation: Needs Improvement. Pt is ready to quit using tobacco.  We discussed: ways to cope with cravings and manage triggers and rewards for quitting. Based on patient preferences and history, patient would benefit from nicotine gum nicotine patch.  Patient was provided the \"Quitting for Good with Treatment and Support\" education booklet.  I also provided the [place] quit program information so that he could get more support.  I also encouraged him to reach out to family members and friends who would be willing to check on him and ensure he stays on track.    PLAN:                            1. Don't smoke when you are using the patches.  It could cause nicotine overdose.  It is okay to use the patch and gum together minimally as we discussed.    2. Come up with a list of things that you can do when you have the cravings to smoke.  You can do these things in place of smoking cigarettes.    3. Read through the smoking cessation packet I included with your paperwork.  It has other medication options for smoking cessation.  If you read through them and decide you would like to try one because the patch and gum are not working please let me know.    4. Contact Quit Partner at 2-611-QUIT-NOW or online at quitpartnermn.com to " receive support on your quit journey.     I know you can do this, you are motivated!     I spent 51 minutes with this patient today. All changes were made via collaborative practice agreement with Dr. Preethi Quiroz. A copy of the visit note was provided to the patient's referring provider.    Will follow up in 2 weeks.    The patient was mailed a summary of these recommendations.     Nenita Beard, PharmD  Medication Therapy Management Pharmacist

## 2020-08-26 ENCOUNTER — TELEPHONE (OUTPATIENT)
Dept: PHARMACY | Facility: CLINIC | Age: 70
End: 2020-08-26

## 2020-08-26 ENCOUNTER — OFFICE VISIT (OUTPATIENT)
Dept: UROLOGY | Facility: CLINIC | Age: 70
End: 2020-08-26
Payer: COMMERCIAL

## 2020-08-26 DIAGNOSIS — R31.0 GROSS HEMATURIA: ICD-10-CM

## 2020-08-26 DIAGNOSIS — C67.9 MALIGNANT NEOPLASM OF URINARY BLADDER, UNSPECIFIED SITE (H): ICD-10-CM

## 2020-08-26 DIAGNOSIS — C67.8 MALIGNANT NEOPLASM OF OVERLAPPING SITES OF BLADDER (H): Primary | ICD-10-CM

## 2020-08-26 DIAGNOSIS — Z85.51 PERSONAL HISTORY OF MALIGNANT NEOPLASM OF BLADDER: ICD-10-CM

## 2020-08-26 LAB
ALBUMIN UR-MCNC: NEGATIVE MG/DL
APPEARANCE UR: CLEAR
BILIRUB UR QL STRIP: NEGATIVE
COLOR UR AUTO: YELLOW
GLUCOSE UR STRIP-MCNC: NEGATIVE MG/DL
HGB UR QL STRIP: NEGATIVE
KETONES UR STRIP-MCNC: 5 MG/DL
LEUKOCYTE ESTERASE UR QL STRIP: NEGATIVE
MUCOUS THREADS #/AREA URNS LPF: PRESENT /LPF
NITRATE UR QL: NEGATIVE
PH UR STRIP: 5 PH (ref 5–7)
RBC #/AREA URNS AUTO: 2 /HPF (ref 0–2)
SOURCE: ABNORMAL
SP GR UR STRIP: 1.02 (ref 1–1.03)
UROBILINOGEN UR STRIP-MCNC: 2 MG/DL (ref 0–2)
WBC #/AREA URNS AUTO: 1 /HPF (ref 0–5)

## 2020-08-26 RX ORDER — CEFAZOLIN SODIUM 2 G/50ML
2 SOLUTION INTRAVENOUS
Status: CANCELLED | OUTPATIENT
Start: 2020-08-26

## 2020-08-26 RX ORDER — CEFAZOLIN SODIUM 1 G/50ML
1 INJECTION, SOLUTION INTRAVENOUS SEE ADMIN INSTRUCTIONS
Status: CANCELLED | OUTPATIENT
Start: 2020-08-26

## 2020-08-26 RX ORDER — LIDOCAINE HYDROCHLORIDE 20 MG/ML
JELLY TOPICAL ONCE
Status: COMPLETED | OUTPATIENT
Start: 2020-08-26 | End: 2020-08-26

## 2020-08-26 RX ADMIN — LIDOCAINE HYDROCHLORIDE: 20 JELLY TOPICAL at 14:37

## 2020-08-26 ASSESSMENT — PAIN SCALES - GENERAL: PAINLEVEL: NO PAIN (0)

## 2020-08-26 NOTE — NURSING NOTE
Chief Complaint   Patient presents with     Cystoscopy     Bladder cancer       There were no vitals taken for this visit. There is no height or weight on file to calculate BMI.    Patient Active Problem List   Diagnosis     Social phobia     Hypertension goal BP (blood pressure) < 140/90     Hyperlipidemia LDL goal <130     Tubular adenoma of colon     Advanced directives, counseling/discussion     Coronary artery disease involving native heart with angina pectoris, unspecified vessel or lesion type (H)     Cigarette nicotine dependence without complication     S/P CABG (coronary artery bypass graft)     Chronic obstructive pulmonary disease, unspecified COPD type (H)     Anemia, unspecified type     Health Care Home     Bladder cancer (H)     Personal history of malignant neoplasm of bladder       Allergies   Allergen Reactions     Codeine Nausea and Vomiting       Current Outpatient Medications   Medication Sig Dispense Refill     nicotine (NICODERM CQ) 21 MG/24HR 24 hr patch Place 1 patch onto the skin every 24 hours 28 patch 1     nicotine polacrilex (NICORETTE) 4 MG gum Chew one piece for extreme cravings as directed. Max 5 per day 100 tablet 3     albuterol (PROAIR HFA/PROVENTIL HFA/VENTOLIN HFA) 108 (90 Base) MCG/ACT inhaler Inhale 2 puffs into the lungs every 6 hours as needed for shortness of breath / dyspnea or wheezing (Patient not taking: Reported on 8/12/2020) 1 Inhaler 1     aspirin 81 MG tablet Take 1 tablet (81 mg) by mouth daily (Patient taking differently: Take 81 mg by mouth every morning ) 90 tablet 3     atorvastatin (LIPITOR) 80 MG tablet Take 1 tablet (80 mg) by mouth At Bedtime 90 tablet 3     ferrous sulfate (IRON) 325 (65 FE) MG tablet Take 325 mg by mouth every morning  60 tablet 3     levofloxacin (LEVAQUIN) 500 MG tablet Take 1 tablet 6 hours post treatment and 1 tablet each AM following each treatment. 12 tablet 0     lisinopril (PRINIVIL/ZESTRIL) 20 MG tablet Take 1 tablet (20 mg) by  mouth At Bedtime (Patient taking differently: Take 20 mg by mouth every morning ) 90 tablet 3     metoprolol succinate ER (TOPROL-XL) 50 MG 24 hr tablet Take 1 tablet (50 mg) by mouth At Bedtime (Patient taking differently: Take 50 mg by mouth every morning ) 90 tablet 3     nitroglycerin (NITROSTAT) 0.4 MG sublingual tablet Place 1 tablet (0.4 mg) under the tongue every 5 minutes as needed for chest pain (Patient not taking: Reported on 2020) 25 tablet 3     PARoxetine (PAXIL) 40 MG tablet Take 1 tablet (40 mg) by mouth every morning 90 tablet 3       Social History     Tobacco Use     Smoking status: Former Smoker     Packs/day: 1.50     Years: 30.00     Pack years: 45.00     Last attempt to quit: 2019     Years since quittin.0     Smokeless tobacco: Never Used   Substance Use Topics     Alcohol use: No     Comment: quit 35 years ago     Drug use: Yes     Comment: occionally use of pot       Invasive Procedure Safety Checklist:    Procedure: Cystoscopy    Action: Complete sections and checkboxes as appropriate.    Pre-procedure:  1. Patient ID Verified with 2 identifiers (Mary and  or MRN) : YES    2. Procedure and site verified with patient/designee (when able) : YES    3. Accurate consent documentation in medical record : YES    4. H&P (or appropriate assessment) documented in medical record : N/A  H&P must be up to 30 days prior to procedure an updated within 24 hours of                 Procedure as applicable.     5. Relevant diagnostic and radiology test results appropriately labeled and displayed as applicable : YES    6. Blood products, implants, devices, and/or special equipment available for the procedure as applicable : YES    7. Procedure site(s) marked with provider initials [Exclusions: none] : NO    8. Marking not required. Reason : Yes  Procedure does not require site marking    Time Out:     Time-Out performed immediately prior to starting procedure, including verbal and active  participation of all team members addressing: YES    1. Correct patient identity.  2. Confirmed that the correct side and site are marked.  3. An accurate procedure to be done.  4. Agreement on the procedure to be done.  5. Correct patient position.  6. Relevant images and results are properly labeled and appropriately displayed.  7. The need to administer antibiotics or fluids for irrigation purposes during the procedure as applicable.  8. Safety precautions based on patient history or medication use.    During Procedure: Verification of correct person, site, and procedure occurs any time the responsibility for care of the patient is transferred to another member of the care team.    The following medication was given:     MEDICATION: Lidocaine Uro-Jet 2% 200mg (20mg/mL)  ROUTE: Urethral   SITE: Urethra   DOSE: 10mL  LOT #: [id number]  : [employer]   EXPIRATION DATE: 5-22  NDC#: [address]-69   Was there drug waste? No    Prior to injection, verified patient identity using patient's name and date of birth.  Due to injection administration, patient instructed to remain in clinic for 15 minutes  afterwards, and to report any adverse reaction to me immediately.    Drug Amount Wasted:  None.  Vial/Syringe: Single dose vial      Chilango Morillo, EMT  8/26/2020  2:14 PM

## 2020-08-26 NOTE — PROGRESS NOTES
CHIEF COMPLAINT   It was my pleasure to see Tevin Guidry who is a 70 year old male for follow-up of bladder cancer.      HPI   Tevin Guidry is a very pleasant 70 year old male who presents with a history of bladder cancer. 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 He had recurrence in 2/2020 and underwent repeat induction course of BCG. At time of recent follow-up with Dr. Sunshine, he was found to have a suspicious lesion and is referred to assume bladder cancer care. Patient notes no hematuria or dysuria.     He had a biopsy with Dr. Enciso in 7/2020 found to be high-grade.    PHYSICAL EXAM  Patient is a 70 year old  male   Vitals: There were no vitals taken for this visit.  General Appearance Adult: There is no height or weight on file to calculate BMI.  Alert, no acute distress, oriented  HENT: throat/mouth:normal, good dentition  Lungs: no respiratory distress, or pursed lip breathing  Heart: No obvious jugular venous distension present  Abdomen: soft, nontender, no organomegaly or masses  Back: no CVAT  Musculoskeltal: extremities normal, no peripheral edema  Skin: no suspicious lesions or rashes  Neuro: Alert, oriented, speech and mentation normal  Psych: affect and mood normal  Gait: Normal  : penis, scrotum, testes normal    OFFICE CYSTOSCOPY 8/26/2020    Pre-procedure diagnosis:  Bladder Cancer  Post-procedure diagnosis: 1 cm area of erythema with small papillary lesions  Procedure performed:  Cystourethroscopy  Surgeon:    Kirt Guzman MD  Anesthesia:    Local    Description of procedure:   After fully informed, voluntary consent was obtained, the patient was brought into the procedure room, identified and placed in a supine position on the cystoscopy table.  The groin/scrotum were prepped with betadine and draped in a sterile fashion.  Urojet lidocaine gel was introduced.  A 15F flexible cystoscope was inserted into the urethra, and the bladder and urethra were examined in a systematic  manner.  The patient tolerated the procedure well and there were no complications.      Cystoscopic findings:  The urethra was normal without strictures.  The prostate was 3cm long and demonstrated mild bilobar hypertrophy.  There was no median lobe.  The external sphincter coapted well and the bladder neck was open. The bladder was completely surveyed.  There was mild trabeculation.  There were no stones or diverticula identifed.  The ureteric orifices were normal in position and number and effluxing clear urine. 1-2 cm area of erythema just lateral to left ureteral orifice. A second 2 mm papillary area noted just lateral to right ureteral orifice.      ASSESSMENT and PLAN  70 year old male with recurrent bladder tumor. Has previously undergone BCG induction x 2. Now with another recurrence. Will plan for TURBT in the near future. Risks/side effects of this discussed, including risk of infection, bleeding, and bladder, urethra, or ureteral injury. He would like to proceed.    - Schedule TURBT with Patricia Guzman MD  Urology  [facility]

## 2020-08-26 NOTE — PATIENT INSTRUCTIONS
"Schedule surgery.    AFTER YOUR CYSTOSCOPY        You have just completed a cystoscopy, or \"cysto\", which allowed your physician to learn more about your bladder (or to remove a stent placed after surgery). We suggest that you continue to avoid caffeine, fruit juice, and alcohol for the next 24 hours, however, you are encouraged to return to your normal activities.         A few things that are considered normal after your cystoscopy:     * Small amount of bleeding (or spotting) that clears within the next 24 hours     * Slight burning sensation with urination     * Sensation to of needing to avoid more frequently     * The feeling of \"air\" in your urine     * Mild discomfort that is relieved with Tylenol        Please contact our office promptly if you:     * Develop a fever above 101 degrees     * Are unable to urinate     * Develop bright red blood that does not stop     * Severe pain or swelling         Please contact our office with any concerns or questions @[email or web address].  "

## 2020-08-26 NOTE — TELEPHONE ENCOUNTER
Clinical Pharmacy Consult:                                                    Tevin Guidry is a 70 year old male called for a clinical pharmacist consult.  He was referred to me from Dr. Preethi Quiroz.     Reason for Consult: Smoking cessation follow up    Discussion: Patient is doing very well with his smoking cessation. He seems very confident that he can continue to be smoke free.    Smoking cessation:  Patient reports things going well.    What has gone well for you? He started out by using the patch for a couple of days and occasionally the gum but doesn't really like the taste of the gum. Now for the last 3 days he has not been using the patches or gum and has not smoked a cigarette.  What strategies have you tried? He really doesn't want to smoke anymore and this has helped him stay smoke free for the last three days. He reports he has no cravings and is optimistic that he can stay quit.  He has not used a patch or smoked a cigarette in 3 days now. He has not done this in a long time.  He feels good. He thought that the morning cigarette was going to be the hardest to get rid of however he reports he gets up and starts his day and tries not to think about it.  This has been going well.  What has helped you the most to keep from smoking? His upcoming doctor appointment this afternoon.  He gets tired of telling his doctors that he is going to quit and wants to be able to tell them that he has quit and stayed smoke-free.  He is determined to stay smoke free.  Is there anything you might anticipate happening in the near future (e.g., next week, next month) that could present a challenge for you? He cant think of anything right now.  How can I continue to support you going forward? Keep following up regularly and calling to keep him to keep him accountable with staying smoke-free.    Plan:  1. Reach out to family and or friends to help keep him accountable  2. Use the nicotine replacement items as needed  3. I  will follow up in 2.5 weeks with a phone call check-in    Nenita Beard PharmD  Medication Therapy Management Pharmacist

## 2020-08-27 ENCOUNTER — PATIENT OUTREACH (OUTPATIENT)
Dept: UROLOGY | Facility: CLINIC | Age: 70
End: 2020-08-27

## 2020-08-27 ENCOUNTER — TELEPHONE (OUTPATIENT)
Dept: UROLOGY | Facility: CLINIC | Age: 70
End: 2020-08-27

## 2020-08-27 DIAGNOSIS — Z11.59 ENCOUNTER FOR SCREENING FOR OTHER VIRAL DISEASES: Primary | ICD-10-CM

## 2020-08-27 PROBLEM — C67.8 MALIGNANT NEOPLASM OF OVERLAPPING SITES OF BLADDER (H): Status: ACTIVE | Noted: 2020-08-27

## 2020-08-27 LAB
BACTERIA SPEC CULT: NO GROWTH
Lab: NORMAL
SPECIMEN SOURCE: NORMAL

## 2020-08-27 NOTE — TELEPHONE ENCOUNTER
Patient is scheduled for surgery with Dr. Megan Abarca    Date of Surgery: 9/2/20    Location: [facility] OR    Informed patient they will need an adult  yes    H&P: Scheduled with PAC 8/31/20    Additional imaging/appointments: Covid test 8/31/20    Surgery packet: to be sent via [employer] 8/27/20     Additional comments: n/a

## 2020-08-27 NOTE — TELEPHONE ENCOUNTER
Pre Op Teaching Flowsheet       Pre and Post op Patient Education  Relevant Diagnosis:  Malignant neoplasm of overlapping sites of bladder   Surgical procedure:  CYSTOSCOPY, WITH INSTILLATION OF OPTICAL IMAGING AGENT INTO BLADDER AND BIOPSY OF BLADDER (CYSVIEW)  Teaching Topic:  Pre and post op teaching  Person Involved in teaching: Tevin Guidry    Motivation Level:  Asks Questions: Yes  Eager to Learn:  Yes  Cooperative: Yes  Receptive (willing/able to accept information):  Yes    Patient demonstrates understanding of the following:  Date of surgery:  9/2/20  Location of surgery:  [facility]- 5th Floor  History and Physical and any other testing necessary prior to surgery: Yes  Required time line for completion of History and Physical and any pre-op testing: Yes    Patient demonstrates understanding of the following:  Pre-op bowel prep:  None  Pre-op showering/scrub information with PCMX Soap: Yes  Blood thinner medications discussed and when to stop (if applicable):  Yes      Infection Prevention:   Patient demonstrates understanding of the following:  Surgical procedure site care taught: at time of discharge  Signs and symptoms of infection taught:  Yes      Post-op follow-up:  Discussed how to contact the hospital, nurse, and clinic scheduling staff if necessary.    Instructional materials used/given/mailed:  [facility] Booklet, post op teaching sheet, Map, Soap, and arrival/location information.    Surgical instructions packet given to patient in office:  Yes.    Patient has a  and someone to stay with him for the first 24 hours.

## 2020-08-27 NOTE — TELEPHONE ENCOUNTER
FUTURE VISIT INFORMATION      SURGERY INFORMATION:    Date: 9/2/20    [facility]    Surgeon:  Kirt Guzman MD    Anesthesia Type:  general    Procedure: CYSTOSCOPY, WITH INSTILLATION OF OPTICAL IMAGING AGENT INTO BLADDER AND BIOPSY OF BLADDER (CYSVIEW)     Consult: ov 8/26    RECORDS REQUESTED FROM:       Primary Care Provider: Preethi Quiroz MD - [place]    Pertinent Medical History: copd, hypertension    Most recent EKG+ Tracing: 3/27/17    Most recent ECHO: 9/26/14    Most recent Cardiac Stress Test: 9/26/14    Most recent Coronary Angiogram: 9/26/14

## 2020-08-31 ENCOUNTER — PRE VISIT (OUTPATIENT)
Dept: SURGERY | Facility: CLINIC | Age: 70
End: 2020-08-31

## 2020-08-31 ENCOUNTER — ANESTHESIA EVENT (OUTPATIENT)
Dept: SURGERY | Facility: AMBULATORY SURGERY CENTER | Age: 70
End: 2020-08-31

## 2020-08-31 ENCOUNTER — VIRTUAL VISIT (OUTPATIENT)
Dept: SURGERY | Facility: CLINIC | Age: 70
End: 2020-08-31
Payer: COMMERCIAL

## 2020-08-31 DIAGNOSIS — Z01.818 PRE-OP EVALUATION: Primary | ICD-10-CM

## 2020-08-31 DIAGNOSIS — C67.8 MALIGNANT NEOPLASM OF OVERLAPPING SITES OF BLADDER (H): ICD-10-CM

## 2020-08-31 DIAGNOSIS — Z11.59 ENCOUNTER FOR SCREENING FOR OTHER VIRAL DISEASES: ICD-10-CM

## 2020-08-31 LAB
LABORATORY COMMENT REPORT: NORMAL
SARS-COV-2 RNA SPEC QL NAA+PROBE: NEGATIVE
SARS-COV-2 RNA SPEC QL NAA+PROBE: NORMAL
SPECIMEN SOURCE: NORMAL
SPECIMEN SOURCE: NORMAL

## 2020-08-31 ASSESSMENT — COPD QUESTIONNAIRES
COPD: 1
CAT_SEVERITY: MILD

## 2020-08-31 ASSESSMENT — PAIN SCALES - GENERAL: PAINLEVEL: NO PAIN (0)

## 2020-08-31 ASSESSMENT — LIFESTYLE VARIABLES: TOBACCO_USE: 1

## 2020-08-31 NOTE — PROGRESS NOTES
"Tevin Guidry is a 70 year old male who is being evaluated via a billable video visit.      The patient has been notified of following:     \"This video visit will be conducted via a call between you and your physician/provider. We have found that certain health care needs can be provided without the need for an in-person physical exam.  This service lets us provide the care you need with a video conversation.  If a prescription is necessary we can send it directly to your pharmacy.  If lab work is needed we can place an order for that and you can then stop by our lab to have the test done at a later time.    Video visits are billed at different rates depending on your insurance coverage.  Please reach out to your insurance provider with any questions.    If during the course of the call the physician/provider feels a video visit is not appropriate, you will not be charged for this service.\"    Patient has given verbal consent for Video visit? Yes  How would you like to obtain your AVS? MyChart    Will anyone else be joining your video visit? No     JOSEPHINE Kaur LPN          "

## 2020-08-31 NOTE — ANESTHESIA PREPROCEDURE EVALUATION
Anesthesia Pre-Procedure Evaluation    Patient: Tevin Guidry   MRN:     [id number] Gender:   male   Age:    70 year old :      1950        Preoperative Diagnosis: Malignant neoplasm of overlapping sites of bladder (H) [C67.8]   Procedure(s):  CYSTOSCOPY, WITH INSTILLATION OF OPTICAL IMAGING AGENT INTO BLADDER AND BIOPSY OF BLADDER (CYSVIEW)     LABS:  CBC:   Lab Results   Component Value Date    WBC 9.0 2020    WBC 10.5 12/10/2018    HGB 14.3 2020    HGB 14.9 12/10/2018    HCT 43.1 2020    HCT 44.9 12/10/2018     2020     12/10/2018     BMP:   Lab Results   Component Value Date     2020     2019    POTASSIUM 5.0 2020    POTASSIUM 4.8 2019    CHLORIDE 107 2020    CHLORIDE 109 2019    CO2 30 2020    CO2 29 2019    BUN 13 2020    BUN 11 2019    CR 0.86 2020    CR 0.80 2019    GLC 84 2020    GLC 95 2019     COAGS:   Lab Results   Component Value Date    PTT 31 10/07/2014    INR 1.11 10/07/2014     POC:   Lab Results   Component Value Date     (H) 10/01/2014     OTHER:   Lab Results   Component Value Date    PH 7.36 2014    LACT 0.6 (L) 2014    A1C 5.9 2014    TAMMIE 8.7 2020    PHOS 4.3 2014    MAG 1.9 10/09/2014    ALBUMIN 3.6 2020    PROTTOTAL 7.0 2020    ALT 18 2020    AST 14 2020    ALKPHOS 132 2020    BILITOTAL 0.3 2020    TSH 0.70 2017        Preop Vitals    BP Readings from Last 3 Encounters:   20 126/76   20 (!) 143/82   20 125/75    Pulse Readings from Last 3 Encounters:   20 73   20 57   04/14/20 56      Resp Readings from Last 3 Encounters:   20 17   20 16   20 16    SpO2 Readings from Last 3 Encounters:   20 97%   20 97%   20 97%      Temp Readings from Last 1 Encounters:   20 97  F (36.1  C) (Tympanic)    Ht Readings  "from Last 1 Encounters:   08/03/20 1.74 m (5' 8.5\")      Wt Readings from Last 1 Encounters:   08/03/20 69.8 kg (153 lb 12.8 oz)    Estimated body mass index is 23.05 kg/m  as calculated from the following:    Height as of 8/3/20: 1.74 m (5' 8.5\").    Weight as of 8/3/20: 69.8 kg (153 lb 12.8 oz).     LDA:  Urethral Catheter Coude 18 fr (Active)   Number of days: 361        Past Medical History:   Diagnosis Date     Colon polyps     multiple may need colectomy (adenoma)     Hyperlipidemia LDL goal <130      Hypertension goal BP (blood pressure) < 140/90      Social phobia       Past Surgical History:   Procedure Laterality Date     CLAVICLE SURGERY Right     fracture in childhood     COLONOSCOPY N/A 3/30/2017    Procedure: COLONOSCOPY;  Surgeon: Jie Onofre MD;  Location: [facility]     CYSTOSCOPY, TRANSURETHRAL RESECTION (TUR) TUMOR BLADDER, COMBINED N/A 9/5/2019    Procedure: cystoscopy,Transurethral Resection Of Bladder Tumor;  Surgeon: Almas Sunshine MD;  Location: [facility]     CYSTOSCOPY, TRANSURETHRAL RESECTION (TUR) TUMOR BLADDER, COMBINED N/A 2/6/2020    Procedure: CYSTOSCOPY, WITH TRANSURETHRAL RESECTION BLADDER TUMOR;  Surgeon: Almas Sunshine MD;  Location: [facility]     DAVINCI BYPASS ARTERY CORONARY N/A 9/29/2014    Procedure: DAVINCI BYPASS ARTERY CORONARY;  Surgeon: Lam Solis MD;  Location: [facility]     ESOPHAGOSCOPY, GASTROSCOPY, DUODENOSCOPY (EGD), COMBINED N/A 3/30/2017    Procedure: COMBINED ESOPHAGOSCOPY, GASTROSCOPY, DUODENOSCOPY (EGD);  Surgeon: Jie Onofre MD;  Location: [facility]     HC COLONOSCOPY THRU STOMA, DIAGNOSTIC  2010    polyps due 2011     SURGICAL HISTORY OF -       right clavicular fracture with ORIF      Allergies   Allergen Reactions     Codeine Nausea and Vomiting        Anesthesia Evaluation     . Pt has had prior anesthetic. Type: General and MAC    No history of anesthetic complications          ROS/MED HX    ENT/Pulmonary: Comment: " Albuterol doesn't seem to help per pt report.    PFT 12/8/15:  Although the FVC and FEV1 are within normal limits, the FEV1/FVC ratio is reduced.  Patient effort was erratic, but the normal FEV1 indicates no significant obstruction.  The inspiratory flow rates are within normal limits.  Lung volumes are within normal limits.  Following administration of bronchodilators, there is no significant response.  The diffusing capacity is normal.  However, the diffusing capacity was not corrected for the patient's hemoglobin.  Mild airway obstruction is present.  IMPRESSION:  Mild Airflow Obstruction   No significant change with bronchodilator  Normal lung volumes  Normal diffusion capacity        (+)tobacco use, Past use 45 pk yr hx, quit 8/1/19 packs/day  Intermittent asthma Treatment: Inhaler prn,  mild COPD, , . .    Neurologic:  - neg neurologic ROS     Cardiovascular:     (+) Dyslipidemia, hypertension--CAD, -CABG-date: 9/2014 robotic, stent,9/2014  1 Drug Eluting Stent .. Taking blood thinners : Instructions Given to patient: last dose ASA 9/2/19. . . :. . Previous cardiac testing Echodate:9/27/14results:Interpretation Summary  Global and regional left ventricular function is normal with an EF of 60-65%.   Right ventricular function, chamber size, wall motion, and thickness are   normal. Pulmonary artery systolic pressure cannot be assessed. The inferior   vena cava is normal. No pericardial effusion is present.    date: results: date: results:Cath date: 9/29/14 results:Successful PCI with ALENA from distal to prox segments of RCA. Good flow of LIMA to LAD with stenosis as noted.          METS/Exercise Tolerance: Comment: Dog walking- walks a [place] kirk daily.  No CP or ELENA with walks.  Endorses some ELENA when carrying heavy objects  >4 METS   Hematologic: Comments: Iron deficiency anemia    (+) Anemia, History of Transfusion no previous transfusion reaction -      Musculoskeletal:  - neg musculoskeletal ROS        GI/Hepatic:  - neg GI/hepatic ROS      (-) liver disease   Renal/Genitourinary:  - ROS Renal section negative       Endo:  - neg endo ROS       Psychiatric:     (+) psychiatric history anxiety      Infectious Disease:  - neg infectious disease ROS       Malignancy:   (+) Malignancy History of Other  Other CA bladder Active status post         Other:    (+) No chance of pregnancy C-spine cleared: N/A, no H/O Chronic Pain,                       PHYSICAL EXAM:   Mental Status/Neuro: A/A/O   Airway: Facies: Feasible  Mallampati: I  Mouth/Opening: Full  TM distance: > 6 cm  Neck ROM: Full   Respiratory: Auscultation: CTAB     Resp. Rate: Normal     Resp. Effort: Normal      CV: Rhythm: Regular  Rate: Age appropriate  Heart: Normal Sounds  Edema: None   Comments: Hard of hearing     Dental: Normal Dentition                Assessment:   ASA SCORE: 3    H&P: History and physical reviewed and following examination; no interval change.   Smoking Status:  Non-Smoker/Unknown   NPO Status: NPO Appropriate     Plan:   Anes. Type:  General   Pre-Medication: Acetaminophen   Induction:  IV (Standard)   Airway: LMA   Access/Monitoring: PIV   Maintenance: Balanced     Postop Plan:   Postop Pain: Opioids  Postop Sedation/Airway: Not planned  Disposition: Outpatient     PONV Management:   Adult Risk Factors:, Non-Smoker, Postop Opioids   Prevention: Ondansetron, Dexamethasone     CONSENT: Direct conversation   Plan and risks discussed with: Patient                   PAC Discussion and Assessment    ASA Classification: 3  Case is suitable for: ASC  Anesthetic techniques and relevant risks discussed: GA  Invasive monitoring and risk discussed:   Types:   Possibility and Risk of blood transfusion discussed:   NPO instructions given:   Additional anesthetic preparation and risks discussed:   Needs early admission to pre-op area:   Other:     PAC Resident/NP Anesthesia Assessment:  Hola Guidry is a 70 year old male scheduled for CYSTOSCOPY,  WITH INSTILLATION OF OPTICAL IMAGING AGENT INTO BLADDER AND BIOPSY OF BLADDER (CYSVIEW) on 9/2/20 by Dr. Guzman in treatment of bladder cancer.  PAC referral for risk assessment and optimization for anesthesia with comorbid conditions of hypertension, dyslipidemia, CAD s/p ALENA in 2014 and CABG 2014, mild COPD, tobacco use, anemia, h/o blood transfusion without reaction, anxiety:    Pre-operative considerations:  1.  Cardiac:  Functional status- METS >4. Hypertension using lisinopril (hold DOS) and metoprolol (continue DOS). Dyslipidemia using lipitor (continue). He has a history of ACD s/p stent and CABG in 2014. Denies any cardiac symptoms since that time. Previous cardiac testing as above. He started holding his ASA 81 mg on Saturday in preparation for the above procedure. low risk surgery with 0.9% (RCRI #) risk of major adverse cardiac event.   2.  Pulm:  Airway feasible.  MICHELLE risk: low. Mild COPD using albuterol PRN- he reports it has been over a year since he last needed to use his inhaler. Former tobacco use.  COVID testing ordered per surgery team.   3.  GI:  Risk of PONV score = 0.  If > 2, anti-emetic intervention recommended.  4.  Heme: anemia using iron supplement, hgb was normal 1/2020. H/o blood transfusion without reaction.   5. Psych: anxiety using paxil. Reports symptoms are stable and he has been feeling well.   6. : h/o bladder cancer s/p TURBT and BCG x2.  Concerns for recurrent disease with above procedure planned.     VTE risk: 3%    Patient is optimized and is acceptable candidate for the proposed procedure.  No further diagnostic evaluation is needed.     **Physical exam and vital signs not completed today as this visit was scheduled as a virtual visit during Covid 19 pandemic. Physical exam should be completed the DOS in pre-op**    **For further details of assessment and testing please see H and P completed on same date.      Jesica Delacruz PA-C        Mid-Level Provider/Resident:    Date:   Time:     Attending Anesthesiologist Anesthesia Assessment:        Anesthesiologist:   Date:   Time:   Pass/Fail:   Disposition:     PAC Pharmacist Assessment:        Pharmacist:   Date:   Time:    Jesica Delacruz PA-C

## 2020-08-31 NOTE — H&P
Pre-Operative H & P     Video-Visit Details    Type of service:  Video Visit    Patient verbally consented to video service today: YES      Video Start Time: 1342  Video End Time (time video stopped): 1400    Originating Location (pt. Location): Home    Distant Location (provider location):  home    Mode of Communication:  Video Conference via [employer]      CC:  Preoperative exam to assess for increased cardiopulmonary risk while undergoing surgery and anesthesia.    Date of Encounter: 8/31/2020  Primary Care Physician:  Preethi Quiroz  Associated diagnosis: bladder cancer    HPI  Tevin Guidry is a 70 year old male who presents for pre-operative H & P in preparation for CYSTOSCOPY, WITH INSTILLATION OF OPTICAL IMAGING AGENT INTO BLADDER AND BIOPSY OF BLADDER (CYSVIEW) with Dr. Guzman on 9/2/20 at [facility]. Patient is being evaluated for comorbid conditions of hypertension, dyslipidemia, CAD s/p ALENA in 2014 and CABG 2014, mild COPD, tobacco use, anemia, h/o blood transfusion without reaction, anxiety        Mr. Guidry has a history of bladder cancer initially diagnosed 7/2019. He is now s/p TURBT 9/5/19 and induction BCG x2. He is followed by urology. He recently was seen by his PCP and reported new urological symptoms.  He was seen by Dr. Guzman and above procedure is now planed.       History is obtained from the patient and chart review.      Past Medical History  Past Medical History:   Diagnosis Date     Colon polyps     multiple may need colectomy (adenoma)     Hyperlipidemia LDL goal <130      Hypertension goal BP (blood pressure) < 140/90      Social phobia        Past Surgical History  Past Surgical History:   Procedure Laterality Date     CLAVICLE SURGERY Right     fracture in childhood     COLONOSCOPY N/A 3/30/2017    Procedure: COLONOSCOPY;  Surgeon: Jie Onofre MD;  [facility]     CYSTOSCOPY, TRANSURETHRAL RESECTION (TUR) TUMOR BLADDER, COMBINED  N/A 9/5/2019    Procedure: cystoscopy,Transurethral Resection Of Bladder Tumor;  Surgeon: Almas Sunshine MD;  [facility]     CYSTOSCOPY, TRANSURETHRAL RESECTION (TUR) TUMOR BLADDER, COMBINED N/A 2/6/2020    Procedure: CYSTOSCOPY, WITH TRANSURETHRAL RESECTION BLADDER TUMOR;  Surgeon: Almas Sunshine MD;  [facility]     DAVINCI BYPASS ARTERY CORONARY N/A 9/29/2014    Procedure: DAVINCI BYPASS ARTERY CORONARY;  Surgeon: Lam Solis MD;  [facility]     ESOPHAGOSCOPY, GASTROSCOPY, DUODENOSCOPY (EGD), COMBINED N/A 3/30/2017    Procedure: COMBINED ESOPHAGOSCOPY, GASTROSCOPY, DUODENOSCOPY (EGD);  Surgeon: Jie Onofre MD;  Location: [facility]     HC COLONOSCOPY THRU STOMA, DIAGNOSTIC  2010    polyps due 2011     SURGICAL HISTORY OF -       right clavicular fracture with ORIF       Hx of Blood transfusions/reactions: Patient has a history of transfusion, but denies reactions     Hx of abnormal bleeding or anti-platelet use: ASA 81 mg    Menstrual history: No LMP for male patient.    Steroid use in the last year: denies    Personal or FH with difficulty with Anesthesia:  denies    Prior to Admission Medications  Current Outpatient Medications   Medication Sig Dispense Refill     albuterol (PROAIR HFA/PROVENTIL HFA/VENTOLIN HFA) 108 (90 Base) MCG/ACT inhaler Inhale 2 puffs into the lungs every 6 hours as needed for shortness of breath / dyspnea or wheezing (Patient not taking: Reported on 8/12/2020) 1 Inhaler 1     aspirin 81 MG tablet Take 1 tablet (81 mg) by mouth daily (Patient taking differently: Take 81 mg by mouth every morning ) 90 tablet 3     atorvastatin (LIPITOR) 80 MG tablet Take 1 tablet (80 mg) by mouth At Bedtime 90 tablet 3     ferrous sulfate (IRON) 325 (65 FE) MG tablet Take 325 mg by mouth every morning  60 tablet 3     levofloxacin (LEVAQUIN) 500 MG tablet Take 1 tablet 6 hours post treatment and 1 tablet each AM following each treatment. 12 tablet 0      lisinopril (PRINIVIL/ZESTRIL) 20 MG tablet Take 1 tablet (20 mg) by mouth At Bedtime (Patient taking differently: Take 20 mg by mouth every morning ) 90 tablet 3     metoprolol succinate ER (TOPROL-XL) 50 MG 24 hr tablet Take 1 tablet (50 mg) by mouth At Bedtime (Patient taking differently: Take 50 mg by mouth every morning ) 90 tablet 3     nicotine (NICODERM CQ) 21 MG/24HR 24 hr patch Place 1 patch onto the skin every 24 hours 28 patch 1     nicotine polacrilex (NICORETTE) 4 MG gum Chew one piece for extreme cravings as directed. Max 5 per day 100 tablet 3     nitroglycerin (NITROSTAT) 0.4 MG sublingual tablet Place 1 tablet (0.4 mg) under the tongue every 5 minutes as needed for chest pain (Patient not taking: Reported on 2020) 25 tablet 3     PARoxetine (PAXIL) 40 MG tablet Take 1 tablet (40 mg) by mouth every morning 90 tablet 3       Allergies  Allergies   Allergen Reactions     Codeine Nausea and Vomiting       Social History  Social History     Socioeconomic History     Marital status: Single     Spouse name: Not on file     Number of children: 0     Years of education: Not on file     Highest education level: Not on file   Occupational History     Not on file   Social Needs     Financial resource strain: Not on file     Food insecurity     Worry: Not on file     Inability: Not on file     Transportation needs     Medical: Not on file     Non-medical: Not on file   Tobacco Use     Smoking status: Former Smoker     Packs/day: 1.50     Years: 30.00     Pack years: 45.00     Last attempt to quit: 2019     Years since quittin.0     Smokeless tobacco: Never Used   Substance and Sexual Activity     Alcohol use: No     Comment: quit 35 years ago     Drug use: Yes     Comment: occionally use of pot     Sexual activity: Yes     Partners: Female   Lifestyle     Physical activity     Days per week: Not on file     Minutes per session: Not on file     Stress: Not on file   Relationships     Social  connections     Talks on phone: Not on file     Gets together: Not on file     Attends [employer] service: Not on file     Active member of club or organization: Not on file     Attends meetings of clubs or organizations: Not on file     Relationship status: Not on file     Intimate partner violence     Fear of current or ex partner: Not on file     Emotionally abused: Not on file     Physically abused: Not on file     Forced sexual activity: Not on file   Other Topics Concern      Service No     Blood Transfusions No     Caffeine Concern Not Asked     Occupational Exposure Not Asked     Hobby Hazards Not Asked     Sleep Concern Not Asked     Stress Concern Not Asked     Weight Concern Not Asked     Special Diet Not Asked     Back Care Not Asked     Exercise No     Bike Helmet Not Asked     Seat Belt Yes     Self-Exams No     Parent/sibling w/ CABG, MI or angioplasty before 65F 55M? No   Social History Narrative     Not on file       Family History  Family History   Problem Relation Age of Onset     Cancer Mother         esog/stomach     Cancer Father         lung     Family History Negative Brother      Hypertension Brother      Anesthesia Reaction No family hx of      Deep Vein Thrombosis (DVT) No family hx of        ROS/MED HX    ENT/Pulmonary: Comment: Albuterol doesn't seem to help per pt report.    PFT 12/8/15:  Although the FVC and FEV1 are within normal limits, the FEV1/FVC ratio is reduced.  Patient effort was erratic, but the normal FEV1 indicates no significant obstruction.  The inspiratory flow rates are within normal limits.  Lung volumes are within normal limits.  Following administration of bronchodilators, there is no significant response.  The diffusing capacity is normal.  However, the diffusing capacity was not corrected for the patient's hemoglobin.  Mild airway obstruction is present.  IMPRESSION:  Mild Airflow Obstruction   No significant change with bronchodilator  Normal lung  volumes  Normal diffusion capacity        (+)tobacco use, Past use 45 pk yr hx, quit 8/1/19 packs/day  Intermittent asthma Treatment: Inhaler prn,  mild COPD, , . .    Neurologic:  - neg neurologic ROS     Cardiovascular:     (+) Dyslipidemia, hypertension--CAD, -CABG-date: 9/2014 robotic, stent,9/2014  1 Drug Eluting Stent .. Taking blood thinners : Instructions Given to patient: last dose ASA 9/2/19. . . :. . Previous cardiac testing Echodate:9/27/14results:Interpretation Summary  Global and regional left ventricular function is normal with an EF of 60-65%.   Right ventricular function, chamber size, wall motion, and thickness are   normal. Pulmonary artery systolic pressure cannot be assessed. The inferior   vena cava is normal. No pericardial effusion is present.    date: results: date: results:Cath date: 9/29/14 results:Successful PCI with ALENA from distal to prox segments of RCA. Good flow of LIMA to LAD with stenosis as noted.          METS/Exercise Tolerance:  >4 METS   Hematologic: Comments: Iron deficiency anemia    (+) Anemia, History of Transfusion no previous transfusion reaction -      Musculoskeletal:  - neg musculoskeletal ROS       GI/Hepatic:  - neg GI/hepatic ROS      (-) liver disease   Renal/Genitourinary:  - ROS Renal section negative       Endo:  - neg endo ROS       Psychiatric:     (+) psychiatric history anxiety      Infectious Disease:  - neg infectious disease ROS       Malignancy:   (+) Malignancy History of Other  Other CA bladder Active status post         Other:          The complete review of systems is negative other than noted in the HPI or here.      0 lbs 0 oz  Data Unavailable   There is no height or weight on file to calculate BMI.       Physical Exam  Constitutional: Awake, alert, cooperative, no apparent distress, and appears stated age.  Respiratory: non labored breathing  Neuropsychiatric: Calm, cooperative. Normal affect.     Labs: (personally reviewed)  Component       Latest Ref Rng & Units 1/23/2020 8/3/2020 8/26/2020   Sodium      133 - 144 mmol/L  141    Potassium      3.4 - 5.3 mmol/L  5.0    Chloride      94 - 109 mmol/L  107    Carbon Dioxide      20 - 32 mmol/L  30    Anion Gap      3 - 14 mmol/L  4    Glucose      70 - 99 mg/dL  84    Urea Nitrogen      7 - 30 mg/dL  13    Creatinine      0.66 - 1.25 mg/dL  0.86    GFR Estimate      >60 mL/min/1.73:m2  86    GFR Estimate If Black      >60 mL/min/1.73:m2  >90    Calcium      8.5 - 10.1 mg/dL  8.7    Bilirubin Total      0.2 - 1.3 mg/dL  0.3    Albumin      3.4 - 5.0 g/dL  3.6    Protein Total      6.8 - 8.8 g/dL  7.0    Alkaline Phosphatase      40 - 150 U/L  132    ALT      0 - 70 U/L  18    AST      0 - 45 U/L  14    Color Urine         Yellow   Appearance Urine         Clear   Glucose Urine      NEG:Negative mg/dL   Negative   Bilirubin Urine      NEG:Negative   Negative   Ketones Urine      NEG:Negative mg/dL   5 (A)   Specific Gravity Urine      1.003 - 1.035   1.023   Blood Urine      NEG:Negative   Negative   pH Urine      5.0 - 7.0 pH   5.0   Protein Albumin Urine      NEG:Negative mg/dL   Negative   Urobilinogen mg/dL      0.0 - 2.0 mg/dL   2.0   Nitrite Urine      NEG:Negative   Negative   Leukocyte Esterase Urine      NEG:Negative   Negative   Source         Midstream Urine   WBC Urine      0 - 5 /HPF   1   RBC Urine      0 - 2 /HPF   2   Mucous Urine      NEG:Negative /LPF   Present (A)   WBC      4.0 - 11.0 10e9/L 9.0     RBC Count      4.4 - 5.9 10e12/L 4.57     Hemoglobin      13.3 - 17.7 g/dL 14.3     Hematocrit      40.0 - 53.0 % 43.1     MCV      78 - 100 fl 94     MCH      26.5 - 33.0 pg 31.3     MCHC      31.5 - 36.5 g/dL 33.2     RDW      10.0 - 15.0 % 12.1     Platelet Count      150 - 450 10e9/L 306       EKG 2017    Marked sinus  Bradycardia    -  Negative precordial T-waves  -May be normal -consider anteroseptal ischemia.    Ventricular rate 48 bpm         PFT 12/8/15    FVC-Pred         4.06                              L                      12/08/2015  2:04     FVC-Pre           5.28                             L                      12/08/2015  2:04     FVC-%Pred-Pre           130                              %                     12/08/2015  2:04     FEV1-Pre         3.31                             L                      12/08/2015  2:04     FEV1-%Pred-Pre          106                              %                     12/08/2015  2:04     FEV1FVC-Pred 77                                %                     12/08/2015  2:04     FEV1FVC-Pre   63                                %                     12/08/2015  2:04     FEFMax-Pred   8.17                             L/sec                12/08/2015  2:04     FEFMax-Pre     8.97                             L/sec                12/08/2015  2:04     FEFMax-%Pred-Pre     109                              %                     12/08/2015  2:04     FEF2575-Pred 2.46                             L/sec                12/08/2015  2:04     FEF2575-Pre    1.37                             L/sec                12/08/2015  2:04     XGO9057-%Pred-Pre    55                                %                     12/08/2015  2:04     FEF2575-Post  1.39                             L/sec                12/08/2015  2:04     YTO8213-%Pred-Post  56                                %                     12/08/2015  2:04     ExpTime-Pre   9.83                             sec                   12/08/2015  2:04     FIFMax-Pre      7.31                             L/sec                12/08/2015  2:04     VC-Pred           4.47                             L                      12/08/2015  2:04     VC-Pre 5.40                             L                      12/08/2015  2:04     VC-%Pred-Pre 120                              %                     12/08/2015  2:04      IC-Pred 3.27                             L                      12/08/2015  2:04     IC-Pre  3.26                             L                      12/08/2015  2:04     IC-%Pred-Pre   99                                %                     12/08/2015  2:04     ERV-Pred         1.20                             L                      12/08/2015  2:04     ERV-Pre           2.14                             L                      12/08/2015  2:04     ERV-%Pred-Pre           178                              %                     12/08/2015  2:04     FEV1FEV6-Pred           78                                %                     12/08/2015  2:04     FEV1FEV6-Pre 67                                %                     12/08/2015  2:04     FRCPleth-Pred 3.56                             L                      12/08/2015  2:04     FRCPleth-Pre   4.18                             L                      12/08/2015  2:04     FRCPleth-%Pred-Pre   117                              %                     12/08/2015  2:04     RVPleth-Pred  2.51                             L                      12/08/2015  2:04     RVPleth-Pre    2.04                             L                      12/08/2015  2:04     RVPleth-%Pred-Pre     81                                %                     12/08/2015  2:04     TLCPleth-Pred 6.72                             L                      12/08/2015  2:04     TLCPleth-Pre   7.44                             L                      12/08/2015  2:04     TLCPleth-%Pred-Pre   110                              %                     12/08/2015  2:04     DLCOunc-Pred 25.92                           ml/min/mmHg                        12/08/2015  2:04     DLCOunc-Pre  20.16                           ml/min/mmHg                        12/08/2015  2:04     DLCOunc-%Pred-Pre   77                                 %                     12/08/2015  2:04     VA-Pre 7.22                             L                      12/08/2015  2:04     VA-%Pred-Pre 112                              %                     12/08/2015  2:04     FEV1SVC-Pred 70                                %                     12/08/2015  2:04     FEV1SVC-Pre   61                                %                     12/08/2015  2:04     Although the FVC and FEV1 are within normal limits, the FEV1/FVC ratio is reduced.  Patient effort was erratic, but the normal FEV1 indicates no significant obstruction.  The inspiratory flow rates are within normal limits.  Lung volumes are within normal limits.  Following administration of bronchodilators, there is no significant response.  The diffusing capacity is normal.  However, the diffusing capacity was not corrected for the patient's hemoglobin.    Mild airway obstruction is present.    IMPRESSION:    Mild Airflow Obstruction    No significant change with bronchodilator    Normal lung volumes    Normal diffusion capacity    This preliminary report should not be used clinically unless reviewed and signed by a physician.         ECHOCARDIOGRAM 9/27/14    Interpretation Summary    Global and regional left ventricular function is normal with an EF of 60-65%.    Right ventricular function, chamber size, wall motion, and thickness are    normal. Pulmonary artery systolic pressure cannot be assessed. The inferior    vena cava  is normal. No pericardial effusion is present.        Outside records reviewed from: care everywhere    ASSESSMENT and PLAN  Hola Guidry is a 70 year old male scheduled for CYSTOSCOPY, WITH INSTILLATION OF OPTICAL IMAGING AGENT INTO BLADDER AND BIOPSY OF BLADDER (CYSVIEW) on 9/2/20 by Dr. Guzman in treatment of bladder cancer.  PAC referral for risk assessment and optimization for anesthesia with comorbid conditions of hypertension, dyslipidemia, CAD s/p ALENA in 2014  and CABG 2014, mild COPD, tobacco use, anemia, h/o blood transfusion without reaction, anxiety:    Pre-operative considerations:  1.  Cardiac:  Functional status- METS >4. Hypertension using lisinopril (hold DOS) and metoprolol (continue DOS). Dyslipidemia using lipitor (continue). He has a history of ACD s/p stent and CABG in 2014. Denies any cardiac symptoms since that time. Previous cardiac testing as above. He started holding his ASA 81 mg on Saturday in preparation for the above procedure. low risk surgery with 0.9% (RCRI #) risk of major adverse cardiac event.   2.  Pulm:  Airway feasible.  MICHELLE risk: low. Mild COPD using albuterol PRN- he reports it has been over a year since he last needed to use his inhaler. Former tobacco use.  COVID testing ordered per surgery team.   3.  GI:  Risk of PONV score = 0.  If > 2, anti-emetic intervention recommended.  4.  Heme: anemia using iron supplement, hgb was normal 1/2020. H/o blood transfusion without reaction.   5. Psych: anxiety using paxil. Reports symptoms are stable and he has been feeling well.   6. : h/o bladder cancer s/p TURBT and BCG x2.  Concerns for recurrent disease with above procedure planned.     VTE risk: 3%    Patient is optimized and is acceptable candidate for the proposed procedure.  No further diagnostic evaluation is needed.     **Physical exam and vital signs not completed today as this visit was scheduled as a virtual visit during Covid 19 pandemic. Physical exam should be completed the DOS in pre-op**    Jesica Delacruz PA-C  Preoperative Assessment Center  [facility]  Phone: [phone]  Fax: [phone]

## 2020-08-31 NOTE — PATIENT INSTRUCTIONS
Preparing for Your Surgery      Name:  Tevin Guidry   MRN:  [id number]   :  1950   Today's Date:  2020         Arriving for surgery:  Surgery date:  2020  Arrival time:  11:05AM    Restrictions due to COVID 19:  No visitors at the Surgery Center   parking is not available     Please come to:    [facility]  [address] [place] [phone]    Please check in on the 5th floor at the Ambulatory Surgery Center         What can I eat or drink?    -  You may eat and drink normally until 8 hours before surgery. (Until 2020, 4:35AM)  -  You may have clear liquids up to 4 hours before surgery. (Until 2020, 8:35AM)  Examples of clear liquids:  Water  Clear broth  Juices (apple, white grape, white cranberry  and cider) without pulp  Noncarbonated, powder based beverages  (lemonade and Prem-Aid)  Sodas (Sprite, 7-Up, ginger ale and seltzer)  Coffee or tea (without milk or cream)  Gatorade    --No alcohol for at least 24 hours before surgery    Which medicines can I take?    Hold aspirin for 7 days before surgery.   Hold multivitamins for 7 days before surgery.  Hold supplements for 7 days before surgery.  Hold Ibuprofen (Advil, Motrin) for 1 day before surgery--unless otherwise directed by surgeon.  Hold Naproxen (Aleve) for 4 days before surgery.        -  PLEASE TAKE the following medications the day of surgery     Paroxetine(Paxil)   Albuterol(ProAir) Inhaler as needed and bring the day of surgery    How do I prepare myself?  - Please take 2 showers, the evening before and the morning of surgery using Scrubcare or Hibiclens soap.    Use this soap only from the neck to your toes.     Leave the soap on your skin for one minute--then rinse thoroughly.      You may use your own shampoo and conditioner; no other hair products.   - Please remove all jewelry and body piercings.  - No lotions, deodorants or fragrance.  - Bring your ID and insurance card.        -  All patients are required to have a Covid-19 test within 4 days of surgery/procedure.      -Patients will be contacted by the [facility] scheduling team within 1 week of surgery to make an appointment.      - Patients may call the Scheduling team at [phone] if they have not been scheduled within 4 days of  surgery.      ALL PATIENTS ARE REQUIRED TO HAVE A RESPONSIBLE ADULT TO DRIVE AND BE IN ATTENDANCE WITH THEM FOR 24 HOURS FOLLOWING SURGERY       Questions or Concerns:    -For questions regarding the day of surgery please contact the Ambulatory Surgery Center at [phone].    -If you have health changes between today and your surgery please contact your surgeon.     For questions after surgery please call your surgeons office.

## 2020-09-02 ENCOUNTER — PRE VISIT (OUTPATIENT)
Dept: UROLOGY | Facility: CLINIC | Age: 70
End: 2020-09-02

## 2020-09-02 ENCOUNTER — ANESTHESIA (OUTPATIENT)
Dept: SURGERY | Facility: AMBULATORY SURGERY CENTER | Age: 70
End: 2020-09-02

## 2020-09-02 ENCOUNTER — HOSPITAL ENCOUNTER (OUTPATIENT)
Facility: AMBULATORY SURGERY CENTER | Age: 70
End: 2020-09-02
Attending: UROLOGY
Payer: COMMERCIAL

## 2020-09-02 VITALS
DIASTOLIC BLOOD PRESSURE: 70 MMHG | SYSTOLIC BLOOD PRESSURE: 127 MMHG | RESPIRATION RATE: 16 BRPM | WEIGHT: 153 LBS | BODY MASS INDEX: 22.66 KG/M2 | HEART RATE: 48 BPM | HEIGHT: 69 IN | TEMPERATURE: 97.3 F | OXYGEN SATURATION: 96 %

## 2020-09-02 DIAGNOSIS — C67.8 MALIGNANT NEOPLASM OF OVERLAPPING SITES OF BLADDER (H): ICD-10-CM

## 2020-09-02 RX ORDER — ONDANSETRON 2 MG/ML
4 INJECTION INTRAMUSCULAR; INTRAVENOUS EVERY 30 MIN PRN
Status: DISCONTINUED | OUTPATIENT
Start: 2020-09-02 | End: 2020-09-03 | Stop reason: HOSPADM

## 2020-09-02 RX ORDER — PROPOFOL 10 MG/ML
INJECTION, EMULSION INTRAVENOUS PRN
Status: DISCONTINUED | OUTPATIENT
Start: 2020-09-02 | End: 2020-09-02

## 2020-09-02 RX ORDER — OXYCODONE HCL 5 MG/5 ML
5 SOLUTION, ORAL ORAL EVERY 4 HOURS PRN
Status: DISCONTINUED | OUTPATIENT
Start: 2020-09-02 | End: 2020-09-03 | Stop reason: HOSPADM

## 2020-09-02 RX ORDER — NALOXONE HYDROCHLORIDE 0.4 MG/ML
.1-.4 INJECTION, SOLUTION INTRAMUSCULAR; INTRAVENOUS; SUBCUTANEOUS
Status: DISCONTINUED | OUTPATIENT
Start: 2020-09-02 | End: 2020-09-03 | Stop reason: HOSPADM

## 2020-09-02 RX ORDER — ONDANSETRON 2 MG/ML
INJECTION INTRAMUSCULAR; INTRAVENOUS PRN
Status: DISCONTINUED | OUTPATIENT
Start: 2020-09-02 | End: 2020-09-02

## 2020-09-02 RX ORDER — SENNA AND DOCUSATE SODIUM 50; 8.6 MG/1; MG/1
1 TABLET, FILM COATED ORAL 2 TIMES DAILY PRN
Qty: 10 TABLET | Refills: 0 | Status: SHIPPED | OUTPATIENT
Start: 2020-09-02 | End: 2020-10-28

## 2020-09-02 RX ORDER — CEFAZOLIN SODIUM 1 G/50ML
1 SOLUTION INTRAVENOUS SEE ADMIN INSTRUCTIONS
Status: DISCONTINUED | OUTPATIENT
Start: 2020-09-02 | End: 2020-09-02 | Stop reason: HOSPADM

## 2020-09-02 RX ORDER — GLYCOPYRROLATE 0.2 MG/ML
INJECTION, SOLUTION INTRAMUSCULAR; INTRAVENOUS PRN
Status: DISCONTINUED | OUTPATIENT
Start: 2020-09-02 | End: 2020-09-02

## 2020-09-02 RX ORDER — FENTANYL CITRATE 50 UG/ML
25-50 INJECTION, SOLUTION INTRAMUSCULAR; INTRAVENOUS
Status: DISCONTINUED | OUTPATIENT
Start: 2020-09-02 | End: 2020-09-03 | Stop reason: HOSPADM

## 2020-09-02 RX ORDER — OXYCODONE HYDROCHLORIDE 5 MG/1
5 TABLET ORAL EVERY 6 HOURS PRN
Qty: 5 TABLET | Refills: 0 | Status: SHIPPED | OUTPATIENT
Start: 2020-09-02 | End: 2020-10-28

## 2020-09-02 RX ORDER — MEPERIDINE HYDROCHLORIDE 25 MG/ML
12.5 INJECTION INTRAMUSCULAR; INTRAVENOUS; SUBCUTANEOUS
Status: DISCONTINUED | OUTPATIENT
Start: 2020-09-02 | End: 2020-09-03 | Stop reason: HOSPADM

## 2020-09-02 RX ORDER — DEXAMETHASONE SODIUM PHOSPHATE 4 MG/ML
INJECTION, SOLUTION INTRA-ARTICULAR; INTRALESIONAL; INTRAMUSCULAR; INTRAVENOUS; SOFT TISSUE PRN
Status: DISCONTINUED | OUTPATIENT
Start: 2020-09-02 | End: 2020-09-02

## 2020-09-02 RX ORDER — ONDANSETRON 4 MG/1
4 TABLET, ORALLY DISINTEGRATING ORAL EVERY 30 MIN PRN
Status: DISCONTINUED | OUTPATIENT
Start: 2020-09-02 | End: 2020-09-03 | Stop reason: HOSPADM

## 2020-09-02 RX ORDER — CEFAZOLIN SODIUM 2 G/50ML
2 SOLUTION INTRAVENOUS
Status: DISCONTINUED | OUTPATIENT
Start: 2020-09-02 | End: 2020-09-02 | Stop reason: HOSPADM

## 2020-09-02 RX ORDER — KETAMINE HYDROCHLORIDE 10 MG/ML
INJECTION, SOLUTION INTRAMUSCULAR; INTRAVENOUS PRN
Status: DISCONTINUED | OUTPATIENT
Start: 2020-09-02 | End: 2020-09-02

## 2020-09-02 RX ORDER — LIDOCAINE HYDROCHLORIDE 20 MG/ML
INJECTION, SOLUTION INFILTRATION; PERINEURAL PRN
Status: DISCONTINUED | OUTPATIENT
Start: 2020-09-02 | End: 2020-09-02

## 2020-09-02 RX ORDER — SODIUM CHLORIDE, SODIUM LACTATE, POTASSIUM CHLORIDE, CALCIUM CHLORIDE 600; 310; 30; 20 MG/100ML; MG/100ML; MG/100ML; MG/100ML
INJECTION, SOLUTION INTRAVENOUS CONTINUOUS
Status: DISCONTINUED | OUTPATIENT
Start: 2020-09-02 | End: 2020-09-02 | Stop reason: HOSPADM

## 2020-09-02 RX ORDER — CEFAZOLIN SODIUM 1 G/3ML
INJECTION, POWDER, FOR SOLUTION INTRAMUSCULAR; INTRAVENOUS PRN
Status: DISCONTINUED | OUTPATIENT
Start: 2020-09-02 | End: 2020-09-02

## 2020-09-02 RX ORDER — ACETAMINOPHEN 325 MG/1
975 TABLET ORAL ONCE
Status: COMPLETED | OUTPATIENT
Start: 2020-09-02 | End: 2020-09-02

## 2020-09-02 RX ADMIN — Medication 100 MCG: at 13:40

## 2020-09-02 RX ADMIN — Medication 100 MCG: at 13:32

## 2020-09-02 RX ADMIN — SODIUM CHLORIDE, SODIUM LACTATE, POTASSIUM CHLORIDE, CALCIUM CHLORIDE: 600; 310; 30; 20 INJECTION, SOLUTION INTRAVENOUS at 11:40

## 2020-09-02 RX ADMIN — DEXAMETHASONE SODIUM PHOSPHATE 4 MG: 4 INJECTION, SOLUTION INTRA-ARTICULAR; INTRALESIONAL; INTRAMUSCULAR; INTRAVENOUS; SOFT TISSUE at 13:19

## 2020-09-02 RX ADMIN — SODIUM CHLORIDE, SODIUM LACTATE, POTASSIUM CHLORIDE, CALCIUM CHLORIDE: 600; 310; 30; 20 INJECTION, SOLUTION INTRAVENOUS at 13:10

## 2020-09-02 RX ADMIN — ACETAMINOPHEN 975 MG: 325 TABLET ORAL at 11:24

## 2020-09-02 RX ADMIN — LIDOCAINE HYDROCHLORIDE 80 MG: 20 INJECTION, SOLUTION INFILTRATION; PERINEURAL at 13:19

## 2020-09-02 RX ADMIN — KETAMINE HYDROCHLORIDE 15 MG: 10 INJECTION, SOLUTION INTRAMUSCULAR; INTRAVENOUS at 13:19

## 2020-09-02 RX ADMIN — PROPOFOL 50 MG: 10 INJECTION, EMULSION INTRAVENOUS at 13:26

## 2020-09-02 RX ADMIN — PROPOFOL 20 MG: 10 INJECTION, EMULSION INTRAVENOUS at 13:22

## 2020-09-02 RX ADMIN — CEFAZOLIN SODIUM 2 G: 1 INJECTION, POWDER, FOR SOLUTION INTRAMUSCULAR; INTRAVENOUS at 13:25

## 2020-09-02 RX ADMIN — GLYCOPYRROLATE 0.1 MG: 0.2 INJECTION, SOLUTION INTRAMUSCULAR; INTRAVENOUS at 13:13

## 2020-09-02 RX ADMIN — PROPOFOL 180 MG: 10 INJECTION, EMULSION INTRAVENOUS at 13:19

## 2020-09-02 RX ADMIN — ONDANSETRON 4 MG: 2 INJECTION INTRAMUSCULAR; INTRAVENOUS at 13:43

## 2020-09-02 RX ADMIN — KETAMINE HYDROCHLORIDE 15 MG: 10 INJECTION, SOLUTION INTRAMUSCULAR; INTRAVENOUS at 13:25

## 2020-09-02 RX ADMIN — PROPOFOL 50 MG: 10 INJECTION, EMULSION INTRAVENOUS at 13:30

## 2020-09-02 ASSESSMENT — MIFFLIN-ST. JEOR: SCORE: 1426.44

## 2020-09-02 NOTE — TELEPHONE ENCOUNTER
Chief Complaint : Post op - Bladder biopsy    Hx/Sx: Bladder cancer    Records/Orders: Pathology in process    Pt Contacted: N/a    At Rooming: Telephone    Chilango Morillo EMT

## 2020-09-02 NOTE — OP NOTE
OPERATIVE REPORT    PREOPERATIVE DIAGNOSIS: Non-muscle invasive bladder cancer    POSTOPERATIVE DIAGNOSIS: Same    PROCEDURES PERFORMED:   1. White and blue light cystourethroscopy following intravesical administration of hexaminolevulinate HCl (Cysview )  2. Transurethral resection of bladder tumor <2cm    STAFF SURGEON: Kirt Guzman MD    RESIDENT: Topher Sykes MD    ANESTHESIA: General    ESTIMATED BLOOD LOSS: 2 mL.     FINDINGS: 1cm papillary tumor on the LLW visible with white light and blue light. Sub-cm erythematous area visible just posterior to the right ureteral orifice visible with white light and blue light. Both areas resected.     SPECIMENS: Left lateral wall bladder tumor, right lateral wall bladder tumor    OPERATIVE INDICATIONS:   Tevin Guidry is a 70 year old male with history of HG Ta non-muscle invasive bladder cancer. He has previously undergone induction BCG. Recent in-office cystoscopy showed a few erythematous areas suspicious for recurrence. After a full explanation of the risks, benefits, and alternatives he elected to undergo a blue-light cystoscopy following intravesical administration of hexaminolevulinate HCl (Cysview ) for biopsy and maximal fulguration.    DESCRIPTION OF PROCEDURE:   After verification of informed consent was obtained, the patient was brought to the operating room, and moved to the operating table. After adequate anesthesia was induced, the patient was repositioned in dorsal lithotomy position and prepped and draped in the usual sterile fashion. A formal timeout was performed to confirm the correct patient, procedure and operative site. Sixty minutes prior to the start of the procedure, 50 ml of hexaminolevulinate HCl (Cysview ) solution were instilled through a one time catheterization with a 14 Fr straight catheter.    A 26-Urdu rigid resectoscope was inserted into a well lubricated urethra with the visual obturator. We began by performing a white light  cystourethroscopy. The urethra was unremarkable, the prostate was 3 cm with moderate lateral lobe hypertrophy. The ureteral orifices were in their orthotopic positions bilaterally. There was a 1cm papillary tumor visible on the LLW and another sub-cm area of suspicious erythema on the RLW just adjacent to the ureteral orifice.There were no intravesical stones or diverticuli and the bladder was mildly trabeculated.     We switched to the blue light which confirmed the aforementioned lesions. We then switched the resection loop and resected the LLW tumor and RLW area of erythema down to their base. The specimens were extracted and sent off for pathology. The resection loop was then used to fulgurate the base of the resection beds to obtain hemostasis. The bladder was examined under low pressure and hemostasis was noted to be excellent. The bladder was drained and the procedure concluded. The patient was awakened from general anesthesia and extubated. He was transferred to the postanesthesia care unit in stable condition and tolerated the procedure well.    POSTOP PLAN:  1. Discharge to home  2. Follow up in one week for pathology review    Topher Sykes MD  Urology Resident    Physician Attestation   I was present for the entire procedure between opening and closing, including all key portions of the operation.    Kirt Guzman MD  Urology  [facility]

## 2020-09-02 NOTE — DISCHARGE INSTRUCTIONS
[facility]  Home Care Following Anesthesia  For 24 hours after surgery:  1. Get plenty of rest.  A responsible adult must stay with you for at least 24 hours after you leave the surgery center.  2. Do not drive or use heavy equipment.  If you have weakness or tingling, don't drive or use heavy equipment until this feeling goes away.   3. Do not drink alcohol.   4. Avoid strenuous or risky activities.  Ask for help when climbing stairs.  5. You may feel lightheaded.  IF so, sit for a few minutes before standing.  Have someone help you get up.   6. If you have nausea (feel sick to your stomach): Drink only clear liquids such as apple juice, ginger ale, broth or 7-Up.  Rest may also help.  Be sure to drink enough fluids.  Move to a regular diet as you feel able.   7. You may have a slight fever.  Call the doctor if your fever is over 100 F (37.7 C) (taken under the tongue) or lasts longer than 24 hours.  8. You may have a dry mouth, a sore throat, muscle aches or trouble sleeping. These should go away after 24 hours.  9. Do not make important or legal decisions.               Tips for taking pain medications  To get the best pain relief possible, remember these points:    Take pain medications as directed, before pain becomes severe.    Pain medication can upset your stomach: taking it with food may help.    Constipation is a common side effect of pain medication. Drink plenty of  fluids.    Eat foods high in fiber. Take a stool softener if recommended by your doctor or pharmacist.    Do not drink alcohol, drive or operate machinery while taking pain medications.    Ask about other ways to control pain, such as with heat, ice or relaxation.    Tylenol/Acetaminophen Consumption  To help encourage the safe use of acetaminophen, the makers of TYLENOL  have lowered the maximum daily dose for single-ingredient Extra Strength TYLENOL  (acetaminophen) products sold in the [place] from 8  pills per day (4,000 mg) to 6 pills per day (3,000 mg). The dosing interval has also changed from 2 pills every 4-6 hours to 2 pills every 6 hours.    If you feel your pain relief is insufficient, you may take Tylenol/Acetaminophen in addition to your narcotic pain medication.     Be careful not to exceed 3,000 mg of Tylenol/Acetaminophen in a 24 hour period from all sources.    If you are taking extra strength Tylenol/acetaminophen (500 mg), the maximum dose is 6 tablets in 24 hours.    If you are taking regular strength acetaminophen (325 mg), the maximum dose is 9 tablets in 24 hours.    975 mg of tylenol was given at 11:25am, please wait until 5 pm before taking another dose, then follow bottle instructions.      Call a doctor for any of the followin. Signs of infection (fever, growing tenderness at the surgery site, a large amount of drainage or bleeding, severe pain, foul-smelling drainage, redness, swelling).  2. It has been over 8 to 10 hours since surgery and you are still not able to urinate (pass water).  3. Headache for over 24 hours.  4. Numbness, tingling or weakness the day after surgery (if you had spinal anesthesia).  5. Signs of Covid-19 infection (temperature over 100 degrees, shortness of breath, cough, loss of taste/smell, generalized body aches, persistent headache, chills, sore throat, nausea/vomiting/diarrhea)    Your doctor is:       Dr. Kirt Guzman, Prostate and Urology: [phone]               After hours dial [phone] and ask for the resident on call for:  Prostate Urology  For emergency care, call the:  [facility]:  [phone] (TTY for hearing impaired: [phone])

## 2020-09-02 NOTE — ANESTHESIA POSTPROCEDURE EVALUATION
Anesthesia POST Procedure Evaluation    Patient: Tevin Guidry   MRN:     [id number] Gender:   male   Age:    70 year old :      1950        Preoperative Diagnosis: Malignant neoplasm of overlapping sites of bladder (H) [C67.8]   Procedure(s):  CYSTOSCOPY, WITH INSTILLATION OF OPTICAL IMAGING AGENT INTO BLADDER AND BIOPSY OF BLADDER (CYSVIEW)   Postop Comments: No value filed.     Anesthesia Type: General       Disposition: Outpatient   Postop Pain Control: Uneventful            Sign Out: Well controlled pain   PONV: No   Neuro/Psych: Uneventful            Sign Out: Acceptable/Baseline neuro status   Airway/Respiratory: Uneventful            Sign Out: Acceptable/Baseline resp. status   CV/Hemodynamics: Uneventful            Sign Out: Acceptable CV status   Other NRE: NONE   DID A NON-ROUTINE EVENT OCCUR? No         Last Anesthesia Record Vitals:  CRNA VITALS  2020 1331 - 2020 1423      2020             Pulse:  (!) 47    SpO2:  99 %    Resp Rate (observed):  12          Last PACU Vitals:  Vitals Value Taken Time   /71 2020  2:05 PM   Temp 36.3  C (97.3  F) 2020  2:05 PM   Pulse 52 2020  2:14 PM   Resp 15 2020  2:14 PM   SpO2 99 % 2020  2:14 PM   Temp src     NIBP     Pulse     SpO2     Resp     Temp     Ht Rate     Temp 2     Vitals shown include unvalidated device data.      Electronically Signed By: Ilan Jacobsen MD, MD, 2020, 2:23 PM

## 2020-09-03 ENCOUNTER — PATIENT OUTREACH (OUTPATIENT)
Dept: UROLOGY | Facility: CLINIC | Age: 70
End: 2020-09-03

## 2020-09-03 LAB — COPATH REPORT: NORMAL

## 2020-09-09 ENCOUNTER — VIRTUAL VISIT (OUTPATIENT)
Dept: UROLOGY | Facility: CLINIC | Age: 70
End: 2020-09-09
Payer: COMMERCIAL

## 2020-09-09 DIAGNOSIS — C67.8 MALIGNANT NEOPLASM OF OVERLAPPING SITES OF BLADDER (H): Primary | ICD-10-CM

## 2020-09-09 DIAGNOSIS — C67.2 MALIGNANT NEOPLASM OF LATERAL WALL OF URINARY BLADDER (H): ICD-10-CM

## 2020-09-09 DIAGNOSIS — Z85.51 PERSONAL HISTORY OF MALIGNANT NEOPLASM OF BLADDER: ICD-10-CM

## 2020-09-09 RX ORDER — LIDOCAINE HYDROCHLORIDE 20 MG/ML
10 JELLY TOPICAL
Status: CANCELLED | OUTPATIENT
Start: 2020-10-01

## 2020-09-09 RX ORDER — NICOTINE 21 MG/24H
PATCH, EXTENDED RELEASE TRANSDERMAL
COMMUNITY
Start: 2020-08-12 | End: 2020-12-11

## 2020-09-09 RX ORDER — LIDOCAINE HYDROCHLORIDE 20 MG/ML
10 JELLY TOPICAL
Status: CANCELLED | OUTPATIENT
Start: 2020-10-08

## 2020-09-09 RX ORDER — LIDOCAINE HYDROCHLORIDE 20 MG/ML
10 JELLY TOPICAL
Status: CANCELLED | OUTPATIENT
Start: 2020-10-15

## 2020-09-09 NOTE — PROGRESS NOTES
"Tevin Guidry is a 70 year old male who is being evaluated via a billable telephone visit.      The patient has been notified of following:     \"This telephone visit will be conducted via a call between you and your physician/provider. We have found that certain health care needs can be provided without the need for a physical exam.  This service lets us provide the care you need with a short phone conversation.  If a prescription is necessary we can send it directly to your pharmacy.  If lab work is needed we can place an order for that and you can then stop by our lab to have the test done at a later time.    Telephone visits are billed at different rates depending on your insurance coverage. During this emergency period, for some insurers they may be billed the same as an in-person visit.  Please reach out to your insurance provider with any questions.    If during the course of the call the physician/provider feels a telephone visit is not appropriate, you will not be charged for this service.\"    Patient has given verbal consent for Telephone visit?  Yes     What phone number would you like to be contacted at? [phone]      How would you like to obtain your AVS? MyChart    Chief Complaint   It was my pleasure to speak with Tevin Guidry who is a 70 year old male for follow-up of bladder cancer.      HPI  Tevin Guidry is a very pleasant 70 year old male who presents with a history of bladder cancer. 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 He had recurrence in 2/2020 and underwent repeat induction course of BCG. 2nd BCG induction completed 4/2020. Recurrence noted in 7/2020 and TURBT 9/2020 with low-grade disease. No issues since biopsy. No ongoing hematuria.     TURBT 9/2/2020  FINAL DIAGNOSIS:   A. 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 Right lateral wall:   - Dysplastic urothelium, " consistent with incipient non invasive low grade papillary urothelial carcinoma     I have reviewed and updated the patient's Past Medical History, Social History, Family History and Medication List.    ALLERGIES  Codeine    ASSESSMENT and PLAN  70 year old male with recurrent bladder tumor. Has previously undergone BCG induction x 2. While office biopsy suggested possible HG disease, formal TURBT with only low-grade pathology. Given the nature of these lesions, I am inclined to classify this as overall a low-grade recurrence, and given size of lesions, may have represented incomplete previous TUR. Given this current recurrence is a very small-volume low grade recurrence, would be reasonable to continue maintenance BCG for now. We did discuss possible intravesical chemotherapy, but given small volume of this LG recurrence, will continue BCG maintenance for now pending another recurrence. He is in agreement with this plan. Will start maintenance BCG in a couple of weeks and plan for cystoscopy in 3 months.     - schedule for maintenance BCG  - office visit in 3 months for cysto    Phone call duration: 12 minutes    Kirt Guzman MD  Urology  [facility]

## 2020-09-18 ENCOUNTER — TELEPHONE (OUTPATIENT)
Dept: UROLOGY | Facility: CLINIC | Age: 70
End: 2020-09-18

## 2020-09-18 NOTE — TELEPHONE ENCOUNTER
----- Message from Carissa Barnes RN sent at 9/18/2020  1:55 PM CDT -----  Alfred Cash,     Looks like he's following with Dr. Guzman now which is really more appropriate anyway. Can you please call the patient and let him know he doesn't need the cystoscopy with Dr. Sunshine? .     I have another bailey that needs to go in that spot. His name is Gómez Winston  MR [id number]. Can you please call him and let him know. Please let me know if he takes it.  I have a whole list of people that need cystos.    Thanks so much!  Carissa  ----- Message -----  From: Shreya Onofre  Sent: 9/18/2020   1:22 PM CDT  To: Carissa Barnes RN, EVELYN Fisher,     This bailey is on the reschedule report as an overbook for [facility] on 10/13 for a cysto but it looks like he is now seeing Dr. Guzman?     Should he be rescheduled to [facility] or is it okay to leave as is?    Thank you,     Shreya Onofre  Urology Clinic Coordinator  [facility]

## 2020-09-22 DIAGNOSIS — Z20.822 ENCOUNTER FOR LABORATORY TESTING FOR COVID-19 VIRUS: Primary | ICD-10-CM

## 2020-09-24 DIAGNOSIS — Z20.822 ENCOUNTER FOR LABORATORY TESTING FOR COVID-19 VIRUS: ICD-10-CM

## 2020-09-24 LAB
SARS-COV-2 RNA SPEC QL NAA+PROBE: NOT DETECTED
SPECIMEN SOURCE: NORMAL

## 2020-10-01 ENCOUNTER — INFUSION THERAPY VISIT (OUTPATIENT)
Dept: ONCOLOGY | Facility: CLINIC | Age: 70
End: 2020-10-01
Attending: UROLOGY
Payer: COMMERCIAL

## 2020-10-01 ENCOUNTER — PATIENT OUTREACH (OUTPATIENT)
Dept: GERIATRIC MEDICINE | Facility: CLINIC | Age: 70
End: 2020-10-01

## 2020-10-01 VITALS
DIASTOLIC BLOOD PRESSURE: 86 MMHG | RESPIRATION RATE: 16 BRPM | HEART RATE: 92 BPM | TEMPERATURE: 97.6 F | SYSTOLIC BLOOD PRESSURE: 130 MMHG | OXYGEN SATURATION: 99 %

## 2020-10-01 DIAGNOSIS — C67.8 MALIGNANT NEOPLASM OF OVERLAPPING SITES OF BLADDER (H): ICD-10-CM

## 2020-10-01 DIAGNOSIS — C67.2 MALIGNANT NEOPLASM OF LATERAL WALL OF URINARY BLADDER (H): Primary | ICD-10-CM

## 2020-10-01 DIAGNOSIS — Z85.51 PERSONAL HISTORY OF MALIGNANT NEOPLASM OF BLADDER: ICD-10-CM

## 2020-10-01 LAB
ALBUMIN UR-MCNC: NEGATIVE MG/DL
APPEARANCE UR: ABNORMAL
BILIRUB UR QL STRIP: NEGATIVE
COLOR UR AUTO: YELLOW
GLUCOSE UR STRIP-MCNC: NEGATIVE MG/DL
HGB UR QL STRIP: NEGATIVE
KETONES UR STRIP-MCNC: NEGATIVE MG/DL
LEUKOCYTE ESTERASE UR QL STRIP: ABNORMAL
NITRATE UR QL: NEGATIVE
PH UR STRIP: 6 PH (ref 5–7)
SOURCE: ABNORMAL
SP GR UR STRIP: 1.02 (ref 1–1.03)
UROBILINOGEN UR STRIP-MCNC: 2 MG/DL (ref 0–2)

## 2020-10-01 PROCEDURE — 81003 URINALYSIS AUTO W/O SCOPE: CPT | Performed by: PATHOLOGY

## 2020-10-01 PROCEDURE — 250N000009 HC RX 250: Performed by: UROLOGY

## 2020-10-01 PROCEDURE — 250N000011 HC RX IP 250 OP 636: Performed by: UROLOGY

## 2020-10-01 PROCEDURE — 51720 TREATMENT OF BLADDER LESION: CPT

## 2020-10-01 RX ORDER — LIDOCAINE HYDROCHLORIDE 20 MG/ML
10 JELLY TOPICAL
Status: DISCONTINUED | OUTPATIENT
Start: 2020-10-01 | End: 2020-10-01 | Stop reason: HOSPADM

## 2020-10-01 RX ORDER — LEVOFLOXACIN 500 MG/1
TABLET, FILM COATED ORAL
Qty: 6 TABLET | Refills: 6 | Status: SHIPPED | OUTPATIENT
Start: 2020-10-01 | End: 2020-12-11

## 2020-10-01 RX ADMIN — BACILLUS CALMETTE-GUERIN 50 MG: 50 POWDER, FOR SUSPENSION INTRAVESICAL at 13:49

## 2020-10-01 RX ADMIN — LIDOCAINE HYDROCHLORIDE 10 ML: 20 JELLY TOPICAL at 13:12

## 2020-10-01 ASSESSMENT — ACTIVITIES OF DAILY LIVING (ADL): DEPENDENT_IADLS:: INDEPENDENT

## 2020-10-01 ASSESSMENT — PAIN SCALES - GENERAL: PAINLEVEL: NO PAIN (0)

## 2020-10-01 NOTE — PATIENT INSTRUCTIONS
Home discharge instructions:  -To make sure each treatment has the best chance of working, follow these steps 2 hours after each treatment                        1. Lie on your back for 15 minues                        2. Lie on your left side for 15 mintues                        3. Lie on your right side for 15 minutes                        4. Get up but keep the medication  In your bladder for 60 more minutes- for a total of 2 hours                        5. Empty your bladder following the directions below (if you have difficulty holding your bladder it is ok to empty your bladder early)  -Wear pad if incontinence is a possibliity  -Wash hands throughly after using the bathroom  -For safety reasons remember to follow these directions for 6 hours after each treatment:                        1. Sit on the toilet seat to urinate                        2. Immediately after urinating- add 2 cups of undiluted chlorine bleach to the toilet                         3. Close lid and let the bleach sit for 15 minutes each time                        4. Drink plenty of water to flush any remaining medication out of your bladder     **These steps will help kill all the BCG bacteria and disinfect the toilet**     Please void BCG at 3:55 pm     Take your antibiotic today at 7:55 pm today and tomorrow morning.        Please call urology triage line at [phone] or [phone] with fevers or chills, burning with urination, or blood in your urine which lasts more than a 48-72 hours or with any question or concerns.

## 2020-10-01 NOTE — PROGRESS NOTES
[facility] Care Coordination Contact    [facility] Home Visit Assessment     Home visit for Health Risk Assessment with Tevin Guidry completed on October 1, 2020    Type of residence:: Apartment - handicap accessible  Current living arrangement:: I live alone          Current Care Plan  Member currently receiving the following home care services:     Member currently receiving the following community resources: None      Medication Review  Medication reconciliation completed in Epic: Yes  Medication set-up & administration: Independent-does not set up.  Self-administers medications.  Medication Risk Assessment Medication (1 or more, place referral to MTM): N/A: No risk factors identified  MTM Referral Placed: No: No risk factors idenified    Mental/Behavioral Health   Depression Screening:           Mental health DX:: No        Falls Assessment:   Fallen 2 or more times in the past year?: No   Any fall with injury in the past year?: No    ADL/IADL Dependencies:   Dependent ADLs:: Independent  Dependent IADLs:: Independent    [facility] Health Plan sponsored benefits: Shared information re: Silver Sneakers/gym memberships, ASA, Calcium +D.    PCA Assessment completed at visit: No     Elderly Waiver Eligibility: No-does not meet criteria    Care Plan & Recommendations: Member is well connected at [facility] member clinics for follow up of bladder CA. Reports being happy with his care there. Is independent in all ADLs and IADLs.  Drives. Reports cooking little and eating out at fast food restaurants frequently. Walks outside daily.Will follow up on a hearing eval to see about hearing aids. Denies pain and reports that he is happy with his life.    See CC for detailed assessment information.    Follow-Up Plan: Member informed of future contact, plan to f/u with member with a 6 month telephone assessment.  Contact information shared with member and family, encouraged member to call with any questions or concerns at  any time.    [place] care continuum providers: Please refer to Health Care Home on the Epic Problem List to view this patient's [facility] Care Plan Summary.    Lovely Medina RN  [facility]   [phone]

## 2020-10-01 NOTE — PROGRESS NOTES
Urgency but better, no blood  Infusion Nursing Note:  Tevin Guidry presents today for BCG.  Instillation number 1 of 3.   Patient seen by provider today: No   present during visit today: Not Applicable.    Note: Pt arrives feeling well, denies any fevers, chills, cough, SOB, pain or burning with urination, or hematuria. He states he does still have some urgency but it is the best it has been for him in a long time. He reports being able to hold his urine for 2 hours at home with previous treatments. He will  his Levaquin on his way out today and understands how to take it.     No visible blood in urine today.    Treatment Conditions:   Patient states no concerns or issues at this time.  Ok to proceed with treatment.     Labs Reviewed:  Urine analysis.  UA RESULTS:  Recent Labs   Lab Test 10/01/20  1230 08/26/20  1530 07/03/19  1155 07/03/19  1155   COLOR Yellow Yellow   < > Brown   APPEARANCE Slightly Cloudy Clear   < > Slightly Cloudy   URINEGLC Negative Negative   < > Negative   URINEBILI Negative Negative   < > Small*   URINEKETONE Negative 5*   < > Trace*   SG 1.020 1.023   < > >1.030   UBLD Negative Negative   < > Large*   URINEPH 6.0 5.0   < > 6.0   PROTEIN Negative Negative   < > 30*   UROBILINOGEN  --   --   --  1.0   NITRITE Negative Negative   < > Negative   LEUKEST Small* Negative   < > Negative   RBCU  --  2   < > >100*   WBCU  --  1   < > 0 - 5    < > = values in this interval not displayed.     .    Procedure:  16F Coude catheter placed.  Lidocaine urojet 2% 10ml used.  Catheter placed without trauma.  Clear/yellow urine drained from bladder.    Patient turned every 15 minutes per protocol: Yes, turning to be completed at home per protocol.   Patient tolerated instillation without incident.    Patient instructed on the following and verbalized understanding:   Medication to remain in the bladder for 2 hours post instillation: YES  Post instillation side effects and precautions  discussed: YES  Levaquin dose to be taken 6 hours post instillation and tomorrow morning: YES    Discharge Plan:   Prescription refills given for Levaquin.  Discharge instructions reviewed with: Patient.  Patient and/or family verbalized understanding of discharge instructions and all questions answered.  Copy of AVS reviewed with patient and/or family.  Patient will return 10/8/20 for next appointment.  Patient discharged in stable condition accompanied by: self.  Departure Mode: Ambulatory.    Mei Castillo RN

## 2020-10-05 ENCOUNTER — PATIENT OUTREACH (OUTPATIENT)
Dept: GERIATRIC MEDICINE | Facility: CLINIC | Age: 70
End: 2020-10-05

## 2020-10-05 NOTE — PROGRESS NOTES
[facility] Care Coordination Contact    Received after visit chart from care coordinator.  Completed following tasks: Mailed copy of care plan to client and mailed POC sig sheet w/HERMELINDO.   Rosaura Karimi  Case Management Specialist  [facility]  [phone]

## 2020-10-05 NOTE — LETTER
October 5, 2020      TEVIN LYONS   [address] [id number]  [place] [address]      Dear Tevin:    At [facility], we are dedicated to improving your health and well-being. Enclosed is the Comprehensive Care Plan that we developed with you on 10/1/2020. Please review the Care Plan carefully.    As a reminder, some of the things we discussed at your visit include:    Your physical and mental health    Ways to reduce falls    Health care needs you may have    Don t forget to contact your care coordinator if you:    Have been hospitalized or plan to be hospitalized     Have had a fall     Have experienced a change in physical health    Are experiencing emotional problems     If you do not agree with your Care Plan, have questions about it, or have experienced a change in your needs, please call me at [phone]. If you are hearing impaired, please call the [place] [employer] at 831 or [phone] (gfmlxh-zn-wxgvnc relay service).    Sincerely,    Lovely Medina RN    E-mail: [email or web address]  Phone: [phone]      [facility] ([facility]) is a health plan that contracts with both Medicare and the [place] Medical Assistance (Medicaid) program to provide benefits of both programs to enrollees. Enrollment in [facility] depends on contract renewal.    [id number])     C1157S (11/18)

## 2020-10-08 ENCOUNTER — INFUSION THERAPY VISIT (OUTPATIENT)
Dept: ONCOLOGY | Facility: CLINIC | Age: 70
End: 2020-10-08
Attending: UROLOGY
Payer: COMMERCIAL

## 2020-10-08 VITALS
DIASTOLIC BLOOD PRESSURE: 81 MMHG | SYSTOLIC BLOOD PRESSURE: 122 MMHG | HEART RATE: 55 BPM | RESPIRATION RATE: 16 BRPM | OXYGEN SATURATION: 98 % | TEMPERATURE: 97.4 F

## 2020-10-08 DIAGNOSIS — C67.2 MALIGNANT NEOPLASM OF LATERAL WALL OF URINARY BLADDER (H): Primary | ICD-10-CM

## 2020-10-08 DIAGNOSIS — C67.8 MALIGNANT NEOPLASM OF OVERLAPPING SITES OF BLADDER (H): ICD-10-CM

## 2020-10-08 DIAGNOSIS — Z85.51 PERSONAL HISTORY OF MALIGNANT NEOPLASM OF BLADDER: ICD-10-CM

## 2020-10-08 LAB
ALBUMIN UR-MCNC: 10 MG/DL
APPEARANCE UR: CLEAR
BILIRUB UR QL STRIP: NEGATIVE
COLOR UR AUTO: YELLOW
GLUCOSE UR STRIP-MCNC: NEGATIVE MG/DL
HGB UR QL STRIP: NEGATIVE
KETONES UR STRIP-MCNC: NEGATIVE MG/DL
LEUKOCYTE ESTERASE UR QL STRIP: ABNORMAL
NITRATE UR QL: NEGATIVE
PH UR STRIP: 6.5 PH (ref 5–7)
SOURCE: ABNORMAL
SP GR UR STRIP: 1.01 (ref 1–1.03)
UROBILINOGEN UR STRIP-MCNC: 0.2 MG/DL (ref 0–2)

## 2020-10-08 PROCEDURE — 250N000009 HC RX 250: Performed by: UROLOGY

## 2020-10-08 PROCEDURE — 250N000011 HC RX IP 250 OP 636: Performed by: UROLOGY

## 2020-10-08 PROCEDURE — 51720 TREATMENT OF BLADDER LESION: CPT

## 2020-10-08 PROCEDURE — 99207 PR NO CHARGE LOS: CPT

## 2020-10-08 RX ORDER — LIDOCAINE HYDROCHLORIDE 20 MG/ML
10 JELLY TOPICAL
Status: DISCONTINUED | OUTPATIENT
Start: 2020-10-08 | End: 2020-10-08 | Stop reason: HOSPADM

## 2020-10-08 RX ADMIN — BACILLUS CALMETTE-GUERIN 50 MG: 50 POWDER, FOR SUSPENSION INTRAVESICAL at 13:37

## 2020-10-08 RX ADMIN — LIDOCAINE HYDROCHLORIDE 10 ML: 20 JELLY TOPICAL at 13:08

## 2020-10-08 ASSESSMENT — PAIN SCALES - GENERAL: PAINLEVEL: NO PAIN (0)

## 2020-10-08 NOTE — PATIENT INSTRUCTIONS
Home discharge instructions:  -To make sure each treatment has the best chance of working, follow these steps 2 hours after each treatment   1. Lie on your back for 15 minues   2. Lie on your left side for 15 mintues   3. Lie on your right side for 15 minutes   4. Get up but keep the medication  In your bladder for 60 more minutes- for a total of 2 hours   5. Empty your bladder following the directions below (if you have difficulty holding your bladder it is ok to empty your bladder early)  -Wear pad if incontinence is a possibliity  -Wash hands throughly after using the bathroom  -For safety reasons remember to follow these directions for 6 hours after each treatment:  (for 6 hours after your empty the BCG from your bladder)   1. Sit on the toilet seat to urinate   2. Immediately after urinating- add 2 cups of undiluted chlorine bleach to the toilet    3. Close lid and let the bleach sit for 15 minutes each time   4. Drink plenty of water to flush any remaining medication out of your bladder    **These steps will help kill all the BCG bacteria and disinfect the toilet**    Please void BCG at 3:40pm    Take your antibiotic today at 7:40pm and tomorrow morning.      Please call urology triage line at [phone] or [phone] with fevers or chills, burning with urination, or blood in your urine which lasts more than a 48-72 hours or with any question or concerns.      October 2020 Sunday Monday Tuesday Wednesday Thursday Friday Saturday                       1    [facility] ONC INFUSION 120  12:30 PM   (120 min.)    ONCOLOGY INFUSION   [facility] 2     3       4     5     6     7     8    [facility] ONC INFUSION 120  12:00 PM   (120 min.)    ONCOLOGY INFUSION   [facility] 9     10       11     12     13     14     15    [facility] ONC INFUSION 120  12:00 PM   (120 min.)    ONCOLOGY INFUSION   [facility] 16     17       18     19      20     21     22     23     24       25     26     27     28     29     30     31                 November 2020 Sunday Monday Tuesday Wednesday Thursday Friday Saturday   1     2     3     4     5     6     7       8     9     10     11     12     13     14       15     16     17     18     19     20     21       22     23     24     25     26     27     28       29     30

## 2020-10-08 NOTE — PROGRESS NOTES
"Infusion Nursing Note:  Tevin Guidry presents today for BCG bladder instillation maintenance.  Instillation number 2 of 3.   Patient seen by provider today: No   present during visit today: Not Applicable.    Note: pt presents to infusion room today feeling well with no new complaints. Pt reports being able to hold BCG in bladder for about 90 minutes post last treatment without incident. Pt states some mild \"irritation\" with urination for several hours post last treatment, but denies any urgency, frequency, or blood. Pt denies fever, chills, dysuria, increased urgency or frequency, or gross hematuria today. No visible blood in today's urine specimen. Pt states having adequate supply of Levaquin at home.     Treatment Conditions:   Patient states no concerns or issues at this time.  Ok to proceed with treatment.     Labs Reviewed:  Urine analysis.  Results meet treatment conditions.   UA RESULTS:  Lab Test 10/08/20  1205   COLOR Yellow   APPEARANCE Clear   URINEGLC Negative   URINEBILI Negative   URINEKETONE Negative   SG 1.015   UBLD Negative   URINEPH 6.5   PROTEIN 10*   UROBILINOGEN  --    NITRITE Negative   LEUKEST Small*   RBCU  --    WBCU  --        Pre-procedure Assessment:  Dysuria:  No  Hematuria:  No  Fever/chills:  No  Increased urinary urgency:  No  Increased urinary frequency:  No    Lidocaine urojet 2% 10 ml used: YES  Catheter placed using sterile technique: 16 Georgian coude Echavarria    Procedure:  Catheter placed without trauma.  Clear/yellow urine drained from bladder.    Patient turned every 15 minutes per protocol: Yes, turning to be completed at home per protocol.     Post-procedure Assessment:  Patient tolerated bladder instillation without incident.  Echavarria discontinued intact.     Patient instructed on the following and verbalized understanding:   Medication to remain in the bladder for 2 hours post instillation: YES  Post instillation side effects and precautions discussed: " YES    Discharge Plan:   Patient declined prescription refills.  Copy of AVS reviewed with patient and/or family.  Patient will return 10/15 for next appointment.  Patient discharged in stable condition accompanied by: self.  Departure Mode: Ambulatory.    DECLAN SWARTZ RN

## 2020-10-15 ENCOUNTER — INFUSION THERAPY VISIT (OUTPATIENT)
Dept: ONCOLOGY | Facility: CLINIC | Age: 70
End: 2020-10-15
Attending: UROLOGY
Payer: COMMERCIAL

## 2020-10-15 VITALS
HEART RATE: 69 BPM | OXYGEN SATURATION: 97 % | SYSTOLIC BLOOD PRESSURE: 120 MMHG | RESPIRATION RATE: 16 BRPM | DIASTOLIC BLOOD PRESSURE: 65 MMHG | TEMPERATURE: 97.7 F

## 2020-10-15 DIAGNOSIS — C67.2 MALIGNANT NEOPLASM OF LATERAL WALL OF URINARY BLADDER (H): Primary | ICD-10-CM

## 2020-10-15 DIAGNOSIS — C67.8 MALIGNANT NEOPLASM OF OVERLAPPING SITES OF BLADDER (H): ICD-10-CM

## 2020-10-15 DIAGNOSIS — Z85.51 PERSONAL HISTORY OF MALIGNANT NEOPLASM OF BLADDER: ICD-10-CM

## 2020-10-15 LAB
ALBUMIN UR-MCNC: NEGATIVE MG/DL
APPEARANCE UR: CLEAR
BILIRUB UR QL STRIP: NEGATIVE
COLOR UR AUTO: NORMAL
GLUCOSE UR STRIP-MCNC: NEGATIVE MG/DL
HGB UR QL STRIP: NEGATIVE
KETONES UR STRIP-MCNC: NEGATIVE MG/DL
LEUKOCYTE ESTERASE UR QL STRIP: NEGATIVE
NITRATE UR QL: NEGATIVE
PH UR STRIP: 5 PH (ref 5–7)
SOURCE: NORMAL
SP GR UR STRIP: 1 (ref 1–1.03)
UROBILINOGEN UR STRIP-MCNC: 0 MG/DL (ref 0–2)

## 2020-10-15 PROCEDURE — 250N000011 HC RX IP 250 OP 636: Performed by: UROLOGY

## 2020-10-15 PROCEDURE — 250N000009 HC RX 250: Performed by: UROLOGY

## 2020-10-15 PROCEDURE — 88120 CYTP URNE 3-5 PROBES EA SPEC: CPT | Mod: 26 | Performed by: PATHOLOGY

## 2020-10-15 PROCEDURE — 88120 CYTP URNE 3-5 PROBES EA SPEC: CPT | Mod: TC | Performed by: UROLOGY

## 2020-10-15 PROCEDURE — 51720 TREATMENT OF BLADDER LESION: CPT

## 2020-10-15 PROCEDURE — 88112 CYTOPATH CELL ENHANCE TECH: CPT | Mod: TC | Performed by: UROLOGY

## 2020-10-15 PROCEDURE — 88112 CYTOPATH CELL ENHANCE TECH: CPT | Mod: 26 | Performed by: PATHOLOGY

## 2020-10-15 PROCEDURE — 99207 PR NO BILLABLE SERVICE THIS VISIT: CPT

## 2020-10-15 RX ORDER — LIDOCAINE HYDROCHLORIDE 20 MG/ML
10 JELLY TOPICAL
Status: DISCONTINUED | OUTPATIENT
Start: 2020-10-15 | End: 2020-10-15 | Stop reason: HOSPADM

## 2020-10-15 RX ADMIN — BACILLUS CALMETTE-GUERIN 50 MG: 50 POWDER, FOR SUSPENSION INTRAVESICAL at 13:43

## 2020-10-15 RX ADMIN — LIDOCAINE HYDROCHLORIDE 10 ML: 20 JELLY TOPICAL at 13:24

## 2020-10-15 ASSESSMENT — PAIN SCALES - GENERAL: PAINLEVEL: NO PAIN (0)

## 2020-10-15 NOTE — PATIENT INSTRUCTIONS
Home discharge instructions:  -To make sure each treatment has the best chance of working, follow these steps 2 hours after each treatment                        1. Lie on your back for 15 minues                        2. Lie on your left side for 15 mintues                        3. Lie on your right side for 15 minutes                        4. Get up but keep the medication  In your bladder for 60 more minutes- for a total of 2 hours                        5. Empty your bladder following the directions below (if you have difficulty holding your bladder it is ok to empty your bladder early)  -Wear pad if incontinence is a possibliity  -Wash hands throughly after using the bathroom  -For safety reasons remember to follow these directions for 6 hours after each treatment:                        1. Sit on the toilet seat to urinate                        2. Immediately after urinating- add 2 cups of undiluted chlorine bleach to the toilet                         3. Close lid and let the bleach sit for 15 minutes each time                        4. Drink plenty of water to flush any remaining medication out of your bladder     **These steps will help kill all the BCG bacteria and disinfect the toilet**     Please void BCG at 1550     Take your antibiotic today at 7:50 pm and tomorrow morning.        Please call urology triage line at [phone] or [phone] with fevers or chills, burning with urination, or blood in your urine which lasts more than a 48-72 hours or with any question or concerns.

## 2020-10-15 NOTE — PROGRESS NOTES
some stinging with urination day of no blood, frequency, chills. Held one hour 50 minutes  Infusion Nursing Note:  Tevin Guidry presents today for BCG.  Instillation number 3 of 3.   Patient seen by provider today: No   present during visit today: Not Applicable.    Note:  Patient arrives to infusion feeling well. Following treatment last week, he had some irritation that only lasted the day of. Patient was able to hold the BCG for 1 hour 50 minutes. Denies dysuria, increased urgency, increased frequency, hematuria, fever, or chills today. Patient took Levaquin as directed.     No visible blood in patient's urine sample today. Pt reports having an adequate supply of Levaquin for today and next treatment.       Treatment Conditions:   Patient states no concerns or issues at this time.  Ok to proceed with treatment.     Labs Reviewed:  Urine analysis.  UA RESULTS:  Recent Labs   Lab Test 10/15/20  1230 08/26/20  1530 08/26/20  1530 07/03/19  1155 07/03/19  1155   COLOR Straw   < > Yellow   < > Brown   APPEARANCE Clear   < > Clear   < > Slightly Cloudy   URINEGLC Negative   < > Negative   < > Negative   URINEBILI Negative   < > Negative   < > Small*   URINEKETONE Negative   < > 5*   < > Trace*   SG 1.004   < > 1.023   < > >1.030   UBLD Negative   < > Negative   < > Large*   URINEPH 5.0   < > 5.0   < > 6.0   PROTEIN Negative   < > Negative   < > 30*   UROBILINOGEN  --   --   --   --  1.0   NITRITE Negative   < > Negative   < > Negative   LEUKEST Negative   < > Negative   < > Negative   RBCU  --   --  2   < > >100*   WBCU  --   --  1   < > 0 - 5    < > = values in this interval not displayed.       Procedure:  16F catheter placed.  Lidocaine urojet 2% 10ml used.  Catheter placed without trauma.  Clear/yellow urine drained from bladder.    Patient turned every 15 minutes per protocol: Yes, turning to be completed at home per protocol.   Patient tolerated instillation without incident.  Catheter removed  intact     Patient instructed on the following and verbalized understanding:   Medication to remain in the bladder for 2 hours post instillation: YES  Post instillation side effects and precautions discussed: YES  Levaquin dose to be taken 6 hours post instillation and tomorrow morning: YES    Discharge Plan:   Patient declined prescription refills.  Discharge instructions reviewed with: Patient.  Patient and/or family verbalized understanding of discharge instructions and all questions answered.  Copy of AVS reviewed with patient and/or family.  Patient discharged in stable condition accompanied by: self.  Departure Mode: Ambulatory.    Mei Castillo RN

## 2020-10-28 ENCOUNTER — OFFICE VISIT (OUTPATIENT)
Dept: OPHTHALMOLOGY | Facility: CLINIC | Age: 70
End: 2020-10-28
Attending: OPHTHALMOLOGY
Payer: COMMERCIAL

## 2020-10-28 DIAGNOSIS — H04.123 DRY EYES, BILATERAL: ICD-10-CM

## 2020-10-28 DIAGNOSIS — H26.9 TOTAL CATARACT OF RIGHT EYE: ICD-10-CM

## 2020-10-28 DIAGNOSIS — H25.813 COMBINED FORM OF AGE-RELATED CATARACT, BOTH EYES: Primary | ICD-10-CM

## 2020-10-28 DIAGNOSIS — H52.4 PRESBYOPIA: ICD-10-CM

## 2020-10-28 LAB — COPATH REPORT: NORMAL

## 2020-10-28 PROCEDURE — G0463 HOSPITAL OUTPT CLINIC VISIT: HCPCS

## 2020-10-28 PROCEDURE — 99204 OFFICE O/P NEW MOD 45 MIN: CPT | Performed by: OPHTHALMOLOGY

## 2020-10-28 PROCEDURE — 76519 ECHO EXAM OF EYE: CPT | Performed by: OPHTHALMOLOGY

## 2020-10-28 PROCEDURE — 76512 OPH US DX B-SCAN: CPT | Performed by: OPHTHALMOLOGY

## 2020-10-28 ASSESSMENT — VISUAL ACUITY
OS_SC: 20/60
OS_SC+: -1
METHOD: SNELLEN - LINEAR
OS_PH_SC+: -2
OS_PH_SC: 20/40 SLOW

## 2020-10-28 ASSESSMENT — CONF VISUAL FIELD
OS_NORMAL: 1
OD_SUPERIOR_TEMPORAL_RESTRICTION: 3
OD_SUPERIOR_NASAL_RESTRICTION: 3
OD_INFERIOR_NASAL_RESTRICTION: 3
OD_INFERIOR_TEMPORAL_RESTRICTION: 3
METHOD: COUNTING FINGERS

## 2020-10-28 ASSESSMENT — TONOMETRY
IOP_METHOD: TONOPEN
OD_IOP_MMHG: 15
OS_IOP_MMHG: 13

## 2020-10-28 ASSESSMENT — SLIT LAMP EXAM - LIDS
COMMENTS: NORMAL
COMMENTS: NORMAL

## 2020-10-28 ASSESSMENT — EXTERNAL EXAM - RIGHT EYE: OD_EXAM: NORMAL

## 2020-10-28 ASSESSMENT — CUP TO DISC RATIO: OS_RATIO: 0.6

## 2020-10-28 ASSESSMENT — EXTERNAL EXAM - LEFT EYE: OS_EXAM: NORMAL
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
## 2020-11-04 ENCOUNTER — TELEPHONE (OUTPATIENT)
Dept: OPHTHALMOLOGY | Facility: CLINIC | Age: 70
End: 2020-11-04

## 2020-11-04 PROBLEM — H26.9: Status: ACTIVE | Noted: 2020-11-04

## 2020-11-04 PROBLEM — H25.813 COMBINED FORM OF AGE-RELATED CATARACT, BOTH EYES: Status: ACTIVE | Noted: 2020-11-04

## 2020-11-04 NOTE — TELEPHONE ENCOUNTER
Spoke with patient to schedule right eye surgery with Dr. Marcus    Surgery was scheduled on 12/11 at [facility]  Patient will have H&P at [facility] on 12/7     Patient is aware a COVID-19 test is needed before their procedure. The test should be with-in 4 days of their procedure.   Test Details: Date 12/7 Location [facility] lab    Post-Op visit was scheduled on 12/11 and 12/23  Patient was advised a / is needed day of surgery. As well as, for 24 hours after their surgery procedure.  Surgery packet was mailed 11/4, patient has my direct contact information for any further questions [phone].

## 2020-11-06 DIAGNOSIS — Z11.59 ENCOUNTER FOR SCREENING FOR OTHER VIRAL DISEASES: Primary | ICD-10-CM

## 2020-11-08 ENCOUNTER — HEALTH MAINTENANCE LETTER (OUTPATIENT)
Age: 70
End: 2020-11-08

## 2020-11-30 PROBLEM — C67.9 BLADDER CANCER (H): Status: ACTIVE | Noted: 2019-09-05

## 2020-12-01 ENCOUNTER — OFFICE VISIT (OUTPATIENT)
Dept: FAMILY MEDICINE | Facility: CLINIC | Age: 70
End: 2020-12-01
Payer: COMMERCIAL

## 2020-12-01 VITALS
HEART RATE: 56 BPM | TEMPERATURE: 97.8 F | SYSTOLIC BLOOD PRESSURE: 162 MMHG | BODY MASS INDEX: 24.12 KG/M2 | OXYGEN SATURATION: 96 % | WEIGHT: 161 LBS | DIASTOLIC BLOOD PRESSURE: 92 MMHG

## 2020-12-01 DIAGNOSIS — C67.9 MALIGNANT NEOPLASM OF URINARY BLADDER, UNSPECIFIED SITE (H): ICD-10-CM

## 2020-12-01 DIAGNOSIS — Z95.1 S/P CABG (CORONARY ARTERY BYPASS GRAFT): ICD-10-CM

## 2020-12-01 DIAGNOSIS — H26.9 CATARACT, UNSPECIFIED CATARACT TYPE, UNSPECIFIED LATERALITY: ICD-10-CM

## 2020-12-01 DIAGNOSIS — I10 ESSENTIAL HYPERTENSION WITH GOAL BLOOD PRESSURE LESS THAN 140/90: ICD-10-CM

## 2020-12-01 DIAGNOSIS — H91.93 BILATERAL HEARING LOSS, UNSPECIFIED HEARING LOSS TYPE: ICD-10-CM

## 2020-12-01 DIAGNOSIS — F17.200 TOBACCO DEPENDENCE: ICD-10-CM

## 2020-12-01 DIAGNOSIS — E78.5 HYPERLIPIDEMIA LDL GOAL <70: ICD-10-CM

## 2020-12-01 DIAGNOSIS — Z01.818 PREOP GENERAL PHYSICAL EXAM: Primary | ICD-10-CM

## 2020-12-01 DIAGNOSIS — J44.9 CHRONIC OBSTRUCTIVE PULMONARY DISEASE, UNSPECIFIED COPD TYPE (H): ICD-10-CM

## 2020-12-01 DIAGNOSIS — I10 HYPERTENSION GOAL BP (BLOOD PRESSURE) < 140/90: ICD-10-CM

## 2020-12-01 DIAGNOSIS — I25.10 CAD, MULTIPLE VESSEL: ICD-10-CM

## 2020-12-01 PROCEDURE — 99214 OFFICE O/P EST MOD 30 MIN: CPT | Performed by: FAMILY MEDICINE

## 2020-12-01 RX ORDER — LISINOPRIL 20 MG/1
20 TABLET ORAL AT BEDTIME
Qty: 90 TABLET | Refills: 3 | Status: SHIPPED | OUTPATIENT
Start: 2020-12-01 | End: 2021-09-08

## 2020-12-01 RX ORDER — METOPROLOL SUCCINATE 50 MG/1
50 TABLET, EXTENDED RELEASE ORAL AT BEDTIME
Qty: 90 TABLET | Refills: 3 | Status: SHIPPED | OUTPATIENT
Start: 2020-12-01 | End: 2021-11-09

## 2020-12-01 NOTE — PROGRESS NOTES
[facility] [address] [place] [address]  Phone: [phone]  Primary Provider: Jeannine Quiroz  Pre-op Performing Provider: JEANNINE QUIROZ    PREOPERATIVE EVALUATION:  Today's date: 12/1/2020    Tevin Guidry is a 70 year old male who presents for a preoperative evaluation.    Surgical Information:  Surgery/Procedure: RIGHT EYE CATARACT REMOVAL WITH INTRAOCULAR LENS IMPLANT  Surgery Location: [facility] [place]    Surgeon: Aimee Marcus MD  Surgery Date: 12/11/2020  Time of Surgery: 11:55am   Where patient plans to recover: At home with family  Fax number for surgical facility: Note does not need to be faxed, will be available electronically in Epic.    Type of Anesthesia Anticipated: Combined MAC with Retrobulbar    Subjective     HPI related to upcoming procedure: RIGHT EYE CATARACT REMOVAL WITH INTRAOCULAR LENS IMPLANT    Preop Questions 12/1/2020   1. Have you ever had a heart attack or stroke? No   2. Have you ever had surgery on your heart or blood vessels, such as a stent placement, a coronary artery bypass, or surgery on an artery in your head, neck, heart, or legs? YES - he had 6 stents placed and hybrid procedure on his heart    3. Do you have chest pain with activity? No   4. Do you have a history of  heart failure? No   5. Do you currently have a cold, bronchitis or symptoms of other infection? No   6. Do you have a cough, shortness of breath, or wheezing? No   7. Do you or anyone in your family have previous history of blood clots? No   8. Do you or does anyone in your family have a serious bleeding problem such as prolonged bleeding following surgeries or cuts? No   9. Have you ever had problems with anemia or been told to take iron pills? YES - 3-4 years ago    10. Have you had any abnormal blood loss such as black, tarry or bloody stools? No   11. Have you ever had a blood transfusion? YES - when he had his  heart surgery    11a. Have you ever had a transfusion reaction? No   12. Are you willing to have a blood transfusion if it is medically needed before, during, or after your surgery? Yes   13. Have you or any of your relatives ever had problems with anesthesia? No   14. Do you have sleep apnea, excessive snoring or daytime drowsiness? No   15. Do you have any artifical heart valves or other implanted medical devices like a pacemaker, defibrillator, or continuous glucose monitor? No   16. Do you have artificial joints? No   17. Are you allergic to latex? No       Health Care Directive:  Patient does not have a Health Care Directive or Living Will: Advance Directive received and scanned. Click on Code in the patient header to view.    Preoperative Review of :   reviewed - oxycodone prescribed by his urologist in 9/2020 received after procedure and did not end up taking any       Status of Chronic Conditions:  CAD - Patient has a longstanding history of CAD. Patient denies recent chest pain or NTG use, denies exercise induced dyspnea or PND. .     HYPERLIPIDEMIA - Patient has a long history of significant Hyperlipidemia requiring medication for treatment with recent good control. Patient reports no problems or side effects with the medication.     Lab Results   Component Value Date    CHOL 140 08/03/2020     Lab Results   Component Value Date    HDL 59 08/03/2020     Lab Results   Component Value Date    LDL 64 08/03/2020     Lab Results   Component Value Date    TRIG 84 08/03/2020     Lab Results   Component Value Date    CHOLHDLRATIO 3.6 10/23/2015        HYPERTENSION - Patient has longstanding history of HTN , currently denies any symptoms referable to elevated blood pressure. Specifically denies chest pain, palpitations, dyspnea, orthopnea, PND or peripheral edema. Blood pressure readings have been in normal range. Current medication regimen is as listed below. Patient denies any side effects of medication.      Review of Systems  Constitutional, neuro, ENT, endocrine, pulmonary, cardiac, gastrointestinal, genitourinary, musculoskeletal, integument and psychiatric systems are negative, except as otherwise noted.    Patient Active Problem List    Diagnosis Date Noted     S/P CABG (coronary artery bypass graft) 10/22/2015     Priority: High     Coronary artery disease involving native heart with angina pectoris, unspecified vessel or lesion type (H) 2014     Priority: High     Combined form of age-related cataract, both eyes 2020     Priority: Medium     Added automatically from request for surgery [id number]       Total cataract of right eye 2020     Priority: Medium     Added automatically from request for surgery [id number]       Malignant neoplasm of overlapping sites of bladder (H) 2020     Priority: Medium     Added automatically from request for surgery [id number]       Personal history of malignant neoplasm of bladder 2020     Priority: Medium     Added automatically from request for surgery [id number]       Bladder cancer (H) 2019     Priority: Medium     Health Care Home 2017     Priority: Medium     [facility] Care Coordinator  Lovely Medina RN  [phone]  [facility] CARE PLAN SUMMARY    Member Name:  Tevin Guidry  Address:  [place] [address] Phone: [phone] (home)    :  1950 Date of Assessment:  10/1/2020   Health Plan:  [facility]  Health Plan Number: [id number] Medical Assistance Number: [id number]  Financial Worker:    Case #:     FVP Care Coordinator:  Lovely Medina RN CC Phone:  [phone]  CC Fax:  [phone]   FVP Enrollment Date: 2018 Case Mix:  L  Rate Cell:  A  Waiver Type:  none   Primary Emergency Contact:   Jose Guidry    [place] Phone: [phone]  Relation: Brother Language:  English  :  No   Health Care Agent/POA:   Advanced Directives/Living Will:     Primary Care  [facility] [phone], f) [phone] Primary Dx:  CAD I25.10  Secondary Dx:  COPDJ44.9   Primary Physician:  Preethi Quiroz   Height:  5' 8\"  Weight:  171 lbs   Specialty Physician:    Audiologist:     Eye Care Provider:   Dental Care Provider:    [facility] [phone] or [phone]   Other:                 Anemia, unspecified type 01/25/2017     Priority: Medium     Chronic obstructive pulmonary disease, unspecified COPD type (H) 12/08/2015     Priority: Medium     PFTs 12/8/15       Cigarette nicotine dependence without complication 10/14/2014     Priority: Medium     Advanced directives, counseling/discussion 08/20/2013     Priority: Medium     Tubular adenoma of colon 07/11/2012     Priority: Medium     9/6/13 colonoscopy revealed 4 polyps. 2 year follow up colonoscopy recommended       Hyperlipidemia LDL goal <130 05/09/2010     Priority: Medium     Hypertension goal BP (blood pressure) < 140/90      Priority: Medium     Social phobia 03/02/2005     Priority: Medium      Past Medical History:   Diagnosis Date     Colon polyps     multiple may need colectomy (adenoma)     Hyperlipidemia LDL goal <130      Hypertension goal BP (blood pressure) < 140/90      Social phobia      Past Surgical History:   Procedure Laterality Date     CLAVICLE SURGERY Right     fracture in childhood     COLONOSCOPY N/A 3/30/2017    Procedure: COLONOSCOPY;  Surgeon: Jie Onofre MD;  Location: [facility]     CYSTOSCOPY, BIOPSY BLADDER INSTILL OPTICAL AGENT N/A 9/2/2020    Procedure: CYSTOSCOPY, WITH INSTILLATION OF OPTICAL IMAGING AGENT INTO BLADDER AND BIOPSY OF BLADDER (CYSVIEW);  Surgeon: Kirt Guzman MD;  [facility]     CYSTOSCOPY, TRANSURETHRAL RESECTION (TUR) TUMOR BLADDER, COMBINED N/A 9/5/2019    Procedure: cystoscopy,Transurethral Resection Of Bladder Tumor;  Surgeon: Almas Sunshine MD;  [facility]     CYSTOSCOPY, " TRANSURETHRAL RESECTION (TUR) TUMOR BLADDER, COMBINED N/A 2/6/2020    Procedure: CYSTOSCOPY, WITH TRANSURETHRAL RESECTION BLADDER TUMOR;  Surgeon: Almas Sunshine MD;  [facility]     DAVINCI BYPASS ARTERY CORONARY N/A 9/29/2014    Procedure: DAVINCI BYPASS ARTERY CORONARY;  Surgeon: Lam Solis MD;  Location: [facility]     ESOPHAGOSCOPY, GASTROSCOPY, DUODENOSCOPY (EGD), COMBINED N/A 3/30/2017    Procedure: COMBINED ESOPHAGOSCOPY, GASTROSCOPY, DUODENOSCOPY (EGD);  Surgeon: Jie Onofre MD;  Location: [facility]     HC COLONOSCOPY THRU STOMA, DIAGNOSTIC  2010    polyps due 2011     SURGICAL HISTORY OF -       right clavicular fracture with ORIF     Current Outpatient Medications   Medication Sig Dispense Refill     albuterol (PROAIR HFA/PROVENTIL HFA/VENTOLIN HFA) 108 (90 Base) MCG/ACT inhaler Inhale 2 puffs into the lungs every 6 hours as needed for shortness of breath / dyspnea or wheezing 1 Inhaler 1     aspirin 81 MG tablet Take 1 tablet (81 mg) by mouth daily (Patient taking differently: Take 81 mg by mouth every morning ) 90 tablet 3     atorvastatin (LIPITOR) 80 MG tablet Take 1 tablet (80 mg) by mouth At Bedtime 90 tablet 3     ferrous sulfate (IRON) 325 (65 FE) MG tablet Take 325 mg by mouth every morning  60 tablet 3     levofloxacin (LEVAQUIN) 500 MG tablet Take 1 tablet 6 hours post treatment and 1 tablet each AM following each treatment. 6 tablet 6     lisinopril (PRINIVIL/ZESTRIL) 20 MG tablet Take 1 tablet (20 mg) by mouth At Bedtime 90 tablet 3     metoprolol succinate ER (TOPROL-XL) 50 MG 24 hr tablet Take 1 tablet (50 mg) by mouth At Bedtime 90 tablet 3     nitroglycerin (NITROSTAT) 0.4 MG sublingual tablet Place 1 tablet (0.4 mg) under the tongue every 5 minutes as needed for chest pain 25 tablet 3     PARoxetine (PAXIL) 40 MG tablet Take 1 tablet (40 mg) by mouth every morning 90 tablet 3     SM NICOTINE 21 MG/24HR 24 hr patch          Allergies   Allergen Reactions      Codeine Nausea and Vomiting        Social History     Tobacco Use     Smoking status: Former Smoker     Packs/day: 1.50     Years: 30.00     Pack years: 45.00     Quit date: 2019     Years since quittin.3     Smokeless tobacco: Never Used     Tobacco comment: had restarted- now quit smoking 20   Substance Use Topics     Alcohol use: No     Comment: quit 35 years ago        History   Drug Use     Comment: occionally use of marijuana         Objective     BP (!) 160/80 (BP Location: Left arm, Patient Position: Sitting, Cuff Size: Adult Regular)   Pulse 56   Temp 97.8  F (36.6  C) (Temporal)   Wt 73 kg (161 lb)   SpO2 96%   BMI 24.12 kg/m      Physical Exam     BP Readings from Last 6 Encounters:   20 (!) 162/92   10/15/20 120/65   10/08/20 122/81   10/01/20 130/86   20 127/70   20 126/76        GENERAL APPEARANCE: healthy, alert and no distress     EYES: EOMI,  PERRL     HENT: ear canals and TM's normal      NECK: no adenopathy, no asymmetry, masses, or scars and thyroid normal to palpation     RESP: lungs clear to auscultation - no rales, rhonchi or wheezes     CV: regular rates and rhythm, normal S1 S2, no S3 or S4 and no murmur, click or rub     ABDOMEN:  soft, nontender, no HSM or masses and bowel sounds normal     MS: extremities normal- no gross deformities noted      SKIN: no suspicious lesions or rashes     NEURO: Grossly normal strength and tone, sensory exam grossly normal, mentation intact and speech normal     PSYCH: mentation appears normal. and affect normal/bright     LYMPHATICS: No cervical adenopathy    Recent Labs   Lab Test 20  1443 20  1425 19  1155 12/10/18  1231   HGB  --  14.3  --  14.9   PLT  --  306  --  253     --  144 139   POTASSIUM 5.0  --  4.8 4.7   CR 0.86  --  0.80 0.84        Diagnostics:  No labs were ordered during this visit.   No EKG required for low risk surgery (cataract, skin procedure, breast biopsy,  etc).    Revised Cardiac Risk Index (RCRI):  The patient has the following serious cardiovascular risks for perioperative complications:   - Coronary Artery Disease (MI, positive stress test, angina, Qs on EKG) = 1 point     RCRI Interpretation: 1 point: Class II (low risk - 0.9% complication rate)            Assessment & Plan   The proposed surgical procedure is considered LOW risk.    Tevin was seen today for pre-op exam.    Diagnoses and all orders for this visit:    Preop general physical exam    Cataract, unspecified cataract type, unspecified laterality    Hypertension goal BP (blood pressure) < 140/90    Hyperlipidemia LDL goal <70    Malignant neoplasm of urinary bladder, unspecified site (H)    Chronic obstructive pulmonary disease, unspecified COPD type (H)    CAD, multiple vessel    S/P CABG (coronary artery bypass graft)    Tobacco dependence           Risks and Recommendations:  The patient has the following additional risks and recommendations for perioperative complications:     Medication Instructions:  Patient is to take all scheduled medications on the day of surgery    RECOMMENDATION:  APPROVAL GIVEN to proceed with proposed procedure pending review of diagnostic evaluation. I have asked Hola to return for repeat blood pressure test when he has his COVID test. Today's blood pressure was elevated (asymptomatic), which is unusual for him. He reports some anxiety today. For the purpose of his procedure, his mean BP has been less than 160/100 over the past year, so it is okay to proceed with surgery. Anticipate improvement on recheck and if not improved would plan on medication adjustment.      Signed Electronically by: Preethi Quiroz MD     Copy of this evaluation report is provided to requesting physician.    [facility] Pre Guidelines    Revised Cardiac Risk Index

## 2020-12-01 NOTE — PATIENT INSTRUCTIONS
"Schedule with audiology for your hearing test and hearing aid evaluation.     Preparing for Your Surgery  Getting started  A surgery nurse will call you to review your health history and instructions. They will give you an arrival time based on your scheduled surgery time.  Please be ready to share the following:    Your doctor's clinic name and phone number    Your medical, surgical and anesthesia history    A list of allergies and sensitivities    A list of medicines, including herbal treatments and over-the-counter drugs    Whether the patient has a legal guardian (ask how to send us the papers in advance)  If your child is having surgery, please ask for a copy of Preparing for Your Child's Surgery.    Preparing for surgery    Within 30 days of surgery: Have an exam at your family clinic (primary care clinic), or go to a pre-operative clinic. This exam is called a \"History and Physical,\" or H&P.    At your H&P exam, talk to your care team about all medicines you take. If you need to stop any medicines before surgery, ask when to start taking them again.  ? We do this for your safety. Many medicines can make you bleed too much during surgery. Some change how well surgery (anesthesia) drugs work.    Call your insurance company to see what it will and won't pay for. Ask if they need to pre-approve the surgery. (If no insurance, call [phone].)    Call your surgeon's clinic if there's any change in your health. This includes signs of a cold or flu (sore throat, runny nose, cough, rash, fever). It also includes a scrape or scratch near the surgery site.    If you have questions on the day of surgery, call your surgery center.  Eating and drinking guidelines  For your safety: Unless your surgeon tells you otherwise, follow the guidelines below.    Eat and drink as usual until 8 hours before surgery. After that, no food or milk.    Drink clear liquids until 2 hours before surgery. These are liquids you can see " through, like water, Gatorade and Propel Water. You may also have black coffee and tea (no cream or milk).    Nothing by mouth within 2 hours of surgery. This includes gum, candy and breath mints.    Stop alcohol the midnight before surgery.    If your family clinic tells you to take medicine on the morning of surgery, it's okay to take it with a sip of water.  Preventing infection    Shower or bathe the night before and morning of your surgery. Follow the instructions your clinic gave you. (If no instructions, use regular soap.)    Don't shave or clip hair near your surgery site. This can lead to skin infection.    Don't smoke the morning of surgery. Smoking increases the risk of infection. You may chew nicotine gum up to 2 hours before surgery. A nicotine patch is okay.  ? Note: Some surgeries require you to completely quit smoking and nicotine. Check with your surgeon.    Your care team will make every effort to keep you safe from infection. We will:  ? Clean our hands often with soap and water (or an alcohol-based hand rub).  ? Clean the skin at your surgery site with a special soap that kills germs. We'll also remove hair from the site as needed.  ? Wear special hair covers, masks, gowns and gloves during surgery.  ? Give antibiotic medicine, if prescribed. Not all surgeries need antibiotics.  What to bring on the day of surgery    Photo ID and insurance card    Copy of your health care directive, if you have one    Glasses and hearing aides (bring cases)  ? You can't wear contacts during surgery    Inhaler and eye drops, if you use them (tell us about these when you arrive)    CPAP machine or breathing device, if you use them    A few personal items, if spending the night    If you have . . .  ? A pacemaker or ICD (cardiac defibrillator): Bring the ID card.  ? An implanted stimulator: Bring the remote control.  ? A legal guardian: Bring a copy of the certified (court-stamped) guardianship papers.  Please  remove any jewelry, including body piercings. Leave jewelry and other valuables at home.  If you're going home the day of surgery  Important: If you don't follow the rules below, we must cancel your surgery.     Arrange for someone to drive you home after surgery. You may not drive, take a taxi or take public transportation by yourself (unless you'll have local anesthesia only).    Arrange for a responsible adult to stay with you overnight. If you don't, we may keep you in the hospital overnight, and you may need to pay the costs yourself.  Questions?   If you have any questions for your care team, list them here: _________________________________________________________________________________________________________________________________________________________________________________________________________________________________________________________________________________________________________________________  For informational purposes only. Not to replace the advice of your health care provider. Copyright   6909-2569 [employer]. All rights reserved. Clinically reviewed by Ludmila Nunn MD. [employer] [id number] - REV 07/19.

## 2020-12-07 DIAGNOSIS — Z11.59 ENCOUNTER FOR SCREENING FOR OTHER VIRAL DISEASES: ICD-10-CM

## 2020-12-07 PROCEDURE — U0003 INFECTIOUS AGENT DETECTION BY NUCLEIC ACID (DNA OR RNA); SEVERE ACUTE RESPIRATORY SYNDROME CORONAVIRUS 2 (SARS-COV-2) (CORONAVIRUS DISEASE [COVID-19]), AMPLIFIED PROBE TECHNIQUE, MAKING USE OF HIGH THROUGHPUT TECHNOLOGIES AS DESCRIBED BY CMS-2020-01-R: HCPCS | Performed by: OPHTHALMOLOGY

## 2020-12-08 DIAGNOSIS — H25.811 COMBINED FORM OF AGE-RELATED CATARACT, RIGHT EYE: Primary | ICD-10-CM

## 2020-12-08 LAB
SARS-COV-2 RNA SPEC QL NAA+PROBE: NOT DETECTED
SPECIMEN SOURCE: NORMAL

## 2020-12-08 RX ORDER — PREDNISOLONE ACETATE 10 MG/ML
SUSPENSION/ DROPS OPHTHALMIC
Qty: 1 BOTTLE | Refills: 1 | Status: SHIPPED | OUTPATIENT
Start: 2020-12-08 | End: 2021-04-01

## 2020-12-08 RX ORDER — OFLOXACIN 3 MG/ML
SOLUTION/ DROPS OPHTHALMIC
Qty: 1 BOTTLE | Refills: 0 | Status: SHIPPED | OUTPATIENT
Start: 2020-12-08 | End: 2021-04-01

## 2020-12-10 ENCOUNTER — ANESTHESIA EVENT (OUTPATIENT)
Dept: SURGERY | Facility: AMBULATORY SURGERY CENTER | Age: 70
End: 2020-12-10

## 2020-12-10 ASSESSMENT — COPD QUESTIONNAIRES
CAT_SEVERITY: MILD
COPD: 1

## 2020-12-10 ASSESSMENT — LIFESTYLE VARIABLES: TOBACCO_USE: 1

## 2020-12-10 NOTE — ANESTHESIA PREPROCEDURE EVALUATION
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]  Total cataract of right eye [H26.9]   Procedure(s):  RIGHT EYE CATARACT REMOVAL WITH INTRAOCULAR LENS IMPLANT     LABS:  CBC:   Lab Results   Component Value Date    WBC 9.0 2020    WBC 10.5 12/10/2018    HGB 14.3 2020    HGB 14.9 12/10/2018    HCT 43.1 2020    HCT 44.9 12/10/2018     2020     12/10/2018     BMP:   Lab Results   Component Value Date     2020     2019    POTASSIUM 5.0 2020    POTASSIUM 4.8 2019    CHLORIDE 107 2020    CHLORIDE 109 2019    CO2 30 2020    CO2 29 2019    BUN 13 2020    BUN 11 2019    CR 0.86 2020    CR 0.80 2019    GLC 84 2020    GLC 95 2019     COAGS:   Lab Results   Component Value Date    PTT 31 10/07/2014    INR 1.11 10/07/2014     POC:   Lab Results   Component Value Date     (H) 10/01/2014     OTHER:   Lab Results   Component Value Date    PH 7.36 2014    LACT 0.6 (L) 2014    A1C 5.9 2014    TAMMIE 8.7 2020    PHOS 4.3 2014    MAG 1.9 10/09/2014    ALBUMIN 3.6 2020    PROTTOTAL 7.0 2020    ALT 18 2020    AST 14 2020    ALKPHOS 132 2020    BILITOTAL 0.3 2020    TSH 0.70 2017        Preop Vitals    BP Readings from Last 3 Encounters:   20 (!) 162/92   10/15/20 120/65   10/08/20 122/81    Pulse Readings from Last 3 Encounters:   20 56   10/15/20 69   10/08/20 55      Resp Readings from Last 3 Encounters:   10/15/20 16   10/08/20 16   10/01/20 16    SpO2 Readings from Last 3 Encounters:   20 96%   10/15/20 97%   10/08/20 98%      Temp Readings from Last 1 Encounters:   20 36.6  C (97.8  F) (Temporal)    Ht Readings from Last  "1 Encounters:   09/02/20 1.74 m (5' 8.5\")      Wt Readings from Last 1 Encounters:   12/01/20 73 kg (161 lb)    Estimated body mass index is 24.12 kg/m  as calculated from the following:    Height as of 9/2/20: 1.74 m (5' 8.5\").    Weight as of 12/1/20: 73 kg (161 lb).     LDA:  Urethral Catheter Coude 18 fr (Active)   Number of days: 462        Past Medical History:   Diagnosis Date     Colon polyps     multiple may need colectomy (adenoma)     Hyperlipidemia LDL goal <130      Hypertension goal BP (blood pressure) < 140/90      Social phobia       Past Surgical History:   Procedure Laterality Date     CLAVICLE SURGERY Right     fracture in childhood     COLONOSCOPY N/A 3/30/2017    Procedure: COLONOSCOPY;  Surgeon: Jie Onofre MD;  Location: [facility]     CYSTOSCOPY, BIOPSY BLADDER INSTILL OPTICAL AGENT N/A 9/2/2020    Procedure: CYSTOSCOPY, WITH INSTILLATION OF OPTICAL IMAGING AGENT INTO BLADDER AND BIOPSY OF BLADDER (CYSVIEW);  Surgeon: Kirt Guzman MD;  Location: [facility]     CYSTOSCOPY, TRANSURETHRAL RESECTION (TUR) TUMOR BLADDER, COMBINED N/A 9/5/2019    Procedure: cystoscopy,Transurethral Resection Of Bladder Tumor;  Surgeon: Almas Sunshine MD;  Location: [facility]     CYSTOSCOPY, TRANSURETHRAL RESECTION (TUR) TUMOR BLADDER, COMBINED N/A 2/6/2020    Procedure: CYSTOSCOPY, WITH TRANSURETHRAL RESECTION BLADDER TUMOR;  Surgeon: Almas Sunshine MD;  Location: [facility]     DAVINCI BYPASS ARTERY CORONARY N/A 9/29/2014    Procedure: DAVINCI BYPASS ARTERY CORONARY;  Surgeon: Lam Solis MD;  Location: [facility]     ESOPHAGOSCOPY, GASTROSCOPY, DUODENOSCOPY (EGD), COMBINED N/A 3/30/2017    Procedure: COMBINED ESOPHAGOSCOPY, GASTROSCOPY, DUODENOSCOPY (EGD);  Surgeon: Jie Onofre MD;  Location: [facility]     HC COLONOSCOPY THRU STOMA, DIAGNOSTIC  2010    polyps due 2011     SURGICAL HISTORY OF -       right clavicular fracture with ORIF      Allergies   Allergen " Reactions     Codeine Nausea and Vomiting        Anesthesia Evaluation     . Pt has had prior anesthetic. Type: General and MAC    No history of anesthetic complications          ROS/MED HX    ENT/Pulmonary: Comment: Albuterol doesn't seem to help per pt report.    PFT 12/8/15:  Although the FVC and FEV1 are within normal limits, the FEV1/FVC ratio is reduced.  Patient effort was erratic, but the normal FEV1 indicates no significant obstruction.  The inspiratory flow rates are within normal limits.  Lung volumes are within normal limits.  Following administration of bronchodilators, there is no significant response.  The diffusing capacity is normal.  However, the diffusing capacity was not corrected for the patient's hemoglobin.  Mild airway obstruction is present.  IMPRESSION:  Mild Airflow Obstruction   No significant change with bronchodilator  Normal lung volumes  Normal diffusion capacity        (+)tobacco use, Past use 45 pk yr hx, quit 8/1/19 packs/day  Intermittent asthma Treatment: Inhaler prn,  mild COPD, , . .    Neurologic:  - neg neurologic ROS     Cardiovascular:     (+) Dyslipidemia, hypertension--CAD, angina-with excertion, CABG-date: 9/2014 robotic, stent,9/2014  1 Drug Eluting Stent .. Taking blood thinners : Instructions Given to patient: last dose ASA 9/2/19. . . :. . Previous cardiac testing Echodate:9/27/14results:Interpretation Summary  Global and regional left ventricular function is normal with an EF of 60-65%.   Right ventricular function, chamber size, wall motion, and thickness are   normal. Pulmonary artery systolic pressure cannot be assessed. The inferior   vena cava is normal. No pericardial effusion is present.    date: results: date: results:Cath date: 9/29/14 results:Successful PCI with ALENA from distal to prox segments of RCA. Good flow of LIMA to LAD with stenosis as noted.          METS/Exercise Tolerance: Comment: Dog walking- walks a [place] kirk daily.  No CP or ELENA with  walks.  Endorses some ELENA when carrying heavy objects  >4 METS   Hematologic: Comments: Iron deficiency anemia    (+) Anemia, History of Transfusion no previous transfusion reaction -      Musculoskeletal:  - neg musculoskeletal ROS       GI/Hepatic:  - neg GI/hepatic ROS      (-) liver disease   Renal/Genitourinary:  - ROS Renal section negative       Endo:  - neg endo ROS       Psychiatric:     (+) psychiatric history anxiety      Infectious Disease:  - neg infectious disease ROS       Malignancy:   (+) Malignancy History of Other  Other CA bladder Active status post         Other:    (+) No chance of pregnancy C-spine cleared: N/A, no H/O Chronic Pain,                       PHYSICAL EXAM:   Mental Status/Neuro: A/A/O   Airway: Facies: Feasible  Mallampati: I  Mouth/Opening: Full  TM distance: > 6 cm  Neck ROM: Full   Respiratory: Auscultation: CTAB     Resp. Rate: Normal     Resp. Effort: Normal      CV: Rhythm: Regular  Rate: Age appropriate  Heart: Normal Sounds  Edema: None   Comments:      Dental: Details                  Assessment:   ASA SCORE: 3    H&P: History and physical reviewed and following examination; no interval change.         Plan:   Anes. Type:  MAC   Pre-Medication: None   Induction:  N/a   Airway: Native Airway   Access/Monitoring: PIV   Maintenance: N/a     Postop Plan:   Postop Pain: None  Postop Sedation/Airway: Not planned     CONSENT: Direct conversation   Plan and risks discussed with: Patient          Comments for Plan/Consent:  Discussed plan for MAC, including risk of aspiration pneumonia, backup plan of general anesthesia and risks of general anesthesia, including sore throat/hoarse voice, abrasions/damage to lips/tongue/teeth, nausea, rare complications (including medication reactions, cardiac, pulmonary).                     Ned Crook MD

## 2020-12-11 ENCOUNTER — OFFICE VISIT (OUTPATIENT)
Dept: OPHTHALMOLOGY | Facility: CLINIC | Age: 70
End: 2020-12-11

## 2020-12-11 ENCOUNTER — ANESTHESIA (OUTPATIENT)
Dept: SURGERY | Facility: AMBULATORY SURGERY CENTER | Age: 70
End: 2020-12-11

## 2020-12-11 ENCOUNTER — HOSPITAL ENCOUNTER (OUTPATIENT)
Facility: AMBULATORY SURGERY CENTER | Age: 70
Discharge: HOME | End: 2020-12-11
Attending: OPHTHALMOLOGY | Admitting: OPHTHALMOLOGY
Payer: COMMERCIAL

## 2020-12-11 VITALS
HEIGHT: 71 IN | BODY MASS INDEX: 22.4 KG/M2 | DIASTOLIC BLOOD PRESSURE: 84 MMHG | WEIGHT: 160 LBS | HEART RATE: 70 BPM | RESPIRATION RATE: 18 BRPM | SYSTOLIC BLOOD PRESSURE: 127 MMHG | TEMPERATURE: 97.8 F | OXYGEN SATURATION: 95 %

## 2020-12-11 DIAGNOSIS — H25.813 COMBINED FORM OF AGE-RELATED CATARACT, BOTH EYES: ICD-10-CM

## 2020-12-11 DIAGNOSIS — Z96.1 PSEUDOPHAKIA, RIGHT EYE: Primary | ICD-10-CM

## 2020-12-11 DIAGNOSIS — H26.9 TOTAL CATARACT OF RIGHT EYE: ICD-10-CM

## 2020-12-11 DIAGNOSIS — H25.811 COMBINED FORM OF AGE-RELATED CATARACT, RIGHT EYE: Primary | ICD-10-CM

## 2020-12-11 PROCEDURE — 99024 POSTOP FOLLOW-UP VISIT: CPT | Mod: GC | Performed by: OPHTHALMOLOGY

## 2020-12-11 PROCEDURE — 66982 XCAPSL CTRC RMVL CPLX WO ECP: CPT | Mod: RT

## 2020-12-11 DEVICE — EYE IMP IOL AMO PCL TECNIS ZCB00 21.5: Type: IMPLANTABLE DEVICE | Site: EYE | Status: FUNCTIONAL

## 2020-12-11 RX ORDER — SODIUM CHLORIDE, SODIUM LACTATE, POTASSIUM CHLORIDE, CALCIUM CHLORIDE 600; 310; 30; 20 MG/100ML; MG/100ML; MG/100ML; MG/100ML
500 INJECTION, SOLUTION INTRAVENOUS CONTINUOUS
Status: DISCONTINUED | OUTPATIENT
Start: 2020-12-11 | End: 2020-12-11 | Stop reason: HOSPADM

## 2020-12-11 RX ORDER — DICLOFENAC SODIUM 1 MG/ML
1 SOLUTION/ DROPS OPHTHALMIC
Status: COMPLETED | OUTPATIENT
Start: 2020-12-11 | End: 2020-12-11

## 2020-12-11 RX ORDER — GLYCOPYRROLATE 0.2 MG/ML
INJECTION, SOLUTION INTRAMUSCULAR; INTRAVENOUS PRN
Status: DISCONTINUED | OUTPATIENT
Start: 2020-12-11 | End: 2020-12-11

## 2020-12-11 RX ORDER — NALOXONE HYDROCHLORIDE 0.4 MG/ML
0.4 INJECTION, SOLUTION INTRAMUSCULAR; INTRAVENOUS; SUBCUTANEOUS
Status: DISCONTINUED | OUTPATIENT
Start: 2020-12-11 | End: 2020-12-12 | Stop reason: HOSPADM

## 2020-12-11 RX ORDER — NALOXONE HYDROCHLORIDE 0.4 MG/ML
0.2 INJECTION, SOLUTION INTRAMUSCULAR; INTRAVENOUS; SUBCUTANEOUS
Status: DISCONTINUED | OUTPATIENT
Start: 2020-12-11 | End: 2020-12-12 | Stop reason: HOSPADM

## 2020-12-11 RX ORDER — BALANCED SALT SOLUTION 6.4; .75; .48; .3; 3.9; 1.7 MG/ML; MG/ML; MG/ML; MG/ML; MG/ML; MG/ML
SOLUTION OPHTHALMIC PRN
Status: DISCONTINUED | OUTPATIENT
Start: 2020-12-11 | End: 2020-12-11 | Stop reason: HOSPADM

## 2020-12-11 RX ORDER — PROPOFOL 10 MG/ML
INJECTION, EMULSION INTRAVENOUS PRN
Status: DISCONTINUED | OUTPATIENT
Start: 2020-12-11 | End: 2020-12-11

## 2020-12-11 RX ORDER — MEPERIDINE HYDROCHLORIDE 25 MG/ML
12.5 INJECTION INTRAMUSCULAR; INTRAVENOUS; SUBCUTANEOUS
Status: DISCONTINUED | OUTPATIENT
Start: 2020-12-11 | End: 2020-12-12 | Stop reason: HOSPADM

## 2020-12-11 RX ORDER — ONDANSETRON 2 MG/ML
4 INJECTION INTRAMUSCULAR; INTRAVENOUS EVERY 30 MIN PRN
Status: DISCONTINUED | OUTPATIENT
Start: 2020-12-11 | End: 2020-12-12 | Stop reason: HOSPADM

## 2020-12-11 RX ORDER — LIDOCAINE 40 MG/G
CREAM TOPICAL
Status: DISCONTINUED | OUTPATIENT
Start: 2020-12-11 | End: 2020-12-11 | Stop reason: HOSPADM

## 2020-12-11 RX ORDER — MOXIFLOXACIN IN NACL,ISO-OS/PF 0.3MG/0.3
SYRINGE (ML) INTRAOCULAR PRN
Status: DISCONTINUED | OUTPATIENT
Start: 2020-12-11 | End: 2020-12-11 | Stop reason: HOSPADM

## 2020-12-11 RX ORDER — ONDANSETRON 4 MG/1
4 TABLET, ORALLY DISINTEGRATING ORAL EVERY 30 MIN PRN
Status: DISCONTINUED | OUTPATIENT
Start: 2020-12-11 | End: 2020-12-12 | Stop reason: HOSPADM

## 2020-12-11 RX ORDER — OFLOXACIN 3 MG/ML
1 SOLUTION/ DROPS OPHTHALMIC
Status: COMPLETED | OUTPATIENT
Start: 2020-12-11 | End: 2020-12-11

## 2020-12-11 RX ORDER — TETRACAINE HYDROCHLORIDE 5 MG/ML
SOLUTION OPHTHALMIC PRN
Status: DISCONTINUED | OUTPATIENT
Start: 2020-12-11 | End: 2020-12-11 | Stop reason: HOSPADM

## 2020-12-11 RX ORDER — CYCLOPENTOLAT/TROPIC/PHENYLEPH 1%-1%-2.5%
1 DROPS (EA) OPHTHALMIC (EYE)
Status: COMPLETED | OUTPATIENT
Start: 2020-12-11 | End: 2020-12-11

## 2020-12-11 RX ORDER — PROPARACAINE HYDROCHLORIDE 5 MG/ML
1 SOLUTION/ DROPS OPHTHALMIC ONCE
Status: COMPLETED | OUTPATIENT
Start: 2020-12-11 | End: 2020-12-11

## 2020-12-11 RX ORDER — SODIUM CHLORIDE, SODIUM LACTATE, POTASSIUM CHLORIDE, CALCIUM CHLORIDE 600; 310; 30; 20 MG/100ML; MG/100ML; MG/100ML; MG/100ML
INJECTION, SOLUTION INTRAVENOUS CONTINUOUS
Status: DISCONTINUED | OUTPATIENT
Start: 2020-12-11 | End: 2020-12-12 | Stop reason: HOSPADM

## 2020-12-11 RX ADMIN — DICLOFENAC SODIUM 1 DROP: 1 SOLUTION/ DROPS OPHTHALMIC at 11:06

## 2020-12-11 RX ADMIN — Medication 1 DROP: at 11:11

## 2020-12-11 RX ADMIN — OFLOXACIN 1 DROP: 3 SOLUTION/ DROPS OPHTHALMIC at 11:06

## 2020-12-11 RX ADMIN — OFLOXACIN 1 DROP: 3 SOLUTION/ DROPS OPHTHALMIC at 11:11

## 2020-12-11 RX ADMIN — SODIUM CHLORIDE, SODIUM LACTATE, POTASSIUM CHLORIDE, CALCIUM CHLORIDE 500 ML: 600; 310; 30; 20 INJECTION, SOLUTION INTRAVENOUS at 11:18

## 2020-12-11 RX ADMIN — PROPOFOL 40 MG: 10 INJECTION, EMULSION INTRAVENOUS at 12:01

## 2020-12-11 RX ADMIN — PROPARACAINE HYDROCHLORIDE 1 DROP: 5 SOLUTION/ DROPS OPHTHALMIC at 11:05

## 2020-12-11 RX ADMIN — DICLOFENAC SODIUM 1 DROP: 1 SOLUTION/ DROPS OPHTHALMIC at 11:11

## 2020-12-11 RX ADMIN — OFLOXACIN 1 DROP: 3 SOLUTION/ DROPS OPHTHALMIC at 11:17

## 2020-12-11 RX ADMIN — Medication 1 DROP: at 11:06

## 2020-12-11 RX ADMIN — DICLOFENAC SODIUM 1 DROP: 1 SOLUTION/ DROPS OPHTHALMIC at 11:17

## 2020-12-11 RX ADMIN — SODIUM CHLORIDE, SODIUM LACTATE, POTASSIUM CHLORIDE, CALCIUM CHLORIDE: 600; 310; 30; 20 INJECTION, SOLUTION INTRAVENOUS at 11:58

## 2020-12-11 RX ADMIN — GLYCOPYRROLATE 0.2 MG: 0.2 INJECTION, SOLUTION INTRAMUSCULAR; INTRAVENOUS at 12:14

## 2020-12-11 RX ADMIN — Medication 1 DROP: at 11:17

## 2020-12-11 ASSESSMENT — VISUAL ACUITY
METHOD: SNELLEN - LINEAR
OD_SC: CF AT 2 FEET

## 2020-12-11 ASSESSMENT — TONOMETRY
OD_IOP_MMHG: 26
IOP_METHOD: TONOPEN

## 2020-12-11 ASSESSMENT — MIFFLIN-ST. JEOR: SCORE: 1494.71

## 2020-12-11 NOTE — PROCEDURES
Ophthalmology Post-op Procedure Note    Surgical Service:    Ophthalmology & Visual Sciences  Date Performed:      December 11, 2020  Location: Pending sale to [facility]      Pre-operative Diagnosis: Visually significant cataract, right eye  Post-operative Diagnosis:  Pseudophakia, right eye  Operative Procedure:  Phacoemulsification with intraocular lens implantation, right eye      Surgeon(s):  Fellow/Staff Surgeon:       Aimee Marcus MD  Resident Surgeon:            Almas Onofre MD    Anesthesia:   Retrobulbar/MAC  Findings:  White cataract with no red reflex  Blood Loss:    Minimal  Implants:  ZCB00 21.5 intraocular lens  Specimens:  None     Complications:  The patient did not experience any complications.   Condition: Stable    Operative Report Completion:    Description of Operation/Procedure:  After appropriate informed consent was obtained, the appropriate cardiac and blood pressure monitors were placed. The patient was brought to the operating room. A final pause occurred just before the start of the procedure during which the entire procedure team actively confirmed the correct patient, procedure, site, special equipment and special requirements. Under monitored anesthesia care, the patient was sedated with intravenous anesthesia. While the patient was sedated, retrobulbar anesthesia was achieved with 4 cc of 1% lidocaine, 0.375% bupivacaine and 75 units of Vitrase. An additional 1 cc of this anesthetic mixture was given around the orbicularis oculi muscle as the needle was being withdrawn from the muscle cone. The patient was prepped and draped in the usual sterile fashion using 5% povidone/iodine.     An eyelid speculum was placed to open the eyelids. A paracentesis port was placed approximately sixty degrees to the left of the planned temporal incision location using the sideport blade. Approximately 0.8 cc of 1% nonpreserved lidocaine was placed into the anterior chamber. Air was injected in the anterior  chamber followed by Trypan blue. Trypan blue was necessary to provide better visualization of the anterior capsule. The anterior chamber was filled with dispersive viscoelastic. A clear corneal temporal incision was made with a metal 2.6 mm keratome. A round continuous tear capsulorhexis was initiated with a cystotome and completed with the Utrata forceps. Balanced salt solution on a cannula was used to perform hydrodissection. The nucleus was removed using phacoemulsification with a chop technique. The remaining cortical material was removed using the irrigation/aspiration handpiece. The capsular bag was filled with cohesive viscoelastic. A lens of the power and model listed above was placed into the capsular bag using the cartridge injection system. The remaining viscoelastic was removed using the irrigation aspiration handpiece. Intracameral moxifloxacin was administered. The paracentesis and temporal wounds were hydrated with balanced salt solution. At the conclusion of the case, the wounds were felt to be watertight, the pupil was round, the lens was centered, and the anterior chamber was deep. The eyelid speculum was removed. Maxitrol ointment was administered to the operative eye. A pressure patch was placed over the operative eye.      Attending Attestation:  I was present for the entire procedure.  Aimee Marcus MD

## 2020-12-11 NOTE — ANESTHESIA POSTPROCEDURE EVALUATION
Anesthesia POST Procedure Evaluation    Patient: Tevin Guidry   MRN:     [id number] Gender:   male   Age:    70 year old :      1950        Preoperative Diagnosis: Combined form of age-related cataract, both eyes [H25.813]  Total cataract of right eye [H26.9]   Procedure(s):  RIGHT EYE COMPLEX CATARACT REMOVAL WITH INTRAOCULAR LENS IMPLANT   Postop Comments: No value filed.     Anesthesia Type: MAC       Disposition: Outpatient   Postop Pain Control: Uneventful            Sign Out: Well controlled pain   PONV: No   Neuro/Psych: Uneventful            Sign Out: Acceptable/Baseline neuro status   Airway/Respiratory: Uneventful            Sign Out: Acceptable/Baseline resp. status   CV/Hemodynamics: Uneventful            Sign Out: Acceptable CV status   Other NRE: NONE   DID A NON-ROUTINE EVENT OCCUR? No         Last Anesthesia Record Vitals:  CRNA VITALS  2020 1212 - 2020 1312      2020             Resp Rate (set):  10          Last PACU Vitals:  Vitals Value Taken Time   /92 20 1246   Temp 36.4  C (97.6  F) 20 1246   Pulse 68 20 1246   Resp 18 20 1246   SpO2 96 % 20 1246   Temp src     NIBP     Pulse     SpO2     Resp     Temp     Ht Rate     Temp 2           Electronically Signed By: Marisela Sam MD, 2020, 1:54 PM

## 2020-12-11 NOTE — DISCHARGE INSTRUCTIONS
[facility]  Home Care Following Anesthesia  For 24 hours after surgery:  1. Get plenty of rest.  A responsible adult must stay with you for at least 24 hours after you leave the surgery center.  2. Do not drive or use heavy equipment.  If you have weakness or tingling, don't drive or use heavy equipment until this feeling goes away.   3. Do not drink alcohol.   4. Avoid strenuous or risky activities.  Ask for help when climbing stairs.  5. You may feel lightheaded.  IF so, sit for a few minutes before standing.  Have someone help you get up.   6. If you have nausea (feel sick to your stomach): Drink only clear liquids such as apple juice, ginger ale, broth or 7-Up.  Rest may also help.  Be sure to drink enough fluids.  Move to a regular diet as you feel able.   7. You may have a slight fever.  Call the doctor if your fever is over 100 F (37.7 C) (taken under the tongue) or lasts longer than 24 hours.  8. You may have a dry mouth, a sore throat, muscle aches or trouble sleeping. These should go away after 24 hours.  9. Do not make important or legal decisions.         Tips for taking pain medications  To get the best pain relief possible, remember these points:    Take pain medications as directed, before pain becomes severe.    Pain medication can upset your stomach: taking it with food may help.    Constipation is a common side effect of pain medication. Drink plenty of  fluids.    Eat foods high in fiber. Take a stool softener if recommended by your doctor or pharmacist.    Do not drink alcohol, drive or operate machinery while taking pain medications.    Ask about other ways to control pain, such as with heat, ice or relaxation.    Tylenol/Acetaminophen Consumption  To help encourage the safe use of acetaminophen, the makers of TYLENOL  have lowered the maximum daily dose for single-ingredient Extra Strength TYLENOL  (acetaminophen) products sold in the [place] from 8 pills per  day (4,000 mg) to 6 pills per day (3,000 mg). The dosing interval has also changed from 2 pills every 4-6 hours to 2 pills every 6 hours.    If you feel your pain relief is insufficient, you may take Tylenol/Acetaminophen in addition to your narcotic pain medication.     Be careful not to exceed 3,000 mg of Tylenol/Acetaminophen in a 24 hour period from all sources.    If you are taking extra strength Tylenol/acetaminophen (500 mg), the maximum dose is 6 tablets in 24 hours.    If you are taking regular strength acetaminophen (325 mg), the maximum dose is 9 tablets in 24 hours.    Call a doctor for any of the followin. Signs of infection (fever, growing tenderness at the surgery site, a large amount of drainage or bleeding, severe pain, foul-smelling drainage, redness, swelling).  2. It has been over 8 to 10 hours since surgery and you are still not able to urinate (pass water).  3. Headache for over 24 hours.  4. Signs of Covid-19 infection (temperature over 100 degrees, shortness of breath, cough, loss of taste/smell, generalized body aches, persistent headache, chills, sore throat, nausea/vomiting/diarrhea)  Your doctor is:       Dr. Aimee Marcus, Ophthalmology: [phone]               Or dial [phone] and ask for the resident on call for:  Ophthalmology  For emergency care, call the:  [place]:  [phone] (TTY for hearing impaired: [phone])

## 2020-12-11 NOTE — ANESTHESIA CARE TRANSFER NOTE
Patient: Tevin VALLEJO Giudry    Procedure(s):  RIGHT EYE COMPLEX CATARACT REMOVAL WITH INTRAOCULAR LENS IMPLANT    Diagnosis: Combined form of age-related cataract, both eyes [H25.813]  Total cataract of right eye [H26.9]  Diagnosis Additional Information: No value filed.    Anesthesia Type:   MAC     Note:  Airway :Room Air  Patient transferred to:Phase II  Handoff Report: Identifed the Patient, Identified the Reponsible Provider, Reviewed the pertinent medical history, Discussed the surgical course, Reviewed Intra-OP anesthesia mangement and issues during anesthesia, Set expectations for post-procedure period and Allowed opportunity for questions and acknowledgement of understanding      Vitals: (Last set prior to Anesthesia Care Transfer)    CRNA VITALS  12/11/2020 1212 - 12/11/2020 1249      12/11/2020             Resp Rate (set):  10                Electronically Signed By: FRANCISCA Jules CRNA  December 11, 2020  12:49 PM

## 2020-12-11 NOTE — PROGRESS NOTES
POD#0, status post cataract surgery, right eye     No complaints.  Denies eye pain.     Va sc Cf at 2 feet     IOP 26 mm Hg by applanation     Impression/Plan:  Pseudophakia, OD: POD0, good post-operative appearance. IOP reasonable.  Corneal edema on exam - high phaco energy required for white cataract.      Doing well.      Eye protection at all times and eye shield at night for 1 week.     Limited activities with no exercise or heavy lifting for 1 week.     Instructed patient to contact us for decreasing vision, eye pain, new floaters or flashes of light or other concerning symptoms.     Written instructions given     Return to clinic: 2-4 weeks for post-op follow-up    Almas Onofre MD - PGY 4  Ophthalmology resident    Teaching statement:  Complete documentation of historical and exam elements from today's encounter can be found in the full encounter summary report (not reduplicated in this progress note). I personally obtained the chief complaint(s) and history of present illness.  I confirmed and edited as necessary the review of systems, past medical/surgical history, family history, social history, and examination findings as documented by others; and I examined the patient myself. I personally reviewed the relevant tests, images, and reports as documented above.     I formulated and edited as necessary the assessment and plan and discussed the findings and management plan with the patient and family.  Aimee Marcus MD  Comprehensive Ophthalmology & Ocular Pathology  Department of Ophthalmology and Visual Neurosciences  [email or web address]  Pager [phone]

## 2020-12-23 ENCOUNTER — OFFICE VISIT (OUTPATIENT)
Dept: OPHTHALMOLOGY | Facility: CLINIC | Age: 70
End: 2020-12-23
Attending: OPHTHALMOLOGY
Payer: COMMERCIAL

## 2020-12-23 DIAGNOSIS — H25.12 AGE-RELATED NUCLEAR CATARACT, LEFT: ICD-10-CM

## 2020-12-23 DIAGNOSIS — H40.003 GLAUCOMA SUSPECT, BOTH EYES: ICD-10-CM

## 2020-12-23 DIAGNOSIS — Z96.1 PSEUDOPHAKIA, RIGHT EYE: Primary | ICD-10-CM

## 2020-12-23 PROCEDURE — G0463 HOSPITAL OUTPT CLINIC VISIT: HCPCS

## 2020-12-23 PROCEDURE — 99024 POSTOP FOLLOW-UP VISIT: CPT | Mod: GC | Performed by: OPHTHALMOLOGY

## 2020-12-23 ASSESSMENT — REFRACTION_MANIFEST
OD_CYLINDER: +0.25
OD_SPHERE: +0.00
OD_AXIS: 171

## 2020-12-23 ASSESSMENT — TONOMETRY
IOP_METHOD: TONOPEN
OS_IOP_MMHG: 19
OD_IOP_MMHG: 20

## 2020-12-23 ASSESSMENT — SLIT LAMP EXAM - LIDS
COMMENTS: NORMAL
COMMENTS: NORMAL

## 2020-12-23 ASSESSMENT — VISUAL ACUITY
OS_PH_SC+: -1
OS_PH_SC: 20/30
METHOD: SNELLEN - LINEAR
OD_SC: 20/30
OD_SC+: -2
OS_SC: 20/40
OS_SC+: -2

## 2020-12-23 ASSESSMENT — CUP TO DISC RATIO: OD_RATIO: 0.6

## 2020-12-23 ASSESSMENT — EXTERNAL EXAM - RIGHT EYE: OD_EXAM: NORMAL

## 2020-12-23 ASSESSMENT — EXTERNAL EXAM - LEFT EYE: OS_EXAM: NORMAL

## 2020-12-23 NOTE — NURSING NOTE
Chief Complaints and History of Present Illnesses   Patient presents with     Post Op (Ophthalmology) Right Eye     Cataract extraction with IOL in the right eye on 12/11/2020     Chief Complaint(s) and History of Present Illness(es)     Post Op (Ophthalmology) Right Eye     Laterality: right eye    Course: gradually improving    Associated symptoms: redness (mild).  Negative for eye pain, dryness, flashes and floaters    Treatments tried: eye drops    Pain scale: 0/10    Comments: Cataract extraction with IOL in the right eye on 12/11/2020              Comments     Patient is 12 days post operative for his right eye.  Patient denies having any eye discomfort.  He is using his post operative drops as directed.    Nirmala Sommers, MERCEDEZ 10:10 AM  December 23, 2020
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
## 2021-01-28 ENCOUNTER — TELEPHONE (OUTPATIENT)
Dept: GASTROENTEROLOGY | Facility: CLINIC | Age: 71
End: 2021-01-28

## 2021-01-28 NOTE — TELEPHONE ENCOUNTER
Patient is scheduled for COLONOSCOPY with Dr. BA    Spoke with: DICK    Date of Procedure: 03/02/2021    [facility]: [facility]    Sedation Type CS    Pre-op for Unit [facility] MAC NOT NEEDED    (if yes advise patient they will need a pre-op prior to procedure)      Is patient on blood thinners? -NO (If yes- inform patient to follow up with PCP or provider for follow up instructions)     Informed patient they will need an adult  YES    Informed Patient of COVID Test Requirement YES    Preferred Pharmacy for Pre Prescription NA    Confirmed Nurse will call to complete assessment YES    Additional comments: NA

## 2021-02-03 NOTE — PATIENT INSTRUCTIONS
Home discharge instructions:  -To make sure each treatment has the best chance of working, follow these steps 2 hours after each treatment                        1. Lie on your back for 15 minues                        2. Lie on your left side for 15 mintues                        3. Lie on your right side for 15 minutes                        4. Get up but keep the medication  In your bladder for 60 more minutes- for a total of 2 hours                        5. Empty your bladder following the directions below (if you have difficulty holding your bladder it is ok to empty your bladder early)  -Wear pad if incontinence is a possibliity  -Wash hands throughly after using the bathroom  -For safety reasons remember to follow these directions for 6 hours after each treatment:                        1. Sit on the toilet seat to urinate                        2. Immediately after urinating- add 2 cups of undiluted chlorine bleach to the toilet                         3. Close lid and let the bleach sit for 15 minutes each time                        4. Drink plenty of water to flush any remaining medication out of your bladder     **These steps will help kill all the BCG bacteria and disinfect the toilet**     Please void BCG at 1250pm.     Take your antibiotic today at 5pm and tomorrow morning.        Please call urology triage line at [phone] or [phone] with fevers or chills, burning with urination, or blood in your urine which lasts more than a 48-72 hours or with any question or concerns.     No

## 2021-02-17 DIAGNOSIS — Z11.59 ENCOUNTER FOR SCREENING FOR OTHER VIRAL DISEASES: ICD-10-CM

## 2021-02-26 DIAGNOSIS — Z11.59 ENCOUNTER FOR SCREENING FOR OTHER VIRAL DISEASES: ICD-10-CM

## 2021-02-26 LAB
SARS-COV-2 RNA RESP QL NAA+PROBE: NORMAL
SPECIMEN SOURCE: NORMAL

## 2021-02-26 PROCEDURE — U0003 INFECTIOUS AGENT DETECTION BY NUCLEIC ACID (DNA OR RNA); SEVERE ACUTE RESPIRATORY SYNDROME CORONAVIRUS 2 (SARS-COV-2) (CORONAVIRUS DISEASE [COVID-19]), AMPLIFIED PROBE TECHNIQUE, MAKING USE OF HIGH THROUGHPUT TECHNOLOGIES AS DESCRIBED BY CMS-2020-01-R: HCPCS | Performed by: INTERNAL MEDICINE

## 2021-02-26 PROCEDURE — U0005 INFEC AGEN DETEC AMPLI PROBE: HCPCS | Performed by: INTERNAL MEDICINE

## 2021-02-27 LAB
LABORATORY COMMENT REPORT: NORMAL
SARS-COV-2 RNA RESP QL NAA+PROBE: NEGATIVE
SPECIMEN SOURCE: NORMAL

## 2021-03-02 ENCOUNTER — HOSPITAL ENCOUNTER (OUTPATIENT)
Facility: AMBULATORY SURGERY CENTER | Age: 71
Discharge: HOME | End: 2021-03-02
Attending: INTERNAL MEDICINE | Admitting: INTERNAL MEDICINE
Payer: COMMERCIAL

## 2021-03-02 VITALS
RESPIRATION RATE: 16 BRPM | HEART RATE: 49 BPM | BODY MASS INDEX: 22.9 KG/M2 | SYSTOLIC BLOOD PRESSURE: 158 MMHG | TEMPERATURE: 98.9 F | WEIGHT: 160 LBS | DIASTOLIC BLOOD PRESSURE: 86 MMHG | OXYGEN SATURATION: 98 % | HEIGHT: 70 IN

## 2021-03-02 LAB — COLONOSCOPY: NORMAL

## 2021-03-02 PROCEDURE — 45381 COLONOSCOPY SUBMUCOUS NJX: CPT | Mod: PT,59

## 2021-03-02 PROCEDURE — 45380 COLONOSCOPY AND BIOPSY: CPT | Mod: PT,59

## 2021-03-02 PROCEDURE — 88305 TISSUE EXAM BY PATHOLOGIST: CPT | Mod: GC | Performed by: PATHOLOGY

## 2021-03-02 PROCEDURE — 45390 COLONOSCOPY W/RESECTION: CPT | Mod: PT

## 2021-03-02 RX ORDER — ONDANSETRON 2 MG/ML
4 INJECTION INTRAMUSCULAR; INTRAVENOUS
Status: DISCONTINUED | OUTPATIENT
Start: 2021-03-02 | End: 2021-03-03 | Stop reason: HOSPADM

## 2021-03-02 RX ORDER — LIDOCAINE 40 MG/G
CREAM TOPICAL
Status: DISCONTINUED | OUTPATIENT
Start: 2021-03-02 | End: 2021-03-03 | Stop reason: HOSPADM

## 2021-03-02 RX ORDER — SIMETHICONE
LIQUID (ML) MISCELLANEOUS PRN
Status: DISCONTINUED | OUTPATIENT
Start: 2021-03-02 | End: 2021-03-02 | Stop reason: HOSPADM

## 2021-03-02 RX ORDER — EPINEPHRINE IN SOD CHLOR,ISO 1 MG/10 ML
SYRINGE (ML) INTRAVENOUS PRN
Status: DISCONTINUED | OUTPATIENT
Start: 2021-03-02 | End: 2021-03-02 | Stop reason: HOSPADM

## 2021-03-02 ASSESSMENT — MIFFLIN-ST. JEOR: SCORE: 1482.01

## 2021-03-04 LAB — COPATH REPORT: NORMAL

## 2021-03-09 ENCOUNTER — IMMUNIZATION (OUTPATIENT)
Dept: PEDIATRICS | Facility: CLINIC | Age: 71
End: 2021-03-09
Payer: COMMERCIAL

## 2021-03-09 ENCOUNTER — PATIENT OUTREACH (OUTPATIENT)
Dept: SURGERY | Facility: CLINIC | Age: 71
End: 2021-03-09

## 2021-03-09 PROCEDURE — 91300 PR COVID VAC PFIZER DIL RECON 30 MCG/0.3 ML IM: CPT

## 2021-03-09 PROCEDURE — 0001A PR COVID VAC PFIZER DIL RECON 30 MCG/0.3 ML IM: CPT

## 2021-03-09 NOTE — PROGRESS NOTES
Set pt up with Dr. Rodríguez to discuss surgical options for unresectable polyps in transverse colon

## 2021-03-22 NOTE — PROGRESS NOTES
Colon and Rectal Surgery Clinic Note    RE: Tevin Guidry.  : 1950.  RENÉE: 3/24/2021.    Reason for visit: Excessive polyp burned not amenable to endoscopic resection.    HPI: 69 yo M with hx notable for bladder CA (s/p TUR), [facility]), presenting after recent colonoscopy revealed endoscopically unresectable polyps. Two tattoos were placed at splenic flexure marking site as distal to this site, he is cleared. However, he remains with several polyps through the transverse colon starting form hepatic flexure. Based on 1.5h colonoscopy, it is recommended he undergo a right hemicolectomy given the polyp burden. He has a history of 10 colonoscopies given polyp burden at each colonoscopy. He otherwise remains asymptomatic, no nausea or vomiting, tolerating diet and having BMs.    Medical history:  Past Medical History:   Diagnosis Date     Bladder cancer (H)      Colon polyps     multiple may need colectomy (adenoma)     COPD (chronic obstructive pulmonary disease) (H)      Hyperlipidemia LDL goal <130      Hypertension goal BP (blood pressure) < 140/90      Social phobia        Surgical history:  Past Surgical History:   Procedure Laterality Date     CLAVICLE SURGERY Right     fracture in childhood     COLONOSCOPY N/A 3/30/2017    Procedure: COLONOSCOPY;  Surgeon: Jie Onofre MD;  Location: [facility]     COLONOSCOPY N/A 3/2/2021    Procedure: COLONOSCOPY, WITH POLYPECTOMY, hot snare, lift with elaview and epi, clip for hemorrhage control, tattoo;  Surgeon: Ra Cardoso MD;  Location: [facility]     CYSTOSCOPY, BIOPSY BLADDER INSTILL OPTICAL AGENT N/A 2020    Procedure: CYSTOSCOPY, WITH INSTILLATION OF OPTICAL IMAGING AGENT INTO BLADDER AND BIOPSY OF BLADDER (CYSVIEW);  Surgeon: Kirt Guzman MD;  [facility]     CYSTOSCOPY, TRANSURETHRAL RESECTION (TUR) TUMOR BLADDER, COMBINED N/A 2019    Procedure: cystoscopy,Transurethral Resection Of Bladder Tumor;  Surgeon:  Almas Sunshine MD;  [facility]     CYSTOSCOPY, TRANSURETHRAL RESECTION (TUR) TUMOR BLADDER, COMBINED N/A 2/6/2020    Procedure: CYSTOSCOPY, WITH TRANSURETHRAL RESECTION BLADDER TUMOR;  Surgeon: Almas Sunshine MD;  [facility]     DAVINCI BYPASS ARTERY CORONARY N/A 9/29/2014    Procedure: DAVINCI BYPASS ARTERY CORONARY;  Surgeon: Lam Solis MD;  Location: [facility]     ESOPHAGOSCOPY, GASTROSCOPY, DUODENOSCOPY (EGD), COMBINED N/A 3/30/2017    Procedure: COMBINED ESOPHAGOSCOPY, GASTROSCOPY, DUODENOSCOPY (EGD);  Surgeon: Jie Onofre MD;  Location: [facility]     HC COLONOSCOPY THRU STOMA, DIAGNOSTIC  2010    polyps due 2011     PHACOEMULSIFICATION CLEAR CORNEA WITH STANDARD INTRAOCULAR LENS IMPLANT Right 12/11/2020    Procedure: RIGHT EYE COMPLEX CATARACT REMOVAL WITH INTRAOCULAR LENS IMPLANT;  Surgeon: Aimee Marcus MD;  Location: [facility] OR     SURGICAL HISTORY OF -       right clavicular fracture with ORIF       Family history:  Family History   Problem Relation Age of Onset     Cancer Mother         esog/stomach     Cancer Father         lung     Family History Negative Brother      Hypertension Brother      Anesthesia Reaction No family hx of      Deep Vein Thrombosis (DVT) No family hx of      Glaucoma No family hx of      Macular Degeneration No family hx of        Medications:  Current Outpatient Medications   Medication Sig Dispense Refill     albuterol (PROAIR HFA/PROVENTIL HFA/VENTOLIN HFA) 108 (90 Base) MCG/ACT inhaler Inhale 2 puffs into the lungs every 6 hours as needed for shortness of breath / dyspnea or wheezing 1 Inhaler 1     aspirin 81 MG tablet Take 1 tablet (81 mg) by mouth daily (Patient taking differently: Take 81 mg by mouth every morning ) 90 tablet 3     atorvastatin (LIPITOR) 80 MG tablet Take 1 tablet (80 mg) by mouth At Bedtime 90 tablet 3     ferrous sulfate (IRON) 325 (65 FE) MG tablet Take 325 mg by mouth every morning  60 tablet 3      "lisinopril (ZESTRIL) 20 MG tablet Take 1 tablet (20 mg) by mouth At Bedtime 90 tablet 3     metoprolol succinate ER (TOPROL-XL) 50 MG 24 hr tablet Take 1 tablet (50 mg) by mouth At Bedtime 90 tablet 3     nitroglycerin (NITROSTAT) 0.4 MG sublingual tablet Place 1 tablet (0.4 mg) under the tongue every 5 minutes as needed for chest pain 25 tablet 3     ofloxacin (OCUFLOX) 0.3 % ophthalmic solution 1 drop 4x daily in the surgical eye for 1 week after surgery, then stop 1 Bottle 0     PARoxetine (PAXIL) 40 MG tablet Take 1 tablet (40 mg) by mouth every morning 90 tablet 3     prednisoLONE acetate (PRED FORTE) 1 % ophthalmic suspension 1 drop in surgical eye as directed, 4x daily after surgery for 1 week, 3x daily for 1 week, 2x daily for 1 week, daily for 1 week, then stop 1 Bottle 1       Allergies:  Allergies   Allergen Reactions     Codeine Nausea and Vomiting       Social history:   Social History     Tobacco Use     Smoking status: Smoker, Current Status Unknown     Packs/day: 1.50     Years: 30.00     Pack years: 45.00     Last attempt to quit: 2019     Years since quittin.6     Smokeless tobacco: Never Used     Tobacco comment: had restarted- now quit smoking 20   Substance Use Topics     Alcohol use: No     Comment: quit 35 years ago     Marital status: single.    ROS:  A complete review of systems was performed with the patient and all systems negative except as per HPI.    Physical Examination:  /80 (BP Location: Left arm, Patient Position: Sitting, Cuff Size: Adult Regular)   Pulse 50   Ht 5' 10\"   Wt 170 lb 11.2 oz   SpO2 96%   BMI 24.49 kg/m    General: Well hydrated. No acute distress.  Abdomen: Soft, NT, nondistended, no incisions noted.   Perianal external examination:  deferred    Laboratory values reviewed:  Recent Labs   Lab Test 20  1443 20  1425 10/07/14  1319 10/07/14  1319   WBC  --  9.0   < >  --    HGB  --  14.3   < >  --    PLT  --  306   < >  --    CR 0.86  " --    < >  --    ALBUMIN 3.6  --    < >  --    BILITOTAL 0.3  --    < >  --    ALKPHOS 132  --    < >  --    ALT 18  --    < >  --    AST 14  --    < >  --    INR  --   --   --  1.11    < > = values in this interval not displayed.     Colonoscopy with Dr. Valle:  Impression:          - One greater than 50 mm polyp at the hepatic flexure.                        - Multiple 3 to 25 mm polyps in the entire colon,                        removed using injection-lift and a hot snare. Resected                        and retrieved.                        - Many polyps in the entire colon.                        - Two biopsies were obtained at the hepatic flexure.                        - An area at the splenic flexure was tattooed.   Recommendation:      - Return to referring physician.                        NOTE: this is the 10th colonoscopy of this patient; his                        colon was studded with polyps and initially we thought                        all were removable. After 1.5 hour we cleared the cecum                        and ascending colon but when we reached the hepatic                        flexure a semi-circumferal band of fibrous tissue mixed                        with polyp tissue was encountered. We tried lifting,                        retroflexion, stiff and soft snares and eventually took                        some biopsies with a cold snare. This entire area cannot                        be removed endoscopically. More large polyps were seen                        in the transverse colon and not removed.                        Because the only option is surgery we placed two tattoos                        around the splenic flexure as distally from this point                        there were no larger polyps remaining. I suggest right                        hemicolectomy including the tattoo site, and repeat                        colonoscopy for removal of smaller lesions 6 months                         afterwards. Discussed in detail with the patient.    Path:  FINAL DIAGNOSIS:   A. ASCENDING COLON, POLYP, POLYPECTOMYx7:   - Tubular and tubulovillous adenomas, fragmented   - Negative for high grade dysplasia     B. COLON, HEPATIC FLEXURE POLYP, POLYPECTOMY:   - Tubular adenoma   - Negative for high grade dysplasia     C. SIGMOID COLON, POLYP, POLYPECTOMYx3:   -Tubulovillous and tubular adenomas, fragmented   - Negative for high grade dysplasia       ASSESSMENT  1. Excessive polyp burden, unable to be cleared endoscopically, primarily focused in the transverse colon, ending at splenic flexure marked with tattoo.    PLAN  Laparoscopic extended right hemicolectomy.  -CT A/P with PO/IV contrast for pre-op evaluation.  1. Preoperative labs: CBC, CMP, PTT/INR, Prealbumin.  2. Mechanical bowel prep with oral antibiotics.  3. Hold these medications prior to surgery: aspirin, lisinopril  4. Advised patient to begin protein shakes: Premier or Pure protein given high protein, low carb ratio for pre-operative rehab.     Risks, benefits, and alternatives of operative treatment were thoroughly discussed with the patient, he understands these well and agrees to proceed.    Time spent: 30 minutes.  >50% spent in discussing, counseling and coordinating care.    Jitendra Rodríguez M.D    Division of Colon and Rectal Surgery  [facility]    Referring Provider:  Ra Cardoso MD  [address] [place] [id number]     Primary Care Provider:  Preethi Quiroz

## 2021-03-24 ENCOUNTER — PRE VISIT (OUTPATIENT)
Dept: SURGERY | Facility: CLINIC | Age: 71
End: 2021-03-24

## 2021-03-24 ENCOUNTER — OFFICE VISIT (OUTPATIENT)
Dept: SURGERY | Facility: CLINIC | Age: 71
End: 2021-03-24
Payer: COMMERCIAL

## 2021-03-24 VITALS
SYSTOLIC BLOOD PRESSURE: 138 MMHG | HEART RATE: 50 BPM | HEIGHT: 70 IN | BODY MASS INDEX: 24.44 KG/M2 | DIASTOLIC BLOOD PRESSURE: 80 MMHG | OXYGEN SATURATION: 96 % | WEIGHT: 170.7 LBS

## 2021-03-24 DIAGNOSIS — Z86.0100 HISTORY OF COLONIC POLYPS: Primary | ICD-10-CM

## 2021-03-24 DIAGNOSIS — Z79.01 LONG TERM (CURRENT) USE OF ANTICOAGULANTS: ICD-10-CM

## 2021-03-24 DIAGNOSIS — C67.9 MALIGNANT NEOPLASM OF BLADDER, UNSPECIFIED (H): ICD-10-CM

## 2021-03-24 PROCEDURE — 99203 OFFICE O/P NEW LOW 30 MIN: CPT | Performed by: SURGERY

## 2021-03-24 RX ORDER — METRONIDAZOLE 500 MG/1
500 TABLET ORAL EVERY 6 HOURS
Qty: 3 TABLET | Refills: 0 | Status: ON HOLD | OUTPATIENT
Start: 2021-03-24 | End: 2021-04-14

## 2021-03-24 RX ORDER — ONDANSETRON 4 MG/1
4 TABLET, FILM COATED ORAL EVERY 6 HOURS
Qty: 3 TABLET | Refills: 0 | Status: ON HOLD | OUTPATIENT
Start: 2021-03-24 | End: 2021-04-14

## 2021-03-24 RX ORDER — POLYETHYLENE GLYCOL 3350 17 G/17G
238 POWDER, FOR SOLUTION ORAL SEE ADMIN INSTRUCTIONS
Qty: 14 PACKET | Refills: 0 | Status: SHIPPED | OUTPATIENT
Start: 2021-03-24 | End: 2021-04-01

## 2021-03-24 RX ORDER — NEOMYCIN SULFATE 500 MG/1
1000 TABLET ORAL EVERY 6 HOURS
Qty: 6 TABLET | Refills: 0 | Status: ON HOLD | OUTPATIENT
Start: 2021-03-24 | End: 2021-04-14

## 2021-03-24 ASSESSMENT — PAIN SCALES - GENERAL: PAINLEVEL: NO PAIN (0)

## 2021-03-24 ASSESSMENT — MIFFLIN-ST. JEOR: SCORE: 1530.54

## 2021-03-24 NOTE — LETTER
3/24/2021       RE: Tevin Guidry  [address] [id number]  [facility] [address]     Dear Colleague,    Thank you for referring your patient, Tevin Guidry, to the [facility] COLON AND RECTAL SURGERY CLINIC [place] [facility]. Please see a copy of my visit note below.    Colon and Rectal Surgery Clinic Note    RE: Tevin Guidry.  : 1950.  RENÉE: 3/24/2021.    Reason for visit: Excessive polyp burned not amenable to endoscopic resection.    HPI: 71 yo M with hx notable for bladder CA (s/p TUR), [facility]), presenting after recent colonoscopy revealed endoscopically unresectable polyps. Two tattoos were placed at splenic flexure marking site as distal to this site, he is cleared. However, he remains with several polyps through the transverse colon starting form hepatic flexure. Based on 1.5h colonoscopy, it is recommended he undergo a right hemicolectomy given the polyp burden. He has a history of 10 colonoscopies given polyp burden at each colonoscopy. He otherwise remains asymptomatic, no nausea or vomiting, tolerating diet and having BMs.    Medical history:  Past Medical History:   Diagnosis Date     Bladder cancer (H)      Colon polyps     multiple may need colectomy (adenoma)     COPD (chronic obstructive pulmonary disease) (H)      Hyperlipidemia LDL goal <130      Hypertension goal BP (blood pressure) < 140/90      Social phobia        Surgical history:  Past Surgical History:   Procedure Laterality Date     CLAVICLE SURGERY Right     fracture in childhood     COLONOSCOPY N/A 3/30/2017    Procedure: COLONOSCOPY;  Surgeon: Jie Onofre MD;  Location: [facility]     COLONOSCOPY N/A 3/2/2021    Procedure: COLONOSCOPY, WITH POLYPECTOMY, hot snare, lift with elaview and epi, clip for hemorrhage control, tattoo;  Surgeon: Ra Cardoso MD;  Location: [facility] OR     CYSTOSCOPY, BIOPSY BLADDER INSTILL OPTICAL AGENT N/A 2020    Procedure:  CYSTOSCOPY, WITH INSTILLATION OF OPTICAL IMAGING AGENT INTO BLADDER AND BIOPSY OF BLADDER (CYSVIEW);  Surgeon: Kirt Guzman MD;  [facility]     CYSTOSCOPY, TRANSURETHRAL RESECTION (TUR) TUMOR BLADDER, COMBINED N/A 9/5/2019    Procedure: cystoscopy,Transurethral Resection Of Bladder Tumor;  Surgeon: Almas Sunshine MD;  [facility]     CYSTOSCOPY, TRANSURETHRAL RESECTION (TUR) TUMOR BLADDER, COMBINED N/A 2/6/2020    Procedure: CYSTOSCOPY, WITH TRANSURETHRAL RESECTION BLADDER TUMOR;  Surgeon: Almas Sunshine MD;  [facility]     DAVINCI BYPASS ARTERY CORONARY N/A 9/29/2014    Procedure: DAVINCI BYPASS ARTERY CORONARY;  Surgeon: Lam Solis MD;  [facility]: [facility]     ESOPHAGOSCOPY, GASTROSCOPY, DUODENOSCOPY (EGD), COMBINED N/A 3/30/2017    Procedure: COMBINED ESOPHAGOSCOPY, GASTROSCOPY, DUODENOSCOPY (EGD);  Surgeon: Jie Onofre MD;  Location: [facility]     HC COLONOSCOPY THRU STOMA, DIAGNOSTIC  2010    polyps due 2011     PHACOEMULSIFICATION CLEAR CORNEA WITH STANDARD INTRAOCULAR LENS IMPLANT Right 12/11/2020    Procedure: RIGHT EYE COMPLEX CATARACT REMOVAL WITH INTRAOCULAR LENS IMPLANT;  Surgeon: Aimee Marcus MD;  Location: [facility] OR     SURGICAL HISTORY OF -       right clavicular fracture with ORIF       Family history:  Family History   Problem Relation Age of Onset     Cancer Mother         esog/stomach     Cancer Father         lung     Family History Negative Brother      Hypertension Brother      Anesthesia Reaction No family hx of      Deep Vein Thrombosis (DVT) No family hx of      Glaucoma No family hx of      Macular Degeneration No family hx of        Medications:  Current Outpatient Medications   Medication Sig Dispense Refill     albuterol (PROAIR HFA/PROVENTIL HFA/VENTOLIN HFA) 108 (90 Base) MCG/ACT inhaler Inhale 2 puffs into the lungs every 6 hours as needed for shortness of breath / dyspnea or wheezing 1 Inhaler 1     aspirin 81 MG  "tablet Take 1 tablet (81 mg) by mouth daily (Patient taking differently: Take 81 mg by mouth every morning ) 90 tablet 3     atorvastatin (LIPITOR) 80 MG tablet Take 1 tablet (80 mg) by mouth At Bedtime 90 tablet 3     ferrous sulfate (IRON) 325 (65 FE) MG tablet Take 325 mg by mouth every morning  60 tablet 3     lisinopril (ZESTRIL) 20 MG tablet Take 1 tablet (20 mg) by mouth At Bedtime 90 tablet 3     metoprolol succinate ER (TOPROL-XL) 50 MG 24 hr tablet Take 1 tablet (50 mg) by mouth At Bedtime 90 tablet 3     nitroglycerin (NITROSTAT) 0.4 MG sublingual tablet Place 1 tablet (0.4 mg) under the tongue every 5 minutes as needed for chest pain 25 tablet 3     ofloxacin (OCUFLOX) 0.3 % ophthalmic solution 1 drop 4x daily in the surgical eye for 1 week after surgery, then stop 1 Bottle 0     PARoxetine (PAXIL) 40 MG tablet Take 1 tablet (40 mg) by mouth every morning 90 tablet 3     prednisoLONE acetate (PRED FORTE) 1 % ophthalmic suspension 1 drop in surgical eye as directed, 4x daily after surgery for 1 week, 3x daily for 1 week, 2x daily for 1 week, daily for 1 week, then stop 1 Bottle 1       Allergies:  Allergies   Allergen Reactions     Codeine Nausea and Vomiting       Social history:   Social History     Tobacco Use     Smoking status: Smoker, Current Status Unknown     Packs/day: 1.50     Years: 30.00     Pack years: 45.00     Last attempt to quit: 2019     Years since quittin.6     Smokeless tobacco: Never Used     Tobacco comment: had restarted- now quit smoking 20   Substance Use Topics     Alcohol use: No     Comment: quit 35 years ago     Marital status: single.    ROS:  A complete review of systems was performed with the patient and all systems negative except as per HPI.    Physical Examination:  /80 (BP Location: Left arm, Patient Position: Sitting, Cuff Size: Adult Regular)   Pulse 50   Ht 5' 10\"   Wt 170 lb 11.2 oz   SpO2 96%   BMI 24.49 kg/m    General: Well hydrated. No " acute distress.  Abdomen: Soft, NT, nondistended, no incisions noted.   Perianal external examination:  deferred    Laboratory values reviewed:  Recent Labs   Lab Test 08/03/20  1443 01/23/20  1425 10/07/14  1319 10/07/14  1319   WBC  --  9.0   < >  --    HGB  --  14.3   < >  --    PLT  --  306   < >  --    CR 0.86  --    < >  --    ALBUMIN 3.6  --    < >  --    BILITOTAL 0.3  --    < >  --    ALKPHOS 132  --    < >  --    ALT 18  --    < >  --    AST 14  --    < >  --    INR  --   --   --  1.11    < > = values in this interval not displayed.     Colonoscopy with Dr. Valle:  Impression:          - One greater than 50 mm polyp at the hepatic flexure.                        - Multiple 3 to 25 mm polyps in the entire colon,                        removed using injection-lift and a hot snare. Resected                        and retrieved.                        - Many polyps in the entire colon.                        - Two biopsies were obtained at the hepatic flexure.                        - An area at the splenic flexure was tattooed.   Recommendation:      - Return to referring physician.                        NOTE: this is the 10th colonoscopy of this patient; his                        colon was studded with polyps and initially we thought                        all were removable. After 1.5 hour we cleared the cecum                        and ascending colon but when we reached the hepatic                        flexure a semi-circumferal band of fibrous tissue mixed                        with polyp tissue was encountered. We tried lifting,                        retroflexion, stiff and soft snares and eventually took                        some biopsies with a cold snare. This entire area cannot                        be removed endoscopically. More large polyps were seen                        in the transverse colon and not removed.                        Because the only option is surgery we placed two  tattoos                        around the splenic flexure as distally from this point                        there were no larger polyps remaining. I suggest right                        hemicolectomy including the tattoo site, and repeat                        colonoscopy for removal of smaller lesions 6 months                        afterwards. Discussed in detail with the patient.    Path:  FINAL DIAGNOSIS:   A. ASCENDING COLON, POLYP, POLYPECTOMYx7:   - Tubular and tubulovillous adenomas, fragmented   - Negative for high grade dysplasia     B. COLON, HEPATIC FLEXURE POLYP, POLYPECTOMY:   - Tubular adenoma   - Negative for high grade dysplasia     C. SIGMOID COLON, POLYP, POLYPECTOMYx3:   -Tubulovillous and tubular adenomas, fragmented   - Negative for high grade dysplasia       ASSESSMENT  1. Excessive polyp burden, unable to be cleared endoscopically, primarily focused in the transverse colon, ending at splenic flexure marked with tattoo.    PLAN  Laparoscopic extended right hemicolectomy.  -CT A/P with PO/IV contrast for pre-op evaluation.  1. Preoperative labs: CBC, CMP, PTT/INR, Prealbumin.  2. Mechanical bowel prep with oral antibiotics.  3. Hold these medications prior to surgery: aspirin, lisinopril  4. Advised patient to begin protein shakes: Premier or Pure protein given high protein, low carb ratio for pre-operative rehab.     Risks, benefits, and alternatives of operative treatment were thoroughly discussed with the patient, he understands these well and agrees to proceed.    Time spent: 30 minutes.  >50% spent in discussing, counseling and coordinating care.    Jitendra Rodríguez M.D    Division of Colon and Rectal Surgery  [facility]    Referring Provider:  Ra Cardoso MD  [address] [place] [id number]     Primary Care Provider:  Preethi Quiroz

## 2021-03-24 NOTE — PATIENT INSTRUCTIONS
Follow up:    1. Sofia will call you to schedule your surgery. If you do not hear from her within three business days please call her.     2. Appointments you will need:  -pre op physical   -COVID test  -labs    3. You will need to do a full bowel prep with antibiotics the day before surgery. I will mail you the instructions.       Please call with any questions or concerns regarding your clinic visit today.    It is a pleasure being involved in your health care.    Contacts post-consultation depending on your need:    Radiology Appointments [phone]    Schedule Clinic Appointments [phone] # 1   M-F 7:30 - 5 pm    EVELYN Becerra [phone]    Clinic Fax Number [phone]    Surgery Scheduling [phone]    My Chart is available 24 hours a day and is a secure way to access your records and communicate with your care team.  I strongly recommend signing up if you haven't already done so, if you are comfortable with computers.  If you would like to inquire about this or are having problems with My Chart access, you may call [phone] or go online at [email or web address].  Please allow at least 24 hours for a response and extra time on weekends and Holidays.

## 2021-03-24 NOTE — NURSING NOTE
"Chief Complaint   Patient presents with     New Patient     discuss surgical options       Vitals:    03/24/21 0935   BP: 138/80   BP Location: Left arm   Patient Position: Sitting   Cuff Size: Adult Regular   Pulse: 50   SpO2: 96%   Weight: 170 lb 11.2 oz   Height: 5' 10\"       Body mass index is 24.49 kg/m .    Chitra Torres MA    "

## 2021-03-29 ENCOUNTER — TELEPHONE (OUTPATIENT)
Dept: SURGERY | Facility: CLINIC | Age: 71
End: 2021-03-29

## 2021-03-29 NOTE — TELEPHONE ENCOUNTER
Case Request received to schedule a Tier 2 surgery admit case with Dr. Jitendra Rodríguez on the [place].    Patient called in wanting to schedule next available with Dr. Jitendra Rodríguez. I told patient that we can add him to the wait list for when blocks release 2 weeks out. Otherwise next available at this time is 5/6/2021. Patient said to go ahead and schedule and notify him once scheduled so that related appointments can be scheduled. Added to wait list on 4/13/2021.

## 2021-03-30 ENCOUNTER — IMMUNIZATION (OUTPATIENT)
Dept: PEDIATRICS | Facility: CLINIC | Age: 71
End: 2021-03-30
Attending: INTERNAL MEDICINE
Payer: COMMERCIAL

## 2021-03-30 DIAGNOSIS — Z11.59 ENCOUNTER FOR SCREENING FOR OTHER VIRAL DISEASES: ICD-10-CM

## 2021-03-30 PROBLEM — Z86.0100 HISTORY OF COLONIC POLYPS: Status: ACTIVE | Noted: 2021-03-30

## 2021-03-30 PROCEDURE — 0002A PR COVID VAC PFIZER DIL RECON 30 MCG/0.3 ML IM: CPT

## 2021-03-30 PROCEDURE — 91300 PR COVID VAC PFIZER DIL RECON 30 MCG/0.3 ML IM: CPT

## 2021-03-31 NOTE — TELEPHONE ENCOUNTER
FUTURE VISIT INFORMATION      SURGERY INFORMATION:    Date: 4.13.21    Location: [facility] OR    Surgeon:  Dr. Rodríguez    Anesthesia Type:  general    Procedure: Laparoscopic extended right colectomy    Consult: 3.24.21    RECORDS REQUESTED FROM:       Primary Care Provider: Preethi Quiroz    Most recent EKG+ Tracing: 3.27.17    Most recent ECHO: 9.27.14    Most recent Cardiac Stress Test: 9.26.14 echo stress    Most recent PFT's: 12.8.15

## 2021-04-01 ENCOUNTER — PRE VISIT (OUTPATIENT)
Dept: SURGERY | Facility: CLINIC | Age: 71
End: 2021-04-01

## 2021-04-01 ENCOUNTER — VIRTUAL VISIT (OUTPATIENT)
Dept: SURGERY | Facility: CLINIC | Age: 71
End: 2021-04-01
Payer: COMMERCIAL

## 2021-04-01 ENCOUNTER — ANESTHESIA EVENT (OUTPATIENT)
Dept: SURGERY | Facility: CLINIC | Age: 71
End: 2021-04-01

## 2021-04-01 DIAGNOSIS — Z01.818 PRE-OP EXAMINATION: Primary | ICD-10-CM

## 2021-04-01 DIAGNOSIS — K63.5 POLYP OF COLON, UNSPECIFIED PART OF COLON, UNSPECIFIED TYPE: ICD-10-CM

## 2021-04-01 PROCEDURE — 99204 OFFICE O/P NEW MOD 45 MIN: CPT | Mod: 95 | Performed by: NURSE PRACTITIONER

## 2021-04-01 ASSESSMENT — COPD QUESTIONNAIRES
COPD: 1
CAT_SEVERITY: MODERATE

## 2021-04-01 ASSESSMENT — LIFESTYLE VARIABLES: TOBACCO_USE: 1

## 2021-04-01 ASSESSMENT — PAIN SCALES - GENERAL: PAINLEVEL: NO PAIN (0)

## 2021-04-01 NOTE — PATIENT INSTRUCTIONS
Preparing for Your Surgery      Name:  Tevin Guidry   MRN:  [id number]   :  1950   Today's Date:  2021       Arriving for surgery:  Surgery date:  21  Arrival time:  8:00 am    Restrictions due to COVID 19:  One consistent visitor per patient is allowed.  The visitor will be allowed in the pre-op area.  Visitors are asked to leave the building during the surgery.  No ill visitors.  All visitors must wear face mask.    [facility] parking is available for anyone with mobility limitations or disabilities.  ("[employer]  24 hours/ 7 days a week; [employer]  7 am- 3:30 pm, Mon- Fri)    Please come to:     [facility] [address]  [address] [place] [address]    - ? parking is available in front of the hospital      -    Please proceed to [address] on the 3rd floor. [phone]?     - ?If you are in need of directions, wheelchair or escort please stop at the Information Desk in the lobby.  Inform the information person that you are here for surgery; a wheelchair and escort to [address] will be provided.?     What can I eat or drink?  -  Follow [facility] surgery instructions for eating, drinking and bowel prep before your procedure.    Examples of clear liquids:  Water  Clear broth  Juices (apple, white grape, white cranberry  and cider) without pulp  Noncarbonated, powder based beverages  (lemonade and Prem-Aid)  Sodas (Sprite, 7-Up, ginger ale and seltzer)  Coffee or tea (without milk or cream)  Gatorade    -  No Alcohol for at least 24 hours before surgery     Which medicines can I take?    **Hold Aspirin for 7 days before surgery - take your last dose 21.**     Hold Multivitamins for 7 days before surgery.  Hold Supplements for 7 days before surgery.  Hold Ibuprofen (Advil, Motrin) for 1 day before surgery--unless otherwise directed by surgeon.  Hold Naproxen (Aleve) for 4 days before surgery.    -  PLEASE TAKE these medications the  day of surgery:  Albuterol (Proair) Inhaler  Paroxetine (Paxil)    How do I prepare myself?  - Please take 2 showers before surgery using Scrubcare or Hibiclens soap.    Use this soap only from the neck to your toes.     Leave the soap on your skin for one minute--then rinse thoroughly.      You may use your own shampoo and conditioner; no other hair products.   - Please remove all jewelry and body piercings.  - No lotions, deodorants or fragrance.   - Bring your ID and insurance card.    - All patients are required to have a Covid-19 test within 4 days of surgery/procedure.      -Patients will be contacted by the [facility] scheduling team within 1 week of surgery to make an appointment.      - Patients may call the Scheduling team at [phone] if they have not been scheduled within 4 days of  surgery.    Questions or Concerns:    - For any questions regarding the day of surgery or your hospital stay, please contact the Pre Admission Nursing Office at [phone].       - If you have health changes between today and your surgery please call your surgeon.       For questions after surgery please call your surgeons office.

## 2021-04-01 NOTE — H&P
Pre-Operative H & P     CC:  Preoperative exam to assess for increased cardiopulmonary risk while undergoing surgery and anesthesia.    Date of Encounter: 2021  Primary Care Physician:  Preethi Quiroz    Type of service:  Video Visit    Patient verbally consented to video service today: YES    Two identifiers used:  yes (name and )    Video Start Time: 10:07  Video End Time (time video stopped): 10:22  Converted to phone as patient inadvertently disconnected.  Completed rest of visit via phone for additional 5 minutes.     Originating Location (pt. Location): Home    Distant Location (provider location):  home    Mode of Communication:  Video Conference via [employer]    Please note, because this was a virtual visit, a full physical could not be completed.  On the DOS, the OOD of anesthesia will complete the appropriate components of the physical exam.    HPI  Tevin Guidry is a 70 year old male who presents for pre-operative H & P in preparation for  Laparoscopic extended right colectomy with Jitendra Rodríguez MD on 2021 at [facility] under general anesthesia.     Mr. Guidry was seen by Dr. Rodríguez on 3/24/2021 for evaluation of excessive polyp burden not amenable to endoscopic resection. Mr. Guidry has a past medical history of recurrent bladder cancer s/p TURBT 2020 and maintenance BCG treatments.  Through Dr. Rodríguez evaluation noting excessive polyp burden, unable to be cleared endoscopically, primarily focused in the transverse colon, ending at splenic flexure marked with tattoo on recent colonoscopy, the above procedure was recommended.      PAC referral for risk assessment and optimization of anesthesia with comorbid conditions including CAD s/p hybrid robotic-assisted CABG (LIMA - LAD) and PCI with ALENA to RCA (distal to prox) (), HTN, HLD, tobacco dependence, COPD, anemia, recurrent bladder cancer and social phobia.     Dx:  h/o Colonic polyps   History is obtained from  the patient and electronic health record.     Past Medical History  Past Medical History:   Diagnosis Date     Bladder cancer (H)      Colon polyps     multiple may need colectomy (adenoma)     COPD (chronic obstructive pulmonary disease) (H)      Hyperlipidemia LDL goal <130      Hypertension goal BP (blood pressure) < 140/90      Social phobia        Past Surgical History  Past Surgical History:   Procedure Laterality Date     CLAVICLE SURGERY Right     fracture in childhood     COLONOSCOPY N/A 3/30/2017    Procedure: COLONOSCOPY;  Surgeon: Jie Onofre MD;  Location: [facility]     COLONOSCOPY N/A 3/2/2021    Procedure: COLONOSCOPY, WITH POLYPECTOMY, hot snare, lift with elaview and epi, clip for hemorrhage control, tattoo;  Surgeon: Ra Cardoso MD;  Location: [facility] OR     CYSTOSCOPY, BIOPSY BLADDER INSTILL OPTICAL AGENT N/A 9/2/2020    Procedure: CYSTOSCOPY, WITH INSTILLATION OF OPTICAL IMAGING AGENT INTO BLADDER AND BIOPSY OF BLADDER (CYSVIEW);  Surgeon: Kirt Guzman MD;  [facility]     CYSTOSCOPY, TRANSURETHRAL RESECTION (TUR) TUMOR BLADDER, COMBINED N/A 9/5/2019    Procedure: cystoscopy,Transurethral Resection Of Bladder Tumor;  Surgeon: Almas Sunshine MD;  [facility]     CYSTOSCOPY, TRANSURETHRAL RESECTION (TUR) TUMOR BLADDER, COMBINED N/A 2/6/2020    Procedure: CYSTOSCOPY, WITH TRANSURETHRAL RESECTION BLADDER TUMOR;  Surgeon: Almas Sunshine MD;  [facility]     DAVINCI BYPASS ARTERY CORONARY N/A 9/29/2014    Procedure: DAVINCI BYPASS ARTERY CORONARY;  Surgeon: Lam Solis MD;  Location: [facility]     ESOPHAGOSCOPY, GASTROSCOPY, DUODENOSCOPY (EGD), COMBINED N/A 3/30/2017    Procedure: COMBINED ESOPHAGOSCOPY, GASTROSCOPY, DUODENOSCOPY (EGD);  Surgeon: Jie Onofre MD;  Location: [facility]     HC COLONOSCOPY THRU STOMA, DIAGNOSTIC  2010    polyps due 2011     PHACOEMULSIFICATION CLEAR CORNEA WITH STANDARD INTRAOCULAR LENS IMPLANT  Right 12/11/2020    Procedure: RIGHT EYE COMPLEX CATARACT REMOVAL WITH INTRAOCULAR LENS IMPLANT;  Surgeon: Aimee Marcus MD;  Location: [facility] OR     SURGICAL HISTORY OF -       right clavicular fracture with ORIF       Hx of Blood transfusions/reactions: yes without reaction     Hx of abnormal bleeding or anti-platelet use: ASA    Menstrual history: No LMP for male patient.:     Steroid use in the last year: denies    Personal or FH with difficulty with Anesthesia:  denies    Prior to Admission Medications  Current Outpatient Medications   Medication Sig Dispense Refill     albuterol (PROAIR HFA/PROVENTIL HFA/VENTOLIN HFA) 108 (90 Base) MCG/ACT inhaler Inhale 2 puffs into the lungs every 6 hours as needed for shortness of breath / dyspnea or wheezing 1 Inhaler 1     aspirin 81 MG tablet Take 1 tablet (81 mg) by mouth daily (Patient taking differently: Take 81 mg by mouth every morning ) 90 tablet 3     atorvastatin (LIPITOR) 80 MG tablet Take 1 tablet (80 mg) by mouth At Bedtime 90 tablet 3     ferrous sulfate (IRON) 325 (65 FE) MG tablet Take 325 mg by mouth every morning  60 tablet 3     lisinopril (ZESTRIL) 20 MG tablet Take 1 tablet (20 mg) by mouth At Bedtime 90 tablet 3     metoprolol succinate ER (TOPROL-XL) 50 MG 24 hr tablet Take 1 tablet (50 mg) by mouth At Bedtime 90 tablet 3     nitroglycerin (NITROSTAT) 0.4 MG sublingual tablet Place 1 tablet (0.4 mg) under the tongue every 5 minutes as needed for chest pain 25 tablet 3     PARoxetine (PAXIL) 40 MG tablet Take 1 tablet (40 mg) by mouth every morning 90 tablet 3     metroNIDAZOLE (FLAGYL) 500 MG tablet Take 1 tablet (500 mg) by mouth every 6 hours At 8:00 am, 2:00 pm, 8:00 pm the day prior to your surgery with neomycin and zofran. 3 tablet 0     neomycin (MYCIFRADIN) 500 MG tablet Take 2 tablets (1,000 mg) by mouth every 6 hours At 8:00 am, 2:00 pm, 8:00 pm the day prior to your surgery with flagyl and zofran. 6 tablet 0     ondansetron (ZOFRAN) 4 MG  tablet Take 1 tablet (4 mg) by mouth every 6 hours At 8:00 am, 2:00 pm, 8:00 pm the day prior to your surgery with neomycin and flagyl. 3 tablet 0       Allergies  Allergies   Allergen Reactions     Codeine Nausea and Vomiting       Social History  Social History     Socioeconomic History     Marital status: Single     Spouse name: Not on file     Number of children: 0     Years of education: Not on file     Highest education level: Not on file   Occupational History     Not on file   Social Needs     Financial resource strain: Not on file     Food insecurity     Worry: Not on file     Inability: Not on file     Transportation needs     Medical: Not on file     Non-medical: Not on file   Tobacco Use     Smoking status: Former Smoker     Packs/day: 1.50     Years: 30.00     Pack years: 45.00     Quit date: 2019     Years since quittin.6     Smokeless tobacco: Never Used     Tobacco comment: had restarted- now quit smoking 20   Substance and Sexual Activity     Alcohol use: No     Comment: quit 35 years ago     Drug use: Yes     Types: Marijuana     Comment: occionally use of marijuana     Sexual activity: Yes     Partners: Female   Lifestyle     Physical activity     Days per week: Not on file     Minutes per session: Not on file     Stress: Not on file   Relationships     Social connections     Talks on phone: Not on file     Gets together: Not on file     Attends Christian service: Not on file     Active member of club or organization: Not on file     Attends meetings of clubs or organizations: Not on file     Relationship status: Not on file     Intimate partner violence     Fear of current or ex partner: Not on file     Emotionally abused: Not on file     Physically abused: Not on file     Forced sexual activity: Not on file   Other Topics Concern      Service No     Blood Transfusions No     Caffeine Concern Not Asked     Occupational Exposure Not Asked     Hobby Hazards Not Asked     Sleep  Concern Not Asked     Stress Concern Not Asked     Weight Concern Not Asked     Special Diet Not Asked     Back Care Not Asked     Exercise No     Bike Helmet Not Asked     Seat Belt Yes     Self-Exams No     Parent/sibling w/ CABG, MI or angioplasty before 65F 55M? No   Social History Narrative     Not on file       Family History  Family History   Problem Relation Age of Onset     Cancer Mother         esog/stomach     Cancer Father         lung     Hypertension Brother      Stomach Cancer Brother      Anesthesia Reaction No family hx of      Deep Vein Thrombosis (DVT) No family hx of      Glaucoma No family hx of      Macular Degeneration No family hx of          ROS/MED HX  ENT/Pulmonary:     (+) MICHELLE risk factors, hypertension, tobacco use (Quit August 2020. 45 pack years smoking history.), Past use, moderate,  COPD (Denies cough, phlegm or shortness of breath. ),     Neurologic:  - neg neurologic ROS     Cardiovascular:     (+) Dyslipidemia hypertension--CAD -CABG (LIMA to LAD. )-date: 2014. -Taking blood thinners Previous cardiac testing   Echo: Date: Results:    Stress Test: Date: Results:    ECG Reviewed: Date: 3/2017 Results:  Marked sinus Bradycardia   - Negative precordial T-waves  -May be normal -consider anteroseptal ischemia  Cath: Date: Results:   (-) angina (No nitroglycerin use. ) and angina (No nitroglycerin use. )   METS/Exercise Tolerance: >4 METS Comment: Enjoys walking dog (takes care of brother's dog during the day).    Can go up and down stairs without any issues.    Hematologic:     (+) anemia, history of blood transfusion (during CABG.), no previous transfusion reaction, Known PRBC Anitbodies:No     Musculoskeletal:  - neg musculoskeletal ROS     GI/Hepatic: Comment: Colonic polyps      Renal/Genitourinary:  - neg Renal ROS     Endo:  - neg endo ROS     Psychiatric/Substance Use:     (+) psychiatric history (Social phobia) other (comment) and anxiety alcohol abuse (Quit drinking 35 years  ago.) Recreational drug usage: Cannabis (Smokes daily.  ).    Infectious Disease:  - neg infectious disease ROS     Malignancy:   (+) Malignancy, History of Other.Other CA bladder cancer status post Surgery.    Other:  - neg other ROS        No vital signs taken since this is a virtual visit.       Physical Exam  Constitutional: Awake, alert, cooperative, no apparent distress, and appears stated age.  HENT: Normocephalic   Respiratory: Regular respiratory rate.  Effort is non-labored, quiet, easy breathing.   Musculoskeletal:  Full ROM of neck.   Neurologic: Awake, alert, oriented to name, place and time.   Neuropsychiatric: Calm, cooperative. Normal affect.     **Please note, because this was a virtual visit due to COVID -19 pandemic, a full physical could not be completed.  On the DOS, the OOD of anesthesia will complete the appropriate components of the physical exam. Please refer to the physical examination documented by the anesthesiologist in the anesthesia record on the day of surgery. **    Labs: (personally reviewed)  Lab Results   Component Value Date    WBC 9.0 01/23/2020     Lab Results   Component Value Date    RBC 4.57 01/23/2020     Lab Results   Component Value Date    HGB 14.3 01/23/2020     Lab Results   Component Value Date    HCT 43.1 01/23/2020     Lab Results   Component Value Date    MCV 94 01/23/2020     Lab Results   Component Value Date    MCH 31.3 01/23/2020     Lab Results   Component Value Date    MCHC 33.2 01/23/2020     Lab Results   Component Value Date    RDW 12.1 01/23/2020     Lab Results   Component Value Date     01/23/2020       Last Comprehensive Metabolic Panel:  Sodium   Date Value Ref Range Status   08/03/2020 141 133 - 144 mmol/L Final     Potassium   Date Value Ref Range Status   08/03/2020 5.0 3.4 - 5.3 mmol/L Final     Chloride   Date Value Ref Range Status   08/03/2020 107 94 - 109 mmol/L Final     Carbon Dioxide   Date Value Ref Range Status   08/03/2020 30 20 -  32 mmol/L Final     Anion Gap   Date Value Ref Range Status   08/03/2020 4 3 - 14 mmol/L Final     Glucose   Date Value Ref Range Status   08/03/2020 84 70 - 99 mg/dL Final     Comment:     Non Fasting     Urea Nitrogen   Date Value Ref Range Status   08/03/2020 13 7 - 30 mg/dL Final     Creatinine   Date Value Ref Range Status   08/03/2020 0.86 0.66 - 1.25 mg/dL Final     GFR Estimate   Date Value Ref Range Status   08/03/2020 86 >60 mL/min/[1.73_m2] Final     Comment:     Non  GFR Calc  Starting 12/18/2018, serum creatinine based estimated GFR (eGFR) will be   calculated using the Chronic Kidney Disease Epidemiology Collaboration   (CKD-EPI) equation.       Calcium   Date Value Ref Range Status   08/03/2020 8.7 8.5 - 10.1 mg/dL Final       PROCEDURES  EKG 3/2017  Marked sinus  Bradycardia   -  Negative precordial T-waves  -May be normal -consider anteroseptal ischemia      Colonoscopy 3/2021  Impression:          - One greater than 50 mm polyp at the hepatic flexure.                        - Multiple 3 to 25 mm polyps in the entire colon,                        removed using injection-lift and a hot snare. Resected                        and retrieved.                        - Many polyps in the entire colon.                        - Two biopsies were obtained at the hepatic flexure.                        - An area at the splenic flexure was tattooed.   Recommendation:      - Return to referring physician.                        NOTE: this is the 10th colonoscopy of this patient; his                        colon was studded with polyps and initially we thought                        all were removable. After 1.5 hour we cleared the cecum                        and ascending colon but when we reached the hepatic                        flexure a semi-circumferal band of fibrous tissue mixed                        with polyp tissue was encountered. We tried lifting,                         retroflexion, stiff and soft snares and eventually took                        some biopsies with a cold snare. This entire area cannot                        be removed endoscopically. More large polyps were seen                        in the transverse colon and not removed.                        Because the only option is surgery we placed two tattoos                        around the splenic flexure as distally from this point                        there were no larger polyps remaining. I suggest right                        hemicolectomy including the tattoo site, and repeat                        colonoscopy for removal of smaller lesions 6 months                        afterwards. Discussed in detail with the patient.                        - Refer to a colo-rectal surgeon at appointment to be                        scheduled.                                                                                       _____________________   Ra Steward MD   3/2/2021 4:47:43 PM           ASSESSMENT and PLAN  Tevin Guidry is a 70 year old male scheduled to undergo Laparoscopic extended right colectomy with Jitendra Rodríguez MD on 4/13/2021 at [facility] under general anesthesia.     He has the following specific operative considerations:   - MICHELLE # of risks 3/8 = intermediate  - VTE risk:  3%  - Risk of PONV score = 1.  If > 2, anti-emetic intervention recommended.      #  Cardiology   - CAD s/p hybrid robotic-assisted CABG (LIMA - LAD) and PCI with ALENA to RCA (distal to prox) (2014).  Denies chest pain, SOB, palpitations, syncope, ELENA, orthopnea, or PND. METS:  >4. RCRI : Coronary Artery Disease (MI, positive stress test, angina, Qs on EKG).  0.9 % risk of major adverse cardiac event.    Take statin as prescribed DOS.  Hold ASA for 7 days prior to surgery.   - HTN, hold ACEI DOS.  Take beta blocker DOS.      #  Pulmonary   - 45 pack years smoking hx.  Quit August 2020.  Smokes marijuana daily.   Instructed to abstain for 24 hours prior to surgery.   - COPD, stble.  Denies cough, shortness of breath and phlegm.  Has not needed rescue inhaler.  No maintenance inhaler.    #  Hematology   -  Anemia on iron replacement.  T&S for DOS.      #  GI   - Excessive polyp burden, unable to be cleared endoscopically, primarily focused in the transverse colon, ending at splenic flexure marked with tattoo on recent colonoscopy, the above procedure was recommended. ORAS protocol.    #  Urology  - Recurrent bladder cancer s/p TURBT and maintenance BCG treatments followed by Dr. Guzman.     #  Neuro   - Anxiety/social phobia, take Paxil as prescribed DOS.     #  ID  - COVID-19 testing per surgeon's office  - Completed COVID vaccine.     #   Anesthesia considerations   -  Refer to PAC assessment in anesthesia records      Arrival time, NPO, shower and medication instructions provided by nursing staff today.  Patient will get labs (CBC, BMP, INR and T&S) when he comes in for his COVID test. EKG to be done DOS.      FRANCISCA Corrigan CNP  Preoperative Assessment Center  [facility]  Phone: [phone]  Fax: [phone]

## 2021-04-01 NOTE — ANESTHESIA PREPROCEDURE EVALUATION
Anesthesia Pre-Procedure Evaluation    Patient: Tevin Guidry   MRN: [id number] : 1950        Preoperative Diagnosis: * No surgery found *   Procedure :      Past Medical History:   Diagnosis Date     Bladder cancer (H)      Colon polyps     multiple may need colectomy (adenoma)     COPD (chronic obstructive pulmonary disease) (H)      Hyperlipidemia LDL goal <130      Hypertension goal BP (blood pressure) < 140/90      Social phobia       Past Surgical History:   Procedure Laterality Date     CLAVICLE SURGERY Right     fracture in childhood     COLONOSCOPY N/A 3/30/2017    Procedure: COLONOSCOPY;  Surgeon: Jie Onofre MD;  Location: [facility]     COLONOSCOPY N/A 3/2/2021    Procedure: COLONOSCOPY, WITH POLYPECTOMY, hot snare, lift with elaview and epi, clip for hemorrhage control, tattoo;  Surgeon: Ra Cardoso MD;  Location: [facility]     CYSTOSCOPY, BIOPSY BLADDER INSTILL OPTICAL AGENT N/A 2020    Procedure: CYSTOSCOPY, WITH INSTILLATION OF OPTICAL IMAGING AGENT INTO BLADDER AND BIOPSY OF BLADDER (CYSVIEW);  Surgeon: Kirt Guzman MD;  [facility]     CYSTOSCOPY, TRANSURETHRAL RESECTION (TUR) TUMOR BLADDER, COMBINED N/A 2019    Procedure: cystoscopy,Transurethral Resection Of Bladder Tumor;  Surgeon: Almas Sunshine MD;  [facility]     CYSTOSCOPY, TRANSURETHRAL RESECTION (TUR) TUMOR BLADDER, COMBINED N/A 2020    Procedure: CYSTOSCOPY, WITH TRANSURETHRAL RESECTION BLADDER TUMOR;  Surgeon: Almas Sunshine MD;  [facility]     DAVINCI BYPASS ARTERY CORONARY N/A 2014    Procedure: DAVINCI BYPASS ARTERY CORONARY;  Surgeon: Lam Solis MD;  Location: [facility]     ESOPHAGOSCOPY, GASTROSCOPY, DUODENOSCOPY (EGD), COMBINED N/A 3/30/2017    Procedure: COMBINED ESOPHAGOSCOPY, GASTROSCOPY, DUODENOSCOPY (EGD);  Surgeon: Jie Onofre MD;  Location: [facility]     HC COLONOSCOPY THRU STOMA, DIAGNOSTIC      polyps due       PHACOEMULSIFICATION CLEAR CORNEA WITH STANDARD INTRAOCULAR LENS IMPLANT Right 2020    Procedure: RIGHT EYE COMPLEX CATARACT REMOVAL WITH INTRAOCULAR LENS IMPLANT;  Surgeon: Aimee Marcus MD;  Location: [facility]     SURGICAL HISTORY OF -       right clavicular fracture with ORIF      Allergies   Allergen Reactions     Codeine Nausea and Vomiting      Social History     Tobacco Use     Smoking status: Smoker, Current Status Unknown     Packs/day: 1.50     Years: 30.00     Pack years: 45.00     Last attempt to quit: 2019     Years since quittin.6     Smokeless tobacco: Never Used     Tobacco comment: had restarted- now quit smoking 20   Substance Use Topics     Alcohol use: No     Comment: quit 35 years ago      Wt Readings from Last 1 Encounters:   21 77.4 kg (170 lb 11.2 oz)        Anesthesia Evaluation   Pt has had prior anesthetic. Type: General and MAC.    No history of anesthetic complications       ROS/MED HX  ENT/Pulmonary:     (+) MICHELLE risk factors, hypertension, tobacco use (Quit 2020. 45 pack years smoking history.), Past use, moderate,  COPD (Denies cough, phlegm or shortness of breath. ),     Neurologic:  - neg neurologic ROS     Cardiovascular:     (+) Dyslipidemia hypertension--CAD -CABG (LIMA to LAD. )-date: . -Taking blood thinners Previous cardiac testing   Echo: Date: Results:    Stress Test: Date: Results:    ECG Reviewed: Date: 3/2017 Results:  Marked sinus Bradycardia   - Negative precordial T-waves  -May be normal -consider anteroseptal ischemia  Cath: Date: Results:   (-) angina (No nitroglycerin use. ) and angina (No nitroglycerin use. )   METS/Exercise Tolerance: >4 METS Comment: Enjoys walking dog (takes care of brother's dog during the day).    Can go up and down stairs without any issues.    Hematologic:     (+) anemia, history of blood transfusion (during CABG.), no previous transfusion reaction, Known PRBC Anitbodies:No     Musculoskeletal:  - neg  musculoskeletal ROS     GI/Hepatic: Comment: Colonic polyps      Renal/Genitourinary:  - neg Renal ROS     Endo:  - neg endo ROS     Psychiatric/Substance Use:     (+) psychiatric history (Social phobia) other (comment) and anxiety alcohol abuse (Quit drinking 35 years ago.) Recreational drug usage: Cannabis (Smokes daily.  ).    Infectious Disease:  - neg infectious disease ROS     Malignancy:   (+) Malignancy, History of Other.Other CA bladder cancer status post Surgery.    Other:  - neg other ROS          Physical Exam    Airway          Neck ROM: full     Respiratory Devices and Support         Dental           Cardiovascular             Pulmonary                   OUTSIDE LABS:  CBC:   Lab Results   Component Value Date    WBC 9.0 01/23/2020    WBC 10.5 12/10/2018    HGB 14.3 01/23/2020    HGB 14.9 12/10/2018    HCT 43.1 01/23/2020    HCT 44.9 12/10/2018     01/23/2020     12/10/2018     BMP:   Lab Results   Component Value Date     08/03/2020     07/03/2019    POTASSIUM 5.0 08/03/2020    POTASSIUM 4.8 07/03/2019    CHLORIDE 107 08/03/2020    CHLORIDE 109 07/03/2019    CO2 30 08/03/2020    CO2 29 07/03/2019    BUN 13 08/03/2020    BUN 11 07/03/2019    CR 0.86 08/03/2020    CR 0.80 07/03/2019    GLC 84 08/03/2020    GLC 95 07/03/2019     COAGS:   Lab Results   Component Value Date    PTT 31 10/07/2014    INR 1.11 10/07/2014     POC:   Lab Results   Component Value Date     (H) 10/01/2014     HEPATIC:   Lab Results   Component Value Date    ALBUMIN 3.6 08/03/2020    PROTTOTAL 7.0 08/03/2020    ALT 18 08/03/2020    AST 14 08/03/2020    ALKPHOS 132 08/03/2020    BILITOTAL 0.3 08/03/2020     OTHER:   Lab Results   Component Value Date    PH 7.36 09/30/2014    LACT 0.6 (L) 09/29/2014    A1C 5.9 09/28/2014    TAMMIE 8.7 08/03/2020    PHOS 4.3 09/30/2014    MAG 1.9 10/09/2014    TSH 0.70 01/25/2017             PAC Discussion and Assessment    ASA Classification: 3  Case is suitable for:  [place]  Anesthetic techniques and relevant risks discussed: GA                  PAC Resident/NP Anesthesia Assessment: Type of service:  Video Visit    Patient verbally consented to video service today: YES    Two identifiers used:  yes (name and )    Video Start Time: 10:07  Video End Time (time video stopped): 10:22  Patient disconnected inadvertently and called patient to complete visit (5 minutes).     Originating Location (pt. Location): Home    Distant Location (provider location):  home    Mode of Communication:  Video Conference via Hyper9    Please note, because this was a virtual visit, a full physical could not be completed.  On the DOS, the OOD of anesthesia will complete the appropriate components of the physical exam.    --    Tevin Guidry is a 70-year-old male scheduled for Laparoscopic extended right colectomy with Jitendra Rodríguez MD on 2021 at [facility] under general anesthesia.     Mr. Guidry was seen by Dr. Rodríguez on 3/24/2021 for evaluation of excessive polyp burden not amenable to endoscopic resection. Mr. Guidry has a past medical history of recurrent bladder cancer s/p TURBT 2020 and maintenance BCG treatments.  Through Dr. Rodríguez evaluation noting excessive polyp burden, unable to be cleared endoscopically, primarily focused in the transverse colon, ending at splenic flexure marked with tattoo on recent colonoscopy, the above procedure was recommended.      PAC referral for risk assessment and optimization of anesthesia with comorbid conditions including CAD s/p hybrid robotic-assisted CABG (LIMA - LAD) and PCI with ALNEA to RCA (distal to prox) (), HTN, HLD, tobacco dependence, COPD, anemia, recurrent bladder cancer and social phobia.     Dx:  h/o Colonic polyps    PROCEDURES  EKG 3/2017  Marked sinus  Bradycardia   -  Negative precordial T-waves  -May be normal -consider anteroseptal ischemia      Colonoscopy 3/2021  Impression:          - One greater  than 50 mm polyp at the hepatic flexure.                        - Multiple 3 to 25 mm polyps in the entire colon,                        removed using injection-lift and a hot snare. Resected                        and retrieved.                        - Many polyps in the entire colon.                        - Two biopsies were obtained at the hepatic flexure.                        - An area at the splenic flexure was tattooed.   Recommendation:      - Return to referring physician.                        NOTE: this is the 10th colonoscopy of this patient; his                        colon was studded with polyps and initially we thought                        all were removable. After 1.5 hour we cleared the cecum                        and ascending colon but when we reached the hepatic                        flexure a semi-circumferal band of fibrous tissue mixed                        with polyp tissue was encountered. We tried lifting,                        retroflexion, stiff and soft snares and eventually took                        some biopsies with a cold snare. This entire area cannot                        be removed endoscopically. More large polyps were seen                        in the transverse colon and not removed.                        Because the only option is surgery we placed two tattoos                        around the splenic flexure as distally from this point                        there were no larger polyps remaining. I suggest right                        hemicolectomy including the tattoo site, and repeat                        colonoscopy for removal of smaller lesions 6 months                        afterwards. Discussed in detail with the patient.                        - Refer to a colo-rectal surgeon at appointment to be                        scheduled.                                                                                       _____________________   Ra Helm  MD Richard   3/2/2021 4:47:43 PM     He has the following specific operative considerations:   - MICHELLE # of risks 3/8 = intermediate  - VTE risk:  3%  - Risk of PONV score = 1.  If > 2, anti-emetic intervention recommended.      #  Cardiology   - CAD s/p hybrid robotic-assisted CABG (LIMA - LAD) and PCI with ALENA to RCA (distal to prox) (2014).  Denies chest pain, SOB, palpitations, syncope, ELENA, orthopnea, or PND. METS:  >4. RCRI : Coronary Artery Disease (MI, positive stress test, angina, Qs on EKG).  0.9 % risk of major adverse cardiac event.    Take statin as prescribed DOS.  Hold ASA for 7 days prior to surgery.   - HTN, hold ACEI DOS.  Take beta blocker DOS.      #  Pulmonary   - 45 pack years smoking hx.  Quit August 2020.  Smokes marijuana daily.  Instructed to abstain for 24 hours prior to surgery.   - COPD, stble.  Denies cough, shortness of breath and phlegm.  Has not needed rescue inhaler.  No maintenance inhaler.    #  Hematology   -  Anemia on iron replacement.  T&S for DOS.      #  GI   - Excessive polyp burden, unable to be cleared endoscopically, primarily focused in the transverse colon, ending at splenic flexure marked with tattoo on recent colonoscopy, the above procedure was recommended. ORAS protocol.    #  Urology  - Recurrent bladder cancer s/p TURBT and maintenance BCG treatments followed by Dr. Guzman.     #  Neuro   - Anxiety/social phobia, take Paxil as prescribed DOS.       #  ID  - COVID-19 testing per surgeon's office    #   Anesthesia considerations   -  Refer to PAC assessment in anesthesia records      Arrival time, NPO, shower and medication instructions provided by nursing staff today.        **For further details of assessment, testing, and physical exam please see H and P completed on same date.                                           Smiley Adams, FRANCISCA CNP

## 2021-04-01 NOTE — PROGRESS NOTES
Hola is a 70 year old who is being evaluated via a billable video visit.      How would you like to obtain your AVS? MyChart    Will anyone else be joining your video visit? No    HPI       Review of Systems         Objective    Vitals - Patient Reported  Pain Score: No Pain (0)        Physical Exam     JOSEPHINE Kaur LPN

## 2021-04-02 ENCOUNTER — TEAM CONFERENCE (OUTPATIENT)
Dept: SURGERY | Facility: CLINIC | Age: 71
End: 2021-04-02

## 2021-04-02 NOTE — PROGRESS NOTES
COLON AND RECTAL SURGERY HUDDLE:    Patient was reviewed in preporation for their surgery the following was reviewed and has been completed:    Surgeon: Dr. Jitendra Rodríguez    Surgery & Date: 4/13 Laparoscopic extended right colectomy    Last MD Note: reviewed    Anesthesia Type: General    Other Providers: No    PAC: Yes    WOC: N/A    Labs: Yes    Bowel Prep: Yes MiraLAX / Gatorade , Antibiotic and Magnesium Citrate    Packet: Yes    Imaging: Yes    Post-Op Appointments: Yes    COVID: Yes

## 2021-04-07 ENCOUNTER — HOSPITAL ENCOUNTER (OUTPATIENT)
Dept: CT IMAGING | Facility: CLINIC | Age: 71
Discharge: HOME | End: 2021-04-07
Attending: SURGERY | Admitting: SURGERY
Payer: COMMERCIAL

## 2021-04-07 DIAGNOSIS — Z86.0100 HISTORY OF COLONIC POLYPS: ICD-10-CM

## 2021-04-07 PROCEDURE — 74177 CT ABD & PELVIS W/CONTRAST: CPT

## 2021-04-07 PROCEDURE — 250N000009 HC RX 250: Performed by: SURGERY

## 2021-04-07 PROCEDURE — 74177 CT ABD & PELVIS W/CONTRAST: CPT | Mod: 26 | Performed by: RADIOLOGY

## 2021-04-07 PROCEDURE — 250N000011 HC RX IP 250 OP 636: Performed by: SURGERY

## 2021-04-07 RX ORDER — IOPAMIDOL 755 MG/ML
100 INJECTION, SOLUTION INTRAVASCULAR ONCE
Status: COMPLETED | OUTPATIENT
Start: 2021-04-07 | End: 2021-04-07

## 2021-04-07 RX ADMIN — IOPAMIDOL 83 ML: 755 INJECTION, SOLUTION INTRAVENOUS at 10:20

## 2021-04-07 RX ADMIN — SODIUM CHLORIDE 61 ML: 9 INJECTION, SOLUTION INTRAVENOUS at 10:21

## 2021-04-07 NOTE — TELEPHONE ENCOUNTER
On 3/30/2021 wait list date granted for surgery on 4/13/2021.    Spoke with patient, completed the following scheduling. On a later date, sent Surgery Packet via [employer] and Mail:    Your surgery is scheduled:    Date: April 13, 2021  ________________________________    Time: 10:00 AM*  ________________________________    Please arrive at:  8:00 AM*  ______________________    Surgeon's Name: Dr. Jitendra Rodríguez  _______________________        Pre-Op Physical Fax Numbers:         [facility]  [facility] [place] Fax:  [phone] / Phone:  [phone]        Your surgery is located at:      [address]()      [place] [address]      [phone]      [email or web address]     *Times are tentative and may change. You can expect a call from the pre-admission nurses to confirm arrival and surgery start times if the times should change.      Required: Yes, you will need a  18 years or older to drive you home from your procedure as well as stay with you for 24 hours after your procedure, if you are discharged the same day as your procedure.    Surgery: Laparoscopic extended right colectomy       Upcoming / Related Appointments:   To ensure you are fully prepared for your surgery, please make sure the following items have been completed PRIOR to your surgery date:    Pre-operative History & Physical appointment:   Clinic appointment with Pre-operative Assessment Center (PAC): 4/1/2021 at 10:00 AM                                                 VIDEO VISIT    Pre-operative Lab appointment:   Lab draw appointment:    4/9/2021 at 3:00 PM                                                 [address] Lab                                                 [address] [place] [address]  Pre-operative COVID-19 Test:   Pre-procedure asymptomatic COVID  "PCR   4/9/2021 at 3:15 PM                                                 [facility]1st [facility] Lab                                                 [address] [place] [id number]    Post operative appointment:  Clinic appointment with Yumi Charles NP: 4/28/2021 at 1:30 PM                                                                      [facility](4K)                                                                      [address] [place] [id number]    Post operative appointment:  Clinic appointment with Dr. Jitendra Rodríguez: 5/19/2021 at 2:00 PM                                                                      [facility](4K)                                                                      [address] [place] [id number]    Pre-surgical Preparation:      MiraLAX/Gatorade, Magnesium Citrate, Antibiotics, Zofran  - obtain ahead of time  - see \"Day Before Surgery\"  - if you have diabetes or blood sugar issues, please use Gatorade ZERO or Low Sugar, as per recommendation by your physician    "

## 2021-04-09 DIAGNOSIS — Z79.01 LONG TERM (CURRENT) USE OF ANTICOAGULANTS: ICD-10-CM

## 2021-04-09 DIAGNOSIS — Z86.0100 HISTORY OF COLONIC POLYPS: ICD-10-CM

## 2021-04-09 DIAGNOSIS — Z11.59 ENCOUNTER FOR SCREENING FOR OTHER VIRAL DISEASES: ICD-10-CM

## 2021-04-09 DIAGNOSIS — K63.5 POLYP OF COLON, UNSPECIFIED PART OF COLON, UNSPECIFIED TYPE: ICD-10-CM

## 2021-04-09 DIAGNOSIS — C67.9 MALIGNANT NEOPLASM OF BLADDER, UNSPECIFIED (H): ICD-10-CM

## 2021-04-09 LAB
ALBUMIN SERPL-MCNC: 3.7 G/DL (ref 3.4–5)
ALP SERPL-CCNC: 130 U/L (ref 40–150)
ALT SERPL W P-5'-P-CCNC: 23 U/L (ref 0–70)
ANION GAP SERPL CALCULATED.3IONS-SCNC: 4 MMOL/L (ref 3–14)
APTT PPP: 30 SEC (ref 22–37)
AST SERPL W P-5'-P-CCNC: 18 U/L (ref 0–45)
BASOPHILS # BLD AUTO: 0.1 10E9/L (ref 0–0.2)
BASOPHILS NFR BLD AUTO: 1.7 %
BILIRUB SERPL-MCNC: 0.4 MG/DL (ref 0.2–1.3)
BUN SERPL-MCNC: 11 MG/DL (ref 7–30)
CALCIUM SERPL-MCNC: 9 MG/DL (ref 8.5–10.1)
CHLORIDE SERPL-SCNC: 108 MMOL/L (ref 94–109)
CO2 SERPL-SCNC: 30 MMOL/L (ref 20–32)
CREAT SERPL-MCNC: 0.8 MG/DL (ref 0.66–1.25)
DIFFERENTIAL METHOD BLD: NORMAL
EOSINOPHIL # BLD AUTO: 0.4 10E9/L (ref 0–0.7)
EOSINOPHIL NFR BLD AUTO: 4.4 %
ERYTHROCYTE [DISTWIDTH] IN BLOOD BY AUTOMATED COUNT: 12.4 % (ref 10–15)
GFR SERPL CREATININE-BSD FRML MDRD: 89 ML/MIN/{1.73_M2}
GLUCOSE SERPL-MCNC: 89 MG/DL (ref 70–99)
HBA1C MFR BLD: 5.4 % (ref 0–5.6)
HCT VFR BLD AUTO: 44 % (ref 40–53)
HGB BLD-MCNC: 14.7 G/DL (ref 13.3–17.7)
IMM GRANULOCYTES # BLD: 0 10E9/L (ref 0–0.4)
IMM GRANULOCYTES NFR BLD: 0.2 %
INR PPP: 1.04 (ref 0.86–1.14)
LYMPHOCYTES # BLD AUTO: 2 10E9/L (ref 0.8–5.3)
LYMPHOCYTES NFR BLD AUTO: 23.8 %
MCH RBC QN AUTO: 31.5 PG (ref 26.5–33)
MCHC RBC AUTO-ENTMCNC: 33.4 G/DL (ref 31.5–36.5)
MCV RBC AUTO: 94 FL (ref 78–100)
MONOCYTES # BLD AUTO: 0.7 10E9/L (ref 0–1.3)
MONOCYTES NFR BLD AUTO: 8.3 %
NEUTROPHILS # BLD AUTO: 5.2 10E9/L (ref 1.6–8.3)
NEUTROPHILS NFR BLD AUTO: 61.6 %
NRBC # BLD AUTO: 0 10*3/UL
NRBC BLD AUTO-RTO: 0 /100
PLATELET # BLD AUTO: 228 10E9/L (ref 150–450)
POTASSIUM SERPL-SCNC: 4.4 MMOL/L (ref 3.4–5.3)
PREALB SERPL IA-MCNC: 23 MG/DL (ref 15–45)
PROT SERPL-MCNC: 7.1 G/DL (ref 6.8–8.8)
RBC # BLD AUTO: 4.67 10E12/L (ref 4.4–5.9)
SARS-COV-2 RNA RESP QL NAA+PROBE: NORMAL
SODIUM SERPL-SCNC: 142 MMOL/L (ref 133–144)
SPECIMEN SOURCE: NORMAL
WBC # BLD AUTO: 8.4 10E9/L (ref 4–11)

## 2021-04-09 PROCEDURE — U0005 INFEC AGEN DETEC AMPLI PROBE: HCPCS | Mod: 90 | Performed by: PATHOLOGY

## 2021-04-09 PROCEDURE — 85730 THROMBOPLASTIN TIME PARTIAL: CPT | Performed by: PATHOLOGY

## 2021-04-09 PROCEDURE — 85610 PROTHROMBIN TIME: CPT | Performed by: PATHOLOGY

## 2021-04-09 PROCEDURE — 36415 COLL VENOUS BLD VENIPUNCTURE: CPT | Performed by: PATHOLOGY

## 2021-04-09 PROCEDURE — U0003 INFECTIOUS AGENT DETECTION BY NUCLEIC ACID (DNA OR RNA); SEVERE ACUTE RESPIRATORY SYNDROME CORONAVIRUS 2 (SARS-COV-2) (CORONAVIRUS DISEASE [COVID-19]), AMPLIFIED PROBE TECHNIQUE, MAKING USE OF HIGH THROUGHPUT TECHNOLOGIES AS DESCRIBED BY CMS-2020-01-R: HCPCS | Mod: 90 | Performed by: PATHOLOGY

## 2021-04-09 PROCEDURE — 85025 COMPLETE CBC W/AUTO DIFF WBC: CPT | Performed by: PATHOLOGY

## 2021-04-09 PROCEDURE — 83036 HEMOGLOBIN GLYCOSYLATED A1C: CPT | Performed by: PATHOLOGY

## 2021-04-09 PROCEDURE — 99000 SPECIMEN HANDLING OFFICE-LAB: CPT | Performed by: PATHOLOGY

## 2021-04-09 PROCEDURE — 80053 COMPREHEN METABOLIC PANEL: CPT | Performed by: PATHOLOGY

## 2021-04-09 PROCEDURE — 84134 ASSAY OF PREALBUMIN: CPT | Performed by: PATHOLOGY

## 2021-04-13 ENCOUNTER — ANESTHESIA EVENT (OUTPATIENT)
Dept: SURGERY | Facility: CLINIC | Age: 71
DRG: 331 | End: 2021-04-13
Payer: COMMERCIAL

## 2021-04-13 ENCOUNTER — ANESTHESIA (OUTPATIENT)
Dept: SURGERY | Facility: CLINIC | Age: 71
DRG: 331 | End: 2021-04-13
Payer: COMMERCIAL

## 2021-04-13 ENCOUNTER — ANCILLARY PROCEDURE (OUTPATIENT)
Dept: ULTRASOUND IMAGING | Facility: CLINIC | Age: 71
End: 2021-04-13
Payer: COMMERCIAL

## 2021-04-13 ENCOUNTER — HOSPITAL ENCOUNTER (INPATIENT)
Facility: CLINIC | Age: 71
LOS: 3 days | Discharge: HOME | DRG: 331 | End: 2021-04-16
Attending: SURGERY | Admitting: SURGERY
Payer: COMMERCIAL

## 2021-04-13 DIAGNOSIS — Z86.0100 HISTORY OF COLONIC POLYPS: ICD-10-CM

## 2021-04-13 DIAGNOSIS — Z90.49 HISTORY OF PARTIAL COLECTOMY: Primary | ICD-10-CM

## 2021-04-13 LAB
ABO + RH BLD: NORMAL
ABO + RH BLD: NORMAL
BLD GP AB SCN SERPL QL: NORMAL
BLOOD BANK CMNT PATIENT-IMP: NORMAL
CREAT SERPL-MCNC: 0.89 MG/DL (ref 0.66–1.25)
GFR SERPL CREATININE-BSD FRML MDRD: 85 ML/MIN/{1.73_M2}
GLUCOSE BLDC GLUCOMTR-MCNC: 92 MG/DL (ref 70–99)
POTASSIUM SERPL-SCNC: 4.4 MMOL/L (ref 3.4–5.3)
SPECIMEN EXP DATE BLD: NORMAL

## 2021-04-13 PROCEDURE — 250N000011 HC RX IP 250 OP 636: Performed by: ANESTHESIOLOGY

## 2021-04-13 PROCEDURE — 999N001017 HC STATISTIC GLUCOSE BY METER IP

## 2021-04-13 PROCEDURE — 250N000009 HC RX 250: Performed by: NURSE ANESTHETIST, CERTIFIED REGISTERED

## 2021-04-13 PROCEDURE — 999N000141 HC STATISTIC PRE-PROCEDURE NURSING ASSESSMENT: Performed by: SURGERY

## 2021-04-13 PROCEDURE — 999N000015 HC STATISTIC ARTERIAL MONITORING DAILY

## 2021-04-13 PROCEDURE — 0DTF4ZZ RESECTION OF RIGHT LARGE INTESTINE, PERCUTANEOUS ENDOSCOPIC APPROACH: ICD-10-PCS | Performed by: SURGERY

## 2021-04-13 PROCEDURE — 36415 COLL VENOUS BLD VENIPUNCTURE: CPT | Performed by: SURGERY

## 2021-04-13 PROCEDURE — 258N000003 HC RX IP 258 OP 636: Performed by: SURGERY

## 2021-04-13 PROCEDURE — 250N000011 HC RX IP 250 OP 636: Performed by: NURSE ANESTHETIST, CERTIFIED REGISTERED

## 2021-04-13 PROCEDURE — 250N000011 HC RX IP 250 OP 636: Performed by: SURGERY

## 2021-04-13 PROCEDURE — 86850 RBC ANTIBODY SCREEN: CPT | Performed by: ANESTHESIOLOGY

## 2021-04-13 PROCEDURE — 82565 ASSAY OF CREATININE: CPT | Performed by: SURGERY

## 2021-04-13 PROCEDURE — 88309 TISSUE EXAM BY PATHOLOGIST: CPT | Mod: TC | Performed by: SURGERY

## 2021-04-13 PROCEDURE — 999N000054 HC STATISTIC EKG NON-CHARGEABLE

## 2021-04-13 PROCEDURE — C9290 INJ, BUPIVACAINE LIPOSOME: HCPCS | Performed by: STUDENT IN AN ORGANIZED HEALTH CARE EDUCATION/TRAINING PROGRAM

## 2021-04-13 PROCEDURE — 710N000010 HC RECOVERY PHASE 1, LEVEL 2, PER MIN: Performed by: SURGERY

## 2021-04-13 PROCEDURE — 36415 COLL VENOUS BLD VENIPUNCTURE: CPT | Performed by: ANESTHESIOLOGY

## 2021-04-13 PROCEDURE — 258N000003 HC RX IP 258 OP 636: Performed by: NURSE ANESTHETIST, CERTIFIED REGISTERED

## 2021-04-13 PROCEDURE — 86900 BLOOD TYPING SEROLOGIC ABO: CPT | Performed by: ANESTHESIOLOGY

## 2021-04-13 PROCEDURE — 88342 IMHCHEM/IMCYTCHM 1ST ANTB: CPT | Mod: 26 | Performed by: PATHOLOGY

## 2021-04-13 PROCEDURE — 88341 IMHCHEM/IMCYTCHM EA ADD ANTB: CPT | Mod: 26 | Performed by: PATHOLOGY

## 2021-04-13 PROCEDURE — 250N000013 HC RX MED GY IP 250 OP 250 PS 637: Performed by: SURGERY

## 2021-04-13 PROCEDURE — 370N000017 HC ANESTHESIA TECHNICAL FEE, PER MIN: Performed by: SURGERY

## 2021-04-13 PROCEDURE — 250N000011 HC RX IP 250 OP 636: Performed by: STUDENT IN AN ORGANIZED HEALTH CARE EDUCATION/TRAINING PROGRAM

## 2021-04-13 PROCEDURE — 258N000003 HC RX IP 258 OP 636: Performed by: ANESTHESIOLOGY

## 2021-04-13 PROCEDURE — 86901 BLOOD TYPING SEROLOGIC RH(D): CPT | Performed by: ANESTHESIOLOGY

## 2021-04-13 PROCEDURE — 93010 ELECTROCARDIOGRAM REPORT: CPT | Mod: 59 | Performed by: INTERNAL MEDICINE

## 2021-04-13 PROCEDURE — 250N000025 HC SEVOFLURANE, PER MIN: Performed by: SURGERY

## 2021-04-13 PROCEDURE — 360N000077 HC SURGERY LEVEL 4, PER MIN: Performed by: SURGERY

## 2021-04-13 PROCEDURE — 84132 ASSAY OF SERUM POTASSIUM: CPT | Performed by: ANESTHESIOLOGY

## 2021-04-13 PROCEDURE — 120N000002 HC R&B MED SURG/OB UMMC

## 2021-04-13 PROCEDURE — 272N000001 HC OR GENERAL SUPPLY STERILE: Performed by: SURGERY

## 2021-04-13 PROCEDURE — 88342 IMHCHEM/IMCYTCHM 1ST ANTB: CPT | Mod: TC | Performed by: SURGERY

## 2021-04-13 PROCEDURE — 999N000157 HC STATISTIC RCP TIME EA 10 MIN

## 2021-04-13 PROCEDURE — 88309 TISSUE EXAM BY PATHOLOGIST: CPT | Mod: 26 | Performed by: PATHOLOGY

## 2021-04-13 PROCEDURE — 88341 IMHCHEM/IMCYTCHM EA ADD ANTB: CPT | Mod: TC | Performed by: SURGERY

## 2021-04-13 RX ORDER — ACETAMINOPHEN 325 MG/1
975 TABLET ORAL EVERY 8 HOURS
Status: COMPLETED | OUTPATIENT
Start: 2021-04-13 | End: 2021-04-16

## 2021-04-13 RX ORDER — ONDANSETRON 4 MG/1
4 TABLET, ORALLY DISINTEGRATING ORAL EVERY 30 MIN PRN
Status: DISCONTINUED | OUTPATIENT
Start: 2021-04-13 | End: 2021-04-13 | Stop reason: HOSPADM

## 2021-04-13 RX ORDER — FENTANYL CITRATE 50 UG/ML
25-50 INJECTION, SOLUTION INTRAMUSCULAR; INTRAVENOUS
Status: DISCONTINUED | OUTPATIENT
Start: 2021-04-13 | End: 2021-04-13 | Stop reason: HOSPADM

## 2021-04-13 RX ORDER — CEFAZOLIN SODIUM 2 G/100ML
2 INJECTION, SOLUTION INTRAVENOUS
Status: COMPLETED | OUTPATIENT
Start: 2021-04-13 | End: 2021-04-13

## 2021-04-13 RX ORDER — ONDANSETRON 2 MG/ML
INJECTION INTRAMUSCULAR; INTRAVENOUS PRN
Status: DISCONTINUED | OUTPATIENT
Start: 2021-04-13 | End: 2021-04-13

## 2021-04-13 RX ORDER — ATORVASTATIN CALCIUM 40 MG/1
80 TABLET, FILM COATED ORAL AT BEDTIME
Status: DISCONTINUED | OUTPATIENT
Start: 2021-04-13 | End: 2021-04-16 | Stop reason: HOSPADM

## 2021-04-13 RX ORDER — HYDROMORPHONE HYDROCHLORIDE 1 MG/ML
0.4 INJECTION, SOLUTION INTRAMUSCULAR; INTRAVENOUS; SUBCUTANEOUS
Status: DISCONTINUED | OUTPATIENT
Start: 2021-04-13 | End: 2021-04-16 | Stop reason: HOSPADM

## 2021-04-13 RX ORDER — INDOCYANINE GREEN AND WATER 25 MG
KIT INJECTION PRN
Status: DISCONTINUED | OUTPATIENT
Start: 2021-04-13 | End: 2021-04-13

## 2021-04-13 RX ORDER — ALBUTEROL SULFATE 90 UG/1
2 AEROSOL, METERED RESPIRATORY (INHALATION) EVERY 6 HOURS PRN
Status: DISCONTINUED | OUTPATIENT
Start: 2021-04-13 | End: 2021-04-16 | Stop reason: HOSPADM

## 2021-04-13 RX ORDER — LABETALOL HYDROCHLORIDE 5 MG/ML
10 INJECTION, SOLUTION INTRAVENOUS EVERY 4 HOURS PRN
Status: DISCONTINUED | OUTPATIENT
Start: 2021-04-13 | End: 2021-04-16 | Stop reason: HOSPADM

## 2021-04-13 RX ORDER — SODIUM CHLORIDE, SODIUM LACTATE, POTASSIUM CHLORIDE, CALCIUM CHLORIDE 600; 310; 30; 20 MG/100ML; MG/100ML; MG/100ML; MG/100ML
INJECTION, SOLUTION INTRAVENOUS CONTINUOUS
Status: DISCONTINUED | OUTPATIENT
Start: 2021-04-13 | End: 2021-04-13 | Stop reason: HOSPADM

## 2021-04-13 RX ORDER — EPHEDRINE SULFATE 50 MG/ML
INJECTION, SOLUTION INTRAMUSCULAR; INTRAVENOUS; SUBCUTANEOUS PRN
Status: DISCONTINUED | OUTPATIENT
Start: 2021-04-13 | End: 2021-04-13

## 2021-04-13 RX ORDER — NALOXONE HYDROCHLORIDE 0.4 MG/ML
0.4 INJECTION, SOLUTION INTRAMUSCULAR; INTRAVENOUS; SUBCUTANEOUS
Status: DISCONTINUED | OUTPATIENT
Start: 2021-04-13 | End: 2021-04-16 | Stop reason: HOSPADM

## 2021-04-13 RX ORDER — NALOXONE HYDROCHLORIDE 0.4 MG/ML
0.2 INJECTION, SOLUTION INTRAMUSCULAR; INTRAVENOUS; SUBCUTANEOUS
Status: DISCONTINUED | OUTPATIENT
Start: 2021-04-13 | End: 2021-04-13 | Stop reason: HOSPADM

## 2021-04-13 RX ORDER — NALOXONE HYDROCHLORIDE 0.4 MG/ML
0.2 INJECTION, SOLUTION INTRAMUSCULAR; INTRAVENOUS; SUBCUTANEOUS
Status: DISCONTINUED | OUTPATIENT
Start: 2021-04-13 | End: 2021-04-16 | Stop reason: HOSPADM

## 2021-04-13 RX ORDER — NALOXONE HYDROCHLORIDE 0.4 MG/ML
0.4 INJECTION, SOLUTION INTRAMUSCULAR; INTRAVENOUS; SUBCUTANEOUS
Status: DISCONTINUED | OUTPATIENT
Start: 2021-04-13 | End: 2021-04-13 | Stop reason: HOSPADM

## 2021-04-13 RX ORDER — HYDROMORPHONE HCL IN WATER/PF 6 MG/30 ML
0.2 PATIENT CONTROLLED ANALGESIA SYRINGE INTRAVENOUS
Status: DISCONTINUED | OUTPATIENT
Start: 2021-04-13 | End: 2021-04-16 | Stop reason: HOSPADM

## 2021-04-13 RX ORDER — FENTANYL CITRATE 50 UG/ML
INJECTION, SOLUTION INTRAMUSCULAR; INTRAVENOUS PRN
Status: DISCONTINUED | OUTPATIENT
Start: 2021-04-13 | End: 2021-04-13

## 2021-04-13 RX ORDER — PAROXETINE 20 MG/1
40 TABLET, FILM COATED ORAL EVERY MORNING
Status: DISCONTINUED | OUTPATIENT
Start: 2021-04-14 | End: 2021-04-16 | Stop reason: HOSPADM

## 2021-04-13 RX ORDER — METOPROLOL SUCCINATE 50 MG/1
50 TABLET, EXTENDED RELEASE ORAL AT BEDTIME
Status: DISCONTINUED | OUTPATIENT
Start: 2021-04-14 | End: 2021-04-16 | Stop reason: HOSPADM

## 2021-04-13 RX ORDER — LIDOCAINE HYDROCHLORIDE 20 MG/ML
INJECTION, SOLUTION INFILTRATION; PERINEURAL PRN
Status: DISCONTINUED | OUTPATIENT
Start: 2021-04-13 | End: 2021-04-13

## 2021-04-13 RX ORDER — LIDOCAINE 40 MG/G
CREAM TOPICAL
Status: DISCONTINUED | OUTPATIENT
Start: 2021-04-13 | End: 2021-04-16 | Stop reason: HOSPADM

## 2021-04-13 RX ORDER — PROPOFOL 10 MG/ML
INJECTION, EMULSION INTRAVENOUS PRN
Status: DISCONTINUED | OUTPATIENT
Start: 2021-04-13 | End: 2021-04-13

## 2021-04-13 RX ORDER — SODIUM CHLORIDE, SODIUM LACTATE, POTASSIUM CHLORIDE, CALCIUM CHLORIDE 600; 310; 30; 20 MG/100ML; MG/100ML; MG/100ML; MG/100ML
INJECTION, SOLUTION INTRAVENOUS CONTINUOUS
Status: DISCONTINUED | OUTPATIENT
Start: 2021-04-13 | End: 2021-04-14

## 2021-04-13 RX ORDER — OXYCODONE HYDROCHLORIDE 5 MG/1
5 TABLET ORAL EVERY 4 HOURS PRN
Status: DISCONTINUED | OUTPATIENT
Start: 2021-04-13 | End: 2021-04-16 | Stop reason: HOSPADM

## 2021-04-13 RX ORDER — ACETAMINOPHEN 325 MG/1
650 TABLET ORAL EVERY 4 HOURS PRN
Status: DISCONTINUED | OUTPATIENT
Start: 2021-04-16 | End: 2021-04-16 | Stop reason: HOSPADM

## 2021-04-13 RX ORDER — LIDOCAINE 40 MG/G
CREAM TOPICAL
Status: DISCONTINUED | OUTPATIENT
Start: 2021-04-13 | End: 2021-04-13 | Stop reason: HOSPADM

## 2021-04-13 RX ORDER — GABAPENTIN 300 MG/1
300 CAPSULE ORAL
Status: COMPLETED | OUTPATIENT
Start: 2021-04-13 | End: 2021-04-13

## 2021-04-13 RX ORDER — ONDANSETRON 2 MG/ML
4 INJECTION INTRAMUSCULAR; INTRAVENOUS ONCE
Status: COMPLETED | OUTPATIENT
Start: 2021-04-13 | End: 2021-04-13

## 2021-04-13 RX ORDER — HYDROMORPHONE HYDROCHLORIDE 1 MG/ML
.3-.5 INJECTION, SOLUTION INTRAMUSCULAR; INTRAVENOUS; SUBCUTANEOUS EVERY 5 MIN PRN
Status: DISCONTINUED | OUTPATIENT
Start: 2021-04-13 | End: 2021-04-13 | Stop reason: HOSPADM

## 2021-04-13 RX ORDER — GABAPENTIN 300 MG/1
300 CAPSULE ORAL 3 TIMES DAILY
Status: DISCONTINUED | OUTPATIENT
Start: 2021-04-13 | End: 2021-04-16 | Stop reason: HOSPADM

## 2021-04-13 RX ORDER — ONDANSETRON 4 MG/1
4 TABLET, ORALLY DISINTEGRATING ORAL EVERY 6 HOURS PRN
Status: DISCONTINUED | OUTPATIENT
Start: 2021-04-13 | End: 2021-04-16 | Stop reason: HOSPADM

## 2021-04-13 RX ORDER — OXYCODONE HYDROCHLORIDE 10 MG/1
10 TABLET ORAL EVERY 4 HOURS PRN
Status: DISCONTINUED | OUTPATIENT
Start: 2021-04-13 | End: 2021-04-16 | Stop reason: HOSPADM

## 2021-04-13 RX ORDER — HYDRALAZINE HYDROCHLORIDE 20 MG/ML
INJECTION INTRAMUSCULAR; INTRAVENOUS PRN
Status: DISCONTINUED | OUTPATIENT
Start: 2021-04-13 | End: 2021-04-13

## 2021-04-13 RX ORDER — BUPIVACAINE HYDROCHLORIDE 2.5 MG/ML
INJECTION, SOLUTION EPIDURAL; INFILTRATION; INTRACAUDAL PRN
Status: DISCONTINUED | OUTPATIENT
Start: 2021-04-13 | End: 2021-04-13

## 2021-04-13 RX ORDER — ONDANSETRON 2 MG/ML
4 INJECTION INTRAMUSCULAR; INTRAVENOUS EVERY 6 HOURS PRN
Status: DISCONTINUED | OUTPATIENT
Start: 2021-04-13 | End: 2021-04-16 | Stop reason: HOSPADM

## 2021-04-13 RX ORDER — FLUMAZENIL 0.1 MG/ML
0.2 INJECTION, SOLUTION INTRAVENOUS
Status: DISCONTINUED | OUTPATIENT
Start: 2021-04-13 | End: 2021-04-13 | Stop reason: HOSPADM

## 2021-04-13 RX ORDER — ONDANSETRON 2 MG/ML
4 INJECTION INTRAMUSCULAR; INTRAVENOUS EVERY 30 MIN PRN
Status: DISCONTINUED | OUTPATIENT
Start: 2021-04-13 | End: 2021-04-13 | Stop reason: HOSPADM

## 2021-04-13 RX ORDER — ACETAMINOPHEN 325 MG/1
975 TABLET ORAL ONCE
Status: COMPLETED | OUTPATIENT
Start: 2021-04-13 | End: 2021-04-13

## 2021-04-13 RX ORDER — CEFAZOLIN SODIUM 2 G/100ML
2 INJECTION, SOLUTION INTRAVENOUS SEE ADMIN INSTRUCTIONS
Status: DISCONTINUED | OUTPATIENT
Start: 2021-04-13 | End: 2021-04-13 | Stop reason: HOSPADM

## 2021-04-13 RX ADMIN — ONDANSETRON 4 MG: 2 INJECTION INTRAMUSCULAR; INTRAVENOUS at 16:00

## 2021-04-13 RX ADMIN — PHENYLEPHRINE HYDROCHLORIDE 100 MCG: 10 INJECTION INTRAVENOUS at 14:53

## 2021-04-13 RX ADMIN — SUGAMMADEX 200 MG: 100 INJECTION, SOLUTION INTRAVENOUS at 15:25

## 2021-04-13 RX ADMIN — ROCURONIUM BROMIDE 10 MG: 10 INJECTION INTRAVENOUS at 13:49

## 2021-04-13 RX ADMIN — HYDRALAZINE HYDROCHLORIDE 5 MG: 20 INJECTION INTRAMUSCULAR; INTRAVENOUS at 12:51

## 2021-04-13 RX ADMIN — Medication 5 MG: at 12:04

## 2021-04-13 RX ADMIN — LIDOCAINE HYDROCHLORIDE 100 MG: 20 INJECTION, SOLUTION INFILTRATION; PERINEURAL at 11:46

## 2021-04-13 RX ADMIN — ROCURONIUM BROMIDE 20 MG: 10 INJECTION INTRAVENOUS at 12:53

## 2021-04-13 RX ADMIN — BUPIVACAINE 20 ML: 13.3 INJECTION, SUSPENSION, LIPOSOMAL INFILTRATION at 10:05

## 2021-04-13 RX ADMIN — FENTANYL CITRATE 50 MCG: 50 INJECTION, SOLUTION INTRAMUSCULAR; INTRAVENOUS at 12:31

## 2021-04-13 RX ADMIN — METRONIDAZOLE 500 MG: 500 INJECTION, SOLUTION INTRAVENOUS at 11:40

## 2021-04-13 RX ADMIN — PHENYLEPHRINE HYDROCHLORIDE 100 MCG: 10 INJECTION INTRAVENOUS at 11:47

## 2021-04-13 RX ADMIN — ATORVASTATIN CALCIUM 80 MG: 40 TABLET, FILM COATED ORAL at 22:01

## 2021-04-13 RX ADMIN — GABAPENTIN 300 MG: 300 CAPSULE ORAL at 09:10

## 2021-04-13 RX ADMIN — ONDANSETRON 4 MG: 2 INJECTION INTRAMUSCULAR; INTRAVENOUS at 10:06

## 2021-04-13 RX ADMIN — PHENYLEPHRINE HYDROCHLORIDE 100 MCG: 10 INJECTION INTRAVENOUS at 11:58

## 2021-04-13 RX ADMIN — FENTANYL CITRATE 50 MCG: 50 INJECTION, SOLUTION INTRAMUSCULAR; INTRAVENOUS at 13:05

## 2021-04-13 RX ADMIN — ONDANSETRON 4 MG: 2 INJECTION INTRAMUSCULAR; INTRAVENOUS at 15:19

## 2021-04-13 RX ADMIN — SODIUM CHLORIDE, POTASSIUM CHLORIDE, SODIUM LACTATE AND CALCIUM CHLORIDE: 600; 310; 30; 20 INJECTION, SOLUTION INTRAVENOUS at 18:02

## 2021-04-13 RX ADMIN — FENTANYL CITRATE 50 MCG: 50 INJECTION, SOLUTION INTRAMUSCULAR; INTRAVENOUS at 14:58

## 2021-04-13 RX ADMIN — Medication 1 G: at 14:00

## 2021-04-13 RX ADMIN — ACETAMINOPHEN 975 MG: 325 TABLET, FILM COATED ORAL at 09:10

## 2021-04-13 RX ADMIN — FENTANYL CITRATE 100 MCG: 50 INJECTION, SOLUTION INTRAMUSCULAR; INTRAVENOUS at 11:46

## 2021-04-13 RX ADMIN — Medication 5 MG: at 12:16

## 2021-04-13 RX ADMIN — PHENYLEPHRINE HYDROCHLORIDE 100 MCG: 10 INJECTION INTRAVENOUS at 12:16

## 2021-04-13 RX ADMIN — INDOCYANINE GREEN 7.5 MG: KIT INTRAVENOUS at 14:36

## 2021-04-13 RX ADMIN — Medication 2 G: at 12:00

## 2021-04-13 RX ADMIN — FENTANYL CITRATE 50 MCG: 50 INJECTION, SOLUTION INTRAMUSCULAR; INTRAVENOUS at 12:21

## 2021-04-13 RX ADMIN — PROPOFOL 150 MG: 10 INJECTION, EMULSION INTRAVENOUS at 11:46

## 2021-04-13 RX ADMIN — FENTANYL CITRATE 50 MCG: 50 INJECTION, SOLUTION INTRAMUSCULAR; INTRAVENOUS at 10:01

## 2021-04-13 RX ADMIN — FENTANYL CITRATE 50 MCG: 50 INJECTION, SOLUTION INTRAMUSCULAR; INTRAVENOUS at 13:57

## 2021-04-13 RX ADMIN — GABAPENTIN 300 MG: 300 CAPSULE ORAL at 19:56

## 2021-04-13 RX ADMIN — PROCHLORPERAZINE EDISYLATE 5 MG: 5 INJECTION INTRAMUSCULAR; INTRAVENOUS at 15:59

## 2021-04-13 RX ADMIN — SODIUM CHLORIDE, POTASSIUM CHLORIDE, SODIUM LACTATE AND CALCIUM CHLORIDE: 600; 310; 30; 20 INJECTION, SOLUTION INTRAVENOUS at 11:40

## 2021-04-13 RX ADMIN — Medication 5 MG: at 14:53

## 2021-04-13 RX ADMIN — ACETAMINOPHEN 975 MG: 325 TABLET, FILM COATED ORAL at 18:00

## 2021-04-13 RX ADMIN — ROCURONIUM BROMIDE 10 MG: 10 INJECTION INTRAVENOUS at 14:58

## 2021-04-13 RX ADMIN — ROCURONIUM BROMIDE 50 MG: 10 INJECTION INTRAVENOUS at 11:46

## 2021-04-13 RX ADMIN — BUPIVACAINE HYDROCHLORIDE 20 ML: 2.5 INJECTION, SOLUTION EPIDURAL; INFILTRATION; INTRACAUDAL; PERINEURAL at 10:05

## 2021-04-13 RX ADMIN — ENOXAPARIN SODIUM 40 MG: 40 INJECTION SUBCUTANEOUS at 10:06

## 2021-04-13 ASSESSMENT — COPD QUESTIONNAIRES: COPD: 1

## 2021-04-13 ASSESSMENT — LIFESTYLE VARIABLES: TOBACCO_USE: 1

## 2021-04-13 ASSESSMENT — ACTIVITIES OF DAILY LIVING (ADL): ADLS_ACUITY_SCORE: 11

## 2021-04-13 ASSESSMENT — MIFFLIN-ST. JEOR: SCORE: 1497.38

## 2021-04-13 NOTE — BRIEF OP NOTE
[facility]    Brief Operative Note    Pre-operative diagnosis: History of colonic polyps [Z86.010]  Post-operative diagnosis Same as pre-operative diagnosis  Procedure: Procedure(s):  1. Laparoscopic extended right colectomy  Surgeon: Surgeon(s) and Role:     * Jitendra Rodríguez MD - Primary     PARVEZ Roy fellow  Anesthesia: Combined General with Block   Estimated blood loss:25 mL  Drains: None  UOP: 100 mL  IVF: 700 mL  Specimens:   ID Type Source Tests Collected by Time Destination   A : Appendix, Terminal Ileum, Ascending Colon, and Transverse Colon Tissue Colon SURGICAL PATHOLOGY EXAM Jitendra Rodríguez MD 4/13/2021  2:49 PM      Findings:   No masses or neoplasia present.  Complications: None.  Implants:  None      Jitendra Rodríguez MD  Division of Colon and Rectal Surgery  [facility]  [phone]

## 2021-04-13 NOTE — ANESTHESIA POSTPROCEDURE EVALUATION
Patient: Tevin Guidry    Procedure(s):  Laparoscopic extended right colectomy    Diagnosis:History of colonic polyps [Z86.010]  Diagnosis Additional Information: No value filed.    Anesthesia Type:  General    Note:  Disposition: Admission   Postop Pain Control: Uneventful            Sign Out: Well controlled pain   PONV: No   Neuro/Psych: Uneventful            Sign Out: Acceptable/Baseline neuro status   Airway/Respiratory: Uneventful            Sign Out: Acceptable/Baseline resp. status   CV/Hemodynamics: Uneventful            Sign Out: Acceptable CV status   Other NRE: NONE   DID A NON-ROUTINE EVENT OCCUR? No         Last vitals:  Vitals:    04/13/21 1545 04/13/21 1600 04/13/21 1630   BP: (!) 162/80 (!) 168/80    Pulse: 59     Resp: 14 14    Temp: 36.7  C (98.1  F)  36.7  C (98.1  F)   SpO2: 100%         Last vitals prior to Anesthesia Care Transfer:  CRNA VITALS  4/13/2021 1518 - 4/13/2021 1618      4/13/2021             Pulse:  70    SpO2:  99 %          Electronically Signed By: Almas Ortiz MD  April 13, 2021  4:31 PM

## 2021-04-13 NOTE — OR NURSING
Bilateral TAP blocks completed with 50 mcg fentanyl. Patient tolerated procedure well, is vitally stable, with no immediate complications. Will continue to monitor until transfer to OR. Okay to give enoxaparin after block per anesthesia.

## 2021-04-13 NOTE — ANESTHESIA PREPROCEDURE EVALUATION
Anesthesia Pre-Procedure Evaluation    Patient: Tevin Guidry   MRN: [id number] : 1950        Preoperative Diagnosis: History of colonic polyps [Z86.010]   Procedure : Procedure(s):  Laparoscopic extended right colectomy     Past Medical History:   Diagnosis Date     Bladder cancer (H)      Colon polyps     multiple may need colectomy (adenoma)     COPD (chronic obstructive pulmonary disease) (H)      Hyperlipidemia LDL goal <130      Hypertension goal BP (blood pressure) < 140/90      Social phobia       Past Surgical History:   Procedure Laterality Date     CLAVICLE SURGERY Right     fracture in childhood     COLONOSCOPY N/A 3/30/2017    Procedure: COLONOSCOPY;  Surgeon: Jie Onofre MD;  Location: [facility]     COLONOSCOPY N/A 3/2/2021    Procedure: COLONOSCOPY, WITH POLYPECTOMY, hot snare, lift with elaview and epi, clip for hemorrhage control, tattoo;  Surgeon: Ra Cardoso MD;  Location: [facility]     CYSTOSCOPY, BIOPSY BLADDER INSTILL OPTICAL AGENT N/A 2020    Procedure: CYSTOSCOPY, WITH INSTILLATION OF OPTICAL IMAGING AGENT INTO BLADDER AND BIOPSY OF BLADDER (CYSVIEW);  Surgeon: Kirt Guzman MD;  [facility]     CYSTOSCOPY, TRANSURETHRAL RESECTION (TUR) TUMOR BLADDER, COMBINED N/A 2019    Procedure: cystoscopy,Transurethral Resection Of Bladder Tumor;  Surgeon: Almas Sunshine MD;  [facility]     CYSTOSCOPY, TRANSURETHRAL RESECTION (TUR) TUMOR BLADDER, COMBINED N/A 2020    Procedure: CYSTOSCOPY, WITH TRANSURETHRAL RESECTION BLADDER TUMOR;  Surgeon: Almas Sunshine MD;  [facility]     DAVINCI BYPASS ARTERY CORONARY N/A 2014    Procedure: DAVINCI BYPASS ARTERY CORONARY;  Surgeon: Lam Solis MD;  Location: [facility]     ESOPHAGOSCOPY, GASTROSCOPY, DUODENOSCOPY (EGD), COMBINED N/A 3/30/2017    Procedure: COMBINED ESOPHAGOSCOPY, GASTROSCOPY, DUODENOSCOPY (EGD);  Surgeon: Jie Onofre MD;  Location: [facility]      HC COLONOSCOPY THRU STOMA, DIAGNOSTIC      polyps due 2011     PHACOEMULSIFICATION CLEAR CORNEA WITH STANDARD INTRAOCULAR LENS IMPLANT Right 2020    Procedure: RIGHT EYE COMPLEX CATARACT REMOVAL WITH INTRAOCULAR LENS IMPLANT;  Surgeon: Aimee Marcus MD;  Location: [facility]     SURGICAL HISTORY OF -       right clavicular fracture with ORIF      Allergies   Allergen Reactions     Codeine Nausea and Vomiting      Social History     Tobacco Use     Smoking status: Former Smoker     Packs/day: 1.50     Years: 30.00     Pack years: 45.00     Quit date: 2019     Years since quittin.7     Smokeless tobacco: Never Used     Tobacco comment: had restarted- now quit smoking 20   Substance Use Topics     Alcohol use: No     Comment: quit 35 years ago      Wt Readings from Last 1 Encounters:   21 75.7 kg (166 lb 14.2 oz)        Anesthesia Evaluation            ROS/MED HX  ENT/Pulmonary:     (+) MICHELLE risk factors, tobacco use, Past use, COPD,     Neurologic:       Cardiovascular: Comment: CAD s/p hybrid robotic-assisted CABG (LIMA - LAD) and PCI with ALENA to RCA (distal to prox) ().   >4 METS. Enjoys walking dog (takes care of brother's dog during the day). Can go up and down stairs without any symptoms.     Denies chest pain, SOB, palpitations, syncope, ELENA, orthopnea, or PND.   RCRI : CAD (h/o MI, positive stress test, angina, Qs on EKG).  0.9 % risk of major adverse cardiac event.  On statins, beta-blocker, ASA (held for surgery).  Seen in PAC, no further testing was ordered.    (+) hypertension--CAD ---Previous cardiac testing   Echo: Date: Results:    Stress Test: Date: Results:    ECG Reviewed: Date: Results:  EKG 3/2017  Marked sinus Bradycardia   Inverted precordial T-waves -May be normal variant -consider anteroseptal ischemia  Cath: Date: Results:      METS/Exercise Tolerance:     Hematologic:       Musculoskeletal:       GI/Hepatic: Comment: Colon polyps not amenable to endoscopic  resection      Renal/Genitourinary: Comment: H/o bladder ca      Endo:       Psychiatric/Substance Use: Comment: Social phobia and anxiety  H/o alcohol abuse (Quit drinking 35 years ago.) Recreational drug usage: Cannabis (Smokes daily)      Infectious Disease:       Malignancy:       Other:               OUTSIDE LABS:  CBC:   Lab Results   Component Value Date    WBC 8.4 04/09/2021    WBC 9.0 01/23/2020    HGB 14.7 04/09/2021    HGB 14.3 01/23/2020    HCT 44.0 04/09/2021    HCT 43.1 01/23/2020     04/09/2021     01/23/2020     BMP:   Lab Results   Component Value Date     04/09/2021     08/03/2020    POTASSIUM 4.4 04/09/2021    POTASSIUM 5.0 08/03/2020    CHLORIDE 108 04/09/2021    CHLORIDE 107 08/03/2020    CO2 30 04/09/2021    CO2 30 08/03/2020    BUN 11 04/09/2021    BUN 13 08/03/2020    CR 0.80 04/09/2021    CR 0.86 08/03/2020    GLC 89 04/09/2021    GLC 84 08/03/2020     COAGS:   Lab Results   Component Value Date    PTT 30 04/09/2021    INR 1.04 04/09/2021     POC:   Lab Results   Component Value Date    BGM 92 04/13/2021     HEPATIC:   Lab Results   Component Value Date    ALBUMIN 3.7 04/09/2021    PROTTOTAL 7.1 04/09/2021    ALT 23 04/09/2021    AST 18 04/09/2021    ALKPHOS 130 04/09/2021    BILITOTAL 0.4 04/09/2021     OTHER:   Lab Results   Component Value Date    PH 7.36 09/30/2014    LACT 0.6 (L) 09/29/2014    A1C 5.4 04/09/2021    TAMMIE 9.0 04/09/2021    PHOS 4.3 09/30/2014    MAG 1.9 10/09/2014    TSH 0.70 01/25/2017       Anesthesia Plan    ASA Status:  3   NPO Status:  NPO Appropriate    Anesthesia Type: General.     - Airway: ETT   Induction: Intravenous.   Maintenance: Balanced.   Techniques and Equipment:     - Lines/Monitors: 2nd IV     Consents    Anesthesia Plan(s) and associated risks, benefits, and realistic alternatives discussed. Questions answered and patient/representative(s) expressed understanding.     - Discussed with:  Patient      - Extended  Intubation/Ventilatory Support Discussed: No.      - Patient is DNR/DNI Status: No    Use of blood products discussed: Yes.     - Discussed with: Patient.     Postoperative Care    Pain management: IV analgesics.   PONV prophylaxis: Ondansetron (or other 5HT-3)     Comments:           Anesthesia attending    70 year old male who  has a past medical history of Bladder cancer (H), Colon polyps, COPD (chronic obstructive pulmonary disease) (H), Hyperlipidemia LDL goal <130, Hypertension goal BP (blood pressure) < 140/90, and Social phobia. to OR for Procedure(s):  Laparoscopic extended right colectomy    Hemoglobin   Date Value Ref Range Status   04/09/2021 14.7 13.3 - 17.7 g/dL Final     Potassium   Date Value Ref Range Status   04/09/2021 4.4 3.4 - 5.3 mmol/L Final     NPO status adequate.    Plan for GA, standard monitors, arterial line, PONV prophylaxis, antibiotics. Plan discussed with the anesthesia and surgery teams. Consent in chart.         Massiel Crespo MD

## 2021-04-13 NOTE — ANESTHESIA PROCEDURE NOTES
Airway       Patient location during procedure: OR  Staff -        CRNA: Josseline Tomlinson APRN CRNA       Performed By: CRNA  Consent for Airway        Urgency: elective  Indications and Patient Condition       Indications for airway management: renate-procedural       Induction type:intravenous       Mask difficulty assessment: 2 - vent by mask + OA or adjuvant +/- NMBA    Final Airway Details       Final airway type: endotracheal airway       Successful airway: ETT - single  Endotracheal Airway Details        ETT size (mm): 7.5       Cuffed: yes       Successful intubation technique: direct laryngoscopy       DL Blade Type: Womack 2       Grade View of Cords: 1       Adjucts: stylet       Position: Right       Measured from: lips       Secured at (cm): 23       Bite block used: None    Post intubation assessment        Placement verified by: capnometry, equal breath sounds and chest rise        Number of attempts at approach: 1       Number of other approaches attempted: 0       Secured with: pink tape       Ease of procedure: easy       Dentition: Intact and Unchanged    Medication(s) Administered   Medication Administration Time: 4/13/2021 11:50 AM

## 2021-04-13 NOTE — ANESTHESIA PROCEDURE NOTES
TAP Procedure Note  Pre-Procedure   Staff -        Anesthesiologist:  Paul Schmidt MD       Resident/Fellow: Kandy Hutton MD       Performed By: resident       Location: pre-op       Pre-Anesthestic Checklist: patient identified, IV checked, site marked, risks and benefits discussed, informed consent, monitors and equipment checked, pre-op evaluation, at physician/surgeon's request and post-op pain management  Timeout:       Correct Patient: Yes        Correct Procedure: Yes        Correct Site: Yes        Correct Position: Yes        Correct Laterality: Yes        Site Marked: Yes  Procedure Documentation  Procedure: TAP       Diagnosis: POST OPERATIVE PAIN       Laterality: bilateral       Patient Position: supine       Patient Prep/Sterile Barriers: sterile gloves, mask       Skin prep: Chloraprep       Needle Type: short bevel       Needle Gauge: 21.        Needle Length (millimeters): 110        - Ultrasound guided       - Ultrasound used to identify targeted nerve, plexus, vascular marker, or fascial plane and place a needle adjacent to it in real-time       - Ultrasound was used to visualize the spread of anesthetic in close proximity to the above referenced structure       - A permanent image is entered into the patient's record.    Assessment/Narrative         The placement was negative for: blood aspirated, painful injection and site bleeding       Paresthesias: No.     Bolus given via needle..        Secured via.        Insertion/Infusion Method: Single Shot       Complications: none       Injection made incrementally with aspirations every 5 mL.

## 2021-04-13 NOTE — OP NOTE
[facility] Colorectal Surgery Operative Report    PREOPERATIVE DIAGNOSIS:  1. History of colon polyps  2. Coronary artery disease  3. Hypertension  4. COPD  5. Bladder cancer  6. Hyperlipidemia        POSTOPERATIVE DIAGNOSIS:   1. History of colon polyps  2. Coronary artery disease  3. Hypertension  4. COPD  5. Bladder cancer  6. Hyperlipidemia    PROCEDURE:  1. Laparoscopic extended right colectomy.    ANESTHESIA: General endotracheal anesthesia plus local anesthesia.    SURGEON:  Jitendra Rodríguez M.D.    ASSISTANT(S): Winsome Satcy, CRS Fellow.    INDICATIONS FOR PROCEDURE  Tevin Guidry is a 70 year old male with a history of multiple colonoscopies for recurrent colon polyps. He was recently treated by Dr. Steward who was able to clear his ascending colon, but unable to clear his transverse colon. The area which from which the colon and rectum were cleared distally was marked with a tattoo. Given his extent of polyp burden, it was recommended he undergo resection to reduce his risk of cancer from polyp burden which was no longer amenable to endoscopic resection and monitoring. I thoroughly discussed the risks, benefits, and alternatives of operative treatment with the patient and he agreed to proceed.    General risks related to abdominal surgery were reviewed with the patient. These include, but are not limited to, death, myocardial infarction, pneumonia, urinary tract infection, deep venous thrombosis with or without pulmonary embolus, abdominal infection from bowel injury or abscess, fistula, anastomotic leak that may require reoperation and a stoma, ureteral injury, bowel obstruction, wound infection, and bleeding.    OPERATIVE PROCEDURE: After obtaining informed consent, the patient was brought to the operating room and placed in the supine position. Appropriate preoperative mechanical and chemical deep venous thrombosis prophylaxis, as well as preoperative prophylactic parenteral antibiotics were given.  "General endotracheal anesthesia was gently induced. Bilateral lower extremity pneumatic compression devices were applied and all pressure points were cushioned. A Echavarria catheter was inserted without difficulty. The abdomen was then preped and draped in the standard sterile fashion. After a \"time-out\" was performed, a 12mm supraumbilical incision was made and a 12mm trocar was inserted in an open fashion. Pneumoperitoneum was established with CO2 without difficulty. A 10mm 30 degree angle laparoscope was then used to explore the peritoneal cavity. No evidence of bleeding, bowel injury or metastatic disease was visualized. Additional trocars were placed at right lower, left upper area (5mm) and left lower quadrant (5mm), under direct vision. We then identified the distal end of the tattoo at the distal transverse colon. Given this landmark, we proceeded with an extended right hemicolectomy. The small bowel and omentum were then displaced towards the left side of the peritoneal cavity and the right colon was tented caudally and laterally. The ileocolic vessels were then identified and dissected at their origin with monopolar and bipolar electrocautery taking care to fully visualize and protect the duodenum and right ureter. The cecum, ascending colon and proximal transverse colon were mobilized using a medial-to-lateral technique The lateral attachments of the appendix and ascending colon were then divided. The gastrocolic ligament was taken down beyond the distal end of the tattoo to enable for adequate resection.     After obtaining sufficient mobilization of the colon to perform our resection, a 5-cm supraumbilical extraction incision was made and a wound protector was placed. The terminal ileum, ascending colon and transverse colon were brought through the incision. The greater omentum up to our proposed area of colonic transection was dissected and divided with the Ligasure device. The ileocolic artery was doubly " divided at its origin with a Ligasure device and suture ligation. The mesocolon was divided with a Ligusure device up to the required bowel segments for our resection and anastomosis. No bleeding was visualized. The terminal ileum and transverse colon were aligned, making sure to not incorporate mesentery or adjacent tissues, and a stapled side-to-side functional end-to-end ileo-ileal anastomosis was made with a KARLA 75 surgical stapler. The anastomosis was evaluated through the common enterotomy and no bleeding was visualized. After this, a TA 90 surgical stapler was used to transect the remaining colon and terminal ileum, making sure the KARLA staple lines were not aligned. The specimen, including the terminal ileum, appendix, ascending colon and transverse colon were sent to pathology. The anastomosis appeared to be well vascularized, widely patent, and without tension or torsion. Areas of bleeding at the anastomosis were reinforced with 3-0 PDS. I    The bowel was then returned to the peritoneal cavity. The peritoneal cavity was irrigated and suctioned. There was no evidence of bleeding or bowel injury. I then personally took the specimen to pathology where it was opened, and without any evidence of polyps grossly present at the colon margin. I returned to the operating room. All trocars were removed with no signs of bleeding. Instrument, sponge, and needle counts were all correct, as reported to me, at this time. The periumbilical extraction incision fascia was re-approximated with running 0 PDS sutures. Wounds were irrigated and dried, and hemostasis was corroborated. Skin incisions were re-approximated with running subcuticular 4-0 Monocryl sutures and Dermabond was applied. The patient tolerated the procedure well.    COMPLICATIONS: none.    ESTIMATED BLOOD LOSS: 25 mL.    REPLACEMENT:     - Crystalloid: 25 mL.    SPECIMEN(S): terminal ileum, vermiform appendix, ascending colon, proximal and middle transverse  colon, mesocolon, and partial omentectomy.    OPERATIVE COUNT: Complete.    OPERATIVE FINDINGS:   1. Stapled ileocolonic anastomosis (ileum to distal transverse colon). No evidence of metastatic disease.  2. Margins grossly clear from polyp brionnaden      Jitendra Rodríguez MD  Division of Colon and Rectal Surgery  [facility]  [phone]

## 2021-04-13 NOTE — ANESTHESIA CARE TRANSFER NOTE
Patient: Tevin Guidry    Procedure(s):  Laparoscopic extended right colectomy    Diagnosis: History of colonic polyps [Z86.010]  Diagnosis Additional Information: No value filed.    Anesthesia Type:   General     Note:    Oropharynx: oropharynx clear of all foreign objects and spontaneously breathing  Level of Consciousness: awake  Oxygen Supplementation: nasal cannula  Level of Supplemental Oxygen (L/min / FiO2): 4L  Independent Airway: airway patency satisfactory and stable  Dentition: dentition unchanged  Vital Signs Stable: post-procedure vital signs reviewed and stable  Report to RN Given: handoff report given  Patient transferred to: PACU    Handoff Report: Identifed the Patient, Identified the Reponsible Provider, Reviewed the pertinent medical history, Discussed the surgical course, Reviewed Intra-OP anesthesia mangement and issues during anesthesia, Set expectations for post-procedure period and Allowed opportunity for questions and acknowledgement of understanding      Vitals: (Last set prior to Anesthesia Care Transfer)  CRNA VITALS  4/13/2021 1518 - 4/13/2021 1548      4/13/2021             Pulse:  70    SpO2:  99 %        Electronically Signed By: FRANCISCA Russell CRNA  April 13, 2021  3:48 PM

## 2021-04-13 NOTE — PROGRESS NOTES
Admitted/transferred from: PACU  2 RN skin assessment completed by Luz Huitron, RN and Jackelyn Duque RN.  Skin assessment finding: 3 abdominal lap sites with liquid bandage, CDI. Abdominal incision above navel with liquid bandage, CDI. No other skin deficits noted.   Interventions/actions: Monitor incisions.     Will continue to monitor.

## 2021-04-14 ENCOUNTER — PATIENT OUTREACH (OUTPATIENT)
Dept: GERIATRIC MEDICINE | Facility: CLINIC | Age: 71
End: 2021-04-14

## 2021-04-14 ENCOUNTER — APPOINTMENT (OUTPATIENT)
Dept: PHYSICAL THERAPY | Facility: CLINIC | Age: 71
DRG: 331 | End: 2021-04-14
Attending: SURGERY
Payer: COMMERCIAL

## 2021-04-14 LAB
ANION GAP SERPL CALCULATED.3IONS-SCNC: 7 MMOL/L (ref 3–14)
BUN SERPL-MCNC: 9 MG/DL (ref 7–30)
CALCIUM SERPL-MCNC: 8.4 MG/DL (ref 8.5–10.1)
CHLORIDE SERPL-SCNC: 104 MMOL/L (ref 94–109)
CO2 SERPL-SCNC: 24 MMOL/L (ref 20–32)
CREAT SERPL-MCNC: 0.75 MG/DL (ref 0.66–1.25)
ERYTHROCYTE [DISTWIDTH] IN BLOOD BY AUTOMATED COUNT: 12.8 % (ref 10–15)
GFR SERPL CREATININE-BSD FRML MDRD: >90 ML/MIN/{1.73_M2}
GLUCOSE SERPL-MCNC: 135 MG/DL (ref 70–99)
HCT VFR BLD AUTO: 42.6 % (ref 40–53)
HGB BLD-MCNC: 14.7 G/DL (ref 13.3–17.7)
INTERPRETATION ECG - MUSE: NORMAL
MAGNESIUM SERPL-MCNC: 2.1 MG/DL (ref 1.6–2.3)
MCH RBC QN AUTO: 32 PG (ref 26.5–33)
MCHC RBC AUTO-ENTMCNC: 34.5 G/DL (ref 31.5–36.5)
MCV RBC AUTO: 93 FL (ref 78–100)
PLATELET # BLD AUTO: 255 10E9/L (ref 150–450)
POTASSIUM SERPL-SCNC: 4.2 MMOL/L (ref 3.4–5.3)
RBC # BLD AUTO: 4.59 10E12/L (ref 4.4–5.9)
SODIUM SERPL-SCNC: 136 MMOL/L (ref 133–144)
WBC # BLD AUTO: 15.2 10E9/L (ref 4–11)

## 2021-04-14 PROCEDURE — 250N000009 HC RX 250: Performed by: STUDENT IN AN ORGANIZED HEALTH CARE EDUCATION/TRAINING PROGRAM

## 2021-04-14 PROCEDURE — 258N000003 HC RX IP 258 OP 636: Performed by: SURGERY

## 2021-04-14 PROCEDURE — 250N000013 HC RX MED GY IP 250 OP 250 PS 637: Performed by: SURGERY

## 2021-04-14 PROCEDURE — 85027 COMPLETE CBC AUTOMATED: CPT | Performed by: SURGERY

## 2021-04-14 PROCEDURE — 97161 PT EVAL LOW COMPLEX 20 MIN: CPT | Mod: GP

## 2021-04-14 PROCEDURE — 83735 ASSAY OF MAGNESIUM: CPT | Performed by: SURGERY

## 2021-04-14 PROCEDURE — 120N000002 HC R&B MED SURG/OB UMMC

## 2021-04-14 PROCEDURE — 250N000011 HC RX IP 250 OP 636

## 2021-04-14 PROCEDURE — 80048 BASIC METABOLIC PNL TOTAL CA: CPT | Performed by: SURGERY

## 2021-04-14 PROCEDURE — 36415 COLL VENOUS BLD VENIPUNCTURE: CPT | Performed by: SURGERY

## 2021-04-14 PROCEDURE — 97530 THERAPEUTIC ACTIVITIES: CPT | Mod: GP

## 2021-04-14 PROCEDURE — 250N000011 HC RX IP 250 OP 636: Performed by: SURGERY

## 2021-04-14 PROCEDURE — 97116 GAIT TRAINING THERAPY: CPT | Mod: GP

## 2021-04-14 RX ORDER — METOPROLOL TARTRATE 1 MG/ML
5 INJECTION, SOLUTION INTRAVENOUS EVERY 6 HOURS
Status: COMPLETED | OUTPATIENT
Start: 2021-04-14 | End: 2021-04-14

## 2021-04-14 RX ADMIN — METOPROLOL TARTRATE 5 MG: 1 INJECTION, SOLUTION INTRAVENOUS at 08:14

## 2021-04-14 RX ADMIN — GABAPENTIN 300 MG: 300 CAPSULE ORAL at 08:14

## 2021-04-14 RX ADMIN — SODIUM CHLORIDE, POTASSIUM CHLORIDE, SODIUM LACTATE AND CALCIUM CHLORIDE: 600; 310; 30; 20 INJECTION, SOLUTION INTRAVENOUS at 02:40

## 2021-04-14 RX ADMIN — METOPROLOL TARTRATE 5 MG: 1 INJECTION, SOLUTION INTRAVENOUS at 14:02

## 2021-04-14 RX ADMIN — ACETAMINOPHEN 975 MG: 325 TABLET, FILM COATED ORAL at 01:26

## 2021-04-14 RX ADMIN — PAROXETINE HYDROCHLORIDE HEMIHYDRATE 40 MG: 20 TABLET, FILM COATED ORAL at 08:14

## 2021-04-14 RX ADMIN — GABAPENTIN 300 MG: 300 CAPSULE ORAL at 20:11

## 2021-04-14 RX ADMIN — GABAPENTIN 300 MG: 300 CAPSULE ORAL at 14:02

## 2021-04-14 RX ADMIN — ENOXAPARIN SODIUM 40 MG: 40 INJECTION SUBCUTANEOUS at 09:31

## 2021-04-14 RX ADMIN — OXYCODONE HYDROCHLORIDE 5 MG: 5 TABLET ORAL at 05:32

## 2021-04-14 RX ADMIN — ATORVASTATIN CALCIUM 80 MG: 40 TABLET, FILM COATED ORAL at 22:25

## 2021-04-14 RX ADMIN — METOPROLOL SUCCINATE 50 MG: 50 TABLET, EXTENDED RELEASE ORAL at 22:25

## 2021-04-14 RX ADMIN — ACETAMINOPHEN 975 MG: 325 TABLET, FILM COATED ORAL at 09:31

## 2021-04-14 RX ADMIN — LABETALOL HYDROCHLORIDE 10 MG: 5 INJECTION, SOLUTION INTRAVENOUS at 04:20

## 2021-04-14 RX ADMIN — ACETAMINOPHEN 975 MG: 325 TABLET, FILM COATED ORAL at 18:06

## 2021-04-14 ASSESSMENT — ACTIVITIES OF DAILY LIVING (ADL)
ADLS_ACUITY_SCORE: 15
ADLS_ACUITY_SCORE: 16
ADLS_ACUITY_SCORE: 15
ADLS_ACUITY_SCORE: 16

## 2021-04-14 NOTE — PROGRESS NOTES
TRANSITIONS OF CARE (TAMARA) LOG   TAMARA tasks should be completed by the CC within one (1) business day of notification of each transition. Follow up contact with member is required after return to their usual care setting.  Note:  If CC finds out about the transitions fifteen (15) days or more after the member has returned to their usual care setting, no TAMARA log is needed. However, the CC should check in with the member to discuss the transition process, any changes needed to the care plan and document it in a case note.    Member Name:  Tevin Guidry [place] [facility] [id number]   Product: [facility] Care Coordinator Contact:  Lovely Medina RN Agency/[facility]/Care System: [facility]   Transition Communication Actions from Care Management Contact   Transition #1   Notification Date: 4/13/21 Transition Date:   4/13/21 Transition From: Home     Is this the member s usual care setting?               yes Transition To: Hospital, [facility]   Transition Type:  Planned  Reason for Admission/Comments:  Surgery for polyp removal, colectomy      Shared CC contact info, care plan/services with receiving setting--Date completed: 4/13/21    Notified PCP of transition--Date completed:  4/13/21     via  (OR) Members PCP was the admitting physician   Transition #2      Notification Date: 4/14/2021         Transition To:  Home  Transition Date: 4/14/21     Transition Type:    Planned  Notified PCP -- Date completed: 4/14/21              Shared CC contact info, care plan/services with receiving setting or, if applicable, home care agency--Date completed:    *Complete additional tasks below, if this transition is a return to usual care setting.      Comments:  Discharged to home after one night admission.       *Complete tasks below when the member is discharging TO their usual care setting within one (1) business day of notification.  For situations where the Care Coordinator is notified of the  discharge prior to the date of discharge, the Care Coordinator must follow up with the member or designated representative to confirm that discharge actually occurred and discuss required TAMARA tasks as outlined in the TAMARA Instructions.  (This includes situations where it may be a  new  usual care setting for the member. (i.e., a community member who decides upon permanent nursing home placement following hospitalization and rehab).    Date completed: 4/15/21  Communicated with member or their designated representative about the following:  care transition process; about changes to the member s health status; plan of care updates; education about transitions and how to prevent unplanned transitions/readmissions  Four Pillars for Optimal Transition:    Check  Yes  - if the member, family member and/or SNF/facility staff manages the following:    If  No  provide explanation in the comments section.          [x]  Yes     []  No     Does the member have a follow-up appointment scheduled with primary care or specialist? (Mental health hospitalizations--the appt. should be w/in 7 days)   [x]  Yes     []  No     Can the member manage their medications or is there a system in place to manage medications (e.g. home care set-up)?         [x]  Yes     []  No     Can the member verbalize warning signs and symptoms to watch for and how to respond?         []  Yes     [x]  No     Does the member use a Personal Health Care Record?  Check  Yes  if visit summary, discharge summary, and/or healthcare summary are being used as a PHR.                                                                                                                                                                                    [x] Yes      [] No      Have you updated the member s care plan?  If  No  provide explanation in comments.   Comments:

## 2021-04-14 NOTE — PLAN OF CARE
POD 1. Lethargic at start of shift, couldn't recall day of the week or specific date. Now oriented x4. Hypertensive, PRN IV Labetalol given. Pt states he has minimal pain, Oxycodone 5mg given before dangling this AM. Echavarria with adequate output. Lap sites XENA. IVMF infusing @ 75/hr. Dangled and stood at bedside with assist of 2, tolerated well. Continue with post op cares.

## 2021-04-14 NOTE — PROGRESS NOTES
COLON & RECTAL SURGERY  PROGRESS NOTE    April 14, 2021  Post-op Day # 1 lap R colectomy    SUBJECTIVE:  Doing well this morning, no flatus yet. Plans to walk this morning. Pain controlled better overnight.     OBJECTIVE:  Temp:  [97.2  F (36.2  C)-98.8  F (37.1  C)] 98.8  F (37.1  C)  Pulse:  [] 76  Resp:  [12-20] 20  BP: (128-172)/(66-96) 148/76  SpO2:  [87 %-100 %] 95 %    Intake/Output Summary (Last 24 hours) at 4/14/2021 0653  Last data filed at 4/14/2021 0412  Gross per 24 hour   Intake 1174.25 ml   Output 625 ml   Net 549.25 ml       GENERAL:  Awake, alert, no acute distress  HEAD: Nomocephalic atraumatic  RESPIRATORY: unlabored breathing on room air   EXTREMITIES: warm and well perfused  ABDOMEN:  Soft, appropriately tender, non-distended  INCISION:  C/D/I with skin glue    LABS:  Lab Results   Component Value Date    WBC 8.4 04/09/2021     Lab Results   Component Value Date    HGB 14.7 04/09/2021     Lab Results   Component Value Date    HCT 44.0 04/09/2021     Lab Results   Component Value Date     04/09/2021     Last Basic Metabolic Panel:  Lab Results   Component Value Date     04/09/2021      Lab Results   Component Value Date    POTASSIUM 4.4 04/13/2021     Lab Results   Component Value Date    CHLORIDE 108 04/09/2021     Lab Results   Component Value Date    TAMMIE 9.0 04/09/2021     Lab Results   Component Value Date    CO2 30 04/09/2021     Lab Results   Component Value Date    BUN 11 04/09/2021     Lab Results   Component Value Date    CR 0.89 04/13/2021     Lab Results   Component Value Date    GLC 89 04/09/2021       ASSESSMENT/PLAN: 70M with multiple right-sided colonic polyps s/p laparoscopic right hemicolectomy on 4/13/21. Progressing well postoperatively.    --Continue scheduled tylenol and gabapentin with prn oxycodone for pain  --Restart home statin and beta blocker for history of CAD and perioperative beta blockade  --Continue clear liquid diet, possible LRD for dinner if  tolerating  --Await return of bowel function  --Hgb stable, start pLov this AM  --Discontinue jordan  --Encourage ambulation

## 2021-04-14 NOTE — PLAN OF CARE
"BP (!) 151/75 (BP Location: Right arm)   Pulse 64   Temp 98.8  F (37.1  C) (Oral)   Resp 18   Ht 1.753 m (5' 9\")   Wt 75.7 kg (166 lb 14.2 oz)   SpO2 95%   BMI 24.65 kg/m      Shift: 4020-3095  Reason for Admission: POD# 1 lap R colectomy  VS: VSS on RA- HTN, on IV metoprolol   Neuros: A/Ox4, able to make needs known.   GI/: No BMs this shift, not passing gas yet. Echavarria removed at 0900, pt voided afterwards.  Nutrition: Tolerating CLD  Drains/Lines: PIV infusing LR @ 75 mL/hr  Activity: Pt up ad dorie  Pain/Nausea: Denies both  Respiratory: Sats >94% on RA  Skin: Incision above umbilicus, 3 lap sites- all XENA  Social: Called pt's brother to give update  Plan of care: Plan for discharge tomorrow. Will continue to monitor and follow POC  "

## 2021-04-14 NOTE — DISCHARGE SUMMARY
[facility]  Discharge Summary  Colon and Rectal Surgery     Tevin Guidry MRN# [id number]   YOB: 1950 Age: 70 year old     Date of Admission:  4/13/2021  Date of Discharge::  4/16/2021  6:18 PM  Admitting Physician:  Jitendra Rodríguez MD  Discharge Physician:  Jitendra Rodríguez MD  Primary Care Physician:        Preethi Quiroz          Admission Diagnoses:   High polyp burden, unable to endoscopically excise/resect  History of colonic polyps [Z86.010]  Coronary artery disease s/p prior CABG  Hypertension  COPD  History of bladder cancer  hyperlipidemia          Discharge Diagnosis:   High polyp burden, unable to endoscopically excise/resect  History of colonic polyps [Z86.010]  Coronary artery disease s/p prior CABG  Hypertension  COPD  History of bladder cancer  hyperlipidemia         Procedures:   4/13/2021:  PROCEDURE:  1. Laparoscopic extended right colectomy.          Consultations:   PHYSICAL THERAPY ADULT IP CONSULT         Imaging Studies:     Results for orders placed or performed during the hospital encounter of 04/13/21   POC US Guidance Needle Placement    Impression    Bilateral Transversus Abdominis Plane Block           Medications Prior to Admission:     Medications Prior to Admission   Medication Sig Dispense Refill Last Dose     albuterol (PROAIR HFA/PROVENTIL HFA/VENTOLIN HFA) 108 (90 Base) MCG/ACT inhaler Inhale 2 puffs into the lungs every 6 hours as needed for shortness of breath / dyspnea or wheezing 1 Inhaler 1 Past Week at Unknown time     atorvastatin (LIPITOR) 80 MG tablet Take 1 tablet (80 mg) by mouth At Bedtime 90 tablet 3 4/12/2021 at 2100     ferrous sulfate (IRON) 325 (65 FE) MG tablet Take 325 mg by mouth every morning  60 tablet 3 Past Week at Unknown time     lisinopril (ZESTRIL) 20 MG tablet Take 1 tablet (20 mg) by mouth At Bedtime 90 tablet 3 4/12/2021 at 2100     metoprolol succinate ER (TOPROL-XL) 50 MG 24 hr  tablet Take 1 tablet (50 mg) by mouth At Bedtime 90 tablet 3 4/12/2021 at 2100     PARoxetine (PAXIL) 40 MG tablet Take 1 tablet (40 mg) by mouth every morning 90 tablet 3 4/12/2021 at 0800     aspirin 81 MG tablet Take 1 tablet (81 mg) by mouth daily (Patient taking differently: Take 81 mg by mouth every morning ) 90 tablet 3 4/6/2021     nitroglycerin (NITROSTAT) 0.4 MG sublingual tablet Place 1 tablet (0.4 mg) under the tongue every 5 minutes as needed for chest pain 25 tablet 3 Unknown at Unknown time     [DISCONTINUED] metroNIDAZOLE (FLAGYL) 500 MG tablet Take 1 tablet (500 mg) by mouth every 6 hours At 8:00 am, 2:00 pm, 8:00 pm the day prior to your surgery with neomycin and zofran. 3 tablet 0 4/12/2021 at 2000     [DISCONTINUED] neomycin (MYCIFRADIN) 500 MG tablet Take 2 tablets (1,000 mg) by mouth every 6 hours At 8:00 am, 2:00 pm, 8:00 pm the day prior to your surgery with flagyl and zofran. 6 tablet 0 4/12/2021 at 2000     [DISCONTINUED] ondansetron (ZOFRAN) 4 MG tablet Take 1 tablet (4 mg) by mouth every 6 hours At 8:00 am, 2:00 pm, 8:00 pm the day prior to your surgery with neomycin and flagyl. 3 tablet 0 4/12/2021 at 2000            Discharge Medications:     Current Discharge Medication List      CONTINUE these medications which have NOT CHANGED    Details   albuterol (PROAIR HFA/PROVENTIL HFA/VENTOLIN HFA) 108 (90 Base) MCG/ACT inhaler Inhale 2 puffs into the lungs every 6 hours as needed for shortness of breath / dyspnea or wheezing  Qty: 1 Inhaler, Refills: 1    Associated Diagnoses: Chronic obstructive pulmonary disease, unspecified COPD type (H)      atorvastatin (LIPITOR) 80 MG tablet Take 1 tablet (80 mg) by mouth At Bedtime  Qty: 90 tablet, Refills: 3    Associated Diagnoses: Hyperlipidemia LDL goal <70      ferrous sulfate (IRON) 325 (65 FE) MG tablet Take 325 mg by mouth every morning   Qty: 60 tablet, Refills: 3    Associated Diagnoses: Anemia, unspecified type; Iron deficiency       lisinopril (ZESTRIL) 20 MG tablet Take 1 tablet (20 mg) by mouth At Bedtime  Qty: 90 tablet, Refills: 3    Associated Diagnoses: Essential hypertension with goal blood pressure less than 140/90      metoprolol succinate ER (TOPROL-XL) 50 MG 24 hr tablet Take 1 tablet (50 mg) by mouth At Bedtime  Qty: 90 tablet, Refills: 3    Associated Diagnoses: CAD, multiple vessel      PARoxetine (PAXIL) 40 MG tablet Take 1 tablet (40 mg) by mouth every morning  Qty: 90 tablet, Refills: 3    Associated Diagnoses: Social phobia      aspirin 81 MG tablet Take 1 tablet (81 mg) by mouth daily  Qty: 90 tablet, Refills: 3    Associated Diagnoses: Coronary artery disease involving native artery of transplanted heart without angina pectoris      nitroglycerin (NITROSTAT) 0.4 MG sublingual tablet Place 1 tablet (0.4 mg) under the tongue every 5 minutes as needed for chest pain  Qty: 25 tablet, Refills: 3    Associated Diagnoses: CAD, multiple vessel         STOP taking these medications       metroNIDAZOLE (FLAGYL) 500 MG tablet Comments:   Reason for Stopping:         neomycin (MYCIFRADIN) 500 MG tablet Comments:   Reason for Stopping:         ondansetron (ZOFRAN) 4 MG tablet Comments:   Reason for Stopping:                     Brief History of Illness:   70 year old male with PMH of coronary artery disease s/p prior robotic CABG, hypertension, hyperlipidemia, COPD, history of bladder cancer who has a history of colonic polyps.  Most recently with a high burden of colonic polyps that could not be endoscopically resected.  Therefore pt underwent an elective laparoscopic extended right colectomy on 4/13/2021.            Hospital Course:   Post-operatively pt was gently fluid resuscitated.  Echavarria was removed and pt was able to void.  Pt had eventual return of bowel function and was able to tolerate small amounts of a low fiber diet.  Post-operative pain was controlled with scheduled tylenol, neurontin, oxycodone.  Pt was deemed  "appropriate for discharge home.  Home aspirin in addition to all home meds was restarted.      Patient is to follow up in the Colon and Rectal Surgery Clinic in 2-3 week with Yumi Bosch NP and then with Dr. Rodríguez in 2-3 weeks after.          Day of Discharge Physical Exam:   Blood pressure (!) 148/76, pulse 76, temperature 98.8  F (37.1  C), temperature source Axillary, resp. rate 20, height 1.753 m (5' 9\"), weight 75.7 kg (166 lb 14.2 oz), SpO2 95 %.    Please refer to Progress Note written by Dr. Noah Bacon and Dr. Lamberto Rodríguez on 4/16/2021 for physical exam on the day of discharge.           Final Pathology Result:   Patient Name: DICK LYONS   MR#: [id number]   Specimen #: [id number]   Collected: 4/13/2021   Received: 4/13/2021   Reported: 4/19/2021 15:03   Ordering Phy(s): LAMBERTO RODRÍGUEZ     For improved result formatting, select 'View Enhanced Report Format' under    Linked Documents section.     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        Tumor is located at the distal resection margin and situated within   the lamina propria/submucosa (distal   margin positive, proximal and deep margins are free of malignancy)        An overlying tubular adenoma is present at this distal resection   margin, does not appear to be the source   of the underlying malignancy        Tumor origin is compatible with gastrointestinal site as it marks   positively with CK 20, pan cytokeratin   and CDX2 (negative for CK-7), see comment   - Multiple tubular adenomas (at least twenty-one), negative for high-grade    dysplasia, distal margin is   transected by a sessile tubular adenoma   - Benign appendix with focal surface serrated change, negative for   malignancy (submitted in its " entirety)   - Unremarkable terminal ileum   - Sixteen benign lymph nodes (0/16)     COMMENT:   The origin of the adenocarcinoma is uncertain based on the current   resection however the immunoperoxidase   battery supports gastrointestinal origin.  Suggest clinical and   radiographic correlation to investigate   possible primary sites (including stomach, pancreaticobiliary and   remainder of the colon).  The appendix was   submitted in its entirety and does not appear to be the source of the   neoplasm.  Intradepartmental   consultation obtained, the case was discussed with Dr. Rodríguez on   4/19/21.     I have personally reviewed all specimens and/or slides, including the   listed special stains, and used them   with my medical judgement to determine or confirm the final diagnosis.     Electronically signed out by:     Valentino Laurent M.D., [facility]            Discharge Instructions and Follow-Up:     Discharge Procedure Orders   Reason for your hospital stay   Order Comments: 4/13/2021:  PROCEDURE:  1. Laparoscopic extended right colectomy.     Adult [facility] Follow-up and recommended labs and tests   Order Comments: WOUND CARE  -Inspect your wounds daily for signs of infection (increased redness, drainage, pain)  -Keep your wound clean and dry  -You may shower, but do not soak in tub or pool    NOTIFY  Please contact Mariam Schumacher RN or Joy Chaudhary RN or Alix GUTHRIE at [phone] for problems after discharge such as:  -Temperature > 101F, chills, rigors, dizziness  -Redness around or purulent drainage from wound  -Inability to tolerate diet, nausea or vomiting  -You stop passing gas, develop significant bloating, abdominal pain  -Have blood in stools/vomit  -Have severe diarrhea/constipation  -Any other questions or concerns.  - At nights (after 4:30pm), on weekends, or if urgent, call [phone] and ask the  to speak with the on-call Colorectal Surgery resident or fellow      Medication  Instructions  Some of your medications may have changed. Please take only prescribed and resumed medications     FOLLOW-UP  1.  You will need to follow-up with Yumi Bosch NP in the Colon and Rectal Surgery clinic in 2-3 week(s) and then with CRS Staff: Dr. Jitendra Rodríguez in 2-3 weeks after.  Please contact our clinic scheduler (phone # [phone]) if you have not heard from our clinic in 3 business days afer discharge to schedule a follow-up appointment.         Appointments on [place] and/or [facility] (with [facility] or [facility] provider or service). Call [phone] if you haven't heard regarding these appointments within 7 days of discharge.     Activity   Order Comments: Your activity upon discharge:  ACTIVITY  -No lifting, pushing, pulling greater than 10 lbs and no strenuous exercise for 6 weeks   -No driving while on narcotic analgesics (i.e. Percocet, oxycodone, Vicodin)  -No driving until you are able to fully twist to both sides or slam on brakes quickly and without any pain  -We encourage walking at least 4-5 times per day     Order Specific Question Answer Comments   Is discharge order? Yes      Diet   Order Comments: Follow this diet upon discharge:  DIET  -Low Residue Diet for at least 4-6 weeks unless cleared by Colorectal surgery.  No raw vegetables, fruit skins, fibrous foods that require a lot of chewing, nuts, seeds, corn, popcorn.   -We recommend eating slowly, chewing thoroughly, eating small frequent meals throughout the day  -Stay well hydrated.     Order Specific Question Answer Comments   Is discharge order? Yes             Home Health Care:     Not needed           Discharge Disposition:     Discharged to home      Condition at discharge: Stable

## 2021-04-14 NOTE — PLAN OF CARE
Time: 5150-8412    Reason for Admission: POD #0 Lap extended R colectomy.    Activity: Pt did not want to dangle stating he was too tired. Informed pt that he would need to at some point tonight.     Neuro: A&O x4. Pt states he feels groggy. Cooperative with cares.     GI/: Echavarria in place with adequate output. No BM this shift.     Diet: Clear Liquids, tolerating.     Incisions/Drains: 3 abdominal lap sites with liquid bandage, CDI. 1 incision above navel with liquid bandage, CDI.     IV Access: R PIV saline locked. L PIV infusing LR at 75mL/hr.     Vitals: Hypertensive. MD paged. AOVSS on 2L NC.     Pain: Pt denied any pain this shift. Stating there was just discomfort.     New changes this shift: Pt admitted to unit around 1720. 2 RN skin assessment completed.     Plan: Continue with plan of care.

## 2021-04-14 NOTE — PROGRESS NOTES
"   04/14/21 1100   Quick Adds   Type of Visit Initial PT Evaluation   Living Environment   People in home sibling(s)   Current Living Arrangements house   Home Accessibility stairs to enter home;stairs within home   Number of Stairs, Main Entrance 10   Stair Railings, Main Entrance railings on both sides of stairs   Number of Stairs, Within Home, Primary other (see comments)  (18)   Stair Railings, Within Home, Primary railings on both sides of stairs   Transportation Anticipated family or friend will provide   Living Environment Comments pt lives with brother in [facility]. Many stairs at home- 10 CHANTAL and then 18 up to bed/bath with B railing present throughout. Walk in shower present.   Self-Care   Usual Activity Tolerance good   Current Activity Tolerance moderate   Regular Exercise No   Equipment Currently Used at Home none   Activity/Exercise/Self-Care Comment pt IND in all cares and mobility at baseline   Disability/Function   Hearing Difficulty or Deaf yes   Patient's preferred means of communication verbal   Describe hearing loss bilateral hearing loss   Hearing Management speak loudly   Wear Glasses or Blind no   Concentrating, Remembering or Making Decisions Difficulty no   Difficulty Communicating no   Difficulty Eating/Swallowing no   Walking or Climbing Stairs Difficulty no   Dressing/Bathing Difficulty no   Toileting issues no   Doing Errands Independently Difficulty (such as shopping) no   Fall history within last six months no   Change in Functional Status Since Onset of Current Illness/Injury yes   General Information   Onset of Illness/Injury or Date of Surgery 04/13/21   Referring Physician Jitendra Rodríguez MD   Patient/Family Therapy Goals Statement (PT) none stated   Pertinent History of Current Problem (include personal factors and/or comorbidities that impact the POC) per chart review, \"Tevin Guidry is a 70 year old male with h/o CAD, HTN, COPD, bladder cancer and colon polyps who " "is POD 0 from lap extended R colectomy. His pain is well controlled and is limited to his RLQ.\"   Existing Precautions/Restrictions fall   General Observations activity: ambulate with assist   Cognition   Orientation Status (Cognition) oriented x 3   Affect/Mental Status (Cognition) WNL   Follows Commands (Cognition) WNL   Pain Assessment   Patient Currently in Pain Yes, see Vital Sign flowsheet   Integumentary/Edema   Integumentary/Edema Comments mild B LE edema   Posture    Posture Protracted shoulders   Range of Motion (ROM)   ROM Comment WNL   Strength   Strength Comments NT formally 2/2 abd precautions, able to achieve gravity dependent ROM and functional mobility WFL   Bed Mobility   Comment (Bed Mobility) Madeline with verbal instruction for log roll (tx)   Transfers   Transfer Safety Comments [place] to FWW   Gait/Stairs (Locomotion)   Comment (Gait/Stairs) pt amb in room with FWW and supervision only, steady on feet, slightly hunched posture 2/2 pain   Balance   Balance Comments pt reports feeling more off balance on turns but no evidence of this with mobility, steady on feet throughout session   Sensory Examination   Sensory Perception patient reports no sensory changes   Clinical Impression   Criteria for Skilled Therapeutic Intervention yes, treatment indicated   PT Diagnosis (PT) impaired functional mobility   Influenced by the following impairments abd precautions, dizziness, reduced activity tolerance   Functional limitations due to impairments gait, stairs   Clinical Presentation Stable/Uncomplicated   Clinical Presentation Rationale PMH, clinician impression   Clinical Decision Making (Complexity) low complexity   Therapy Frequency (PT) 4x/week   Predicted Duration of Therapy Intervention (days/wks) 3-4 days   Planned Therapy Interventions (PT) balance training;bed mobility training;gait training;patient/family education;stair training;strengthening;transfer training   Risk & Benefits of therapy have been " explained evaluation/treatment results reviewed;care plan/treatment goals reviewed;risks/benefits reviewed;current/potential barriers reviewed;participants voiced agreement with care plan;participants included;patient   Clinical Impression Comments pt slightly below basleine and presenting to be expected POD 1 colectomy. High level balance and activity tolerance deficits, but anticipate will progress well with skilled IP PT and reach PT goals in 2-3 days at IND mobility status.   PT Discharge Planning    PT Discharge Recommendation (DC Rec) home with assist   PT Rationale for DC Rec pt slightly below basleine and presenting to be expected POD 1 colectomy. High level balance and activity tolerance deficits, but anticipate will progress well with skilled IP PT and reach PT goals in 2-3 days at IND mobility status.   PT Brief overview of current status  Ax1 in room for line management- ok to amb in halls with IV or FWW without staff assist   Total Evaluation Time   Total Evaluation Time (Minutes) 8

## 2021-04-14 NOTE — PROGRESS NOTES
Surgery Progress Note  4/13/2021     Subjective:  Pain well controlled, mainly in his lower abdomen. Denies N/V, HA, blurry vision, n/w/t, CP, SOB, tachypnea, tachycardia, irregular heart beat or F/C. No flatus, last BM yet.    Objective:  Temp:  [97.8  F (36.6  C)-98.7  F (37.1  C)] 98.7  F (37.1  C)  Pulse:  [49-70] 70  Resp:  [12-17] 17  BP: (128-168)/(71-96) 161/96  SpO2:  [87 %-100 %] 87 %  I/O last 3 completed shifts:  In: 600 [I.V.:600]  Out: 85 [Urine:85]    Gen: Awake, alert, NAD   Resp: NLB on RA  Abd: Soft, non-distended, appropriately tender RLQ  Ext: WWP  Dressing/Incision: C/d/i     A/P: Tevin Guidry is a 70 year old male with h/o CAD, HTN, COPD, bladder cancer and colon polyps who is POD 0 from lap extended R colectomy. His pain is well controlled and is limited to his RLQ. No acute concerns at this time    - continue plans per primary team      Donna Bah MD  Surgery PGY-1

## 2021-04-15 ENCOUNTER — APPOINTMENT (OUTPATIENT)
Dept: PHYSICAL THERAPY | Facility: CLINIC | Age: 71
DRG: 331 | End: 2021-04-15
Attending: SURGERY
Payer: COMMERCIAL

## 2021-04-15 ENCOUNTER — PATIENT OUTREACH (OUTPATIENT)
Dept: GERIATRIC MEDICINE | Facility: CLINIC | Age: 71
End: 2021-04-15

## 2021-04-15 PROCEDURE — 250N000013 HC RX MED GY IP 250 OP 250 PS 637: Performed by: SURGERY

## 2021-04-15 PROCEDURE — 97530 THERAPEUTIC ACTIVITIES: CPT | Mod: GP | Performed by: PHYSICAL THERAPIST

## 2021-04-15 PROCEDURE — 120N000002 HC R&B MED SURG/OB UMMC

## 2021-04-15 PROCEDURE — 250N000011 HC RX IP 250 OP 636: Performed by: SURGERY

## 2021-04-15 PROCEDURE — 97116 GAIT TRAINING THERAPY: CPT | Mod: GP | Performed by: PHYSICAL THERAPIST

## 2021-04-15 RX ADMIN — ACETAMINOPHEN 975 MG: 325 TABLET, FILM COATED ORAL at 11:25

## 2021-04-15 RX ADMIN — GABAPENTIN 300 MG: 300 CAPSULE ORAL at 08:08

## 2021-04-15 RX ADMIN — GABAPENTIN 300 MG: 300 CAPSULE ORAL at 20:12

## 2021-04-15 RX ADMIN — ACETAMINOPHEN 975 MG: 325 TABLET, FILM COATED ORAL at 18:54

## 2021-04-15 RX ADMIN — METOPROLOL SUCCINATE 50 MG: 50 TABLET, EXTENDED RELEASE ORAL at 21:04

## 2021-04-15 RX ADMIN — ACETAMINOPHEN 975 MG: 325 TABLET, FILM COATED ORAL at 02:37

## 2021-04-15 RX ADMIN — ATORVASTATIN CALCIUM 80 MG: 40 TABLET, FILM COATED ORAL at 21:04

## 2021-04-15 RX ADMIN — GABAPENTIN 300 MG: 300 CAPSULE ORAL at 14:16

## 2021-04-15 RX ADMIN — ENOXAPARIN SODIUM 40 MG: 40 INJECTION SUBCUTANEOUS at 11:26

## 2021-04-15 RX ADMIN — PAROXETINE HYDROCHLORIDE HEMIHYDRATE 40 MG: 20 TABLET, FILM COATED ORAL at 08:08

## 2021-04-15 ASSESSMENT — ACTIVITIES OF DAILY LIVING (ADL)
ADLS_ACUITY_SCORE: 15

## 2021-04-15 NOTE — PLAN OF CARE
Afebrile, HTN but less than parameter for Labetalol, OVSS on RA.  Alert and oriented x4.  Denies pain and nausea.  PIV SL.  Tolerating low fiber diet, appetite fair.  Voiding spontaneously in adequate amount. No BM.   Not yet passing gas.  Up independently in hallway.  Continue plan of care.

## 2021-04-15 NOTE — PROGRESS NOTES
Call to member to check in after being discharged following a colectomy surgery on 4/13/21.  Left VM.    Lovely Medina RN  [facility]   [phone]

## 2021-04-15 NOTE — PLAN OF CARE
VSS overnight, denies pain only has discomfort. Denied wanting any PRN pain medication, utilizing scheduled tylenol. Up ad dorie. Voiding without difficulty. Denies passing flatus at this time and no BM as of now. Will continue to monitor.

## 2021-04-16 ENCOUNTER — APPOINTMENT (OUTPATIENT)
Dept: PHYSICAL THERAPY | Facility: CLINIC | Age: 71
DRG: 331 | End: 2021-04-16
Attending: SURGERY
Payer: COMMERCIAL

## 2021-04-16 VITALS
SYSTOLIC BLOOD PRESSURE: 113 MMHG | WEIGHT: 166.89 LBS | HEIGHT: 69 IN | RESPIRATION RATE: 16 BRPM | BODY MASS INDEX: 24.72 KG/M2 | TEMPERATURE: 97.7 F | OXYGEN SATURATION: 92 % | DIASTOLIC BLOOD PRESSURE: 75 MMHG | HEART RATE: 81 BPM

## 2021-04-16 LAB — PLATELET # BLD AUTO: 283 10E9/L (ref 150–450)

## 2021-04-16 PROCEDURE — 85049 AUTOMATED PLATELET COUNT: CPT | Performed by: SURGERY

## 2021-04-16 PROCEDURE — 250N000013 HC RX MED GY IP 250 OP 250 PS 637: Performed by: SURGERY

## 2021-04-16 PROCEDURE — 97530 THERAPEUTIC ACTIVITIES: CPT | Mod: GP

## 2021-04-16 PROCEDURE — 97116 GAIT TRAINING THERAPY: CPT | Mod: GP

## 2021-04-16 PROCEDURE — 250N000011 HC RX IP 250 OP 636: Performed by: SURGERY

## 2021-04-16 PROCEDURE — 36415 COLL VENOUS BLD VENIPUNCTURE: CPT | Performed by: SURGERY

## 2021-04-16 RX ORDER — ACETAMINOPHEN 325 MG/1
650 TABLET ORAL EVERY 4 HOURS PRN
Qty: 100 TABLET | Refills: 0 | Status: SHIPPED | OUTPATIENT
Start: 2021-04-16 | End: 2021-06-23

## 2021-04-16 RX ORDER — OXYCODONE HYDROCHLORIDE 5 MG/1
5 TABLET ORAL EVERY 4 HOURS PRN
Qty: 12 TABLET | Refills: 0 | Status: SHIPPED | OUTPATIENT
Start: 2021-04-16 | End: 2021-06-23

## 2021-04-16 RX ADMIN — PAROXETINE HYDROCHLORIDE HEMIHYDRATE 40 MG: 20 TABLET, FILM COATED ORAL at 07:58

## 2021-04-16 RX ADMIN — ACETAMINOPHEN 975 MG: 325 TABLET, FILM COATED ORAL at 02:24

## 2021-04-16 RX ADMIN — ONDANSETRON 4 MG: 2 INJECTION INTRAMUSCULAR; INTRAVENOUS at 00:43

## 2021-04-16 RX ADMIN — ACETAMINOPHEN 975 MG: 325 TABLET, FILM COATED ORAL at 10:45

## 2021-04-16 RX ADMIN — GABAPENTIN 300 MG: 300 CAPSULE ORAL at 14:27

## 2021-04-16 RX ADMIN — GABAPENTIN 300 MG: 300 CAPSULE ORAL at 07:58

## 2021-04-16 RX ADMIN — ENOXAPARIN SODIUM 40 MG: 40 INJECTION SUBCUTANEOUS at 10:45

## 2021-04-16 ASSESSMENT — ACTIVITIES OF DAILY LIVING (ADL)
ADLS_ACUITY_SCORE: 15

## 2021-04-16 NOTE — PLAN OF CARE
A&Ox4, VSS on RA except for elevated BP. BP earlier this evening 145/92, PRN Labetalol parameters not met. Rechecked BP before bed 159/103, scheduled Metoprolol administered. Pain managed with scheduled Tylenol. Contact precautions maintained d/t MRSA. Low fiber diet. Ambulates independently. Voids spontaneously w/o saving. -BM/gas this evening. Stool sample still needed, patient aware. Continue POC.

## 2021-04-16 NOTE — PROGRESS NOTES
Colorectal Surgery Progress Note  04/16/2021     S:  No acute events overnight. Became nauseous overnight and vomited after eating dinner, otherwise tolerated LRD yesterday, feeling better this AM. Pain adequately controlled. Ambulated yesterday x2. Passing gas, with 1xBM yday. Voiding adequately without Echavarria.     O:  Temp:  [98.6  F (37  C)-99.8  F (37.7  C)] 99.8  F (37.7  C)  Pulse:  [60-80] 80  Resp:  [16-18] 18  BP: (123-168)/() 123/80  SpO2:  [92 %-95 %] 94 %  I/O last 3 completed shifts:  In: 240 [P.O.:240]  Out: 1075 [Urine:675; Emesis/NG output:400]    Physical Exam  General: alert, oriented, no acute distress  HEENT: normocephalic, atraumatic  Resp: Non-labored breathing on room air  Abd: Soft, non-distended, appropriately tender to palpation RLQ  Incision: Clean, dry, intact with dermabond    A/P: 70M with multiple right-sided colonic polyps s/p POD3 laparoscopic right hemicolectomy on 4/13/21, progressing well postoperatively, now with ROBF     --Neuro/Pain: Continue scheduled Tylenol and Gabapentin with PRN oxycodone for pain  --CV: Restarted PTA statin and beta blocker for history of CAD with perioperative beta blockade  --Resp: IS, Encourage ambulation  --GI/FEN: Continue LRD, with ROBF, encourage small bites and monitor for nausea  --Heme: Hgb stable  - ID: WBC 15.2 4/15  - PPx: Continue Lovenox for DVT PPX  --Dispo: If tolerating LRD with no nausea, possible discharge home later today     Seen and discussed with fellow Dr. Mao, and discussed with staff surgeon Dr. Marcos Bacon MD  General Surgery PGY-1  [facility]  Pager [phone]

## 2021-04-16 NOTE — PROGRESS NOTES
Assumed care of Patient from [id number] AVSS on RA except for elevated BP. BP rechecked at 0230, 123/80. Order parameters not met for PRN labetalol. Pt denies pain, scheduled tylenol given preventively. Pt had an epidosde of emesis, 400 ml. MD notified, PRN Zofran given. Pt had large BM, micro study stool patient, sample sent. Abdominal incisions clean, dry and intact, reddened around the site. Pt voiding sponatenously. Up in room independently. Low fiber diet. Using IS. Contact precautions maintained

## 2021-04-16 NOTE — DISCHARGE SUMMARY
Afebrile, VSS on RA.  Alert and oriented x4.   Denies pain and nausea.  Tolerating low fiber diet.  Voiding spontaneously without difficulty.  No BM this shift.  Up independently in hallway.  PIV removed prior to discharge.  Midline incision and lap sites XENA. Discharge instructions and medications reviewed with Pt.  Pt. Discharged to home.

## 2021-04-16 NOTE — H&P (VIEW-ONLY)
Colorectal Surgery Progress Note  04/16/2021     S:  No acute events overnight. Became nauseous overnight and vomited after eating dinner, otherwise tolerated LRD yesterday, feeling better this AM. Pain adequately controlled. Ambulated yesterday x2. Passing gas, with 1xBM yday. Voiding adequately without Echavarria.     O:  Temp:  [98.6  F (37  C)-99.8  F (37.7  C)] 99.8  F (37.7  C)  Pulse:  [60-80] 80  Resp:  [16-18] 18  BP: (123-168)/() 123/80  SpO2:  [92 %-95 %] 94 %  I/O last 3 completed shifts:  In: 240 [P.O.:240]  Out: 1075 [Urine:675; Emesis/NG output:400]    Physical Exam  General: alert, oriented, no acute distress  HEENT: normocephalic, atraumatic  Resp: Non-labored breathing on room air  Abd: Soft, non-distended, appropriately tender to palpation RLQ  Incision: Clean, dry, intact with dermabond    A/P: 70M with multiple right-sided colonic polyps s/p POD3 laparoscopic right hemicolectomy on 4/13/21, progressing well postoperatively, now with ROBF     --Neuro/Pain: Continue scheduled Tylenol and Gabapentin with PRN oxycodone for pain  --CV: Restarted PTA statin and beta blocker for history of CAD with perioperative beta blockade  --Resp: IS, Encourage ambulation  --GI/FEN: Continue LRD, with ROBF, encourage small bites and monitor for nausea  --Heme: Hgb stable  - ID: WBC 15.2 4/15  - PPx: Continue Lovenox for DVT PPX  --Dispo: If tolerating LRD with no nausea, possible discharge home later today     Seen and discussed with fellow Dr. Mao, and discussed with staff surgeon Dr. Marcos Bacon MD  General Surgery PGY-1  [facility]  Pager [phone]

## 2021-04-17 NOTE — PLAN OF CARE
Physical Therapy Discharge Summary    Reason for therapy discharge:    All goals and outcomes met, no further needs identified.    Progress towards therapy goal(s). See goals on Care Plan in [facility] electronic health record for goal details.  Goals met    Therapy recommendation(s):    No further therapy is recommended.

## 2021-04-18 ENCOUNTER — PATIENT OUTREACH (OUTPATIENT)
Dept: CARE COORDINATION | Facility: CLINIC | Age: 71
End: 2021-04-18

## 2021-04-19 ENCOUNTER — PATIENT OUTREACH (OUTPATIENT)
Dept: SURGERY | Facility: CLINIC | Age: 71
End: 2021-04-19

## 2021-04-19 ENCOUNTER — TELEPHONE (OUTPATIENT)
Dept: SURGERY | Facility: CLINIC | Age: 71
End: 2021-04-19

## 2021-04-19 DIAGNOSIS — C18.9 COLON CANCER (H): Primary | ICD-10-CM

## 2021-04-19 LAB — COPATH REPORT: NORMAL

## 2021-04-19 RX ORDER — ONDANSETRON 4 MG/1
4 TABLET, FILM COATED ORAL EVERY 6 HOURS
Qty: 3 TABLET | Refills: 0 | Status: SHIPPED | OUTPATIENT
Start: 2021-04-19 | End: 2021-09-08

## 2021-04-19 RX ORDER — METRONIDAZOLE 500 MG/1
500 TABLET ORAL EVERY 6 HOURS
Qty: 3 TABLET | Refills: 0 | Status: SHIPPED | OUTPATIENT
Start: 2021-04-19 | End: 2021-09-08

## 2021-04-19 RX ORDER — POLYETHYLENE GLYCOL 3350 17 G/17G
238 POWDER, FOR SOLUTION ORAL SEE ADMIN INSTRUCTIONS
Qty: 14 PACKET | Refills: 0 | Status: SHIPPED | OUTPATIENT
Start: 2021-04-19 | End: 2021-09-08

## 2021-04-19 RX ORDER — NEOMYCIN SULFATE 500 MG/1
1000 TABLET ORAL EVERY 6 HOURS
Qty: 6 TABLET | Refills: 0 | Status: SHIPPED | OUTPATIENT
Start: 2021-04-19 | End: 2021-09-08

## 2021-04-19 NOTE — PROGRESS NOTES
Post Op Note     Called to check on patient postoperatively after hospital discharge.     Patient is s/p laparoscopic extended right colectomy with Dr. Jitendra Rodríguez for colonic polyp.   Admitted 4/14/2021 and discharged on 4/16/2021.      No pain at this time.     Patient is eating and drinking normally. Patient is on a low fiber diet.  Encouraged patient to drink 8-10 glasses of water a day.     Patient is passing flats, is having loose bowel movements.    Patient is voiding normally and urine is light in color.    Patient is not set up with home care.     Patient does not require supplies.     I did not review any pathology with patient.     Patient Denies nausea and vomiting.    Patient Denies any fevers or chills.    Patient's incision is C/D/I. Pt states that his left sided incision is a bit red. I told him to monitor this and if the redness continues to grow then to contact the clinic. Patient reminded NOT to remove any dressings over their incisions.     Patient is on a activity restriction. Lifting 10 pounds for 6 weeks.     Patient Denies needing any forms completed.     Follow up is set up with Yumi Charles NP on 4/28 and with Dr. Jitendra Rodríguez on 5/19.   Encouraged the patient to contact the clinic in the meantime with any fevers, chills, nausea, vomiting, increased colostomy output, no colostomy output, dizziness, lightheadedness, uncontrolled pain, changes to the incisions, or with any questions or concerns.    Patient's questions were answered to their stated satisfaction and they are in agreement with this plan.    EVELYN Becerra [phone]  Colon & Rectal Surgery Clinic  [facility]

## 2021-04-19 NOTE — TELEPHONE ENCOUNTER
Per Dr. Jitendra Rodríguez, it is urgent that patient's next surgery admit with him be scheduled on 4/22/2021. So, I moved things around to make that happen.    RN Mariam told me that she scheduled patient's CT tomorrow 4/20/2021, and to schedule WOC, Labs, and COVID test same day at the [facility] if possible. I added these appts onto 4/20/2021.    Left patient a  msg asking him to check his [employer] regarding his appts scheduled for tomorrow starting at 11:00 AM at the [facility].    Sent The [employer] message with Surgery Packet including the following info:     Required: Yes, you will need a  18 years or older to drive you home from your procedure as well as stay with you for 24 hours after your procedure, if you are discharged the same day as your procedure.     Surgery: Diagnostic laparoscopy, left hemicolectomy, possible ileostomy     Date: April 22, 2021               Surgeon(s): Dr. Jitendra Rodríguez     Time: You will receive a call 2-3 days prior to your surgery with your finalized surgery and arrival time.      Location:     [address] [place] [address])      [place] [address] [phone]      [email or web address]      Upcoming / Related Appointments:   To ensure you are fully prepared for your surgery, please make sure the following items have been completed PRIOR to your surgery date:     Pre-operative History & Physical appointment:   Clinic appointment with Pre-operative Assessment Center (PAC): 4/1/2021 at 10:00 AM                                                                                  VIDEO VISIT     Pre-operative Wound/Ostomy appointment:  Clinic appointment with Wound/Ostomy nurse: 4/20/2021 at 11:00 AM                                                                                 [facility](4[facility])                                                                                [address] [place] [address]  [place] [address]     Pre-operative Lab appointment:   Lab draw appointment:                                   4/20/2021 at 12:15 PM                                                                                  [facility] Floor Lab                                                                                  [address] [place] [id number]     Pre-operative COVID-19 Test:   Pre-procedure asymptomatic COVID PCR    4/20/2021 at 12:45 PM                                                                                  [facility] [place] [address] Lab                                                                                  [address] [place] [address]     Pre-operative Imaging appointment:   CT Chest WWO                                              4/20/2021 at 1:40 PM                                                                                  [address] Lab                                                                                  [address] [place] [address]  Post operative appointment:  Clinic appointment with Yumi Charles NP: 5/5/2021 at 1:45  PM                                                                                 [facility] Floor(4K)                                                                                [address] [place] [address]     Post operative appointment:  Clinic appointment with Dr. Jitendra Rodríguez: 5/119/2021 at 2:00 PM                                                                                 [address]  "Floor(4K)                                                                                [address] [place] [address]     Pre-surgical Preparation:       MiraLAX/Gatorade, Magnesium Citrate, Antibiotics, Zofran  - obtain ahead of time  - see \"Day Before Surgery\"  - if you have diabetes or blood sugar concerns, please use Gatorade ZERO or Low Sugar, as per recommendation by your physician    "

## 2021-04-19 NOTE — PROGRESS NOTES
The patient was contacted by another colon and rectal RNCC for post-hospital follow up. Will close encounter at this time.    Kajal Royal CMA, [facility]  Post Hospital Discharge Team

## 2021-04-20 ENCOUNTER — ANCILLARY PROCEDURE (OUTPATIENT)
Dept: CT IMAGING | Facility: CLINIC | Age: 71
End: 2021-04-20
Attending: SURGERY
Payer: COMMERCIAL

## 2021-04-20 ENCOUNTER — OFFICE VISIT (OUTPATIENT)
Dept: WOUND CARE | Facility: CLINIC | Age: 71
End: 2021-04-20
Payer: COMMERCIAL

## 2021-04-20 ENCOUNTER — PATIENT OUTREACH (OUTPATIENT)
Dept: SURGERY | Facility: CLINIC | Age: 71
End: 2021-04-20

## 2021-04-20 DIAGNOSIS — Z71.89 OSTOMY NURSE CONSULTATION: Primary | ICD-10-CM

## 2021-04-20 DIAGNOSIS — Z11.59 ENCOUNTER FOR SCREENING FOR OTHER VIRAL DISEASES: ICD-10-CM

## 2021-04-20 DIAGNOSIS — C18.9 COLON CANCER (H): ICD-10-CM

## 2021-04-20 DIAGNOSIS — K63.5 POLYP OF COLON, UNSPECIFIED PART OF COLON, UNSPECIFIED TYPE: ICD-10-CM

## 2021-04-20 LAB
ABO + RH BLD: NORMAL
ABO + RH BLD: NORMAL
ANION GAP SERPL CALCULATED.3IONS-SCNC: 4 MMOL/L (ref 3–14)
BLD GP AB SCN SERPL QL: NORMAL
BLOOD BANK CMNT PATIENT-IMP: NORMAL
BUN SERPL-MCNC: 17 MG/DL (ref 7–30)
CALCIUM SERPL-MCNC: 8.9 MG/DL (ref 8.5–10.1)
CEA SERPL-MCNC: 1.5 UG/L (ref 0–2.5)
CHLORIDE SERPL-SCNC: 107 MMOL/L (ref 94–109)
CO2 SERPL-SCNC: 30 MMOL/L (ref 20–32)
CREAT SERPL-MCNC: 0.85 MG/DL (ref 0.66–1.25)
ERYTHROCYTE [DISTWIDTH] IN BLOOD BY AUTOMATED COUNT: 12.7 % (ref 10–15)
GFR SERPL CREATININE-BSD FRML MDRD: 87 ML/MIN/{1.73_M2}
GLUCOSE SERPL-MCNC: 102 MG/DL (ref 70–99)
HCT VFR BLD AUTO: 45.8 % (ref 40–53)
HGB BLD-MCNC: 15 G/DL (ref 13.3–17.7)
INR PPP: 1 (ref 0.86–1.14)
LABORATORY COMMENT REPORT: NORMAL
MCH RBC QN AUTO: 31 PG (ref 26.5–33)
MCHC RBC AUTO-ENTMCNC: 32.8 G/DL (ref 31.5–36.5)
MCV RBC AUTO: 95 FL (ref 78–100)
PLATELET # BLD AUTO: 357 10E9/L (ref 150–450)
POTASSIUM SERPL-SCNC: 4.3 MMOL/L (ref 3.4–5.3)
RADIOLOGIST FLAGS: ABNORMAL
RBC # BLD AUTO: 4.84 10E12/L (ref 4.4–5.9)
SARS-COV-2 RNA RESP QL NAA+PROBE: NEGATIVE
SARS-COV-2 RNA RESP QL NAA+PROBE: NORMAL
SODIUM SERPL-SCNC: 141 MMOL/L (ref 133–144)
SPECIMEN EXP DATE BLD: NORMAL
SPECIMEN SOURCE: NORMAL
SPECIMEN SOURCE: NORMAL
WBC # BLD AUTO: 9.6 10E9/L (ref 4–11)

## 2021-04-20 PROCEDURE — 36415 COLL VENOUS BLD VENIPUNCTURE: CPT | Performed by: PATHOLOGY

## 2021-04-20 PROCEDURE — 85610 PROTHROMBIN TIME: CPT | Performed by: PATHOLOGY

## 2021-04-20 PROCEDURE — 86900 BLOOD TYPING SEROLOGIC ABO: CPT | Performed by: PATHOLOGY

## 2021-04-20 PROCEDURE — 82378 CARCINOEMBRYONIC ANTIGEN: CPT | Mod: 90 | Performed by: PATHOLOGY

## 2021-04-20 PROCEDURE — 99207 PR PREOP VISIT IN GLOBAL PKG: CPT

## 2021-04-20 PROCEDURE — U0005 INFEC AGEN DETEC AMPLI PROBE: HCPCS | Mod: 90 | Performed by: PATHOLOGY

## 2021-04-20 PROCEDURE — U0003 INFECTIOUS AGENT DETECTION BY NUCLEIC ACID (DNA OR RNA); SEVERE ACUTE RESPIRATORY SYNDROME CORONAVIRUS 2 (SARS-COV-2) (CORONAVIRUS DISEASE [COVID-19]), AMPLIFIED PROBE TECHNIQUE, MAKING USE OF HIGH THROUGHPUT TECHNOLOGIES AS DESCRIBED BY CMS-2020-01-R: HCPCS | Mod: 90 | Performed by: PATHOLOGY

## 2021-04-20 PROCEDURE — 86850 RBC ANTIBODY SCREEN: CPT | Performed by: PATHOLOGY

## 2021-04-20 PROCEDURE — 86901 BLOOD TYPING SEROLOGIC RH(D): CPT | Performed by: PATHOLOGY

## 2021-04-20 PROCEDURE — 80048 BASIC METABOLIC PNL TOTAL CA: CPT | Performed by: PATHOLOGY

## 2021-04-20 PROCEDURE — 71250 CT THORAX DX C-: CPT | Mod: GC | Performed by: RADIOLOGY

## 2021-04-20 PROCEDURE — 85027 COMPLETE CBC AUTOMATED: CPT | Performed by: PATHOLOGY

## 2021-04-20 NOTE — TELEPHONE ENCOUNTER
Action 4.20.21 MJ   Action Taken All records internal. Previously saw Dr. Rodríguez on 3.24.21

## 2021-04-20 NOTE — OR NURSING
RE: Bowel prep before upcoming surgery?  Received: Today  Message Contents   Corazon Barnes RN Duellman, Megan, RN             Thanks! I have not been able to reach him yet.     Corazon    Previous Messages    ----- Message -----   From: Mariam Schumacher RN   Sent: 4/20/2021   1:42 PM CDT   To: Corazon Barnes RN, Sofia Harris   Subject: RE: Bowel prep before upcoming surgery?           Yes everything was sent to him via [employer] and it looks like he reviewed this. Does he have questions?   ----- Message -----   From: Corazon Barnes RN   Sent: 4/20/2021   1:35 PM CDT   To: Sofia Harris, Mariam Schumacher RN   Subject: Bowel prep before upcoming surgery?               Hi Mariam and Giovana     Does pt need Bowel prep (and or antibiotics & Zofran) before upcoming surgery on 4/22 ? If, so-Is he aware and does he have instructions and or meds if needed?     Thanks so much.     Corazon Barnes RN, BSN   Preanesthesia Screening   [phone]

## 2021-04-20 NOTE — PROGRESS NOTES
WOC Preoperative Ostomy Consult    Referral from Dr. Rodríguez  Consult attended by patient   Diagnosis: Colon cancer Date of Surgery: 04/22/2021   Type of Surgery:   Diagnostic laparoscopy, Laparoscopic Left hemicolectomy N/A General with Adductor Block   possible laparoscopic ileostomy         Emotional readiness for surgery: no acute distress   Physical Limitations: Without limitations  Abdomen assessed for site by: Patient's ability to visiualize area, avoidance of skin creases and scars, palpating for rectus abdominus muscle and clothing fit  Teaching: Anatomy/picture of stoma, stoma/bowel function postop, intro to pouches, diet, written/media offered and role of WOC/postop followup explained.  Stoma site marking:  Marking pen and tegaderm   Location chosen: RLQ  [employer]'s Ostomy packet given to patient  Yumi Bosch NP was available for supervision of care if needed or if questions should arise and regarding plan of care.  Vani Ballard RN RN CWON

## 2021-04-20 NOTE — OR NURSING
Bowel prep before upcoming surgery?  Received: Today  Message Contents   Corazon Barnes, Mariam Greer, EVELYN; Sofia Harris     Does pt need Bowel prep (and or antibiotics & Zofran) before upcoming surgery on 4/22 ? If, so-Is he aware and does he have instructions and or meds if needed?     Thanks so much.     Corazon Barnes RN, BSN   Preanesthesia Screening   [phone]

## 2021-04-21 ENCOUNTER — ANESTHESIA EVENT (OUTPATIENT)
Dept: SURGERY | Facility: CLINIC | Age: 71
End: 2021-04-21
Payer: COMMERCIAL

## 2021-04-22 ENCOUNTER — HOSPITAL ENCOUNTER (OUTPATIENT)
Facility: CLINIC | Age: 71
Discharge: HOME | End: 2021-04-22
Attending: SURGERY | Admitting: SURGERY
Payer: COMMERCIAL

## 2021-04-22 ENCOUNTER — PATIENT OUTREACH (OUTPATIENT)
Dept: GERIATRIC MEDICINE | Facility: CLINIC | Age: 71
End: 2021-04-22

## 2021-04-22 ENCOUNTER — ANESTHESIA (OUTPATIENT)
Dept: SURGERY | Facility: CLINIC | Age: 71
End: 2021-04-22
Payer: COMMERCIAL

## 2021-04-22 VITALS
DIASTOLIC BLOOD PRESSURE: 90 MMHG | HEART RATE: 55 BPM | TEMPERATURE: 97.6 F | BODY MASS INDEX: 23.51 KG/M2 | OXYGEN SATURATION: 97 % | RESPIRATION RATE: 16 BRPM | HEIGHT: 69 IN | SYSTOLIC BLOOD PRESSURE: 131 MMHG | WEIGHT: 158.73 LBS

## 2021-04-22 DIAGNOSIS — C18.9 COLON CANCER (H): ICD-10-CM

## 2021-04-22 DIAGNOSIS — Z80.0 FAMILY HISTORY OF GASTRIC CANCER: ICD-10-CM

## 2021-04-22 DIAGNOSIS — C18.9 COLON CANCER (H): Primary | ICD-10-CM

## 2021-04-22 DIAGNOSIS — Z86.0100 HISTORY OF COLONIC POLYPS: ICD-10-CM

## 2021-04-22 DIAGNOSIS — D37.4 NEOPLASM OF UNCERTAIN BEHAVIOR OF COLON: ICD-10-CM

## 2021-04-22 LAB — GLUCOSE BLDC GLUCOMTR-MCNC: 101 MG/DL (ref 70–99)

## 2021-04-22 PROCEDURE — 710N000012 HC RECOVERY PHASE 2, PER MINUTE: Performed by: SURGERY

## 2021-04-22 PROCEDURE — 250N000011 HC RX IP 250 OP 636: Performed by: NURSE ANESTHETIST, CERTIFIED REGISTERED

## 2021-04-22 PROCEDURE — 250N000013 HC RX MED GY IP 250 OP 250 PS 637: Performed by: STUDENT IN AN ORGANIZED HEALTH CARE EDUCATION/TRAINING PROGRAM

## 2021-04-22 PROCEDURE — 370N000017 HC ANESTHESIA TECHNICAL FEE, PER MIN: Performed by: SURGERY

## 2021-04-22 PROCEDURE — 88305 TISSUE EXAM BY PATHOLOGIST: CPT | Mod: 26 | Performed by: PATHOLOGY

## 2021-04-22 PROCEDURE — 250N000011 HC RX IP 250 OP 636: Performed by: SURGERY

## 2021-04-22 PROCEDURE — 258N000003 HC RX IP 258 OP 636: Performed by: NURSE ANESTHETIST, CERTIFIED REGISTERED

## 2021-04-22 PROCEDURE — 82962 GLUCOSE BLOOD TEST: CPT

## 2021-04-22 PROCEDURE — 360N000076 HC SURGERY LEVEL 3, PER MIN: Performed by: SURGERY

## 2021-04-22 PROCEDURE — 999N000141 HC STATISTIC PRE-PROCEDURE NURSING ASSESSMENT: Performed by: SURGERY

## 2021-04-22 PROCEDURE — 250N000009 HC RX 250: Performed by: NURSE ANESTHETIST, CERTIFIED REGISTERED

## 2021-04-22 PROCEDURE — 272N000001 HC OR GENERAL SUPPLY STERILE: Performed by: SURGERY

## 2021-04-22 PROCEDURE — 250N000013 HC RX MED GY IP 250 OP 250 PS 637: Performed by: SURGERY

## 2021-04-22 PROCEDURE — 88342 IMHCHEM/IMCYTCHM 1ST ANTB: CPT | Mod: TC | Performed by: SURGERY

## 2021-04-22 PROCEDURE — 88342 IMHCHEM/IMCYTCHM 1ST ANTB: CPT | Mod: 26 | Performed by: PATHOLOGY

## 2021-04-22 PROCEDURE — 88305 TISSUE EXAM BY PATHOLOGIST: CPT | Mod: TC | Performed by: SURGERY

## 2021-04-22 RX ORDER — HYDROMORPHONE HYDROCHLORIDE 1 MG/ML
.3-.5 INJECTION, SOLUTION INTRAMUSCULAR; INTRAVENOUS; SUBCUTANEOUS EVERY 5 MIN PRN
Status: CANCELLED | OUTPATIENT
Start: 2021-04-22

## 2021-04-22 RX ORDER — NALOXONE HYDROCHLORIDE 0.4 MG/ML
0.4 INJECTION, SOLUTION INTRAMUSCULAR; INTRAVENOUS; SUBCUTANEOUS
Status: DISCONTINUED | OUTPATIENT
Start: 2021-04-22 | End: 2021-04-22 | Stop reason: HOSPADM

## 2021-04-22 RX ORDER — LIDOCAINE HYDROCHLORIDE 20 MG/ML
INJECTION, SOLUTION INFILTRATION; PERINEURAL PRN
Status: DISCONTINUED | OUTPATIENT
Start: 2021-04-22 | End: 2021-04-22

## 2021-04-22 RX ORDER — ONDANSETRON 2 MG/ML
4 INJECTION INTRAMUSCULAR; INTRAVENOUS EVERY 30 MIN PRN
Status: CANCELLED | OUTPATIENT
Start: 2021-04-22

## 2021-04-22 RX ORDER — NALOXONE HYDROCHLORIDE 0.4 MG/ML
0.2 INJECTION, SOLUTION INTRAMUSCULAR; INTRAVENOUS; SUBCUTANEOUS
Status: CANCELLED | OUTPATIENT
Start: 2021-04-22

## 2021-04-22 RX ORDER — SODIUM CHLORIDE, SODIUM LACTATE, POTASSIUM CHLORIDE, CALCIUM CHLORIDE 600; 310; 30; 20 MG/100ML; MG/100ML; MG/100ML; MG/100ML
INJECTION, SOLUTION INTRAVENOUS CONTINUOUS
Status: CANCELLED | OUTPATIENT
Start: 2021-04-22

## 2021-04-22 RX ORDER — GABAPENTIN 300 MG/1
300 CAPSULE ORAL
Status: COMPLETED | OUTPATIENT
Start: 2021-04-22 | End: 2021-04-22

## 2021-04-22 RX ORDER — FENTANYL CITRATE 50 UG/ML
25-50 INJECTION, SOLUTION INTRAMUSCULAR; INTRAVENOUS
Status: DISCONTINUED | OUTPATIENT
Start: 2021-04-22 | End: 2021-04-22 | Stop reason: HOSPADM

## 2021-04-22 RX ORDER — NALOXONE HYDROCHLORIDE 0.4 MG/ML
0.2 INJECTION, SOLUTION INTRAMUSCULAR; INTRAVENOUS; SUBCUTANEOUS
Status: DISCONTINUED | OUTPATIENT
Start: 2021-04-22 | End: 2021-04-22 | Stop reason: HOSPADM

## 2021-04-22 RX ORDER — ONDANSETRON 2 MG/ML
4 INJECTION INTRAMUSCULAR; INTRAVENOUS ONCE
Status: COMPLETED | OUTPATIENT
Start: 2021-04-22 | End: 2021-04-22

## 2021-04-22 RX ORDER — NALOXONE HYDROCHLORIDE 0.4 MG/ML
0.4 INJECTION, SOLUTION INTRAMUSCULAR; INTRAVENOUS; SUBCUTANEOUS
Status: CANCELLED | OUTPATIENT
Start: 2021-04-22

## 2021-04-22 RX ORDER — CEFAZOLIN SODIUM 2 G/100ML
2 INJECTION, SOLUTION INTRAVENOUS
Status: DISCONTINUED | OUTPATIENT
Start: 2021-04-22 | End: 2021-04-22 | Stop reason: HOSPADM

## 2021-04-22 RX ORDER — ACETAMINOPHEN 325 MG/1
975 TABLET ORAL ONCE
Status: COMPLETED | OUTPATIENT
Start: 2021-04-22 | End: 2021-04-22

## 2021-04-22 RX ORDER — METOPROLOL SUCCINATE 50 MG/1
50 TABLET, EXTENDED RELEASE ORAL DAILY
Status: DISCONTINUED | OUTPATIENT
Start: 2021-04-22 | End: 2021-04-22 | Stop reason: HOSPADM

## 2021-04-22 RX ORDER — DEXAMETHASONE SODIUM PHOSPHATE 4 MG/ML
INJECTION, SOLUTION INTRA-ARTICULAR; INTRALESIONAL; INTRAMUSCULAR; INTRAVENOUS; SOFT TISSUE PRN
Status: DISCONTINUED | OUTPATIENT
Start: 2021-04-22 | End: 2021-04-22

## 2021-04-22 RX ORDER — ONDANSETRON 4 MG/1
4 TABLET, ORALLY DISINTEGRATING ORAL EVERY 30 MIN PRN
Status: CANCELLED | OUTPATIENT
Start: 2021-04-22

## 2021-04-22 RX ORDER — FLUMAZENIL 0.1 MG/ML
0.2 INJECTION, SOLUTION INTRAVENOUS
Status: DISCONTINUED | OUTPATIENT
Start: 2021-04-22 | End: 2021-04-22 | Stop reason: HOSPADM

## 2021-04-22 RX ORDER — PROPOFOL 10 MG/ML
INJECTION, EMULSION INTRAVENOUS PRN
Status: DISCONTINUED | OUTPATIENT
Start: 2021-04-22 | End: 2021-04-22

## 2021-04-22 RX ORDER — LIDOCAINE 40 MG/G
CREAM TOPICAL
Status: CANCELLED | OUTPATIENT
Start: 2021-04-22

## 2021-04-22 RX ORDER — CEFAZOLIN SODIUM 2 G/100ML
2 INJECTION, SOLUTION INTRAVENOUS SEE ADMIN INSTRUCTIONS
Status: DISCONTINUED | OUTPATIENT
Start: 2021-04-22 | End: 2021-04-22 | Stop reason: HOSPADM

## 2021-04-22 RX ORDER — ONDANSETRON 4 MG/1
4 TABLET, ORALLY DISINTEGRATING ORAL
Status: CANCELLED | OUTPATIENT
Start: 2021-04-22

## 2021-04-22 RX ORDER — PROPOFOL 10 MG/ML
INJECTION, EMULSION INTRAVENOUS CONTINUOUS PRN
Status: DISCONTINUED | OUTPATIENT
Start: 2021-04-22 | End: 2021-04-22

## 2021-04-22 RX ORDER — FENTANYL CITRATE 50 UG/ML
25-50 INJECTION, SOLUTION INTRAMUSCULAR; INTRAVENOUS
Status: CANCELLED | OUTPATIENT
Start: 2021-04-22

## 2021-04-22 RX ORDER — SODIUM CHLORIDE, SODIUM LACTATE, POTASSIUM CHLORIDE, CALCIUM CHLORIDE 600; 310; 30; 20 MG/100ML; MG/100ML; MG/100ML; MG/100ML
INJECTION, SOLUTION INTRAVENOUS CONTINUOUS PRN
Status: DISCONTINUED | OUTPATIENT
Start: 2021-04-22 | End: 2021-04-22

## 2021-04-22 RX ADMIN — DEXAMETHASONE SODIUM PHOSPHATE 4 MG: 4 INJECTION, SOLUTION INTRA-ARTICULAR; INTRALESIONAL; INTRAMUSCULAR; INTRAVENOUS; SOFT TISSUE at 12:10

## 2021-04-22 RX ADMIN — LIDOCAINE HYDROCHLORIDE 60 MG: 20 INJECTION, SOLUTION INFILTRATION; PERINEURAL at 12:04

## 2021-04-22 RX ADMIN — ONDANSETRON 4 MG: 2 INJECTION INTRAMUSCULAR; INTRAVENOUS at 12:16

## 2021-04-22 RX ADMIN — GABAPENTIN 300 MG: 300 CAPSULE ORAL at 10:48

## 2021-04-22 RX ADMIN — SODIUM CHLORIDE, POTASSIUM CHLORIDE, SODIUM LACTATE AND CALCIUM CHLORIDE: 600; 310; 30; 20 INJECTION, SOLUTION INTRAVENOUS at 12:05

## 2021-04-22 RX ADMIN — METOPROLOL SUCCINATE 50 MG: 50 TABLET, EXTENDED RELEASE ORAL at 11:19

## 2021-04-22 RX ADMIN — PROPOFOL 125 MCG/KG/MIN: 10 INJECTION, EMULSION INTRAVENOUS at 12:05

## 2021-04-22 RX ADMIN — PROPOFOL 30 MG: 10 INJECTION, EMULSION INTRAVENOUS at 12:08

## 2021-04-22 RX ADMIN — ACETAMINOPHEN 975 MG: 325 TABLET, FILM COATED ORAL at 10:48

## 2021-04-22 ASSESSMENT — LIFESTYLE VARIABLES: TOBACCO_USE: 1

## 2021-04-22 ASSESSMENT — MIFFLIN-ST. JEOR: SCORE: 1460.38

## 2021-04-22 ASSESSMENT — COPD QUESTIONNAIRES: COPD: 1

## 2021-04-22 NOTE — PROGRESS NOTES
TRANSITIONS OF CARE (TAMARA) LOG   TAMARA tasks should be completed by the CC within one (1) business day of notification of each transition. Follow up contact with member is required after return to their usual care setting.  Note:  If CC finds out about the transitions fifteen (15) days or more after the member has returned to their usual care setting, no TAMARA log is needed. However, the CC should check in with the member to discuss the transition process, any changes needed to the care plan and document it in a case note.    Member Name:  [facility]evin Guidry [facility] [id number]   Product: [facility] Care Coordinator Contact:  Lovely Medina RN Agency/[facility]/Care System: [facility]   Transition Communication Actions from Care Management Contact   Transition #1   Notification Date: 4/22/21 Transition Date:   4/22/21 Transition From: Home     Is this the member s usual care setting?               yes Transition To: Hospital, [facility]   Transition Type:  Planned  Reason for Admission/Comments:  Colon CA surgery, L hemicolectomy     Shared CC contact info, care plan/services with receiving setting--Date completed: 4/22/21    Notified PCP of transition--Date completed:  4/22/21     via  (OR) Members PCP was the admitting physician

## 2021-04-22 NOTE — ANESTHESIA PREPROCEDURE EVALUATION
Anesthesia Pre-Procedure Evaluation    Patient: Tevin Guidry   MRN: [id number] : 1950        Preoperative Diagnosis: History of colonic polyps [Z86.010]   Procedure : Procedure(s):  Laparoscopic extended right colectomy     Past Medical History:   Diagnosis Date     Bladder cancer (H)      Colon polyps     multiple may need colectomy (adenoma)     COPD (chronic obstructive pulmonary disease) (H)      Hyperlipidemia LDL goal <130      Hypertension goal BP (blood pressure) < 140/90      Social phobia       Past Surgical History:   Procedure Laterality Date     CLAVICLE SURGERY Right     fracture in childhood     COLONOSCOPY N/A 3/30/2017    Procedure: COLONOSCOPY;  Surgeon: Jie Onofre MD;  Location: [facility] GI     COLONOSCOPY N/A 3/2/2021    Procedure: COLONOSCOPY, WITH POLYPECTOMY, hot snare, lift with elaview and epi, clip for hemorrhage control, tattoo;  Surgeon: Ra Cardoso MD;  Location: [facility] OR     CYSTOSCOPY, BIOPSY BLADDER INSTILL OPTICAL AGENT N/A 2020    Procedure: CYSTOSCOPY, WITH INSTILLATION OF OPTICAL IMAGING AGENT INTO BLADDER AND BIOPSY OF BLADDER (CYSVIEW);  Surgeon: Kirt Guzman MD;  [facility]     CYSTOSCOPY, TRANSURETHRAL RESECTION (TUR) TUMOR BLADDER, COMBINED N/A 2019    Procedure: cystoscopy,Transurethral Resection Of Bladder Tumor;  Surgeon: Almas Sunshine MD;  [facility]     CYSTOSCOPY, TRANSURETHRAL RESECTION (TUR) TUMOR BLADDER, COMBINED N/A 2020    Procedure: CYSTOSCOPY, WITH TRANSURETHRAL RESECTION BLADDER TUMOR;  Surgeon: Almas Sunshine MD;  [facility]     DAVINCI BYPASS ARTERY CORONARY N/A 2014    Procedure: DAVINCI BYPASS ARTERY CORONARY;  Surgeon: Lam Solis MD;  Location: [facility] OR     ESOPHAGOSCOPY, GASTROSCOPY, DUODENOSCOPY (EGD), COMBINED N/A 3/30/2017    Procedure: COMBINED ESOPHAGOSCOPY, GASTROSCOPY, DUODENOSCOPY (EGD);  Surgeon: Jie Onofre MD;  Location: [facility] GI      HC COLONOSCOPY THRU STOMA, DIAGNOSTIC      polyps due      PHACOEMULSIFICATION CLEAR CORNEA WITH STANDARD INTRAOCULAR LENS IMPLANT Right 2020    Procedure: RIGHT EYE COMPLEX CATARACT REMOVAL WITH INTRAOCULAR LENS IMPLANT;  Surgeon: Aimee Marcus MD;  Location: [facility] OR     SURGICAL HISTORY OF -       right clavicular fracture with ORIF      Allergies   Allergen Reactions     Codeine Nausea and Vomiting      Social History     Tobacco Use     Smoking status: Former Smoker     Packs/day: 1.50     Years: 30.00     Pack years: 45.00     Quit date: 2019     Years since quittin.7     Smokeless tobacco: Never Used     Tobacco comment: had restarted- now quit smoking 20   Substance Use Topics     Alcohol use: No     Comment: quit 35 years ago      Wt Readings from Last 1 Encounters:   21 75.7 kg (166 lb 14.2 oz)        Anesthesia Evaluation   Pt has had prior anesthetic. Type: General and MAC.    No history of anesthetic complications       ROS/MED HX  ENT/Pulmonary:     (+) MICHELLE risk factors, hypertension, tobacco use, Past use, COPD,  (-) asthma   Neurologic:  - neg neurologic ROS  (-) no CVA and no TIA   Cardiovascular: Comment: CAD s/p hybrid robotic-assisted CABG (LIMA - LAD) and PCI with ALENA to RCA (distal to prox) ().   >4 METS. Enjoys walking dog (takes care of brother's dog during the day). Can go up and down stairs without any symptoms.     Denies chest pain, SOB, palpitations, syncope, ELENA, orthopnea, or PND.   RCRI : CAD (h/o MI, positive stress test, angina, Qs on EKG).  0.9 % risk of major adverse cardiac event.  On statins, beta-blocker, ASA (held for surgery).  Seen in PAC, no further testing was ordered.    (+) Dyslipidemia hypertension--CAD -CABG-date: . stent-. Drug Eluting Stent. Taking blood thinners Pt has received instructions: ELENA. Previous cardiac testing   Echo: Date: Results:    Stress Test: Date: Results:    ECG Reviewed: Date: 21  Results:  NSR  Cath: Date: Results:      METS/Exercise Tolerance: 3 - Able to walk 1-2 blocks without stopping    Hematologic:     (+) history of blood transfusion, no previous transfusion reaction, Known PRBC Anitbodies:No - during CABG,  (-) anemia   Musculoskeletal:  - neg musculoskeletal ROS  (-) arthritis   GI/Hepatic: Comment: Colon polyps not amenable to endoscopic resection   (-) liver disease   Renal/Genitourinary: Comment: H/o bladder ca   (-) renal disease   Endo:    (-) Type II DM, thyroid disease and obesity   Psychiatric/Substance Use: Comment: Social phobia and anxiety  H/o alcohol abuse (Quit drinking 35 years ago.) Recreational drug usage: Cannabis (Smokes daily) - neg psychiatric ROS     Infectious Disease: Comment: COVID NEGATIVE 4/20/21    (+) MRSA,     Malignancy:   (+) Malignancy (bladder), History of Other and GI.GI CA  Active status post Surgery.  Other CA Remission status post.    Other:  - neg other ROS   (-) Any chance pregnant       Physical Exam    Airway        Mallampati: II   TM distance: > 3 FB   Neck ROM: full   Mouth opening: > 3 cm    Respiratory Devices and Support         Dental  no notable dental history         Cardiovascular   cardiovascular exam normal          Pulmonary   pulmonary exam normal        breath sounds clear to auscultation           OUTSIDE LABS:  CBC:   Lab Results   Component Value Date    WBC 9.6 04/20/2021    WBC 15.2 (H) 04/14/2021    HGB 15.0 04/20/2021    HGB 14.7 04/14/2021    HCT 45.8 04/20/2021    HCT 42.6 04/14/2021     04/20/2021     04/16/2021     BMP:   Lab Results   Component Value Date     04/20/2021     04/14/2021    POTASSIUM 4.3 04/20/2021    POTASSIUM 4.2 04/14/2021    CHLORIDE 107 04/20/2021    CHLORIDE 104 04/14/2021    CO2 30 04/20/2021    CO2 24 04/14/2021    BUN 17 04/20/2021    BUN 9 04/14/2021    CR 0.85 04/20/2021    CR 0.75 04/14/2021     (H) 04/20/2021     (H) 04/14/2021     COAGS:   Lab  Results   Component Value Date    PTT 30 04/09/2021    INR 1.00 04/20/2021     POC:   Lab Results   Component Value Date    BGM 92 04/13/2021     HEPATIC:   Lab Results   Component Value Date    ALBUMIN 3.7 04/09/2021    PROTTOTAL 7.1 04/09/2021    ALT 23 04/09/2021    AST 18 04/09/2021    ALKPHOS 130 04/09/2021    BILITOTAL 0.4 04/09/2021     OTHER:   Lab Results   Component Value Date    PH 7.36 09/30/2014    LACT 0.6 (L) 09/29/2014    A1C 5.4 04/09/2021    TAMMIE 8.9 04/20/2021    PHOS 4.3 09/30/2014    MAG 2.1 04/14/2021    TSH 0.70 01/25/2017       Anesthesia Plan    ASA Status:  3   NPO Status:  NPO Appropriate    Anesthesia Type: MAC.   Induction: Intravenous.   Maintenance: Balanced.   Techniques and Equipment:     - Lines/Monitors: 2nd IV, BIS     Consents    Anesthesia Plan(s) and associated risks, benefits, and realistic alternatives discussed. Questions answered and patient/representative(s) expressed understanding.     - Discussed with:  Patient      - Extended Intubation/Ventilatory Support Discussed: No.      - Patient is DNR/DNI Status: No    Use of blood products discussed: Yes.     - Discussed with: Patient.     - Consented: consented to blood products            Reason for refusal: other.     Postoperative Care    Pain management: IV analgesics, Multi-modal analgesia.   PONV prophylaxis: Ondansetron (or other 5HT-3)     Comments:           Anesthesia attending    70 year old male who  has a past medical history of Bladder cancer (H), Colon polyps, COPD (chronic obstructive pulmonary disease) (H), Hyperlipidemia LDL goal <130, Hypertension goal BP (blood pressure) < 140/90, and Social phobia. to OR for Procedure(s):  Diagnostic laparoscopy, Laparoscopic Left hemicolectomy  possible laparoscopic ileostomy    Hemoglobin   Date Value Ref Range Status   04/20/2021 15.0 13.3 - 17.7 g/dL Final     Potassium   Date Value Ref Range Status   04/20/2021 4.3 3.4 - 5.3 mmol/L Final     NPO status  adequate.    Plan for GA, standard monitors, arterial line, PONV prophylaxis, antibiotics. Plan discussed with the anesthesia and surgery teams. Consent in chart.             Cruz Alston MD

## 2021-04-22 NOTE — OP NOTE
[facility] Colorectal Surgery Operative Report    Operative Note    Pre-operative diagnosis: 1. History of colon polyps  2. Coronary artery disease  3. Hypertension  4. COPD  5. Bladder cancer  6. 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 History of colon polyps  2. Coronary artery disease  3. Hypertension  4. COPD  5. Bladder cancer  6. 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 Small polyps seen (<5), not resected at this time. Resected one 1 cm polyp in descending colon.     Complications: none   Implants: None.           INDICATIONS FOR PROCEDURE  Tevin Guidry is a 70 year old male who presents postoperative day 9 following an extended right hemicolectomy. Pathology returned on his colon on postoperative day 6 yielding the surprising finding of signet ring cell cancer in the distal end of the transected specimen. However, after discussion with my partners, as well as Dr. Valentino Laurent from pathology, Dr. 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 It was decided that he would in fact not benefit from further colonic resection. Instead I proposed performing a colonoscopy today to evaluate his anastomosis at this time to rule out a colonic source. This was extensively  discussed with the patient, who was in agreement with this plan.    After understanding the risks and benefits, he proceeding with colonoscopy.      OPERATIVE PROCEDURE:  The patient was positioned on the left side with knees and hips flexed. A digital exam was performed which was normal. A colonoscopy was passed into the anus, advanced into the rectum and passed to the anastomosis without difficulty . Prep was adequate. <5 small polyps were seen, left alone at this time. One 1cm polyp was seen in the descending colon, resection completed with cold snare, clip x2 placed. Pictures were taken as well to confirm. The entire colonic mucosa was then circumferentially inspected upon slow withdrawal of the scope. All visualized portions of the colon were normal. The rectum and anal canal were normal. Retroflexion was normal as well.    Postoperatively, the patient was transferred to the recovery room in stable condition.    Discharge today.    Follow up with oncology, with need for EGD, PET scan.      Jitendra Rodríguez MD  Division of Colon and Rectal Surgery  [facility]  [phone]

## 2021-04-22 NOTE — ANESTHESIA CARE TRANSFER NOTE
Patient: Tevin Guidry    Procedure(s):  Colonoscopy    Diagnosis: Colon cancer (H) [C18.9]  Diagnosis Additional Information: No value filed.    Anesthesia Type:   General     Note:    Oropharynx: oropharynx clear of all foreign objects and spontaneously breathing  Level of Consciousness: awake  Oxygen Supplementation: room air    Independent Airway: airway patency satisfactory and stable  Dentition: dentition unchanged  Vital Signs Stable: post-procedure vital signs reviewed and stable  Report to RN Given: handoff report given  Patient transferred to: Phase II    Handoff Report: Identifed the Patient, Identified the Reponsible Provider, Reviewed the pertinent medical history, Discussed the surgical course, Reviewed Intra-OP anesthesia mangement and issues during anesthesia, Set expectations for post-procedure period and Allowed opportunity for questions and acknowledgement of understanding      Vitals: (Last set prior to Anesthesia Care Transfer)  CRNA VITALS  4/22/2021 1229 - 4/22/2021 1306      4/22/2021             Pulse:  54    SpO2:  100 %        Electronically Signed By: FRANCISCA Max CRNA  April 22, 2021  1:06 PM

## 2021-04-22 NOTE — ANESTHESIA POSTPROCEDURE EVALUATION
Patient: Tevin Guidry    Procedure(s):  Colonoscopy    Diagnosis:Colon cancer (H) [C18.9]  Diagnosis Additional Information: No value filed.    Anesthesia Type:  General    Note:  Disposition: Outpatient   Postop Pain Control: Uneventful            Sign Out: Well controlled pain   PONV: No   Neuro/Psych: Uneventful            Sign Out: Acceptable/Baseline neuro status   Airway/Respiratory: Uneventful            Sign Out: Acceptable/Baseline resp. status   CV/Hemodynamics: Uneventful            Sign Out: Acceptable CV status; No obvious hypovolemia; No obvious fluid overload   Other NRE: NONE   DID A NON-ROUTINE EVENT OCCUR? No           Last vitals:  Vitals:    04/22/21 1320 04/22/21 1330 04/22/21 1347   BP: 118/86 (!) 154/71 (!) 142/70   Pulse: 50 50 53   Resp: 18 16 14   Temp:   36.5  C (97.7  F)   SpO2: 100% 97% 98%       Last vitals prior to Anesthesia Care Transfer:  CRNA VITALS  4/22/2021 1229 - 4/22/2021 1329      4/22/2021             NIBP:  125/87    SpO2:  100 %          Electronically Signed By: Cruz Alston MD  April 22, 2021  2:01 PM

## 2021-04-22 NOTE — DISCHARGE INSTRUCTIONS
[facility]  Same-Day Surgery   Adult Discharge Orders & Instructions     For 24 hours after surgery    1. Get plenty of rest.  A responsible adult must stay with you for at least 24 hours after you leave the hospital.   2. Do not drive or use heavy equipment.  If you have weakness or tingling, don't drive or use heavy equipment until this feeling goes away.  3. Do not drink alcohol.  4. Avoid strenuous or risky activities.  Ask for help when climbing stairs.   5. You may feel lightheaded.  IF so, sit for a few minutes before standing.  Have someone help you get up.   6. If you have nausea (feel sick to your stomach): Drink only clear liquids such as apple juice, ginger ale, broth or 7-Up.  Rest may also help.  Be sure to drink enough fluids.  Move to a regular diet as you feel able.  7. You may have a slight fever. Call the doctor if your fever is over 100 F (37.7 C) (taken under the tongue) or lasts longer than 24 hours.  8. You may have a dry mouth, a sore throat, muscle aches or trouble sleeping.  These should go away after 24 hours.  9. Do not make important or legal decisions.   Call your doctor for any of the followin.  Signs of infection (fever, growing tenderness at the surgery site, a large amount of drainage or bleeding, severe pain, foul-smelling drainage, redness, swelling).    2. It has been over 8 to 10 hours since surgery and you are still not able to urinate (pass water).    3.  Headache for over 24 hours.    4.  Numbness, tingling or weakness the day after surgery (if you had spinal anesthesia).  To contact a doctor, call Dr. Rodríguez at [phone] (clinic number) or:        [phone] and ask for the resident on call for   Gastroenterology (answered 24 hours a day)      Emergency Department:    [facility]: [phone]       (TTY for hearing impaired: [phone])    [facility]: [phone]       (TTY for hearing impaired:  [phone])  Discharge Instructions after Colonoscopy    Today you had a  Colonoscopy    Discomfort    Air was placed in your colon during the exam in order to see it. Walking helps to pass the air.    You may take Tylenol (acetaminophen) for pain unless your doctor has told you not to.  Do not take aspirin or ibuprofen (Advil, Motrin, or other anti-inflammatory  drugs) for _____ days.    Follow-up  We took small tissue samples or polyps to study. Your doctor will call you with the results  within two weeks.

## 2021-04-22 NOTE — OR NURSING
Discharge instructions given to patient and brother, Jose, via phone. Both verbalized understanding, no further instructions at this time.

## 2021-04-23 ENCOUNTER — TELEPHONE (OUTPATIENT)
Dept: GASTROENTEROLOGY | Facility: CLINIC | Age: 71
End: 2021-04-23

## 2021-04-23 NOTE — TELEPHONE ENCOUNTER
Patient is scheduled for EDG with Dr. Tobar    Spoke with: Hola    Date of Procedure: 5/6/21    Location: [facility]    Sedation Type MAC    Conscious Sedation- Needs  for 6 hours after the procedure  MAC/General-Needs  for 24 hours after procedure    Pre-op for Unit J MAC and OR Not needed    (if yes advise patient they will need a pre-op prior to procedure)      Is patient on blood thinners? -NO (If yes- inform patient to follow up with PCP or provider for follow up instructions)     Informed patient they will need an adult  YES  Cannot take any type of public or medical transportation alone    Informed Patient of COVID Test Requirement YES    Preferred Pharmacy for Pre Prescription On Chart    Confirmed Nurse will call to complete assessment YES    Additional comments: No

## 2021-04-26 DIAGNOSIS — Z11.59 ENCOUNTER FOR SCREENING FOR OTHER VIRAL DISEASES: ICD-10-CM

## 2021-04-26 LAB — COPATH REPORT: NORMAL

## 2021-04-26 NOTE — TELEPHONE ENCOUNTER
RECORDS STATUS - ALL OTHER DIAGNOSIS      RECORDS RECEIVED FROM: [facility]   DATE RECEIVED: 4/26   NOTES STATUS DETAILS   OFFICE NOTE from referring provider [facility] Jitendra Rodríguez MD in [facility] COLON & RECTAL SURGERY   OFFICE NOTE from medical oncologist     DISCHARGE SUMMARY from hospital [facility] 4/22/21, 4/13/21, 3/30/17, 4/28/16, 9/6/13, 7/20/12, 6/2/11   DISCHARGE REPORT from the ER NA    OPERATIVE REPORT Epic 4/22/21, 3/2/21, 3/30/17, 4/28/16, 9/6/13, 7/20/12, 6/2/11, 4/19/10, 3/24/05 : Colonoscopy    4/13/21: Right Colectomy    3/20/17: EGD   MEDICATION LIST [facility]    CLINICAL TRIAL TREATMENTS TO DATE     LABS     PATHOLOGY REPORTS [facility] 4/22/21, 4/13/21, 3/2/21, 9/2/20, 7/14/20, 2/6/20, 9/5/19, 3/30/17, 4/28/16, 9/6/13, 7/20/12: Surg Path   ANYTHING RELATED TO DIAGNOSIS Epic 4/22/21   GENONOMIC TESTING     TYPE:     IMAGING (NEED IMAGES & REPORT)     XR PACS [facility]   CT SCANS PACS 4/20/21, 4/7/21: [facility]   MRI     MAMMO     ULTRASOUND PPACS 4/13/21: [facility]   PET Scheduled @ [facility] 4/30/21: [facility]

## 2021-04-27 ENCOUNTER — PATIENT OUTREACH (OUTPATIENT)
Dept: GERIATRIC MEDICINE | Facility: CLINIC | Age: 71
End: 2021-04-27

## 2021-04-27 NOTE — PROGRESS NOTES
"[facility] Care Coordination Contact      [facility] Six-Month Telephone Assessment    6 month telephone assessment completed on 21.    ER visits: Yes -  [facility]  Hospitalizations: Yes -  [facility]  TCU stays: No  Significant health status changes: yes, had 2.5 feet of bowel removed.  Found cancer outside of the bowel so is now scheduled for PET scan to see where primary site of cancer is. No cancer in the bowel.  Falls/Injuries: Yes: x1 on the sidewalk, resulting in a swollen \"squishy\" elbow that was never checked out.  Said he has no need to see PCP but when he does he will ask her about it.  No pain.  ADL/IADL changes: No, is back to walking everyday. Reports that his brother ad mother  of stomach cancer so he is suspecting that is what he has.  Changes in services: No, does not receive services  Caregiver Assessment follow up:  na    Goals: See POC in chart for goal progress documentation.  Has not seen a dentist, colonoscopy resulted in 2.5 feet of bowel removal. Has had one fall.    Will see member in 6 months for an annual health risk assessment.   Encouraged member to call CC with any questions or concerns in the meantime.     Lovely Medina RN  [facility]   [phone]          "

## 2021-04-29 ENCOUNTER — TELEPHONE (OUTPATIENT)
Dept: GASTROENTEROLOGY | Facility: OUTPATIENT CENTER | Age: 71
End: 2021-04-29

## 2021-04-29 NOTE — TELEPHONE ENCOUNTER
Patient taking any blood thinners ? No      Heart disease ?  CABG 2014     Lung disease ? COPD     Sleep apnea ? Denies      Diabetic ? Denies      Kidney disease ? Denies      Electronic implanted medical devices ? Denies      PTSD ? N/a      Prep instructions reviewed with patient ? Instructions, policy,MAC sedation plan reviewed. Advised patient to have someone stay with them for 24 hours post exam.     Yes    Pharmacy : N/a        Colon cancer (H)   History of colonic polyps   Family history of gastric cancer          Referring provider : Preethi Quiroz MD      Arrival Time : 10 AM    Covid test 5-4 at [address] [place] [address]

## 2021-04-30 ENCOUNTER — HOSPITAL ENCOUNTER (OUTPATIENT)
Dept: PET IMAGING | Facility: CLINIC | Age: 71
Discharge: HOME | End: 2021-04-30
Attending: SURGERY | Admitting: SURGERY
Payer: COMMERCIAL

## 2021-04-30 DIAGNOSIS — Z86.0100 HISTORY OF COLONIC POLYPS: ICD-10-CM

## 2021-04-30 DIAGNOSIS — Z80.0 FAMILY HISTORY OF GASTRIC CANCER: ICD-10-CM

## 2021-04-30 DIAGNOSIS — D37.4 NEOPLASM OF UNCERTAIN BEHAVIOR OF COLON: ICD-10-CM

## 2021-04-30 DIAGNOSIS — C18.9 COLON CANCER (H): ICD-10-CM

## 2021-04-30 PROCEDURE — 71260 CT THORAX DX C+: CPT | Mod: 26 | Performed by: RADIOLOGY

## 2021-04-30 PROCEDURE — 78816 PET IMAGE W/CT FULL BODY: CPT | Mod: PI

## 2021-04-30 PROCEDURE — A9552 F18 FDG: HCPCS | Performed by: SURGERY

## 2021-04-30 PROCEDURE — 78816 PET IMAGE W/CT FULL BODY: CPT | Mod: 26 | Performed by: RADIOLOGY

## 2021-04-30 PROCEDURE — 74177 CT ABD & PELVIS W/CONTRAST: CPT

## 2021-04-30 PROCEDURE — 74177 CT ABD & PELVIS W/CONTRAST: CPT | Mod: 26 | Performed by: RADIOLOGY

## 2021-04-30 PROCEDURE — 343N000001 HC RX 343: Performed by: SURGERY

## 2021-04-30 PROCEDURE — 250N000011 HC RX IP 250 OP 636: Performed by: SURGERY

## 2021-04-30 RX ORDER — IOPAMIDOL 755 MG/ML
60-135 INJECTION, SOLUTION INTRAVASCULAR ONCE
Status: COMPLETED | OUTPATIENT
Start: 2021-04-30 | End: 2021-04-30

## 2021-04-30 RX ADMIN — FLUDEOXYGLUCOSE F-18 10.05 MCI.: 500 INJECTION, SOLUTION INTRAVENOUS at 12:08

## 2021-04-30 RX ADMIN — IOPAMIDOL 97 ML: 755 INJECTION, SOLUTION INTRAVENOUS at 13:02

## 2021-05-02 NOTE — PROGRESS NOTES
"Hola is a 70 year old who is being evaluated via a billable video visit.      How would you like to obtain your AVS? MyChart  If the video visit is dropped, the invitation should be resent by: Send to e-mail at: [email or web address]  Will anyone else be joining your video visit? No       I have reviewed and updated the patient's allergies and medication list.    Concerns: none  Refills: none     Vitals - Patient Reported  Weight (Patient Reported): 72.6 kg (160 lb)  Height (Patient Reported): 175.3 cm (5' 9\")  BMI (Based on Pt Reported Ht/Wt): 23.63  Pain Score: No Pain (0)    Quyen Anne CMA            NEW PATIENT VISIT    NAME: Hola Guidry     DATE: 5/3/2021  MRN: [id number]  REFERRING PHYSICIAN: Dr Jitendra Rodríguez.   CC/PATIENT ID:  HPI: 70 year old male with PMHx of coronary artery disease s/p prior robotic CABG, hypertension, hyperlipidemia, COPD, history of bladder cancer, and history of colonic polyps. He started to have regular colonoscopy since age of 48 and had total 10 colonoscopy done. There has been findings of colonic polyps in every colonoscopy. He also developed anemia about 3 years ago. His last colonoscopy in March was delayed due to his diagnosis of Bladder cancer, then his most recent colonoscopy showed a high burden of colonic polyps that could not be endoscopically resected. Therefore pt underwent an elective laparoscopic extended right colectomy on 4/13/2021. 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 The Tumor is located at the distal resection margin and situated within the lamina propria/submucosa (distal margin positive, proximal and deep margins are free of malignancy). He was then seen by Dr Rodríguez again on 4/22 and repeated a colonoscopy: colonoscopy was advanced into the rectum and passed to the anastomosis " without difficulty . Prep was adequate. <5 small polyps were seen, left alone at this time. One 1cm polyp was seen in the descending colon, resection completed with cold snare. The entire colonic mucosa was then circumferentially inspected upon slow withdrawal of the scope. All visualized portions of the colon were normal. The rectum and anal canal were normal. Retroflexion was normal as well. 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   Patient reports feeling well, denies any diarrhea, abdominal pain, no blood in the stool or dark stool before his right seema colectomy. He has recovered well from the surgery, he still has loose stool since surgery, wound healed well, no more abdominal pain. He denies fever/chill, no dyspnea, no chest pain, no headache, no hematuria, no dysuria, no urgency. He has COPD and has dyspnea on significant exertion such as moving heavy objects, but denies dyspnea at rest, he is able to walk one flight of stairs without getting shortness of breath. He has good appetite, weight has been stable. He has been active, do regular walking about 1 mile every day.     For his history of bladder cancer. He had HG Ta in 9/2019 followed by BCG induction. He had recurrence in 2/2020 and underwent repeat induction course of BCG. 2nd BCG induction completed 4/2020. Recurrence noted in 7/2020 and TURBT 9/2020 with Non invasive low grade papillary urothelial carcinoma in the left and right lateral wall of bladder. He was given BCG maintenance in 10/2020.       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 FDG at the colonic surgical anastomosis presumably postsurgical inflammation.   2. No definite FDG uptake to suggest metastatic  disease.   3. No definite primary significant ring carcinoma is identified. There are 3 equivocal findings:   3a. Mild uptake in the gastric cardia, favor physiologic  3b. Loop of slightly narrowed thickened small bowel with FDG uptake,  favor physiologic or postoperative inflammation but cannot rule out a primary.   3c. Extensive FDG uptake in the entire sigmoid colon, favored to be physiologic.  3d. If clinically indicated, an MR enterography in this case may better define bowel abnormalities. Alternatively attention to these areas on follow-up imaging.  4. Incidentally noted bilateral hydroceles and right peritesticular calcification.       PAST MEDICAL HISTORY:   Past Medical History:   Diagnosis Date     Bladder cancer (H)      Colon polyps     multiple may need colectomy (adenoma)     COPD (chronic obstructive pulmonary disease) (H)      Hyperlipidemia LDL goal <130      Hypertension goal BP (blood pressure) < 140/90      Social phobia      PAST SURGICAL HISTORY:  Past Surgical History:   Procedure Laterality Date     CLAVICLE SURGERY Right     fracture in childhood     COLONOSCOPY N/A 3/30/2017    Procedure: COLONOSCOPY;  Surgeon: Jie Onofre MD;  Location: [facility]     COLONOSCOPY N/A 3/2/2021    Procedure: COLONOSCOPY, WITH POLYPECTOMY, hot snare, lift with elaview and epi, clip for hemorrhage control, tattoo;  Surgeon: Ra Cardoso MD;  Location: [facility]     COLONOSCOPY N/A 4/22/2021    Procedure: Colonoscopy;  Surgeon: Jitendra Rodríguez MD;  Location: [facility]     CYSTOSCOPY, BIOPSY BLADDER INSTILL OPTICAL AGENT N/A 9/2/2020    Procedure: CYSTOSCOPY, WITH INSTILLATION OF OPTICAL IMAGING AGENT INTO BLADDER AND BIOPSY OF BLADDER (CYSVIEW);  Surgeon: Kirt Guzman MD;  [facility]     CYSTOSCOPY, TRANSURETHRAL RESECTION (TUR) TUMOR BLADDER, COMBINED N/A 9/5/2019    Procedure: cystoscopy,Transurethral Resection Of Bladder Tumor;  Surgeon: Almas Sunshine MD;  [facility]     CYSTOSCOPY, TRANSURETHRAL RESECTION (TUR) TUMOR BLADDER, COMBINED N/A 2020    Procedure: CYSTOSCOPY, WITH TRANSURETHRAL RESECTION BLADDER TUMOR;  Surgeon: Almas Sunshine MD;  [facility]     DAVINCI BYPASS ARTERY CORONARY N/A 2014    Procedure: DAVINCI BYPASS ARTERY CORONARY;  Surgeon: Lam Solis MD;  Location: [facility]     ESOPHAGOSCOPY, GASTROSCOPY, DUODENOSCOPY (EGD), COMBINED N/A 3/30/2017    Procedure: COMBINED ESOPHAGOSCOPY, GASTROSCOPY, DUODENOSCOPY (EGD);  Surgeon: Jie Onofre MD;  Location: [facility]     HC COLONOSCOPY THRU STOMA, DIAGNOSTIC      polyps due      PHACOEMULSIFICATION CLEAR CORNEA WITH STANDARD INTRAOCULAR LENS IMPLANT Right 2020    Procedure: RIGHT EYE COMPLEX CATARACT REMOVAL WITH INTRAOCULAR LENS IMPLANT;  Surgeon: Aimee Marcus MD;  Location: [facility]     SURGICAL HISTORY OF -       right clavicular fracture with ORIF     FAMILY HISTORY: Mother was diagnosed with esophageal/gastric cancer at age of 65,  of metastatic disease 4 months after. Father smoked 3 PPD, and was diagnosed with lung cancer at age of 40, had lobectomy, then had recurrent/metastatic disease around age of 50. One brother  of gastric cancer at age of 72, which was diagnosed about 10 years ago, other details unknown. His other brother is 69 and is in good health. Mother's sister had ovarian cancer. Paternal grandmother  of cancer, details unknown.     SH: Patient was born in [facility], grew up in [place]. 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 He has been on Paxil which helps to control his anxiety with social interaction.   He is single, never , no children.          Occupation:  He was self employed, still work part-time yard work.   Tobacco: Smoker 1.5 PPD x 30 years, quit 2019.   Alcohol: None  Drugs: uses Marijuana  occasionally    MEDS:   Current Outpatient Medications   Medication     acetaminophen (TYLENOL) 325 MG tablet     albuterol (PROAIR HFA/PROVENTIL HFA/VENTOLIN HFA) 108 (90 Base) MCG/ACT inhaler     aspirin 81 MG tablet     atorvastatin (LIPITOR) 80 MG tablet     ferrous sulfate (IRON) 325 (65 FE) MG tablet     lisinopril (ZESTRIL) 20 MG tablet     metoprolol succinate ER (TOPROL-XL) 50 MG 24 hr tablet     metroNIDAZOLE (FLAGYL) 500 MG tablet     neomycin (MYCIFRADIN) 500 MG tablet     nitroglycerin (NITROSTAT) 0.4 MG sublingual tablet     ondansetron (ZOFRAN) 4 MG tablet     oxyCODONE (ROXICODONE) 5 MG tablet     PARoxetine (PAXIL) 40 MG tablet     polyethylene glycol (MIRALAX) 17 g packet     No current facility-administered medications for this visit.       ALLERGIES:  Allergies   Allergen Reactions     Codeine Nausea and Vomiting     ROS: FULL 10-PT ROS REVIEWED, and was negative unless mentioned above in H&P.   PHYSICAL EXAM:  VITALS: no recorded today  ECOG PS status   Physical exam could not be performed today in context of a Virtual Visit during the COVID19/Coronavirus pandemic.  Observed physical assessments made today by visualizing the patient by video link:    General/Constitutional: Generally appears well, not acutely ill.  HEENT: no scleral icterus, not jaundiced.  Respiratory: no labored breathing.  Musculoskeletal: appears to have full range of motion and adequate physical strength.  Skin: no jaundice, discoloration or other notable lesions.  Neurological: no evidence of tremors.  Psychiatric: no evident anxiety; fully alert and oriented with fluent speech.      The rest of a comprehensive physical examination is deferred due to PHE (public health emergency) video visit restrictions.    LABS REVIEWED ON DAY OF VISIT  4/13/2021 Pathology FINAL DIAGNOSIS:   APPENDIX, TERMINAL ILEUM, ASCENDING COLON AND TRANSVERSE COLON, RESECTION:   - 2mm focus of poorly differentiated adenocarcinoma with signet ring  cell features        Tumor is located at the distal resection margin and situated within the lamina propria/submucosa (distal   margin positive, proximal and deep margins are free of malignancy)        An overlying tubular adenoma is present at this distal resection margin, does not appear to be the source of the underlying malignancy        Tumor origin is compatible with gastrointestinal site as it marks positively with CK 20, pan cytokeratin and CDX2 (negative for CK-7), see comment   - Multiple tubular adenomas (at least twenty-one), negative for high-grade dysplasia, distal margin is transected by a sessile tubular adenoma   - Benign appendix with focal surface serrated change, negative for malignancy (submitted in its entirety)   - Unremarkable terminal ileum   - Sixteen benign lymph nodes (0/16)   RADIOLOGY:   4/30/2021 PET CT, IMPRESSION:   In this patient with 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 FDG at the colonic surgical anastomosis presumably postsurgical inflammation.   2. No definite FDG uptake to suggest metastatic disease.   3. No definite primary significant ring carcinoma is identified. There are 3 equivocal findings:   3a. Mild uptake in the gastric cardia, favor physiologic  3b. Loop of slightly narrowed thickened small bowel with FDG uptake,  favor physiologic or postoperative inflammation but cannot rule out a primary.   3c. Extensive FDG uptake in the entire sigmoid colon, favored to be physiologic.  3d. If clinically indicated, an MR enterography in this case may better define bowel abnormalities. Alternatively attention to these areas on follow-up imaging.  4. Incidentally noted bilateral hydroceles and right peritesticular calcification.             IMPRESSION/PLAN:  70 year old male with PMHx of coronary artery disease s/p prior robotic CABG, hypertension, hyperlipidemia, COPD, history of bladder cancer, and history of  colonic polyps for which he had 10 colonoscopy in the past 20 years. His most recent colonoscopy showed a high burden of colonic polyps that could not be endoscopically resected. Therefore pt underwent an elective laparoscopic extended right colectomy on 4/13/2021. 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 Repeat colonoscopy on 4/22 showed all visualized portions of the colon were normal, with one polyps removed in the descending colon. 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   Dr Sánchez has discussed with Dr Rodrígeuz, given his family history and PET findings, we will plan for upper endoscopy to look for any primary lesion in the stomach which is arranged to be done on Thursday (5/6). The other primary source could be in the small bowel but it is challenge to scope the entire small bowel, we could potentially try capsule endoscopy. Given his family history, his mother and brother both had gastric cancer though they were not diagnosed at very young age, he himself has quite extensive colon polyps; it will be helpful to get Germline genetic testing to evaluate for any familial cancer syndrome, which may also help us during the process of searching for a primary site. It is also possible that the 2mm focus that was resected was the primary lesion of a signet ring cell carcinoma that was originated from his appendix.     His younger brother who is 69 and appeared to be healthy never had a colonoscopy, we recommended for his brother to have screening colonoscopy as well.     Treatment plan:  EGD on 5/6, and  refer to genetic counselor. Case will be presented to tumor board for discussion after his EGD.  RTC in: to be determined  Patient was discussed with Dr Princess Bautista  Hem/Onco Fellow     I spent > 60 minutes in consultation including history, physical, and 30 x minutes of discussion.   Over 50% of the time was spent counseling and coordinating care.  I spent 35 minutes reviewing the patient s medical records, images, imaging reports, and outside clinic records and lab values; as well as presentation of the patient s case at Multidisciplinary Tumor Conference.    MEDICAL ONCOLOGY ATTENDING PHYSICIAN ADDENDUM:  I have seen and evaluated this patient with the medical oncology fellow. I have reviewed and edited this fellow's note, and agree with the assessment and plan stated above. A complete review of systems was assessed, and pertinent positives/negatives are discussed in detail above and as follows.    Mr. Guidry joins me from his brother's house in [place].  He is alone on the call, using [employer]-based virtual video visit in the setting of the ongoing coronavirus pandemic and precautionary stay-at-home orders.  We are in our academic office during the course of this encounter.  He is kindly referred by Dr. Jitendra Rodríguez from our Colorectal Surgery service at the [facility].      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  The site of origin is unclear, so at the current time, it can be considered carcinoma of unknown primary, very likely of gastrointestinal origin, but the primary site is not known.    HISTORY OF PRESENT ILLNESS:  Mr. Tevin Guidry is a 70-year-old gentleman from [place].  He has a past medical history notable for cardiovascular disease, status post multivessel CABG 09/29/2014 and angioplasties.  He has hypertension, COPD from a history of smoking, which he quit in 2019.  He has a  history of bladder cancer which was a noninvasive high grade form of urothelial carcinoma diagnosed in the summer and early fall of 2020.  He is status post multiple injections of BCG.  He was diagnosed in the summer of 2019 with a recurrence in mid winter of 2020.  He also has a history of innumerable polyps over the years. He has had nearly a dozen colonoscopies to date, dating back to age 48.  He has had numerous colonoscopies, but none of them have shown evidence of a neoplastic or malignant disease or significant dysplasia but he had tubulovillous forms of adenoma.      He had a colonoscopy 03/30/2017 and another one 03/02/2021 that was delayed due to the COVID pandemic and other healthcare issues.  This was performed by Dr. Ra Steward on 03/02/2021 and the pathology was notable for tubular and tubulovillous adenomas.  Due to the extent and burden of the number of polyps, he was referred to Dr. Rodríguez for consideration of hemicolectomy.  A number of polyps in the distal transverse colon were tattooed.  Dr. Rodríguez took the patient to the operating room for right hemicolectomy 04/13/2021 due to this high polyp burden.      The pathology has been very extensively reviewed and I discussed the case personally with Dr. Rodríguez as well.  Much of the colon is unremarkable.  Zero of 16 lymph nodes were involved.  The terminal ileum was unremarkable.  The appendix was largely unremarkable as were the numerous tubular adenomas which numbered at least 21 per the pathology report but they were all negative for high grade dysplasia.  However, at the distal tip of the appendix at the end of numerous feet of colon removed by Dr. Rodríguez, there was a tiny 2 mm focus of poorly differentiated adenocarcinoma.  It contained signet ring cell features.  It was located at the distal resection margin and situated within the lamina propria.      Dr. Rodríguez has reviewed this extensively with our Pathology team and GI  team and it is felt that this tiny malignant focus is located within the lamina propria and submucosa but not deep within the colon wall indicating possibly of metastatic origin rather than a primary origin within the appendix.  This has presented a diagnostic dilemma.  A PET CT was performed for full radiologic staging.  This is done 04/30/2021.  It did not show any tom evidence of FDG uptake or large tumor masses.  There is some physiologic uptake in the gastric cardia as well as in the sigmoid colon.  There is no obvious evidence of a different primary site.  There was incidental notation of bilateral hydroceles and right peritesticular calcification.  Mr. Guidry is otherwise feeling well.  He has a history of anemia around 2017, 2018.  He has had upper GI endoscopies, last of which was 03/30/2017, that have been unremarkable.  He has first-degree relatives as described above with gastric carcinoma.  He is awaiting further followup and diagnostic evaluation by Dr. Sera Tobar from our GI team for upper GI endoscopy 3 days from now on 05/06.      PAST MEDICAL HISTORY:  Notable for innumerable colon polyps.  It is not clear whether or not he has a germline or familial mutation that is causing this.  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  He has CAD, hypertension.  He he has had cardiac catheterization including serum 09/2014 when he had a CABG with LIMA to LAD, 09/29/2014.  He has another malignancy in the form of noninvasive bladder carcinoma, left lateral wall of the bladder.  He has treatments with BCG and other interventions by our Urology team since 2019 for that.  COPD due to remote history of smoking.    PAST SURGICAL HISTORY:  Most remarkable for the hemicolectomy performed by Dr Colvin 04/13/2021 per above and the interventions for the bladder carcinoma in 2019 and 2020.  Please see the Urology note for  details.    FAMILY HISTORY:  Most notable for him being 1 of 3 sons from his parents.  His mom was a smoker and diagnosed with stage IV gastric carcinoid at age 65 and  within 4 months of diagnosis.  Father was a smoker and had lung cancer at age 40 and  at age 50 of this disease.  One brother had gastric carcinoma and  around age 72 after initial diagnosis at age 62.  Another brother is living at age 69 and has never had a colonoscopy due to lack of insurance and other reasons.  A maternal aunt also had ovarian cancer.    SOCIAL HISTORY:  The patient lives in [place].  He has an associate degree in respiratory therapy  Due to social phobia, he has not been employed in that practice.  He shares that information with us and states he has been self employed in [employer] throughout his adulthood.  He is single, never , no children.  Tobacco:  He smoked 1-1/2 packs a day of cigarettes for 30 years, quit 2019.  Occasional use of marijuana.  Denied any alcohol use or other illicit drug use.    MEDICATIONS/ALLERGIES:  Fully reviewed in Epic.  Current Outpatient Medications   Medication     acetaminophen (TYLENOL) 325 MG tablet     albuterol (PROAIR HFA/PROVENTIL HFA/VENTOLIN HFA) 108 (90 Base) MCG/ACT inhaler     aspirin 81 MG tablet     atorvastatin (LIPITOR) 80 MG tablet     ferrous sulfate (IRON) 325 (65 FE) MG tablet     lisinopril (ZESTRIL) 20 MG tablet     metoprolol succinate ER (TOPROL-XL) 50 MG 24 hr tablet     metroNIDAZOLE (FLAGYL) 500 MG tablet     neomycin (MYCIFRADIN) 500 MG tablet     nitroglycerin (NITROSTAT) 0.4 MG sublingual tablet     ondansetron (ZOFRAN) 4 MG tablet     oxyCODONE (ROXICODONE) 5 MG tablet     PARoxetine (PAXIL) 40 MG tablet     polyethylene glycol (MIRALAX) 17 g packet     No current facility-administered medications for this visit.           Allergies   Allergen Reactions     Codeine Nausea and Vomiting       REVIEW OF SYSTEMS:  Most pertinent for dyspnea on  "exertion secondary to history of tobacco use.  Full 14-pt ROS reviewed today.    PHYSICAL EXAMINATION:  Physical exam could not be performed today in context of a Virtual Visit during the COVID19/Coronavirus pandemic.  Vitals - Patient Reported  Weight (Patient Reported): 72.6 kg (160 lb)  Height (Patient Reported): 175.3 cm (5' 9\")  BMI (Based on Pt Reported Ht/Wt): 23.63  Pain Score: No Pain (0)         Observed physical assessments made today by visualizing the patient by video link:    General/Constitutional: Generally appears well, not acutely ill.  HEENT: no scleral icterus, not jaundiced.  Respiratory: no labored breathing.  Musculoskeletal: appears to have full range of motion and adequate physical strength.  Skin: no jaundice, discoloration or other notable lesions.  Neurological: no evidence of tremors.  Psychiatric: no evident anxiety; fully alert and oriented with fluent speech.      The rest of a comprehensive physical examination is deferred due to PHE (public health emergency) video visit restrictions.        Pertinent labs, pathology, imaging and other diagnostic evaluation was reviewed by me extensively prior to and on the morning of the visit as well.  I also had extensive discussion with Dr. Rodríguez on the morning of the visit to review this complex case and diagnostic dilemma.      IMPRESSION/PLAN:  Mr. Hola Guidry is a 70-year-old gentleman with history of innumerable polyps.  He does not have a known germline mutation, although he has had multiple first-degree members of family with gastric carcinoma.  He is awaiting upper endoscopy 3 days from now by Dr. Tobar on 05/06/2021 to help clarify whether or not he has a primary gastric carcinoma, which would make sense in terms of signet cell features of the 2 mm focus in the distal appendix that was positive for adenocarcinoma.  Otherwise, the rest of the colon and terminal ileum were negative for carcinoma.  It is not out of the realm of " possibility that signet ring cell features and type of histology can be found in small intestinal carcinomas including appendix, but more commonly, it is found in the upper GI tract.  Right now, based on the location as well being more mucosa than through wall that there is consideration whether or not this represents an unusual form of metastasis to the appendiceal cavity rather than a primary appendiceal malignancy.  However, the PET CT is unremarkable and did not show any evidence of an alternate primary at this time.    I carefully reviewed the natural course, biology and treatment of invasive carcinomas in general.  I stated this is not likely to be related at all to his bladder carcinoma.  I think he has a fairly good understanding that it is not certain at all whether or not his history of innumerable polyps may or may not relate to this unusual finding which may be completely incidental and I suspect that is the case.  Usually patients who have a  germline or familial mutation such as APC would manifest with colon cancer in their 20s.  He is 70.  It is possible he has other mutations such as MUTYH that might manifest with also having increased risk of colorectal cancer as well as gastric carcinomas, potentially with signet ring cell features.  If he had a CDH1 mutation, he probably would present with cancer earlier in his adulthood life.  He has no children.  He has one brother.  I offered that we could send him to our Cancer Genetic team and that might help clarify whether he has underlying mutation causing his polyps primarily.  He was very accepting of this and we will make that referral.      I am not saying at the current time it is going to have any bearing on whether or not a completion colectomy may be needed but we will await the upper endoscopy and he can at his convenience meet with meet our Cancer Genetics team.  We look forward to discussing the patient's case at multidisciplinary colorectal  tumor board next week following completion of the EGD for evaluation.  Right now with 0 of 16 lymph nodes involvement and just a 2 mm focus, if there is a possibility to have completion of surgery, that would be surgery with intent to cure and will be a low stage tumor, that may not merit consideration of postoperative adjuvant chemotherapy.  Nonetheless, there were poor prognostic features noted in that 2 mm focus.  We will await the upper endoscopy before proceeding.  If this ends up being metastatic from the stomach for example if the biopsy is positive for adenocarcinoma within the gastric cardia that lights up on PET CT then this cancer is stage IV metastatic disease and chemotherapy will be given with palliative intent.  The patient  stated general understanding of the above and will proceed accordingly.          VIRTUAL VISIT - DETAILS:    I have reviewed the note as documented above. This accurately captures the substance of my conversation with the patient.    Date of call: May 3, 2021   Start of call: 9:23 am  End of call: 9:45 am    Provider location: [facility] (academic office)  Patient location: Home      Mode of Video Visit: [facility]         I spent 45 minutes on the day of the visit reviewing updated information regarding this case, including notes, imaging, labs, and other pertinent results, and also in phone discussion with referring physician Dr. Rodríguez.        Tanner Sánchez MD PhD

## 2021-05-03 ENCOUNTER — PRE VISIT (OUTPATIENT)
Dept: ONCOLOGY | Facility: CLINIC | Age: 71
End: 2021-05-03

## 2021-05-03 ENCOUNTER — VIRTUAL VISIT (OUTPATIENT)
Dept: ONCOLOGY | Facility: CLINIC | Age: 71
End: 2021-05-03
Attending: SURGERY
Payer: COMMERCIAL

## 2021-05-03 ENCOUNTER — PATIENT OUTREACH (OUTPATIENT)
Dept: ONCOLOGY | Facility: CLINIC | Age: 71
End: 2021-05-03

## 2021-05-03 DIAGNOSIS — Z80.0 FAMILY HISTORY OF GASTRIC CANCER: ICD-10-CM

## 2021-05-03 DIAGNOSIS — C18.9 COLON CANCER (H): ICD-10-CM

## 2021-05-03 DIAGNOSIS — Z86.0100 HISTORY OF COLONIC POLYPS: ICD-10-CM

## 2021-05-03 PROCEDURE — 99205 OFFICE O/P NEW HI 60 MIN: CPT | Mod: 95 | Performed by: INTERNAL MEDICINE

## 2021-05-03 NOTE — LETTER
"    5/3/2021         RE: Tevin Guidry  [address] [id number]  [facility] [address]        Dear Colleague,    Thank you for referring your patient, Tevin Guidry, to the [facility]. Please see a copy of my visit note below.    Hola is a 70 year old who is being evaluated via a billable video visit.      How would you like to obtain your AVS? MyChart  If the video visit is dropped, the invitation should be resent by: Send to e-mail at: [email or web address]  Will anyone else be joining your video visit? No       I have reviewed and updated the patient's allergies and medication list.    Concerns: none  Refills: none     Vitals - Patient Reported  Weight (Patient Reported): 72.6 kg (160 lb)  Height (Patient Reported): 175.3 cm (5' 9\")  BMI (Based on Pt Reported Ht/Wt): 23.63  Pain Score: No Pain (0)    Quyen Anne CMA            NEW PATIENT VISIT    NAME: Hola Guidry     DATE: 5/3/2021  MRN: [id number]  REFERRING PHYSICIAN: Dr Jitendra Rodríguez.   CC/PATIENT ID:  HPI: 70 year old male with PMHx of coronary artery disease s/p prior robotic CABG, hypertension, hyperlipidemia, COPD, history of bladder cancer, and history of colonic polyps. He started to have regular colonoscopy since age of 48 and had total 10 colonoscopy done. There has been findings of colonic polyps in every colonoscopy. He also developed anemia about 3 years ago. His last colonoscopy in March was delayed due to his diagnosis of Bladder cancer, then his most recent colonoscopy showed a high burden of colonic polyps that could not be endoscopically resected. Therefore pt underwent an elective laparoscopic extended right colectomy on 4/13/2021. 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 The Tumor is located at the distal resection margin and situated within " the lamina propria/submucosa (distal margin positive, proximal and deep margins are free of malignancy). He was then seen by Dr Rodríguez again on 4/22 and repeated a colonoscopy: colonoscopy was advanced into the rectum and passed to the anastomosis without difficulty . Prep was adequate. <5 small polyps were seen, left alone at this time. One 1cm polyp was seen in the descending colon, resection completed with cold snare. The entire colonic mucosa was then circumferentially inspected upon slow withdrawal of the scope. All visualized portions of the colon were normal. The rectum and anal canal were normal. Retroflexion was normal as well. 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   Patient reports feeling well, denies any diarrhea, abdominal pain, no blood in the stool or dark stool before his right seema colectomy. He has recovered well from the surgery, he still has loose stool since surgery, wound healed well, no more abdominal pain. He denies fever/chill, no dyspnea, no chest pain, no headache, no hematuria, no dysuria, no urgency. He has COPD and has dyspnea on significant exertion such as moving heavy objects, but denies dyspnea at rest, he is able to walk one flight of stairs without getting shortness of breath. He has good appetite, weight has been stable. He has been active, do regular walking about 1 mile every day.     For his history of bladder cancer. He had HG Ta in 9/2019 followed by BCG induction. He had recurrence in 2/2020 and underwent repeat induction course of BCG. 2nd BCG induction completed 4/2020. Recurrence noted in 7/2020 and TURBT 9/2020 with Non invasive low grade papillary urothelial carcinoma in the left and right lateral wall of bladder. He was given BCG maintenance in 10/2020.       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 FDG at the colonic surgical anastomosis presumably postsurgical inflammation.   2. No definite FDG uptake to suggest metastatic disease.   3. No definite primary significant ring carcinoma is identified. There are 3 equivocal findings:   3a. Mild uptake in the gastric cardia, favor physiologic  3b. Loop of slightly narrowed thickened small bowel with FDG uptake,  favor physiologic or postoperative inflammation but cannot rule out a primary.   3c. Extensive FDG uptake in the entire sigmoid colon, favored to be physiologic.  3d. If clinically indicated, an MR enterography in this case may better define bowel abnormalities. Alternatively attention to these areas on follow-up imaging.  4. Incidentally noted bilateral hydroceles and right peritesticular calcification.       PAST MEDICAL HISTORY:   Past Medical History:   Diagnosis Date     Bladder cancer (H)      Colon polyps     multiple may need colectomy (adenoma)     COPD (chronic obstructive pulmonary disease) (H)      Hyperlipidemia LDL goal <130      Hypertension goal BP (blood pressure) < 140/90      Social phobia      PAST SURGICAL HISTORY:  Past Surgical History:   Procedure Laterality Date     CLAVICLE SURGERY Right     fracture in childhood     COLONOSCOPY N/A 3/30/2017    Procedure: COLONOSCOPY;  Surgeon: Jie Onofre MD;  Location: [facility]     COLONOSCOPY N/A 3/2/2021    Procedure: COLONOSCOPY, WITH POLYPECTOMY, hot snare, lift with elaview and epi, clip for hemorrhage control, tattoo;  Surgeon: Ra Cardoso MD;  Location: [facility]     COLONOSCOPY N/A 4/22/2021    Procedure: Colonoscopy;  Surgeon: Jitendra Rodríguez MD;  Location: [facility]     CYSTOSCOPY, BIOPSY BLADDER INSTILL OPTICAL AGENT N/A 9/2/2020    Procedure: CYSTOSCOPY, WITH INSTILLATION OF OPTICAL IMAGING AGENT INTO BLADDER AND BIOPSY OF BLADDER (CYSVIEW);  Surgeon:  Kirt Guzman MD;  [facility]     CYSTOSCOPY, TRANSURETHRAL RESECTION (TUR) TUMOR BLADDER, COMBINED N/A 2019    Procedure: cystoscopy,Transurethral Resection Of Bladder Tumor;  Surgeon: Almas Sunshine MD;  [facility]     CYSTOSCOPY, TRANSURETHRAL RESECTION (TUR) TUMOR BLADDER, COMBINED N/A 2020    Procedure: CYSTOSCOPY, WITH TRANSURETHRAL RESECTION BLADDER TUMOR;  Surgeon: Almas Sunshine MD;  [facility]     DAVINCI BYPASS ARTERY CORONARY N/A 2014    Procedure: DAVINCI BYPASS ARTERY CORONARY;  Surgeon: Lam Slois MD;  Location: [facility]     ESOPHAGOSCOPY, GASTROSCOPY, DUODENOSCOPY (EGD), COMBINED N/A 3/30/2017    Procedure: COMBINED ESOPHAGOSCOPY, GASTROSCOPY, DUODENOSCOPY (EGD);  Surgeon: Jie Onofre MD;  Location: [facility]     HC COLONOSCOPY THRU STOMA, DIAGNOSTIC      polyps due      PHACOEMULSIFICATION CLEAR CORNEA WITH STANDARD INTRAOCULAR LENS IMPLANT Right 2020    Procedure: RIGHT EYE COMPLEX CATARACT REMOVAL WITH INTRAOCULAR LENS IMPLANT;  Surgeon: Aimee Marcus MD;  Location: [facility]     SURGICAL HISTORY OF -       right clavicular fracture with ORIF     FAMILY HISTORY: Mother was diagnosed with esophageal/gastric cancer at age of 65,  of metastatic disease 4 months after. Father smoked 3 PPD, and was diagnosed with lung cancer at age of 40, had lobectomy, then had recurrent/metastatic disease around age of 50. One brother  of gastric cancer at age of 72, which was diagnosed about 10 years ago, other details unknown. His other brother is 69 and is in good health. Mother's sister had ovarian cancer. Paternal grandmother  of cancer, details unknown.     SH: Patient was born in [facility], grew up in [place]. 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 He has been on Paxil which helps to control his  anxiety with social interaction.   He is single, never , no children.          Occupation:  He was self employed, still work part-time yard work.   Tobacco: Smoker 1.5 PPD x 30 years, quit 08/2019.   Alcohol: None  Drugs: uses Marijuana occasionally    MEDS:   Current Outpatient Medications   Medication     acetaminophen (TYLENOL) 325 MG tablet     albuterol (PROAIR HFA/PROVENTIL HFA/VENTOLIN HFA) 108 (90 Base) MCG/ACT inhaler     aspirin 81 MG tablet     atorvastatin (LIPITOR) 80 MG tablet     ferrous sulfate (IRON) 325 (65 FE) MG tablet     lisinopril (ZESTRIL) 20 MG tablet     metoprolol succinate ER (TOPROL-XL) 50 MG 24 hr tablet     metroNIDAZOLE (FLAGYL) 500 MG tablet     neomycin (MYCIFRADIN) 500 MG tablet     nitroglycerin (NITROSTAT) 0.4 MG sublingual tablet     ondansetron (ZOFRAN) 4 MG tablet     oxyCODONE (ROXICODONE) 5 MG tablet     PARoxetine (PAXIL) 40 MG tablet     polyethylene glycol (MIRALAX) 17 g packet     No current facility-administered medications for this visit.       ALLERGIES:  Allergies   Allergen Reactions     Codeine Nausea and Vomiting     ROS: FULL 10-PT ROS REVIEWED, and was negative unless mentioned above in H&P.   PHYSICAL EXAM:  VITALS: no recorded today  ECOG PS status   Physical exam could not be performed today in context of a Virtual Visit during the COVID19/Coronavirus pandemic.  Observed physical assessments made today by visualizing the patient by video link:    General/Constitutional: Generally appears well, not acutely ill.  HEENT: no scleral icterus, not jaundiced.  Respiratory: no labored breathing.  Musculoskeletal: appears to have full range of motion and adequate physical strength.  Skin: no jaundice, discoloration or other notable lesions.  Neurological: no evidence of tremors.  Psychiatric: no evident anxiety; fully alert and oriented with fluent speech.      The rest of a comprehensive physical examination is deferred due to PHE (public health emergency)  video visit restrictions.    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        Tumor is located at the distal resection margin and situated within the lamina propria/submucosa (distal   margin positive, proximal and deep margins are free of malignancy)        An overlying tubular adenoma is present at this distal resection margin, does not appear to be the source of the underlying malignancy        Tumor origin is compatible with gastrointestinal site as it marks positively with CK 20, pan cytokeratin and CDX2 (negative for CK-7), see comment   - Multiple tubular adenomas (at least twenty-one), negative for high-grade dysplasia, distal margin is transected by a sessile tubular adenoma   - Benign appendix with focal surface serrated change, negative for malignancy (submitted in its entirety)   - Unremarkable terminal ileum   - Sixteen benign lymph nodes (0/16)   RADIOLOGY:   4/30/2021 PET CT, IMPRESSION:   In this patient with 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 FDG at the colonic surgical anastomosis presumably postsurgical inflammation.   2. No definite FDG uptake to suggest metastatic disease.   3. No definite primary significant ring carcinoma is identified. There are 3 equivocal findings:   3a. Mild uptake in the gastric cardia, favor physiologic  3b. Loop of slightly narrowed thickened small bowel with FDG uptake,  favor physiologic or postoperative inflammation but cannot rule out a primary.   3c. Extensive FDG uptake in the entire sigmoid colon, favored to be physiologic.  3d. If clinically indicated, an MR enterography in this case may better define bowel abnormalities. Alternatively attention to these areas on follow-up imaging.  4. Incidentally noted  bilateral hydroceles and right peritesticular calcification.             IMPRESSION/PLAN:  70 year old male with PMHx of coronary artery disease s/p prior robotic CABG, hypertension, hyperlipidemia, COPD, history of bladder cancer, and history of colonic polyps for which he had 10 colonoscopy in the past 20 years. His most recent colonoscopy showed a high burden of colonic polyps that could not be endoscopically resected. Therefore pt underwent an elective laparoscopic extended right colectomy on 4/13/2021. 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 Repeat colonoscopy on 4/22 showed all visualized portions of the colon were normal, with one polyps removed in the descending colon. 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   Dr Sánchez has discussed with Dr Rodríguez, given his family history and PET findings, we will plan for upper endoscopy to look for any primary lesion in the stomach which is arranged to be done on Thursday (5/6). The other primary source could be in the small bowel but it is challenge to scope the entire small bowel, we could potentially try capsule endoscopy. Given his family history, his mother and brother both had gastric cancer though they were not diagnosed at very young age, he himself has quite extensive colon polyps; it will be helpful to get Germline genetic testing to evaluate for any familial cancer syndrome, which may also help us during the process of searching for a primary site. It is also possible that the 2mm focus that was resected was the primary lesion of a signet ring  cell carcinoma that was originated from his appendix.     His younger brother who is 69 and appeared to be healthy never had a colonoscopy, we recommended for his brother to have screening colonoscopy as well.     Treatment plan:  EGD on 5/6, and refer to genetic counselor. Case will be presented to tumor board for discussion after his EGD.  RTC in: to be determined  Patient was discussed with Dr Princess Bautista  Hem/Onco Fellow     I spent > 60 minutes in consultation including history, physical, and 30 x minutes of discussion.   Over 50% of the time was spent counseling and coordinating care.  I spent 35 minutes reviewing the patient s medical records, images, imaging reports, and outside clinic records and lab values; as well as presentation of the patient s case at Multidisciplinary Tumor Conference.    MEDICAL ONCOLOGY ATTENDING PHYSICIAN ADDENDUM:  I have seen and evaluated this patient with the medical oncology fellow. I have reviewed and edited this fellow's note, and agree with the assessment and plan stated above. A complete review of systems was assessed, and pertinent positives/negatives are discussed in detail above and as follows.    Mr. Guidry joins me from his brother's house in [place].  He is alone on the call, using Skype-based virtual video visit in the setting of the ongoing coronavirus pandemic and precautionary stay-at-home orders.  We are in our academic office during the course of this encounter.  He is kindly referred by Dr. Jitendra Rodríguez from our Colorectal Surgery service at the [facility].      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  The site of origin is unclear, so at the current time, it can be considered carcinoma of unknown primary, very likely of gastrointestinal origin, but the primary site is not known.    HISTORY OF PRESENT ILLNESS:  Mr. Tevin Guidry is a 70-year-old  gentleman from [place].  He has a past medical history notable for cardiovascular disease, status post multivessel CABG 09/29/2014 and angioplasties.  He has hypertension, COPD from a history of smoking, which he quit in 2019.  He has a history of bladder cancer which was a noninvasive high grade form of urothelial carcinoma diagnosed in the summer and early fall of 2020.  He is status post multiple injections of BCG.  He was diagnosed in the summer of 2019 with a recurrence in mid winter of 2020.  He also has a history of innumerable polyps over the years. He has had nearly a dozen colonoscopies to date, dating back to age 48.  He has had numerous colonoscopies, but none of them have shown evidence of a neoplastic or malignant disease or significant dysplasia but he had tubulovillous forms of adenoma.      He had a colonoscopy 03/30/2017 and another one 03/02/2021 that was delayed due to the COVID pandemic and other healthcare issues.  This was performed by Dr. Ra Steward on 03/02/2021 and the pathology was notable for tubular and tubulovillous adenomas.  Due to the extent and burden of the number of polyps, he was referred to Dr. Rodríguez for consideration of hemicolectomy.  A number of polyps in the distal transverse colon were tattooed.  Dr. Rodríguze took the patient to the operating room for right hemicolectomy 04/13/2021 due to this high polyp burden.      The pathology has been very extensively reviewed and I discussed the case personally with Dr. Rodríguez as well.  Much of the colon is unremarkable.  Zero of 16 lymph nodes were involved.  The terminal ileum was unremarkable.  The appendix was largely unremarkable as were the numerous tubular adenomas which numbered at least 21 per the pathology report but they were all negative for high grade dysplasia.  However, at the distal tip of the appendix at the end of numerous feet of colon removed by Dr. Rodríguez, there was a tiny 2 mm focus of poorly  differentiated adenocarcinoma.  It contained signet ring cell features.  It was located at the distal resection margin and situated within the lamina propria.      Dr. Rodríguez has reviewed this extensively with our Pathology team and GI team and it is felt that this tiny malignant focus is located within the lamina propria and submucosa but not deep within the colon wall indicating possibly of metastatic origin rather than a primary origin within the appendix.  This has presented a diagnostic dilemma.  A PET CT was performed for full radiologic staging.  This is done 04/30/2021.  It did not show any tom evidence of FDG uptake or large tumor masses.  There is some physiologic uptake in the gastric cardia as well as in the sigmoid colon.  There is no obvious evidence of a different primary site.  There was incidental notation of bilateral hydroceles and right peritesticular calcification.  Mr. Guidry is otherwise feeling well.  He has a history of anemia around 2017, 2018.  He has had upper GI endoscopies, last of which was 03/30/2017, that have been unremarkable.  He has first-degree relatives as described above with gastric carcinoma.  He is awaiting further followup and diagnostic evaluation by Dr. Sera Tobar from our GI team for upper GI endoscopy 3 days from now on 05/06.      PAST MEDICAL HISTORY:  Notable for innumerable colon polyps.  It is not clear whether or not he has a germline or familial mutation that is causing this.  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  He has CAD, hypertension.  He he has had cardiac catheterization including serum 09/2014 when he had a CABG with LIMA to LAD, 09/29/2014.  He has another malignancy in the form of noninvasive bladder carcinoma, left lateral wall of the bladder.  He has treatments with BCG and other interventions by our Urology team since 2019 for that.  COPD due to remote  history of smoking.    PAST SURGICAL HISTORY:  Most remarkable for the hemicolectomy performed by Dr Colvin 2021 per above and the interventions for the bladder carcinoma in 2019 and 2020.  Please see the Urology note for details.    FAMILY HISTORY:  Most notable for him being 1 of 3 sons from his parents.  His mom was a smoker and diagnosed with stage IV gastric carcinoid at age 65 and  within 4 months of diagnosis.  Father was a smoker and had lung cancer at age 40 and  at age 50 of this disease.  One brother had gastric carcinoma and  around age 72 after initial diagnosis at age 62.  Another brother is living at age 69 and has never had a colonoscopy due to lack of insurance and other reasons.  A maternal aunt also had ovarian cancer.    SOCIAL HISTORY:  The patient lives in [place].  He has an associate degree in respiratory therapy  Due to social phobia, he has not been employed in that practice.  He shares that information with us and states he has been self employed in [employer] throughout his adulthood.  He is single, never , no children.  Tobacco:  He smoked 1-1/2 packs a day of cigarettes for 30 years, quit 2019.  Occasional use of marijuana.  Denied any alcohol use or other illicit drug use.    MEDICATIONS/ALLERGIES:  Fully reviewed in Epic.  Current Outpatient Medications   Medication     acetaminophen (TYLENOL) 325 MG tablet     albuterol (PROAIR HFA/PROVENTIL HFA/VENTOLIN HFA) 108 (90 Base) MCG/ACT inhaler     aspirin 81 MG tablet     atorvastatin (LIPITOR) 80 MG tablet     ferrous sulfate (IRON) 325 (65 FE) MG tablet     lisinopril (ZESTRIL) 20 MG tablet     metoprolol succinate ER (TOPROL-XL) 50 MG 24 hr tablet     metroNIDAZOLE (FLAGYL) 500 MG tablet     neomycin (MYCIFRADIN) 500 MG tablet     nitroglycerin (NITROSTAT) 0.4 MG sublingual tablet     ondansetron (ZOFRAN) 4 MG tablet     oxyCODONE (ROXICODONE) 5 MG tablet     PARoxetine (PAXIL) 40 MG tablet      "polyethylene glycol (MIRALAX) 17 g packet     No current facility-administered medications for this visit.           Allergies   Allergen Reactions     Codeine Nausea and Vomiting       REVIEW OF SYSTEMS:  Most pertinent for dyspnea on exertion secondary to history of tobacco use.  Full 14-pt ROS reviewed today.    PHYSICAL EXAMINATION:  Physical exam could not be performed today in context of a Virtual Visit during the COVID19/Coronavirus pandemic.  Vitals - Patient Reported  Weight (Patient Reported): 72.6 kg (160 lb)  Height (Patient Reported): 175.3 cm (5' 9\")  BMI (Based on Pt Reported Ht/Wt): 23.63  Pain Score: No Pain (0)         Observed physical assessments made today by visualizing the patient by video link:    General/Constitutional: Generally appears well, not acutely ill.  HEENT: no scleral icterus, not jaundiced.  Respiratory: no labored breathing.  Musculoskeletal: appears to have full range of motion and adequate physical strength.  Skin: no jaundice, discoloration or other notable lesions.  Neurological: no evidence of tremors.  Psychiatric: no evident anxiety; fully alert and oriented with fluent speech.      The rest of a comprehensive physical examination is deferred due to PHE (public health emergency) video visit restrictions.        Pertinent labs, pathology, imaging and other diagnostic evaluation was reviewed by me extensively prior to and on the morning of the visit as well.  I also had extensive discussion with Dr. Rodríguez on the morning of the visit to review this complex case and diagnostic dilemma.      IMPRESSION/PLAN:  Mr. Hola Guidry is a 70-year-old gentleman with history of innumerable polyps.  He does not have a known germline mutation, although he has had multiple first-degree members of family with gastric carcinoma.  He is awaiting upper endoscopy 3 days from now by Dr. Tobar on 05/06/2021 to help clarify whether or not he has a primary gastric carcinoma, which would make " sense in terms of signet cell features of the 2 mm focus in the distal appendix that was positive for adenocarcinoma.  Otherwise, the rest of the colon and terminal ileum were negative for carcinoma.  It is not out of the realm of possibility that signet ring cell features and type of histology can be found in small intestinal carcinomas including appendix, but more commonly, it is found in the upper GI tract.  Right now, based on the location as well being more mucosa than through wall that there is consideration whether or not this represents an unusual form of metastasis to the appendiceal cavity rather than a primary appendiceal malignancy.  However, the PET CT is unremarkable and did not show any evidence of an alternate primary at this time.    I carefully reviewed the natural course, biology and treatment of invasive carcinomas in general.  I stated this is not likely to be related at all to his bladder carcinoma.  I think he has a fairly good understanding that it is not certain at all whether or not his history of innumerable polyps may or may not relate to this unusual finding which may be completely incidental and I suspect that is the case.  Usually patients who have a  germline or familial mutation such as APC would manifest with colon cancer in their 20s.  He is 70.  It is possible he has other mutations such as MUTYH that might manifest with also having increased risk of colorectal cancer as well as gastric carcinomas, potentially with signet ring cell features.  If he had a CDH1 mutation, he probably would present with cancer earlier in his adulthood life.  He has no children.  He has one brother.  I offered that we could send him to our Cancer Genetic team and that might help clarify whether he has underlying mutation causing his polyps primarily.  He was very accepting of this and we will make that referral.      I am not saying at the current time it is going to have any bearing on whether or not  a completion colectomy may be needed but we will await the upper endoscopy and he can at his convenience meet with meet our Cancer Genetics team.  We look forward to discussing the patient's case at multidisciplinary colorectal tumor board next week following completion of the EGD for evaluation.  Right now with 0 of 16 lymph nodes involvement and just a 2 mm focus, if there is a possibility to have completion of surgery, that would be surgery with intent to cure and will be a low stage tumor, that may not merit consideration of postoperative adjuvant chemotherapy.  Nonetheless, there were poor prognostic features noted in that 2 mm focus.  We will await the upper endoscopy before proceeding.  If this ends up being metastatic from the stomach for example if the biopsy is positive for adenocarcinoma within the gastric cardia that lights up on PET CT then this cancer is stage IV metastatic disease and chemotherapy will be given with palliative intent.  The patient  stated general understanding of the above and will proceed accordingly.          VIRTUAL VISIT - DETAILS:    I have reviewed the note as documented above. This accurately captures the substance of my conversation with the patient.    Date of call: May 3, 2021   Start of call: 9:23 am  End of call: 9:45 am    Provider location: [facility] (academic office)  Patient location: Home      Mode of Video Visit: well         I spent 45 minutes on the day of the visit reviewing updated information regarding this case, including notes, imaging, labs, and other pertinent results, and also in phone discussion with referring physician Dr. Rodríguez.        Tanner Sánchez MD PhD                Again, thank you for allowing me to participate in the care of your patient.        Sincerely,        Tanner Sánchez MD

## 2021-05-03 NOTE — PROGRESS NOTES
Oncology RN Care Coordination Note:     This writer has completed an online requisition for [employer] molecular profiling per Dr. Sánchez's request.    I have submitted the pathology report collected on 04.22.2021 and the patient's face sheet.     Dr. Sánchez has been updated.      Mariam Knight, RN BSN   [facility]  Nurse Coordinator

## 2021-05-04 DIAGNOSIS — Z11.59 ENCOUNTER FOR SCREENING FOR OTHER VIRAL DISEASES: ICD-10-CM

## 2021-05-04 LAB
LABORATORY COMMENT REPORT: NORMAL
SARS-COV-2 RNA RESP QL NAA+PROBE: NEGATIVE
SARS-COV-2 RNA RESP QL NAA+PROBE: NORMAL
SPECIMEN SOURCE: NORMAL
SPECIMEN SOURCE: NORMAL

## 2021-05-04 PROCEDURE — U0005 INFEC AGEN DETEC AMPLI PROBE: HCPCS | Mod: 90 | Performed by: PATHOLOGY

## 2021-05-04 PROCEDURE — U0003 INFECTIOUS AGENT DETECTION BY NUCLEIC ACID (DNA OR RNA); SEVERE ACUTE RESPIRATORY SYNDROME CORONAVIRUS 2 (SARS-COV-2) (CORONAVIRUS DISEASE [COVID-19]), AMPLIFIED PROBE TECHNIQUE, MAKING USE OF HIGH THROUGHPUT TECHNOLOGIES AS DESCRIBED BY CMS-2020-01-R: HCPCS | Mod: 90 | Performed by: PATHOLOGY

## 2021-05-04 PROCEDURE — 99000 SPECIMEN HANDLING OFFICE-LAB: CPT | Performed by: PATHOLOGY

## 2021-05-05 ENCOUNTER — PRE VISIT (OUTPATIENT)
Dept: SURGERY | Facility: CLINIC | Age: 71
End: 2021-05-05

## 2021-05-05 ENCOUNTER — TELEPHONE (OUTPATIENT)
Dept: GASTROENTEROLOGY | Facility: OUTPATIENT CENTER | Age: 71
End: 2021-05-05

## 2021-05-05 ENCOUNTER — VIRTUAL VISIT (OUTPATIENT)
Dept: SURGERY | Facility: CLINIC | Age: 71
End: 2021-05-05
Payer: COMMERCIAL

## 2021-05-05 VITALS — HEIGHT: 69 IN | WEIGHT: 160 LBS | BODY MASS INDEX: 23.7 KG/M2

## 2021-05-05 DIAGNOSIS — Z09 FOLLOW-UP EXAMINATION AFTER COLORECTAL SURGERY: Primary | ICD-10-CM

## 2021-05-05 PROCEDURE — 99024 POSTOP FOLLOW-UP VISIT: CPT | Performed by: NURSE PRACTITIONER

## 2021-05-05 ASSESSMENT — MIFFLIN-ST. JEOR: SCORE: 1466.14

## 2021-05-05 ASSESSMENT — PAIN SCALES - GENERAL: PAINLEVEL: NO PAIN (0)

## 2021-05-05 NOTE — PROGRESS NOTES
"Colon and Rectal Surgery Consult Video Note    Date: 2021     Referring provider:  Jitendra Rodríguez MD  [address] [place] [address]     RE: Tevin Guidry  : 1950  RENÉE: 2021    Tevin Guidry is a 70 year old male who is being evaluated via a billable video visit.      The patient has been notified of following:     \"This video visit will be conducted via a call between you and your physician/provider. We have found that certain health care needs can be provided without the need for an in-person physical exam.  This service lets us provide the care you need with a video conversation.  If a prescription is necessary we can send it directly to your pharmacy.  If lab work is needed we can place an order for that and you can then stop by our lab to have the test done at a later time.    Video visits are billed at different rates depending on your insurance coverage.  Please reach out to your insurance provider with any questions.    If during the course of the call the physician/provider feels a video visit is not appropriate, you will not be charged for this service.\"    Patient has given verbal consent for Video visit? Yes    Video Start Time: 1149     Tevin Guidry is a very pleasant 70 year old male who underwent an extended right hemicolectomy with Dr. 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 This was suggestive of metastatic disease and not primary colon cancer. He underwent colonoscopy with Dr. Rodríguez on 21 with no masses noted and one tubular adenoma.    Interval History:  Hola has been doing well.  He denies any pain related to surgery.  He, unfortunately, fell outside while throwing a ball for his dog and sprained his wrist and hit his head.  He did not lose consciousness and is feeling okay.  He reports that his incision sites are " healing well.  He is eating and drinking without difficulty and tolerating a low residue diet.  He is now having formed bowel movements.  No fevers or chills.  Family history is significant for an older brother with stomach cancer, mother with stomach cancer, maternal aunt with ovarian cancer, and father with lung cancer.  He is set up to meet with one of our genetic counselors in about a month.    Assessment/Plan: Hola is recovering well from surgery.  No pain.  He is tolerating a low residue diet.  Would like him to stay on this for another 2 weeks and then he can slowly add fiber back into his diet.  No lifting more than 10 pounds for full 6 weeks from surgery.  He is having normal, formed bowel movements now.  He will be meeting with a genetic counselor at the beginning of June given his family history of gastric cancers and has an EGD scheduled for tomorrow.  He has a follow-up visit scheduled with Dr. Rodríguez on May 19.  Encouraged him to contact the clinic in the meantime with any questions or concerns. Patient's questions were answered to his stated satisfaction and he is in agreement with this plan.    Video-Visit Details    Type of service:  Video Visit    Video End Time (time video stopped): 1157    Originating Location (pt. Location): Home    Distant Location (provider location):  [facility] [place]     Mode of Communication:  Video Conference via ESKY    13 minutes spent on the date of the encounter doing chart review, history, review of imaging, documentation ,and further activities as noted above, which includes my time spent on video with the patient/family.       Medical history:  Past Medical History:   Diagnosis Date     Bladder cancer (H)      Colon polyps     multiple may need colectomy (adenoma)     COPD (chronic obstructive pulmonary disease) (H)      Hyperlipidemia LDL goal <130      Hypertension goal BP (blood pressure) < 140/90      Social  phobia        Surgical history:  Past Surgical History:   Procedure Laterality Date     CLAVICLE SURGERY Right     fracture in childhood     COLONOSCOPY N/A 3/30/2017    Procedure: COLONOSCOPY;  Surgeon: Jie Onofre MD;  Location: [facility]     COLONOSCOPY N/A 3/2/2021    Procedure: COLONOSCOPY, WITH POLYPECTOMY, hot snare, lift with elaview and epi, clip for hemorrhage control, tattoo;  Surgeon: Ra Cardoso MD;  Location: [facility]     COLONOSCOPY N/A 4/22/2021    Procedure: Colonoscopy;  Surgeon: Jitendra Rodríguez MD;  Location: [facility]     CYSTOSCOPY, BIOPSY BLADDER INSTILL OPTICAL AGENT N/A 9/2/2020    Procedure: CYSTOSCOPY, WITH INSTILLATION OF OPTICAL IMAGING AGENT INTO BLADDER AND BIOPSY OF BLADDER (CYSVIEW);  Surgeon: Kirt Guzman MD;  [facility]     CYSTOSCOPY, TRANSURETHRAL RESECTION (TUR) TUMOR BLADDER, COMBINED N/A 9/5/2019    Procedure: cystoscopy,Transurethral Resection Of Bladder Tumor;  Surgeon: Almas Sunshine MD;  [facility]     CYSTOSCOPY, TRANSURETHRAL RESECTION (TUR) TUMOR BLADDER, COMBINED N/A 2/6/2020    Procedure: CYSTOSCOPY, WITH TRANSURETHRAL RESECTION BLADDER TUMOR;  Surgeon: Almas Sunshine MD;  [facility]     DAVINCI BYPASS ARTERY CORONARY N/A 9/29/2014    Procedure: DAVINCI BYPASS ARTERY CORONARY;  Surgeon: Lam Solis MD;  Location: [facility]     ESOPHAGOSCOPY, GASTROSCOPY, DUODENOSCOPY (EGD), COMBINED N/A 3/30/2017    Procedure: COMBINED ESOPHAGOSCOPY, GASTROSCOPY, DUODENOSCOPY (EGD);  Surgeon: Jie Onofre MD;  Location: [facility]     HC COLONOSCOPY THRU STOMA, DIAGNOSTIC  2010    polyps due 2011     PHACOEMULSIFICATION CLEAR CORNEA WITH STANDARD INTRAOCULAR LENS IMPLANT Right 12/11/2020    Procedure: RIGHT EYE COMPLEX CATARACT REMOVAL WITH INTRAOCULAR LENS IMPLANT;  Surgeon: Aimee Marcus MD;  Location: [facility] OR     SURGICAL HISTORY OF -       right clavicular fracture with ORIF       Problem  "list:  Patient Active Problem List    Diagnosis Date Noted     Colon cancer (H) 2021     Priority: Medium     Added automatically from request for surgery [id number]       History of colonic polyps 2021     Priority: Medium     Added automatically from request for surgery [id number]       Combined form of age-related cataract, both eyes 2020     Priority: Medium     Added automatically from request for surgery [id number]       Total cataract of right eye 2020     Priority: Medium     Added automatically from request for surgery [id number]       Malignant neoplasm of overlapping sites of bladder (H) 2020     Priority: Medium     Added automatically from request for surgery [id number]       Personal history of malignant neoplasm of bladder 2020     Priority: Medium     Added automatically from request for surgery [id number]       Bladder cancer (H) 2019     Priority: Medium     Health Care Home 2017     Priority: Medium     [facility] Care Coordinator  Lovely Medina RN  [phone]  [facility] CARE PLAN SUMMARY    Member Name:  Tevin Guidry  Address:  [place] [address] Phone: [phone] (home)    :  1950 Date of Assessment:  10/1/2020   Health Plan:  [employer]  Health Plan Number: [id number] Medical Assistance Number: [id number]  Financial Worker:    Case #:     [facility] Care Coordinator:  Lovely Medina RN CC Phone:  [phone]  CC Fax:  [phone]   [facility] Enrollment Date: 2018 Case Mix:  L  Rate Cell:  A  Waiver Type:  none   Primary Emergency Contact:   Jose Guidry    [place] Phone: [phone]  Relation: Brother Language:  English  :  No   Health Care Agent/POA:   Advanced Directives/Living Will:     Primary Care Clinic/Phone/Fax:  [facility]/(p) [phone], (f) [phone] Primary Dx:  CAD I25.10  Secondary Dx:  COPDJ44.9   Primary Physician:  Preethi Quiroz   Height:  5' 8\"  Weight:  171 lbs "   Specialty Physician:    Audiologist:     Eye Care Provider:   Dental Care Provider:    Freddie: [facility] [phone] or [phone]   Other:                 Anemia, unspecified type 01/25/2017     Priority: Medium     Chronic obstructive pulmonary disease, unspecified COPD type (H) 12/08/2015     Priority: Medium     PFTs 12/8/15       S/P CABG (coronary artery bypass graft) 10/22/2015     Priority: Medium     Cigarette nicotine dependence without complication 10/14/2014     Priority: Medium     Coronary artery disease involving native heart with angina pectoris, unspecified vessel or lesion type (H) 09/26/2014     Priority: Medium     Advanced directives, counseling/discussion 08/20/2013     Priority: Medium     Tubular adenoma of colon 07/11/2012     Priority: Medium     9/6/13 colonoscopy revealed 4 polyps. 2 year follow up colonoscopy recommended       Hyperlipidemia LDL goal <130 05/09/2010     Priority: Medium     Hypertension goal BP (blood pressure) < 140/90      Priority: Medium     Social phobia 03/02/2005     Priority: Medium       Medications:  Current Outpatient Medications   Medication Sig Dispense Refill     acetaminophen (TYLENOL) 325 MG tablet Take 2 tablets (650 mg) by mouth every 4 hours as needed for other (For optimal non-opioid multimodal pain management to improve pain control.) 100 tablet 0     albuterol (PROAIR HFA/PROVENTIL HFA/VENTOLIN HFA) 108 (90 Base) MCG/ACT inhaler Inhale 2 puffs into the lungs every 6 hours as needed for shortness of breath / dyspnea or wheezing 1 Inhaler 1     aspirin 81 MG tablet Take 1 tablet (81 mg) by mouth daily (Patient taking differently: Take 81 mg by mouth every morning ) 90 tablet 3     atorvastatin (LIPITOR) 80 MG tablet Take 1 tablet (80 mg) by mouth At Bedtime 90 tablet 3     ferrous sulfate (IRON) 325 (65 FE) MG tablet Take 325 mg by mouth every morning  60 tablet 3     lisinopril (ZESTRIL) 20 MG tablet Take 1 tablet (20 mg) by mouth  "At Bedtime 90 tablet 3     metoprolol succinate ER (TOPROL-XL) 50 MG 24 hr tablet Take 1 tablet (50 mg) by mouth At Bedtime 90 tablet 3     metroNIDAZOLE (FLAGYL) 500 MG tablet Take 1 tablet (500 mg) by mouth every 6 hours At 8:00 am, 2:00 pm, 8:00 pm the day prior to your surgery with neomycin and zofran. 3 tablet 0     neomycin (MYCIFRADIN) 500 MG tablet Take 2 tablets (1,000 mg) by mouth every 6 hours At 8:00 am, 2:00 pm, 8:00 pm the day prior to your surgery with flagyl and zofran. 6 tablet 0     nitroglycerin (NITROSTAT) 0.4 MG sublingual tablet Place 1 tablet (0.4 mg) under the tongue every 5 minutes as needed for chest pain 25 tablet 3     ondansetron (ZOFRAN) 4 MG tablet Take 1 tablet (4 mg) by mouth every 6 hours At 8:00 am, 2:00 pm, 8:00 pm the day prior to your surgery with neomycin and flagyl. 3 tablet 0     PARoxetine (PAXIL) 40 MG tablet Take 1 tablet (40 mg) by mouth every morning 90 tablet 3     polyethylene glycol (MIRALAX) 17 g packet Take 238 g by mouth See Admin Instructions Starting at 4 pm night prior to surgery. Refer to \"Getting Ready for Surgery\" instructions. 14 packet 0     oxyCODONE (ROXICODONE) 5 MG tablet Take 1 tablet (5 mg) by mouth every 4 hours as needed for moderate to severe pain (Patient not taking: Reported on 2021) 12 tablet 0       Allergies:  Allergies   Allergen Reactions     Codeine Nausea and Vomiting       Family history:  Family History   Problem Relation Age of Onset     Cancer Mother         esog/stomach     Cancer Father         lung     Hypertension Brother      Stomach Cancer Brother      Anesthesia Reaction No family hx of      Deep Vein Thrombosis (DVT) No family hx of      Glaucoma No family hx of      Macular Degeneration No family hx of        Social history:  Social History     Tobacco Use     Smoking status: Former Smoker     Packs/day: 1.50     Years: 30.00     Pack years: 45.00     Quit date: 2019     Years since quittin.7     Smokeless tobacco: " "Never Used     Tobacco comment: had restarted- now quit smoking 8/25/20   Substance Use Topics     Alcohol use: No     Comment: quit 35 years ago    Marital status: single.    Nursing Notes:   Alix Carrasco CMA  5/5/2021 11:31 AM  Signed  Chief Complaint   Patient presents with     Surgical Followup     Post-op follow up, DOS:04/22/2021       Vitals:    05/05/21 1127   Weight: 160 lb   Height: 5' 9\"       Body mass index is 23.63 kg/m .          MARIAMA Davis APRN, NP-C  Colon and Rectal Surgery   [facility]    This note was created using speech recognition software and may contain unintended word substitutions.  "

## 2021-05-05 NOTE — LETTER
"2021       RE: Tevin Guidry  [address] [address]  [facility] [address]     Dear Colleague,    Thank you for referring your patient, Tevin Guidry, to the [facility] COLON AND RECTAL SURGERY CLINIC [place] at [facility]. Please see a copy of my visit note below.    Colon and Rectal Surgery Consult Video Note    Date: 2021     Referring provider:  Jitendra Rodríguez MD  [address] [place] [id number]     RE: Tevin Guidry  : 1950  RENÉE: 2021    Tevin Guidry is a 70 year old male who is being evaluated via a billable video visit.      The patient has been notified of following:     \"This video visit will be conducted via a call between you and your physician/provider. We have found that certain health care needs can be provided without the need for an in-person physical exam.  This service lets us provide the care you need with a video conversation.  If a prescription is necessary we can send it directly to your pharmacy.  If lab work is needed we can place an order for that and you can then stop by our lab to have the test done at a later time.    Video visits are billed at different rates depending on your insurance coverage.  Please reach out to your insurance provider with any questions.    If during the course of the call the physician/provider feels a video visit is not appropriate, you will not be charged for this service.\"    Patient has given verbal consent for Video visit? Yes    Video Start Time: 1149     Tevin Guidry is a very pleasant 70 year old male who underwent an extended right hemicolectomy with Dr. 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 This was suggestive of metastatic disease and not primary colon cancer. He underwent colonoscopy with Dr. Rodríguez on 21 with no " masses noted and one tubular adenoma.    Interval History:  Hola has been doing well.  He denies any pain related to surgery.  He, unfortunately, fell outside while throwing a ball for his dog and sprained his wrist and hit his head.  He did not lose consciousness and is feeling okay.  He reports that his incision sites are healing well.  He is eating and drinking without difficulty and tolerating a low residue diet.  He is now having formed bowel movements.  No fevers or chills.  Family history is significant for an older brother with stomach cancer, mother with stomach cancer, maternal aunt with ovarian cancer, and father with lung cancer.  He is set up to meet with one of our genetic counselors in about a month.    Assessment/Plan: oHla is recovering well from surgery.  No pain.  He is tolerating a low residue diet.  Would like him to stay on this for another 2 weeks and then he can slowly add fiber back into his diet.  No lifting more than 10 pounds for full 6 weeks from surgery.  He is having normal, formed bowel movements now.  He will be meeting with a genetic counselor at the beginning of June given his family history of gastric cancers and has an EGD scheduled for tomorrow.  He has a follow-up visit scheduled with Dr. Rodríguez on May 19.  Encouraged him to contact the clinic in the meantime with any questions or concerns. Patient's questions were answered to his stated satisfaction and he is in agreement with this plan.    Video-Visit Details    Type of service:  Video Visit    Video End Time (time video stopped): 1157    Originating Location (pt. Location): Home    Distant Location (provider location):  [facility] COLON AND RECTAL SURGERY CLINIC [place]     Mode of Communication:  Video Conference via TAKO    13 minutes spent on the date of the encounter doing chart review, history, review of imaging, documentation ,and further activities as noted above, which includes my time spent on video  with the patient/family.       Medical history:  Past Medical History:   Diagnosis Date     Bladder cancer (H)      Colon polyps     multiple may need colectomy (adenoma)     COPD (chronic obstructive pulmonary disease) (H)      Hyperlipidemia LDL goal <130      Hypertension goal BP (blood pressure) < 140/90      Social phobia        Surgical history:  Past Surgical History:   Procedure Laterality Date     CLAVICLE SURGERY Right     fracture in childhood     COLONOSCOPY N/A 3/30/2017    Procedure: COLONOSCOPY;  Surgeon: Jie Onofre MD;  Location: [facility]     COLONOSCOPY N/A 3/2/2021    Procedure: COLONOSCOPY, WITH POLYPECTOMY, hot snare, lift with elaview and epi, clip for hemorrhage control, tattoo;  Surgeon: Ra Cardoso MD;  Location: [facility] OR     COLONOSCOPY N/A 4/22/2021    Procedure: Colonoscopy;  Surgeon: Jitendra Rodríguez MD;  Location: [facility]     CYSTOSCOPY, BIOPSY BLADDER INSTILL OPTICAL AGENT N/A 9/2/2020    Procedure: CYSTOSCOPY, WITH INSTILLATION OF OPTICAL IMAGING AGENT INTO BLADDER AND BIOPSY OF BLADDER (CYSVIEW);  Surgeon: Kirt Guzman MD;  [facility]     CYSTOSCOPY, TRANSURETHRAL RESECTION (TUR) TUMOR BLADDER, COMBINED N/A 9/5/2019    Procedure: cystoscopy,Transurethral Resection Of Bladder Tumor;  Surgeon: Almas Sunshine MD;  [facility]     CYSTOSCOPY, TRANSURETHRAL RESECTION (TUR) TUMOR BLADDER, COMBINED N/A 2/6/2020    Procedure: CYSTOSCOPY, WITH TRANSURETHRAL RESECTION BLADDER TUMOR;  Surgeon: Almas Sunshine MD;  [facility]     DAVINCI BYPASS ARTERY CORONARY N/A 9/29/2014    Procedure: DAVINCI BYPASS ARTERY CORONARY;  Surgeon: Lam Solis MD;  [facility]: [facility]     ESOPHAGOSCOPY, GASTROSCOPY, DUODENOSCOPY (EGD), COMBINED N/A 3/30/2017    Procedure: COMBINED ESOPHAGOSCOPY, GASTROSCOPY, DUODENOSCOPY (EGD);  Surgeon: Jie Onofre MD;  Location: [facility]     HC COLONOSCOPY THRU STOMA, DIAGNOSTIC  2010    polyps due       PHACOEMULSIFICATION CLEAR CORNEA WITH STANDARD INTRAOCULAR LENS IMPLANT Right 2020    Procedure: RIGHT EYE COMPLEX CATARACT REMOVAL WITH INTRAOCULAR LENS IMPLANT;  Surgeon: Aimee Marcus MD;  Location: [facility] OR     SURGICAL HISTORY OF -       right clavicular fracture with ORIF       Problem list:  Patient Active Problem List    Diagnosis Date Noted     Colon cancer (H) 2021     Priority: Medium     Added automatically from request for surgery [id number]       History of colonic polyps 2021     Priority: Medium     Added automatically from request for surgery [id number]       Combined form of age-related cataract, both eyes 2020     Priority: Medium     Added automatically from request for surgery [id number]       Total cataract of right eye 2020     Priority: Medium     Added automatically from request for surgery [id number]       Malignant neoplasm of overlapping sites of bladder (H) 2020     Priority: Medium     Added automatically from request for surgery [id number]       Personal history of malignant neoplasm of bladder 2020     Priority: Medium     Added automatically from request for surgery [id number]       Bladder cancer (H) 2019     Priority: Medium     Health Care Home 2017     Priority: Medium     [facility] Care Coordinator  Lovely Medina RN  [phone]  [facility] CARE PLAN SUMMARY    Member Name:  Tevin Guidry  Address:  [place] [address] Phone: [phone] (home)    :  1950 Date of Assessment:  10/1/2020   Health Plan:  [facility]  Health Plan Number: [id number] Medical Assistance Number: [id number]  Financial Worker:    Case #:     FVP Care Coordinator:  Lovely Medina RN CC Phone:  [phone]  CC Fax:  [phone]   FVP Enrollment Date: 2018 Case Mix:  L  Rate Cell:  A  Waiver Type:  none   Primary Emergency Contact:   Jose Guidry    [place] Phone: [phone]  Relation: Brother Language:   "English  :  No   Health Care Agent/POA:   Advanced Directives/Living Will:     Primary Care Clinic/Phone/Fax:  [facility]/(p) [phone], (f) [phone] Primary Dx:  CAD I25.10  Secondary Dx:  COPDJ44.9   Primary Physician:  Preethi Quiroz   Height:  5' 8\"  Weight:  171 lbs   Specialty Physician:    Audiologist:     Eye Care Provider:   Dental Care Provider:    [facility]: [facility] [phone] or [phone]   Other:                 Anemia, unspecified type 01/25/2017     Priority: Medium     Chronic obstructive pulmonary disease, unspecified COPD type (H) 12/08/2015     Priority: Medium     PFTs 12/8/15       S/P CABG (coronary artery bypass graft) 10/22/2015     Priority: Medium     Cigarette nicotine dependence without complication 10/14/2014     Priority: Medium     Coronary artery disease involving native heart with angina pectoris, unspecified vessel or lesion type (H) 09/26/2014     Priority: Medium     Advanced directives, counseling/discussion 08/20/2013     Priority: Medium     Tubular adenoma of colon 07/11/2012     Priority: Medium     9/6/13 colonoscopy revealed 4 polyps. 2 year follow up colonoscopy recommended       Hyperlipidemia LDL goal <130 05/09/2010     Priority: Medium     Hypertension goal BP (blood pressure) < 140/90      Priority: Medium     Social phobia 03/02/2005     Priority: Medium       Medications:  Current Outpatient Medications   Medication Sig Dispense Refill     acetaminophen (TYLENOL) 325 MG tablet Take 2 tablets (650 mg) by mouth every 4 hours as needed for other (For optimal non-opioid multimodal pain management to improve pain control.) 100 tablet 0     albuterol (PROAIR HFA/PROVENTIL HFA/VENTOLIN HFA) 108 (90 Base) MCG/ACT inhaler Inhale 2 puffs into the lungs every 6 hours as needed for shortness of breath / dyspnea or wheezing 1 Inhaler 1     aspirin 81 MG tablet Take 1 tablet (81 mg) by mouth daily (Patient taking differently: " "Take 81 mg by mouth every morning ) 90 tablet 3     atorvastatin (LIPITOR) 80 MG tablet Take 1 tablet (80 mg) by mouth At Bedtime 90 tablet 3     ferrous sulfate (IRON) 325 (65 FE) MG tablet Take 325 mg by mouth every morning  60 tablet 3     lisinopril (ZESTRIL) 20 MG tablet Take 1 tablet (20 mg) by mouth At Bedtime 90 tablet 3     metoprolol succinate ER (TOPROL-XL) 50 MG 24 hr tablet Take 1 tablet (50 mg) by mouth At Bedtime 90 tablet 3     metroNIDAZOLE (FLAGYL) 500 MG tablet Take 1 tablet (500 mg) by mouth every 6 hours At 8:00 am, 2:00 pm, 8:00 pm the day prior to your surgery with neomycin and zofran. 3 tablet 0     neomycin (MYCIFRADIN) 500 MG tablet Take 2 tablets (1,000 mg) by mouth every 6 hours At 8:00 am, 2:00 pm, 8:00 pm the day prior to your surgery with flagyl and zofran. 6 tablet 0     nitroglycerin (NITROSTAT) 0.4 MG sublingual tablet Place 1 tablet (0.4 mg) under the tongue every 5 minutes as needed for chest pain 25 tablet 3     ondansetron (ZOFRAN) 4 MG tablet Take 1 tablet (4 mg) by mouth every 6 hours At 8:00 am, 2:00 pm, 8:00 pm the day prior to your surgery with neomycin and flagyl. 3 tablet 0     PARoxetine (PAXIL) 40 MG tablet Take 1 tablet (40 mg) by mouth every morning 90 tablet 3     polyethylene glycol (MIRALAX) 17 g packet Take 238 g by mouth See Admin Instructions Starting at 4 pm night prior to surgery. Refer to \"Getting Ready for Surgery\" instructions. 14 packet 0     oxyCODONE (ROXICODONE) 5 MG tablet Take 1 tablet (5 mg) by mouth every 4 hours as needed for moderate to severe pain (Patient not taking: Reported on 5/5/2021) 12 tablet 0       Allergies:  Allergies   Allergen Reactions     Codeine Nausea and Vomiting       Family history:  Family History   Problem Relation Age of Onset     Cancer Mother         esog/stomach     Cancer Father         lung     Hypertension Brother      Stomach Cancer Brother      Anesthesia Reaction No family hx of      Deep Vein Thrombosis (DVT) No " "family hx of      Glaucoma No family hx of      Macular Degeneration No family hx of        Social history:  Social History     Tobacco Use     Smoking status: Former Smoker     Packs/day: 1.50     Years: 30.00     Pack years: 45.00     Quit date: 2019     Years since quittin.7     Smokeless tobacco: Never Used     Tobacco comment: had restarted- now quit smoking 20   Substance Use Topics     Alcohol use: No     Comment: quit 35 years ago    Marital status: single.    Nursing Notes:   Alix Carrasco CMA  2021 11:31 AM  Signed  Chief Complaint   Patient presents with     Surgical Followup     Post-op follow up, DOS:2021       Vitals:    21 1127   Weight: 160 lb   Height: 5' 9\"       Body mass index is 23.63 kg/m .        MARIAMA Davis APRN, NP-C  Colon and Rectal Surgery   [facility]    This note was created using speech recognition software and may contain unintended word substitutions.      "

## 2021-05-05 NOTE — NURSING NOTE
"Chief Complaint   Patient presents with     Surgical Followup     Post-op follow up, DOS:04/22/2021       Vitals:    05/05/21 1127   Weight: 160 lb   Height: 5' 9\"       Body mass index is 23.63 kg/m .          Alix Barnett CMA    "

## 2021-05-06 ENCOUNTER — TRANSFERRED RECORDS (OUTPATIENT)
Dept: HEALTH INFORMATION MANAGEMENT | Facility: CLINIC | Age: 71
End: 2021-05-06

## 2021-05-06 ENCOUNTER — DOCUMENTATION ONLY (OUTPATIENT)
Dept: GASTROENTEROLOGY | Facility: OUTPATIENT CENTER | Age: 71
End: 2021-05-06
Attending: SURGERY
Payer: COMMERCIAL

## 2021-05-06 DIAGNOSIS — Z86.0100 HISTORY OF COLONIC POLYPS: ICD-10-CM

## 2021-05-06 DIAGNOSIS — C18.9 COLON CANCER (H): ICD-10-CM

## 2021-05-06 DIAGNOSIS — Z80.0 FAMILY HISTORY OF GASTRIC CANCER: ICD-10-CM

## 2021-05-06 PROCEDURE — 88342 IMHCHEM/IMCYTCHM 1ST ANTB: CPT | Mod: GC | Performed by: PATHOLOGY

## 2021-05-06 PROCEDURE — 88305 TISSUE EXAM BY PATHOLOGIST: CPT | Mod: GC | Performed by: PATHOLOGY

## 2021-05-06 PROCEDURE — 88341 IMHCHEM/IMCYTCHM EA ADD ANTB: CPT | Mod: GC | Performed by: PATHOLOGY

## 2021-05-06 NOTE — TELEPHONE ENCOUNTER
MEDICAL RECORDS REQUEST   [facility]  Urology Clinic  [address] [place] [address]  PHONE: [phone]  Fax: [phone]        FUTURE VISIT INFORMATION                                                   Tevin GENEVIEVE Guidry, : 1950 scheduled for future visit at [facility]    APPOINTMENT INFORMATION:    Date: 19 2PM     Provider:  Roxanna Stroud PA-C    Reason for Visit/Diagnosis: Gross hematuria    REFERRAL INFORMATION:    Referring provider:  Preethi Quiroz     Specialty: MD    Referring providers clinic:  [facility]     Clinic contact number:  [phone]    RECORDS REQUESTED FOR VISIT                                                     NOTES  STATUS/DETAILS   OFFICE NOTE from referring provider  yes   OFFICE NOTE from other specialist  no   DISCHARGE SUMMARY from hospital  no   DISCHARGE REPORT from the ER  no   OPERATIVE REPORT  no   MEDICATION LIST  no     PRE-VISIT CHECKLIST      Record collection complete Yes- All recs in Epic    Appointment appropriately scheduled           (right time/right provider) Yes   MyChart activation No- Declined    Questionnaire complete If no, please explain: In process      Completed by: Aimee Perez  
Yessenia Watson-  [phone]/Yes

## 2021-05-10 ENCOUNTER — TUMOR CONFERENCE (OUTPATIENT)
Dept: ONCOLOGY | Facility: CLINIC | Age: 71
End: 2021-05-10
Payer: COMMERCIAL

## 2021-05-10 NOTE — PROGRESS NOTES
Oncology RN Care Coordination Note:     This writer received an email from [employer] stating the tissue sample submitted wasn't malignant, this writer corrected the error.  Tissue sample that should have been submitted was collected on 04.13.2021.    This writer submitted a new requisition for this patient's tissue sample that was collected on 04.13.2021.  This writer has updated the [employer] team regarding the new submission.    Mariam Knight, RN BSN   [facility]  Nurse Coordinator

## 2021-05-13 LAB — COPATH REPORT: NORMAL

## 2021-05-17 NOTE — PROGRESS NOTES
"Colon and Rectal Surgery Consult Telephone Note    Date: 2021     Referring provider:  No referring provider defined for this encounter.     RE: Tevin Guidry  : 1950  RENÉE: 2021    Tevin Guidry is a 70 year old male who is being evaluated via a billable telephone visit.      The patient has been notified of following:     \"This telephone visit will be conducted via a call between you and your physician/provider. We have found that certain health care needs can be provided without the need for an in-person physical exam.  This service lets us provide the care you need with a telephone conversation.  If a prescription is necessary we can send it directly to your pharmacy.  If lab work is needed we can place an order for that and you can then stop by our lab to have the test done at a later time.    Telephone visits are billed at different rates depending on your insurance coverage.  Please reach out to your insurance provider with any questions.    If during the course of the call the physician/provider feels a telephonevisit is not appropriate, you will not be charged for this service.\"    Patient has given verbal consent for telephone visit? 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 This was suggestive of metastatic disease and not primary colon cancer. He underwent colonoscopy with me on 21 with no masses noted and one tubular adenoma.    Interval Hx:  -EGD on  without any obvious evidence of neoplasia or masses in Esophagus or stomach.  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 FDG at the colonic surgical anastomosis " presumably postsurgical  inflammation.   2. No definite FDG uptake to suggest metastatic disease.   3. No definite primary significant ring carcinoma is identified.   There are 3 equivocal findings:   3a. Mild uptake in the gastric cardia, favor physiologic  3b. Loop of slightly narrowed thickened small bowel with FDG uptake,  favor physiologic or postoperative inflammation but cannot rule out a  primary.   3c. Extensive FDG uptake in the entire sigmoid colon, favored to be  physiologic.  3d. If clinically indicated, an MR enterography in this case may  better define bowel abnormalities. Alternatively attention to these  areas on follow-up imaging.  4. Incidentally noted bilateral hydroceles and right peritesticular  calcification.     Otherwise doing well, no complaints. x2 BM daily, tolerating diet, feeling well.    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  -Overall recovering nicely and doing well. Diet as tolerated, no further lifting restrictions.  -Follow up with Dr. Sánchez, and with Dr. Valle for polyp monitoring and removal as previously planned.      Jitendra Rodríguez MD  Division of Colon and Rectal Surgery  [facility]  [phone]      Teelphone-Visit Details    Type of service:  telephone Visit    Telephone End Time (time telephone stopped): 2:40 pm     Originating Location (pt. Location): Home    Distant Location (provider location):  [facility] [place]     Mode of Communication:  telephone Conference via [employer]     Medical history:  Past Medical History:   Diagnosis Date     Bladder cancer (H)      Colon polyps     multiple may need colectomy (adenoma)     COPD (chronic obstructive pulmonary disease) (H)      Hyperlipidemia LDL goal <130      Hypertension goal BP  (blood pressure) < 140/90      Social phobia        Surgical history:  Past Surgical History:   Procedure Laterality Date     CLAVICLE SURGERY Right     fracture in childhood     COLONOSCOPY N/A 3/30/2017    Procedure: COLONOSCOPY;  Surgeon: Jie Onofre MD;  Location: [facility]     COLONOSCOPY N/A 3/2/2021    Procedure: COLONOSCOPY, WITH POLYPECTOMY, hot snare, lift with elaview and epi, clip for hemorrhage control, tattoo;  Surgeon: Ra Cardoos MD;  Location: [facility]     COLONOSCOPY N/A 4/22/2021    Procedure: Colonoscopy;  Surgeon: Jitendra Rodríguez MD;  Location: [facility]     CYSTOSCOPY, BIOPSY BLADDER INSTILL OPTICAL AGENT N/A 9/2/2020    Procedure: CYSTOSCOPY, WITH INSTILLATION OF OPTICAL IMAGING AGENT INTO BLADDER AND BIOPSY OF BLADDER (CYSVIEW);  Surgeon: Kirt Guzman MD;  [facility]     CYSTOSCOPY, TRANSURETHRAL RESECTION (TUR) TUMOR BLADDER, COMBINED N/A 9/5/2019    Procedure: cystoscopy,Transurethral Resection Of Bladder Tumor;  Surgeon: Almas Sunshine MD;  [facility]     CYSTOSCOPY, TRANSURETHRAL RESECTION (TUR) TUMOR BLADDER, COMBINED N/A 2/6/2020    Procedure: CYSTOSCOPY, WITH TRANSURETHRAL RESECTION BLADDER TUMOR;  Surgeon: Almas Sunshine MD;  [facility]     DAVINCI BYPASS ARTERY CORONARY N/A 9/29/2014    Procedure: DAVINCI BYPASS ARTERY CORONARY;  Surgeon: Lam Solis MD;  Location: [facility]     ESOPHAGOSCOPY, GASTROSCOPY, DUODENOSCOPY (EGD), COMBINED N/A 3/30/2017    Procedure: COMBINED ESOPHAGOSCOPY, GASTROSCOPY, DUODENOSCOPY (EGD);  Surgeon: Jie Onofre MD;  Location: [facility]     HC COLONOSCOPY THRU STOMA, DIAGNOSTIC  2010    polyps due 2011     PHACOEMULSIFICATION CLEAR CORNEA WITH STANDARD INTRAOCULAR LENS IMPLANT Right 12/11/2020    Procedure: RIGHT EYE COMPLEX CATARACT REMOVAL WITH INTRAOCULAR LENS IMPLANT;  Surgeon: Aimee Marcus MD;  Location: [facility] OR     SURGICAL HISTORY OF -       right clavicular  fracture with ORIF       Problem list:  Patient Active Problem List    Diagnosis Date Noted     Colon cancer (H) 2021     Priority: Medium     Added automatically from request for surgery [id number]       History of colonic polyps 2021     Priority: Medium     Added automatically from request for surgery [id number]       Combined form of age-related cataract, both eyes 2020     Priority: Medium     Added automatically from request for surgery [id number]       Total cataract of right eye 2020     Priority: Medium     Added automatically from request for surgery [id number]       Malignant neoplasm of overlapping sites of bladder (H) 2020     Priority: Medium     Added automatically from request for surgery [id number]       Personal history of malignant neoplasm of bladder 2020     Priority: Medium     Added automatically from request for surgery [id number]       Bladder cancer (H) 2019     Priority: Medium     Health Care Home 2017     Priority: Medium     [facility] Care Coordinator  Lovely Medina RN  [phone]  [facility] CARE PLAN SUMMARY    Member Name:  Tevin Guidry  Address:  [place] [address] Phone: [phone] (home)    :  1950 Date of Assessment:  10/1/2020   Health Plan:  [facility]  Health Plan Number: [id number] Medical Assistance Number: [id number]  Financial Worker:    Case #:     FVP Care Coordinator:  Lovely Medina RN CC Phone:  [phone]  CC Fax:  [phone]   [facility] Enrollment Date: 2018 Case Mix:  L  Rate Cell:  A  Waiver Type:  none   Primary Emergency Contact:   Jose Guidry    [place] Phone: [phone]  Relation: Brother Language:  English  :  No   Health Care Agent/POA:   Advanced Directives/Living Will:     Primary Care Clinic/Phone/Fax:  [facility]/(p) [phone], f) [phone] Primary Dx:  CAD I25.10  Secondary Dx:  COPDJ44.9   Primary Physician:  Preethi Quiroz   Height:  " 5' 8\"  Weight:  171 lbs   Specialty Physician:    Audiologist:     Eye Care Provider:   Dental Care Provider:    [facility]: [facility] [phone] or [phone]   Other:                 Anemia, unspecified type 01/25/2017     Priority: Medium     Chronic obstructive pulmonary disease, unspecified COPD type (H) 12/08/2015     Priority: Medium     PFTs 12/8/15       S/P CABG (coronary artery bypass graft) 10/22/2015     Priority: Medium     Cigarette nicotine dependence without complication 10/14/2014     Priority: Medium     Coronary artery disease involving native heart with angina pectoris, unspecified vessel or lesion type (H) 09/26/2014     Priority: Medium     Advanced directives, counseling/discussion 08/20/2013     Priority: Medium     Tubular adenoma of colon 07/11/2012     Priority: Medium     9/6/13 colonoscopy revealed 4 polyps. 2 year follow up colonoscopy recommended       Hyperlipidemia LDL goal <130 05/09/2010     Priority: Medium     Hypertension goal BP (blood pressure) < 140/90      Priority: Medium     Social phobia 03/02/2005     Priority: Medium       Medications:  Current Outpatient Medications   Medication Sig Dispense Refill     acetaminophen (TYLENOL) 325 MG tablet Take 2 tablets (650 mg) by mouth every 4 hours as needed for other (For optimal non-opioid multimodal pain management to improve pain control.) 100 tablet 0     albuterol (PROAIR HFA/PROVENTIL HFA/VENTOLIN HFA) 108 (90 Base) MCG/ACT inhaler Inhale 2 puffs into the lungs every 6 hours as needed for shortness of breath / dyspnea or wheezing 1 Inhaler 1     aspirin 81 MG tablet Take 1 tablet (81 mg) by mouth daily (Patient taking differently: Take 81 mg by mouth every morning ) 90 tablet 3     atorvastatin (LIPITOR) 80 MG tablet Take 1 tablet (80 mg) by mouth At Bedtime 90 tablet 3     ferrous sulfate (IRON) 325 (65 FE) MG tablet Take 325 mg by mouth every morning  60 tablet 3     lisinopril (ZESTRIL) 20 MG tablet Take 1 " "tablet (20 mg) by mouth At Bedtime 90 tablet 3     metoprolol succinate ER (TOPROL-XL) 50 MG 24 hr tablet Take 1 tablet (50 mg) by mouth At Bedtime 90 tablet 3     metroNIDAZOLE (FLAGYL) 500 MG tablet Take 1 tablet (500 mg) by mouth every 6 hours At 8:00 am, 2:00 pm, 8:00 pm the day prior to your surgery with neomycin and zofran. 3 tablet 0     neomycin (MYCIFRADIN) 500 MG tablet Take 2 tablets (1,000 mg) by mouth every 6 hours At 8:00 am, 2:00 pm, 8:00 pm the day prior to your surgery with flagyl and zofran. 6 tablet 0     nitroglycerin (NITROSTAT) 0.4 MG sublingual tablet Place 1 tablet (0.4 mg) under the tongue every 5 minutes as needed for chest pain 25 tablet 3     ondansetron (ZOFRAN) 4 MG tablet Take 1 tablet (4 mg) by mouth every 6 hours At 8:00 am, 2:00 pm, 8:00 pm the day prior to your surgery with neomycin and flagyl. 3 tablet 0     oxyCODONE (ROXICODONE) 5 MG tablet Take 1 tablet (5 mg) by mouth every 4 hours as needed for moderate to severe pain (Patient not taking: Reported on 5/5/2021) 12 tablet 0     PARoxetine (PAXIL) 40 MG tablet Take 1 tablet (40 mg) by mouth every morning 90 tablet 3     polyethylene glycol (MIRALAX) 17 g packet Take 238 g by mouth See Admin Instructions Starting at 4 pm night prior to surgery. Refer to \"Getting Ready for Surgery\" instructions. 14 packet 0       Allergies:  Allergies   Allergen Reactions     Codeine Nausea and Vomiting       Family history:  Family History   Problem Relation Age of Onset     Cancer Mother         esog/stomach     Cancer Father         lung     Hypertension Brother      Stomach Cancer Brother      Anesthesia Reaction No family hx of      Deep Vein Thrombosis (DVT) No family hx of      Glaucoma No family hx of      Macular Degeneration No family hx of        Social history:  Social History     Tobacco Use     Smoking status: Former Smoker     Packs/day: 1.50     Years: 30.00     Pack years: 45.00     Quit date: 8/1/2019     Years since quitting: " 1.7     Smokeless tobacco: Never Used     Tobacco comment: had restarted- now quit smoking 8/25/20   Substance Use Topics     Alcohol use: No     Comment: quit 35 years ago    Marital status: single.      Jitendra Rodríguez MD  Division of Colon and Rectal Surgery  [facility]  [phone]

## 2021-05-18 LAB
CASE NUMBER: NORMAL
LOCATION PERFORMED: NORMAL
RESULT: NORMAL
SEND OUTS MISC TEST SPECIMEN: NORMAL
TEST NAME: NORMAL

## 2021-05-19 ENCOUNTER — VIRTUAL VISIT (OUTPATIENT)
Dept: SURGERY | Facility: CLINIC | Age: 71
End: 2021-05-19
Payer: COMMERCIAL

## 2021-05-19 VITALS — WEIGHT: 160 LBS | HEIGHT: 69 IN | BODY MASS INDEX: 23.7 KG/M2

## 2021-05-19 DIAGNOSIS — Z09 FOLLOW-UP EXAMINATION AFTER COLORECTAL SURGERY: Primary | ICD-10-CM

## 2021-05-19 PROCEDURE — 99024 POSTOP FOLLOW-UP VISIT: CPT | Performed by: SURGERY

## 2021-05-19 ASSESSMENT — MIFFLIN-ST. JEOR: SCORE: 1466.14

## 2021-05-19 ASSESSMENT — PAIN SCALES - GENERAL: PAINLEVEL: NO PAIN (0)

## 2021-05-19 NOTE — LETTER
"2021       RE: Tevin Guidry  [address]  [facility] [address]     Dear Colleague,    Thank you for referring your patient, Tevin Guidry, to the [facility] COLON AND RECTAL SURGERY CLINIC [place] at [facility]. Please see a copy of my visit note below.    Colon and Rectal Surgery Consult Telephone Note    Date: 2021     Referring provider:  No referring provider defined for this encounter.     RE: Tevin Guidry  : 1950  RENÉE: 2021    Tevin Guidry is a 70 year old male who is being evaluated via a billable telephone visit.      The patient has been notified of following:     \"This telephone visit will be conducted via a call between you and your physician/provider. We have found that certain health care needs can be provided without the need for an in-person physical exam.  This service lets us provide the care you need with a telephone conversation.  If a prescription is necessary we can send it directly to your pharmacy.  If lab work is needed we can place an order for that and you can then stop by our lab to have the test done at a later time.    Telephone visits are billed at different rates depending on your insurance coverage.  Please reach out to your insurance provider with any questions.    If during the course of the call the physician/provider feels a telephonevisit is not appropriate, you will not be charged for this service.\"    Patient has given verbal consent for telephone visit? 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 This was suggestive of metastatic disease and not primary colon cancer. He underwent colonoscopy with me on 21 with no " masses noted and one tubular adenoma.    Interval Hx:  -EGD on 5/6 without any obvious evidence of neoplasia or masses in Esophagus or stomach.  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 FDG at the colonic surgical anastomosis presumably postsurgical  inflammation.   2. No definite FDG uptake to suggest metastatic disease.   3. No definite primary significant ring carcinoma is identified.   There are 3 equivocal findings:   3a. Mild uptake in the gastric cardia, favor physiologic  3b. Loop of slightly narrowed thickened small bowel with FDG uptake,  favor physiologic or postoperative inflammation but cannot rule out a  primary.   3c. Extensive FDG uptake in the entire sigmoid colon, favored to be  physiologic.  3d. If clinically indicated, an MR enterography in this case may  better define bowel abnormalities. Alternatively attention to these  areas on follow-up imaging.  4. Incidentally noted bilateral hydroceles and right peritesticular  calcification.     Otherwise doing well, no complaints. x2 BM daily, tolerating diet, feeling well.    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  -Overall recovering nicely and doing well. Diet as tolerated, no further lifting restrictions.  -Follow up with Dr. Sánchez, and with Dr. Valle for polyp monitoring and removal as previously planned.    Jitendra Rodríguez MD  Division of Colon and Rectal Surgery  [facility]  [phone]    Teelphone-Visit Details    Type of service:  telephone Visit    Telephone End Time (time telephone stopped): 2:40 pm     Originating Location (pt. Location): Home    Distant Location (provider location):  [facility] COLON AND RECTAL SURGERY CLINIC  [place]     Mode of Communication:  telephone Conference via Infogram     Medical history:  Past Medical History:   Diagnosis Date     Bladder cancer (H)      Colon polyps     multiple may need colectomy (adenoma)     COPD (chronic obstructive pulmonary disease) (H)      Hyperlipidemia LDL goal <130      Hypertension goal BP (blood pressure) < 140/90      Social phobia        Surgical history:  Past Surgical History:   Procedure Laterality Date     CLAVICLE SURGERY Right     fracture in childhood     COLONOSCOPY N/A 3/30/2017    Procedure: COLONOSCOPY;  Surgeon: Jie Onofre MD;  Location: [facility]     COLONOSCOPY N/A 3/2/2021    Procedure: COLONOSCOPY, WITH POLYPECTOMY, hot snare, lift with elaview and epi, clip for hemorrhage control, tattoo;  Surgeon: Ra Cardoso MD;  Location: [facility]     COLONOSCOPY N/A 4/22/2021    Procedure: Colonoscopy;  Surgeon: Jitendra Rodríguez MD;  Location: [facility]     CYSTOSCOPY, BIOPSY BLADDER INSTILL OPTICAL AGENT N/A 9/2/2020    Procedure: CYSTOSCOPY, WITH INSTILLATION OF OPTICAL IMAGING AGENT INTO BLADDER AND BIOPSY OF BLADDER (CYSVIEW);  Surgeon: Kirt Guzman MD;  [facility]     CYSTOSCOPY, TRANSURETHRAL RESECTION (TUR) TUMOR BLADDER, COMBINED N/A 9/5/2019    Procedure: cystoscopy,Transurethral Resection Of Bladder Tumor;  Surgeon: Almas Sunshine MD;  [facility]     CYSTOSCOPY, TRANSURETHRAL RESECTION (TUR) TUMOR BLADDER, COMBINED N/A 2/6/2020    Procedure: CYSTOSCOPY, WITH TRANSURETHRAL RESECTION BLADDER TUMOR;  Surgeon: Almas Sunshine MD;  [facility]     DAVINCI BYPASS ARTERY CORONARY N/A 9/29/2014    Procedure: DAVINCI BYPASS ARTERY CORONARY;  Surgeon: Lam Solis MD;  Location: [facility]     ESOPHAGOSCOPY, GASTROSCOPY, DUODENOSCOPY (EGD), COMBINED N/A 3/30/2017    Procedure: COMBINED ESOPHAGOSCOPY, GASTROSCOPY, DUODENOSCOPY (EGD);  Surgeon: Jie Onofre MD;  Location: [facility]     HC  COLONOSCOPY THRU STOMA, DIAGNOSTIC      polyps due      PHACOEMULSIFICATION CLEAR CORNEA WITH STANDARD INTRAOCULAR LENS IMPLANT Right 2020    Procedure: RIGHT EYE COMPLEX CATARACT REMOVAL WITH INTRAOCULAR LENS IMPLANT;  Surgeon: Aimee Marcus MD;  Location: [facility] OR     SURGICAL HISTORY OF -       right clavicular fracture with ORIF       Problem list:  Patient Active Problem List    Diagnosis Date Noted     Colon cancer (H) 2021     Priority: Medium     Added automatically from request for surgery [id number]       History of colonic polyps 2021     Priority: Medium     Added automatically from request for surgery [id number]       Combined form of age-related cataract, both eyes 2020     Priority: Medium     Added automatically from request for surgery [id number]       Total cataract of right eye 2020     Priority: Medium     Added automatically from request for surgery [id number]       Malignant neoplasm of overlapping sites of bladder (H) 2020     Priority: Medium     Added automatically from request for surgery [id number]       Personal history of malignant neoplasm of bladder 2020     Priority: Medium     Added automatically from request for surgery [id number]       Bladder cancer (H) 2019     Priority: Medium     Health Care Home 2017     Priority: Medium     [facility] Care Coordinator  Lovely Medina RN  [phone]  [facility] PLAN SUMMARY    Member Name:  Tevin Guidry  Address:  [place] [address] Phone: [phone] (home)    :  1950 Date of Assessment:  10/1/2020   Health Plan:  [facility]  Health Plan Number: [id number] Medical Assistance Number: [id number]  Financial Worker:    Case #:     P Care Coordinator:  Lovely Medina RN CC Phone:  [phone]  CC Fax:  [phone]   FVP Enrollment Date: 2018 Case Mix:  L  Rate Cell:  A  Waiver Type:  none   Primary Emergency Contact:   Jose Guidry  "Phone: [phone]  Relation: Brother Language:  English  :  No   Health Care Agent/POA:   Advanced Directives/Living Will:     Primary Care Clinic/Phone/Fax:  [facility] [place]/(p) [phone], (f) [phone] Primary Dx:  CAD I25.10  Secondary Dx:  COPDJ44.9   Primary Physician:  Preethi Quiroz   Height:  5' 8\"  Weight:  171 lbs   Specialty Physician:    Audiologist:     Eye Care Provider:   Dental Care Provider:    [facility]: [facility] [phone] or [phone]   Other:                 Anemia, unspecified type 01/25/2017     Priority: Medium     Chronic obstructive pulmonary disease, unspecified COPD type (H) 12/08/2015     Priority: Medium     PFTs 12/8/15       S/P CABG (coronary artery bypass graft) 10/22/2015     Priority: Medium     Cigarette nicotine dependence without complication 10/14/2014     Priority: Medium     Coronary artery disease involving native heart with angina pectoris, unspecified vessel or lesion type (H) 09/26/2014     Priority: Medium     Advanced directives, counseling/discussion 08/20/2013     Priority: Medium     Tubular adenoma of colon 07/11/2012     Priority: Medium     9/6/13 colonoscopy revealed 4 polyps. 2 year follow up colonoscopy recommended       Hyperlipidemia LDL goal <130 05/09/2010     Priority: Medium     Hypertension goal BP (blood pressure) < 140/90      Priority: Medium     Social phobia 03/02/2005     Priority: Medium       Medications:  Current Outpatient Medications   Medication Sig Dispense Refill     acetaminophen (TYLENOL) 325 MG tablet Take 2 tablets (650 mg) by mouth every 4 hours as needed for other (For optimal non-opioid multimodal pain management to improve pain control.) 100 tablet 0     albuterol (PROAIR HFA/PROVENTIL HFA/VENTOLIN HFA) 108 (90 Base) MCG/ACT inhaler Inhale 2 puffs into the lungs every 6 hours as needed for shortness of breath / dyspnea or wheezing 1 Inhaler 1     aspirin 81 MG tablet Take 1 tablet (81 " "mg) by mouth daily (Patient taking differently: Take 81 mg by mouth every morning ) 90 tablet 3     atorvastatin (LIPITOR) 80 MG tablet Take 1 tablet (80 mg) by mouth At Bedtime 90 tablet 3     ferrous sulfate (IRON) 325 (65 FE) MG tablet Take 325 mg by mouth every morning  60 tablet 3     lisinopril (ZESTRIL) 20 MG tablet Take 1 tablet (20 mg) by mouth At Bedtime 90 tablet 3     metoprolol succinate ER (TOPROL-XL) 50 MG 24 hr tablet Take 1 tablet (50 mg) by mouth At Bedtime 90 tablet 3     metroNIDAZOLE (FLAGYL) 500 MG tablet Take 1 tablet (500 mg) by mouth every 6 hours At 8:00 am, 2:00 pm, 8:00 pm the day prior to your surgery with neomycin and zofran. 3 tablet 0     neomycin (MYCIFRADIN) 500 MG tablet Take 2 tablets (1,000 mg) by mouth every 6 hours At 8:00 am, 2:00 pm, 8:00 pm the day prior to your surgery with flagyl and zofran. 6 tablet 0     nitroglycerin (NITROSTAT) 0.4 MG sublingual tablet Place 1 tablet (0.4 mg) under the tongue every 5 minutes as needed for chest pain 25 tablet 3     ondansetron (ZOFRAN) 4 MG tablet Take 1 tablet (4 mg) by mouth every 6 hours At 8:00 am, 2:00 pm, 8:00 pm the day prior to your surgery with neomycin and flagyl. 3 tablet 0     oxyCODONE (ROXICODONE) 5 MG tablet Take 1 tablet (5 mg) by mouth every 4 hours as needed for moderate to severe pain (Patient not taking: Reported on 5/5/2021) 12 tablet 0     PARoxetine (PAXIL) 40 MG tablet Take 1 tablet (40 mg) by mouth every morning 90 tablet 3     polyethylene glycol (MIRALAX) 17 g packet Take 238 g by mouth See Admin Instructions Starting at 4 pm night prior to surgery. Refer to \"Getting Ready for Surgery\" instructions. 14 packet 0       Allergies:  Allergies   Allergen Reactions     Codeine Nausea and Vomiting       Family history:  Family History   Problem Relation Age of Onset     Cancer Mother         esog/stomach     Cancer Father         lung     Hypertension Brother      Stomach Cancer Brother      Anesthesia Reaction No " family hx of      Deep Vein Thrombosis (DVT) No family hx of      Glaucoma No family hx of      Macular Degeneration No family hx of        Social history:  Social History     Tobacco Use     Smoking status: Former Smoker     Packs/day: 1.50     Years: 30.00     Pack years: 45.00     Quit date: 2019     Years since quittin.7     Smokeless tobacco: Never Used     Tobacco comment: had restarted- now quit smoking 20   Substance Use Topics     Alcohol use: No     Comment: quit 35 years ago    Marital status: single.      Jitendra Rodríguez MD  Division of Colon and Rectal Surgery  [facility]  [phone]

## 2021-05-19 NOTE — NURSING NOTE
"Chief Complaint   Patient presents with     Video Visit     Post-op 4/13/2021       Vitals:    05/19/21 1337   Weight: 160 lb   Height: 5' 9\"       Body mass index is 23.63 kg/m .    Chitra Torres MA    "

## 2021-05-24 ENCOUNTER — TUMOR CONFERENCE (OUTPATIENT)
Dept: ONCOLOGY | Facility: CLINIC | Age: 71
End: 2021-05-24
Payer: COMMERCIAL

## 2021-05-25 LAB — LAB SCANNED RESULT: NORMAL

## 2021-06-02 ENCOUNTER — ANCILLARY PROCEDURE (OUTPATIENT)
Dept: GENERAL RADIOLOGY | Facility: CLINIC | Age: 71
End: 2021-06-02
Attending: FAMILY MEDICINE
Payer: COMMERCIAL

## 2021-06-02 ENCOUNTER — OFFICE VISIT (OUTPATIENT)
Dept: FAMILY MEDICINE | Facility: CLINIC | Age: 71
End: 2021-06-02
Payer: COMMERCIAL

## 2021-06-02 ENCOUNTER — VIRTUAL VISIT (OUTPATIENT)
Dept: ONCOLOGY | Facility: CLINIC | Age: 71
End: 2021-06-02
Attending: GENETIC COUNSELOR, MS
Payer: COMMERCIAL

## 2021-06-02 VITALS
HEART RATE: 56 BPM | RESPIRATION RATE: 16 BRPM | SYSTOLIC BLOOD PRESSURE: 165 MMHG | OXYGEN SATURATION: 96 % | WEIGHT: 163 LBS | DIASTOLIC BLOOD PRESSURE: 89 MMHG | BODY MASS INDEX: 24.07 KG/M2 | TEMPERATURE: 97.8 F

## 2021-06-02 DIAGNOSIS — Z86.0100 HISTORY OF COLONIC POLYPS: Primary | ICD-10-CM

## 2021-06-02 DIAGNOSIS — C80.1 CANCER WITH UNKNOWN PRIMARY SITE (H): ICD-10-CM

## 2021-06-02 DIAGNOSIS — M25.532 LEFT WRIST PAIN: Primary | ICD-10-CM

## 2021-06-02 DIAGNOSIS — Z80.0 FAMILY HISTORY OF STOMACH CANCER: ICD-10-CM
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
## 2021-06-02 DIAGNOSIS — Z85.51 PERSONAL HISTORY OF MALIGNANT NEOPLASM OF BLADDER: ICD-10-CM

## 2021-06-02 DIAGNOSIS — S52.552A OTHER CLOSED EXTRA-ARTICULAR FRACTURE OF DISTAL END OF LEFT RADIUS, INITIAL ENCOUNTER: ICD-10-CM

## 2021-06-02 PROCEDURE — 99213 OFFICE O/P EST LOW 20 MIN: CPT | Performed by: FAMILY MEDICINE

## 2021-06-02 PROCEDURE — 73110 X-RAY EXAM OF WRIST: CPT | Mod: LT | Performed by: RADIOLOGY

## 2021-06-02 PROCEDURE — 96040 HC GENETIC COUNSELING, EACH 30 MINUTES: CPT | Performed by: GENETIC COUNSELOR, MS

## 2021-06-02 NOTE — PROGRESS NOTES
6/2/2021    Tevin is a 70 year old who is being evaluated via a billable video visit.      Video-Visit Details    Type of service: Video Visit    Video Start Time: 11:16 am  Video End Time: 12:14 pm    Originating Location (pt. Location): Home    Distant Location (provider location): [facility]    Platform used for Video Visit: Madeleine    Referring Provider: Cecile Bautista MD    Presenting Information:   I spoke with Tevin Guidry over video today for genetic counseling to discuss his personal and family history of cancer. With his permission, this appointment was conducted over video due to COVID-19 precautions. We talked today to review this history, cancer screening recommendations, and available genetic testing options.    Personal History:  Tevin is a 70 year old male. He was recently diagnosed with a cancer of unknown primary site (likely GI) at age 70. 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 This is thought to be compatible with a GI primary cancer, although the site of origin is unclear. He has undergone follow up colonoscopy (4/22/21), upper endoscopy (5/6/21) and PET CT (4/30/21) with no findings suggestive of a primary site.     He also has a history of bladder cancer diagnosed in 2019 at age 69.     He has a history of colon polyps, with the following colonoscopy history:     4/22/21: COLON, DESCENDING, POLYP: Tubular adenoma. Negative for high grade dysplasia or malignancy    4/13/21 (hemicolectomy): (in addition to the above malignant pathology): Multiple tubular adenomas (at least twenty-one), negative for high-grade dysplasia, distal margin is transected by a sessile tubular adenoma    3/2/21: greater than 50 mm scar/polyp combo (hepatic flexure), multiple polyps (3 to 25 mm in size) were found in the entire colon.  Right hemicolectomy suggested. Pathology: A. ASCENDING COLON, POLYP, POLYPECTOMYx7: Tubular and tubulovillous adenomas, fragmented. Negative for high grade dysplasia. B. COLON, HEPATIC FLEXURE POLYP, POLYPECTOMY: Tubular adenoma. Negative for high grade dysplasia C. SIGMOID COLON, POLYP, POLYPECTOMYx3: Tubulovillous and tubular adenomas, fragmented. Negative for high grade dysplasia.     3/30/17: Five medium polyps in the rectum, in the sigmoid colon, in the descending colon and in the ascending colon. Pathology: D. COLON, SIGMOID POLYP x 3, POLYPECTOMY: Tubular adenomas x3. Negative for high-grade dysplasia and invasive malignancy E. COLON, RECTAL POLYP, POLYPECTOMY: Tubulovillous adenoma. Negative for high-grade dysplasia and invasive malignancy     4/28/16: One 2 mm polyp in the cecum, One 3 mm polyp in the ascending colon, three 1 to 3 mm polyps at the hepatic flexure, one 3 mm polyp in the transverse colon, one 3 mm polyp in the descending colon, one 3 mm polyp in the sigmoid colon, two 2 to 4 mm polyps in the rectum. Pathology: A: Large intestine, cecum, polypectomy: Fragment of cauterized colonic mucosa. No evidence of neoplastic or hyperplastic polyp B: Large intestine, ascending colon, polypectomy: Eight fragments of tubular adenoma. No evidence of high-grade dysplasia or invasive malignancy C: Large intestine, hepatic flexure, polypectomy: Three fragments of tubular adenoma. No evidence of high-grade dysplasia or invasive malignancy D: Large intestine, transverse and descending colon, polypectomy: Four fragments of tubular adenoma.No evidence of high-grade dysplasia or invasive malignant. E: Large intestine, sigmoid colon, polypectomy: Tubular adenoma. No evidence of high-grade dysplasia or invasive malignancy. F: Large intestine, rectum, polypectomy: Three fragments of villous adenoma. No evidence of high-grade dysplasia or invasive malignancy.     9/6/13: One 6 mm polyp in the distal ascending colon, two  3 to 6 mm polyps in the transverse colon, one 4 mm polyp in the sigmoid colon. Pathology: A.  Large intestine at ascending colon, polypectomy - Tubular adenoma without high-grade dysplasia or malignancy. B.  Large intestine at transverse colon, polypectomy - Two tubular adenomas without high-grade dysplasia or malignancy. C.  Sigmoid colon, biopsy - Tubular adenoma without high grade dysplasia or malignancy.    7/20/12: Seven 5 to 10 mm polyps in the transverse colon, Ten 3 mm polyps in the transverse colon and in the ascending colon. Pathology: Large intestine, transverse colon, polypectomy - Three (3) tubular adenomas; no evidence of high grade dysplasia or invasive malignancy.     6/2/11: Six sessile polyps were found in the transverse colon. Pathology: Transverse colon, polypectomy - Multiple tubular adenomas (clinically from 5 polyps).     4/19/10: 15 mm polyp in the mid ascending colon, 8 mm polyp in the hepatic flexure, three 5 mm polyps in the transverse colon, multiple 5 to 12 mm polyps in the descending colon, three 5 to 10 mm polyps in the sigmoid colon. Pathology: A.  Colon, right, polypectomy - Non-neoplastic colonic mucosa with microscopic lymphoid aggregate, no evidence of malignancy. B.  Colon, hepatic flexure, polypectomy - Tubulovillous adenoma. C.  Colon, left, polypectomy - Fragments of villous adenoma with high   grade dysplastic change, no evidence of invasive malignancy    3/24/05: three 4 mm polyps in the transverse colon    Tevin reported current tobacco use (he is working on quitting).     Family History: (Please see scanned pedigree for detailed family history information)  Children:    He does not have any children.  Siblings:    His brother was diagnosed with stomach cancer in his early 60s and passed away at age 72. He reports that this was apparently a particularly aggressive cancer and that he was treated with oral chemotherapy. He had not had contact with his brother for 20 years.  "He states that he was told by a relative that this was a GIST tumor.     He has one other brother who is 69 years old and has no known cancer history. He has no insurance so he has never had a colonoscopy.    Maternal:    His mother was diagnosed with stomach cancer at age 63 and passed away within 4-5 months of her diagnosis. He reports that this was metastatic at diagnosis. Unknown if she ever had a colonoscopy.    His aunt was diagnosed with ovarian cancer that she \"fought for a long time\" before passing away at age 68. She had 5 children, no known cancers. He reports that one of his cousins passed away he thinks due to pulmonary fibrosis or another lung condition.    No known history of cancer in his maternal grandparents.  Paternal:    His father was diagnosed with lung cancer and had a lobe removed. He passed away due to this at age 49. He had a history of smoking 3 packs per day. Unknown if he ever had a colonoscopy.     He is not aware of any diagnoses of cancer in his paternal relatives, although he has limited information about them.      His maternal ethnicity is Syriac, [place]. His paternal ethnicity is [place], English. There is no known Ashkenazi Yazdanism ancestry on either side of his family.     Discussion:    Tevin's personal and family history of cancer is suggestive of a hereditary cancer syndrome.    We reviewed the features of sporadic, familial, and hereditary cancers. 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    We discussed the natural history and genetics of hereditary cancer. We reviewed genes associated with an increased risk for colon polyposis (such as the APC gene), as well as genes associated with gastric cancer (particularly the CDH1 gene, which is " associated with signet ring cell carcinomas). A detailed handout regarding genes associated with an increased risk for colon polyps/cancer will be provided to Tevin via [employer] and can be found in the after visit summary. Topics included: inheritance pattern, cancer risks, cancer screening recommendations, and also risks, benefits and limitations of testing.    Based on his personal and family history, Tevin meets current National Comprehensive Cancer Network (NCCN) criteria for genetic testing of adenomatous polyposis genes.      We discussed that there are additional genes that could cause increased risk for colon polyps, stomach, and ovarian cancer. As many of these genes present with overlapping features in a family and accurate cancer risk cannot always be established based upon the pedigree analysis alone, it would be reasonable for Tevin to consider panel genetic testing to analyze multiple genes at once.    We reviewed genetic testing options for Hola based on his personal and family history: a panel of genes associated with gastrointestinal cancers, or larger panel options to include genes associated with multiple different types of cancer. He expressed an interest in more broad testing and opted for a CustomNext-Cancer panel (a combination of the CancerNext panel + the CTNNA1 gene + additional genes associated with an increased risk for GISTs (KIT, PDGFRA, SDHA, SDHB, SDHC, SDHD)).   Genetic testing is available for 36 genes associated with hereditary cancer: CancerNext (APC, JAVI, AXIN2, BARD1, BMPR1A, BRCA1, BRCA2, BRIP1, CDH1, CDK4, CDKN2A, CHEK2, DICER1, EPCAM, GREM1, HOXB13, MLH1, MSH2, MSH3, MSH6, MUTYH, NBN, NF1, NTHL1, PALB2, PMS2, POLD1, POLE, PTEN, RAD51C, RAD51D, RECQL, SMAD4, SMARCA4, STK11, and TP53).  We discussed that many of the genes in the CancerNext panel are associated with specific hereditary cancer syndromes and published management guidelines: Hereditary Breast and Ovarian Cancer  syndrome (BRCA1, BRCA2), Alcantar syndrome (MLH1, MSH2, MSH6, PMS2, EPCAM), Familial Adenomatous Polyposis (APC), Hereditary Diffuse Gastric Cancer (CDH1), Familial Atypical Multiple Mole Melanoma syndrome (CDK4, CDKN2A), Juvenile Polyposis syndrome (BMPR1A, SMAD4), Cowden syndrome (PTEN), Li Fraumeni syndrome (TP53), Peutz-Jeghers syndrome (STK11), MUTYH Associated Polyposis (MUTYH), and Neurofibromatosis type 1 (NF1).   The JAVI, AXIN2, BRIP1, CHEK2, GREM1, MSH3, NBN, NTHL1, PALB2, POLD1, POLE, RAD51C, and RAD51D genes are associated with increased cancer risk and have published management guidelines for certain cancers.    The remaining genes (BARD1, DICER1, HOXB13, RECQL, and SMARCA4) are associated with increased cancer risk and may allow us to make medical recommendations when mutations are identified.    As described above, his testing will also include the CTNNA1 gene (preliminary evidence of association with diffuse gastric cancer) and additional genes associated with GIST tumors (KIT, PDGFRA, SDHA, SDHB, SDHC, SDHD).  Due to COVID-19 precautions consent was obtained over the video today. This is indicated on the consent form, which also includes my signature (as the provider who obtained consent). Genetic testing via a CustomNext-Cancer panel (a combination of the CancerNext panel + the CTNNA1 gene + additional genes associated with an increased risk for GISTs (KIT, PDGFRA, SDHA, SDHB, SDHC, SDHD)) will be sent to [facility]. Tevin opted to have a saliva sample collection kit shipped directly to his home from [facility]. He will ship the kit back to [facility] for analysis. Turnaround time from date when sample is received at the lab: approximately 3-4 weeks.    Medical Management: For Tevin, we reviewed that the information from genetic testing may determine:    additional cancer screening for which Tevin may qualify (i.e. more frequent colonoscopies, more frequent dermatologic exams,  etc.),    and targeted chemotherapies for certain cancer types (i.e. immunotherapy for individuals with Alcantar syndrome, PARP inhibitors, etc.).     These recommendations will be discussed in detail once genetic testing is completed.     Plan:  1) Today Tevin elected to proceed with genetic testing via a CustomNext-Cancer panel (a combination of the CancerNext panel + the CTNNA1 gene + additional genes associated with an increased risk for GISTs (KIT, PDGFRA, SDHA, SDHB, SDHC, SDHD)) offered by [facility].  2) This information should be available in 4-5 weeks.  3) Tevin will be scheduled for a virtual visit (phone or video) to discuss the results.    Time spent over video: 58 minutes    Dee Shcaefer MS, [facility] [place]ensed, Certified Genetic Counselor  Office: [phone]  Email: [email or web address]

## 2021-06-02 NOTE — LETTER
Cancer Risk Management  Program Locations    [facility]  [facility]  [facility]  Mailing Address  Cancer Risk Management Program  [facility]  [address]  [place] [address]    New patient appointments  [phone]  June 4, 2021    Tevin Guidry  [address]  [facility] [address]      Dear Tevin,    It was a pleasure speaking with you over video for genetic counseling on 6/2/21. Here is a copy of the progress note from our discussion. If you have any additional questions, please feel free to call.    Referring Provider: Cecile Bautista MD    Presenting Information:   I spoke with Tevin Guidry over video today for genetic counseling to discuss his personal and family history of cancer. With his permission, this appointment was conducted over video due to COVID-19 precautions. We talked today to review this history, cancer screening recommendations, and available genetic testing options.    Personal History:  Tevin is a 70 year old male. He was recently diagnosed with a cancer of unknown primary site (likely GI) at age 70. 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 This is thought to be compatible with a GI primary cancer, although the site of origin is unclear. He has undergone follow up colonoscopy (4/22/21), upper endoscopy (5/6/21) and PET CT (4/30/21) with no findings suggestive of a primary site.     He also has a history of bladder cancer diagnosed in 2019 at age 69.     He has a history of colon polyps, with the following colonoscopy history:     4/22/21: COLON, DESCENDING, POLYP: Tubular adenoma. Negative for high grade dysplasia or  malignancy    4/13/21 (hemicolectomy): (in addition to the above malignant pathology): Multiple tubular adenomas (at least twenty-one), negative for high-grade dysplasia, distal margin is transected by a sessile tubular adenoma    3/2/21: greater than 50 mm scar/polyp combo (hepatic flexure), multiple polyps (3 to 25 mm in size) were found in the entire colon. Right hemicolectomy suggested. Pathology: A. ASCENDING COLON, POLYP, POLYPECTOMYx7: Tubular and tubulovillous adenomas, fragmented. Negative for high grade dysplasia. B. COLON, HEPATIC FLEXURE POLYP, POLYPECTOMY: Tubular adenoma. Negative for high grade dysplasia C. SIGMOID COLON, POLYP, POLYPECTOMYx3: Tubulovillous and tubular adenomas, fragmented. Negative for high grade dysplasia.     3/30/17: Five medium polyps in the rectum, in the sigmoid colon, in the descending colon and in the ascending colon. Pathology: D. COLON, SIGMOID POLYP x 3, POLYPECTOMY: Tubular adenomas x3. Negative for high-grade dysplasia and invasive malignancy E. COLON, RECTAL POLYP, POLYPECTOMY: Tubulovillous adenoma. Negative for high-grade dysplasia and invasive malignancy     4/28/16: One 2 mm polyp in the cecum, One 3 mm polyp in the ascending colon, three 1 to 3 mm polyps at the hepatic flexure, one 3 mm polyp in the transverse colon, one 3 mm polyp in the descending colon, one 3 mm polyp in the sigmoid colon, two 2 to 4 mm polyps in the rectum. Pathology: A: Large intestine, cecum, polypectomy: Fragment of cauterized colonic mucosa. No evidence of neoplastic or hyperplastic polyp B: Large intestine, ascending colon, polypectomy: Eight fragments of tubular adenoma. No evidence of high-grade dysplasia or invasive malignancy C: Large intestine, hepatic flexure, polypectomy: Three fragments of tubular adenoma. No evidence of high-grade dysplasia or invasive malignancy D: Large intestine, transverse and descending colon, polypectomy: Four fragments of tubular adenoma.No evidence of  high-grade dysplasia or invasive malignant. E: Large intestine, sigmoid colon, polypectomy: Tubular adenoma. No evidence of high-grade dysplasia or invasive malignancy. F: Large intestine, rectum, polypectomy: Three fragments of villous adenoma. No evidence of high-grade dysplasia or invasive malignancy.     9/6/13: One 6 mm polyp in the distal ascending colon, two 3 to 6 mm polyps in the transverse colon, one 4 mm polyp in the sigmoid colon. Pathology: A.  Large intestine at ascending colon, polypectomy - Tubular adenoma without high-grade dysplasia or malignancy. B.  Large intestine at transverse colon, polypectomy - Two tubular adenomas without high-grade dysplasia or malignancy. C.  Sigmoid colon, biopsy - Tubular adenoma without high grade dysplasia or malignancy.    7/20/12: Seven 5 to 10 mm polyps in the transverse colon, Ten 3 mm polyps in the transverse colon and in the ascending colon. Pathology: Large intestine, transverse colon, polypectomy - Three (3) tubular adenomas; no evidence of high grade dysplasia or invasive malignancy.     6/2/11: Six sessile polyps were found in the transverse colon. Pathology: Transverse colon, polypectomy - Multiple tubular adenomas (clinically from 5 polyps).     4/19/10: 15 mm polyp in the mid ascending colon, 8 mm polyp in the hepatic flexure, three 5 mm polyps in the transverse colon, multiple 5 to 12 mm polyps in the descending colon, three 5 to 10 mm polyps in the sigmoid colon. Pathology: A.  Colon, right, polypectomy - Non-neoplastic colonic mucosa with microscopic lymphoid aggregate, no evidence of malignancy. B.  Colon, hepatic flexure, polypectomy - Tubulovillous adenoma. C.  Colon, left, polypectomy - Fragments of villous adenoma with high   grade dysplastic change, no evidence of invasive malignancy    3/24/05: three 4 mm polyps in the transverse colon    Tevin reported current tobacco use (he is working on quitting).     Family History: (Please see scanned  "pedigree for detailed family history information)  Children:    He does not have any children.  Siblings:    His brother was diagnosed with stomach cancer in his early 60s and passed away at age 72. He reports that this was apparently a particularly aggressive cancer and that he was treated with oral chemotherapy. He had not had contact with his brother for 20 years. He states that he was told by a relative that this was a GIST tumor.     He has one other brother who is 69 years old and has no known cancer history. He has no insurance so he has never had a colonoscopy.    Maternal:    His mother was diagnosed with stomach cancer at age 63 and passed away within 4-5 months of her diagnosis. He reports that this was metastatic at diagnosis. Unknown if she ever had a colonoscopy.    His aunt was diagnosed with ovarian cancer that she \"fought for a long time\" before passing away at age 68. She had 5 children, no known cancers. He reports that one of his cousins passed away he thinks due to pulmonary fibrosis or another lung condition.    No known history of cancer in his maternal grandparents.  Paternal:    His father was diagnosed with lung cancer and had a lobe removed. He passed away due to this at age 49. He had a history of smoking 3 packs per day. Unknown if he ever had a colonoscopy.     He is not aware of any diagnoses of cancer in his paternal relatives, although he has limited information about them.      His maternal ethnicity is Vietnamese, [place]in. His paternal ethnicity is Montene[place]in, English. There is no known Ashkenazi Sikh ancestry on either side of his family.     Discussion:    Tevin's personal and family history of cancer is suggestive of a hereditary cancer syndrome.    We reviewed the features of sporadic, familial, and hereditary cancers. 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    We discussed the natural history and genetics of hereditary cancer. We reviewed genes associated with an increased risk for colon polyposis (such as the APC gene), as well as genes associated with gastric cancer (particularly the CDH1 gene, which is associated with signet ring cell carcinomas). A detailed handout regarding genes associated with an increased risk for colon polyps/cancer will be provided to Tevin via [employer] and can be found in the after visit summary. Topics included: inheritance pattern, cancer risks, cancer screening recommendations, and also risks, benefits and limitations of testing.    Based on his personal and family history, Tevin meets current National Comprehensive Cancer Network (NCCN) criteria for genetic testing of adenomatous polyposis genes.      We discussed that there are additional genes that could cause increased risk for colon polyps, stomach, and ovarian cancer. As many of these genes present with overlapping features in a family and accurate cancer risk cannot always be established based upon the pedigree analysis alone, it would be reasonable for Tevin to consider panel genetic testing to analyze multiple genes at once.    We reviewed genetic testing options for Hola based on his personal and family history: a panel of genes associated with gastrointestinal cancers, or larger panel options to include genes associated with multiple different types of cancer. He expressed an interest in more broad testing and opted for a CustomNext-Cancer panel (a combination of the CancerNext panel + the CTNNA1 gene + additional genes associated with an increased risk for GISTs (KIT, PDGFRA, SDHA, SDHB, SDHC, SDHD)).   Genetic testing is available for 36 genes associated with hereditary cancer: CancerNext (APC, JAVI, AXIN2, BARD1, BMPR1A, BRCA1, BRCA2, BRIP1, CDH1, CDK4,  CDKN2A, CHEK2, DICER1, EPCAM, GREM1, HOXB13, MLH1, MSH2, MSH3, MSH6, MUTYH, NBN, NF1, NTHL1, PALB2, PMS2, POLD1, POLE, PTEN, RAD51C, RAD51D, RECQL, SMAD4, SMARCA4, STK11, and TP53).  We discussed that many of the genes in the CancerNext panel are associated with specific hereditary cancer syndromes and published management guidelines: Hereditary Breast and Ovarian Cancer syndrome (BRCA1, BRCA2), Alcantar syndrome (MLH1, MSH2, MSH6, PMS2, EPCAM), Familial Adenomatous Polyposis (APC), Hereditary Diffuse Gastric Cancer (CDH1), Familial Atypical Multiple Mole Melanoma syndrome (CDK4, CDKN2A), Juvenile Polyposis syndrome (BMPR1A, SMAD4), Cowden syndrome (PTEN), Li Fraumeni syndrome (TP53), Peutz-Jeghers syndrome (STK11), MUTYH Associated Polyposis (MUTYH), and Neurofibromatosis type 1 (NF1).   The JAVI, AXIN2, BRIP1, CHEK2, GREM1, MSH3, NBN, NTHL1, PALB2, POLD1, POLE, RAD51C, and RAD51D genes are associated with increased cancer risk and have published management guidelines for certain cancers.    The remaining genes (BARD1, DICER1, HOXB13, RECQL, and SMARCA4) are associated with increased cancer risk and may allow us to make medical recommendations when mutations are identified.    As described above, his testing will also include the CTNNA1 gene (preliminary evidence of association with diffuse gastric cancer) and additional genes associated with GIST tumors (KIT, PDGFRA, SDHA, SDHB, SDHC, SDHD).  Due to COVID-19 precautions consent was obtained over the video today. This is indicated on the consent form, which also includes my signature (as the provider who obtained consent). Genetic testing via a CustomNext-Cancer panel (a combination of the CancerNext panel + the CTNNA1 gene + additional genes associated with an increased risk for GISTs (KIT, PDGFRA, SDHA, SDHB, SDHC, SDHD)) will be sent to [facility]. Tevin opted to have a saliva sample collection kit shipped directly to his home from [facility].  He will ship the kit back to [facility] for analysis. Turnaround time from date when sample is received at the lab: approximately 3-4 weeks.    Medical Management: For Tevin, we reviewed that the information from genetic testing may determine:    additional cancer screening for which Tevin may qualify (i.e. more frequent colonoscopies, more frequent dermatologic exams, etc.),    and targeted chemotherapies for certain cancer types (i.e. immunotherapy for individuals with Alcantar syndrome, PARP inhibitors, etc.).     These recommendations will be discussed in detail once genetic testing is completed.     Plan:  1) Today Tevin elected to proceed with genetic testing via a CustomNext-Cancer panel (a combination of the CancerNext panel + the CTNNA1 gene + additional genes associated with an increased risk for GISTs (KIT, PDGFRA, SDHA, SDHB, SDHC, SDHD)) offered by [employer].  2) This information should be available in 4-5 weeks.  3) Tevin will be scheduled for a virtual visit (phone or video) to discuss the results.  Dee Schaefer MS, [facility]  Licensed, Certified Genetic Counselor  Office: [phone]  Email: [email or web address]

## 2021-06-02 NOTE — PATIENT INSTRUCTIONS
Patient Education     Wrist Sprain  A sprain is an injury to the ligaments or capsule that holds a joint together. There are no broken bones. Most sprains take about 3 to 6 weeks to heal. If it a severe sprain where the ligament is completely torn, it can take months to recover.  Most wrist sprains are treated with a splint, wrist brace, or elastic wrap for support. Severe sprains may require surgery.  Home care    Keep your arm elevated to reduce pain and swelling. This is very important during the first 48 hours.    Apply an ice pack over the injured area for 15 to 20 minutes every 3 to 6 hours. You should do this for the first 24 to 48 hours. You can make an ice pack by filling a plastic bag that seals at the top with ice cubes and then wrapping it with a thin towel. Continue to use ice packs for relief of pain and swelling as needed. As the ice melts, be careful to avoid getting your wrap, splint, or cast wet. After 48 hours, apply heat (warm shower or warm bath) for 15 to 20 minutes several times a day, or alternate ice and heat.     You may use over-the-counter pain medicine to control pain, unless another pain medicine was prescribed. If you have chronic liver or kidney disease or ever had a stomach ulcer or gastrointestinal bleeding, talk with your doctor before using these medicines.    If you were given a splint or brace, wear it for the time advised by your doctor.  Follow-up care  Follow up with your healthcare provider, or as advised. Any X-rays you had today don t show any broken bones, breaks, or fractures. Sometimes fractures don t show up on the first X-ray. Bruises and sprains can sometimes hurt as much as a fracture. These injuries can take time to heal completely. If your symptoms don t improve or they get worse, talk with your doctor. You may need a repeat X-ray. If X-rays were taken, you will be told of any new findings that may affect your care.  When to seek medical advice  Call your  healthcare provider right away if any of these occur:    Pain or swelling increases    Fingers or hand becomes cold, blue, numb, or tingly   [employer] last reviewed this educational content on 5/1/2018 2000-2021 [employer]. All rights reserved. This information is not intended as a substitute for professional medical care. Always follow your healthcare professional's instructions.

## 2021-06-02 NOTE — PROGRESS NOTES
Assessment & Plan     Left wrist pain  Fall, initial encounter  -Sprain vs distal ulna fracture  - XR Wrist Left G/E 3 Views -will contact with results  -Splint provided for comfort    25 minutes spent on the date of the encounter doing chart review, history and exam, documentation and further activities per the note    DO MEGAN Mendieta [facility]    Chace Simental is a 70 year old who presents for the following health issues:    HPI     States playing with brother's dog about 1 month ago. Tripped over rope and fell with his left hand outstretched. Had pain and swelling in his wrist initially. States swelling improved.  Having difficulty with moving wrist up and down. Pain over distal ulna, mild. Taking ibuprofen for pain. Denies numbness, tingling, weakness. Left hand dominant.     Left wrist strain   Onset/Duration: x 1 month   Description  Location: wrist - left  Joint Swelling: YES  Redness: no  Pain: YES  Warmth: no  Intensity:  moderate  Progression of Symptoms:  same  Accompanying signs and symptoms:   Fevers: no  Numbness/tingling/weakness: no  History  Trauma to the area: YES- fall  Recent illness:  no  Previous similar problem: no  Previous evaluation:  no  Precipitating or alleviating factors:  Aggravating factors include: lifting  Therapies tried and outcome: Ibuprofen       Review of Systems   INTEGUMENTARY/SKIN: NEGATIVE for worrisome rashes, moles or lesions  NEURO: NEGATIVE for weakness, dizziness or paresthesias      Objective    BP (!) 165/89 (BP Location: Right arm, Patient Position: Sitting, Cuff Size: Adult Regular)   Pulse 56   Temp 97.8  F (36.6  C) (Tympanic)   Resp 16   Wt 73.9 kg (163 lb)   SpO2 96%   BMI 24.07 kg/m    Body mass index is 24.07 kg/m .  Physical Exam   GENERAL: healthy, alert and no distress  RESP: breathing comfortably, no acute respiratory distress  MS: No swelling over left wrist. No tenderness over distal ulna. Strength and  sensation in left hand intact. Mild discomfort with pronation of left forearm.  SKIN: no suspicious lesions or rashes

## 2021-06-03 ENCOUNTER — TELEPHONE (OUTPATIENT)
Dept: FAMILY MEDICINE | Facility: CLINIC | Age: 71
End: 2021-06-03

## 2021-06-03 NOTE — TELEPHONE ENCOUNTER
----- Message from Isaac Duron DO sent at 6/3/2021 10:37 AM CDT -----  Pls call patient. Xray shows a non-displaced fracture of the radius bone in his left wrist and possibly an old abnormality with the other wrist bone (ulna). Since he still has some pain and a fracture, continue with brace. I will have him follow-up with ortho, referral placed.

## 2021-06-03 NOTE — TELEPHONE ENCOUNTER
Writer called patient, reviewed message per Dr. Duron along with scheduling number for Orthopedics.    Patient verbalized understanding and in agreement with plan.    Patient's call transferred to Orthopedic scheduling.    VIJAYA Cordova, RN  [facility]

## 2021-06-04 NOTE — PATIENT INSTRUCTIONS
Assessing Cancer Risk  Only about 5-10% of cancers are thought to be due to an inherited cancer susceptibility gene.    These families often have:  ? Several people with the same or related types of cancer  ? Cancers diagnosed at a young age (before age 50)  ? Individuals with more than one primary cancer  ? Multiple generations of the family affected with cancer    Comprehensive Colon Cancer Panel  We each inherit two copies of every gene in our bodies: one from our mother, and one from our father.  Each gene has a specific job to do.  When a gene has a mistake or  mutation  in it, it does not work like it should.      This handout will review common hereditary colon cancer syndromes, and other genes related to an increased risk for colon cancer.  The genes that will be discussed in this handout are: APC, BMPR1A, CDH1, CHEK2, EPCAM, GREM1, MLH1, MSH2, MSH6, MUTYH, PMS2, POLD1, POLE, PTEN, SMAD4, STK11, and TP53.  These genes are clinically actionable, meaning there are published guidelines for cancer screening and management for individuals who are found to carry mutations in these genes. Inheriting a mutation does not mean a person will develop cancer, but it does significantly increase his or her risk above the general population risk.      Familial Adenomatous Polyposis (FAP)  FAP is a hereditary cancer syndrome caused by mutations in the APC gene. The condition is known to cause hundreds to thousands of adenomatous polyps in the colon, creating a  carpet  of polyps. Some individuals have what is called attenuated FAP (AFAP), a milder form of FAP with fewer polyps and typically later onset. Individuals with an APC gene mutation are at an increased risk for colon, thyroid, and duodenal cancers, as well as several other types of cancer1.  Other features of this condition may include: osteomas, dental anomalies, benign skin lesions, CHRPE ( freckle  on the inside of the eye), and desmoid tumors.      Lifetime  Cancer Risks     Cancer Type General Population FAP   Colon  5% near 100%   Thyroid (papillary) 1% 1-12%   Duodenal <1% 5%   Liver  <1% 1-2% before age 5   Pancreas <1% 1%   Stomach <1% 1%       Juvenile Polyposis Syndrome (JPS)  JPS is characterized by hamartomatous polyps, called juvenile polyps, in the gastrointestinal tract.  Juvenile  refers to the type of polyps seen in this hereditary cancer condition, not the age of onset. Currently, mutations in two genes are known to cause JPS: BMPR1A and SMAD4. Of individuals clinically diagnosed with JPS, only 40% have an identifiable mutation in one of these genes, suggesting there are other genes that cause JPS that have not been discovered yet. Individuals with JPS are at an increased risk for colon cancer and stomach cancer 2,3,4. Pancreatic and small bowel cancers have also been reported in JPS, but the actual risks are unknown.         Lifetime Cancer Risks    Cancer Type General Population JPS   Colon 5% 40-50%   Gastric/Duodenal <1% 10-21%     Some individuals with SMAD4 mutations have a condition called JPS/HHT (Juvenile Polyposis/Hereditary Hemorrhagic Telangiectasia) where in addition to JPS, individuals may have nose bleeds and clotting issues.     Hereditary Diffuse Gastric Cancer (HDGC)  Currently, mutations in one gene are known to cause Hereditary Diffuse Gastric Cancer: CDH1.  Individuals with HDGC are at increased risk for diffuse gastric cancer and lobular breast cancer. Of people diagnosed with HDGC, 30-50% have a mutation in the CDH1 gene.  This suggests there are likely mutations in other genes that may cause HDGC that have not been identified yet. Individuals with HDGC may also be at increased risk for colon cancer.      Lifetime Cancer Risks    Cancer Type General Population HDGC   Diffuse Gastric <1% 67-83%   Breast 12% 39-52%     Alcantar syndrome  Mutations in five different genes are known to cause Alcantar syndrome: MLH1, MSH2, MSH6, PMS2, and  EPCAM. Individuals with Alcantar syndrome have an increased risk for colon, uterine, ovarian, small bowel, stomach, urinary tract, and brain cancer, as well as several other types of cancer. The exact lifetime cancer risks are dependent upon the gene in which the mutation was identified.      Lifetime Cancer Risks    Cancer Type General Population Alcantar syndrome   Colon 5% 12-52%   Uterine 2-3% Up to 57%   Stomach <1% Up to 16%   Ovarian 2% Up to 38%   Urinary tract <1% 1-7%   Hepatobiliary tract <1% 1-4%   Small bowel <1% Up to 11%   Brain/CNS <1% Not well established   Pancreas <1% Up to 6%       MUTYH-Associated Polyposis (MAP)  MAP is a hereditary cancer syndrome caused by mutations in the MUTYH gene. Unlike the other hereditary cancer genes discussed in this handout, two mutations in the MUTYH gene cause MAP and increase cancer risk. Those affected with MAP typically have between  adenomatous polyps. This syndrome also increases the risk for colon and duodenal cancer. Current research suggests that other cancers may be associated with MUTYH mutations, as well. The table below includes the risk that someone with two MUTYH gene mutations would develop cancer in their lifetime; of note, there is also an increased colon cancer risk for individuals who carry only one MUTYH gene mutation5,6,7.       Lifetime Cancer Risks    Cancer Type General Population MAP   Colon 5% %   Duodenal  <1% 5%       Cowden syndrome  Cowden syndrome is a hereditary condition that increases the risk for breast, thyroid, endometrial, colon, and kidney cancer.  A single mutation in the PTEN gene causes Cowden syndrome and increases cancer risk.  The table below shows the chance that someone with a PTEN mutation would develop cancer in their lifetime8,9.  Other benign features seen in some individuals with Cowden syndrome include benign skin lesions (facial papules, keratoses, lipomas), learning disabilities, autism, thyroid nodules,  hamartomatous colon polyps, and larger head size.      Lifetime Cancer Risks   Cancer Type General Population Cowden Syndrome   Breast 12% 25-50%*   Thyroid 1% 35%   Renal 1-2% 35%   Endometrial 2-3% 28%   Colon 5% 9%   Melanoma 2-3% >5%     *One recent study found breast cancer risk to be increased to 85%    Peutz-Jeghers syndrome (PJS)  PJS is a hereditary cancer syndrome caused by mutations in the STK11 gene. This condition can be distinguished from other hereditary syndromes by the presence of hamartomatous polyps in the gastrointestinal tract and freckles present in unusual places such as the hands, feet, neck, and lips. Individuals with Peutz-Jeghers syndrome have an increased risk for colon, breast, pancreatic, and other cancers3.  Men are at risk for testicular tumors which can affect hormones in the body. Women are at risk for sex cord tumors of the ovaries and a rare aggressive type of cervical cancer.     Lifetime Cancer Risks    Cancer Type General Population PJS   Breast 12% 45-50%   Colon 5% 39%   Stomach <1% 29%   Pancreas 1.5% 11-36%   Small Intestine <1% 13%     Ovarian  2%  18%   Lung 6-7% 15-17%     Additional Genes Associated with Increased Colon Cancer Risk  CHEK2  CHEK2 is a moderate-risk breast cancer gene.  Women who have a mutation in CHEK2 have around a 2-fold increased risk for breast cancer compared to the general population, and this risk may be higher depending upon family history.12,13,14.  Mutations in CHEK2 have also been shown to increase the risk of a number of other cancers, including colon and prostate, however these cancer risks are currently not well understood.     GREM1  GREM1 is a moderate-risk colon polyposis gene. Duplications of this gene are more commonly found in individuals with Ashkenazi Bahai [id number]. Mutations in GREM1 are associated with colon polyps and therefore an increased risk of colon cancer; however the estimated cancer risk is not well [id number].      POLD1 and POLE  POLD1 and POLE are moderate-risk colon cancer genes. Carriers of a mutation in one of these genes increases the lifetime risk of colorectal iaeczy19,18,19,20. Mutations in these genes may also be associated with increased risk for other cancers including: endometrial cancer, duodenal adenomas and carcinomas, and brain tumors.    TP53  Li Fraumeni syndrome (LFS) is a hereditary cancer predisposition syndrome. LFS is caused by a mutation in the TP53 gene. A single mutation in one of the copies of TP53 increases the risk for multiple cancers. Individuals with LFS are at an increased risk for developing cancer at a young age. The general lifetime risk for development of cancer is 50% by age 30 and 90% by age 60.      Core Cancers: Sarcomas, Breast, Brain, Lung, Leukemias/Lymphomas, Adrenocortical carcinomas  Other Cancers: Gastrointestinal, Thyroid, Skin, Genitourinary    Genetic Testing  Genetic testing involves a simple blood test and will look at the genetic information in genes associated with an increased risk of colon cancer. The tests look for any harmful mutations that are associated with increased cancer risk.  If possible, it is recommended that the person(s) who has had cancer be tested before other family members.  That person will give us the most useful information about whether or not a specific gene mutation is associated with the cancer in the family.     Results  There are three possible results from genetic testing:  ? Positive--a harmful mutation was identified  ? Negative--no mutation was identified  ? Variant of unknown significance--a variation in one of the genes was identified, but it is unclear how this impacts cancer risk in the family  Advantages and Disadvantages  There are advantages and disadvantages to genetic testing of these genes.    Advantages  ? May clarify your cancer risk  ? Can help you make medical decisions  ? May explain the cancers in your family  ? May  give useful information to your family members (if you share your results)    Disadvantages  ? Possible negative emotional impact of learning about inherited cancer risk  ? Uncertainty in interpreting a negative test result in some situations  ? Possible genetic discrimination concerns (see below)    Inheritance   Most mutations in the genes outlined above are inherited in an autosomal dominant pattern.  This means that if a parent has a mutation, each of his or her children will have a 50% chance of inheriting that same mutation.  Therefore, each child--male or female--would have a 50% chance of being at increased risk for developing cancer.                                            Image obtained from [employer], 2013     In the case of MUTYH-Associated Polyposis (MAP) this hereditary cancer syndrome is inherited in an autosomal recessive pattern. This means that each parent of an individual with MAP is a carrier of MAP, meaning that they have only one mutation in MUTYH. They still have one functioning copy of their gene.  Carriers are at a slightly higher risk for colon cancer than the general population. If each parent is a carrier for MAP, they have a 25% of having a child who is affected with MAP, meaning the child inherited both gene mutations - one from each parent.       Image obtained from [employer], 2016    Genetic Information Nondiscrimination Act (ELE)  ELE is a federal law that protects individuals from health insurance or employment discrimination based on a genetic test result alone.  Although rare, there are currently no legal protections in terms of life insurance, long term care, or disability insurances.  Visit the National Human Genome Research [place] at Genome.gov/76485874 to learn more.    Reducing Cancer Risk  Each of the genes listed within this handout have nationally recognized cancer screening guidelines that would be recommended for individuals who test  positive.  In addition to increased cancer screening, surgeries may be offered or recommended to reduce cancer risk in certain cases.  Recommendations are based upon an individual s genetic test result as well as their personal and family history of cancer.    Questions to Think About Regarding Genetic Testing  ? What effect will the test result have on me and my relationship with my family members if I have an inherited gene mutation?  If I don t have a gene mutation?  ? Should I share my test results, and how will my family react to this news, which may also affect them?  ? Are my children ready to learn new information that may one day affect their own health?    Resources    PTEN World [employer].Express Fit   No Stomach for Cancer, Inc. nostomachforcancer.org   Stomach Cancer Relief Network scrnet.org   Collaborative Group of the Americas on Inherited Colorectal Cancer (CGA) cgaicc.com   Cancer Care cancercare.org   American Cancer Society (ACS) cancer.org   National Cancer [place] (NCI) cancer.org   Alcantar Syndrome International lynchcancers.[email or web address]       Please call us if you have any questions or concerns.     Cancer Risk Management Program [phone]-CANCER ([phone])  ? Wero Sommers, MS, [facility] [phone]  ? Yana Christie, MS, [facility]  [phone]  ? Addie Breen, MS, [facility]  [phone]  ? Balbina Cabrera, MS, [facility] [phone]  ? Fatmata Valera, MS, [facility] [phone]  ? Dee Schaefer, MS, [facility]  [phone]    References    1. Selvin KIMBROUGH, Mary J, Benson G, Ayaka E, Paula J, et al. The Prevalence of thyroid cancer and benign thyroid disease in patients with familial adenomatous polyposis may be higher than previously recognized. Clin Colorectal Cancer. 2012;11:304-308.  2. Carlos L, Godwin Santiago A, Arlyn L, Blake C, Rebekah K, et al. Risk of colorectal cancer in juvenile polyposis. Gut. 2007;56:965-967.  3. Jose FG, Madeline MN, Hernandez CA. Colorectal cancer risk in hamartomatous polyposis syndromes.  World Journal of Gastrointestinal Surgery. 2015;27:25-32  4. Jeff PRIETO. Guidance on gastrointestinal surveillance for hereditary non-polyposis colorectal cancer, familial adenomatous polypolis, juvenile polyposis, and Peutz-Jeghers syndrome. Gut. 2002;51:21-27.  5. Heri AK, Sacha IVY, Patience JG et al. Risk of extracolonic cancers for people with biallelic and monoallelic mutations in MUTYH. Int J of Cancer. 2016;139:4639-3512.  6. Chad S, Michael S, Meredith H, Caprice K, Stone M, et al. MUTYH-associated polyposis: 70 of 71 patients with biallelic mutations present with an attenuated or atypical phenotype. Int J of Cancer. 2006;119:807-814.  7. Iker G, Truong F, Karan I, Louann M, Iker H, et al. MUTYH mutation carriers have increased breast cancer risk. Cancer. 2012;0022-1944.  8. Nehemias MH, Hari J, Stephanie J, Luna LA, Nicolasa MS, Eng C. Lifetime cancer risks in individuals with germline PTEN mutations. Clin Cancer Res. 2012;18:400-7.  9. Pilnimaki R. Cowden Syndrome: A Critical Review of the Clinical Literature. J Salome . 2009:18:13-27.  10. National Comprehensive Cancer Network. Clinical practice guidelines in oncology, colorectal cancer screening. Available online (registration required). 2013.  11. National Cancer [place]. SEER Cancer Stat Fact Sheets.  December 2013.  12. CHEK2 Breast Cancer Case-Control Consortium. CHEK2*1100delC and susceptibility to breast cancer: A collaborative analysis involving 10,860 breast cancer cases and 9,065 controls from 10 studies. Am J Hum Salome, 74 (2004), pp. 2265-1268  13. Evelyn T, Mehnaz S, Jareth K, et al. Spectrum of Mutations in BRCA1, BRCA2, CHEK2, and TP53 in Families at High Risk of Breast Cancer. KENIA. 2006;295(12):8124-4368.  14. Karina C, Shannon D, Derick A, et al. Risk of breast cancer in women with a CHEK2 mutation with and without a family history of breast cancer. J Clin Oncol. 2011;29:5759-4654.  15. Alvin KEVIN, Alan E, Behzad J, Bobby BURTON,  Yusuf GUZMAN et al. Defining the polyposis/colorectal cancer phenotype associated with the GREM1 duplication: counseling and management guidelines. Salome .Res. 2016;98:1-5.  16. Rc KIMBROUGH, Vivien S, Neto DESIR, Neena Lilly, et al. Hereditary mixed polyposis syndrome is caused by a 40kb upstream duplication that leads to increased and ectopic expression of the BMP antagonist GREM1. Annette Salome. 2015;44:699-703.  17. MALICK Alvarez. et al. Germline mutations affecting the proofreading domains of POLE and POLD1 predispose to colorectal adenomas and carcinomas. Annette. Salome. 45, 136-44 (2013).  18. SHANELL Cho. et al. POLE and POLD1 mutations in 529 gabriele with familial colorectal cancer and/or polyposis: review of reported cases and recommendations for genetic testing and surveillance. Salome. Med. (2015). doi:10.1038/gim.2015.75  19. LUIGI Dolan et al. New insights into POLE and POLD1 germline mutations in familial colorectal cancer and polyposis. Hum. Mol. Salome. 23, 2669-12 (2014).  20. MARISSA Melo. et al. Frequency and phenotypic spectrum of germline mutations in POLE and seven other polymerase genes in 266 patients with colorectal adenomas and carcinomas. Int. J. Cancer 137, 320-31 (2015).

## 2021-06-07 DIAGNOSIS — C78.5: ICD-10-CM

## 2021-06-07 DIAGNOSIS — C80.1 SIGNET RING CELL ADENOCARCINOMA (H): Primary | ICD-10-CM

## 2021-06-07 NOTE — TELEPHONE ENCOUNTER
DIAGNOSIS: Left wrist pain/Other closed extra-articular fracture of distal end of left radius/XR/Isaac Duron DO/[facility]   APPOINTMENT DATE: 6/8/21   NOTES STATUS DETAILS   OFFICE NOTE from referring provider Internal Isaac Duron DO in  FAMILY PRAC/IMPEDS   OFFICE NOTE from other specialist N/A    DISCHARGE SUMMARY from hospital N/A    DISCHARGE REPORT from the ER N/A    OPERATIVE REPORT N/A    EMG report N/A    MEDICATION LIST Internal    MRI N/A    DEXA (osteoporosis/bone health) N/A    CT SCAN N/A    XRAYS (IMAGES & REPORTS) Internal  left wrist 6/2/21

## 2021-06-08 ENCOUNTER — ANCILLARY PROCEDURE (OUTPATIENT)
Dept: GENERAL RADIOLOGY | Facility: CLINIC | Age: 71
End: 2021-06-08
Attending: FAMILY MEDICINE
Payer: COMMERCIAL

## 2021-06-08 ENCOUNTER — OFFICE VISIT (OUTPATIENT)
Dept: ORTHOPEDICS | Facility: CLINIC | Age: 71
End: 2021-06-08
Attending: FAMILY MEDICINE
Payer: COMMERCIAL

## 2021-06-08 ENCOUNTER — PRE VISIT (OUTPATIENT)
Dept: ORTHOPEDICS | Facility: CLINIC | Age: 71
End: 2021-06-08

## 2021-06-08 DIAGNOSIS — M25.532 LEFT WRIST PAIN: Primary | ICD-10-CM

## 2021-06-08 DIAGNOSIS — S52.552A OTHER CLOSED EXTRA-ARTICULAR FRACTURE OF DISTAL END OF LEFT RADIUS, INITIAL ENCOUNTER: ICD-10-CM

## 2021-06-08 DIAGNOSIS — M25.532 LEFT WRIST PAIN: ICD-10-CM

## 2021-06-08 PROCEDURE — 99204 OFFICE O/P NEW MOD 45 MIN: CPT | Performed by: FAMILY MEDICINE

## 2021-06-08 PROCEDURE — 73110 X-RAY EXAM OF WRIST: CPT | Mod: LT | Performed by: RADIOLOGY

## 2021-06-08 ASSESSMENT — PATIENT HEALTH QUESTIONNAIRE - PHQ9: SUM OF ALL RESPONSES TO PHQ QUESTIONS 1-9: 0

## 2021-06-08 NOTE — LETTER
6/8/2021      RE: Tevin Guidry  [address]  [facility] [address]         [facility]  SPORTS & ORTHOPEDIC CLINIC VISIT     Jun 8, 2021        SUBJECTIVE  Tevin Guidry is a/an 70 year old male who is seen in consultation at the request of  Isaac Duron D.O. for evaluation of left wrist pain.     Hola suffered a fall about 5 weeks ago, he was playing with his family's dog and was taken out by the dog at the knees as the dog was chasing an object.  He fell, breaking the fall with his left wrist.  This is his dominant hand.  He noticed pain and swelling that was ongoing, he was seen by his family doctor last week and diagnosed with a fracture.  He was given a thumb spica brace.  This is helping somewhat.    Onset: 5 week(s) ago. Patient describes injury as playing with his dog and the leash got caught pulled him to the ground.   Location of Pain: left radius   Worsened by: pronation and supination  Better with: Brace   Treatments tried: casting/splinting/bracing  Associated symptoms: no distal numbness or tingling; denies swelling or warmth    Orthopedic/Surgical history: NO  Social History/Occupation: House painting and lawn work       REVIEW OF SYSTEMS:    Do you have fever, chills, weight loss? No    Do you have any vision problems? No    Do you have any chest pain or edema? No    Do you have any shortness of breath or wheezing?  No    Do you have stomach problems? No    Do you have any numbness or focal weakness? No    Do you have diabetes? No    Do you have problems with bleeding or clotting? No    Do you have an rashes or other skin lesions? No    OBJECTIVE:  General  - normal appearance, in no obvious distress  HEENT  - conjunctivae not injected, moist mucous membranes  CV  - normal radial pulse  Pulm  - normal respiratory pattern, non-labored  Musculoskeletal - left wrist  - inspection: Swelling throughout radiocarpal joint  - palpation: Tender ulnar recess, dorsal RC  joint  - ROM: Globally restricted  - strength: 5/5  strength  Neuro  - no sensory or motor deficit, grossly normal coordination, normal muscle tone  Skin  - no ecchymosis, erythema, warmth, or induration, no obvious rash  Psych  - interactive, appropriate, normal mood and affect          ASSESSMENT & PLAN    Mr. Guidry is a 70-year-old gentleman seen today for a left wrist fracture.    I ordered and reviewed an x-ray of his left wrist, this does reveal a nondisplaced fracture of the distal radius that is mildly impacted.  This is somewhat healed compared to his fracture that was taken 6 days ago.    At this point I am happy that his fracture has not displaced.  I do think he should continue to wear his brace for at least the next week, possibly longer, if still painful.  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    If him does well we can follow-up as needed for this and other issues.    Reviewed imaging and assessment with patient in detail      DO MEGAN Vanessa [facility] [place]

## 2021-06-08 NOTE — PROGRESS NOTES
[facility]  SPORTS & ORTHOPEDIC CLINIC VISIT     Jun 8, 2021        SUBJECTIVE  Tevin Guidry is a/an 70 year old male who is seen in consultation at the request of  Isaac Duron D.O. for evaluation of left wrist pain.     Hola suffered a fall about 5 weeks ago, he was playing with his family's dog and was taken out by the dog at the knees as the dog was chasing an object.  He fell, breaking the fall with his left wrist.  This is his dominant hand.  He noticed pain and swelling that was ongoing, he was seen by his family doctor last week and diagnosed with a fracture.  He was given a thumb spica brace.  This is helping somewhat.    Onset: 5 week(s) ago. Patient describes injury as playing with his dog and the leash got caught pulled him to the ground.   Location of Pain: left radius   Worsened by: pronation and supination  Better with: Brace   Treatments tried: casting/splinting/bracing  Associated symptoms: no distal numbness or tingling; denies swelling or warmth    Orthopedic/Surgical history: NO  Social History/Occupation: House painting and lawn work       REVIEW OF SYSTEMS:    Do you have fever, chills, weight loss? No    Do you have any vision problems? No    Do you have any chest pain or edema? No    Do you have any shortness of breath or wheezing?  No    Do you have stomach problems? No    Do you have any numbness or focal weakness? No    Do you have diabetes? No    Do you have problems with bleeding or clotting? No    Do you have an rashes or other skin lesions? No    OBJECTIVE:  General  - normal appearance, in no obvious distress  HEENT  - conjunctivae not injected, moist mucous membranes  CV  - normal radial pulse  Pulm  - normal respiratory pattern, non-labored  Musculoskeletal - left wrist  - inspection: Swelling throughout radiocarpal joint  - palpation: Tender ulnar recess, dorsal RC joint  - ROM: Globally restricted  - strength: 5/5  strength  Neuro  - no sensory or  motor deficit, grossly normal coordination, normal muscle tone  Skin  - no ecchymosis, erythema, warmth, or induration, no obvious rash  Psych  - interactive, appropriate, normal mood and affect          ASSESSMENT & PLAN    Mr. Guidry is a 70-year-old gentleman seen today for a left wrist fracture.    I ordered and reviewed an x-ray of his left wrist, this does reveal a nondisplaced fracture of the distal radius that is mildly impacted.  This is somewhat healed compared to his fracture that was taken 6 days ago.    At this point I am happy that his fracture has not displaced.  I do think he should continue to wear his brace for at least the next week, possibly longer, if still painful.  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    If him does well we can follow-up as needed for this and other issues.    Reviewed imaging and assessment with patient in detail      Valentino Fonseca DO  [facility] [place]

## 2021-06-14 NOTE — PROGRESS NOTES
Hand Therapy Initial Evaluation  Current Date:  6/22/2021    Diagnosis: Left Distal Radius Fracture  DOI: 05/2021, MD order 6/8/21  Post: 6 weeks    Precautions: N/A    Subjective:  Tevin Guidry is a 70 year old male.    Patient reports symptoms of the left wrist which occurred due to sustaining a DRF after tripping while playing with the dog. Since onset symptoms are gradually improving  General health as reported by patient is fair.  Pertinent medical history includes:Anemia, Cancer, Heart Problems, High Blood Pressure, Smoking  Medical allergies:Codeine.  Surgical history: cancer: bladder, other: CAD surgery 2014.  Medication history: Anti-depressants, Cardiac, High Blood Pressure.    Current occupation is Retired, part time painting and yardwork  Job Tasks: Driving, Lifting, Carrying    Occupational Profile Information:  Left hand dominant  Prior functional level:  no limitations  Patient reports symptoms of pain, stiffness/loss of motion, weakness/loss of strength and edema  Special tests:  x-ray.    Previous treatment: OTC wrist splint  Barriers include:none  Mobility: No difficulty  Transportation: drives  Leisure activities/hobbies: gambling  Other: Babysits brother's dog daily    Functional Outcome Measure:   Upper Extremity Functional Index Score:  SCORE:   Column Totals: /80: 75   (A lower score indicates greater disability.)    Objective:    Pain Level (Scale 0-10):   6/22/2021   At Rest 0   With Use 2-4     Pain Description:  Date 6/22/2021   Location Left dorsal and ulnar wrist   Pain Quality Dull, sore, discomfort   Frequency intermittent   Pain is worst daytime   Exacerbated by Opening car doors,    Relieved by rest   Progression improving     ROM  Pain Report: - none  + mild    ++ moderate    +++ severe   Wrist 6/22/2021 6/22/2021   AROM (PROM) Right Left   Extension 66 50 pre  60 post   Flexion 80 40 pre  45 post   RD 20 15   UD 36 30   Supination 70 70+   Pronation 85 80+     ROM - Fingers and  thumb  WNL bilaterally      Strength:  Deferred for now    Edema (Circumference measured in cm)   6/22/2021 6/22/2021    Right Left   DWC 17.5 19.5   Swelling noted over dorsal hand and wrist    Scar/Wound:  N/A    Sensation: WNL throughout all nerve distributions; per patient report    Assessment:  Patient presents with symptoms consistent with diagnosis as listed above with conservative intervention.     Patient's limitations or Problem List includes:  Pain, Decreased ROM/motion, Increased edema, Weakness, Decreased  and Decreased pinch of the left wrist which interferes with the patient's ability to perform Self Care Tasks (dressing, eating, bathing, hygiene/toileting), Recreational Activities and Household Chores as compared to previous level of function.    Rehab Potential:  Excellent - Return to full activity, no limitations    Patient will benefit from skilled Occupational Therapy to increase ROM,  strength, stability of wrist, coordination and dexterity and decrease pain and edema to return to previous activity level and resume normal daily tasks and to reach their rehab potential.    Barriers to Learning:  No barrier    Communication Issues:  Patient appears to be able to clearly communicate and understand verbal and written communication and follow directions correctly.    Chart Review: Chart Review and Detailed history review with patient    Identified Performance Deficits: home establishment and management, meal preparation and cleanup, work and leisure activities    Assessment of Occupational Performance:  3-5 Performance Deficits    Clinical Decision Making (Complexity): Low complexity    Treatment Explanation:  The following has been discussed with the patient:  RX ordered/plan of care  Anticipated outcomes  Possible risks and side effects    Plan:  Frequency:  1 X week, once daily  Duration:  for 6 weeks    Treatment Plan:  Modalities:  Paraffin  Therapeutic Exercise:  AROM, AAROM, PROM,  Isotonics and Isometrics  Manual Techniques:  Joint mobilization and Myofascial release  Self Care:  Self Care Tasks    Home Program:  Heat/ice  OTC wrist cock up - protection and sleeping  Compression sleeve for night  AROM wrist - flexion/extension and circumduction  AROM forearm - pronation/supination    Next visit:   Paraffin  A/PROM  Add gentle PROM for HEP  Future visits: strengthening    Discharge Plan:  Achieve all LTG.  Independent in home treatment program.  Reach maximal therapeutic benefit.    Please see daily flow sheet for treatment and 1:1 time provided today.

## 2021-06-22 ENCOUNTER — THERAPY VISIT (OUTPATIENT)
Dept: OCCUPATIONAL THERAPY | Facility: CLINIC | Age: 71
End: 2021-06-22
Attending: FAMILY MEDICINE
Payer: COMMERCIAL

## 2021-06-22 DIAGNOSIS — S52.552A OTHER CLOSED EXTRA-ARTICULAR FRACTURE OF DISTAL END OF LEFT RADIUS, INITIAL ENCOUNTER: ICD-10-CM

## 2021-06-22 DIAGNOSIS — M25.532 LEFT WRIST PAIN: ICD-10-CM

## 2021-06-22 PROCEDURE — 97018 PARAFFIN BATH THERAPY: CPT | Mod: GO | Performed by: OCCUPATIONAL THERAPIST

## 2021-06-22 PROCEDURE — 97165 OT EVAL LOW COMPLEX 30 MIN: CPT | Mod: GO | Performed by: OCCUPATIONAL THERAPIST

## 2021-06-22 PROCEDURE — 97110 THERAPEUTIC EXERCISES: CPT | Mod: GO | Performed by: OCCUPATIONAL THERAPIST

## 2021-06-23 ENCOUNTER — OFFICE VISIT (OUTPATIENT)
Dept: OPHTHALMOLOGY | Facility: CLINIC | Age: 71
End: 2021-06-23
Attending: OPHTHALMOLOGY
Payer: COMMERCIAL

## 2021-06-23 DIAGNOSIS — H40.003 GLAUCOMA SUSPECT, BOTH EYES: Primary | ICD-10-CM

## 2021-06-23 DIAGNOSIS — H25.12 AGE-RELATED NUCLEAR CATARACT, LEFT: ICD-10-CM

## 2021-06-23 DIAGNOSIS — H52.4 PRESBYOPIA: ICD-10-CM

## 2021-06-23 DIAGNOSIS — Z96.1 PSEUDOPHAKIA, RIGHT EYE: ICD-10-CM

## 2021-06-23 PROCEDURE — G0463 HOSPITAL OUTPT CLINIC VISIT: HCPCS

## 2021-06-23 PROCEDURE — 92014 COMPRE OPH EXAM EST PT 1/>: CPT | Mod: GC | Performed by: OPHTHALMOLOGY

## 2021-06-23 ASSESSMENT — CUP TO DISC RATIO
OD_RATIO: 0.8
OS_RATIO: 0.7

## 2021-06-23 ASSESSMENT — VISUAL ACUITY
OS_SC: 20/80
METHOD: SNELLEN - LINEAR
OD_SC: 20/20
METHOD_MR: DIAGNOSTIC
OS_PH_SC: 20/50
OS_PH_SC+: -2

## 2021-06-23 ASSESSMENT — REFRACTION_MANIFEST
OS_ADD: +2.25
OD_SPHERE: PLANO
OD_CYLINDER: SPHERE
OS_AXIS: 025
OS_SPHERE: -2.00
OS_CYLINDER: +0.50
OD_ADD: +2.25

## 2021-06-23 ASSESSMENT — TONOMETRY
OD_IOP_MMHG: 10
OS_IOP_MMHG: 14
IOP_METHOD: APPLANATION

## 2021-06-23 ASSESSMENT — EXTERNAL EXAM - LEFT EYE: OS_EXAM: NORMAL

## 2021-06-23 ASSESSMENT — EXTERNAL EXAM - RIGHT EYE: OD_EXAM: NORMAL

## 2021-06-23 ASSESSMENT — CONF VISUAL FIELD
OD_NORMAL: 1
METHOD: COUNTING FINGERS
OS_NORMAL: 1

## 2021-06-23 ASSESSMENT — SLIT LAMP EXAM - LIDS
COMMENTS: NORMAL
COMMENTS: NORMAL
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
## 2021-06-23 NOTE — NURSING NOTE
Chief Complaints and History of Present Illnesses   Patient presents with     Follow Up     Chief Complaint(s) and History of Present Illness(es)     Follow Up     Laterality: right eye    Severity: mild    Course: stable    Associated symptoms: Negative for eye pain, floaters and flashes    Pain scale: 0/10              Comments     Hola is here to follow up on Pseudophakia, right eye. He says vision RE seems pretty good. He is also having some testing today as well.     Marvin Zuniga COT 10:37 AM June 23, 2021

## 2021-06-28 NOTE — PROGRESS NOTES
SOAP Note Objective Information for 6/29/2021    Diagnosis: Left Distal Radius Fracture  DOI: 05/2021, MD order 6/8/21  Post: 7 weeks    Pain Level (Scale 0-10):   6/22/2021 6/29/21   At Rest 0 0   With Use 2-4 2-4     Pain Description:  Date 6/22/2021   Location Left dorsal and ulnar wrist   Pain Quality Dull, sore, discomfort   Frequency intermittent   Pain is worst daytime   Exacerbated by Opening car doors,    Relieved by rest   Progression improving     ROM  Pain Report: - none  + mild    ++ moderate    +++ severe   Wrist 6/22/2021 6/22/2021 6/29/21   AROM (PROM) Right Left Left   Extension 66 50 pre  60 post 53 pre  62 post   Flexion 80 40 pre  45 post 46 pre  49 post   RD 20 15 NT   UD 36 30 NT   Supination 70 70+ 75   Pronation 85 80+ 80     Home Program:  Heat/ice  OTC wrist cock up - protection and sleeping  Compression sleeve for night  AROM wrist - flexion/extension and circumduction  AROM forearm - pronation/supination    Next visit:   Paraffin  A/PROM  Add gentle PROM for HEP   Future visits: strengthening    Please see daily flow sheet for treatment and 1:1 time provided today.

## 2021-06-29 ENCOUNTER — THERAPY VISIT (OUTPATIENT)
Dept: OCCUPATIONAL THERAPY | Facility: CLINIC | Age: 71
End: 2021-06-29
Attending: FAMILY MEDICINE
Payer: COMMERCIAL

## 2021-06-29 DIAGNOSIS — M25.532 LEFT WRIST PAIN: ICD-10-CM

## 2021-06-29 DIAGNOSIS — S52.552A OTHER CLOSED EXTRA-ARTICULAR FRACTURE OF DISTAL END OF LEFT RADIUS, INITIAL ENCOUNTER: ICD-10-CM

## 2021-06-29 PROCEDURE — 97110 THERAPEUTIC EXERCISES: CPT | Mod: GO | Performed by: OCCUPATIONAL THERAPIST

## 2021-06-29 PROCEDURE — 97140 MANUAL THERAPY 1/> REGIONS: CPT | Mod: GO | Performed by: OCCUPATIONAL THERAPIST

## 2021-06-29 PROCEDURE — 97018 PARAFFIN BATH THERAPY: CPT | Mod: GO | Performed by: OCCUPATIONAL THERAPIST

## 2021-07-13 NOTE — PROGRESS NOTES
SOAP Note Objective Information for 7/19/2021    Diagnosis: Left Distal Radius Fracture  DOI: 05/2021, MD order 6/8/21  Post: 8 weeks    Pain Level (Scale 0-10):   6/22/2021 6/29/21 7/19/21   At Rest 0 0 0   With Use 2-4 2-4 2-4     Pain Description:  Date 6/22/2021   Location Left dorsal and ulnar wrist   Pain Quality Dull, sore, discomfort   Frequency intermittent   Pain is worst daytime   Exacerbated by Opening car doors,    Relieved by rest   Progression improving     ROM  Pain Report: - none  + mild    ++ moderate    +++ severe   Wrist 6/22/2021 6/22/2021 6/29/21 7/19/21   AROM (PROM) Right Left Left Left   Extension 66 50 pre  60 post 53 pre  62 post   56 post   Flexion 80 40 pre  45 post 46 pre  49 post   50 post   RD 20 15 NT    UD 36 30 NT    Supination 70 70+ 75    Pronation 85 80+ 80      Strength   (Measured in pounds)  Pain Report: - none  + mild    ++ moderate    +++ severe    7/19/2021 7/19/2021   Trials Right Left   1  2  3 55 27-   Average 55 27     Home Program:  Heat/ice  OTC wrist cock up - protection and sleeping  Compression sleeve for night  PROM wrist flexion/extension  Strengthening 3 x week:   with tennis ball  Wrist isometric flexion/extension    Next visit:   PN due  Paraffin  A/PROM  STM/joint mobs  Progress ROM and Strengthening as indicated    Please see daily flow sheet for treatment and 1:1 time provided today.

## 2021-07-15 NOTE — PROGRESS NOTES
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  The site of origin is unclear, so at the current time, it can be considered carcinoma of unknown primary, very likely of gastrointestinal origin, but the primary site is not known.    COVID vaccination status: completed Spring (~April) 2021    Oncology History:   He had a colonoscopy 03/30/2017 and another one 03/02/2021 that was delayed due to the COVID pandemic and other healthcare issues.  This was performed by Dr. Marin Steward on 03/02/2021 and the pathology was notable for tubular and tubulovillous adenomas.  Due to the extent and burden of the number of polyps, he was referred to Dr. Rodríguez for consideration of hemicolectomy.  A number of polyps in the distal transverse colon were tattooed.    Dr. Rodríguez took the patient to the operating room for right hemicolectomy 04/13/2021 due to this high polyp burden.    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        Tumor is located at the distal resection margin and situated within    the lamina propria/submucosa (distal   margin positive, proximal and deep margins are free of malignancy)        An overlying tubular adenoma is present at this distal resection   margin, does not appear to be the source   of the underlying malignancy        Tumor origin is compatible with gastrointestinal site as it marks   positively with CK 20, pan cytokeratin   and CDX2 (negative for CK-7), see comment   - Multiple tubular adenomas (at least twenty-one), negative for high-grade    dysplasia, distal margin is   transected by a sessile tubular adenoma   - Benign appendix with focal surface serrated change, negative for   malignancy (submitted in its entirety)   -  Unremarkable terminal ileum   - Sixteen benign lymph nodes (0/16)     COMMENT:   The origin of the adenocarcinoma is uncertain based on the current   resection however the immunoperoxidase   battery supports gastrointestinal origin.  Suggest clinical and   radiographic correlation to investigate   possible primary sites (including stomach, pancreaticobiliary and   remainder of the colon).  The appendix was   submitted in its entirety and does not appear to be the source of the   neoplasm.  Intradepartmental   consultation obtained, the case was discussed with Dr. Rodríguez on   4/19/21.     April 20, 2021--CT chest IMPRESSION:   In this patient with recently diagnosed colon cancer:  1. No evidence of metastatic disease in the chest.  2. Subcutaneous emphysema along the left lower chest/upper abdominal  wall may be secondary to transversus abdominis block done on  4/13/2021. No free intraperitoneal air or pneumothorax.  3. Coronary artery calcifications.  4. Sequelae of prior granulomatous disease.     [Access Center: Subcutaneous emphysema of the left lower chest/upper  abdominal wall, likely from transversus abdominis than on 4/13/2021.  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 FDG at the colonic surgical anastomosis presumably postsurgical  inflammation.   2. No definite FDG uptake to suggest metastatic disease.   3. No definite primary significant ring carcinoma is identified.   There are 3 equivocal findings:   3a. Mild uptake in the gastric cardia, favor physiologic  3b. Loop of slightly narrowed thickened small bowel with FDG uptake,  favor physiologic or postoperative inflammation but cannot  rule out a  primary.   3c. Extensive FDG uptake in the entire sigmoid colon, favored to be  physiologic.  3d. If clinically indicated, an MR enterography in this case may  better define bowel abnormalities. Alternatively attention to these  areas on follow-up imaging.  4. Incidentally noted bilateral hydroceles and right peritesticular  calcification.     May 3, 2021--initial medical oncology consultation, Dr. Sánchez.    May 6, 2021--EGD - unremarkable; no masses seen.    FINAL DIAGNOSIS:   A. ANTRUM, BIOPSY:   - Gastric antral type mucosa with features of reactive gastropathy   - No H. pylori like organisms identified on routine staining   - Negative for intestinal metaplasia or dysplasia     B. STOMACH, POLYP:   - Gastric body type mucosa with chronic gastritis   - No H. pylori like organisms identified on routine staining   - Negative for intestinal metaplasia, neoplastic polyp or dysplasia     C. STOMACH, POLYPS, BIOPSY:   - Fundic gland polyp in one fragment   - Gastric antral type mucosa with mild chronic gastritis   - No H. pylori like organisms identified on routine staining or by   immunohistochemistry   - Negative for intestinal metaplasia or dysplasia     D. STOMACH, BIOPSY:   - Chronic active gastritis with focal intestinal and pancreatic acinar   type metaplasia, see comment   - No H. pylori like organisms identified on routine staining or by   immunohistochemistry   - Negative for dysplasia, see comment       July 16, 2021 -- oncology follow-up/virtual visit, Dr. Sánchez.      Interim History/History Of Present Illness:    Mr. Tevin Guidry is a 70-year-old gentleman from [place].  He has a past medical history notable for cardiovascular disease, status post multivessel CABG 09/29/2014 and angioplasties.  He has hypertension, COPD from a history of smoking, which he quit in 2019.  He has a history of bladder cancer which was a noninvasive high grade form of urothelial carcinoma diagnosed in the summer and  early fall of 2020.  He is status post multiple injections of BCG.  He was diagnosed in the summer of 2019 with a recurrence in mid winter of 2020.  He also has a history of innumerable polyps over the years. He has had nearly a dozen colonoscopies to date, dating back to age 48.  He has had numerous colonoscopies, but none of them have shown evidence of a neoplastic or malignant disease or significant dysplasia but he had tubulovillous forms of adenoma.          He returns today for an [facility]-based virtual video visit.  In general, he is feeling well.  He has no symptomatic complaints.  At our initial consultation in early May at the request of Dr. Rodríguez, there had been considerable discussion and questions whether or not he had a primary tumor located elsewhere outside of the appendix.  A 2 mm focus of significant carcinoma was found incidentally during extensive hemicolectomy performed by Dr. Rodríguez in mid April.  There has been extensive pathology review and identification of a 2 mm focus of poorly differentiated adenocarcinoma at the tip of the appendix.  It contained signet ring cell features.  There is not part of the remainder of the colon that harbored any malignancy.     The hemicolectomy was done in the setting of numerous polyps, none of which have been shown to have malignancy itself.  After that point and as the patient had a colonoscopy prior to that, most recently 04/22, he did have an upper endoscopy that was performed 05/06, 3 days after his consultation with me.  The area that showed some physiologic uptake on the previous PET CT did not identify any mass-like tumors in the esophagus or stomach or duodenum that were consistent with a primary source of a signet ring cell carcinoma. In the absence of that, there is no evidence the patient has any form of systemic cancer that could have served as a primary site that would have metastasized to the appendix.  At this point, my best effort and  judgment is that he likely had an incidental finding of a 2 mm focus signet ring cell carcinoma that likely arose in the tip of the appendix rather than the other way around.  Certainly, his case presents a diagnostic dilemma.  The patient has no symptomatic complaints at this time.  He has a history of emphysema and other medical issues as stated above.          Review Of Systems:  Comprehensive (14-point) ROS reviewed. Pertinent symptoms reviewed above per HPI.      Past medical, social, surgical, and family histories reviewed.  PAST MEDICAL HISTORY:  Notable for innumerable colon polyps.  It is not clear whether or not he has a germline or familial mutation that is causing this.  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  He has CAD, hypertension.  He he has had cardiac catheterization including serum 2014 when he had a CABG with LIMA to LAD, 2014.  He has another malignancy in the form of noninvasive bladder carcinoma, left lateral wall of the bladder.  He has treatments with BCG and other interventions by our Urology team since 2019 for that.  COPD due to remote history of smoking.    PAST SURGICAL HISTORY:  Most remarkable for the hemicolectomy performed by Dr Colvin 2021 per above and the interventions for the bladder carcinoma in 2019 and .  Please see the Urology note for details.    FAMILY HISTORY:  Most notable for him being 1 of 3 sons from his parents.  His mom was a smoker and diagnosed with stage IV gastric carcinoid at age 65 and  within 4 months of diagnosis.  Father was a smoker and had lung cancer at age 40 and  at age 50 of this disease.  One brother had gastric carcinoma and  around age 72 after initial diagnosis at age 62.  Another brother is living at age 69 and has never had a colonoscopy due to lack of insurance and other reasons.  A maternal aunt also had ovarian  cancer.    SOCIAL HISTORY:  The patient lives in [place].  He has an associate degree in respiratory therapy  Due to social phobia, he has not been employed in that practice.  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  He is single, never , no children.  Tobacco:  He smoked 1-1/2 packs a day of cigarettes for 30 years, quit 2019.  Occasional use of marijuana.  Denied any alcohol use or other illicit drug use.    Allergies:  Allergies as of 07/16/2021 - Reviewed 06/23/2021   Allergen Reaction Noted     Codeine Nausea and Vomiting 10/14/2014       Current Medications:  Current Outpatient Medications   Medication Sig Dispense Refill     albuterol (PROAIR HFA/PROVENTIL HFA/VENTOLIN HFA) 108 (90 Base) MCG/ACT inhaler Inhale 2 puffs into the lungs every 6 hours as needed for shortness of breath / dyspnea or wheezing 1 Inhaler 1     aspirin 81 MG tablet Take 1 tablet (81 mg) by mouth daily (Patient taking differently: Take 81 mg by mouth every morning ) 90 tablet 3     atorvastatin (LIPITOR) 80 MG tablet Take 1 tablet (80 mg) by mouth At Bedtime 90 tablet 3     ferrous sulfate (IRON) 325 (65 FE) MG tablet Take 325 mg by mouth every morning  60 tablet 3     lisinopril (ZESTRIL) 20 MG tablet Take 1 tablet (20 mg) by mouth At Bedtime 90 tablet 3     metoprolol succinate ER (TOPROL-XL) 50 MG 24 hr tablet Take 1 tablet (50 mg) by mouth At Bedtime 90 tablet 3     metroNIDAZOLE (FLAGYL) 500 MG tablet Take 1 tablet (500 mg) by mouth every 6 hours At 8:00 am, 2:00 pm, 8:00 pm the day prior to your surgery with neomycin and zofran. 3 tablet 0     neomycin (MYCIFRADIN) 500 MG tablet Take 2 tablets (1,000 mg) by mouth every 6 hours At 8:00 am, 2:00 pm, 8:00 pm the day prior to your surgery with flagyl and zofran. 6 tablet 0     nitroglycerin (NITROSTAT) 0.4 MG sublingual tablet Place 1 tablet (0.4 mg) under the tongue every 5 minutes as needed for chest  "pain 25 tablet 3     ondansetron (ZOFRAN) 4 MG tablet Take 1 tablet (4 mg) by mouth every 6 hours At 8:00 am, 2:00 pm, 8:00 pm the day prior to your surgery with neomycin and flagyl. 3 tablet 0     PARoxetine (PAXIL) 40 MG tablet Take 1 tablet (40 mg) by mouth every morning 90 tablet 3     polyethylene glycol (MIRALAX) 17 g packet Take 238 g by mouth See Admin Instructions Starting at 4 pm night prior to surgery. Refer to \"Getting Ready for Surgery\" instructions. 14 packet 0        Physical Exam:  Physical exam could not be performed today in context of a Virtual Visit during the COVID19/Coronavirus pandemic.            Observed physical assessments made today by visualizing the patient by video link:    General/Constitutional: Generally appears well, not acutely ill.  HEENT: no scleral icterus, not jaundiced.  Respiratory: no labored breathing.  Musculoskeletal: appears to have full range of motion and adequate physical strength.  Skin: no jaundice, discoloration or other notable lesions.  Neurological: no evidence of tremors.  Psychiatric: no evident anxiety; fully alert and oriented with fluent speech.      The rest of a comprehensive physical examination is deferred due to PHE (public health emergency) video visit restrictions.      Laboratory/Imaging Studies  No visits with results within 2 Week(s) from this visit.   Latest known visit with results is:   Virtual Visit on 05/05/2021   Component Date Value Ref Range Status     Copath Report 05/06/2021    Final                    Value:Patient Name: DICK LYONS  MR#: [id number]  Specimen #: [id number]  Collected: 5/6/2021  Received: 5/6/2021  Reported: 5/13/2021 10:06  Ordering Phy(s): MANNIE DAWKINS  Additional Phy(s): [facility]: [facility]    For improved result formatting, select 'View Enhanced Report Format' under   Linked Documents section.    SPECIMEN(S):  A: Antral biopsy  B: Gastric polyp  C: Gastric biopsy  D: Gastric " biopsy    FINAL DIAGNOSIS:  A. ANTRUM, BIOPSY:  - Gastric antral type mucosa with features of reactive gastropathy  - No H. pylori like organisms identified on routine staining  - Negative for intestinal metaplasia or dysplasia    B. STOMACH, POLYP:  - Gastric body type mucosa with chronic gastritis  - No H. pylori like organisms identified on routine staining  - Negative for intestinal metaplasia, neoplastic polyp or dysplasia    C. STOMACH, POLYPS, BIOPSY:  - Fundic gland polyp in one fragment  - Gastric antral type mucosa with mild chronic gastritis  - No H. pylor                          i like organisms identified on routine staining or by   immunohistochemistry  - Negative for intestinal metaplasia or dysplasia    D. STOMACH, BIOPSY:  - Chronic active gastritis with focal intestinal and pancreatic acinar   type metaplasia, see comment  - No H. pylori like organisms identified on routine staining or by   immunohistochemistry  - Negative for dysplasia, see comment    COMMENT:  D).  The biopsies show antral type mucosa with pancreatic acinar type   metaplasia and a minute focus of  intestinal metaplasia.  Immunohistochemistry was performed with   appropriate controls.  Chromogranin performed  on part D highlights linear enterochromaffin cell-like hyperplasia.    Gastrin stain shows rare positive cells  at the edge of one biopsy fragments.  If these biopsies were taken from   the fundus or body, these findings are  suggestive of autoimmune gastritis.  Clinical correlation is recommended.    I have personally reviewed all specimens and/or slides, including the   listed sp                          ecial stains, and used them  with my medical judgement to determine or confirm the final diagnosis.    Electronically signed out by:    Liliane Ta M.D., [facility]    CLINICAL HISTORY:  Screening endoscopy.  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    Striped erythema in gastric antrum.  Polyps in the gastric  "fundus.    GROSS:  A: The specimen is received in formalin with proper patient   identification, labeled \"gastric biopsy antrum\".  The specimen consists of 4 pieces of tan-pink tissue ranging from 0.2-0.5   cm in greatest dimension, submitted  in toto in cassette A1.    B: The specimen is received in formalin with proper patient   identification, labeled \"gastric polyps small\".  The specimen consists of 3 pieces of tan-pink tissue ranging from 0.2-0.5   cm in greatest dimension, submitted  in toto in cassette B1.    C: The specimen is received in formalin with proper patient   identification, labeled \"gastric biopsy large  polyps\".  The specimen consists of 5 pieces of                           tan-pink tissue ranging   from 0.2-0.6 cm in greatest dimension,  submitted in toto in cassette C 1.    D: The specimen is received in formalin with proper patient   identification, labeled \"gastric biopsy fundus\".  The specimen consists of 3 pieces of tan-pink tissue, each measuring   approximately 0.2 cm in greatest  dimension; submitted in toto in cassette D1. (Dictated by: MONICA 5/6/2021   02:53 PM)    MICROSCOPIC:  Microscopic examination was performed.  Immunohistochemistry was performed   with appropriate controls.  CK  AE1/AE3 performed on parts A1-D1 does not show evidence of signet ring   cell carcinoma.    The technical component of this testing was completed at the [facility], with the professional component performed   at the [facility], [address] [place] [id number] ([phone])    CPT Codes:  A: 54722-KB3, 04155-OKO  B: 71204-LN5, 49424-MLX                            C: 09814-LX2, 61513-TUJ, 95922-ZUI  D: 35306-AM6, 13544-ZAD, 01265-ZSK, 89051-JZA, 74439-JZYTTH4TT    COLLECTION SITE:  Client[facility]  [facility]    Resident  [id number]   "          RADIOLOGY:  Prior to and including the day of this visit, I personally reviewed the recent imaging scans. I released the pertinent recent imaging results to [employer] in advance of this visit, and reviewed the summary verbatim and in lay language during today's call.        ASSESSMENT/PLAN:  Mr. Hola Guidry is a 70-year-old gentleman with history of innumerable polyps.  He does not have a known germline mutation, although he has had multiple first-degree members of family with gastric carcinoma.  He is awaiting upper endoscopy 3 days from now by Dr. Tobar on 05/06/2021 to help clarify whether or not he has a primary gastric carcinoma, which would make sense in terms of signet cell features of the 2 mm focus in the distal appendix that was positive for adenocarcinoma.  Otherwise, the rest of the colon and terminal ileum were negative for carcinoma.  It is not out of the realm of possibility that signet ring cell features and type of histology can be found in small intestinal carcinomas including appendix, but more commonly, it is found in the upper GI tract.  Right now, based on the location as well being more mucosa than through wall that there is consideration whether or not this represents an unusual form of metastasis to the appendiceal cavity rather than a primary appendiceal malignancy.  However, the PET CT is unremarkable and did not show any evidence of an alternate primary at that time.    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  In the absence of any findings on extensive workup that included full body PET CT, upper endoscopy, lower endoscopy not showing any other forms of tumors or malignancy at this point, my assessment and impression is that he likely had a tiny focus of adenocarcinoma at the tip of the appendix as a primary form of  adenocarcinoma and not metastatic disease to the appendix.  I know the Pathology team and surgeon, Dr. Steward, have examined this quite carefully.      At this point, from a clinical standpoint, that is probably the best assessment at this time.  I do not think at this point any further diagnostic evaluation is warranted.  We will continue to do active surveillance.  Previously a PET CT and followup with me had been scheduled for late August, but we can move that back to November.  I will try one more PET CT just to assure we are not missing something diagnostically, but in the absence of that, if that does not show anything in terms of occult source of primary malignancy, then we will suspend any further radiologic surveillance as the 2 mm focus of carcinoma has already been resected.  The patient appeared to have understanding of this explanation and will go forward and change that schedule accordingly            VIRTUAL VISIT - DETAILS:    I have reviewed the note as documented above. This accurately captures the substance of my conversation with the patient.    Date of call: July 16, 2021   Start of call: 9:35 am  End of call: 9:49 am    Provider location: [facility] (academic office)  Patient location: Home      Mode of Video Visit: [facility]         I spent 20 minutes on the day of the visit reviewing updated information regarding this case, including notes, imaging, labs, and other pertinent results.        Tanner Sánchez MD PhD        The above was transcribed using a voice recognition software.  While I reviewed and edited the transcription, I may miss errors.  Please let me know of any of serious errors and I will addend the note.

## 2021-07-16 ENCOUNTER — VIRTUAL VISIT (OUTPATIENT)
Dept: ONCOLOGY | Facility: CLINIC | Age: 71
End: 2021-07-16
Attending: INTERNAL MEDICINE
Payer: COMMERCIAL

## 2021-07-16 DIAGNOSIS — C80.1 SIGNET RING CELL ADENOCARCINOMA (H): Primary | ICD-10-CM

## 2021-07-16 PROCEDURE — 99214 OFFICE O/P EST MOD 30 MIN: CPT | Mod: 95 | Performed by: INTERNAL MEDICINE

## 2021-07-16 PROCEDURE — 999N001193 HC VIDEO/TELEPHONE VISIT; NO CHARGE

## 2021-07-16 NOTE — PROGRESS NOTES
Hola is a 70 year old who is being evaluated via a billable video visit.      How would you like to obtain your AVS? MyChart  If the video visit is dropped, the invitation should be resent by: Text to cell phone: [phone]  Will anyone else be joining your video visit? No

## 2021-07-19 ENCOUNTER — THERAPY VISIT (OUTPATIENT)
Dept: OCCUPATIONAL THERAPY | Facility: CLINIC | Age: 71
End: 2021-07-19
Payer: COMMERCIAL

## 2021-07-19 DIAGNOSIS — S52.552A OTHER CLOSED EXTRA-ARTICULAR FRACTURE OF DISTAL END OF LEFT RADIUS, INITIAL ENCOUNTER: ICD-10-CM

## 2021-07-19 DIAGNOSIS — M25.532 LEFT WRIST PAIN: ICD-10-CM

## 2021-07-19 PROCEDURE — 97035 APP MDLTY 1+ULTRASOUND EA 15: CPT | Mod: GO | Performed by: OCCUPATIONAL THERAPIST

## 2021-07-19 PROCEDURE — 97110 THERAPEUTIC EXERCISES: CPT | Mod: GO | Performed by: OCCUPATIONAL THERAPIST

## 2021-07-19 PROCEDURE — 97140 MANUAL THERAPY 1/> REGIONS: CPT | Mod: GO | Performed by: OCCUPATIONAL THERAPIST

## 2021-07-29 NOTE — PROGRESS NOTES
Hand Therapy Progress Note  Reporting Period:  6/22/2021 through 8/2/2021    Diagnosis: Left Distal Radius Fracture  DOI: 05/2021, MD order 6/8/21  Post: 11 weeks    Precautions: N/A    Initial History:  Patient reports symptoms of the left wrist which occurred due to sustaining a DRF after tripping while playing with the dog. Since onset symptoms are gradually improving  General health as reported by patient is fair.  Pertinent medical history includes:Anemia, Cancer, Heart Problems, High Blood Pressure, Smoking  Medical allergies:Codeine.  Surgical history: cancer: bladder, other: CAD surgery 2014.  Medication history: Anti-depressants, Cardiac, High Blood Pressure.    Current occupation is Retired, part time painting and yardwork  Job Tasks: Driving, Lifting, Carrying    Occupational Profile Information:  Left hand dominant  Prior functional level:  no limitations  Patient reports symptoms of pain, stiffness/loss of motion, weakness/loss of strength and edema  Special tests:  x-ray.    Previous treatment: OTC wrist splint  Barriers include:none  Mobility: No difficulty  Transportation: drives  Leisure activities/hobbies: gambling  Other: Babysits brother's dog daily    Functional Outcome Measure:   Upper Extremity Functional Index Score:  SCORE:   Column Totals: /80: 76   (A lower score indicates greater disability.)    Subjective:  Subjective changes as noted by patient: Its doing a lot better this week!  Functional changes noted by patient: Less pain opening the car door, lifting something heavy. Still hard with twisting motions or lifting something too heavy (as in pouring something heavy).  Response to previous treatment: good  Patient has noted adverse reaction to: None    Objective:    Pain Level (Scale 0-10):   6/22/2021 6/29/21 7/19/21 8/2/21   At Rest 0 0 0 0   With Use 2-4 2-4 2-4 2-3     Pain Description:  Date 6/22/2021 8/2/21   Location Left dorsal and ulnar wrist Left Dorsal and Ulnar wrist   Pain  Quality Dull, sore, discomfort Sharp, sore, stiff, cramping   Frequency intermittent intermittent   Pain is worst daytime daytime   Exacerbated by Opening car doors,  Pouring something heavy   Relieved by rest Rest, splint, exercises   Progression improving resolving     ROM  Pain Report: - none  + mild    ++ moderate    +++ severe   Wrist 6/22/2021 6/22/2021 6/29/21 7/19/21 8/2/21   AROM (PROM) Right Left Left Left Left   Extension 66 50 pre  60 post 53 pre  62 post   56 post   58 post   Flexion 80 40 pre  45 post 46 pre  49 post   50 post   51 post   RD 20 15 NT  NT   UD 36 30 NT  NT   Supination 70 70+ 75  NT   Pronation 85 80+ 80  NT     ROM - Fingers and thumb  WNL bilaterally    Strength   (Measured in pounds)  Pain Report: - none  + mild    ++ moderate    +++ severe    7/19/2021 7/19/2021 8/2/21   Trials Right Left Left   1  2  3 55 27- 31-   Average 55 27 31     Edema (Circumference measured in cm)   6/22/2021 6/22/2021 8/2/21    Right Left Left   DWC 17.5 19.5 18.6   Swelling noted over dorsal hand and wrist    Scar/Wound:  N/A    Sensation: WNL throughout all nerve distributions; per patient report    Assessment:  Response to therapy has been improvement to:    Strength:    Edema:  Circumferential edema is down  Pain:  frequency is less, intensity of pain is decreased, duration of pain is decreased and less tender over affected area  Overall Assessment:  Patient's symptoms are resolving.  Patient is progressing well and is ready to decrease frequency of treatment in the clinic.  STG/LTG:  See goal sheet for details and updates of remaining functional limitations.     Plan:  I have re-evaluated this patient and find that the nature, scope, duration and intensity of the therapy is appropriate for the medical condition of the patient.  Frequency:  1 X every other week, once daily  Duration:  for 4 additional weeks (6 visits per POC)    Home Program:  Heat/ice  OTC wrist cock up - protection and  sleeping  Compression sleeve for night  PROM wrist flexion/extension  Strengthening 3 x week:   with tennis ball  Wrist isometric flexion/extension    Next visit:   PN due - recheck strength and ROM  Progress HEP as needed and discharge to HEP if doing well  Paraffin  A/PROM  STM/joint mobs    Please see daily flow sheet for treatment and 1:1 time provided today.

## 2021-08-02 ENCOUNTER — THERAPY VISIT (OUTPATIENT)
Dept: OCCUPATIONAL THERAPY | Facility: CLINIC | Age: 71
End: 2021-08-02
Payer: COMMERCIAL

## 2021-08-02 DIAGNOSIS — M25.532 LEFT WRIST PAIN: ICD-10-CM

## 2021-08-02 DIAGNOSIS — S52.552A OTHER CLOSED EXTRA-ARTICULAR FRACTURE OF DISTAL END OF LEFT RADIUS, INITIAL ENCOUNTER: ICD-10-CM

## 2021-08-02 PROCEDURE — 97110 THERAPEUTIC EXERCISES: CPT | Mod: GO | Performed by: OCCUPATIONAL THERAPIST

## 2021-08-02 PROCEDURE — 97140 MANUAL THERAPY 1/> REGIONS: CPT | Mod: GO | Performed by: OCCUPATIONAL THERAPIST

## 2021-08-02 PROCEDURE — 97018 PARAFFIN BATH THERAPY: CPT | Mod: GO | Performed by: OCCUPATIONAL THERAPIST

## 2021-08-04 ENCOUNTER — VIRTUAL VISIT (OUTPATIENT)
Dept: ONCOLOGY | Facility: CLINIC | Age: 71
End: 2021-08-04
Attending: GENETIC COUNSELOR, MS
Payer: COMMERCIAL

## 2021-08-04 DIAGNOSIS — D12.6 MYH-ASSOCIATED COLONIC POLYPOSIS (MAP): Primary | ICD-10-CM

## 2021-08-04 PROCEDURE — 999N000069 HC STATISTIC GENETIC COUNSELING, < 16 MIN: Performed by: GENETIC COUNSELOR, MS

## 2021-08-04 NOTE — LETTER
Cancer Risk Management  Program Locations    [facility]  [facility]  Mailing Address  Cancer Risk Management Program  [address]  [place] [address]    New patient appointments  [phone]  November 6, 2021    Tevin Guidry  [address]  [facility] [address]      Dear Tevin,    It was a pleasure speaking with you over the phone for genetic counseling on 8/4/2021. Here is a copy of the progress note from our discussion. If you have any additional questions, please feel free to call.    Referring Provider: Cecile Bautista MD    Presenting Information:   I spoke with Tevin over the phone to discuss his genetic testing results. Testing was performed on a saliva sample collected by Tevin at his home. A CustomNext-Cancer panel (a combination of the CancerNext panel + the CTNNA1 gene + additional genes associated with an increased risk for GISTs (KIT, PDGFRA, SDHA, SDHB, SDHC, SDHD)) was ordered from [facility]. This testing was done because of his personal and family history of cancer and colon polyps.      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 This means that this mutation is present on both copies of his MUTYH gene. We discussed that these mutations are consistent with a diagnosis of MUTYH-associated polyposis (MAP) and increased risk for colon polyps and colon cancer. We discussed the impact of this testing on Tevin in detail.     Of note, Tevin tested negative for mutations in the following genes: APC, JAVI, AXIN2, BARD1, BMPR1A, BRCA1, BRCA2, BRIP1, CDH1, CDK4, CDKN2A, CHEK2, CTNNA1, DICER1, MLH1, MSH2, MSH3, MSH6, NBN, NF1, NTHL1, PALB2,  PMS2, PTEN, RAD51C, RAD51D, RECQL, SDHA, SDHB, SDHC, SDHD, SMAD4, SMARCA4, STK11 and TP53 (sequencing and deletion/duplication); HOXB13, KIT, PDGFRA, POLD1 and POLE (sequencing only); EPCAM and GREM1 (deletion/duplication only). We reviewed the autosomal dominant inheritance of these genes. Mutations in these genes do not skip generations.      Cancer Risks:   We discussed that individuals with two mutations in the MUTYH gene have a diagnosis of MUTYH-associated polyposis (MAP).     Individuals with MAP can have hundreds to thousands of polyps, but most have fewer than 100. Multiple different types of colon polyps may be seen in individuals with MAP including adenomas, serrated adenomas, hyperplastic/sessile serrated polyps, and mixed (hyperplastic and adenomatous) polyps.    Individuals with MAP have an increased risk of colorectal cancer. In the absence of appropriate surveillance, the lifetime risk for colorectal cancer may be up to 80%.    There is also an increased risk for duodenal cancer in individuals with MAP.     Individuals with MAP may also be at increased risk for other cancers, including ovarian and bladder, although current risks are not well defined. Other cancers (including breast, uterine, gastric, pancreatic, skin, and thyroid) have also been reported in individuals with MAP, although confirmed risks are not available at this time.     Individuals with MAP may also have these features:    CHRPE: freckle like spots on the inside of the eye    Dental abnormalities/cysts on the jaw bone    Benign and malignant tumors of the skin s oil glands (sebaceous tumors)     Cancer Screening and Prevention:  The following screening is recommended for individuals with MUTYH-associated polyposis (MAP) per current National Comprehensive Cancer Network (NCCN) guidelines:    High-quality colonoscopy and polypectomy every 1-2 years beginning at age 25-30 (or earlier based on family history). Depending upon colon  polyp burden, removal of the colon may be recommended with continued surveillance of the rectum (as needed).    Baseline upper endoscopy (including complete visualization of the ampulla of Vater) beginning at age 30-35; repeated based on polyp findings.    Annual physical exam to begin at the time of diagnosis with MAP. Guidelines do not yet encourage screening for other cancers, but skin and thyroid exams could be done as part of the annual physical exam.    There are currently no other specific cancer screening guidelines for other cancers potentially associated with MAP. As such, additional screening should be based on personal and family history.    Other screening based on Tevin's personal and family history:    He was recently diagnosed with a cancer of unknown primary site (likely GI). He had extensive workup for this. He should continue with the plan for active surveillance as directed by his oncologist, Tanner Sánchez MD, PhD.    Tevin has already also had laparoscopic right hemicolectomy on 4/13/21 due to high colon polyp burden. He should continue with his colonoscopy surveillance as outlined above and as recommended by his physicians.    He also has a history of bladder cancer initially diagnosed in 2019. He should continue with his follow up for this as recommended by his urologist.     Of note, the above information is based on our current understanding of Tevin's genetic findings. Tevin is encouraged to reach out to me regularly regarding any pertinent updates to his personal and/or family history of cancer, as our understanding of the genetic findings in his family may change over time.     Implications for Family Members:  We reviewed the autosomal recessive inheritance of MAP. Individuals with MAP have a mutation within both copies of the MUTYH gene (two mutations, one that was inherited from each parent). When both parents are carriers for MAP, they have a 25% chance of having a child who  inherited two mutations (affected with MAP), a 50% chance of having a child who inherited one mutation (carrier of MAP, with small increased risk for colon cancer), and a 25% chance of having a child who inherited neither mutation (unaffected; not a carrier). These chances apply to each pregnancy. Since two MUTYH mutations were detected in Tevin, we would assume that both of his parents were carriers for MAP. Therefore, the above outlined chances of inheriting one, two, or zero mutations in the MUTYH gene would have also applied to each of Tevin's brothers. Genetic counseling is recommended for Tevin s brother who is still living to determine whether he inherited one, both, or neither of these two familial mutations. It is recommended that he have comprehensive testing of the MUTYH gene, though, as it is possible that he could carry a mutation in the MUTYH gene that is different than the ones identified in Tevin. I would be happy to see Tevin s family members for testing if they are interested.    Given that both of his parents are assumed to have been carriers for MAP, genetic counseling and testing is also recommended for his extended maternal and paternal relatives, and Tevin is encouraged to share this information with his family members on both sides of the family. Again, comprehensive testing of the MUTYH gene would be recommended for family members. I am happy to help his relatives connect with a genetic counselor in their area if they would like to discuss testing.    We reviewed that having only one MUTYH mutation means that an individual is a carrier for MAP. Approximately 1 in 100 (1%) individuals in the general population are carriers of MAP. Carriers of MAP have a very slightly increased risk of colon cancer (which could be as high as twice the population risk of 5%).     Additional Testing Considerations:  Even though Tevin's genetic testing result was positive, other relatives may carry  "a different gene mutation associated with an increased risk for cancer. We reviewed that while Tevin's diagnosis of MAP explains his personal history of colon polyps, it likely does not explain his extended family history of cancers. Therefore, genetic counseling is recommended for his brother, maternal, and paternal relatives to discuss genetic testing options. If any of these relatives do pursue genetic testing, Tevin is encouraged to contact me so that we may review the impact of their test results on him.    Support Resources:   Hereditary Colon Cancer Takes Guts - http://www.hcctakesguts.org/peer-support  This is a non-profit organization that supports and connects individuals with hereditary colon cancer syndromes.  They also promote research and health care initiatives.     Collaborative Group of the Americas on Inherited Colorectal Cancer (CGA) - http://www.cgaicc.com/  This group was established in 1995 and works to improve understanding of inherited colorectal cancer and management of families.    [employer] [facility] - www.IncaplubtNovastcities.org   [employer] is a 501(c)3 nonprofit and the local affiliate of the Cancer Support Community, a network of more than 54 supportive, free and welcoming  clubhouses  where everyone living with cancer can come for social, emotional and psychological support. Clubs are healing environments where individuals learn from each other with guidance from licensed professionals.    Other References:    American Cancer Society - www.cancer.org    National Society of Genetic Counselors: http://www.nsgc.org/     Plan:  1.  Tevin will be mailed a copy of his test results along with this letter.  2.  I will provide a \"Dear Relative\" letter for Tevin to share with his family members.  3.  He plans to follow up with his physicians.    If Tevin has additional questions, I encouraged him to contact me directly at [phone].     Dee Schaefer MS, " [facility]  Licensed, Certified Genetic Counselor  Office: [phone]  Email: [email or web address]                                                                          Dear Relative:  The purpose of this letter is to inform you that your relative recently underwent genetic counseling and genetic testing due to the family history of cancer.    The testing done through [facility], identified two mutations in the MUTYH gene.  Specifically, the mutations are called p.G396D (also known as c.1187G>A). Your relative has two of this same mutation, meaning that they are homozygous for this p.G396D mutation. The accession number linked to your relative's test report is: [id number].  Mutations in MUTYH cause a condition called MAP (MUTYH-associated polyposis).  Having just one mutation in MUTYH means you are a carrier, and have a slightly increased risk for colon cancer.  Having two mutations in MUTYH means you have MAP.    Individuals with MAP can have hundreds to thousands of colon polyps, although most have fewer than 100 adenomatous polyps.  Individuals with MAP have up to an 80% risk of developing colon cancer by age 70 if not treated.  MAP is also associated with an increased risk of duodenal cancer.  Several other benign growths have been reported in MAP. Carriers of MAP have a slightly increased risk of colon cancer (which could be as high as twice the population risk of about 5%). Approximately 1 in 100 (1%) individuals in the general population are carriers of MAP.  The risk to pass a MUTYH mutation on to children depends on if a parent has one or two MUTYH mutation(s).  The risks are different if one or both parents are carriers, not carriers, or have MAP themselves.  I understand that this may be surprising, unexpected, and even scary news.  Because this mutation has been identified in your family, you are at risk for having one copy of this same mutation (being a carrier for MAP), or having two mutations  (having MAP due to these mutations or another mutation).  As mentioned earlier, your children may also be at risk.    Genetic testing for mutations in the MUTYH gene can be done to determine your risk.  Depending on how you are related to other individuals in the family who have already undergone genetic testing, more targeted testing may be appropriate.     Scheduling a genetic counseling appointment does not mean you have to undergo genetic testing.  The decision to pursue such testing is a very personal one that is discussed in more detail during the session.  Much of cancer genetic counseling is providing valuable information to individuals who are impacted by genetic information such as this.    If you are interested in scheduling a genetic counseling appointment at [facility], please call [phone] to schedule an appointment. You can also find a genetic counselor close to you or at another health system at [facility]  Sincerely,   Dee Schaefer MS, [facility]  Licensed, Certified Genetic Counselor, Office: [phone], Email: [email or web address]

## 2021-08-04 NOTE — Clinical Note
Please send copy of letter to patient with test results. Please enclose test results: (Laboratory Miscellaneous Order) [[id number]] (Order [id number])

## 2021-08-24 NOTE — PROGRESS NOTES
Hand Therapy Discharge Note  Reporting Period:  8/2/2021 through 8/30/2021    Diagnosis: Left Distal Radius Fracture  DOI: 05/2021, MD order 6/8/21  Post: 11 weeks    Precautions: N/A    Initial History:  Patient reports symptoms of the left wrist which occurred due to sustaining a DRF after tripping while playing with the dog. Since onset symptoms are gradually improving  General health as reported by patient is fair.  Pertinent medical history includes:Anemia, Cancer, Heart Problems, High Blood Pressure, Smoking  Medical allergies:Codeine.  Surgical history: cancer: bladder, other: CAD surgery 2014.  Medication history: Anti-depressants, Cardiac, High Blood Pressure.    Current occupation is Retired, part time painting and yardwork  Job Tasks: Driving, Lifting, Carrying    Occupational Profile Information:  Left hand dominant  Prior functional level:  no limitations  Patient reports symptoms of pain, stiffness/loss of motion, weakness/loss of strength and edema  Special tests:  x-ray.    Previous treatment: OTC wrist splint  Barriers include:none  Mobility: No difficulty  Transportation: drives  Leisure activities/hobbies: gambling  Other: Babysits brother's dog daily    Subjective:  Subjective changes as noted by patient: Its doing well. I'm using it a lot more now instead of relying so much on my right hand. I can open my car door without a problem.  Functional changes noted by patient: Improvement in Self Care Tasks (dressing, eating, bathing, hygiene/toileting), Work Tasks and Household Chores  Response to previous treatment: good  Patient has noted adverse reaction to: None    Objective:    Pain Level (Scale 0-10):   6/22/2021 6/29/21 7/19/21 8/2/21 8/30/2021   At Rest 0 0 0 0 0   With Use 2-4 2-4 2-4 2-3 0-2     Pain Description:  Date 6/22/2021 8/2/21   Location Left dorsal and ulnar wrist Left Dorsal and Ulnar wrist   Pain Quality Dull, sore, discomfort Sharp, sore, stiff, cramping   Frequency intermittent  intermittent   Pain is worst daytime daytime   Exacerbated by Opening car doors,  Pouring something heavy   Relieved by rest Rest, splint, exercises   Progression improving resolving     ROM  Pain Report: - none  + mild    ++ moderate    +++ severe   Wrist 6/22/2021 6/22/2021 6/29/21 7/19/21 8/2/21   AROM (PROM) Right Left Left Left Left   Extension 66 50 pre  60 post 53 pre  62 post   56 post   58 post   Flexion 80 40 pre  45 post 46 pre  49 post   50 post   51 post   RD 20 15 NT  NT   UD 36 30 NT  NT   Supination 70 70+ 75  NT   Pronation 85 80+ 80  NT     ROM  Pain Report: - none  + mild    ++ moderate    +++ severe   Wrist 8/30/21       AROM (PROM) Left       Extension 51 pre  61 post       Flexion 60 pre  60 post       RD        UD        Supination        Pronation          ROM - Fingers and thumb  WNL bilaterally    Strength   (Measured in pounds)  Pain Report: - none  + mild    ++ moderate    +++ severe    7/19/2021 7/19/2021 8/2/21 8/30/21   Trials Right Left Left Left   1  2  3 55 27- 31- 34-   Average 55 27 31 34     Edema (Circumference measured in cm)   6/22/2021 6/22/2021 8/2/21 8/30/21    Right Left Left Left   DWC 17.5 19.5 18.6 18.0   Swelling noted over dorsal hand and wrist    Scar/Wound:  N/A    Sensation: WNL throughout all nerve distributions; per patient report    Assessment:  Response to therapy has been improvement to:  ROM of Wrist:  Ext  and Flex  Strength:    Pain:  frequency is less, intensity of pain is decreased, duration of pain is decreased and less tender over affected area  Overall Assessment:  Patient's symptoms are resolving.  Patient is becoming more independent in home exercise program and is ready to discharge from [facility].  STG/LTG:  See goal sheet for details and updates of remaining functional limitations.     Plan:  Discharge to home exercise program    Home Program:  Heat/ice  OTC wrist cock up - protection and sleeping  Compression sleeve  for night  PROM wrist flexion/extension  Strengthening 3 x week:   with tennis ball  Wrist isometric flexion/extension    Please see daily flow sheet for treatment and 1:1 time provided today.

## 2021-08-30 ENCOUNTER — THERAPY VISIT (OUTPATIENT)
Dept: OCCUPATIONAL THERAPY | Facility: CLINIC | Age: 71
End: 2021-08-30
Payer: COMMERCIAL

## 2021-08-30 DIAGNOSIS — M25.532 LEFT WRIST PAIN: ICD-10-CM

## 2021-08-30 DIAGNOSIS — S52.552A OTHER CLOSED EXTRA-ARTICULAR FRACTURE OF DISTAL END OF LEFT RADIUS, INITIAL ENCOUNTER: ICD-10-CM

## 2021-08-30 PROCEDURE — 97110 THERAPEUTIC EXERCISES: CPT | Mod: GO | Performed by: OCCUPATIONAL THERAPIST

## 2021-08-30 PROCEDURE — 97018 PARAFFIN BATH THERAPY: CPT | Mod: GO | Performed by: OCCUPATIONAL THERAPIST

## 2021-08-30 PROCEDURE — 97140 MANUAL THERAPY 1/> REGIONS: CPT | Mod: GO | Performed by: OCCUPATIONAL THERAPIST

## 2021-08-31 ENCOUNTER — PATIENT OUTREACH (OUTPATIENT)
Dept: GERIATRIC MEDICINE | Facility: CLINIC | Age: 71
End: 2021-08-31

## 2021-08-31 ASSESSMENT — ACTIVITIES OF DAILY LIVING (ADL): DEPENDENT_IADLS:: INDEPENDENT

## 2021-08-31 NOTE — PROGRESS NOTES
[facility] Care Coordination Contact    [facility] Home Visit Assessment     Home visit TELEPHONIC for Health Risk Assessment with Tevin Guidry completed on August 31, 2021    Type of residence:: Apartment - handicap accessible  Current living arrangement:: I live alone     Assessment completed with:: Patient    Current Care Plan  Member currently receiving the following home care services:     Member currently receiving the following community resources:    none    Medication Review  Medication reconciliation completed in Epic: Yes  Medication set-up & administration: Independent-does not set up.  Self-administers medications.  Medication Risk Assessment Medication (1 or more, place referral to MTM): N/A: No risk factors identified  MTM Referral Placed: No: No risk factors idenified    Mental/Behavioral Health   Depression Screening:   PHQ-2 Total Score (Adult) - Positive if 3 or more points; Administer PHQ-9 if positive: 0       Mental health DX:: No        Falls Assessment:   Fallen 2 or more times in the past year?: No   Any fall with injury in the past year?: Yes    ADL/IADL Dependencies:   Dependent ADLs:: Independent  Dependent IADLs:: Independent    [facility] sponsored benefits: Shared information re: Silver Sneakers/gym memberships, ASA, Calcium +D.    PCA Assessment completed at visit: No     Elderly Waiver Eligibility: No-does not meet criteria    Care Plan & Recommendations: member has denied need for anything at this time.  Reports being well taken care of for all health needs at the [facility].  Denies pain and feels like energy is back to baseline after colon surgery. Shortly after surgery he fx his arm when throwing a ball to the dog and getting the leash wrapped up in his legs and is still gaining strength back after that.    He officially quit smoking but has had a few slip ups but is doing okay now.  Has had social anxiety since his teens and has  "been on Paxil for 15 years and reports \"it works for me\".  He has completed a health Care Directive.    See [facility] for detailed assessment information.    Follow-Up Plan: Member informed of future contact, plan to f/u with member with a 6 month telephone assessment.  Contact information shared with member and family, encouraged member to call with any questions or concerns at any time.    [facility] continuum providers: Please refer to Health Care Home on the Epic Problem List to view this patient's [facility] Care Plan Summary.    Lovely Medina RN  [facility]   [phone]      "

## 2021-09-01 ENCOUNTER — PATIENT OUTREACH (OUTPATIENT)
Dept: GERIATRIC MEDICINE | Facility: CLINIC | Age: 71
End: 2021-09-01

## 2021-09-01 NOTE — LETTER
September 1, 2021      TEVIN LYONS  [address]  [facility] [address]      Dear Tevin:    At [facility], we are dedicated to improving your health and well-being. Enclosed is the Comprehensive Care Plan that we developed with you on 8/31/2021. Please review the Care Plan carefully.    As a reminder, some of the things we discussed at your visit include:    Your physical and mental health    Ways to reduce falls    Health care needs you may have    Don t forget to contact your care coordinator if you:    Have been hospitalized or plan to be hospitalized     Have had a fall     Have experienced a change in physical health    Are experiencing emotional problems     If you do not agree with your Care Plan, have questions about it, or have experienced a change in your needs, please call me at [phone]. If you are hearing impaired, please call the [place] Relay at 063 or [phone] (hbwoqk-hr-[id number] relay service).    Sincerely,    [email or web address]  Phone: [phone]      [facility] ([facility]) is a health plan that contracts with both Medicare and the [place] Medical Assistance (Medicaid) program to provide benefits of both programs to enrollees. Enrollment in [facility] depends on contract renewal.    [id number])     [id number] (11/18)

## 2021-09-01 NOTE — PROGRESS NOTES
[facility] Care Coordination Contact    Received after visit chart from care coordinator.  Completed following tasks: Mailed copy of care plan to client and mailed POC sig page w/CUONGE.   Rosaura Karimi  Case Management Specialist  [facility]  [phone]

## 2021-09-08 ENCOUNTER — OFFICE VISIT (OUTPATIENT)
Dept: FAMILY MEDICINE | Facility: CLINIC | Age: 71
End: 2021-09-08
Payer: COMMERCIAL

## 2021-09-08 VITALS
OXYGEN SATURATION: 97 % | BODY MASS INDEX: 24.51 KG/M2 | TEMPERATURE: 98.3 F | DIASTOLIC BLOOD PRESSURE: 81 MMHG | WEIGHT: 166 LBS | HEART RATE: 50 BPM | SYSTOLIC BLOOD PRESSURE: 129 MMHG

## 2021-09-08 DIAGNOSIS — I10 HYPERTENSION GOAL BP (BLOOD PRESSURE) < 140/90: ICD-10-CM

## 2021-09-08 DIAGNOSIS — I10 ESSENTIAL HYPERTENSION WITH GOAL BLOOD PRESSURE LESS THAN 140/90: ICD-10-CM

## 2021-09-08 DIAGNOSIS — F40.10 SOCIAL PHOBIA: ICD-10-CM

## 2021-09-08 DIAGNOSIS — Z85.51 PERSONAL HISTORY OF MALIGNANT NEOPLASM OF BLADDER: ICD-10-CM

## 2021-09-08 DIAGNOSIS — Z95.1 S/P CABG (CORONARY ARTERY BYPASS GRAFT): ICD-10-CM

## 2021-09-08 DIAGNOSIS — I25.119 CORONARY ARTERY DISEASE INVOLVING NATIVE HEART WITH ANGINA PECTORIS, UNSPECIFIED VESSEL OR LESION TYPE (H): Primary | ICD-10-CM

## 2021-09-08 DIAGNOSIS — C80.1 SIGNET RING CELL ADENOCARCINOMA (H): ICD-10-CM

## 2021-09-08 DIAGNOSIS — E78.5 HYPERLIPIDEMIA LDL GOAL <70: ICD-10-CM

## 2021-09-08 DIAGNOSIS — J44.9 CHRONIC OBSTRUCTIVE PULMONARY DISEASE, UNSPECIFIED COPD TYPE (H): ICD-10-CM

## 2021-09-08 PROCEDURE — 99214 OFFICE O/P EST MOD 30 MIN: CPT | Performed by: FAMILY MEDICINE

## 2021-09-08 RX ORDER — ATORVASTATIN CALCIUM 80 MG/1
80 TABLET, FILM COATED ORAL AT BEDTIME
Qty: 90 TABLET | Refills: 3 | Status: SHIPPED | OUTPATIENT
Start: 2021-09-08 | End: 2021-11-09

## 2021-09-08 RX ORDER — LISINOPRIL 20 MG/1
20 TABLET ORAL DAILY
Qty: 180 TABLET | Refills: 3 | Status: SHIPPED | OUTPATIENT
Start: 2021-09-08 | End: 2021-11-09

## 2021-09-08 RX ORDER — PAROXETINE 40 MG/1
40 TABLET, FILM COATED ORAL EVERY MORNING
Qty: 90 TABLET | Refills: 3 | Status: SHIPPED | OUTPATIENT
Start: 2021-09-08 | End: 2022-06-22

## 2021-09-08 RX ORDER — LISINOPRIL 20 MG/1
20 TABLET ORAL 2 TIMES DAILY
Qty: 90 TABLET | Refills: 3 | Status: SHIPPED | OUTPATIENT
Start: 2021-09-08 | End: 2021-09-08

## 2021-09-08 ASSESSMENT — ANXIETY QUESTIONNAIRES
2. NOT BEING ABLE TO STOP OR CONTROL WORRYING: NOT AT ALL
7. FEELING AFRAID AS IF SOMETHING AWFUL MIGHT HAPPEN: NOT AT ALL
3. WORRYING TOO MUCH ABOUT DIFFERENT THINGS: NOT AT ALL
1. FEELING NERVOUS, ANXIOUS, OR ON EDGE: NOT AT ALL
GAD7 TOTAL SCORE: 0
5. BEING SO RESTLESS THAT IT IS HARD TO SIT STILL: NOT AT ALL
6. BECOMING EASILY ANNOYED OR IRRITABLE: NOT AT ALL
IF YOU CHECKED OFF ANY PROBLEMS ON THIS QUESTIONNAIRE, HOW DIFFICULT HAVE THESE PROBLEMS MADE IT FOR YOU TO DO YOUR WORK, TAKE CARE OF THINGS AT HOME, OR GET ALONG WITH OTHER PEOPLE: NOT DIFFICULT AT ALL

## 2021-09-08 ASSESSMENT — PATIENT HEALTH QUESTIONNAIRE - PHQ9
5. POOR APPETITE OR OVEREATING: NOT AT ALL
SUM OF ALL RESPONSES TO PHQ QUESTIONS 1-9: 1

## 2021-09-08 NOTE — PROGRESS NOTES
"    Assessment & Plan         Essential hypertension with goal blood pressure less than 140/90  Initially elevated. Recheck with MA. Reports it has been elevated recently. Will increase lisinopril to 20mg twice daily. Continues  metoprolol 50mg daily. Will plan on follow up with lab and bp check in two weeks, see me two days after virtually to review results.             Hyperlipidemia LDL goal <70  Controlled. Will check lipids today   - atorvastatin (LIPITOR) 80 MG tablet; Take 1 tablet (80 mg) by mouth daily  Dispense: 90 tablet; Refill: 3        History of bladder cancer  Recommended scheduling follow up with urology. Last visit 9/2020.     Presented with gross hematuria in July 2019, evaluated by Urology and found to have non-invasive high grfade urothelial carcinoma s/p TURBT on 9/5/19.  9/19 - Noninvasive HG bladder cancer  Underwent induction BCG  2/20 - Noninvasive LG bladder cancer  Underwent repeat induction BCG        Smoking about 1ppd. I strongly encouraged smoking cessation. He was interested in visiting with MTM. Will help him schedule.         CAD, multiple vessel s/p CABG   Stable continues ASA 81mg daily and statin. Has not needed to take his nitroglycerin   Will follow up with cardiology  - metoprolol succinate ER (TOPROL-XL) 50 MG 24 hr tablet; Take 1 tablet (50 mg) by mouth daily  Dispense: 90 tablet; Refill: 3      Social phobia  Stable   - PARoxetine (PAXIL) 40 MG tablet; Take 1 tablet (40 mg) by mouth every morning  Dispense: 90 tablet; Refill: 3     Tobacco dependence  Back to smoking about a pack per day and feeling guilty about it. He is open to meeting with MTM about smoking cessation. Will help him schedule.          Chronic obstructive pulmonary disease, unspecified COPD type (H)   stable. Rare cough or use of albuterol.     Signet ring cell adenocarcinoma  Reviewed oncology notes today   Per 7/16/21 visit note with oncology:   \"His case is puzzling and the finding of the 2 mm focus of " "poorly differentiated adenocarcinoma with signet ring cell features at the tip of the appendix was incidental.  In the absence of any findings on extensive workup that included full body PET CT, upper endoscopy, lower endoscopy not showing any other forms of tumors or malignancy at this point, my assessment and impression is that he likely had a tiny focus of adenocarcinoma at the tip of the appendix as a primary form of adenocarcinoma and not metastatic disease to the appendix.  I know the Pathology team and surgeon, Dr. Steward, have examined this quite carefully.       At this point, from a clinical standpoint, that is probably the best assessment at this time.  I do not think at this point any further diagnostic evaluation is warranted.  We will continue to do active surveillance.  Previously a PET CT and followup with me had been scheduled for late August, but we can move that back to November.  I will try one more PET CT just to assure we are not missing something diagnostically, but in the absence of that, if that does not show anything in terms of occult source of primary malignancy, then we will suspend any further radiologic surveillance as the 2 mm focus of carcinoma has already been resected.  The patient appeared to have understanding of this explanation and will go forward and change that schedule accordingly   \"  He has repeat PET scan scheduled in November.                        Patient Instructions   Increase lisinopril to 20mg twice daily. Schedule a lab visit with blood pressure check in two weeks and a virtual visit with me about 2 days after that.   Schedule follow up with your Urologist.   See Pelon Bah PharmD to discuss options for quitting smoking .  Schedule follow up with cardiology.       No follow-ups on file.    Preethi Quiroz MD   [facility]    Chace Simental is a 71 year old who presents for the following health issues     HPI     Hyperlipidemia " "Follow-Up      Are you regularly taking any medication or supplement to lower your cholesterol?   Yes     Are you having muscle aches or other side effects that you think could be caused by your cholesterol lowering medication?  No    Anxiety Follow-Up    How are you doing with your anxiety since your last visit? No change    Are you having other symptoms that might be associated with anxiety? No    Have you had a significant life event? No     Are you feeling depressed? No    Do you have any concerns with your use of alcohol or other drugs? No    Social History     Tobacco Use     Smoking status: Former Smoker     Packs/day: 0.00     Years: 30.00     Pack years: 0.00     Types: Cigarettes     Quit date: 2019     Years since quittin.1     Smokeless tobacco: Never Used     Tobacco comment: had restarted- now quit smoking 20   Substance Use Topics     Alcohol use: No     Comment: quit 35 years ago     Drug use: Yes     Types: Marijuana     Comment: occionally use of marijuana     CLIF-7 SCORE 3/27/2017 10/30/2017 7/3/2019   Total Score - - -   Total Score 0 0 0     PHQ 2019 8/3/2020 2021   PHQ-9 Total Score 2 1 0   Q9: Thoughts of better off dead/self-harm past 2 weeks Not at all Not at all Not at all     Smoking about 1 ppd. Wants to quit, but has not found anything very helpful. Has used nicoderm on and off, but smokes while using the patch. Feels frustrated with himself, like he has \"failed\".          Used to be on higher dose of lisinopril, but his bp was lower and he was getting lightheaded so it was reduced.        Review of Systems          Objective    BP (!) 144/104   Pulse 52   Temp 98.3  F (36.8  C)   Wt 75.3 kg (166 lb)   SpO2 97%   BMI 24.51 kg/m    Body mass index is 24.51 kg/m .  Physical Exam   GENERAL:  alert and no distress    External Order Results on 2021   Component Date Value Ref Range Status     Scan Lab Results (External) 2021 See Scanned Report*  Final    " CustomNext-Cancer panel, genetic testing, [facility]

## 2021-09-09 ASSESSMENT — ANXIETY QUESTIONNAIRES: GAD7 TOTAL SCORE: 0

## 2021-09-11 ENCOUNTER — HEALTH MAINTENANCE LETTER (OUTPATIENT)
Age: 71
End: 2021-09-11

## 2021-09-14 ENCOUNTER — OFFICE VISIT (OUTPATIENT)
Dept: PHARMACY | Facility: CLINIC | Age: 71
End: 2021-09-14
Payer: COMMERCIAL

## 2021-09-14 VITALS
HEART RATE: 51 BPM | BODY MASS INDEX: 24.51 KG/M2 | SYSTOLIC BLOOD PRESSURE: 144 MMHG | WEIGHT: 166 LBS | DIASTOLIC BLOOD PRESSURE: 90 MMHG | OXYGEN SATURATION: 96 %

## 2021-09-14 DIAGNOSIS — F17.210 CIGARETTE NICOTINE DEPENDENCE WITHOUT COMPLICATION: Primary | ICD-10-CM

## 2021-09-14 DIAGNOSIS — J44.9 CHRONIC OBSTRUCTIVE PULMONARY DISEASE, UNSPECIFIED COPD TYPE (H): ICD-10-CM

## 2021-09-14 DIAGNOSIS — D64.9 ANEMIA, UNSPECIFIED TYPE: ICD-10-CM

## 2021-09-14 DIAGNOSIS — I10 HYPERTENSION GOAL BP (BLOOD PRESSURE) < 140/90: ICD-10-CM

## 2021-09-14 PROCEDURE — 99607 MTMS BY PHARM ADDL 15 MIN: CPT | Performed by: PHARMACIST

## 2021-09-14 PROCEDURE — 99605 MTMS BY PHARM NP 15 MIN: CPT | Performed by: PHARMACIST

## 2021-09-14 RX ORDER — ALBUTEROL SULFATE 90 UG/1
2 AEROSOL, METERED RESPIRATORY (INHALATION) EVERY 6 HOURS PRN
Qty: 18 G | Refills: 1 | Status: SHIPPED | OUTPATIENT
Start: 2021-09-14 | End: 2022-06-22

## 2021-09-14 RX ORDER — NICOTINE 21 MG/24HR
1 PATCH, TRANSDERMAL 24 HOURS TRANSDERMAL EVERY 24 HOURS
Qty: 28 PATCH | Refills: 1 | Status: SHIPPED | OUTPATIENT
Start: 2021-09-14 | End: 2022-01-11

## 2021-09-14 RX ORDER — BUPROPION HYDROCHLORIDE 150 MG/1
150 TABLET ORAL EVERY MORNING
Qty: 30 TABLET | Refills: 3 | Status: SHIPPED | OUTPATIENT
Start: 2021-09-14 | End: 2021-09-26 | Stop reason: DRUGHIGH

## 2021-09-14 NOTE — PROGRESS NOTES
"Medication Therapy Management (MTM) Encounter    ASSESSMENT:                            Medication Adherence/Access: No issues identified    Smoking Cessation: not controlled. Based on patients interest in quitting smoking, but previous failed attempts, consider starting bupropion  XL 150mg tablet daily in morning to improve smoking cessation success. Based on patient having first cigarette within a few minutes of awakening, it would be beneficial to start nicotine 21 mg patches daily  for 4 weeks to reduce nicotine withdrawals.    Anemia: stable. Hemoglobin is within goal range of 13.3 to 17.7g/dL at April 2021 lab. Patient ran out of ferrous sulfate tablets and stopped taking. Previously taking for anemia, and since hemoglobin is within range may stop taking ferrous sulfate daily.     Hypertension: Stable. Patient is not meeting blood pressure goal of < 140/80mmHg. Patient would benefit from the following changes - smoking cessation. Continue lisinopril and metoprolol without increase, as blood pressure today was just above goal and patient attempting to quit smoking. See PCP next week for BP recheck.     PLAN:                            1. No longer necessary to take an iron (ferrous sulfate) tablet daily.     2. Will refill your albuterol inhaler as needed for shortness of breath.     3. We recommend that you take bupropion XL 150mg daily in the morning with breakfast. We can increase it to 300mg as you see fit, if you feel like it is working for you but you need an increase in the dose send me a message through your [employer]. As we discussed at the visit, there is no need to pick a \"quit date\". You will start taking the bupropion and using the nicotine patches and let yourself slowly wean off of smoking. We will start you out on the 21mg Nicotine patch that you can apply in the morning and leave on for up to 24 hours. If you have strange/vivid dreams, try taking your patch off in the evening before bedtime. You " can pick both of these up next door at the pharmacy.       Follow-up: Return in about 4 weeks (around 10/12/2021), or 1:30pm, for tobacco abuse.      SUBJECTIVE/OBJECTIVE:                          Tevin Guidry is a 71 year old male coming in for an initial visit. He was referred to me from Preethi Quiroz  .      Reason for visit:   Smoking cessation.    Allergies/ADRs: Reviewed in chart  Past Medical History: Per patient has a hemicolectomy with a hx of polyps (removed appendix). CAD (CABG), Social phobia, COPD, adenocarcinoma      Tobacco: He currently smokes 1ppd. He is trying to quit smoking currently. Successfully quit smoking tobacco for 10 years and restarted about 5-6 years ago.   Alcohol: not currently using  Other Substance Use: marajuana flower, occasional vape - smokes everyday   Caffeine: large starbucks in morning and sometimes in PM      Medication Adherence/Access: no issues reported    Tried smoking cessation with NRT (prn patch and gum) in August 2020.       Smoking Cessation:  How much does patient smoke: No more than a 1ppd   Why does patient smoke: will quit smoking for a few days and then have a stressful day and pick it up again. Seems that he smokes out of routine and stress relief.   Triggers for smoking include:  Stress, horse racing/gambling and then just the general routine (gets an urge). Discussed that he would be willing to stop horse betting to reduce stressors.   How long has patient been smoking:  Started when he was a kid around 11 or 11 yo, about 60 years. Reports he quit for about a 10 year period and gum helped him quit. Started smoking again about 5-6 years ago when He rented a room from a woman that had a roll up smoking machine, and this triggered him to start smoking again. Last quit smoking because he was convinced he had to quit from risk factors (familial hx of cancer) and then in addition to cannabis use felt he didn't need to smoke both. Was still smoking marajuana at  the time.      Feels like he is willing to quit smoking and ready to quit.     Support system: Brother has been on him to quit smoking but his girlfriend smokes. Discussed chatting with brother if he needs encouragement     Has used the patch in the past, was using it regularly everyday. Found the gum to be most effective in first attempt then after that when using it didn't find it to be that useful.     Smokes within first half hour of waking up.     What worked/didn t work in the past: patches worked somewhat but wasn't ready to quit smoking, gum worked to quit smoking for the first time (x10 years), but feels it hasn't worked in recent smoking attempts   Why does patient want to quit (motivators for quitting): health/familial hx of cancer, expensive   How important is it for patient to quit on a scale of 0 - 10? 9  How confident is the patient that they can stop smoking on a scale of 0-10: 6-8 (he has done it before)     Occupation: Retired - does some house painting, yard work, dog sitting      Fagerstrom Test to Assess Nicotine Dependence:  Points 0 1 2 3   1) How soon after you wake do you smoke your first cigarette? >1 hr 1/2 to 1 hr 6 to 30 min <5 min   2) Do you find it difficult to refrain from smoking in places where it is forbidden? No Yes     3) Which cigarette would you hate to give up the most? Any other 1st in the AM     4) How many cigarettes/day do you smoke? <10 11 to 20 21 to 30 >31   5) Do you smoke the most in the morning? No Yes Unknown     6) If you are so ill that you are in bed most of the day, do you still smoke? No Yes Unknown    Fagerstrom score ~5   (0 - 5 = low to moderate nicotine dependence)  (6 - 10 = HIGH nicotine dependence)      Anemia: Currently taking: ferrous sulfate 325mg daily. Patient reports they ran out ~2 months ago and stopped taking. Discussed it is okay to not take anymore due to improvement in hemoglobin.     Hemoglobin   Date Value Ref Range Status   04/20/2021  15.0 13.3 - 17.7 g/dL Final       Hypertension: Current medications include lisinopril 20mg twice daily, metoprolol succ 50mg daily.  Patient does not self-monitor blood pressure.  Patient reports no current medication side effects. Patient reports they have smoked a cigarettes right before the visit, likely impacting his fluctuations in blood pressure. Continue to monitor.     BP Readings from Last 3 Encounters:   09/14/21 (!) 144/90   09/08/21 129/81   06/02/21 (!) 165/89         Today's Vitals: BP (!) 144/90   Pulse 51   Wt 166 lb (75.3 kg)   SpO2 96%   BMI 24.51 kg/m    ----------------      I spent 50 minutes with this patient today (an extra 15 minutes was spent creating the Medication Action Plan). All changes were made via collaborative practice agreement with Preethi Quiroz MD, MD. A copy of the visit note was provided to the patient's primary care provider.    The patient was given a summary of these recommendations.     Andrea Bah [facility].  Medication Therapy Management Provider  [phone]    Edith Gamino  Pharm[facility]        Medication Therapy Recommendations  Cigarette nicotine dependence without complication    Current Medication: buPROPion (WELLBUTRIN XL) 150 MG 24 hr tablet   Rationale: Preventive therapy - Needs additional medication therapy - Indication   Recommendation: Start Medication - Initiate 1 tablet daily for smoking cessation.   Status: Accepted per CPA          Current Medication: nicotine (NICODERM CQ) 21 MG/24HR 24 hr patch   Rationale: Synergistic therapy - Needs additional medication therapy - Indication   Recommendation: Start Medication - Apply 1 patch daily for up to 24 hours to reduce nicotine withdrawals.   Status: Accepted per CPA

## 2021-09-14 NOTE — Clinical Note
Preethi--yoan-- we started uriel back on nicoderm 21mg patch + we added daily bupropion to help hold dopamine level as he weans off cigs.    Recheck with us in 4 weeks.    Andrea Bah [facility].  Medication Therapy Management Provider  [phone]  Edith Gamino  Pharm[facility]

## 2021-09-14 NOTE — LETTER
"        Date: 2021    Tevin Guidry  [address]  [facility] [address]    Dear Mr. Guidry,    Thank you for talking with me on 21 about your health and medications. Medicare s MTM (Medication Therapy Management) program helps you understand your medications and use them safely.      This letter includes an action plan (Medication Action Plan) and medication list (Personal Medication List). The action plan has steps you should take to help you get the best results from your medications. The medication list will help you keep track of your medications and how to use them the right way.       Have your action plan and medication list with you when you talk with your doctors, pharmacists, and other healthcare providers in your care team.     Ask your doctors, pharmacists, and other healthcare providers to update the action plan and medication list at every visit.     Take your medication list with you if you go to the hospital or emergency room.     Give a copy of the action plan and medication list to your family or caregivers.     If you want to talk about this letter or any of the papers with it, please call   [phone].We look forward to working with you, your doctors, and other healthcare providers to help you stay healthy through the [facility] MTM program.    Sincerely,  Andrea Bah [facility]    Enclosed: Medication Action Plan and Personal Medication List    MEDICATION ACTION PLAN FOR Tevin Guidry,  1950     This action plan will help you get the best results from your medications if you:   1. Read \"What we talked about.\"   2. Take the steps listed in the \"What I need to do\" boxes.   3. Fill in \"What I did and when I did it.\"   4. Fill in \"My follow-up plan\" and \"Questions I want to ask.\"     Have this action plan with you when you talk with your doctors, pharmacists, and other healthcare providers in your care team. Share this with your family or caregivers too.  DATE PREPARED: " 2021  What we talked about: smoking cessation                                                  What I need to do: start 21mg nicotine patch + 150mg er tab of Bupropion daily.        What I did and when I did it:                                              My follow-up plan:                 Questions I want to ask:              If you have any questions about your action plan, call Andrea Bah BRISEYDA, Phone: [phone] , Monday-Friday 8-4:30pm.           PERSONAL MEDICATION LIST FOR Tevin Guidry DOB 1950     This medication list was made for you after we talked. We also used information from your doctor's chart.      Use blank rows to add new medications. Then fill in the dates you started using them.    Cross out medications when you no longer use them. Then write the date and why you stopped using them.    Ask your doctors, pharmacists, and other healthcare providers to update this list at every visit. Keep this list up-to-date with:       Prescription medications    Over the counter drugs     Herbals    Vitamins    Minerals      If you go to the hospital or emergency room, take this list with you. Share this with your family or caregivers too.     DATE PREPARED: 2021  Allergies or side effects: Codeine     Medication:  ALBUTEROL SULFATE  (90 BASE) MCG/ACT IN AERS      How I use it:  Inhale 2 puffs into the lungs every 6 hours as needed for shortness of breath / dyspnea or wheezing      Why I use it: Chronic obstructive pulmonary disease, unspecified COPD type (H)    Prescriber:  Preethi Quiroz MD      Date I started using it:       Date I stopped using it:         Why I stopped using it:            Medication:  ASPIRIN 81 MG PO TABS      How I use it:  Take 1 tablet (81 mg) by mouth daily      Why I use it: Coronary artery disease involving native artery of transplanted heart without angina pectoris    Prescriber:  Casa Evans MD      Date I started using it:       Date I  stopped using it:         Why I stopped using it:            Medication:  ATORVASTATIN CALCIUM 80 MG PO TABS      How I use it:  Take 1 tablet (80 mg) by mouth At Bedtime      Why I use it: Hyperlipidemia LDL goal <70    Prescriber:  Preethi Quiroz MD      Date I started using it:       Date I stopped using it:         Why I stopped using it:            Medication:  BUPROPION HCL ER (XL) 150 MG PO TB24      How I use it:  Take 1 tablet (150 mg) by mouth every morning      Why I use it: Cigarette nicotine dependence without complication    Prescriber:  Preethi Quiroz MD      Date I started using it:       Date I stopped using it:         Why I stopped using it:            Medication:  LISINOPRIL 20 MG PO TABS      How I use it:  Take 1 tablet (20 mg) by mouth daily      Why I use it: Essential hypertension with goal blood pressure less than 140/90    Prescriber:  Preethi Quiroz MD      Date I started using it:       Date I stopped using it:         Why I stopped using it:            Medication:  METOPROLOL SUCCINATE ER 50 MG PO TB24      How I use it:  Take 1 tablet (50 mg) by mouth At Bedtime      Why I use it: CAD, multiple vessel    Prescriber:  Preethi Quiroz MD      Date I started using it:       Date I stopped using it:         Why I stopped using it:            Medication:  NICOTINE 21 MG/24HR TD PT24      How I use it:  Place 1 patch onto the skin every 24 hours      Why I use it: Cigarette nicotine dependence without complication    Prescriber:  Preethi Quiroz MD      Date I started using it:       Date I stopped using it:         Why I stopped using it:            Medication:  PAROXETINE HCL 40 MG PO TABS      How I use it:  Take 1 tablet (40 mg) by mouth every morning      Why I use it: Social phobia    Prescriber:  Preethi Quiroz MD      Date I started using it:       Date I stopped using it:         Why I stopped using it:            Medication:         How I use it:         Why I use it:       Prescriber:         Date I started using it:       Date I stopped using it:         Why I stopped using it:            Medication:         How I use it:         Why I use it:      Prescriber:         Date I started using it:       Date I stopped using it:         Why I stopped using it:            Medication:         How I use it:         Why I use it:      Prescriber:         Date I started using it:       Date I stopped using it:         Why I stopped using it:              Other Information:     If you have any questions about your medication list, call Andrea Bah BRISEYDA, Phone: [phone] , Monday-Friday 8-4:30pm.    According to the Paperwork Reduction Act of 1995, no persons are required to respond to a collection of information unless it displays a valid OMB control number. The valid OMB number for this information collection is [id number]. The time required to complete this information collection is estimated to average 40 minutes per response, including the time to review instructions, searching existing data resources, gather the data needed, and complete and review the information collection. If you have any comments concerning the accuracy of the time estimate(s) or suggestions for improving this form, please write to: CMS, Attn: AMANDA Reports Clearance Officer, [address] [place] [phone].

## 2021-09-14 NOTE — PATIENT INSTRUCTIONS
"Recommendations from today's MTM visit:                                                    MTM (medication therapy management) is a service provided by a clinical pharmacist designed to help you get the most of out of your medicines.   Today we reviewed what your medicines are for, how to know if they are working, that your medicines are safe and how to make your medicine regimen as easy as possible.    1. No longer necessary to take an iron (ferrous sulfate) tablet daily.     2. Will refill your albuterol inhaler as needed for shortness of breath.     3. We recommend that you take bupropion XL 150mg daily in the morning with breakfast. We can increase it to 300mg as you see fit, if you feel like it is working for you but you need an increase in the dose send me a message through your [employer]. As we discussed at the visit, there is no need to pick a \"quit date\". You will start taking the bupropion and using the nicotine patches and let yourself slowly wean off of smoking. We will start you out on the 21mg Nicotine patch that you can apply in the morning and leave on for up to 24 hours. If you have strange/vivid dreams, try taking your patch off in the evening before bedtime. You can pick both of these up next door at the pharmacy.       Follow-up: Return in about 4 weeks (around 10/12/2021), or 1:30pm, for tobacco abuse.    It was great to speak with you today.  I value your experience and would be very thankful for your time with providing feedback on our clinic survey. You may receive a survey via email or text message in the next few days.     To schedule another MTM appointment, please call the clinic directly or you may call the MTM scheduling line at [phone] or toll-free at [phone].     My Clinical Pharmacist's contact information:                                                      Please feel free to contact me with any questions or concerns you have.      Andrea Bah Rph.  Medication " Therapy Management Provider  [phone]    Edith Gamino  [facility]

## 2021-09-22 ENCOUNTER — LAB (OUTPATIENT)
Dept: LAB | Facility: CLINIC | Age: 71
End: 2021-09-22
Payer: COMMERCIAL

## 2021-09-22 ENCOUNTER — ALLIED HEALTH/NURSE VISIT (OUTPATIENT)
Dept: FAMILY MEDICINE | Facility: CLINIC | Age: 71
End: 2021-09-22
Payer: COMMERCIAL

## 2021-09-22 VITALS — HEART RATE: 55 BPM | DIASTOLIC BLOOD PRESSURE: 72 MMHG | SYSTOLIC BLOOD PRESSURE: 106 MMHG

## 2021-09-22 DIAGNOSIS — Z23 NEED FOR PROPHYLACTIC VACCINATION AND INOCULATION AGAINST INFLUENZA: Primary | ICD-10-CM

## 2021-09-22 DIAGNOSIS — I25.119 CORONARY ARTERY DISEASE INVOLVING NATIVE HEART WITH ANGINA PECTORIS, UNSPECIFIED VESSEL OR LESION TYPE (H): ICD-10-CM

## 2021-09-22 DIAGNOSIS — I10 HYPERTENSION GOAL BP (BLOOD PRESSURE) < 140/90: ICD-10-CM

## 2021-09-22 PROCEDURE — 36415 COLL VENOUS BLD VENIPUNCTURE: CPT

## 2021-09-22 PROCEDURE — 80061 LIPID PANEL: CPT

## 2021-09-22 PROCEDURE — 80048 BASIC METABOLIC PNL TOTAL CA: CPT

## 2021-09-22 PROCEDURE — 99207 PR NO CHARGE NURSE ONLY: CPT

## 2021-09-22 PROCEDURE — 90662 IIV NO PRSV INCREASED AG IM: CPT

## 2021-09-22 PROCEDURE — G0008 ADMIN INFLUENZA VIRUS VAC: HCPCS

## 2021-09-22 NOTE — PROGRESS NOTES

## 2021-09-22 NOTE — Clinical Note
Tevin Guidry is a 71 year old patient who comes in today for a Blood Pressure check.  Initial BP:  /72 (BP Location: Left arm, Patient Position: Chair, Cuff Size: Adult Regular)   Pulse 55      55  Disposition: follow-up as previously indicated by provider  Amy Granados MA

## 2021-09-23 LAB
CHOLEST SERPL-MCNC: 140 MG/DL
FASTING STATUS PATIENT QL REPORTED: NO
HDLC SERPL-MCNC: 45 MG/DL
LDLC SERPL CALC-MCNC: 78 MG/DL
NONHDLC SERPL-MCNC: 95 MG/DL
TRIGL SERPL-MCNC: 86 MG/DL

## 2021-09-28 ENCOUNTER — VIRTUAL VISIT (OUTPATIENT)
Dept: FAMILY MEDICINE | Facility: CLINIC | Age: 71
End: 2021-09-28
Payer: COMMERCIAL

## 2021-09-28 DIAGNOSIS — F17.210 CIGARETTE NICOTINE DEPENDENCE WITHOUT COMPLICATION: ICD-10-CM

## 2021-09-28 DIAGNOSIS — I10 HYPERTENSION GOAL BP (BLOOD PRESSURE) < 140/90: Primary | ICD-10-CM

## 2021-09-28 PROCEDURE — 99441 PR PHYSICIAN TELEPHONE EVALUATION 5-10 MIN: CPT | Mod: 95 | Performed by: FAMILY MEDICINE

## 2021-09-28 NOTE — PROGRESS NOTES
Hola is a 71 year old who is being evaluated via a billable telephone visit.      What phone number would you like to be contacted at? [phone]  How would you like to obtain your AVS? MyChart    Assessment & Plan       Essential hypertension with goal blood pressure less than 140/90  Controlled. Continue lisinopril 20 mg twice daily and metoprolol 50 mg daily.  BMP reviewed today and was stable.  He is tolerating his medications well.    Initially elevated. Recheck with MA. Reports it has been elevated recently. Will increase lisinopril to 20mg twice daily. Continues  metoprolol 50mg daily. Will plan on follow up with lab and bp check in two weeks, see me two days after virtually to review results.     Tobacco use  He has started Wellbutrin and is finding it helpful, plans on increasing the dose to 300 mg daily.  He will continue to work with MTM             Return in about 6 months (around 3/28/2022) for Follow up.    Preethi Quiroz MD   [facility]   Hola is a 71 year old who presents for the following health issues     HPI      Hypertension Follow-up      Do you check your blood pressure regularly outside of the clinic? No     Are you following a low salt diet? No    Are your blood pressures ever more than 140 on the top number (systolic) OR more   than 90 on the bottom number (diastolic), for example 140/90?              Review of Systems          Objective           Vitals:  No vitals were obtained today due to virtual visit.    Physical Exam   healthy, alert and no distress  PSYCH: Alert and oriented times 3; coherent speech, normal   rate and volume, able to articulate logical thoughts, able   to abstract reason, no tangential thoughts, no hallucinations   or delusions  His affect is normal  RESP: No cough, no audible wheezing, able to talk in full sentences  Remainder of exam unable to be completed due to telephone visits    Lab on 09/22/2021   Component Date Value  Ref Range Status     Cholesterol 09/22/2021 140  <200 mg/dL Final     Triglycerides 09/22/2021 86  <150 mg/dL Final     Direct Measure HDL 09/22/2021 45  >=40 mg/dL Final     LDL Cholesterol Calculated 09/22/2021 78  <=100 mg/dL Final     Non HDL Cholesterol 09/22/2021 95  <130 mg/dL Final     Patient Fasting > 8hrs? 09/22/2021 No   Final     Sodium 09/22/2021 138  133 - 144 mmol/L Final     Potassium 09/22/2021 4.2  3.4 - 5.3 mmol/L Final     Chloride 09/22/2021 104  94 - 109 mmol/L Final     Carbon Dioxide (CO2) 09/22/2021 30  20 - 32 mmol/L Final     Anion Gap 09/22/2021 4  3 - 14 mmol/L Final     Urea Nitrogen 09/22/2021 13  7 - 30 mg/dL Final     Creatinine 09/22/2021 0.98  0.66 - 1.25 mg/dL Final     Calcium 09/22/2021 8.2* 8.5 - 10.1 mg/dL Final     Glucose 09/22/2021 95  70 - 99 mg/dL Final     GFR Estimate 09/22/2021 76  >60 mL/min/1.73m2 Final    As of July 11, 2021, eGFR is calculated by the CKD-EPI creatinine equation, without race adjustment. eGFR can be influenced by muscle mass, exercise, and diet. The reported eGFR is an estimation only and is only applicable if the renal function is stable.                Phone call duration:  5 minutes

## 2021-10-06 LAB
ANION GAP SERPL CALCULATED.3IONS-SCNC: 4 MMOL/L (ref 3–14)
BUN SERPL-MCNC: 13 MG/DL (ref 7–30)
CALCIUM SERPL-MCNC: 9.5 MG/DL (ref 8.5–10.1)
CHLORIDE BLD-SCNC: 104 MMOL/L (ref 94–109)
CO2 SERPL-SCNC: 30 MMOL/L (ref 20–32)
CREAT SERPL-MCNC: 0.98 MG/DL (ref 0.66–1.25)
GFR SERPL CREATININE-BSD FRML MDRD: 76 ML/MIN/1.73M2
GLUCOSE BLD-MCNC: 95 MG/DL (ref 70–99)
POTASSIUM BLD-SCNC: 4.2 MMOL/L (ref 3.4–5.3)
SODIUM SERPL-SCNC: 138 MMOL/L (ref 133–144)

## 2021-10-07 ENCOUNTER — PRE VISIT (OUTPATIENT)
Dept: UROLOGY | Facility: CLINIC | Age: 71
End: 2021-10-07

## 2021-10-07 NOTE — TELEPHONE ENCOUNTER
Reason for visit: Cystoscopy     Relevant information: Recurrent bladder tumor    Records/imaging/labs/orders: All records available    Pt called: N/A    At Rooming: Collect urine

## 2021-10-11 NOTE — PROGRESS NOTES
Medication Therapy Management (MTM) Encounter    ASSESSMENT:                            Medication Adherence/Access: No issues identified    Smoking Cessation: not controlled. Patients bupropion was increase to 300mg daily ~2 weeks ago per patient request. Based on patient reporting going 1-1.5 days without cigarrette then smoking an entire pack when stressed from horse gambling, it would be beneficial to consider nicotine replacement gum. Continue nicotine 21mg patch daily and nicorette 2-4mg gum as needed for cravings.     Hypertension: Stable. Patient is not meeting blood pressure goal of < 140/80mmHg. Patient would benefit from the following changes - smoking cessation. Continue lisinopril and metoprolol without increase, as blood pressure today was above goal likely related to anxiety around discussion of smoking cessation. Closely monitor at follow-up visit.     PLAN:                            1.  Continue the 21 mg nicotine patch daily and bupropion 300mg daily. The days that you are feeling stressed or the greatest urge try and replace the cigarettes with a good amount of nicotine gum (2mg or 4mg). Even if you have one piece of gum every 1-2 hours (up to 20 pieces) that is okay if this is something then is helping you avoid smoking.     2. Try to find an activity that can clear your mind when you are having cravings like walking your brothers dog or reading a book.     3. Blood pressure today was above goal, continue to monitor at upcoming visit.     Follow-up: Return in about 5 weeks (around 11/16/2021), or 1:30pm, for BP Recheck, tobacco abuse.      SUBJECTIVE/OBJECTIVE:                          Tevin Guidry is a 71 year old male coming in for a follow-up (9-14-21) visit. He was referred to me from Preethi Quiroz      Reason for visit:   Smoking cessation. - messaged to increase bupropion to 300mg daily     Allergies/ADRs: Reviewed in chart  Past Medical History: Per patient has a hemicolectomy with  a hx of polyps (removed appendix). CAD (CABG), Social phobia, COPD, adenocarcinoma      Tobacco: He currently smokes 1ppd. He is trying to quit smoking currently. Successfully quit smoking tobacco for 10 years and restarted about 5-6 years ago.   Alcohol: not currently using  Other Substance Use: marajuana flower, occasional vape - smokes everyday   Caffeine: large starbucks in morning and sometimes in PM      Medication Adherence/Access: no issues reported    Tried smoking cessation with NRT (prn patch and gum) in August 2020.       Smoking Cessation: Current medications include: bupropion 300mg daily (increased from 150mg daily two weeks ago) and nicotine 21mg patch daily. Patient reports no side effects. Cannot tell bupropion is changing his mood at all - may feel happier but no major change. Has been using the patch everyday and bupropion.    Patient reports he has not completely quit smoking but has cut back. Can go about a day and half without thinking about it and then has a cigarette. Triggers: when he see someone having a cigarette on tv and stress related (he is still horse gambling). The urge has decreased since starting the bupropion. Had one cigarette today and 2 partials, Sunday smoked an entire pack, Saturday he did not smoke at all. It comes in waves.      Enjoys watching tv and taking care of the dog. He goes and watches his brothers dog everyday - feels like this really distracts him and he doesn't think about smoking.     Reports his sleep is good, generally 6-8 hours per night     He is still motivated to quit smoking. Motivations include: history of bladder cancer, knows that it isn't good for him, family history of cancer, cost     Previously:   How much does patient smoke: No more than a 1ppd   Why does patient smoke: will quit smoking for a few days and then have a stressful day and pick it up again. Seems that he smokes out of routine and stress relief.   Triggers for smoking include:   Stress, horse racing/gambling and then just the general routine (gets an urge). Discussed that he would be willing to stop horse betting to reduce stressors.   How long has patient been smoking:  Started when he was a kid around 11 or 13 yo, about 60 years. Reports he quit for about a 10 year period and gum helped him quit. Started smoking again about 5-6 years ago when He rented a room from a woman that had a roll up smoking machine, and this triggered him to start smoking again. Last quit smoking because he was convinced he had to quit from risk factors (familial hx of cancer) and then in addition to cannabis use felt he didn't need to smoke both. Was still smoking marajuana at the time.      Feels like he is willing to quit smoking and ready to quit.     Support system: Brother has been on him to quit smoking but his girlfriend smokes. Discussed chatting with brother if he needs encouragement     Has used the patch in the past, was using it regularly everyday. Found the gum to be most effective in first attempt then after th150/at when using it didn't find it to be that useful.     Smokes within first half hour of waking up. ??     What worked/didn t work in the past: patches worked somewhat but wasn't ready to quit smoking, gum worked to quit smoking for the first time (x10 years), but feels it hasn't worked in recent smoking attempts   Why does patient want to quit (motivators for quitting): health/familial hx of cancer, expensive   How important is it for patient to quit on a scale of 0 - 10? 9  How confident is the patient that they can stop smoking on a scale of 0-10: 6-8 (he has done it before)     Occupation: Retired - does some house painting, yard work, dog sitting      Fagerstrom Test to Assess Nicotine Dependence:  Points 0 1 2 3   1) How soon after you wake do you smoke your first cigarette? >1 hr 1/2 to 1 hr 6 to 30 min <5 min   2) Do you find it difficult to refrain from smoking in places where it  is forbidden? No Yes     3) Which cigarette would you hate to give up the most? Any other 1st in the AM     4) How many cigarettes/day do you smoke? <10 11 to 20 21 to 30 >31   5) Do you smoke the most in the morning? No Yes Unknown     6) If you are so ill that you are in bed most of the day, do you still smoke? No Yes Unknown    Fagerstrom score ~5   (0 - 5 = low to moderate nicotine dependence)  (6 - 10 = HIGH nicotine dependence)      Hypertension: Current medications include lisinopril 20mg twice daily, metoprolol succ 50mg daily.  Patient does not self-monitor blood pressure.  Patient reports no current medication side effects. Patient reports they are somewhat anxious about the visit, thinks this could be related to blood pressure elevation at moment. Continue to monitor.     Currently takes aspirin 81mg daily based on history of CABG. Reports no side effects.    BP Readings from Last 3 Encounters:   10/12/21 (!) 150/84   09/22/21 106/72   09/14/21 (!) 144/90            Today's Vitals: BP (!) 150/84   Pulse 62   Wt 166 lb (75.3 kg)   SpO2 90%   BMI 24.51 kg/m    ----------------      I spent 30 minutes with this patient today. All changes were made via collaborative practice agreement with Preethi Quiroz MD, MD. A copy of the visit note was provided to the patient's primary care provider.    The patient was given a summary of these recommendations.     Andrea Bah [facility].  Medication Therapy Management Provider  [phone]    Edith Gamino  Pharm[facility]        Medication Therapy Recommendations  Cigarette nicotine dependence without complication    Rationale: Synergistic therapy - Needs additional medication therapy - Indication   Recommendation: Start Medication - Nicorette 4 MG Gum - Take nicorette 2-4mg gum every 1-2 hours as needed for cravings   Status: Accepted per CPA

## 2021-10-12 ENCOUNTER — OFFICE VISIT (OUTPATIENT)
Dept: PHARMACY | Facility: CLINIC | Age: 71
End: 2021-10-12
Payer: COMMERCIAL

## 2021-10-12 VITALS
HEART RATE: 62 BPM | OXYGEN SATURATION: 90 % | BODY MASS INDEX: 24.51 KG/M2 | WEIGHT: 166 LBS | SYSTOLIC BLOOD PRESSURE: 150 MMHG | DIASTOLIC BLOOD PRESSURE: 84 MMHG

## 2021-10-12 DIAGNOSIS — I10 HYPERTENSION GOAL BP (BLOOD PRESSURE) < 140/90: ICD-10-CM

## 2021-10-12 DIAGNOSIS — F17.210 CIGARETTE NICOTINE DEPENDENCE WITHOUT COMPLICATION: Primary | ICD-10-CM

## 2021-10-12 PROCEDURE — 99606 MTMS BY PHARM EST 15 MIN: CPT | Performed by: PHARMACIST

## 2021-10-12 PROCEDURE — 99607 MTMS BY PHARM ADDL 15 MIN: CPT | Performed by: PHARMACIST

## 2021-10-12 NOTE — RESULT ENCOUNTER NOTE
Excellent! Please call or send a [employer] message if you have any questions. Preethi Quiroz M.D.

## 2021-10-12 NOTE — Clinical Note
FYI - patient is still motivated to continue working on smoking cessation. His bupropion was increased to 300mg two weeks ago. In past month, has reduced the amount of cigarettes he smokes in a week, but continuing to work on quitting.     Andrea Pope

## 2021-10-12 NOTE — PATIENT INSTRUCTIONS
Recommendations from today's MTM visit:                                                       1.  Continue the 21 mg nicotine patch daily and bupropion 300mg daily. The days that you are feeling stressed or the greatest urge try and replace the cigarettes with a good amount of nicotine gum (2mg or 4mg). Even if you have one piece of gum every 1-2 hours (up to 20 pieces) that is okay if this is something then is helping you avoid smoking.     2. Try to find an activity that can clear your mind when you are having cravings like walking your brothers dog or reading a book.     3. Blood pressure today was above goal, continue to monitor at upcoming visit.     Follow-up: Return in about 5 weeks (around 11/16/2021), or 1:30pm, for BP Recheck, tobacco abuse.    It was great to speak with you today.  I value your experience and would be very thankful for your time with providing feedback on our clinic survey. You may receive a survey via email or text message in the next few days.     To schedule another MTM appointment, please call the clinic directly or you may call the MTM scheduling line at [phone] or toll-free at [phone].     My Clinical Pharmacist's contact information:                                                      Please feel free to contact me with any questions or concerns you have.      Andrea Bah Rph.  Medication Therapy Management Provider  [phone]  Edith Gamino  Pharm[facility]

## 2021-11-05 NOTE — PROGRESS NOTES
"8/4/2021    Referring Provider: Cecile Bautista MD    Presenting Information:   I spoke with Tevin over the phone to discuss his genetic testing results. Testing was performed on a saliva sample collected by Tevin at his home. A CustomNext-Cancer panel (a combination of the CancerNext panel + the CTNNA1 gene + additional genes associated with an increased risk for GISTs (KIT, PDGFRA, SDHA, SDHB, SDHC, SDHD)) was ordered from [facility]. This testing was done because of his personal and family history of cancer and colon polyps.      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 This means that this mutation is present on both copies of his MUTYH gene. We discussed that these mutations are consistent with a diagnosis of MUTYH-associated polyposis (MAP) and increased risk for colon polyps and colon cancer. We discussed the impact of this testing on Tevin in detail.     Of note, Tevin tested negative for mutations in the following genes: APC, JAVI, AXIN2, BARD1, BMPR1A, BRCA1, BRCA2, BRIP1, CDH1, CDK4, CDKN2A, CHEK2, CTNNA1, DICER1, MLH1, MSH2, MSH3, MSH6, NBN, NF1, NTHL1, PALB2, PMS2, PTEN, RAD51C, RAD51D, RECQL, SDHA, SDHB, SDHC, SDHD, SMAD4, SMARCA4, STK11 and TP53 (sequencing and deletion/duplication); HOXB13, KIT, PDGFRA, POLD1 and POLE (sequencing only); EPCAM and GREM1 (deletion/duplication only). We reviewed the autosomal dominant inheritance of these genes. Mutations in these genes do not skip generations.      A copy of the test report can be found in the Laboratory tab, dated 7/14/21, and named \"LABORATORY MISCELLANEOUS ORDER\". The report is scanned in as a linked document.    Cancer Risks:   We discussed that individuals with two mutations in the MUTYH gene have a diagnosis of MUTYH-associated polyposis (MAP).     Individuals with MAP can have hundreds to thousands " of polyps, but most have fewer than 100. Multiple different types of colon polyps may be seen in individuals with MAP including adenomas, serrated adenomas, hyperplastic/sessile serrated polyps, and mixed (hyperplastic and adenomatous) polyps.    Individuals with MAP have an increased risk of colorectal cancer. In the absence of appropriate surveillance, the lifetime risk for colorectal cancer may be up to 80%.    There is also an increased risk for duodenal cancer in individuals with MAP.     Individuals with MAP may also be at increased risk for other cancers, including ovarian and bladder, although current risks are not well defined. Other cancers (including breast, uterine, gastric, pancreatic, skin, and thyroid) have also been reported in individuals with MAP, although confirmed risks are not available at this time.     Individuals with MAP may also have these features:    CHRPE: freckle like spots on the inside of the eye    Dental abnormalities/cysts on the jaw bone    Benign and malignant tumors of the skin s oil glands (sebaceous tumors)     Cancer Screening and Prevention:  The following screening is recommended for individuals with MUTYH-associated polyposis (MAP) per current National Comprehensive Cancer Network (NCCN) guidelines:    High-quality colonoscopy and polypectomy every 1-2 years beginning at age 25-30 (or earlier based on family history). Depending upon colon polyp burden, removal of the colon may be recommended with continued surveillance of the rectum (as needed).    Baseline upper endoscopy (including complete visualization of the ampulla of Vater) beginning at age 30-35; repeated based on polyp findings.    Annual physical exam to begin at the time of diagnosis with MAP. Guidelines do not yet encourage screening for other cancers, but skin and thyroid exams could be done as part of the annual physical exam.    There are currently no other specific cancer screening guidelines for other  cancers potentially associated with MAP. As such, additional screening should be based on personal and family history.    Other screening based on Tevin's personal and family history:    He was recently diagnosed with a cancer of unknown primary site (likely GI). He had extensive workup for this. He should continue with the plan for active surveillance as directed by his oncologist, Tanner Sánchez MD, PhD.    Tevin has already also had laparoscopic right hemicolectomy on 4/13/21 due to high colon polyp burden. He should continue with his colonoscopy surveillance as outlined above and as recommended by his physicians.    He also has a history of bladder cancer initially diagnosed in 2019. He should continue with his follow up for this as recommended by his urologist.     Of note, the above information is based on our current understanding of Tevin's genetic findings. Tevin is encouraged to reach out to me regularly regarding any pertinent updates to his personal and/or family history of cancer, as our understanding of the genetic findings in his family may change over time.     Implications for Family Members:  We reviewed the autosomal recessive inheritance of MAP. Individuals with MAP have a mutation within both copies of the MUTYH gene (two mutations, one that was inherited from each parent). When both parents are carriers for MAP, they have a 25% chance of having a child who inherited two mutations (affected with MAP), a 50% chance of having a child who inherited one mutation (carrier of MAP, with small increased risk for colon cancer), and a 25% chance of having a child who inherited neither mutation (unaffected; not a carrier). These chances apply to each pregnancy. Since two MUTYH mutations were detected in Tevin, we would assume that both of his parents were carriers for MAP. Therefore, the above outlined chances of inheriting one, two, or zero mutations in the MUTYH gene would have also applied to each  of Tevin's brothers. Genetic counseling is recommended for Tevin s brother who is still living to determine whether he inherited one, both, or neither of these two familial mutations. It is recommended that he have comprehensive testing of the MUTYH gene, though, as it is possible that he could carry a mutation in the MUTYH gene that is different than the ones identified in Tevin. I would be happy to see Tevin s family members for testing if they are interested.    Given that both of his parents are assumed to have been carriers for MAP, genetic counseling and testing is also recommended for his extended maternal and paternal relatives, and Tevin is encouraged to share this information with his family members on both sides of the family. Again, comprehensive testing of the MUTYH gene would be recommended for family members. I am happy to help his relatives connect with a genetic counselor in their area if they would like to discuss testing.    We reviewed that having only one MUTYH mutation means that an individual is a carrier for MAP. Approximately 1 in 100 (1%) individuals in the general population are carriers of MAP. Carriers of MAP have a very slightly increased risk of colon cancer (which could be as high as twice the population risk of 5%).     Additional Testing Considerations:  Even though Tevin's genetic testing result was positive, other relatives may carry a different gene mutation associated with an increased risk for cancer. We reviewed that while Tevin's diagnosis of MAP explains his personal history of colon polyps, it likely does not explain his extended family history of cancers. Therefore, genetic counseling is recommended for his brother, maternal, and paternal relatives to discuss genetic testing options. If any of these relatives do pursue genetic testing, Tevin is encouraged to contact me so that we may review the impact of their test results on him.    Support Resources:  "  Hereditary Colon Cancer Takes Guts - http://www.[facility] Cent[facility]uts.org/peer-support  This is a non-profit organization that supports and connects individuals with hereditary colon cancer syndromes.  They also promote research and health care initiatives.     Collaborative Group of the Americas on Inherited Colorectal Cancer (CGA) - http://www.cgaicc.com/  This group was established in 1995 and works to improve understanding of inherited colorectal cancer and management of families.    [employer] - www.[employer].org   [employer] is a 501(c)3 nonprofit and the local affiliate of the Cancer Support Community, a network of more than 54 supportive, free and welcoming  clubhouses  where everyone living with cancer can come for social, emotional and psychological support. Clubs are healing environments where individuals learn from each other with guidance from licensed professionals.    Other References:    American Cancer Society - www.cancer.org    National Society of Genetic Counselors: http://www.nsgc.org/     Plan:  1.  Tevin will be mailed a copy of his test results along with this letter.  2.  I will provide a \"Dear Relative\" letter for Tevin to share with his family members.  3.  He plans to follow up with his physicians.    If Tevin has additional questions, I encouraged him to contact me directly at [phone].     Dee Schaefer MS, [facility]  Licensed, Certified Genetic Counselor  Office: [phone]  Email: [email or web address]      "

## 2021-11-08 ENCOUNTER — HOSPITAL ENCOUNTER (OUTPATIENT)
Dept: PET IMAGING | Facility: CLINIC | Age: 71
Discharge: HOME | End: 2021-11-08
Attending: INTERNAL MEDICINE | Admitting: INTERNAL MEDICINE
Payer: COMMERCIAL

## 2021-11-08 DIAGNOSIS — C80.1 SIGNET RING CELL ADENOCARCINOMA (H): ICD-10-CM

## 2021-11-08 DIAGNOSIS — C78.5: ICD-10-CM

## 2021-11-08 PROCEDURE — 74177 CT ABD & PELVIS W/CONTRAST: CPT | Mod: 26 | Performed by: RADIOLOGY

## 2021-11-08 PROCEDURE — 78816 PET IMAGE W/CT FULL BODY: CPT | Mod: PS

## 2021-11-08 PROCEDURE — 78816 PET IMAGE W/CT FULL BODY: CPT | Mod: 26 | Performed by: RADIOLOGY

## 2021-11-08 PROCEDURE — 250N000011 HC RX IP 250 OP 636: Performed by: INTERNAL MEDICINE

## 2021-11-08 PROCEDURE — A9552 F18 FDG: HCPCS | Performed by: INTERNAL MEDICINE

## 2021-11-08 PROCEDURE — 343N000001 HC RX 343: Performed by: INTERNAL MEDICINE

## 2021-11-08 PROCEDURE — 71260 CT THORAX DX C+: CPT | Mod: 26 | Performed by: RADIOLOGY

## 2021-11-08 RX ORDER — IOPAMIDOL 755 MG/ML
10-135 INJECTION, SOLUTION INTRAVASCULAR ONCE
Status: COMPLETED | OUTPATIENT
Start: 2021-11-08 | End: 2021-11-08

## 2021-11-08 RX ADMIN — FLUDEOXYGLUCOSE F-18 12.73 MCI.: 500 INJECTION, SOLUTION INTRAVENOUS at 12:05

## 2021-11-08 RX ADMIN — IOPAMIDOL 101 ML: 755 INJECTION, SOLUTION INTRAVENOUS at 12:26

## 2021-11-09 ENCOUNTER — OFFICE VISIT (OUTPATIENT)
Dept: CARDIOLOGY | Facility: CLINIC | Age: 71
End: 2021-11-09
Attending: STUDENT IN AN ORGANIZED HEALTH CARE EDUCATION/TRAINING PROGRAM
Payer: COMMERCIAL

## 2021-11-09 VITALS
HEIGHT: 69 IN | HEART RATE: 55 BPM | WEIGHT: 166.1 LBS | OXYGEN SATURATION: 96 % | DIASTOLIC BLOOD PRESSURE: 84 MMHG | SYSTOLIC BLOOD PRESSURE: 152 MMHG | BODY MASS INDEX: 24.6 KG/M2

## 2021-11-09 DIAGNOSIS — I25.10 CORONARY ARTERY DISEASE INVOLVING NATIVE CORONARY ARTERY OF NATIVE HEART WITHOUT ANGINA PECTORIS: Primary | ICD-10-CM

## 2021-11-09 DIAGNOSIS — I10 ESSENTIAL HYPERTENSION WITH GOAL BLOOD PRESSURE LESS THAN 140/90: ICD-10-CM

## 2021-11-09 DIAGNOSIS — I25.10 CAD, MULTIPLE VESSEL: ICD-10-CM

## 2021-11-09 DIAGNOSIS — E78.5 HYPERLIPIDEMIA LDL GOAL <70: ICD-10-CM

## 2021-11-09 DIAGNOSIS — Z95.1 S/P CABG (CORONARY ARTERY BYPASS GRAFT): ICD-10-CM

## 2021-11-09 PROCEDURE — G0463 HOSPITAL OUTPT CLINIC VISIT: HCPCS

## 2021-11-09 PROCEDURE — 99214 OFFICE O/P EST MOD 30 MIN: CPT | Performed by: STUDENT IN AN ORGANIZED HEALTH CARE EDUCATION/TRAINING PROGRAM

## 2021-11-09 RX ORDER — EZETIMIBE 10 MG/1
10 TABLET ORAL DAILY
Qty: 90 TABLET | Refills: 3 | Status: SHIPPED | OUTPATIENT
Start: 2021-11-09 | End: 2022-12-13

## 2021-11-09 RX ORDER — METOPROLOL SUCCINATE 50 MG/1
50 TABLET, EXTENDED RELEASE ORAL AT BEDTIME
Qty: 90 TABLET | Refills: 3 | Status: SHIPPED | OUTPATIENT
Start: 2021-11-09 | End: 2022-12-13

## 2021-11-09 RX ORDER — ATORVASTATIN CALCIUM 80 MG/1
80 TABLET, FILM COATED ORAL AT BEDTIME
Qty: 90 TABLET | Refills: 3 | Status: SHIPPED | OUTPATIENT
Start: 2021-11-09 | End: 2022-12-13

## 2021-11-09 RX ORDER — LISINOPRIL 20 MG/1
20 TABLET ORAL 2 TIMES DAILY
Qty: 180 TABLET | Refills: 3 | Status: SHIPPED | OUTPATIENT
Start: 2021-11-09 | End: 2023-02-22

## 2021-11-09 ASSESSMENT — PAIN SCALES - GENERAL: PAINLEVEL: NO PAIN (0)

## 2021-11-09 ASSESSMENT — MIFFLIN-ST. JEOR: SCORE: 1488.8

## 2021-11-09 NOTE — PATIENT INSTRUCTIONS
"You were seen today in the Cardiovascular Clinic at the [facility].     Cardiology Providers you saw during your visit: Azul Breaux MD      Diagnosis:   Encounter Diagnoses   Name Primary?     Essential hypertension with goal blood pressure less than 140/90      Hyperlipidemia LDL goal <70      S/P CABG (coronary artery bypass graft)      Coronary artery disease involving native coronary artery of native heart without angina pectoris Yes     CAD, multiple vessel         Results: Discussed with you today cholesterol, smoking cessation      Orders:   Orders Placed This Encounter   Procedures     Lipid panel reflex to direct LDL Fasting     Follow-Up with Cardiologist         Medications Discontinued:  Medications Discontinued During This Encounter   Medication Reason     lisinopril (ZESTRIL) 20 MG tablet      metoprolol succinate ER (TOPROL-XL) 50 MG 24 hr tablet Reorder     atorvastatin (LIPITOR) 80 MG tablet Reorder         Recommendations:   1. START EZETIMIBE 10 mg a day you can take this every night- it is to lower the cholesterol more  2. Take a blood work (lipid panel) when you go for blood work next  3. Make note of your blood pressure and take them to the PCP  4. Great job in quitting smoking! Keep it up.       Follow up with Provider - Dr Evans in 2y        Please feel free to call me with any questions or concerns.       Questions: [phone].   First press #1 for the [employer] for \"Medical Questions\" to reach us Cardiology Nurses.     [phone].   First press #1 for the [employer] and then press #1     On Call Cardiologist for after hours or on weekends: [phone]   option #4 and ask to speak to the on-call Cardiologist.          If you need a medication refill please contact your pharmacy.  Please allow 3 business days for your refill to be " completed.  ________________________________________________________________________________________________________________________________

## 2021-11-09 NOTE — LETTER
11/9/2021      RE: Tevin Guidry  [address]  [place] [address]       Dear Colleague,    Thank you for the opportunity to participate in the care of your patient, Tevin Guidry, at the [facility] [place] at [facility]. Please see a copy of my visit note below.    [facility]  CARDIOVASCULAR MEDICINE CLINIC NOTE    Referring Provider: Established Patient   Primary Care Provider: Preethi Quiroz     Patient Name: Tevin Guidry   MRN: [id number]       Chief complaint:coronary artery disease, follow up    HPI:   70 yo M with a history of CAD s/p CABG (LIMA-LAD ) and ALENA to RCA in 2014, HTN, HLP who comes for follow up.     His cardiac history dates to 2013 when he was noted to have pain with exertion stress was positive coronary angiogram showed diffuse disease of his RCA and moderate LAD stenosis he had a hybrid robotic assisted CABG with LIMA-LAD and ALENA to the RCA 10/2014. Since then he has been doing well. He was last seen by Dr Evans in 2017 who changed his statin. 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 He also fell and broke his wrist in june of this year. From a cardiac standpoint. He walks the dog every day a Croatian frye she drags him and he walks 20 min three times a day he does not have to stop because of shortness of breath he can walk up two flights of stairs. He is still smoking but is working to quit. A month ago they doubled his lisinopril from 20mg a day to 20mg BID and he tells me with his bp cuff at home he did get systolics in the 100 range.     Current cardiac meds  Asa  Atorvastatin 80/d  Lisinopril 20mg BID  Metoprolol XL 50mg at bedtime    CURRENT MEDICATIONS:  Current Outpatient Medications   Medication Sig Dispense Refill     albuterol (PROAIR  HFA/PROVENTIL HFA/VENTOLIN HFA) 108 (90 Base) MCG/ACT inhaler Inhale 2 puffs into the lungs every 6 hours as needed for shortness of breath / dyspnea or wheezing 18 g 1     aspirin 81 MG tablet Take 1 tablet (81 mg) by mouth daily (Patient taking differently: Take 81 mg by mouth every morning ) 90 tablet 3     atorvastatin (LIPITOR) 80 MG tablet Take 1 tablet (80 mg) by mouth At Bedtime 90 tablet 3     buPROPion (WELLBUTRIN XL) 300 MG 24 hr tablet Take 1 tablet (300 mg) by mouth every morning 90 tablet 1     ezetimibe (ZETIA) 10 MG tablet Take 1 tablet (10 mg) by mouth daily 90 tablet 3     lisinopril (ZESTRIL) 20 MG tablet Take 1 tablet (20 mg) by mouth 2 times daily 180 tablet 3     metoprolol succinate ER (TOPROL-XL) 50 MG 24 hr tablet Take 1 tablet (50 mg) by mouth At Bedtime 90 tablet 3     nicotine (NICODERM CQ) 21 MG/24HR 24 hr patch Place 1 patch onto the skin every 24 hours 28 patch 1     PARoxetine (PAXIL) 40 MG tablet Take 1 tablet (40 mg) by mouth every morning 90 tablet 3       PAST MEDICAL HISTORY:  Past Medical History:   Diagnosis Date     Bladder cancer (H)      Colon polyps     multiple may need colectomy (adenoma)     COPD (chronic obstructive pulmonary disease) (H)      Hyperlipidemia LDL goal <130      Hypertension goal BP (blood pressure) < 140/90      Social phobia        PAST SURGICAL HISTORY:  Past Surgical History:   Procedure Laterality Date     CATARACT IOL, RT/LT       CLAVICLE SURGERY Right     fracture in childhood     COLONOSCOPY N/A 3/30/2017    Procedure: COLONOSCOPY;  Surgeon: Jie Onofre MD;  Location: [facility] GI     COLONOSCOPY N/A 3/2/2021    Procedure: COLONOSCOPY, WITH POLYPECTOMY, hot snare, lift with elaview and epi, clip for hemorrhage control, tattoo;  Surgeon: Ra Cardoso MD;  [place] [facility]     COLONOSCOPY N/A 4/22/2021    Procedure: Colonoscopy;  Surgeon: Jitendra Rodríguez MD;  Location: [facility] OR     CYSTOSCOPY, BIOPSY BLADDER INSTILL OPTICAL AGENT N/A  9/2/2020    Procedure: CYSTOSCOPY, WITH INSTILLATION OF OPTICAL IMAGING AGENT INTO BLADDER AND BIOPSY OF BLADDER (CYSVIEW);  Surgeon: Kirt Guzman MD;  Location: [facility] OR     CYSTOSCOPY, TRANSURETHRAL RESECTION (TUR) TUMOR BLADDER, COMBINED N/A 9/5/2019    Procedure: cystoscopy,Transurethral Resection Of Bladder Tumor;  Surgeon: Almas Sunshine MD;  Location: [facility] OR     CYSTOSCOPY, TRANSURETHRAL RESECTION (TUR) TUMOR BLADDER, COMBINED N/A 2/6/2020    Procedure: CYSTOSCOPY, WITH TRANSURETHRAL RESECTION BLADDER TUMOR;  Surgeon: Almas Sunshine MD;  Location: [facility]     DAVINCI BYPASS ARTERY CORONARY N/A 9/29/2014    Procedure: DAVINCI BYPASS ARTERY CORONARY;  Surgeon: Lam Solis MD;  Location: [facility] OR     ESOPHAGOSCOPY, GASTROSCOPY, DUODENOSCOPY (EGD), COMBINED N/A 3/30/2017    Procedure: COMBINED ESOPHAGOSCOPY, GASTROSCOPY, DUODENOSCOPY (EGD);  Surgeon: Jie Onofre MD;  Location: [facility]     PHACOEMULSIFICATION CLEAR CORNEA WITH STANDARD INTRAOCULAR LENS IMPLANT Right 12/11/2020    Procedure: RIGHT EYE COMPLEX CATARACT REMOVAL WITH INTRAOCULAR LENS IMPLANT;  Surgeon: Aimee Marcus MD;  Location: [facility] OR     SURGICAL HISTORY OF -       right clavicular fracture with ORIF     ZZHC COLONOSCOPY THRU STOMA, DIAGNOSTIC  2010    polyps due 2011       ALLERGIES:     Allergies   Allergen Reactions     Codeine Nausea and Vomiting       FAMILY HISTORY:  Family History   Problem Relation Age of Onset     Cancer Mother         esog/stomach     Cancer Father         lung     Hypertension Brother      Stomach Cancer Brother      Anesthesia Reaction No family hx of      Deep Vein Thrombosis (DVT) No family hx of      Glaucoma No family hx of      Macular Degeneration No family hx of        SOCIAL HISTORY:  Social History     Tobacco Use     Smoking status: Former Smoker     Packs/day: 0.00     Years: 30.00     Pack years: 0.00     Types: Cigarettes     Quit date: 8/1/2019      "Years since quittin.2     Smokeless tobacco: Never Used     Tobacco comment: had restarted- now quit smoking 20   Substance Use Topics     Alcohol use: No     Comment: quit 35 years ago     Drug use: Yes     Types: Marijuana     Comment: occionally use of marijuana       ROS:   A comprehensive 14 point review of systems is negative other than as mentioned in HPI.    Exam:  BP (!) 152/84   Pulse 55   Ht 1.753 m (5' 9\")   Wt 75.3 kg (166 lb 1.6 oz)   SpO2 96%   BMI 24.53 kg/m    Body mass index is 24.53 kg/m .  Wt Readings from Last 2 Encounters:   21 75.3 kg (166 lb 1.6 oz)   10/12/21 75.3 kg (166 lb)     Constitutional: no acute distress, pleasant and cooperative, appears overall well.  Eyes:sclera white, conjunctiva clear, without icterus or pallor   Ears/Nose/Mouth/Throat: mucosa moist, no central cyanosis, Nares patent b/l, moist mucous membranes  Cardiovascular: RRR nl S1S2, JVP not elevated, extremities with no edema or cyanosis  Respiratory: clear to auscultation bilaterally -no rales, rhonchi or wheezes, no use of accessory muscles, no retractions, respirations are unlabored, normal respiratory rate  Gastrointestinal: soft, nontender, non distended, no hepatosplenomegaly or masses  Musculoskeletal: normal muscle bulk and tone, joints   Skin: normal skin appearance without worrisome lesions.   Neurologic: Alert and oriented, face symmetric, normal gait  Psychiatric: appropriate affect, cooperative        Labs:  Reviewed.   Recent Labs   Lab Test 21  1425 21  1231    141   POTASSIUM 4.2 4.3   CHLORIDE 104 107   CO2 30 30   BUN 13 17   CR 0.98 0.85     CBC RESULTS:   Recent Labs   Lab Test 21  1231   WBC 9.6   RBC 4.84   HGB 15.0   HCT 45.8   MCV 95   MCH 31.0   MCHC 32.8   RDW 12.7        Cholesterol   Date Value Ref Range Status   2021 140 <200 mg/dL Final   2020 140 <200 mg/dL Final   2019 129 <200 mg/dL Final     HDL Cholesterol   Date " Value Ref Range Status   08/03/2020 59 >39 mg/dL Final   07/03/2019 50 >39 mg/dL Final     Direct Measure HDL   Date Value Ref Range Status   09/22/2021 45 >=40 mg/dL Final     LDL Cholesterol Calculated   Date Value Ref Range Status   09/22/2021 78 <=100 mg/dL Final   08/03/2020 64 <100 mg/dL Final     Comment:     Desirable:       <100 mg/dl   07/03/2019 60 <100 mg/dL Final     Comment:     Desirable:       <100 mg/dl     Triglycerides   Date Value Ref Range Status   09/22/2021 86 <150 mg/dL Final   08/03/2020 84 <150 mg/dL Final     Comment:     Non Fasting   07/03/2019 93 <150 mg/dL Final     Comment:     Non Fasting     Cholesterol/HDL Ratio   Date Value Ref Range Status   10/23/2015 3.6 0.0 - 5.0 Final   09/30/2014 2.1 0.0 - 5.0 Final     INR   Date Value Ref Range Status   04/20/2021 1.00 0.86 - 1.14 Final       TSH   Date Value Ref Range Status   01/25/2017 0.70 0.40 - 4.00 mU/L Final     Recent Labs   Lab Test 04/09/21  1533 09/28/14  0709   A1C 5.4 5.9       Testing/Procedures:  I personally reviewed all the following information:  Coronary angiogram with PCI (9/29/2014)  LIMA graft injected after the PCI per request of Dr. Solis after the PCI.   It has two stenotic areas at the touchdown of mLAD with LOKESH 3 flow flow distally.   Possible edematous change from the operation per Dr. Solis   Impression: 1. Successful PCI with ALENA from distal to prox segments of RCA  . 2. Good flow of LIMA to LAD with stenosis as noted above. Davinci Hybrid Coronary Artery Bypass Graft of Left inferior Mamary Artery to LAD(9/29/2014)    Echo 2014  Global and regional left ventricular function is normal with an EF of 60-65%.   Right ventricular function, chamber size, wall motion, and thickness are normal. Pulmonary artery systolic pressure cannot be assessed. The inferior vena cava  is normal.   No pericardial effusion is present.      Assessment and Plan:   70 yo M with a history of CAD s/p CABG (LIMA-LAD ) and ALENA to RCA in  2014, HTN, HLP who comes for follow up.     CAD   - s/p robotic-hybrid CABG (LIMA-LAD ) and ALENA to RCA in 2014  - on asa  - statin - atorvastatin 80mg qhs  - bb- metoprolol XK 50mg qhs  - ACEI- lisinopril 20mg BID  - active smoker     Smoking cessation  -we discussed smoking cessation  - he is working to quit and is actively engaged with pharmacy to do so.    HTN  - uncontrolled today in the visit however just had his lisinopril uptitrated and states at home his readings are closer to systolics 100-110  - on lisinopril and metoprolol- bp log and update PCP to uptitrate medications as needed    HLP  - atorvastatin 80 mg/d, LDL 78  - given history of CAD and CABG with active smoking will like to lower LDL further to <70, start ezetimibe 10mg d  - lipid panel    -we discussed recommendation of 150 min moderate intensity exercise /week   - discussed the need for heart healthy diet including a diet rich in fruits, vegetables and fiber and low on carbonated beverages sugar  -discussed recommendation of moderation of alcohol, salt and NSAIDs    >50% of this 30 minute visit were spent with the patient and ** family on in-person counseling and discussion regarding the above issues, the remainder of the time was spent in chart review, documentation, ordering and communication with other providers.    The patient states understanding and is agreeable with plan.     Azul Breaux MD  Cardiology

## 2021-11-09 NOTE — PROGRESS NOTES
[facility]  CARDIOVASCULAR MEDICINE CLINIC NOTE    Referring Provider: Established Patient   Primary Care Provider: Preethi Quiroz     Patient Name: Tevin Guidry   MRN: [id number]       Chief complaint:coronary artery disease, follow up    HPI:   72 yo M with a history of CAD s/p CABG (LIMA-LAD ) and ALENA to RCA in 2014, HTN, HLP who comes for follow up.     His cardiac history dates to 2013 when he was noted to have pain with exertion stress was positive coronary angiogram showed diffuse disease of his RCA and moderate LAD stenosis he had a hybrid robotic assisted CABG with LIMA-LAD and ALENA to the RCA 10/2014. Since then he has been doing well. He was last seen by Dr Evans in 2017 who changed his statin. 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 He also fell and broke his wrist in june of this year. From a cardiac standpoint. He walks the dog every day a Marshallese frye she drags him and he walks 20 min three times a day he does not have to stop because of shortness of breath he can walk up two flights of stairs. He is still smoking but is working to quit. A month ago they doubled his lisinopril from 20mg a day to 20mg BID and he tells me with his bp cuff at home he did get systolics in the 100 range.     Current cardiac meds  Asa  Atorvastatin 80/d  Lisinopril 20mg BID  Metoprolol XL 50mg at bedtime    CURRENT MEDICATIONS:  Current Outpatient Medications   Medication Sig Dispense Refill     albuterol (PROAIR HFA/PROVENTIL HFA/VENTOLIN HFA) 108 (90 Base) MCG/ACT inhaler Inhale 2 puffs into the lungs every 6 hours as needed for shortness of breath / dyspnea or wheezing 18 g 1     aspirin 81 MG tablet Take 1 tablet (81 mg) by mouth daily (Patient taking differently: Take 81 mg by mouth every morning ) 90 tablet 3     atorvastatin (LIPITOR) 80 MG tablet  Take 1 tablet (80 mg) by mouth At Bedtime 90 tablet 3     buPROPion (WELLBUTRIN XL) 300 MG 24 hr tablet Take 1 tablet (300 mg) by mouth every morning 90 tablet 1     ezetimibe (ZETIA) 10 MG tablet Take 1 tablet (10 mg) by mouth daily 90 tablet 3     lisinopril (ZESTRIL) 20 MG tablet Take 1 tablet (20 mg) by mouth 2 times daily 180 tablet 3     metoprolol succinate ER (TOPROL-XL) 50 MG 24 hr tablet Take 1 tablet (50 mg) by mouth At Bedtime 90 tablet 3     nicotine (NICODERM CQ) 21 MG/24HR 24 hr patch Place 1 patch onto the skin every 24 hours 28 patch 1     PARoxetine (PAXIL) 40 MG tablet Take 1 tablet (40 mg) by mouth every morning 90 tablet 3       PAST MEDICAL HISTORY:  Past Medical History:   Diagnosis Date     Bladder cancer (H)      Colon polyps     multiple may need colectomy (adenoma)     COPD (chronic obstructive pulmonary disease) (H)      Hyperlipidemia LDL goal <130      Hypertension goal BP (blood pressure) < 140/90      Social phobia        PAST SURGICAL HISTORY:  Past Surgical History:   Procedure Laterality Date     CATARACT IOL, RT/LT       CLAVICLE SURGERY Right     fracture in childhood     COLONOSCOPY N/A 3/30/2017    Procedure: COLONOSCOPY;  Surgeon: Jie Onofre MD;  Location: [facility]     COLONOSCOPY N/A 3/2/2021    Procedure: COLONOSCOPY, WITH POLYPECTOMY, hot snare, lift with elaview and epi, clip for hemorrhage control, tattoo;  Surgeon: Ra Cardoso MD;  Location: [facility]     COLONOSCOPY N/A 4/22/2021    Procedure: Colonoscopy;  Surgeon: Jitendra Rodríguez MD;  Location: [facility]     CYSTOSCOPY, BIOPSY BLADDER INSTILL OPTICAL AGENT N/A 9/2/2020    Procedure: CYSTOSCOPY, WITH INSTILLATION OF OPTICAL IMAGING AGENT INTO BLADDER AND BIOPSY OF BLADDER (CYSVIEW);  Surgeon: Kirt Guzman MD;  [facility]     CYSTOSCOPY, TRANSURETHRAL RESECTION (TUR) TUMOR BLADDER, COMBINED N/A 9/5/2019    Procedure: cystoscopy,Transurethral Resection Of Bladder Tumor;  Surgeon:  Almas Sunshine MD;  [facility]: [facility]     CYSTOSCOPY, TRANSURETHRAL RESECTION (TUR) TUMOR BLADDER, COMBINED N/A 2020    Procedure: CYSTOSCOPY, WITH TRANSURETHRAL RESECTION BLADDER TUMOR;  Surgeon: Almas Sunshine MD;  [facility]     DAVINCI BYPASS ARTERY CORONARY N/A 2014    Procedure: DAVINCI BYPASS ARTERY CORONARY;  Surgeon: Lam Solis MD;  Location: [facility]     ESOPHAGOSCOPY, GASTROSCOPY, DUODENOSCOPY (EGD), COMBINED N/A 3/30/2017    Procedure: COMBINED ESOPHAGOSCOPY, GASTROSCOPY, DUODENOSCOPY (EGD);  Surgeon: Jie Onofre MD;  Location: [facility]     PHACOEMULSIFICATION CLEAR CORNEA WITH STANDARD INTRAOCULAR LENS IMPLANT Right 2020    Procedure: RIGHT EYE COMPLEX CATARACT REMOVAL WITH INTRAOCULAR LENS IMPLANT;  Surgeon: Aimee Marcus MD;  Location: [facility]     SURGICAL HISTORY OF -       right clavicular fracture with ORIF     ZZHC COLONOSCOPY THRU STOMA, DIAGNOSTIC      polyps due        ALLERGIES:     Allergies   Allergen Reactions     Codeine Nausea and Vomiting       FAMILY HISTORY:  Family History   Problem Relation Age of Onset     Cancer Mother         esog/stomach     Cancer Father         lung     Hypertension Brother      Stomach Cancer Brother      Anesthesia Reaction No family hx of      Deep Vein Thrombosis (DVT) No family hx of      Glaucoma No family hx of      Macular Degeneration No family hx of        SOCIAL HISTORY:  Social History     Tobacco Use     Smoking status: Former Smoker     Packs/day: 0.00     Years: 30.00     Pack years: 0.00     Types: Cigarettes     Quit date: 2019     Years since quittin.2     Smokeless tobacco: Never Used     Tobacco comment: had restarted- now quit smoking 20   Substance Use Topics     Alcohol use: No     Comment: quit 35 years ago     Drug use: Yes     Types: Marijuana     Comment: occionally use of marijuana       ROS:   A comprehensive 14 point review of systems is negative other  "than as mentioned in HPI.    Exam:  BP (!) 152/84   Pulse 55   Ht 1.753 m (5' 9\")   Wt 75.3 kg (166 lb 1.6 oz)   SpO2 96%   BMI 24.53 kg/m    Body mass index is 24.53 kg/m .  Wt Readings from Last 2 Encounters:   11/09/21 75.3 kg (166 lb 1.6 oz)   10/12/21 75.3 kg (166 lb)     Constitutional: no acute distress, pleasant and cooperative, appears overall well.  Eyes:sclera white, conjunctiva clear, without icterus or pallor   Ears/Nose/Mouth/Throat: mucosa moist, no central cyanosis, Nares patent b/l, moist mucous membranes  Cardiovascular: RRR nl S1S2, JVP not elevated, extremities with no edema or cyanosis  Respiratory: clear to auscultation bilaterally -no rales, rhonchi or wheezes, no use of accessory muscles, no retractions, respirations are unlabored, normal respiratory rate  Gastrointestinal: soft, nontender, non distended, no hepatosplenomegaly or masses  Musculoskeletal: normal muscle bulk and tone, joints   Skin: normal skin appearance without worrisome lesions.   Neurologic: Alert and oriented, face symmetric, normal gait  Psychiatric: appropriate affect, cooperative        Labs:  Reviewed.   Recent Labs   Lab Test 09/22/21  1425 04/20/21  1231    141   POTASSIUM 4.2 4.3   CHLORIDE 104 107   CO2 30 30   BUN 13 17   CR 0.98 0.85     CBC RESULTS:   Recent Labs   Lab Test 04/20/21  1231   WBC 9.6   RBC 4.84   HGB 15.0   HCT 45.8   MCV 95   MCH 31.0   MCHC 32.8   RDW 12.7        Cholesterol   Date Value Ref Range Status   09/22/2021 140 <200 mg/dL Final   08/03/2020 140 <200 mg/dL Final   07/03/2019 129 <200 mg/dL Final     HDL Cholesterol   Date Value Ref Range Status   08/03/2020 59 >39 mg/dL Final   07/03/2019 50 >39 mg/dL Final     Direct Measure HDL   Date Value Ref Range Status   09/22/2021 45 >=40 mg/dL Final     LDL Cholesterol Calculated   Date Value Ref Range Status   09/22/2021 78 <=100 mg/dL Final   08/03/2020 64 <100 mg/dL Final     Comment:     Desirable:       <100 mg/dl "   07/03/2019 60 <100 mg/dL Final     Comment:     Desirable:       <100 mg/dl     Triglycerides   Date Value Ref Range Status   09/22/2021 86 <150 mg/dL Final   08/03/2020 84 <150 mg/dL Final     Comment:     Non Fasting   07/03/2019 93 <150 mg/dL Final     Comment:     Non Fasting     Cholesterol/HDL Ratio   Date Value Ref Range Status   10/23/2015 3.6 0.0 - 5.0 Final   09/30/2014 2.1 0.0 - 5.0 Final     INR   Date Value Ref Range Status   04/20/2021 1.00 0.86 - 1.14 Final       TSH   Date Value Ref Range Status   01/25/2017 0.70 0.40 - 4.00 mU/L Final     Recent Labs   Lab Test 04/09/21  1533 09/28/14  0709   A1C 5.4 5.9       Testing/Procedures:  I personally reviewed all the following information:  Coronary angiogram with PCI (9/29/2014)  LIMA graft injected after the PCI per request of Dr. Solis after the PCI.   It has two stenotic areas at the touchdown of mLAD with LOKESH 3 flow flow distally.   Possible edematous change from the operation per Dr. Solis   Impression: 1. Successful PCI with ALENA from distal to prox segments of RCA  . 2. Good flow of LIMA to LAD with stenosis as noted above. Davinci Hybrid Coronary Artery Bypass Graft of Left inferior Mamary Artery to LAD(9/29/2014)    Echo 2014  Global and regional left ventricular function is normal with an EF of 60-65%.   Right ventricular function, chamber size, wall motion, and thickness are normal. Pulmonary artery systolic pressure cannot be assessed. The inferior vena cava  is normal.   No pericardial effusion is present.      Assessment and Plan:   72 yo M with a history of CAD s/p CABG (LIMA-LAD ) and ALENA to RCA in 2014, HTN, HLP who comes for follow up.     CAD   - s/p robotic-hybrid CABG (LIMA-LAD ) and ALENA to RCA in 2014  - on asa  - statin - atorvastatin 80mg qhs  - bb- metoprolol XK 50mg qhs  - ACEI- lisinopril 20mg BID  - active smoker     Smoking cessation  -we discussed smoking cessation  - he is working to quit and is actively engaged with  pharmacy to do so.    HTN  - uncontrolled today in the visit however just had his lisinopril uptitrated and states at home his readings are closer to systolics 100-110  - on lisinopril and metoprolol- bp log and update PCP to uptitrate medications as needed    HLP  - atorvastatin 80 mg/d, LDL 78  - given history of CAD and CABG with active smoking will like to lower LDL further to <70, start ezetimibe 10mg d  - lipid panel    -we discussed recommendation of 150 min moderate intensity exercise /week   - discussed the need for heart healthy diet including a diet rich in fruits, vegetables and fiber and low on carbonated beverages sugar  -discussed recommendation of moderation of alcohol, salt and NSAIDs    >50% of this 30 minute visit were spent with the patient and ** family on in-person counseling and discussion regarding the above issues, the remainder of the time was spent in chart review, documentation, ordering and communication with other providers.    The patient states understanding and is agreeable with plan.     Azul Breaux MD  Cardiology    [facility]  ESTABLISHED PATIENT

## 2021-11-09 NOTE — NURSING NOTE
Chief Complaint   Patient presents with     Follow Up     present for HDL follow up      Vitals were taken and medications reconciled.    RANDA Plascencia  9:36 AM

## 2021-11-15 NOTE — PROGRESS NOTES
Medication Therapy Management (MTM) Encounter    ASSESSMENT:                            Medication Adherence/Access: No issues identified    Smoking cessation: Patient is smoke free. Patient is ready to quit using tobacco.  Based on patient preferences and history, patient would benefit from continuing the nicotine gum, nicotine patch, and  bupropion.  We discussed: risks of smoking, benefits of quitting and ways to cope with cravings and manage triggers.    Hypertension: Patient is not meeting blood pressure goal of < 140/90mmHg. Patient would benefit from the following changes - addition of bedtime 5mg. Amlodipine, continued blood pressure monitoring, watching diet, increasing exercise and weight loss and limiting/reducing sodium.      PLAN:                            1. FYI--Congratulations --your smoke free x 1 week since 11-9-21.   Keep using daily 21mg Nicotine patch as well as 300mg./day Bupropion and use the 4mg. Nicotine gum as needed to control cravings/urges.     2. BP today is 148/80mmhg. --lets stay on Lisinopril 20mg twice daily, and Metoprolol 50mg. ER tab daily --lets ADD 5mg. Amlodipine at bedtime to lower your top BP number to goal of <140/90mmhg.         Follow-up: Return in about 4 weeks (around 12/14/2021), or 1:30pm, for BP Recheck, Medication Therapy Management Visit, tobacco abuse.      SUBJECTIVE/OBJECTIVE:                          Tevin Guidry is a 71 year old male coming in for a follow-up (10-12-21) visit. He was referred to me from Preethi Quiroz      Reason for visit:   Smoking cessation f/up. BP recheck.         Allergies/ADRs: Reviewed in chart  Past Medical History: Per patient has a hemicolectomy with a hx of polyps (removed appendix). CAD (CABG), Social phobia, COPD, adenocarcinoma      Tobacco: He currently smokes 1ppd. He is trying to quit smoking currently. Successfully quit smoking tobacco for 10 years and restarted about 5-6 years ago.   Alcohol: not currently  using  Other Substance Use: marajuana flower, occasional vape - smokes everyday   Caffeine: large starbucks in morning and sometimes in PM      Medication Adherence/Access: no issues reported    Tried smoking cessation with NRT (prn patch and gum) in August 2020.       Smoking Cessation: Current medications include: bupropion 300mg daily (increased from 150mg daily two weeks ago) and nicotine 21mg patch daily. Patient reports no side effects. Cannot tell bupropion is changing his mood at all - may feel happier but no major change. Has been using the patch everyday and bupropion.    Patient reports he has  completely quit smoking since last Tuesday 11-9-21 .  He is doing ok but struggled yesterday --bought some nicotine gum 4mg.- used it for urges --it works!  It helps to not have any cigs around. mat RAMOS is a smoker --he watches there dog --it is tough to avoid her cigs but she is helping not leaving cigs around.        Can go about a day and half without thinking about it and then has a cigarette. Triggers: when he see someone having a cigarette on tv and stress related (he is still horse gambling). The urge has decreased since starting the bupropion. Had one cigarette today and 2 partials, Sunday smoked an entire pack, Saturday he did not smoke at all. It comes in waves.      Enjoys watching tv and taking care of the dog. He goes and watches his brothdhruv dog everyday - feels like this really distracts him and he doesn't think about smoking.     Reports his sleep is good, generally 6-8 hours per night     He is still motivated to quit smoking. Motivations include: history of bladder cancer, knows that it isn't good for him, family history of cancer, cost     Previously:   How much does patient smoke: No more than a 1ppd , but as of 11-9-1 now smoke free!  Why does patient smoke: will quit smoking for a few days and then have a stressful day and pick it up again. Seems that he smokes out of routine and stress  relief.   Triggers for smoking include:  Stress, horse racing/gambling and then just the general routine (gets an urge). Discussed that he would be willing to stop horse betting to reduce stressors.   How long has patient been smoking:  Started when he was a kid around 11 or 11 yo, about 60 years. Reports he quit for about a 10 year period and gum helped him quit. Started smoking again about 5-6 years ago when He rented a room from a woman that had a roll up smoking machine, and this triggered him to start smoking again. Last quit smoking because he was convinced he had to quit from risk factors (familial hx of cancer) and then in addition to cannabis use felt he didn't need to smoke both. Was still smoking marajuana at the time.      Feels like he is willing to quit smoking and ready to quit.     Support system: Brother has been on him to quit smoking but his girlfriend smokes. Discussed chatting with brother if he needs encouragement     Has used the patch in the past, was using it regularly everyday. Found the gum to be most effective in first attempt then after that when using it didn't find it to be that useful.     Smokes within first half hour of waking up. ??     What worked/didn t work in the past: patches worked somewhat but wasn't ready to quit smoking, gum worked to quit smoking for the first time (x10 years), but feels it hasn't worked in recent smoking attempts   Why does patient want to quit (motivators for quitting): health/familial hx of cancer, expensive   How important is it for patient to quit on a scale of 0 - 10? 9  How confident is the patient that they can stop smoking on a scale of 0-10: 6-8 (he has done it before)     Occupation: Retired - does some house painting, yard work, dog sitting      Fagerstrom Test to Assess Nicotine Dependence:  Points 0 1 2 3   1) How soon after you wake do you smoke your first cigarette? >1 hr 1/2 to 1 hr 6 to 30 min <5 min   2) Do you find it difficult to  refrain from smoking in places where it is forbidden? No Yes     3) Which cigarette would you hate to give up the most? Any other 1st in the AM     4) How many cigarettes/day do you smoke? <10 11 to 20 21 to 30 >31   5) Do you smoke the most in the morning? No Yes Unknown     6) If you are so ill that you are in bed most of the day, do you still smoke? No Yes Unknown    Fagerstrom score ~5   (0 - 5 = low to moderate nicotine dependence)  (6 - 10 = HIGH nicotine dependence)      Hypertension: Current medications include lisinopril 20mg twice daily, metoprolol succ 50mg daily.  Patient does not self-monitor blood pressure.  Patient reports no current medication side effects. Patient reports they are somewhat anxious about the visit, thinks this could be related to blood pressure elevation at moment. Continue to monitor.     Currently takes aspirin 81mg daily based on history of CABG. Reports no side effects.    BP Readings from Last 3 Encounters:   11/16/21 (!) 148/80   11/16/21 (!) 172/95   11/09/21 (!) 152/84        Hyperlipidemia: Current therapy includes :atorvastatin 80mg daily and Ezetimibe (Zetia) 10mg once daily.  Patient reports no significant myalgias or other side effects.  The 10-year ASCVD risk score (Wells Tannerydon HINSON Jr., et al., 2013) is: 28.7%    Values used to calculate the score:      Age: 71 years      Sex: Male      Is Non- : No      Diabetic: No      Tobacco smoker: No      Systolic Blood Pressure: 148 mmHg      Is BP treated: Yes      HDL Cholesterol: 45 mg/dL      Total Cholesterol: 140 mg/dL  Recent Labs   Lab Test 09/22/21  1425 08/03/20  1443 10/27/16  1028 10/23/15  0850 09/30/14  0427   CHOL 140 140   < > 155 109   HDL 45 59   < > 43 51   LDL 78 64   < > 85 39   TRIG 86 84   < > 137 97   CHOLHDLRATIO  --   --   --  3.6 2.1    < > = values in this interval not displayed.           Today's Vitals: BP (!) 148/80   Pulse 59   Wt 172 lb 3.2 oz (78.1 kg)   SpO2 93%   BMI 25.43  kg/m    ----------------      I spent 30 minutes with this patient today. All changes were made via collaborative practice agreement with Preethi Quiroz MD, MD. A copy of the visit note was provided to the patient's primary care provider.    The patient was given a summary of these recommendations.     Andrea Bah [facility].  Medication Therapy Management Provider  [phone]         Medication Therapy Recommendations  Hypertension goal BP (blood pressure) < 140/90    Current Medication: amLODIPine (NORVASC) 5 MG tablet   Rationale: Synergistic therapy - Needs additional medication therapy - Indication   Recommendation: Start Medication - amLODIPine 5 MG tablet - 1 tab at bedtime.   Status: Accepted per CPA

## 2021-11-16 ENCOUNTER — OFFICE VISIT (OUTPATIENT)
Dept: PHARMACY | Facility: CLINIC | Age: 71
End: 2021-11-16
Payer: COMMERCIAL

## 2021-11-16 ENCOUNTER — OFFICE VISIT (OUTPATIENT)
Dept: UROLOGY | Facility: CLINIC | Age: 71
End: 2021-11-16
Payer: COMMERCIAL

## 2021-11-16 VITALS
WEIGHT: 172.2 LBS | OXYGEN SATURATION: 93 % | HEART RATE: 59 BPM | DIASTOLIC BLOOD PRESSURE: 80 MMHG | BODY MASS INDEX: 25.43 KG/M2 | SYSTOLIC BLOOD PRESSURE: 148 MMHG

## 2021-11-16 VITALS — HEART RATE: 56 BPM | DIASTOLIC BLOOD PRESSURE: 95 MMHG | SYSTOLIC BLOOD PRESSURE: 172 MMHG

## 2021-11-16 DIAGNOSIS — C67.8 MALIGNANT NEOPLASM OF OVERLAPPING SITES OF BLADDER (H): Primary | ICD-10-CM

## 2021-11-16 DIAGNOSIS — I10 HYPERTENSION GOAL BP (BLOOD PRESSURE) < 140/90: Primary | ICD-10-CM

## 2021-11-16 DIAGNOSIS — F17.210 CIGARETTE NICOTINE DEPENDENCE WITHOUT COMPLICATION: ICD-10-CM

## 2021-11-16 PROCEDURE — 88112 CYTOPATH CELL ENHANCE TECH: CPT | Mod: TC | Performed by: UROLOGY

## 2021-11-16 PROCEDURE — 99606 MTMS BY PHARM EST 15 MIN: CPT | Performed by: PHARMACIST

## 2021-11-16 PROCEDURE — 88120 CYTP URNE 3-5 PROBES EA SPEC: CPT | Mod: TC | Performed by: UROLOGY

## 2021-11-16 PROCEDURE — 88112 CYTOPATH CELL ENHANCE TECH: CPT | Mod: 26 | Performed by: PATHOLOGY

## 2021-11-16 PROCEDURE — 99607 MTMS BY PHARM ADDL 15 MIN: CPT | Performed by: PHARMACIST

## 2021-11-16 PROCEDURE — 88120 CYTP URNE 3-5 PROBES EA SPEC: CPT | Mod: 26 | Performed by: PATHOLOGY

## 2021-11-16 PROCEDURE — 52000 CYSTOURETHROSCOPY: CPT | Performed by: UROLOGY

## 2021-11-16 RX ORDER — LIDOCAINE HYDROCHLORIDE 20 MG/ML
JELLY TOPICAL ONCE
Status: COMPLETED | OUTPATIENT
Start: 2021-11-16 | End: 2021-11-16

## 2021-11-16 RX ORDER — AMLODIPINE BESYLATE 5 MG/1
5 TABLET ORAL AT BEDTIME
Qty: 90 TABLET | Refills: 1 | Status: SHIPPED | OUTPATIENT
Start: 2021-11-16 | End: 2022-05-13

## 2021-11-16 RX ADMIN — LIDOCAINE HYDROCHLORIDE: 20 JELLY TOPICAL at 10:10

## 2021-11-16 ASSESSMENT — PAIN SCALES - GENERAL: PAINLEVEL: NO PAIN (0)

## 2021-11-16 NOTE — NURSING NOTE
Chief Complaint   Patient presents with     Cystoscopy       Blood pressure (!) 172/95, pulse 56. There is no height or weight on file to calculate BMI.    Patient Active Problem List   Diagnosis     Social phobia     Hypertension goal BP (blood pressure) < 140/90     Hyperlipidemia LDL goal <130     Tubular adenoma of colon     Advanced directives, counseling/discussion     Coronary artery disease involving native heart with angina pectoris, unspecified vessel or lesion type (H)     Cigarette nicotine dependence without complication     S/P CABG (coronary artery bypass graft)     Chronic obstructive pulmonary disease, unspecified COPD type (H)     Anemia, unspecified type     Bladder cancer (H)     Personal history of malignant neoplasm of bladder     Malignant neoplasm of overlapping sites of bladder (H)     Combined form of age-related cataract, both eyes     Total cataract of right eye     History of colonic polyps     Colon cancer (H)     Signet ring cell adenocarcinoma (H)       Allergies   Allergen Reactions     Codeine Nausea and Vomiting       Current Outpatient Medications   Medication Sig Dispense Refill     albuterol (PROAIR HFA/PROVENTIL HFA/VENTOLIN HFA) 108 (90 Base) MCG/ACT inhaler Inhale 2 puffs into the lungs every 6 hours as needed for shortness of breath / dyspnea or wheezing 18 g 1     aspirin 81 MG tablet Take 1 tablet (81 mg) by mouth daily (Patient taking differently: Take 81 mg by mouth every morning ) 90 tablet 3     atorvastatin (LIPITOR) 80 MG tablet Take 1 tablet (80 mg) by mouth At Bedtime 90 tablet 3     buPROPion (WELLBUTRIN XL) 300 MG 24 hr tablet Take 1 tablet (300 mg) by mouth every morning 90 tablet 1     ezetimibe (ZETIA) 10 MG tablet Take 1 tablet (10 mg) by mouth daily 90 tablet 3     lisinopril (ZESTRIL) 20 MG tablet Take 1 tablet (20 mg) by mouth 2 times daily 180 tablet 3     metoprolol succinate ER (TOPROL-XL) 50 MG 24 hr tablet Take 1 tablet (50 mg) by mouth At Bedtime 90  tablet 3     nicotine (NICODERM CQ) 21 MG/24HR 24 hr patch Place 1 patch onto the skin every 24 hours 28 patch 1     PARoxetine (PAXIL) 40 MG tablet Take 1 tablet (40 mg) by mouth every morning 90 tablet 3       Social History     Tobacco Use     Smoking status: Former Smoker     Packs/day: 0.00     Years: 30.00     Pack years: 0.00     Types: Cigarettes     Quit date: 2019     Years since quittin.2     Smokeless tobacco: Never Used     Tobacco comment: had restarted- now quit smoking 20   Substance Use Topics     Alcohol use: No     Comment: quit 35 years ago     Drug use: Yes     Types: Marijuana     Comment: occionally use of marijuana       Invasive Procedure Safety Checklist:    Procedure: Cystoscopy    Action: Complete sections and checkboxes as appropriate.    Pre-procedure:  1. Patient ID Verified with 2 identifiers (Mary and  or MRN) : YES    2. Procedure and site verified with patient/designee (when able) : YES    3. Accurate consent documentation in medical record : YES    4. H&P (or appropriate assessment) documented in medical record : N/A  H&P must be up to 30 days prior to procedure an updated within 24 hours of                 Procedure as applicable.     5. Relevant diagnostic and radiology test results appropriately labeled and displayed as applicable : YES    6. Blood products, implants, devices, and/or special equipment available for the procedure as applicable : YES    7. Procedure site(s) marked with provider initials [Exclusions: none] : NO    8. Marking not required. Reason : Yes  Procedure does not require site marking    Time Out:     Time-Out performed immediately prior to starting procedure, including verbal and active participation of all team members addressing: YES    1. Correct patient identity.  2. Confirmed that the correct side and site are marked.  3. An accurate procedure to be done.  4. Agreement on the procedure to be done.  5. Correct patient position.  6.  Relevant images and results are properly labeled and appropriately displayed.  7. The need to administer antibiotics or fluids for irrigation purposes during the procedure as applicable.  8. Safety precautions based on patient history or medication use.    During Procedure: Verification of correct person, site, and procedure occurs any time the responsibility for care of the patient is transferred to another member of the care team.    The following medication was given:     MEDICATION:  Lidocaine without epinephrine 2% jelly  ROUTE: urethral   SITE: urethral   DOSE: 10 mL  LOT #: [id number]  : [employer]  EXPIRATION DATE: 05/23  NDC#: [address]-9   Was there drug waste? No    Prior to med admin, verified patient identity using patient's name and date of birth.  Due to med administration, patient instructed to remain in clinic for 15 minutes  afterwards, and to report any adverse reaction to me immediately.    Drug Amount Wasted:  None.  Vial/Syringe: Syringe      Devante Knox EMT-P  11/16/2021  10:16 AM

## 2021-11-16 NOTE — PATIENT INSTRUCTIONS
Recommendations from today's MTM visit:                                                       1. FYI--Congratulations --your smoke free x 1 week since 11-9-21.   Keep using daily 21mg Nicotine patch as well as 300mg./day Bupropion and use the 4mg. Nicotine gum as needed to control cravings/urges.     2. BP today is 148/80mmhg. --lets stay on Lisinopril 20mg twice daily, and Metoprolol 50mg. ER tab daily --lets ADD 5mg. Amlodipine at bedtime to lower your top BP number to goal of <140/90mmhg.         Follow-up: Return in about 4 weeks (around 12/14/2021), or 1:30pm, for BP Recheck, Medication Therapy Management Visit, tobacco abuse.    It was great to speak with you today.  I value your experience and would be very thankful for your time with providing feedback on our clinic survey. You may receive a survey via email or text message in the next few days.     To schedule another MTM appointment, please call the clinic directly or you may call the MTM scheduling line at [phone] or toll-free at [phone].     My Clinical Pharmacist's contact information:                                                      Please feel free to contact me with any questions or concerns you have.      Andrea Bah Rph.  Medication Therapy Management Provider  [phone]

## 2021-11-16 NOTE — PROGRESS NOTES
CHIEF COMPLAINT   It was my pleasure to see Tevin Guidry who is a 71 year old male for follow-up of bladder cancer.      HPI  Tevin Guidry is a very pleasant 71 year old male who presents with a history of bladder cancer. 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 He had recurrence in 2/2020 and underwent repeat induction course of BCG. 2nd BCG induction completed 4/2020. Recurrence noted in 7/2020 and TURBT 9/2020 with low-grade disease. No issues since biopsy. No ongoing hematuria.    BCG maintenance 10/15/2020  Had colectomy Spring 2021     TURBT 9/2/2020  FINAL DIAGNOSIS:   A. 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 Right lateral wall:   - Dysplastic urothelium, consistent with incipient non invasive low grade papillary urothelial carcinoma     PHYSICAL EXAM  Patient is a 71 year old  male   Vitals: Blood pressure (!) 172/95, pulse 56.  General Appearance Adult: There is no height or weight on file to calculate BMI.  Alert, no acute distress, oriented  Lungs: no respiratory distress, or pursed lip breathing  Abdomen: soft, nontender, no organomegaly or masses  Back: no CVAT  Neuro: Alert, oriented, speech and mentation normal  Psych: affect and mood normal  : penis, scrotum, testes normal    OFFICE CYSTOSCOPY 11/16/2021     Pre-procedure diagnosis:       Bladder Cancer  Post-procedure diagnosis:     Normal cystoscopy  Procedure performed:             Cystourethroscopy  Surgeon:                                 Kirt Guzman MD  Anesthesia:                             Local     Description of procedure:   After fully informed, voluntary consent was obtained, the patient was brought into the procedure room, identified and placed in a supine position on the cystoscopy table.  The groin/scrotum were prepped with betadine and draped in a sterile fashion.  Urojet lidocaine gel was introduced.  A 15F  flexible cystoscope was inserted into the urethra, and the bladder and urethra were examined in a systematic manner.  The patient tolerated the procedure well and there were no complications.       Cystoscopic findings:  The urethra was normal without strictures.  The prostate was 3cm long and demonstrated mild bilobar hypertrophy.  There was no median lobe.  The external sphincter coapted well and the bladder neck was open. The bladder was completely surveyed.  There was mild trabeculation.  There were no stones or diverticula identifed.  The ureteric orifices were normal in position and number and effluxing clear urine. Previous resection sites noted with no suspicious lesions today.     ASSESSMENT and PLAN  71 year old male with recurrent bladder tumor. Has previously undergone BCG induction x 2. While office biopsy suggested possible HG disease, formal TURBT with only low-grade pathology. Has completed some BCG maintenance, but last about 1 year ago. Given no recurrence today, will defer additional intravesical therapy at this time. Plan Cysto in 6 months.     - Cytology/FISH today  - office visit in 6 months for cysto    Kirt Guzman MD  Urology  [facility]

## 2021-11-16 NOTE — Clinical Note
Preethi--yoan--great news --Hola reports today he is 1 week (11-9-21) smoke free, his bp is still too high I added bedtime amlodipine as well and will see him back in 4 weeks.    Valarie

## 2021-11-16 NOTE — PATIENT INSTRUCTIONS
"Please follow up in six months with Dr. Guzman for a cystoscopy.     It was a pleasure meeting with you today.  Thank you for allowing me and my team the privilege of caring for you today.  YOU are the reason we are here, and I truly hope we provided you with the excellent service you deserve.  Please let us know if there is anything else we can do for you so that we can be sure you are leaving completely satisfied with your care experience.          AFTER YOUR CYSTOSCOPY        You have just completed a cystoscopy, or \"cysto\", which allowed your physician to learn more about your bladder (or to remove a stent placed after surgery). We suggest that you continue to avoid caffeine, fruit juice, and alcohol for the next 24 hours, however, you are encouraged to return to your normal activities.         A few things that are considered normal after your cystoscopy:     * Small amount of bleeding (or spotting) that clears within the next 24 hours     * Slight burning sensation with urination     * Sensation to of needing to avoid more frequently     * The feeling of \"air\" in your urine     * Mild discomfort that is relieved with Tylenol        Please contact our office promptly if you:     * Develop a fever above 101 degrees     * Are unable to urinate     * Develop bright red blood that does not stop     * Severe pain or swelling         Please contact our office with any concerns or questions @[email or web address].  "

## 2021-11-17 NOTE — PROGRESS NOTES
Hola is a 71 year old who is being evaluated via a billable video visit.      How would you like to obtain your AVS? [employer]  If the video visit is dropped, the invitation should be resent by: Send to e-mail at: [email or web address]  Will anyone else be joining your video visit? 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  The site of origin is unclear, so at the current time, it can be considered carcinoma of unknown primary, very likely of gastrointestinal origin, but the primary site is not known.    COVID vaccination status: completed Spring (~April) 2021    Oncology History:   He had a colonoscopy 03/30/2017 and another one 03/02/2021 that was delayed due to the COVID pandemic and other healthcare issues.  This was performed by Dr. Marin Steward on 03/02/2021 and the pathology was notable for tubular and tubulovillous adenomas.  Due to the extent and burden of the number of polyps, he was referred to Dr. Rodríguez for consideration of hemicolectomy.  A number of polyps in the distal transverse colon were tattooed.    Dr. Rodríguez took the patient to the operating room for right hemicolectomy 04/13/2021 due to this high polyp burden.    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        Tumor is located at the distal resection margin and situated within    the lamina propria/submucosa (distal   margin positive, proximal and deep margins are free of malignancy)        An overlying tubular adenoma is present at this distal resection   margin, does not appear to be the source   of the underlying malignancy        Tumor origin is compatible with gastrointestinal site as it marks   positively with CK 20, pan cytokeratin   and  CDX2 (negative for CK-7), see comment   - Multiple tubular adenomas (at least twenty-one), negative for high-grade    dysplasia, distal margin is   transected by a sessile tubular adenoma   - Benign appendix with focal surface serrated change, negative for   malignancy (submitted in its entirety)   - Unremarkable terminal ileum   - Sixteen benign lymph nodes (0/16)     COMMENT:   The origin of the adenocarcinoma is uncertain based on the current   resection however the immunoperoxidase   battery supports gastrointestinal origin.  Suggest clinical and   radiographic correlation to investigate   possible primary sites (including stomach, pancreaticobiliary and   remainder of the colon).  The appendix was   submitted in its entirety and does not appear to be the source of the   neoplasm.  Intradepartmental   consultation obtained, the case was discussed with Dr. Rodríguez on   4/19/21.     April 20, 2021--CT chest IMPRESSION:   In this patient with recently diagnosed colon cancer:  1. No evidence of metastatic disease in the chest.  2. Subcutaneous emphysema along the left lower chest/upper abdominal  wall may be secondary to transversus abdominis block done on  4/13/2021. No free intraperitoneal air or pneumothorax.  3. Coronary artery calcifications.  4. Sequelae of prior granulomatous disease.     [Access Center: Subcutaneous emphysema of the left lower chest/upper  abdominal wall, likely from transversus abdominis than on 4/13/2021.  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 FDG at the colonic surgical anastomosis presumably postsurgical  inflammation.   2. No definite FDG uptake to suggest metastatic  disease.   3. No definite primary significant ring carcinoma is identified.   There are 3 equivocal findings:   3a. Mild uptake in the gastric cardia, favor physiologic  3b. Loop of slightly narrowed thickened small bowel with FDG uptake,  favor physiologic or postoperative inflammation but cannot rule out a  primary.   3c. Extensive FDG uptake in the entire sigmoid colon, favored to be  physiologic.  3d. If clinically indicated, an MR enterography in this case may  better define bowel abnormalities. Alternatively attention to these  areas on follow-up imaging.  4. Incidentally noted bilateral hydroceles and right peritesticular  calcification.     May 3, 2021--initial medical oncology consultation, Dr. Sánchez.    May 6, 2021--EGD - unremarkable; no masses seen.    FINAL DIAGNOSIS:   A. ANTRUM, BIOPSY:   - Gastric antral type mucosa with features of reactive gastropathy   - No H. pylori like organisms identified on routine staining   - Negative for intestinal metaplasia or dysplasia     B. STOMACH, POLYP:   - Gastric body type mucosa with chronic gastritis   - No H. pylori like organisms identified on routine staining   - Negative for intestinal metaplasia, neoplastic polyp or dysplasia     C. STOMACH, POLYPS, BIOPSY:   - Fundic gland polyp in one fragment   - Gastric antral type mucosa with mild chronic gastritis   - No H. pylori like organisms identified on routine staining or by   immunohistochemistry   - Negative for intestinal metaplasia or dysplasia     D. STOMACH, BIOPSY:   - Chronic active gastritis with focal intestinal and pancreatic acinar   type metaplasia, see comment   - No H. pylori like organisms identified on routine staining or by   immunohistochemistry   - Negative for dysplasia, see comment       July 16, 2021 -- oncology follow-up/virtual visit, Dr. Sánchez.  11/8/21-PET/CT--IMPRESSION: Hx. of 2 mm focus of poorly differentiated adenocarcinoma  with significant ring cell features at the tip of the  appendix.  No  recurrent or metastatic disease.  1. Postsurgical changes of right hemicolectomy with mild residual  uptake along the surgical anastomosis which is below that of  background, decreased from prior, and favored to be inflammatory.   2. Slight progression of peripheral reticular opacities in the  dependent right lung with a basilar predominance, differential  atelectasis, edema, inflammatory, infectious, aspiration.  This is on  a background of emphysema.  2a. A 9 mm groundglass nodule right upper lobe peripherally unchanged  from prior exam, indeterminant. Continued attention on follow-up.    November 19, 2021 -- oncology follow-up/virtual visit, Dr. Sánchez.          Interim History/History Of Present Illness:    Mr. Tevin Guidry is a 71-year-old gentleman from [place].  He has a past medical history notable for cardiovascular disease, status post multivessel CABG 09/29/2014 and angioplasties.  He has hypertension, COPD from a history of smoking, which he quit in 2019.  He has a history of bladder cancer which was a noninvasive high grade form of urothelial carcinoma diagnosed in the summer and early fall of 2020.  He is status post multiple injections of BCG.  He was diagnosed in the summer of 2019 with a recurrence in mid winter of 2020.  He also has a history of innumerable polyps over the years. He has had nearly a dozen colonoscopies to date, dating back to age 48.  He has had numerous colonoscopies, but none of them have shown evidence of a neoplastic or malignant disease or significant dysplasia but he had tubulovillous forms of adenoma.      Mr. Guidry joins us for virtual visit for followup.  He generally is doing well.  He had a 11/16 appointment with Dr. Guzman from Urology earlier this week.  He had multiple recurrent bladder cancer and a cystoscopy done looked unremarkable.  Mr. Guidry had a colonoscopy and finding of a small, 2 mm, minute focus incidentally discovered in the appendix  comprising adenocarcinoma with signet ring histology.  I have been following him for this reason in concert with Dr. Rodríguez.  PET/CT was done on  continues to show no obvious evidence of metastatic disease or local recurrence.  He generally is feeling well.  He denies any symptomatic issues at this time.  He denies any pain.  He states he is feeling well and that he has been cleared from his Cardiology Team to not have to followup for another 2 years.            Review Of Systems:  Comprehensive (14-point) ROS reviewed. Pertinent symptoms reviewed above per HPI.      Past medical, social, surgical, and family histories reviewed.  PAST MEDICAL HISTORY:  Notable for innumerable colon polyps.  It is not clear whether or not he has a germline or familial mutation that is causing this.  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  He has CAD, hypertension.  He he has had cardiac catheterization including serum 2014 when he had a CABG with LIMA to LAD, 2014.  He has another malignancy in the form of noninvasive bladder carcinoma, left lateral wall of the bladder.  He has treatments with BCG and other interventions by our Urology team since 2019 for that.  COPD due to remote history of smoking.    PAST SURGICAL HISTORY:  Most remarkable for the hemicolectomy performed by Dr Colvin 2021 per above and the interventions for the bladder carcinoma in 2019 and .  Please see the Urology note for details.    FAMILY HISTORY:  Most notable for him being 1 of 3 sons from his parents.  His mom was a smoker and diagnosed with stage IV gastric carcinoid at age 65 and  within 4 months of diagnosis.  Father was a smoker and had lung cancer at age 40 and  at age 50 of this disease.  One brother had gastric carcinoma and  around age 72 after initial diagnosis at age 62.  Another brother is living at age 69 and has  never had a colonoscopy due to lack of insurance and other reasons.  A maternal aunt also had ovarian cancer.    SOCIAL HISTORY:  The patient lives in [place].  He has an associate degree in respiratory therapy  Due to social phobia, he has not been employed in that practice.  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  He is single, never , no children.  Tobacco:  He smoked 1-1/2 packs a day of cigarettes for 30 years, quit 2019.  Occasional use of marijuana.  Denied any alcohol use or other illicit drug use.    Allergies:  Allergies as of 11/19/2021 - Reviewed 11/16/2021   Allergen Reaction Noted     Codeine Nausea and Vomiting 10/14/2014       Current Medications:  Current Outpatient Medications   Medication Sig Dispense Refill     albuterol (PROAIR HFA/PROVENTIL HFA/VENTOLIN HFA) 108 (90 Base) MCG/ACT inhaler Inhale 2 puffs into the lungs every 6 hours as needed for shortness of breath / dyspnea or wheezing (Patient not taking: Reported on 11/16/2021) 18 g 1     amLODIPine (NORVASC) 5 MG tablet Take 1 tablet (5 mg) by mouth At Bedtime 90 tablet 1     aspirin 81 MG tablet Take 1 tablet (81 mg) by mouth daily (Patient taking differently: Take 81 mg by mouth every morning ) 90 tablet 3     atorvastatin (LIPITOR) 80 MG tablet Take 1 tablet (80 mg) by mouth At Bedtime 90 tablet 3     buPROPion (WELLBUTRIN XL) 300 MG 24 hr tablet Take 1 tablet (300 mg) by mouth every morning 90 tablet 1     ezetimibe (ZETIA) 10 MG tablet Take 1 tablet (10 mg) by mouth daily 90 tablet 3     lisinopril (ZESTRIL) 20 MG tablet Take 1 tablet (20 mg) by mouth 2 times daily 180 tablet 3     metoprolol succinate ER (TOPROL-XL) 50 MG 24 hr tablet Take 1 tablet (50 mg) by mouth At Bedtime 90 tablet 3     nicotine (NICODERM CQ) 21 MG/24HR 24 hr patch Place 1 patch onto the skin every 24 hours 28 patch 1     nicotine polacrilex (NICORETTE) 4 MG gum Place 4 mg inside  cheek every hour as needed for smoking cessation       PARoxetine (PAXIL) 40 MG tablet Take 1 tablet (40 mg) by mouth every morning 90 tablet 3        Physical Exam:  Physical exam could not be performed today in context of a Virtual Visit during the COVID19/Coronavirus pandemic.  Vitals - Patient Reported  Systolic (Patient Reported): 129  Diastolic (Patient Reported): 85  Weight (Patient Reported): 74.8 kg (165 lb)  Pain Score: No Pain (0)         Observed physical assessments made today by visualizing the patient by video link:    General/Constitutional: Generally appears well, not acutely ill.  HEENT: no scleral icterus, not jaundiced.  Respiratory: no labored breathing.  Musculoskeletal: appears to have full range of motion and adequate physical strength.  Skin: no jaundice, discoloration or other notable lesions.  Neurological: no evidence of tremors.  Psychiatric: no evident anxiety; fully alert and oriented with fluent speech.      The rest of a comprehensive physical examination is deferred due to PHE (public health emergency) video visit restrictions.      Laboratory/Imaging Studies  No visits with results within 2 Week(s) from this visit.   Latest known visit with results is:   Virtual Visit on 05/05/2021   Component Date Value Ref Range Status     Copath Report 05/06/2021    Final                    Value:Patient Name: DICK LYONS  MR#: [id number]  Specimen #: [id number]  Collected: 5/6/2021  Received: 5/6/2021  Reported: 5/13/2021 10:06  Ordering Phy(s): MANNIE DAWKINS  Additional Phy(s): [facility]: [facility]    For improved result formatting, select 'View Enhanced Report Format' under   Linked Documents section.    SPECIMEN(S):  A: Antral biopsy  B: Gastric polyp  C: Gastric biopsy  D: Gastric biopsy    FINAL DIAGNOSIS:  A. ANTRUM, BIOPSY:  - Gastric antral type mucosa with features of reactive gastropathy  - No H. pylori like organisms identified on routine staining  -  "Negative for intestinal metaplasia or dysplasia    B. STOMACH, POLYP:  - Gastric body type mucosa with chronic gastritis  - No H. pylori like organisms identified on routine staining  - Negative for intestinal metaplasia, neoplastic polyp or dysplasia    C. STOMACH, POLYPS, BIOPSY:  - Fundic gland polyp in one fragment  - Gastric antral type mucosa with mild chronic gastritis  - No H. pylor                          i like organisms identified on routine staining or by   immunohistochemistry  - Negative for intestinal metaplasia or dysplasia    D. STOMACH, BIOPSY:  - Chronic active gastritis with focal intestinal and pancreatic acinar   type metaplasia, see comment  - No H. pylori like organisms identified on routine staining or by   immunohistochemistry  - Negative for dysplasia, see comment    COMMENT:  D).  The biopsies show antral type mucosa with pancreatic acinar type   metaplasia and a minute focus of  intestinal metaplasia.  Immunohistochemistry was performed with   appropriate controls.  Chromogranin performed  on part D highlights linear enterochromaffin cell-like hyperplasia.    Gastrin stain shows rare positive cells  at the edge of one biopsy fragments.  If these biopsies were taken from   the fundus or body, these findings are  suggestive of autoimmune gastritis.  Clinical correlation is recommended.    I have personally reviewed all specimens and/or slides, including the   listed sp                          ecial stains, and used them  with my medical judgement to determine or confirm the final diagnosis.    Electronically signed out by:    Liliane Ta M.D., Physicians    CLINICAL HISTORY:  Screening endoscopy.  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    Striped erythema in gastric antrum.  Polyps in the gastric fundus.    GROSS:  A: The specimen is received in formalin with proper patient   identification, labeled \"gastric biopsy antrum\".  The specimen consists of 4 pieces of tan-pink " "tissue ranging from 0.2-0.5   cm in greatest dimension, submitted  in toto in cassette A1.    B: The specimen is received in formalin with proper patient   identification, labeled \"gastric polyps small\".  The specimen consists of 3 pieces of tan-pink tissue ranging from 0.2-0.5   cm in greatest dimension, submitted  in toto in cassette B1.    C: The specimen is received in formalin with proper patient   identification, labeled \"gastric biopsy large  polyps\".  The specimen consists of 5 pieces of                           tan-pink tissue ranging   from 0.2-0.6 cm in greatest dimension,  submitted in toto in cassette C 1.    D: The specimen is received in formalin with proper patient   identification, labeled \"gastric biopsy fundus\".  The specimen consists of 3 pieces of tan-pink tissue, each measuring   approximately 0.2 cm in greatest  dimension; submitted in toto in cassette D1. (Dictated by: MONICA 5/6/2021   02:53 PM)    MICROSCOPIC:  Microscopic examination was performed.  Immunohistochemistry was performed   with appropriate controls.  CK  AE1/AE3 performed on parts A1-D1 does not show evidence of signet ring   cell carcinoma.    The technical component of this testing was completed at the [facility], with the professional component performed   at the [facility], [address] [place] [id number] ([phone])    CPT Codes:  A: 51844-LF7, 54669-PBE  B: 77296-XL6, 89932-VRC                            C: 13868-ES7, 92063-PTR, 64503-ROB  D: 67171-TG2, 77509-WQJ, 30348-VMZ, 35922-TGX, 03522-ETTEES9OI    COLLECTION SITE:  Client: [facility]  Location: [facility] (B)    Resident  [id number]            RADIOLOGY:  Prior to and including the day of this visit, I personally reviewed the recent imaging scans. I released the pertinent recent imaging results to [facility] in " advance of this visit, and reviewed the summary verbatim and in lay language during today's call.      ASSESSMENT/PLAN:  Mr. Hola Guidry is a 71-year-old gentleman with history of innumerable polyps.  He does not have a known germline mutation, although he has had multiple first-degree members of family with gastric carcinoma.  He is awaiting upper endoscopy 3 days from now by Dr. Tobar on 05/06/2021 to help clarify whether or not he has a primary gastric carcinoma, which would make sense in terms of signet cell features of the 2 mm focus in the distal appendix that was positive for adenocarcinoma.  Otherwise, the rest of the colon and terminal ileum were negative for carcinoma.  It is not out of the realm of possibility that signet ring cell features and type of histology can be found in small intestinal carcinomas including appendix, but more commonly, it is found in the upper GI tract.  Right now, based on the location as well being more mucosa than through wall that there is consideration whether or not this represents an unusual form of metastasis to the appendiceal cavity rather than a primary appendiceal malignancy.  However, the PET CT was unremarkable and did not show any evidence of an alternate primary at that time.    It is difficult to ascertain the exact risk of recurrence of the appendix that was incidentally found at the tip of the appendix.  It was very minimal in size at 2 mm and did comprise some high-risk form of histology with signet ring subset.  We will continue to do active surveillance.  We will get another PET/CT in 6 months' time.  He will follow up with me within a few days to review the results.  I sent a message to Dr. Rodríguez via Epic messaging today indicating that the patient is interested in getting a surveillance colonoscopy.  I advised the patient that in the absence of any symptoms, it may be scheduled for around a year after the initial colonoscopy and surgery.  He stated  full understanding and had no further questions.              VIRTUAL VISIT - DETAILS:    I have reviewed the note as documented above. This accurately captures the substance of my conversation with the patient.    Date of call: November 19, 2021   Start of call: 9:13 am  End of call: 9:27 am    Provider location: [facility] (academic office)  Patient location: Home      Mode of Video Visit: Amwell           I spent 14 minutes in consultation, including history and discussion with the patient including review of recent lab values and radiologic imaging results.  An additional 10 minutes was spent on the day of the visit, including reviewing pertinent medical notes and documentation from other physicians and APPs who have evaluated and treated this patient, pertinent lab values, pathology and imaging results, personal review of radiologic images, discussing the case with referring providers and/or nurse coordinator team, post-visit orders, and all post-visit documentation.    Tanner Sánchez MD PhD        The above was transcribed using a voice recognition software.  While I reviewed and edited the transcription, I may miss errors.  Please let me know of any of serious errors and I will addend the note.

## 2021-11-18 LAB
PATH REPORT.COMMENTS IMP SPEC: NORMAL
PATH REPORT.FINAL DX SPEC: NORMAL
PATH REPORT.GROSS SPEC: NORMAL
PATH REPORT.RELEVANT HX SPEC: NORMAL

## 2021-11-19 ENCOUNTER — VIRTUAL VISIT (OUTPATIENT)
Dept: ONCOLOGY | Facility: CLINIC | Age: 71
End: 2021-11-19
Attending: INTERNAL MEDICINE
Payer: COMMERCIAL

## 2021-11-19 DIAGNOSIS — C18.1 MALIGNANT NEOPLASM OF APPENDIX (H): Primary | ICD-10-CM

## 2021-11-19 PROCEDURE — 999N001193 HC VIDEO/TELEPHONE VISIT; NO CHARGE

## 2021-11-19 PROCEDURE — 99213 OFFICE O/P EST LOW 20 MIN: CPT | Mod: 95 | Performed by: INTERNAL MEDICINE

## 2021-11-19 NOTE — LETTER
11/19/2021         RE: Tevin Guidry  [address]  [facility] [address]        Dear Colleague,    Thank you for referring your patient, Tevin Guidry, to the [facility]. Please see a copy of my visit note below.    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  The site of origin is unclear, so at the current time, it can be considered carcinoma of unknown primary, very likely of gastrointestinal origin, but the primary site is not known.    COVID vaccination status: completed Spring (~April) 2021    Oncology History:   He had a colonoscopy 03/30/2017 and another one 03/02/2021 that was delayed due to the COVID pandemic and other healthcare issues.  This was performed by Dr. Marin Steward on 03/02/2021 and the pathology was notable for tubular and tubulovillous adenomas.  Due to the extent and burden of the number of polyps, he was referred to Dr. Rodríguez for consideration of hemicolectomy.  A number of polyps in the distal transverse colon were tattooed.    Dr. Rodríguez took the patient to the operating room for right hemicolectomy 04/13/2021 due to this high polyp burden.    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        Tumor is located at the distal resection margin and situated within    the lamina propria/submucosa (distal   margin positive, proximal and deep margins are free of malignancy)        An overlying tubular adenoma is present at this distal resection   margin, does not appear to be the source   of the underlying malignancy        Tumor origin is compatible with gastrointestinal site as it marks   positively with CK 20, pan cytokeratin   and CDX2 (negative for CK-7), see  comment   - Multiple tubular adenomas (at least twenty-one), negative for high-grade    dysplasia, distal margin is   transected by a sessile tubular adenoma   - Benign appendix with focal surface serrated change, negative for   malignancy (submitted in its entirety)   - Unremarkable terminal ileum   - Sixteen benign lymph nodes (0/16)     COMMENT:   The origin of the adenocarcinoma is uncertain based on the current   resection however the immunoperoxidase   battery supports gastrointestinal origin.  Suggest clinical and   radiographic correlation to investigate   possible primary sites (including stomach, pancreaticobiliary and   remainder of the colon).  The appendix was   submitted in its entirety and does not appear to be the source of the   neoplasm.  Intradepartmental   consultation obtained, the case was discussed with Dr. Rodríguez on   4/19/21.     April 20, 2021--CT chest IMPRESSION:   In this patient with recently diagnosed colon cancer:  1. No evidence of metastatic disease in the chest.  2. Subcutaneous emphysema along the left lower chest/upper abdominal  wall may be secondary to transversus abdominis block done on  4/13/2021. No free intraperitoneal air or pneumothorax.  3. Coronary artery calcifications.  4. Sequelae of prior granulomatous disease.     [Access Center: Subcutaneous emphysema of the left lower chest/upper  abdominal wall, likely from transversus abdominis than on 4/13/2021.  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 FDG at the colonic surgical anastomosis presumably postsurgical  inflammation.   2. No definite FDG uptake to suggest metastatic disease.   3. No definite  primary significant ring carcinoma is identified.   There are 3 equivocal findings:   3a. Mild uptake in the gastric cardia, favor physiologic  3b. Loop of slightly narrowed thickened small bowel with FDG uptake,  favor physiologic or postoperative inflammation but cannot rule out a  primary.   3c. Extensive FDG uptake in the entire sigmoid colon, favored to be  physiologic.  3d. If clinically indicated, an MR enterography in this case may  better define bowel abnormalities. Alternatively attention to these  areas on follow-up imaging.  4. Incidentally noted bilateral hydroceles and right peritesticular  calcification.     May 3, 2021--initial medical oncology consultation, Dr. Sánchez.    May 6, 2021--EGD - unremarkable; no masses seen.    FINAL DIAGNOSIS:   A. ANTRUM, BIOPSY:   - Gastric antral type mucosa with features of reactive gastropathy   - No H. pylori like organisms identified on routine staining   - Negative for intestinal metaplasia or dysplasia     B. STOMACH, POLYP:   - Gastric body type mucosa with chronic gastritis   - No H. pylori like organisms identified on routine staining   - Negative for intestinal metaplasia, neoplastic polyp or dysplasia     C. STOMACH, POLYPS, BIOPSY:   - Fundic gland polyp in one fragment   - Gastric antral type mucosa with mild chronic gastritis   - No H. pylori like organisms identified on routine staining or by   immunohistochemistry   - Negative for intestinal metaplasia or dysplasia     D. STOMACH, BIOPSY:   - Chronic active gastritis with focal intestinal and pancreatic acinar   type metaplasia, see comment   - No H. pylori like organisms identified on routine staining or by   immunohistochemistry   - Negative for dysplasia, see comment       July 16, 2021 -- oncology follow-up/virtual visit, Dr. Sánchez.  11/8/21-PET/CT--IMPRESSION: Hx. of 2 mm focus of poorly differentiated adenocarcinoma  with significant ring cell features at the tip of the appendix.  No  recurrent or  metastatic disease.  1. Postsurgical changes of right hemicolectomy with mild residual  uptake along the surgical anastomosis which is below that of  background, decreased from prior, and favored to be inflammatory.   2. Slight progression of peripheral reticular opacities in the  dependent right lung with a basilar predominance, differential  atelectasis, edema, inflammatory, infectious, aspiration.  This is on  a background of emphysema.  2a. A 9 mm groundglass nodule right upper lobe peripherally unchanged  from prior exam, indeterminant. Continued attention on follow-up.    November 19, 2021 -- oncology follow-up/virtual visit, Dr. Sánchez.          Interim History/History Of Present Illness:    Mr. Tevin Guidry is a 71-year-old gentleman from [place].  He has a past medical history notable for cardiovascular disease, status post multivessel CABG 09/29/2014 and angioplasties.  He has hypertension, COPD from a history of smoking, which he quit in 2019.  He has a history of bladder cancer which was a noninvasive high grade form of urothelial carcinoma diagnosed in the summer and early fall of 2020.  He is status post multiple injections of BCG.  He was diagnosed in the summer of 2019 with a recurrence in mid winter of 2020.  He also has a history of innumerable polyps over the years. He has had nearly a dozen colonoscopies to date, dating back to age 48.  He has had numerous colonoscopies, but none of them have shown evidence of a neoplastic or malignant disease or significant dysplasia but he had tubulovillous forms of adenoma.      Mr. Guidry joins us for virtual visit for followup.  He generally is doing well.  He had a 11/16 appointment with Dr. Guzman from Urology earlier this week.  He had multiple recurrent bladder cancer and a cystoscopy done looked unremarkable.  Mr. Guidry had a colonoscopy and finding of a small, 2 mm, minute focus incidentally discovered in the appendix comprising adenocarcinoma with  signet ring histology.  I have been following him for this reason in concert with Dr. Rodríguez.  PET/CT was done on  continues to show no obvious evidence of metastatic disease or local recurrence.  He generally is feeling well.  He denies any symptomatic issues at this time.  He denies any pain.  He states he is feeling well and that he has been cleared from his Cardiology Team to not have to followup for another 2 years.            Review Of Systems:  Comprehensive (14-point) ROS reviewed. Pertinent symptoms reviewed above per HPI.      Past medical, social, surgical, and family histories reviewed.  PAST MEDICAL HISTORY:  Notable for innumerable colon polyps.  It is not clear whether or not he has a germline or familial mutation that is causing this.  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  He has CAD, hypertension.  He he has had cardiac catheterization including serum 2014 when he had a CABG with LIMA to LAD, 2014.  He has another malignancy in the form of noninvasive bladder carcinoma, left lateral wall of the bladder.  He has treatments with BCG and other interventions by our Urology team since 2019 for that.  COPD due to remote history of smoking.    PAST SURGICAL HISTORY:  Most remarkable for the hemicolectomy performed by Dr Colvin 2021 per above and the interventions for the bladder carcinoma in 2019 and .  Please see the Urology note for details.    FAMILY HISTORY:  Most notable for him being 1 of 3 sons from his parents.  His mom was a smoker and diagnosed with stage IV gastric carcinoid at age 65 and  within 4 months of diagnosis.  Father was a smoker and had lung cancer at age 40 and  at age 50 of this disease.  One brother had gastric carcinoma and  around age 72 after initial diagnosis at age 62.  Another brother is living at age 69 and has never had a colonoscopy due to lack  of insurance and other reasons.  A maternal aunt also had ovarian cancer.    SOCIAL HISTORY:  The patient lives in [place].  He has an associate degree in respiratory therapy  Due to social phobia, he has not been employed in that practice.  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  He is single, never , no children.  Tobacco:  He smoked 1-1/2 packs a day of cigarettes for 30 years, quit 2019.  Occasional use of marijuana.  Denied any alcohol use or other illicit drug use.    Allergies:  Allergies as of 11/19/2021 - Reviewed 11/16/2021   Allergen Reaction Noted     Codeine Nausea and Vomiting 10/14/2014       Current Medications:  Current Outpatient Medications   Medication Sig Dispense Refill     albuterol (PROAIR HFA/PROVENTIL HFA/VENTOLIN HFA) 108 (90 Base) MCG/ACT inhaler Inhale 2 puffs into the lungs every 6 hours as needed for shortness of breath / dyspnea or wheezing (Patient not taking: Reported on 11/16/2021) 18 g 1     amLODIPine (NORVASC) 5 MG tablet Take 1 tablet (5 mg) by mouth At Bedtime 90 tablet 1     aspirin 81 MG tablet Take 1 tablet (81 mg) by mouth daily (Patient taking differently: Take 81 mg by mouth every morning ) 90 tablet 3     atorvastatin (LIPITOR) 80 MG tablet Take 1 tablet (80 mg) by mouth At Bedtime 90 tablet 3     buPROPion (WELLBUTRIN XL) 300 MG 24 hr tablet Take 1 tablet (300 mg) by mouth every morning 90 tablet 1     ezetimibe (ZETIA) 10 MG tablet Take 1 tablet (10 mg) by mouth daily 90 tablet 3     lisinopril (ZESTRIL) 20 MG tablet Take 1 tablet (20 mg) by mouth 2 times daily 180 tablet 3     metoprolol succinate ER (TOPROL-XL) 50 MG 24 hr tablet Take 1 tablet (50 mg) by mouth At Bedtime 90 tablet 3     nicotine (NICODERM CQ) 21 MG/24HR 24 hr patch Place 1 patch onto the skin every 24 hours 28 patch 1     nicotine polacrilex (NICORETTE) 4 MG gum Place 4 mg inside cheek every hour as needed for  smoking cessation       PARoxetine (PAXIL) 40 MG tablet Take 1 tablet (40 mg) by mouth every morning 90 tablet 3        Physical Exam:  Physical exam could not be performed today in context of a Virtual Visit during the COVID19/Coronavirus pandemic.  Vitals - Patient Reported  Systolic (Patient Reported): 129  Diastolic (Patient Reported): 85  Weight (Patient Reported): 74.8 kg (165 lb)  Pain Score: No Pain (0)         Observed physical assessments made today by visualizing the patient by video link:    General/Constitutional: Generally appears well, not acutely ill.  HEENT: no scleral icterus, not jaundiced.  Respiratory: no labored breathing.  Musculoskeletal: appears to have full range of motion and adequate physical strength.  Skin: no jaundice, discoloration or other notable lesions.  Neurological: no evidence of tremors.  Psychiatric: no evident anxiety; fully alert and oriented with fluent speech.      The rest of a comprehensive physical examination is deferred due to PHE (public health emergency) video visit restrictions.      Laboratory/Imaging Studies  No visits with results within 2 Week(s) from this visit.   Latest known visit with results is:   Virtual Visit on 05/05/2021   Component Date Value Ref Range Status     Copath Report 05/06/2021    Final                    Value:Patient Name: DICK LYONS  MR#: [id number]  Specimen #: [id number]  Collected: 5/6/2021  Received: 5/6/2021  Reported: 5/13/2021 10:06  Ordering Phy(s): MANNIE DAWKINS  Additional Phy(s): [facility]: [facility]    For improved result formatting, select 'View Enhanced Report Format' under   Linked Documents section.    SPECIMEN(S):  A: Antral biopsy  B: Gastric polyp  C: Gastric biopsy  D: Gastric biopsy    FINAL DIAGNOSIS:  A. ANTRUM, BIOPSY:  - Gastric antral type mucosa with features of reactive gastropathy  - No H. pylori like organisms identified on routine staining  - Negative for intestinal metaplasia  "or dysplasia    B. STOMACH, POLYP:  - Gastric body type mucosa with chronic gastritis  - No H. pylori like organisms identified on routine staining  - Negative for intestinal metaplasia, neoplastic polyp or dysplasia    C. STOMACH, POLYPS, BIOPSY:  - Fundic gland polyp in one fragment  - Gastric antral type mucosa with mild chronic gastritis  - No H. pylor                          i like organisms identified on routine staining or by   immunohistochemistry  - Negative for intestinal metaplasia or dysplasia    D. STOMACH, BIOPSY:  - Chronic active gastritis with focal intestinal and pancreatic acinar   type metaplasia, see comment  - No H. pylori like organisms identified on routine staining or by   immunohistochemistry  - Negative for dysplasia, see comment    COMMENT:  D).  The biopsies show antral type mucosa with pancreatic acinar type   metaplasia and a minute focus of  intestinal metaplasia.  Immunohistochemistry was performed with   appropriate controls.  Chromogranin performed  on part D highlights linear enterochromaffin cell-like hyperplasia.    Gastrin stain shows rare positive cells  at the edge of one biopsy fragments.  If these biopsies were taken from   the fundus or body, these findings are  suggestive of autoimmune gastritis.  Clinical correlation is recommended.    I have personally reviewed all specimens and/or slides, including the   listed sp                          ecial stains, and used them  with my medical judgement to determine or confirm the final diagnosis.    Electronically signed out by:    Liliane Ta M.D., Physicians    CLINICAL HISTORY:  Screening endoscopy.  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    Striped erythema in gastric antrum.  Polyps in the gastric fundus.    GROSS:  A: The specimen is received in formalin with proper patient   identification, labeled \"gastric biopsy antrum\".  The specimen consists of 4 pieces of tan-pink tissue ranging from 0.2-0.5   cm in " "greatest dimension, submitted  in toto in cassette A1.    B: The specimen is received in formalin with proper patient   identification, labeled \"gastric polyps small\".  The specimen consists of 3 pieces of tan-pink tissue ranging from 0.2-0.5   cm in greatest dimension, submitted  in toto in cassette B1.    C: The specimen is received in formalin with proper patient   identification, labeled \"gastric biopsy large  polyps\".  The specimen consists of 5 pieces of                           tan-pink tissue ranging   from 0.2-0.6 cm in greatest dimension,  submitted in toto in cassette C 1.    D: The specimen is received in formalin with proper patient   identification, labeled \"gastric biopsy fundus\".  The specimen consists of 3 pieces of tan-pink tissue, each measuring   approximately 0.2 cm in greatest  dimension; submitted in toto in cassette D1. (Dictated by: MONICA 5/6/2021   02:53 PM)    MICROSCOPIC:  Microscopic examination was performed.  Immunohistochemistry was performed   with appropriate controls.  CK  AE1/AE3 performed on parts A1-D1 does not show evidence of signet ring   cell carcinoma.    The technical component of this testing was completed at the [facility], with the professional component performed   at the [facility], [address] [place] [id number] ([phone])    CPT Codes:  A: 73870-UW4, 52374-EBT  B: 56309-HR9, 08142-MDJ                            C: 17129-RG2, 47091-RIA, 65513-NVP  D: 81886-JZ2, 16584-PQW, 62640-INA, 22209-VWS, 78798-ROFVVS2VZ    COLLECTION SITE:  Client: [facility]  Location: [facility] (B)    Resident  [id number]            RADIOLOGY:  Prior to and including the day of this visit, I personally reviewed the recent imaging scans. I released the pertinent recent imaging results to [employer] in advance of this visit, and reviewed " the summary verbatim and in lay language during today's call.      ASSESSMENT/PLAN:  Mr. Hola Guidry is a 71-year-old gentleman with history of innumerable polyps.  He does not have a known germline mutation, although he has had multiple first-degree members of family with gastric carcinoma.  He is awaiting upper endoscopy 3 days from now by Dr. Tobar on 05/06/2021 to help clarify whether or not he has a primary gastric carcinoma, which would make sense in terms of signet cell features of the 2 mm focus in the distal appendix that was positive for adenocarcinoma.  Otherwise, the rest of the colon and terminal ileum were negative for carcinoma.  It is not out of the realm of possibility that signet ring cell features and type of histology can be found in small intestinal carcinomas including appendix, but more commonly, it is found in the upper GI tract.  Right now, based on the location as well being more mucosa than through wall that there is consideration whether or not this represents an unusual form of metastasis to the appendiceal cavity rather than a primary appendiceal malignancy.  However, the PET CT was unremarkable and did not show any evidence of an alternate primary at that time.    It is difficult to ascertain the exact risk of recurrence of the appendix that was incidentally found at the tip of the appendix.  It was very minimal in size at 2 mm and did comprise some high-risk form of histology with signet ring subset.  We will continue to do active surveillance.  We will get another PET/CT in 6 months' time.  He will follow up with me within a few days to review the results.  I sent a message to Dr. Rodríguez via Epic messaging today indicating that the patient is interested in getting a surveillance colonoscopy.  I advised the patient that in the absence of any symptoms, it may be scheduled for around a year after the initial colonoscopy and surgery.  He stated full understanding and had no further  questions.    I spent 14 minutes in consultation, including history and discussion with the patient including review of recent lab values and radiologic imaging results.  An additional 10 minutes was spent on the day of the visit, including reviewing pertinent medical notes and documentation from other physicians and APPs who have evaluated and treated this patient, pertinent lab values, pathology and imaging results, personal review of radiologic images, discussing the case with referring providers and/or nurse coordinator team, post-visit orders, and all post-visit documentation.    Tanner Sánchez MD PhD

## 2021-12-13 DIAGNOSIS — C18.1 APPENDIX CARCINOMA (H): Primary | ICD-10-CM

## 2021-12-14 ENCOUNTER — OFFICE VISIT (OUTPATIENT)
Dept: PHARMACY | Facility: CLINIC | Age: 71
End: 2021-12-14
Payer: COMMERCIAL

## 2021-12-14 VITALS
SYSTOLIC BLOOD PRESSURE: 110 MMHG | OXYGEN SATURATION: 94 % | WEIGHT: 171 LBS | HEART RATE: 65 BPM | DIASTOLIC BLOOD PRESSURE: 68 MMHG | BODY MASS INDEX: 25.25 KG/M2

## 2021-12-14 DIAGNOSIS — I10 HYPERTENSION GOAL BP (BLOOD PRESSURE) < 140/90: Primary | ICD-10-CM

## 2021-12-14 DIAGNOSIS — F17.210 CIGARETTE NICOTINE DEPENDENCE WITHOUT COMPLICATION: ICD-10-CM

## 2021-12-14 PROCEDURE — 99607 MTMS BY PHARM ADDL 15 MIN: CPT | Performed by: PHARMACIST

## 2021-12-14 PROCEDURE — 99606 MTMS BY PHARM EST 15 MIN: CPT | Performed by: PHARMACIST

## 2021-12-14 NOTE — PATIENT INSTRUCTIONS
Recommendations from today's MTM visit:                                                       1. FYI--congrats your really close to being 100% smoke free --stay on daily 21mg. Nicotine  patch, 300mg./day bupropion, chew the 4mg. Nicotine gum whenever you have urge/craving.        Please let me know if you want to try the Nicotrol inhaler ?     2. FYi--BP today is excellent 110/68mmhg.           Follow-up: Return in about 4 weeks (around 1/11/2022), or 1:30pm, for Medication Therapy Management Visit, BP Recheck, tobacco abuse.    It was great to speak with you today.  I value your experience and would be very thankful for your time with providing feedback on our clinic survey. You may receive a survey via email or text message in the next few days.     To schedule another MTM appointment, please call the clinic directly or you may call the MTM scheduling line at [phone] or toll-free at [phone].     My Clinical Pharmacist's contact information:                                                      Please feel free to contact me with any questions or concerns you have.      Andrea Bah [facility].  Medication Therapy Management Provider  [phone]

## 2021-12-14 NOTE — PROGRESS NOTES
Medication Therapy Management (MTM) Encounter    ASSESSMENT:                            Medication Adherence/Access: No issues identified    Smoking cessation: Patient is 98% smoke free. Patient is ready to quit using tobacco.  Based on patient preferences and history, patient would benefit from continuing the nicotine gum, nicotine patch, and  bupropion.  We discussed: risks of smoking, benefits of quitting and ways to cope with cravings and manage triggers.  He has to get mentally tough he states to finally be at 100% off all cigs.  Did offer nicotrol inhaler if he wants something to puff on --he will think about it .      Hypertension: Stable. Patient is meeting blood pressure goal of < 140/90mmHg. Patient would benefit from the following changes - continued blood pressure monitoring, watching diet, increasing exercise and weight loss and smoking cessation-100%.      PLAN:                              1. FYI--congrats your really close to being 100% smoke free --stay on daily 21mg. Nicotine  patch, 300mg./day bupropion, chew the 4mg. Nicotine gum whenever you have urge/craving.        Please let me know if you want to try the Nicotrol inhaler ?     2. FYi--BP today is excellent 110/68mmhg.           Follow-up: Return in about 4 weeks (around 1/11/2022), or 1:30pm, for Medication Therapy Management Visit, BP Recheck, tobacco abuse.        SUBJECTIVE/OBJECTIVE:                          Tevin Guidry is a 71 year old male coming in for a follow-up (11-16-21) visit. He was referred to me from Preethi Quiroz      Reason for visit:   Smoking cessation f/up. BP recheck.     He feels better --lower bp and 98% smoke free .         Allergies/ADRs: Reviewed in chart  Past Medical History: Per patient has a hemicolectomy with a hx of polyps (removed appendix). CAD (CABG), Social phobia, COPD, adenocarcinoma      Tobacco: He currently smokes 1ppd. He is trying to quit smoking currently. Successfully quit smoking tobacco  for 10 years and restarted about 5-6 years ago.   Alcohol: not currently using  Other Substance Use: marajuana flower, occasional vape - smokes everyday   Caffeine: large starbucks in morning and sometimes in PM      Medication Adherence/Access: Good save for not changing nicotine patch daily.     Tried smoking cessation with NRT (prn patch and gum) in August 2020.       Smoking Cessation: Current medications include: bupropion 300mg daily (increased from 150mg daily two weeks ago) and nicotine 21mg patch daily, nicotine 4mg. Gum  . Patient reports no side effects. Cannot tell bupropion is changing his mood at all - may feel happier but no major change. Has been using the patch everyday and bupropion.      Today 12-14-21:  Admits today has a cigarette every 4 days --has cig. Butt from- nahomy tray- access when he goes to his brothers place - to walk his dogs.    He has no desire to smoke but just seeing cig. [place] triggers him to smoke - but only 1 every few days --he is NOT buying any cigs or bumming a cig from brother or sister in law.    Also he likes the gum, and bupropion --admitys not quite wearing nicotine patch daily .     He admits he missed puffing on cigs and blowing smoke rings .          Previously:  Patient reports he has  completely quit smoking since last Tuesday 11-9-21 .  He is doing ok but struggled yesterday --bought some nicotine gum 4mg.- used it for urges --it works!  It helps to not have any cigs around. brothdhruv GF is a smoker --he watches there dog --it is tough to avoid her cigs but she is helping not leaving cigs around.        Can go about a day and half without thinking about it and then has a cigarette. Triggers: when he see someone having a cigarette on tv and stress related (he is still horse gambling). The urge has decreased since starting the bupropion. Had one cigarette today and 2 partials, Sunday smoked an entire pack, Saturday he did not smoke at all. It comes in waves.       Enjoys watching tv and taking care of the dog. He goes and watches his brothers dog everyday - feels like this really distracts him and he doesn't think about smoking.     Reports his sleep is good, generally 6-8 hours per night     He is still motivated to quit smoking. Motivations include: history of bladder cancer, knows that it isn't good for him, family history of cancer, cost     Previously:   How much does patient smoke: No more than a 1ppd , but as of 11-9-1 now smoke free!  Why does patient smoke: will quit smoking for a few days and then have a stressful day and pick it up again. Seems that he smokes out of routine and stress relief.   Triggers for smoking include:  Stress, horse racing/gambling and then just the general routine (gets an urge). Discussed that he would be willing to stop horse betting to reduce stressors.   How long has patient been smoking:  Started when he was a kid around 11 or 13 yo, about 60 years. Reports he quit for about a 10 year period and gum helped him quit. Started smoking again about 5-6 years ago when He rented a room from a woman that had a roll up smoking machine, and this triggered him to start smoking again. Last quit smoking because he was convinced he had to quit from risk factors (familial hx of cancer) and then in addition to cannabis use felt he didn't need to smoke both. Was still smoking marajuana at the time.      Feels like he is willing to quit smoking and ready to quit.     Support system: Brother has been on him to quit smoking but his girlfriend smokes. Discussed chatting with brother if he needs encouragement     Has used the patch in the past, was using it regularly everyday. Found the gum to be most effective in first attempt then after that when using it didn't find it to be that useful.     Smokes within first half hour of waking up. ??     What worked/didn t work in the past: patches worked somewhat but wasn't ready to quit smoking, gum worked to  quit smoking for the first time (x10 years), but feels it hasn't worked in recent smoking attempts   Why does patient want to quit (motivators for quitting): health/familial hx of cancer, expensive   How important is it for patient to quit on a scale of 0 - 10? 9  How confident is the patient that they can stop smoking on a scale of 0-10: 6-8 (he has done it before)     Occupation: Retired - does some house painting, yard work, dog sitting      Fagerstrom Test to Assess Nicotine Dependence:  Points 0 1 2 3   1) How soon after you wake do you smoke your first cigarette? >1 hr 1/2 to 1 hr 6 to 30 min <5 min   2) Do you find it difficult to refrain from smoking in places where it is forbidden? No Yes     3) Which cigarette would you hate to give up the most? Any other 1st in the AM     4) How many cigarettes/day do you smoke? <10 11 to 20 21 to 30 >31   5) Do you smoke the most in the morning? No Yes Unknown     6) If you are so ill that you are in bed most of the day, do you still smoke? No Yes Unknown    Fagerstrom score ~5   (0 - 5 = low to moderate nicotine dependence)  (6 - 10 = HIGH nicotine dependence)      Hypertension: Current medications include : amlodipine 5mg hs daily now , lisinopril 20mg twice daily, metoprolol succ. 50mg daily.  Patient does not self-monitor blood pressure.  Patient reports no current medication side effects. Patient reports they are somewhat anxious about the visit, thinks this could be related to blood pressure elevation at moment. Continue to monitor.     Currently takes aspirin 81mg daily based on history of CABG. Reports no side effects.    BP Readings from Last 3 Encounters:   12/14/21 110/68   11/16/21 (!) 148/80   11/16/21 (!) 172/95        Hyperlipidemia: Current therapy includes :atorvastatin 80mg daily and Ezetimibe (Zetia) 10mg once daily.  Patient reports no significant myalgias or other side effects.  The 10-year ASCVD risk score (Maryvilledon HINSON Jr., et al., 2013) is: 18%     Values used to calculate the score:      Age: 71 years      Sex: Male      Is Non- : No      Diabetic: No      Tobacco smoker: No      Systolic Blood Pressure: 110 mmHg      Is BP treated: Yes      HDL Cholesterol: 45 mg/dL      Total Cholesterol: 140 mg/dL  Recent Labs   Lab Test 09/22/21  1425 08/03/20  1443 10/27/16  1028 10/23/15  0850 09/30/14  0427   CHOL 140 140   < > 155 109   HDL 45 59   < > 43 51   LDL 78 64   < > 85 39   TRIG 86 84   < > 137 97   CHOLHDLRATIO  --   --   --  3.6 2.1    < > = values in this interval not displayed.           Today's Vitals: /68   Pulse 65   Wt 171 lb (77.6 kg)   SpO2 94%   BMI 25.25 kg/m    ----------------      I spent 30 minutes with this patient today. All changes were made via collaborative practice agreement with Preethi Quiroz MD, MD. A copy of the visit note was provided to the patient's primary care provider.    The patient was given a summary of these recommendations.     Andrea Bah [facility].  Medication Therapy Management Provider  [phone]         Medication Therapy Recommendations  Cigarette nicotine dependence without complication    Current Medication: nicotine (NICODERM CQ) 21 MG/24HR 24 hr patch   Rationale: Patient forgets to take - Adherence - Adherence   Recommendation: Provide Adherence Intervention   Status: Accepted per CPA

## 2021-12-14 NOTE — Clinical Note
Preethi-- yoan-- BP wnl now , almost smoke free 100% --just every 3 days or so smoking used cig butt from nahomy tray at brothers house -- also not quite 100% adherent to putting a new nicotine patch on daily --we discussed those 2 things --he will work on that -- see me again in 4 weeks for f/up.  I did offer him nicotrol inhaler as well --as he likes the puffing aspect and missed that . He will think about that .    Andrea Pope, formerly [facility].  Medication Therapy Management Provider  [phone]

## 2022-01-01 ENCOUNTER — HEALTH MAINTENANCE LETTER (OUTPATIENT)
Age: 72
End: 2022-01-01

## 2022-01-06 ENCOUNTER — MYC MEDICAL ADVICE (OUTPATIENT)
Dept: PHARMACY | Facility: CLINIC | Age: 72
End: 2022-01-06
Payer: COMMERCIAL

## 2022-01-06 NOTE — TELEPHONE ENCOUNTER
It has been 22 days with no tobacco  at all.   It has been two weeks since  I have used any gum or the patch.    Perhaps we have succeeded.   I will see you next week.    Fantastic news !!    Andrea Bah rhoda.  Medication Therapy Management Provider  [phone]

## 2022-01-10 NOTE — PROGRESS NOTES
Medication Therapy Management (MTM) Encounter    ASSESSMENT:                            Medication Adherence/Access: No issues identified    Smoking cessation: Patient is smoke free.     We discussed: rewards for quitting.      Hypertension: Stable. Patient is meeting blood pressure goal of < 140/90mmHg. Patient would benefit from the following changes - continued blood pressure monitoring, watching diet, increasing exercise and weight loss and smoking cessation-100% now!      PLAN:                            1. Congrats your smoke and nicotine free since 12-15-21!!  Stay on Bupropion 300mg./day as is .    Keep nicotine patch/gum around just in case your tempted to relapse.     2. BP today = 112/68mmhg--fantastic.          Follow-up: Return in about 3 months (around 4/14/2022), or 1:30 PM, for Medication Therapy Management Visit, BP Recheck, tobacco abuse.    SUBJECTIVE/OBJECTIVE:                          Tevin Guidry is a 71 year old male coming in for a follow-up (12-14-21) visit. He was referred to me from Preethi Quiroz      Reason for visit:   BP and smoking cessation f/up. He has no intention of smoking anymore !!    1-6-22:   It has been 22 days with no tobacco  at all.   It has been two weeks since  I have used any gum or the patch.    Perhaps we have succeeded.   I will see you next week.            Allergies/ADRs: Reviewed in chart  Past Medical History: Per patient has a hemicolectomy with a hx of polyps (removed appendix). CAD (CABG), Social phobia, COPD, adenocarcinoma  Of bladder/colon. Had signet ring cancer removed with colon as well.  Has MUTYH--associated polyposis.   Tobacco: He currently is smoke free now since 12-15-21.   Alcohol: not currently using  Other Substance Use: marijuana flower, occasional vape - smokes everyday   Caffeine: large starbucks in morning and sometimes in PM      Medication Adherence/Access: Good save for not changing nicotine patch daily.     Tried smoking cessation  with NRT (prn patch and gum) in August 2020.       Smoking Cessation: Current medications include: bupropion 300mg daily (increased from 150mg daily six weeks ago) and now off all NRT( nicotine 21mg patch daily, nicotine 4mg. Gum -previously used these) . Patient reports no side effects.     1-11-22--now smoke free since 12-15-21 as well as nicotine free--no patches/gum.     He does still use marijuana as vape or sample cannabis semi-regular or joints in pipe. He does take a lot to get him high. Not yet ready to quit it -- will try to cut back but it makes him happier when he uses it.         Today 12-14-21:  Admits today has a cigarette every 4 days --has cig. Butt from- nahomy tray- access when he goes to his brothers place - to walk his dogs.    He has no desire to smoke but just seeing cig. [place] triggers him to smoke - but only 1 every few days --he is NOT buying any cigs or bumming a cig from brother or sister in law.    Also he likes the gum, and bupropion --admitys not quite wearing nicotine patch daily .     He admits he missed puffing on cigs and blowing smoke rings .          Previously:  Patient reports he has  completely quit smoking since last Tuesday 11-9-21 .  He is doing ok but struggled yesterday --bought some nicotine gum 4mg.- used it for urges --it works!  It helps to not have any cigs around. mat RAMOS is a smoker --he watches there dog --it is tough to avoid her cigs but she is helping not leaving cigs around.        Can go about a day and half without thinking about it and then has a cigarette. Triggers: when he see someone having a cigarette on tv and stress related (he is still horse gambling). The urge has decreased since starting the bupropion. Had one cigarette today and 2 partials, Sunday smoked an entire pack, Saturday he did not smoke at all. It comes in waves.      Enjoys watching tv and taking care of the dog. He goes and watches his brothers dog everyday - feels like this really  distracts him and he doesn't think about smoking.     Reports his sleep is good, generally 6-8 hours per night     He is still motivated to quit smoking. Motivations include: history of bladder cancer, knows that it isn't good for him, family history of cancer, cost     Previously:   How much does patient smoke: No more than a 1ppd , but as of 11-9-1 now smoke free!  Why does patient smoke: will quit smoking for a few days and then have a stressful day and pick it up again. Seems that he smokes out of routine and stress relief.   Triggers for smoking include:  Stress, horse racing/gambling and then just the general routine (gets an urge). Discussed that he would be willing to stop horse betting to reduce stressors.   How long has patient been smoking:  Started when he was a kid around 11 or 13 yo, about 60 years. Reports he quit for about a 10 year period and gum helped him quit. Started smoking again about 5-6 years ago when He rented a room from a woman that had a roll up smoking machine, and this triggered him to start smoking again. Last quit smoking because he was convinced he had to quit from risk factors (familial hx of cancer) and then in addition to cannabis use felt he didn't need to smoke both. Was still smoking marajuana at the time.      Feels like he is willing to quit smoking and ready to quit.     Support system: Brother has been on him to quit smoking but his girlfriend smokes. Discussed chatting with brother if he needs encouragement     Has used the patch in the past, was using it regularly everyday. Found the gum to be most effective in first attempt then after that when using it didn't find it to be that useful.     Smokes within first half hour of waking up. ??     What worked/didn t work in the past: patches worked somewhat but wasn't ready to quit smoking, gum worked to quit smoking for the first time (x10 years), but feels it hasn't worked in recent smoking attempts   Why does patient want  to quit (motivators for quitting): health/familial hx of cancer, expensive   How important is it for patient to quit on a scale of 0 - 10? 9  How confident is the patient that they can stop smoking on a scale of 0-10: 6-8 (he has done it before)     Occupation: Retired - does some house painting, yard work, dog sitting      Fagerstrom Test to Assess Nicotine Dependence:  Points 0 1 2 3   1) How soon after you wake do you smoke your first cigarette? >1 hr 1/2 to 1 hr 6 to 30 min <5 min   2) Do you find it difficult to refrain from smoking in places where it is forbidden? No Yes     3) Which cigarette would you hate to give up the most? Any other 1st in the AM     4) How many cigarettes/day do you smoke? <10 11 to 20 21 to 30 >31   5) Do you smoke the most in the morning? No Yes Unknown     6) If you are so ill that you are in bed most of the day, do you still smoke? No Yes Unknown    Fagerstrom score ~5   (0 - 5 = low to moderate nicotine dependence)  (6 - 10 = HIGH nicotine dependence)      Hypertension: Current medications include : amlodipine 5mg hs daily now , lisinopril 20mg twice daily, metoprolol succ. 50mg daily.  Patient does not self-monitor blood pressure.  Patient reports no current medication side effects. Patient reports they are somewhat anxious about the visit, thinks this could be related to blood pressure elevation at moment. Continue to monitor.     Currently takes aspirin 81mg daily based on history of CABG. Reports no side effects.    BP Readings from Last 3 Encounters:   01/11/22 112/68   12/14/21 110/68   11/16/21 (!) 148/80        Hyperlipidemia: Current therapy includes :atorvastatin 80mg daily and Ezetimibe (Zetia) 10mg once daily.  Patient reports no significant myalgias or other side effects.  The 10-year ASCVD risk score (Cheridon HINSON Jr., et al., 2013) is: 18.5%    Values used to calculate the score:      Age: 71 years      Sex: Male      Is Non- : No      Diabetic:  No      Tobacco smoker: No      Systolic Blood Pressure: 112 mmHg      Is BP treated: Yes      HDL Cholesterol: 45 mg/dL      Total Cholesterol: 140 mg/dL  Recent Labs   Lab Test 09/22/21  1425 08/03/20  1443 10/27/16  1028 10/23/15  0850 09/30/14  0427   CHOL 140 140   < > 155 109   HDL 45 59   < > 43 51   LDL 78 64   < > 85 39   TRIG 86 84   < > 137 97   CHOLHDLRATIO  --   --   --  3.6 2.1    < > = values in this interval not displayed.           Today's Vitals: /68   Pulse 66   Wt 170 lb 3.2 oz (77.2 kg)   SpO2 95%   BMI 25.13 kg/m    ----------------      I spent 30 minutes with this patient today. All changes were made via collaborative practice agreement with Preethi Quiroz MD, MD. A copy of the visit note was provided to the patient's primary care provider.    The patient was given a summary of these recommendations.     Andrea Bah rhoda.  Medication Therapy Management Provider  [phone]         Medication Therapy Recommendations  No medication therapy recommendations to display

## 2022-01-11 ENCOUNTER — OFFICE VISIT (OUTPATIENT)
Dept: PHARMACY | Facility: CLINIC | Age: 72
End: 2022-01-11
Payer: COMMERCIAL

## 2022-01-11 VITALS
DIASTOLIC BLOOD PRESSURE: 68 MMHG | OXYGEN SATURATION: 95 % | BODY MASS INDEX: 25.13 KG/M2 | WEIGHT: 170.2 LBS | HEART RATE: 66 BPM | SYSTOLIC BLOOD PRESSURE: 112 MMHG

## 2022-01-11 DIAGNOSIS — F17.210 CIGARETTE NICOTINE DEPENDENCE WITHOUT COMPLICATION: ICD-10-CM

## 2022-01-11 DIAGNOSIS — I10 HYPERTENSION GOAL BP (BLOOD PRESSURE) < 140/90: Primary | ICD-10-CM

## 2022-01-11 DIAGNOSIS — E78.5 HYPERLIPIDEMIA LDL GOAL <130: ICD-10-CM

## 2022-01-11 PROCEDURE — 99605 MTMS BY PHARM NP 15 MIN: CPT | Performed by: PHARMACIST

## 2022-01-11 NOTE — LETTER
January 13, 2022  Tevin Guidyr  [address]  [facility] [address]    Dear Mr. Guidry, [facility]        Thank you for talking with me on Jan 11, 2022 about your health and medications. As a follow-up to our conversation, I have included two documents:      1. Your Recommended To-Do List has steps you should take to get the best results from your medications.  2. Your Medication List will help you keep track of your medications and how to take them.    If you want to talk about these documents, please call Andrea Bah RPH at phone: [phone], Monday-Friday 8-4:30pm.    I look forward to working with you and your doctors to make sure your medications work well for you.    Sincerely,    Andrea Bah RPH

## 2022-01-11 NOTE — PATIENT INSTRUCTIONS
Recommendations from today's MTM visit:                                                       1. Congrats your smoke and nicotine free since 12-15-21!!  Stay on Bupropion 300mg./day as is .    Keep nicotine patch/gum around just in case your tempted to relapse.     2. BP today = 112/68mmhg--fantastic.          Follow-up: Return in about 3 months (around 4/14/2022), or 1:30 PM, for Medication Therapy Management Visit, BP Recheck, tobacco abuse.    It was great to speak with you today.  I value your experience and would be very thankful for your time with providing feedback on our clinic survey. You may receive a survey via email or text message in the next few days.     To schedule another MTM appointment, please call the clinic directly or you may call the MTM scheduling line at [phone] or toll-free at [phone].     My Clinical Pharmacist's contact information:                                                      Please feel free to contact me with any questions or concerns you have.      Andrea Bah Rph.  Medication Therapy Management Provider  [phone]

## 2022-01-11 NOTE — Clinical Note
Preethi-- yoan--congrats to Hola --smoke and nicotine free since 12-15-21--I am going to keep him on Bupropion this year until December --then see if he wants to wean off.  Quite remarkable after 60 years smoking cigs that he has successfully quit.    -Andrea

## 2022-01-11 NOTE — LETTER
"Recommended To-Do List      Prepared on: 1/13/2022     You can get the best results from your medications by completing the items on this \"To-Do List.\"      Bring your To-Do List when you go to your doctor. And, share it with your family or caregivers.    My To-Do List:  What we talked about: What I should do:     Congrats -smoke and nicotine free since 12-15-21--continue 300mg./day Bupropion as is .               "

## 2022-01-11 NOTE — LETTER
_  Medication List        Prepared on: 1/13/2022     Bring your Medication List when you go to the doctor, hospital, or   emergency room. And, share it with your family or caregivers.     Note any changes to how you take your medications.  Cross out medications when you no longer use them.    Medication How I take it Why I use it Prescriber   albuterol (PROAIR HFA/PROVENTIL HFA/VENTOLIN HFA) 108 (90 Base) MCG/ACT inhaler Inhale 2 puffs into the lungs every 6 hours as needed for shortness of breath / dyspnea or wheezing Chronic obstructive pulmonary disease, unspecified COPD type (H) Preethi Quiroz MD   amLODIPine (NORVASC) 5 MG tablet Take 1 tablet (5 mg) by mouth At Bedtime Hypertension Goal BP (Blood Pressure) < 140/90 Preethi Quiroz MD   aspirin 81 MG tablet Take 1 tablet (81 mg) by mouth daily Coronary artery disease involving native artery of transplanted heart without angina pectoris Casa Evans MD   atorvastatin (LIPITOR) 80 MG tablet Take 1 tablet (80 mg) by mouth At Bedtime Hyperlipidemia LDL Goal <70 Azul Breaux MD   buPROPion (WELLBUTRIN XL) 300 MG 24 hr tablet Take 1 tablet (300 mg) by mouth every morning Tobacco Dependence Preethi Quiroz MD   ezetimibe (ZETIA) 10 MG tablet Take 1 tablet (10 mg) by mouth daily Essential hypertension with goal blood pressure less than 140/90; Hyperlipidemia LDL Goal <70 Azul Breaux MD   lisinopril (ZESTRIL) 20 MG tablet Take 1 tablet (20 mg) by mouth 2 times daily Essential hypertension with goal blood pressure less than 140/90 Azul Breaux MD   metoprolol succinate ER (TOPROL-XL) 50 MG 24 hr tablet Take 1 tablet (50 mg) by mouth At Bedtime CAD, multiple vessel Azul Breaux MD   PARoxetine (PAXIL) 40 MG tablet Take 1 tablet (40 mg) by mouth every morning Social Phobia Preethi Quiroz MD         Add new medications, over-the-counter drugs, herbals, vitamins, or  minerals in the blank rows  below.    Medication How I take it Why I use it Prescriber                          Allergies:      codeine        Side effects I have had:              Other Information:             My notes and questions:

## 2022-02-01 ENCOUNTER — PATIENT OUTREACH (OUTPATIENT)
Dept: GERIATRIC MEDICINE | Facility: CLINIC | Age: 72
End: 2022-02-01
Payer: COMMERCIAL

## 2022-02-01 DIAGNOSIS — H91.93 BILATERAL HEARING LOSS, UNSPECIFIED HEARING LOSS TYPE: Primary | ICD-10-CM

## 2022-02-01 NOTE — PROGRESS NOTES
[facility] Care Coordination Contact    Updated member that referral is placed and gave contact information for him to schedule appointment.  Li Jensen RN  [facility]  P: [phone]  Fax: [phone]

## 2022-02-01 NOTE — PROGRESS NOTES
[facility] Care Coordination Contact      [facility] Six-Month Telephone Assessment    6 month telephone assessment completed on 2/1/22.    ER visits: No  Hospitalizations: No  TCU stays: No  Significant health status changes: Reports to have officially quit smoking. 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 Updated Oncology. Would like to complete hearing exam soon, needs new referral and would preferr to follow up with the [facility]- Audiology. Was at dental clinic today to have exam and fillings completed as well as teeth pulled if needed. Remains active and still walking brother dog regularly  Falls/Injuries: No  ADL/IADL changes: No  Changes in services: No    Caregiver Assessment follow up:  NA    Goals: See POC in chart for goal progress documentation.      Will see member in 6 months for an annual health risk assessment.   Encouraged member to call CC with any questions or concerns in the meantime.   Li Jensen RN  [facility]  P: [phone]  Fax: [phone]

## 2022-02-07 DIAGNOSIS — Z12.11 ENCOUNTER FOR SCREENING COLONOSCOPY: Primary | ICD-10-CM

## 2022-02-08 ENCOUNTER — TELEPHONE (OUTPATIENT)
Dept: GASTROENTEROLOGY | Facility: CLINIC | Age: 72
End: 2022-02-08

## 2022-02-08 ENCOUNTER — OFFICE VISIT (OUTPATIENT)
Dept: AUDIOLOGY | Facility: CLINIC | Age: 72
End: 2022-02-08
Attending: FAMILY MEDICINE
Payer: COMMERCIAL

## 2022-02-08 DIAGNOSIS — H91.93 BILATERAL HEARING LOSS, UNSPECIFIED HEARING LOSS TYPE: ICD-10-CM

## 2022-02-08 DIAGNOSIS — H90.3 SENSORINEURAL HEARING LOSS, BILATERAL: Primary | ICD-10-CM

## 2022-02-08 PROCEDURE — 92557 COMPREHENSIVE HEARING TEST: CPT | Performed by: AUDIOLOGIST

## 2022-02-08 PROCEDURE — 92550 TYMPANOMETRY & REFLEX THRESH: CPT | Performed by: AUDIOLOGIST

## 2022-02-08 NOTE — TELEPHONE ENCOUNTER
Screening Questions  Blue=prep questions Red=location Green=sedation   1. Are you active on mychart? y    2. What insurance is in the chart? [employer]are     3.  Ordering/Referring Provider: Jitendra Rodríguez MD     4. BMI 24.4, If greater than 40 review exclusion criteria also will need EXTENDED PREP    5.  Respiratory Screening (If yes to any of the following HOSPITAL setting only):     Do you use daily home oxygen? n  Do you have mod to severe Obstructive Sleep Apnea? n (can be seen at [facility] or hospital setting)    Do you have Pulmonary Hypertension? n   Do you have UNCONTROLLED asthma? n    6. Have you had a heart or lung transplant? n  (If yes, please review exclusion criteria)    7. Are you currently on dialysis?n  (If yes, schedule in HOSPITAL setting only)(If yes, please send Golytely prep)    8. Do you have chronic kidney disease? n (If yes, please send Golytely prep)    9. Have you had a stroke or Transient ischemic attack (TIA) within 6 months? n (If yes, do not schedule at [facility])    10. In the past 6 months, have you had any heart related issues including cardiomyopathy or heart attack? n (If yes, please review exclusion criteria)           If yes, did it require cardiac stenting or other implantable device?  (If yes, please review exclusion criteria)      11. Do you have any implantable devices in your body (pacemaker, defib, LVAD)? n (If yes, schedule at [facility])    12. Do you take nitroglycerin? If yes, how often? n (if yes, schedule at HOSPITAL setting)    13. Are you currently taking any blood thinners?n (If yes- inform patient to follow up with PCP or provider for follow up instructions)     14. Are you a diabetic? n (If yes, please send Golytely prep)    15. (Females) Are you currently pregnant?   If yes, how many weeks?      16. Are you taking any prescription pain medications on a routine schedule? n If yes, MAC sedation and patient will need EXTENDED PREP.    17. Do you have any chemical dependencies such as  alcohol, street drugs, or methadone? Y medical marijuana  If yes, MAC sedation     18. Do you have any history of post-traumatic stress syndrome, severe anxiety or history of psychosis? n  If yes, MAC sedation.     19. Do you transfer independently? y    20.  Do you have any issues with constipation? n   If yes, pt will need EXTENDED PREP     21. Preferred Pharmacy for Pre Prescription [facility] - [place], [place] - [address]    Scheduling Details    Which Colonoscopy Prep was Sent?: miralax  Type of Procedure Scheduled: colon  Surgeon: Marcos  Date of Procedure: 4/22/22  Location: [facility]  Caller (Please ask for phone number if not scheduled by patient): Tevin Guidry      Sedation Type: MAC  Conscious Sedation- Needs  for 6 hours after the procedure  MAC/General-Needs  for 24 hours after procedure    Pre-op Required at [facility] [place] [place] and OR for MAC sedation:   (if yes advise patient they will need a pre-op prior to procedure)      Informed patient they will need an adult  y  Cannot take any type of public or medical transportation alone    Pre-Procedure Covid test to be completed at [facility] or Externally:  4/18    Confirmed Nurse will call to complete assessment Y    Additional comments:  ([facility] PATIENTS NEED EXTENDED PREP)

## 2022-02-08 NOTE — TELEPHONE ENCOUNTER
FUTURE VISIT INFORMATION      FUTURE VISIT INFORMATION:    Date: 3/9/22    Time: 1:30PM    [place] [facility] [place] INFORMATION:    Referring provider:  Simran Frost    Referring providers clinic:  [facility] [place]     Reason for visit/diagnosis  Medical clearance for aids- referred by audiology     RECORDS REQUESTED FROM:       Clinic name Comments Records Status Imaging Status   [facility] [place]  2/8/22 note and audio from Simran Frost  3/7/2015 note from Dr Nissen  EPIC    Imaging 11/8/21 PET  Epic PACS

## 2022-02-08 NOTE — PROGRESS NOTES
AUDIOLOGY REPORT    SUBJECTIVE: Tevin Guidry is a 71 year old male who was seen in the Audiology Clinic at [facility] for audiologic evaluation, referred by Preethi Quiroz M.D. The patient has been seen previously in this clinic on 3/3/2015 for assessment, and results indicated normal hearing sloping to moderately severe sensorineural hearing loss for the right ear and normal hearing sloping to severe sensorineural hearing loss for the left ear. Asymmetries were noted at 0422-0032 and 8000 Hz, so the patient was seen by Rick Nissen, M.D. at that time. Today, the patient reports a decrease in hearing for both ears. He denies bilateral pain, bilateral drainage, bilateral tinnitus, dizziness, or a history of ear surgeries. The patient notes difficulty with communication in a variety of listening situations.     OBJECTIVE:  Abuse Screening:  Do you feel unsafe at home or work/school? No  Do you feel threatened by someone? No  Does anyone try to keep you from having contact with others, or doing things outside of your home? No  Physical signs of abuse present? No     Fall Risk Screening:  Have you fallen two or more times in the past year? No  Have you fallen and had an injury in the past year? Yes, fell and broke arm due to an accident while walking his dog    Timed Up and Go Score (in seconds): Not tested  Is patient a fall risk? No  Referral initiated: No, the patient has already followed up for this  Fall Risk Assessment Completed by Audiology    Otoscopic exam indicated ears are clear of cerumen bilaterally     Pure Tone Thresholds assessed using conventional audiometry with good reliability from 250-8000 Hz bilaterally using insert earphones and circumaural headphones     RIGHT:  Normal hearing sloping to moderately severe sensorineural hearing loss    LEFT:    Normal hearing sloping to severe sensorineural hearing loss    Tympanogram:    RIGHT: Hypermobile     LEFT:   Within normal limits    Reflexes (reported by stimulus ear):    RIGHT: Ipsilateral is present at normal levels    RIGHT: Contralateral is present at normal levels    LEFT:   Ipsilateral is present at normal levels    LEFT:   Contralateral is present at normal levels    Speech Reception Threshold:    RIGHT: 40 dB HL    LEFT:   45 dB HL    Word Recognition Score:     RIGHT: 68% at 80 dB HL using NU-6 recorded word list    LEFT:   56% at 80 dB HL using NU-6 recorded word list    ASSESSMENT: Bilateral sensorineural hearing loss. Compared to patient's previous audiogram dated 3/3/2015, thresholds have worsened by 10-30 dB at 5053-1870 and 8000 Hz for the right ear and are stable for the left ear. Word recognition scores have worsened bilaterally, although not a clinically significant difference (88% to 68% right and 72% to 56% left). Today s results were discussed with the patient in detail.     PLAN: The patient was counseled regarding hearing loss and impact on communication. He will follow up with Dr. Nissen for hearing aid medical clearance and return for a hearing aid consultation. Repeat audiologic evaluation is recommended if changes are noted. Please call this clinic with questions regarding these results or recommendations.      JOCE Watkins.  Audiology Doctoral Student    I was present with the patient for the entire Audiology appointment, including all procedures/testing performed by the Amelie student.    Amelie Garzon, [facility]  Licensed Audiologist  [place] #[id number]      CC: Preethi Quiroz M.D.

## 2022-02-17 PROBLEM — Z76.89 HEALTH CARE HOME: Status: RESOLVED | Noted: 2017-03-13 | Resolved: 2021-09-01

## 2022-02-19 ENCOUNTER — PATIENT OUTREACH (OUTPATIENT)
Dept: GERIATRIC MEDICINE | Facility: CLINIC | Age: 72
End: 2022-02-19
Payer: COMMERCIAL

## 2022-02-19 NOTE — LETTER
February 19, 2022    TEVIN LYONS  [address]  [facility] [address]      Dear Tevin:    As a member of [facility] ([employer]) ([facility]), you are provided a care coordinator. I will be your new care coordinator as of 3/1/2022. I will be calling you soon to see how you are doing and determine your needs.    If you have any questions, please feel free to call me at  [phone]. If you reach my voice mail, please leave a message and your phone number. If you are hearing impaired, please call the [place] Relay at 610 or [phone] (aekeyu-za-tbrwsn relay service).    I look forward to speaking with you soon.    Sincerely,    Elizabeth Martinez RN  [phone]  @[email or web address] is a health plan that contracts with both Medicare and the [place] Medical Assistance (Medicaid) program to provide benefits of both programs to enrollees. Enrollment in [facility] depends on contract renewal.      [facility]  [id number] DHS Approved ([id number])  [id number] (11/18)

## 2022-02-19 NOTE — PROGRESS NOTES
[facility] Care Coordination Contact    Internal CC change effective 3/1/2022.  Mailed member CC Change letter.  Additional tasks to be completed by CMS include: update database & EPIC, enter CC Change in MMIS, and move member files on Q drive.    Rosaura Karimi  Case Management Specialist  [facility]  [phone]

## 2022-03-08 ENCOUNTER — PATIENT OUTREACH (OUTPATIENT)
Dept: GERIATRIC MEDICINE | Facility: CLINIC | Age: 72
End: 2022-03-08
Payer: COMMERCIAL

## 2022-03-08 NOTE — PROGRESS NOTES
[facility] Care Coordination Contact    Member transferred from Lovely Medina RN, to this care coordinator effective 3/1/22.  Called member to introduce self and provide contact information.    Also advised I will call him in June or July to schedule his annual assessment (due by 7/31/22).    Hola voiced understanding of the above.  Elizabeth Martinez RN  [facility]   [phone]

## 2022-03-09 ENCOUNTER — PRE VISIT (OUTPATIENT)
Dept: OTOLARYNGOLOGY | Facility: CLINIC | Age: 72
End: 2022-03-09

## 2022-03-09 ENCOUNTER — OFFICE VISIT (OUTPATIENT)
Dept: OTOLARYNGOLOGY | Facility: CLINIC | Age: 72
End: 2022-03-09
Payer: COMMERCIAL

## 2022-03-09 VITALS
HEART RATE: 71 BPM | SYSTOLIC BLOOD PRESSURE: 120 MMHG | BODY MASS INDEX: 24.44 KG/M2 | DIASTOLIC BLOOD PRESSURE: 73 MMHG | HEIGHT: 69 IN | WEIGHT: 165 LBS

## 2022-03-09 DIAGNOSIS — H90.3 BILATERAL SENSORINEURAL HEARING LOSS: Primary | ICD-10-CM

## 2022-03-09 PROCEDURE — 99203 OFFICE O/P NEW LOW 30 MIN: CPT | Performed by: REGISTERED NURSE

## 2022-03-09 ASSESSMENT — PAIN SCALES - GENERAL: PAINLEVEL: NO PAIN (0)

## 2022-03-09 NOTE — PROGRESS NOTES
Otolaryngology Clinic  March 9, 2022    Chief Complaint:   Bilateral sensorineural hearing loss       History of Present Illness:   Tevin Guidry is a 71 year old male who presents today for evaluation of bilateral sensorineural hearing loss.  Patient has been seen in the past in 2015 by Dr. Nissen for bilateral sensorineural hearing loss with left asymmetry. Discussed proceeding with a MRI but decided to just monitor.     Today, patient reports gradual decrease of hearing in both ears, left seems worse than right. More difficulty with communication in crowded environments. Patient denies any otalgia, otorrhea, dizziness, facial numbness/weakness, history of frequent ear infections, or ear surgeries. Does have slight imbalance with position changes.     Of note, patient has a history of multiple colon polyps who was recently diagnosed with MUTYH associated polyposis.    Past Medical History:  Past Medical History:   Diagnosis Date     Bladder cancer (H)      Colon polyps     multiple may need colectomy (adenoma)     COPD (chronic obstructive pulmonary disease) (H)      Hyperlipidemia LDL goal <130      Hypertension goal BP (blood pressure) < 140/90      Social phobia      Past Surgical History:  Past Surgical History:   Procedure Laterality Date     CATARACT IOL, RT/LT       CLAVICLE SURGERY Right     fracture in childhood     COLONOSCOPY N/A 3/30/2017    Procedure: COLONOSCOPY;  Surgeon: Jie Onofre MD;  Location: [facility]     COLONOSCOPY N/A 3/2/2021    Procedure: COLONOSCOPY, WITH POLYPECTOMY, hot snare, lift with elaview and epi, clip for hemorrhage control, tattoo;  Surgeon: Ra Cardoso MD;  Location: [facility] OR     COLONOSCOPY N/A 4/22/2021    Procedure: Colonoscopy;  Surgeon: Jitendra Rodríguez MD;  Location: [facility] OR     CYSTOSCOPY, BIOPSY BLADDER INSTILL OPTICAL AGENT N/A 9/2/2020    Procedure: CYSTOSCOPY, WITH INSTILLATION OF OPTICAL IMAGING AGENT INTO BLADDER AND BIOPSY OF BLADDER  (CYSVIEW);  Surgeon: Kirt Guzman MD;  [facility]     CYSTOSCOPY, TRANSURETHRAL RESECTION (TUR) TUMOR BLADDER, COMBINED N/A 9/5/2019    Procedure: cystoscopy,Transurethral Resection Of Bladder Tumor;  Surgeon: Almas Sunshine MD;  [facility]     CYSTOSCOPY, TRANSURETHRAL RESECTION (TUR) TUMOR BLADDER, COMBINED N/A 2/6/2020    Procedure: CYSTOSCOPY, WITH TRANSURETHRAL RESECTION BLADDER TUMOR;  Surgeon: Almas Sunshine MD;  [facility]     DAVINCI BYPASS ARTERY CORONARY N/A 9/29/2014    Procedure: DAVINCI BYPASS ARTERY CORONARY;  Surgeon: Lam Solis MD;  Location: [facility] OR     ESOPHAGOSCOPY, GASTROSCOPY, DUODENOSCOPY (EGD), COMBINED N/A 3/30/2017    Procedure: COMBINED ESOPHAGOSCOPY, GASTROSCOPY, DUODENOSCOPY (EGD);  Surgeon: Jie Onofre MD;  Location: [facility] GI     PHACOEMULSIFICATION CLEAR CORNEA WITH STANDARD INTRAOCULAR LENS IMPLANT Right 12/11/2020    Procedure: RIGHT EYE COMPLEX CATARACT REMOVAL WITH INTRAOCULAR LENS IMPLANT;  Surgeon: Aimee Marcus MD;  Location: [facility] OR     SURGICAL HISTORY OF -       right clavicular fracture with ORIF     ZZHC COLONOSCOPY THRU STOMA, DIAGNOSTIC  2010    polyps due 2011     Medications:  Current Outpatient Medications   Medication Sig Dispense Refill     amLODIPine (NORVASC) 5 MG tablet Take 1 tablet (5 mg) by mouth At Bedtime 90 tablet 1     aspirin 81 MG tablet Take 1 tablet (81 mg) by mouth daily 90 tablet 3     atorvastatin (LIPITOR) 80 MG tablet Take 1 tablet (80 mg) by mouth At Bedtime 90 tablet 3     buPROPion (WELLBUTRIN XL) 300 MG 24 hr tablet Take 1 tablet (300 mg) by mouth every morning 90 tablet 1     ezetimibe (ZETIA) 10 MG tablet Take 1 tablet (10 mg) by mouth daily 90 tablet 3     lisinopril (ZESTRIL) 20 MG tablet Take 1 tablet (20 mg) by mouth 2 times daily 180 tablet 3     metoprolol succinate ER (TOPROL-XL) 50 MG 24 hr tablet Take 1 tablet (50 mg) by mouth At Bedtime 90 tablet 3      "PARoxetine (PAXIL) 40 MG tablet Take 1 tablet (40 mg) by mouth every morning 90 tablet 3     albuterol (PROAIR HFA/PROVENTIL HFA/VENTOLIN HFA) 108 (90 Base) MCG/ACT inhaler Inhale 2 puffs into the lungs every 6 hours as needed for shortness of breath / dyspnea or wheezing (Patient not taking: Reported on 2021) 18 g 1     Allergies:  Allergies   Allergen Reactions     Codeine Nausea and Vomiting      Social History:  Social History     Tobacco Use     Smoking status: Former Smoker     Packs/day: 0.00     Years: 30.00     Pack years: 0.00     Types: Cigarettes     Quit date: 12/15/2021     Years since quittin.2     Smokeless tobacco: Never Used     Tobacco comment: quit successfully (smoike and nicotine free)   Substance Use Topics     Alcohol use: No     Comment: quit 35 years ago     Drug use: Yes     Types: Marijuana     Comment: occionally use of marijuana     ROS: 10 point ROS neg other than the symptoms noted above in the HPI.    Physical Exam:    /73 (BP Location: Right arm, Patient Position: Sitting, Cuff Size: Adult Regular)   Pulse 71   Ht 1.753 m (5' 9\")   Wt 74.8 kg (165 lb)   BMI 24.37 kg/m       Constitutional:  The patient was unaccompanied, well-groomed, and in no acute distress.     Neurologic: Alert and oriented x 3.    Psychiatric: The patient's affect was calm, cooperative, and appropriate.     Communication:  Normal; communicates verbally, normal voice quality.    Respiratory: Breathing comfortably without stridor or exertion of accessory muscles.    Ears: Pinnae and tragus non-tender.  EAC's and TM's were clear.          Audiogram: 22- data independently reviewed  Bilateral normal sloping to moderately severe SNHL   WR right: 68% left: 56% @ 80 dB  Acoustic Reflexes: present in all conditions.  Tympanograms: type A bilaterally        Assessment and Plan:  1. Bilateral sensorineural hearing loss  Patient with bilateral sensorineural hearing loss. There is not a significant " asymmetry of the left side as previously seen on 2015 audiogram. Word recognition scores have decreased by about 20% bilaterally. Patient is a hearing aid candidate and is medically cleared for amplification. Patient is already scheduled for a hearing aid consult. Encouraged patient to proceed with this consult. Long discussion with patient regarding benefits of hearing aids including improvement with communication, decrease in cognitive decline, decrease in social isolation, and stimulation of hearing nerve to prevent further degradation of word recognition. Patient is in agreement and will proceed with hearing aid consult.     Patient will follow up as needed in clinic. Should continue to have hearing monitored with annual audiograms.    Marcelina Love DNP, APRN, CNP  Otolaryngology  Head & Neck Surgery  [phone]    30 minutes spent on the date of the encounter doing chart review, history and exam, documentation and further activities per the note

## 2022-03-09 NOTE — LETTER
3/9/2022       RE: Tevin Guidry  [address]  [facility] [address]     Dear Colleague,    Thank you for referring your patient, Tevin Guidry, to the [facility] EAR NOSE AND THROAT CLINIC [place] at [facility]. Please see a copy of my visit note below.      Otolaryngology Clinic  March 9, 2022    Chief Complaint:   Bilateral sensorineural hearing loss       History of Present Illness:   Tevin Guidry is a 71 year old male who presents today for evaluation of bilateral sensorineural hearing loss.  Patient has been seen in the past in 2015 by Dr. Nissen for bilateral sensorineural hearing loss with left asymmetry. Discussed proceeding with a MRI but decided to just monitor.     Today, patient reports gradual decrease of hearing in both ears, left seems worse than right. More difficulty with communication in crowded environments. Patient denies any otalgia, otorrhea, dizziness, facial numbness/weakness, history of frequent ear infections, or ear surgeries. Does have slight imbalance with position changes.     Of note, patient has a history of multiple colon polyps who was recently diagnosed with MUTYH associated polyposis.    Past Medical History:  Past Medical History:   Diagnosis Date     Bladder cancer (H)      Colon polyps     multiple may need colectomy (adenoma)     COPD (chronic obstructive pulmonary disease) (H)      Hyperlipidemia LDL goal <130      Hypertension goal BP (blood pressure) < 140/90      Social phobia      Past Surgical History:  Past Surgical History:   Procedure Laterality Date     CATARACT IOL, RT/LT       CLAVICLE SURGERY Right     fracture in childhood     COLONOSCOPY N/A 3/30/2017    Procedure: COLONOSCOPY;  Surgeon: Jie Onofre MD;  Location:  GI     COLONOSCOPY N/A 3/2/2021    Procedure: COLONOSCOPY, WITH POLYPECTOMY, hot snare, lift with elaview and epi, clip for hemorrhage control, tattoo;   Surgeon: Ra Cardoso MD;  Location: [facility] OR     COLONOSCOPY N/A 4/22/2021    Procedure: Colonoscopy;  Surgeon: Jitendra Rodríguez MD;  Location: [facility] OR     CYSTOSCOPY, BIOPSY BLADDER INSTILL OPTICAL AGENT N/A 9/2/2020    Procedure: CYSTOSCOPY, WITH INSTILLATION OF OPTICAL IMAGING AGENT INTO BLADDER AND BIOPSY OF BLADDER (CYSVIEW);  Surgeon: Kirt Guzman MD;  [facility]     CYSTOSCOPY, TRANSURETHRAL RESECTION (TUR) TUMOR BLADDER, COMBINED N/A 9/5/2019    Procedure: cystoscopy,Transurethral Resection Of Bladder Tumor;  Surgeon: Almas Sunshine MD;  [facility]     CYSTOSCOPY, TRANSURETHRAL RESECTION (TUR) TUMOR BLADDER, COMBINED N/A 2/6/2020    Procedure: CYSTOSCOPY, WITH TRANSURETHRAL RESECTION BLADDER TUMOR;  Surgeon: Almas Sunshine MD;  [facility]     DAVINCI BYPASS ARTERY CORONARY N/A 9/29/2014    Procedure: DAVINCI BYPASS ARTERY CORONARY;  Surgeon: Lam Solis MD;  Location: [facility] OR     ESOPHAGOSCOPY, GASTROSCOPY, DUODENOSCOPY (EGD), COMBINED N/A 3/30/2017    Procedure: COMBINED ESOPHAGOSCOPY, GASTROSCOPY, DUODENOSCOPY (EGD);  Surgeon: Jie Onofre MD;  Location: [facility] GI     PHACOEMULSIFICATION CLEAR CORNEA WITH STANDARD INTRAOCULAR LENS IMPLANT Right 12/11/2020    Procedure: RIGHT EYE COMPLEX CATARACT REMOVAL WITH INTRAOCULAR LENS IMPLANT;  Surgeon: Aimee Marcus MD;  Location: [facility]     SURGICAL HISTORY OF -       right clavicular fracture with ORIF     ZZHC COLONOSCOPY THRU STOMA, DIAGNOSTIC  2010    polyps due 2011     Medications:  Current Outpatient Medications   Medication Sig Dispense Refill     amLODIPine (NORVASC) 5 MG tablet Take 1 tablet (5 mg) by mouth At Bedtime 90 tablet 1     aspirin 81 MG tablet Take 1 tablet (81 mg) by mouth daily 90 tablet 3     atorvastatin (LIPITOR) 80 MG tablet Take 1 tablet (80 mg) by mouth At Bedtime 90 tablet 3     buPROPion (WELLBUTRIN XL) 300 MG 24 hr tablet Take 1 tablet (300 mg) by mouth  "every morning 90 tablet 1     ezetimibe (ZETIA) 10 MG tablet Take 1 tablet (10 mg) by mouth daily 90 tablet 3     lisinopril (ZESTRIL) 20 MG tablet Take 1 tablet (20 mg) by mouth 2 times daily 180 tablet 3     metoprolol succinate ER (TOPROL-XL) 50 MG 24 hr tablet Take 1 tablet (50 mg) by mouth At Bedtime 90 tablet 3     PARoxetine (PAXIL) 40 MG tablet Take 1 tablet (40 mg) by mouth every morning 90 tablet 3     albuterol (PROAIR HFA/PROVENTIL HFA/VENTOLIN HFA) 108 (90 Base) MCG/ACT inhaler Inhale 2 puffs into the lungs every 6 hours as needed for shortness of breath / dyspnea or wheezing (Patient not taking: Reported on 2021) 18 g 1     Allergies:  Allergies   Allergen Reactions     Codeine Nausea and Vomiting      Social History:  Social History     Tobacco Use     Smoking status: Former Smoker     Packs/day: 0.00     Years: 30.00     Pack years: 0.00     Types: Cigarettes     Quit date: 12/15/2021     Years since quittin.2     Smokeless tobacco: Never Used     Tobacco comment: quit successfully (smoike and nicotine free)   Substance Use Topics     Alcohol use: No     Comment: quit 35 years ago     Drug use: Yes     Types: Marijuana     Comment: occionally use of marijuana     ROS: 10 point ROS neg other than the symptoms noted above in the HPI.    Physical Exam:    /73 (BP Location: Right arm, Patient Position: Sitting, Cuff Size: Adult Regular)   Pulse 71   Ht 1.753 m (5' 9\")   Wt 74.8 kg (165 lb)   BMI 24.37 kg/m       Constitutional:  The patient was unaccompanied, well-groomed, and in no acute distress.     Neurologic: Alert and oriented x 3.    Psychiatric: The patient's affect was calm, cooperative, and appropriate.     Communication:  Normal; communicates verbally, normal voice quality.    Respiratory: Breathing comfortably without stridor or exertion of accessory muscles.    Ears: Pinnae and tragus non-tender.  EAC's and TM's were clear.          Audiogram: 22- data independently " reviewed  Bilateral normal sloping to moderately severe SNHL   WR right: 68% left: 56% @ 80 dB  Acoustic Reflexes: present in all conditions.  Tympanograms: type A bilaterally        Assessment and Plan:  1. Bilateral sensorineural hearing loss  Patient with bilateral sensorineural hearing loss. There is not a significant asymmetry of the left side as previously seen on 2015 audiogram. Word recognition scores have decreased by about 20% bilaterally. Patient is a hearing aid candidate and is medically cleared for amplification. Patient is already scheduled for a hearing aid consult. Encouraged patient to proceed with this consult. Long discussion with patient regarding benefits of hearing aids including improvement with communication, decrease in cognitive decline, decrease in social isolation, and stimulation of hearing nerve to prevent further degradation of word recognition. Patient is in agreement and will proceed with hearing aid consult.     Patient will follow up as needed in clinic. Should continue to have hearing monitored with annual audiograms.    Marcelina Love DNP, APRN, CNP  Otolaryngology  Head & Neck Surgery  [phone]    30 minutes spent on the date of the encounter doing chart review, history and exam, documentation and further activities per the note

## 2022-03-09 NOTE — NURSING NOTE
"Chief Complaint   Patient presents with     Consult     Hearing Aid Clearance       Blood pressure 120/73, pulse 71, height 1.753 m (5' 9\"), weight 74.8 kg (165 lb).    Balbina Minor, EMT    "

## 2022-03-12 DIAGNOSIS — Z11.59 ENCOUNTER FOR SCREENING FOR OTHER VIRAL DISEASES: Primary | ICD-10-CM

## 2022-03-23 ENCOUNTER — OFFICE VISIT (OUTPATIENT)
Dept: AUDIOLOGY | Facility: CLINIC | Age: 72
End: 2022-03-23
Payer: COMMERCIAL

## 2022-03-23 DIAGNOSIS — H90.3 SENSORINEURAL HEARING LOSS, BILATERAL: Primary | ICD-10-CM

## 2022-03-23 PROCEDURE — 92591 PR HEARING AID EXAM BINAURAL: CPT | Performed by: AUDIOLOGIST

## 2022-03-23 NOTE — PROGRESS NOTES
AUDIOLOGY REPORT    SUBJECTIVE:   Tevin Guidry is a 71 year old male was seen in the Audiology Clinic at  [facility] on 3/23/22 to discuss concerns with hearing and functional communication difficulties. Tevin has been seen previously on 2/8/22, and results revealed a bilateral normal sloping to severe sensorineural hearing loss.  The patient was medically evaluated and determined to be cleared for binaural hearing aids by Rick Nissen, M.D. Tevin notes difficulty with communication in a variety of listening situations.    OBJECTIVE:  Abuse Screening:  Do you feel unsafe at home or work/school? No  Do you feel threatened by someone? No  Does anyone try to keep you from having contact with others, or doing things outside of your home? No  Physical signs of abuse present? No    Patient is a hearing aid candidate. Patient would like to move forward with a hearing aid evaluation today. Therefore, the patient was presented with different options for amplification to help aid in communication. Discussed styles, levels of technology and monaural vs. binaural fitting. He has an Android phone.    The hearing aid(s) mutually chosen were:  Binaural: Phonak Audeo P70-R  COLOR: P6  BATTERY SIZE: rechargeable  EARMOLD/TIPS: Large vented  CANAL/ LENGTH: 1 MP      ASSESSMENT:     Reviewed purchase information and warranty information with patient. The 45 day trial period was explained to patient. The patient was given a copy of the [place] Department of Health consumer brochure on purchasing hearing instruments. Patient risk factors have been provided to the patient in writing prior to the sale of the hearing aid per FDA regulation. The risk factors are also available in the User Instructional Booklet to be presented on the day of the hearing aid fitting. Hearing aid(s) ordered. Hearing aid evaluation completed.    PLAN:   Tevin is scheduled to return in 2-3 weeks for  a hearing aid fitting and programming. Purchase agreement will be completed on that date. Please contact this clinic with any questions or concerns.      Kelsea Marti, [facility]  Licensed Audiologist  [place] License #[id number]

## 2022-04-07 DIAGNOSIS — F17.200 TOBACCO DEPENDENCE: ICD-10-CM

## 2022-04-07 RX ORDER — BUPROPION HYDROCHLORIDE 300 MG/1
TABLET ORAL
Qty: 90 TABLET | Refills: 0 | Status: SHIPPED | OUTPATIENT
Start: 2022-04-07 | End: 2022-07-15

## 2022-04-07 NOTE — TELEPHONE ENCOUNTER
Prescription approved per [facility] Refill Protocol.  GEE Rae RN  [facility]

## 2022-04-11 ENCOUNTER — TELEPHONE (OUTPATIENT)
Dept: OPHTHALMOLOGY | Facility: CLINIC | Age: 72
End: 2022-04-11
Payer: COMMERCIAL

## 2022-04-13 ENCOUNTER — OFFICE VISIT (OUTPATIENT)
Dept: AUDIOLOGY | Facility: CLINIC | Age: 72
End: 2022-04-13
Payer: COMMERCIAL

## 2022-04-13 ENCOUNTER — TELEPHONE (OUTPATIENT)
Dept: GASTROENTEROLOGY | Facility: CLINIC | Age: 72
End: 2022-04-13

## 2022-04-13 DIAGNOSIS — H90.3 SENSORINEURAL HEARING LOSS, BILATERAL: Primary | ICD-10-CM

## 2022-04-13 DIAGNOSIS — Z12.11 ENCOUNTER FOR SCREENING COLONOSCOPY: Primary | ICD-10-CM

## 2022-04-13 PROCEDURE — V5020 CONFORMITY EVALUATION: HCPCS | Performed by: AUDIOLOGIST

## 2022-04-13 PROCEDURE — V5261 HEARING AID, DIGIT, BIN, BTE: HCPCS | Mod: NU | Performed by: AUDIOLOGIST

## 2022-04-13 PROCEDURE — V5160 DISPENSING FEE BINAURAL: HCPCS | Performed by: AUDIOLOGIST

## 2022-04-13 RX ORDER — BISACODYL 5 MG/1
TABLET, DELAYED RELEASE ORAL
Qty: 4 TABLET | Refills: 0 | Status: SHIPPED | OUTPATIENT
Start: 2022-04-13 | End: 2022-05-20

## 2022-04-13 NOTE — TELEPHONE ENCOUNTER
Pre assessment questions completed for upcoming colonoscopy procedure scheduled on 4.22.2022    COVID test scheduled 4.18.2022    Reviewed procedural arrival time 1245 and facility location [facility].    Designated  policy reviewed. Instructed to have someone stay 24 hours post procedure.     Anticoagulation/blood thinners? no    Electronic implanted devices? No    Indication for procedure: screening colonoscopy    Referring provider: Jitendra Rodríguez MD    Reviewed Golytely prep instructions with patient. No fiber/iron supplements or foods that contain nuts/seeds prior to procedure.     Patient verbalized understanding and had no questions or concerns at this time.    Sarah Gutierrez RN

## 2022-04-13 NOTE — PROGRESS NOTES
AUDIOLOGY REPORT    SUBJECTIVE: Tevin Guidry is a 71 year old male who was seen in the Audiology Clinic at [facility] for a fitting of bilateral Phonak Audeo P70 - R hearing aids. Previous results have revealed normal hearing sloping to moderately severe sensorineural hearing loss for the right ear and normal hearing sloping to severe sensorineural hearing loss for the left ear. The patient was given medical clearance to pursue amplification by Marcelina Love CNP.    OBJECTIVE: The hearing aid conformity evaluation was completed.The hearing aids were placed and they provided a good fit. Simulated real-ear measurements were completed on the [employer] system and were a good match to NAL-NL1 targets with soft sounds audible, moderate sounds comfortable, and loud sounds below discomfort. UCLs are verified through maximum power output measures and demonstrate appropriate limiting of loud inputs. Tevin was oriented to proper hearing aid use, care, cleaning (no water, dry brush), batteries (size: rechargeable, low-battery signal), aid insertion/removal, user booklet, warranty information, storage cases, and other hearing aid details. The patient confirmed understanding of hearing aid use and care and showed proper insertion of the hearing aids while in the office today.Tevin reported good volume and sound quality.   Hearing aids were programmed as follows:  Program 1: AutoSense  Program button and volume control were deactivated. The patient would like to wait to pair the hearing aids with his Android phone and the [employer] mariam.    EARS FIT: Bilateral  HEARING AID MODEL NAME: Phonak Audeo P70 - R  HEARING AID STYLE: -in-the-ear behind-the-ear  EARMOLDS/TIP: Large vented domes  SERIAL NUMBERS: Right: [id number] Left: [id number]  WARRANTY END DATE: 6/20/2025    ASSESSMENT: Bilateral Phonak Audeo P70 - R hearing aids were fit today. Verification measures were performed.  Tevin signed the Hearing Aid Purchase Agreement and was given a copy, as well as details on his hearing aids. The patient was counseled that exact out of pocket amounts cannot be determined for hearing aid claims being sent to insurance. Any insurance coverage information presented to the patient is an estimate only, and is not a guarantee of payment. The patient has been advised to check with their own insurance.    PLAN: Tevin will return for follow-up in 2-3 weeks for a hearing aid review appointment. Further care and cleaning will be reviewed at that time. The hearing aids will also be paired to his Android phone and the [employer] mariam. Please call this clinic with questions regarding today s appointment.    Amelie Garzon, [facility]  Licensed Audiologist  [place] #[id number]

## 2022-04-13 NOTE — PROGRESS NOTES
Medication Therapy Management (MTM) Encounter    ASSESSMENT:                            Medication Adherence/Access: No issues identified          Smoking cessation: Patient is smoke free. Patient continues to use tobacco. Patient is ready to quit using tobacco.  Based on patient preferences and history, patient would benefit from: continued  nicotine gum and bupropion,consider  Chantix in future if he fails current therapy .  We discussed: risks of smoking, benefits of quitting and ways to cope with cravings and manage triggers.        Hypertension: Stable. Patient is meeting blood pressure goal of < 140/90mmHg. Patient would benefit from the following changes - continued blood pressure monitoring, watching diet, increasing exercise and weight loss and smoking cessation-working to stay 100% smoke free again.       PLAN:                              1. Sorry to hear of your recent smoking relapse --but glad to hear your 3 days smoke free now.  Stay on Bupropion 300mg./day + chew enough pieces of 4mg. Nicotine gum daily to keep you smoke free.   2. Main goal is to find something else to do when bored rather than reaching for a cigarette. (Go for a walk /exercise)    3.  Have your colonoscopy next week . 1 year post colectomy screening.         Follow-up: Return in about 6 weeks (around 5/26/2022), or 1:30 PM, for Medication Therapy Management Visit, tobacco abuse.(consider chantix trial if he relapses).         SUBJECTIVE/OBJECTIVE:                          Tevin Guidry is a 71 year old male coming in for a follow-up (1-) visit. He was referred to me from Preethi Quiroz  .      Reason for visit:   He did have a relapse -back to smoking. None for last 3 days.     Dental issues are stressful.   2 teeth yanked.             Allergies/ADRs: Reviewed in chart  Past Medical History: Per patient has a hemicolectomy with a hx of polyps (removed appendix). CAD (CABG), Social phobia, COPD, adenocarcinoma  Of  bladder/colon. Had signet ring cancer removed with colon as well.  Has MUTYH--associated polyposis.   Tobacco: He currently gets a hold a cigarette- relapsed recently -see below .  Never had chantix in the past .   Alcohol: not currently using  Other Substance Use: marijuana flower, occasional vape - smokes everyday --he is much more worried about cig smoking vs. Marijuana.   Caffeine: large starbucks in morning and sometimes in PM      Medication Adherence/Access: No issues          Smoking cessation:  Patient reports things not going so well.  What hasn't worked for you? Relapsed 1 month ago , boredom.   What side effects to nicotine replacement therapies or medications are you experiencing: none   What times or situations have you found the most challenging?Just when he gets the urge--maybe boredom/stress.    Is there anything that worked well for you?  Bupropion 300mg./day helps.   How can you get back on your plan? Back on 3 days now smoke free.   What is a first step-today or tomorrow or in the next few days-that will enable you to do this?   How can I support you in doing this? continue meds/counseling.       Today 4-14-22:    Smoking is sporadic , admitted 6-8 cigs/day x 2 + weeks not too long ago.   He states he doesn't know why he started up again, maybe boredom he thinks biggest reason.    Some marijuana daily smoking still --maybe he ran out of that so that is trigger for him to restart cigs.     He did quit cigs 4-11-22 ; still smokes daily Marijuana though.     Back to chewing 10-12 nicotine pieces gum/day .           Hypertension: Current medications include : amlodipine 5mg hs daily now , lisinopril 20mg twice daily, metoprolol succ. 50mg daily.  Patient does not self-monitor blood pressure.  Patient reports no current medication side effects. Patient reports they are somewhat anxious about the visit, thinks this could be related to blood pressure elevation at moment. Continue to monitor.      Currently takes aspirin 81mg daily based on history of CABG. Reports no side effects.    BP Readings from Last 3 Encounters:   04/14/22 116/70   03/09/22 120/73   01/11/22 112/68        Hyperlipidemia: Current therapy includes :atorvastatin 80mg daily and Ezetimibe (Zetia) 10mg once daily.  Patient reports no significant myalgias or other side effects.  The 10-year ASCVD risk score (Cheridon HINSON Jr., et al., 2013) is: 19.6%    Values used to calculate the score:      Age: 71 years      Sex: Male      Is Non- : No      Diabetic: No      Tobacco smoker: No      Systolic Blood Pressure: 116 mmHg      Is BP treated: Yes      HDL Cholesterol: 45 mg/dL      Total Cholesterol: 140 mg/dL  Recent Labs   Lab Test 09/22/21  1425 08/03/20  1443 10/27/16  1028 10/23/15  0850 09/30/14  0427   CHOL 140 140   < > 155 109   HDL 45 59   < > 43 51   LDL 78 64   < > 85 39   TRIG 86 84   < > 137 97   CHOLHDLRATIO  --   --   --  3.6 2.1    < > = values in this interval not displayed.           Today's Vitals: /70   Pulse 65   Wt 170 lb (77.1 kg)   SpO2 94%   BMI 25.10 kg/m    ----------------      I spent 30 minutes with this patient today. All changes were made via collaborative practice agreement with Preethi Quiroz MD, MD. A copy of the visit note was provided to the patient's primary care provider.    The patient was given a summary of these recommendations.     Andrea Bah rhoda.  Medication Therapy Management Provider  [phone]         Medication Therapy Recommendations  No medication therapy recommendations to display

## 2022-04-14 ENCOUNTER — PRE VISIT (OUTPATIENT)
Dept: UROLOGY | Facility: CLINIC | Age: 72
End: 2022-04-14
Payer: COMMERCIAL

## 2022-04-14 ENCOUNTER — OFFICE VISIT (OUTPATIENT)
Dept: PHARMACY | Facility: CLINIC | Age: 72
End: 2022-04-14
Payer: COMMERCIAL

## 2022-04-14 VITALS
OXYGEN SATURATION: 94 % | WEIGHT: 170 LBS | SYSTOLIC BLOOD PRESSURE: 116 MMHG | DIASTOLIC BLOOD PRESSURE: 70 MMHG | BODY MASS INDEX: 25.1 KG/M2 | HEART RATE: 65 BPM

## 2022-04-14 DIAGNOSIS — E78.5 HYPERLIPIDEMIA LDL GOAL <130: ICD-10-CM

## 2022-04-14 DIAGNOSIS — I10 HYPERTENSION GOAL BP (BLOOD PRESSURE) < 140/90: ICD-10-CM

## 2022-04-14 DIAGNOSIS — F17.200 NICOTINE DEPENDENCE, UNCOMPLICATED, UNSPECIFIED NICOTINE PRODUCT TYPE: ICD-10-CM

## 2022-04-14 DIAGNOSIS — F17.210 CIGARETTE NICOTINE DEPENDENCE WITHOUT COMPLICATION: Primary | ICD-10-CM

## 2022-04-14 PROCEDURE — 99606 MTMS BY PHARM EST 15 MIN: CPT | Performed by: PHARMACIST

## 2022-04-14 NOTE — Clinical Note
Preethi--good news uriel 3 days no tobacco, bad news just before this had 6 week relapse , will give him 1 more chance on current therapy --if that doesn't work --will add chantix.  Toshia.-Andrea

## 2022-04-14 NOTE — PATIENT INSTRUCTIONS
"Recommendations from today's MTM visit:                                                       Sorry to hear of your recent smoking relapse --but glad to hear your 3 days smoke free now.  Stay on Bupropion 300mg./day + chew enough pieces of 4mg. Nicotine gum daily to keep you smoke free.   Main goal is to find something else to do when bored rather than reaching for a cigarette. (Go for a walk /exercise)    3.  Have your colonoscopy next week . 1 year post colectomy screening.         Follow-up: Return in about 6 weeks (around 5/26/2022), or 1:30 PM, for Medication Therapy Management Visit, tobacco abuse.    It was great speaking with you today.  I value your experience and would be very thankful for your time in providing feedback in our clinic survey. In the next few days, you may receive an email or text message from [employer] with a link to a survey related to your  clinical pharmacist.\"     To schedule another MTM appointment, please call the clinic directly or you may call the MTM scheduling line at [phone] or toll-free at [phone].     My Clinical Pharmacist's contact information:                                                      Please feel free to contact me with any questions or concerns you have.      Andrea Bah Rph.  Medication Therapy Management Provider  [phone]    "

## 2022-04-14 NOTE — CONFIDENTIAL NOTE
Reason for visit: Cystoscopy     Relevant information: Bladder cancer    Records/imaging/labs/orders: All records available    Pt called: Send [employer] message about cysto    At Rooming: Collect urine

## 2022-04-18 ENCOUNTER — LAB (OUTPATIENT)
Dept: LAB | Facility: CLINIC | Age: 72
End: 2022-04-18
Payer: COMMERCIAL

## 2022-04-18 DIAGNOSIS — Z11.59 ENCOUNTER FOR SCREENING FOR OTHER VIRAL DISEASES: ICD-10-CM

## 2022-04-18 PROCEDURE — U0005 INFEC AGEN DETEC AMPLI PROBE: HCPCS

## 2022-04-18 PROCEDURE — U0003 INFECTIOUS AGENT DETECTION BY NUCLEIC ACID (DNA OR RNA); SEVERE ACUTE RESPIRATORY SYNDROME CORONAVIRUS 2 (SARS-COV-2) (CORONAVIRUS DISEASE [COVID-19]), AMPLIFIED PROBE TECHNIQUE, MAKING USE OF HIGH THROUGHPUT TECHNOLOGIES AS DESCRIBED BY CMS-2020-01-R: HCPCS

## 2022-04-19 LAB — SARS-COV-2 RNA RESP QL NAA+PROBE: NEGATIVE

## 2022-04-22 ENCOUNTER — TRANSFERRED RECORDS (OUTPATIENT)
Dept: HEALTH INFORMATION MANAGEMENT | Facility: CLINIC | Age: 72
End: 2022-04-22
Payer: COMMERCIAL

## 2022-04-22 ENCOUNTER — DOCUMENTATION ONLY (OUTPATIENT)
Dept: GASTROENTEROLOGY | Facility: OUTPATIENT CENTER | Age: 72
End: 2022-04-22
Payer: COMMERCIAL

## 2022-04-22 DIAGNOSIS — Z12.11 ENCOUNTER FOR SCREENING COLONOSCOPY: ICD-10-CM

## 2022-04-22 PROCEDURE — 88305 TISSUE EXAM BY PATHOLOGIST: CPT | Mod: TC,ORL | Performed by: SURGERY

## 2022-04-22 PROCEDURE — 88305 TISSUE EXAM BY PATHOLOGIST: CPT | Mod: 26 | Performed by: PATHOLOGY

## 2022-04-25 ENCOUNTER — LAB REQUISITION (OUTPATIENT)
Dept: LAB | Facility: CLINIC | Age: 72
End: 2022-04-25
Payer: COMMERCIAL

## 2022-04-27 ENCOUNTER — IMMUNIZATION (OUTPATIENT)
Dept: NURSING | Facility: CLINIC | Age: 72
End: 2022-04-27
Payer: COMMERCIAL

## 2022-04-27 LAB
PATH REPORT.COMMENTS IMP SPEC: NORMAL
PATH REPORT.FINAL DX SPEC: NORMAL
PATH REPORT.GROSS SPEC: NORMAL
PATH REPORT.MICROSCOPIC SPEC OTHER STN: NORMAL
PATH REPORT.RELEVANT HX SPEC: NORMAL
PHOTO IMAGE: NORMAL

## 2022-04-27 PROCEDURE — 91305 COVID-19,PF,PFIZER (12+ YRS): CPT

## 2022-04-27 PROCEDURE — 0054A COVID-19,PF,PFIZER (12+ YRS): CPT

## 2022-05-12 ENCOUNTER — OFFICE VISIT (OUTPATIENT)
Dept: AUDIOLOGY | Facility: CLINIC | Age: 72
End: 2022-05-12
Payer: COMMERCIAL

## 2022-05-12 DIAGNOSIS — H90.3 SENSORINEURAL HEARING LOSS, BILATERAL: Primary | ICD-10-CM

## 2022-05-12 PROCEDURE — 99207 PR ASSESSMENT FOR HEARING AID: CPT | Performed by: AUDIOLOGIST

## 2022-05-12 NOTE — PROGRESS NOTES
AUDIOLOGY REPORT    SUBJECTIVE: Tevin Guidry is a 71 year old male who was seen in the Audiology Clinic at [facility] on 5/12/2022 for a follow-up check regarding the fitting of new hearing aids. Previous results have revealed normal hearing sloping to moderately severe sensorineural hearing loss for the right ear and normal hearing sloping to severe sensorineural hearing loss for the left ear. The patient has been seen previously in this clinic and was fit with bilateral Phonak Audeo P70 - R hearing aids on 4/13/2022. Tevin reports he is overall pleased with the new hearing aids. He notes he left his  at his friend's house, so the hearing aids are dead today. He reports the right dome slips out of his ear at times. He also notes the left volume seems slightly louder than the right. He reports he paired the hearing aids to his phone and the mariam and turns the left down about 2 clicks each day.    OBJECTIVE: Both hearing aids were charged for 15 minutes on a clinic . When the patient placed the right hearing aid, it was noted that he was not inserting it into the ear fully. Reviewed correct placement and practiced several times in the office today. Overall gain for the left hearing aid was decreased 2 dB. Tevin reported better volume and sound quality.    Reviewed 45 day trial period, care, cleaning (no water, dry brush), batteries (size: rechargeable, low-battery signal), aid insertion/removal, volume adjustment (if applicable), user booklet, warranty information, storage cases, and other hearing aid details.     An electroacoustic analysis was performed at user settings for future comparison.    ASSESSMENT: A follow-up appointment for hearing aid fitting was completed today. Changes made as outlined above. No charge visit, as it is within 90 days of the hearing aid fitting.    PLAN: Tevin will return for follow-up as needed, or at least every  9-12 months for cleaning and assessment of the hearing aids. Please call this clinic with any questions regarding today s appointment.      Amelie Garzon, [facility]  Licensed Audiologist  [place] #[id number]

## 2022-05-17 ENCOUNTER — OFFICE VISIT (OUTPATIENT)
Dept: UROLOGY | Facility: CLINIC | Age: 72
End: 2022-05-17
Payer: COMMERCIAL

## 2022-05-17 DIAGNOSIS — C67.9 MALIGNANT NEOPLASM OF URINARY BLADDER, UNSPECIFIED SITE (H): ICD-10-CM

## 2022-05-17 DIAGNOSIS — C67.8 MALIGNANT NEOPLASM OF OVERLAPPING SITES OF BLADDER (H): Primary | ICD-10-CM

## 2022-05-17 PROCEDURE — 88120 CYTP URNE 3-5 PROBES EA SPEC: CPT | Mod: 26 | Performed by: PATHOLOGY

## 2022-05-17 PROCEDURE — 88112 CYTOPATH CELL ENHANCE TECH: CPT | Mod: TC | Performed by: UROLOGY

## 2022-05-17 PROCEDURE — 52000 CYSTOURETHROSCOPY: CPT | Performed by: UROLOGY

## 2022-05-17 PROCEDURE — 88120 CYTP URNE 3-5 PROBES EA SPEC: CPT | Mod: TC | Performed by: UROLOGY

## 2022-05-17 PROCEDURE — 88112 CYTOPATH CELL ENHANCE TECH: CPT | Mod: 26 | Performed by: PATHOLOGY

## 2022-05-17 RX ORDER — LIDOCAINE HYDROCHLORIDE 20 MG/ML
JELLY TOPICAL ONCE
Status: COMPLETED | OUTPATIENT
Start: 2022-05-17 | End: 2022-05-17

## 2022-05-17 RX ADMIN — LIDOCAINE HYDROCHLORIDE: 20 JELLY TOPICAL at 11:17

## 2022-05-17 NOTE — PROGRESS NOTES
CHIEF COMPLAINT   It was my pleasure to see Tevin Guidry who is a 71 year old male for follow-up of bladder cancer.      HPI  Tevin Guidry is a very pleasant 71 year old male who presents with a history of bladder cancer. 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 He had recurrence in 2/2020 and underwent repeat induction course of BCG. 2nd BCG induction completed 4/2020. Recurrence noted in 7/2020 and TURBT 9/2020 with low-grade disease. No issues since biopsy. No ongoing hematuria.     BCG maintenance 10/15/2020  Had colectomy Spring 2021     TURBT 9/2/2020  FINAL DIAGNOSIS:   A. 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 Right lateral wall:   - Dysplastic urothelium, consistent with incipient non invasive low grade papillary urothelial carcinoma     PHYSICAL EXAM  Patient is a 71 year old  male   Vitals: There were no vitals taken for this visit.  General Appearance Adult: There is no height or weight on file to calculate BMI.  Alert, no acute distress, oriented  Lungs: no respiratory distress, or pursed lip breathing  Abdomen: soft, nontender, no organomegaly or masses  Back: no CVAT  Neuro: Alert, oriented, speech and mentation normal  Psych: affect and mood normal  : penis, scrotum, testes normal    OFFICE CYSTOSCOPY 5/17/2022     Pre-procedure diagnosis:       Bladder Cancer  Post-procedure diagnosis:     Normal cystoscopy  Procedure performed:             Cystourethroscopy  Surgeon:                                 Kirt Guzman MD  Anesthesia:                             Local     Description of procedure:   After fully informed, voluntary consent was obtained, the patient was brought into the procedure room, identified and placed in a supine position on the cystoscopy table.  The groin/scrotum were prepped with betadine and draped in a sterile fashion.  Urojet lidocaine gel was introduced.   A 15F flexible cystoscope was inserted into the urethra, and the bladder and urethra were examined in a systematic manner.  The patient tolerated the procedure well and there were no complications.       Cystoscopic findings:  The urethra was normal without strictures.  The prostate was 3cm long and demonstrated mild bilobar hypertrophy.  There was no median lobe.  The external sphincter coapted well and the bladder neck was open. The bladder was completely surveyed.  There was mild trabeculation.  There were no stones or diverticula identifed.  The ureteric orifices were normal in position and number and effluxing clear urine. Previous resection sites noted with no suspicious lesions today.      ASSESSMENT and PLAN  71 year old male with recurrent bladder tumor. Has previously undergone BCG induction x 2. While office biopsy suggested possible HG disease, formal TURBT with only low-grade pathology. Has completed some BCG maintenance, but has since stopped. Given no recurrence today, will defer additional intravesical therapy at this time. Plan Cysto in 6 months.     - Cytology/FISH today  - office visit in 6 months for cysto    Kirt Guzman MD  Urology  [facility]

## 2022-05-17 NOTE — NURSING NOTE
Chief Complaint   Patient presents with     Cystoscopy       There were no vitals taken for this visit. There is no height or weight on file to calculate BMI.    Patient Active Problem List   Diagnosis     Social phobia     Hypertension goal BP (blood pressure) < 140/90     Hyperlipidemia LDL goal <130     Tubular adenoma of colon     Advanced directives, counseling/discussion     Coronary artery disease involving native heart with angina pectoris, unspecified vessel or lesion type (H)     Cigarette nicotine dependence without complication     S/P CABG (coronary artery bypass graft)     Chronic obstructive pulmonary disease, unspecified COPD type (H)     Anemia, unspecified type     Bladder cancer (H)     Personal history of malignant neoplasm of bladder     Malignant neoplasm of overlapping sites of bladder (H)     Combined form of age-related cataract, both eyes     Total cataract of right eye     History of colonic polyps     Colon cancer (H)     Signet ring cell adenocarcinoma (H)       Allergies   Allergen Reactions     Codeine Nausea and Vomiting       Current Outpatient Medications   Medication Sig Dispense Refill     albuterol (PROAIR HFA/PROVENTIL HFA/VENTOLIN HFA) 108 (90 Base) MCG/ACT inhaler Inhale 2 puffs into the lungs every 6 hours as needed for shortness of breath / dyspnea or wheezing 18 g 1     amLODIPine (NORVASC) 5 MG tablet TAKE ONE TABLET BY MOUTH EVERY NIGHT AT BEDTIME 90 tablet 0     aspirin 81 MG tablet Take 1 tablet (81 mg) by mouth daily 90 tablet 3     atorvastatin (LIPITOR) 80 MG tablet Take 1 tablet (80 mg) by mouth At Bedtime 90 tablet 3     bisacodyl (DULCOLAX) 5 MG EC tablet Take as directed. One day before exam take 2 tablets at 3 PM. Day of exam take 2 tablets at 6 AM. 4 tablet 0     buPROPion (WELLBUTRIN XL) 300 MG 24 hr tablet TAKE ONE TABLET BY MOUTH EVERY MORNING 90 tablet 0     ezetimibe (ZETIA) 10 MG tablet Take 1 tablet (10 mg) by mouth daily 90 tablet 3     lisinopril  (ZESTRIL) 20 MG tablet Take 1 tablet (20 mg) by mouth 2 times daily 180 tablet 3     metoprolol succinate ER (TOPROL-XL) 50 MG 24 hr tablet Take 1 tablet (50 mg) by mouth At Bedtime 90 tablet 3     nicotine (NICORETTE) 4 MG lozenge Place 4 mg inside cheek every hour as needed for smoking cessation Chews up to 10-12 /day .       PARoxetine (PAXIL) 40 MG tablet Take 1 tablet (40 mg) by mouth every morning 90 tablet 3     polyethylene glycol (GOLYTELY) 236 g suspension Take as directed. One day before exam fill the jug with water. Cover and shake until well mixed. At 6 PM start drinking an 8oz glass of mixture every 15 minutes until jug is 1/2 empty. Store remainder in the refrigerator. Day of exam at 6 AM Drink the other half of the Golytely jug. Drink one 8-ounce glass every 15 minutes until the jug is empty. You should finish the prep 4 hours before the exam. 4000 mL 0       Social History     Tobacco Use     Smoking status: Former Smoker     Packs/day: 0.00     Years: 30.00     Pack years: 0.00     Types: Cigarettes     Quit date: 12/15/2021     Years since quittin.4     Smokeless tobacco: Never Used     Tobacco comment: quit successfully (smoike and nicotine free)   Substance Use Topics     Alcohol use: No     Comment: quit 35 years ago     Drug use: Yes     Types: Marijuana     Comment: occionally use of marijuana       Invasive Procedure Safety Checklist:    Procedure: Cystoscopy    Action: Complete sections and checkboxes as appropriate.    Pre-procedure:  1. Patient ID Verified with 2 identifiers (Mary and  or MRN) : YES    2. Procedure and site verified with patient/designee (when able) : YES    3. Accurate consent documentation in medical record : YES    4. H&P (or appropriate assessment) documented in medical record : N/A  H&P must be up to 30 days prior to procedure an updated within 24 hours of                 Procedure as applicable.     5. Relevant diagnostic and radiology test results  appropriately labeled and displayed as applicable : YES    6. Blood products, implants, devices, and/or special equipment available for the procedure as applicable : YES    7. Procedure site(s) marked with provider initials [Exclusions: none] : NO    8. Marking not required. Reason : Yes  Procedure does not require site marking    Time Out:     Time-Out performed immediately prior to starting procedure, including verbal and active participation of all team members addressing: YES    1. Correct patient identity.  2. Confirmed that the correct side and site are marked.  3. An accurate procedure to be done.  4. Agreement on the procedure to be done.  5. Correct patient position.  6. Relevant images and results are properly labeled and appropriately displayed.  7. The need to administer antibiotics or fluids for irrigation purposes during the procedure as applicable.  8. Safety precautions based on patient history or medication use.    During Procedure: Verification of correct person, site, and procedure occurs any time the responsibility for care of the patient is transferred to another member of the care team.    The following medication was given:     MEDICATION:  Lidocaine without epinephrine 2% jelly  ROUTE: urethral   SITE: urethral   DOSE: 10 mL  LOT #: [id number]  : [employer]  EXPIRATION DATE: 9/23  NDC#: [address]-5   Was there drug waste? No    Prior to med admin, verified patient identity using patient's name and date of birth.  Due to med administration, patient instructed to remain in clinic for 15 minutes  afterwards, and to report any adverse reaction to me immediately.    Drug Amount Wasted:  None.  Vial/Syringe: Syringe      Devante Knox EMT-P  5/17/2022  11:34 AM

## 2022-05-17 NOTE — PATIENT INSTRUCTIONS
"Please follow up in six months for a cystoscopy.          AFTER YOUR CYSTOSCOPY        You have just completed a cystoscopy, or \"cysto\", which allowed your physician to learn more about your bladder (or to remove a stent placed after surgery). We suggest that you continue to avoid caffeine, fruit juice, and alcohol for the next 24 hours, however, you are encouraged to return to your normal activities.         A few things that are considered normal after your cystoscopy:     * Small amount of bleeding (or spotting) that clears within the next 24 hours     * Slight burning sensation with urination     * Sensation to of needing to avoid more frequently     * The feeling of \"air\" in your urine     * Mild discomfort that is relieved with Tylenol        Please contact our office promptly if you:     * Develop a fever above 101 degrees     * Are unable to urinate     * Develop bright red blood that does not stop     * Severe pain or swelling         Please contact our office with any concerns or questions [email or web address].  "

## 2022-05-20 ENCOUNTER — OFFICE VISIT (OUTPATIENT)
Dept: OPHTHALMOLOGY | Facility: CLINIC | Age: 72
End: 2022-05-20
Attending: OPHTHALMOLOGY
Payer: COMMERCIAL

## 2022-05-20 DIAGNOSIS — H25.812 COMBINED FORM OF AGE-RELATED CATARACT, LEFT EYE: ICD-10-CM

## 2022-05-20 DIAGNOSIS — Z96.1 PSEUDOPHAKIA, RIGHT EYE: ICD-10-CM

## 2022-05-20 DIAGNOSIS — H40.003 GLAUCOMA SUSPECT, BOTH EYES: Primary | ICD-10-CM

## 2022-05-20 DIAGNOSIS — Z86.0100 HISTORY OF COLONIC POLYPS: ICD-10-CM

## 2022-05-20 PROCEDURE — 92083 EXTENDED VISUAL FIELD XM: CPT | Performed by: OPHTHALMOLOGY

## 2022-05-20 PROCEDURE — 92014 COMPRE OPH EXAM EST PT 1/>: CPT | Performed by: OPHTHALMOLOGY

## 2022-05-20 PROCEDURE — G0463 HOSPITAL OUTPT CLINIC VISIT: HCPCS

## 2022-05-20 ASSESSMENT — SLIT LAMP EXAM - LIDS
COMMENTS: NORMAL
COMMENTS: NORMAL

## 2022-05-20 ASSESSMENT — EXTERNAL EXAM - RIGHT EYE: OD_EXAM: NORMAL

## 2022-05-20 ASSESSMENT — CUP TO DISC RATIO
OS_RATIO: 0.7
OD_RATIO: 0.8

## 2022-05-20 ASSESSMENT — EXTERNAL EXAM - LEFT EYE: OS_EXAM: NORMAL

## 2022-05-20 ASSESSMENT — VISUAL ACUITY
OS_SC: 20/100
METHOD: SNELLEN - LINEAR
METHOD_MR: DIAGNOSTIC
OS_PH_SC: 20/70
OD_SC: 20/20
OD_CC: J1
OS_CC: J5
OS_SC+: +-1
OS_PH_SC+: +1

## 2022-05-20 ASSESSMENT — CONF VISUAL FIELD
OD_NORMAL: 1
OS_NORMAL: 1
METHOD: COUNTING FINGERS

## 2022-05-20 ASSESSMENT — TONOMETRY
OD_IOP_MMHG: 10
OS_IOP_MMHG: 11
IOP_METHOD: APPLANATION

## 2022-05-20 ASSESSMENT — REFRACTION_MANIFEST
OS_CYLINDER: +1.75
OS_SPHERE: -2.50
OS_AXIS: 150
OS_ADD: +2.25

## 2022-05-20 ASSESSMENT — PATIENT HEALTH QUESTIONNAIRE - PHQ9: SUM OF ALL RESPONSES TO PHQ QUESTIONS 1-9: 1
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
## 2022-05-20 NOTE — NURSING NOTE
Chief Complaints and History of Present Illnesses   Patient presents with     Glaucoma Follow-Up     Chief Complaint(s) and History of Present Illness(es)     Glaucoma Follow-Up     Laterality: both eyes    Associated symptoms: Negative for dryness, eye pain, flashes and floaters    Pain scale: 0/10              Comments     Glaucoma suspect, both eyes follow up. Since last visit, he states LE is a little cloudy. He has a history of IOL implant RE but not LE yet.   He was recently been diagnosed with MUTYH ASSOCIATED POLYPOSIS, a genetic condition.     Marvin Zuniga COT 2:12 PM May 20, 2022

## 2022-06-06 ENCOUNTER — HOSPITAL ENCOUNTER (OUTPATIENT)
Dept: PET IMAGING | Facility: CLINIC | Age: 72
Discharge: HOME | End: 2022-06-06
Attending: INTERNAL MEDICINE | Admitting: INTERNAL MEDICINE
Payer: COMMERCIAL

## 2022-06-06 DIAGNOSIS — C18.1 APPENDIX CARCINOMA (H): ICD-10-CM

## 2022-06-06 LAB
CREAT BLD-MCNC: 1 MG/DL (ref 0.7–1.3)
GFR SERPL CREATININE-BSD FRML MDRD: >60 ML/MIN/1.73M2

## 2022-06-06 PROCEDURE — 250N000011 HC RX IP 250 OP 636: Performed by: INTERNAL MEDICINE

## 2022-06-06 PROCEDURE — 343N000001 HC RX 343: Performed by: INTERNAL MEDICINE

## 2022-06-06 PROCEDURE — 78816 PET IMAGE W/CT FULL BODY: CPT | Mod: PS

## 2022-06-06 PROCEDURE — A9552 F18 FDG: HCPCS | Performed by: INTERNAL MEDICINE

## 2022-06-06 PROCEDURE — 74177 CT ABD & PELVIS W/CONTRAST: CPT

## 2022-06-06 PROCEDURE — 82565 ASSAY OF CREATININE: CPT

## 2022-06-06 RX ORDER — IOPAMIDOL 755 MG/ML
10-135 INJECTION, SOLUTION INTRAVASCULAR ONCE
Status: COMPLETED | OUTPATIENT
Start: 2022-06-06 | End: 2022-06-06

## 2022-06-06 RX ADMIN — FLUDEOXYGLUCOSE F-18 10.99 MCI.: 500 INJECTION, SOLUTION INTRAVENOUS at 14:21

## 2022-06-06 RX ADMIN — IOPAMIDOL 104 ML: 755 INJECTION, SOLUTION INTRAVENOUS at 14:23

## 2022-06-20 ASSESSMENT — PATIENT HEALTH QUESTIONNAIRE - PHQ9: SUM OF ALL RESPONSES TO PHQ QUESTIONS 1-9: 4

## 2022-06-22 ENCOUNTER — OFFICE VISIT (OUTPATIENT)
Dept: FAMILY MEDICINE | Facility: CLINIC | Age: 72
End: 2022-06-22
Payer: COMMERCIAL

## 2022-06-22 VITALS
SYSTOLIC BLOOD PRESSURE: 130 MMHG | WEIGHT: 169 LBS | DIASTOLIC BLOOD PRESSURE: 78 MMHG | OXYGEN SATURATION: 98 % | HEART RATE: 71 BPM | TEMPERATURE: 98.7 F | RESPIRATION RATE: 14 BRPM | BODY MASS INDEX: 25.61 KG/M2 | HEIGHT: 68 IN

## 2022-06-22 DIAGNOSIS — Z00.00 ENCOUNTER FOR MEDICARE ANNUAL WELLNESS EXAM: Primary | ICD-10-CM

## 2022-06-22 DIAGNOSIS — C80.1 SIGNET RING CELL ADENOCARCINOMA (H): ICD-10-CM

## 2022-06-22 DIAGNOSIS — I10 HYPERTENSION GOAL BP (BLOOD PRESSURE) < 140/90: ICD-10-CM

## 2022-06-22 DIAGNOSIS — Z86.0100 HISTORY OF COLONIC POLYPS: ICD-10-CM

## 2022-06-22 DIAGNOSIS — F40.10 SOCIAL PHOBIA: ICD-10-CM

## 2022-06-22 DIAGNOSIS — F17.200 TOBACCO DEPENDENCE: ICD-10-CM

## 2022-06-22 DIAGNOSIS — E78.5 HYPERLIPIDEMIA LDL GOAL <70: ICD-10-CM

## 2022-06-22 DIAGNOSIS — J44.9 CHRONIC OBSTRUCTIVE PULMONARY DISEASE, UNSPECIFIED COPD TYPE (H): ICD-10-CM

## 2022-06-22 DIAGNOSIS — D12.6 TUBULAR ADENOMA OF COLON: ICD-10-CM

## 2022-06-22 DIAGNOSIS — I25.119 CORONARY ARTERY DISEASE INVOLVING NATIVE HEART WITH ANGINA PECTORIS, UNSPECIFIED VESSEL OR LESION TYPE (H): ICD-10-CM

## 2022-06-22 DIAGNOSIS — D12.6 MYH-ASSOCIATED COLONIC POLYPOSIS (MAP): ICD-10-CM

## 2022-06-22 DIAGNOSIS — E78.5 HYPERLIPIDEMIA LDL GOAL <130: ICD-10-CM

## 2022-06-22 DIAGNOSIS — C67.8 MALIGNANT NEOPLASM OF OVERLAPPING SITES OF BLADDER (H): ICD-10-CM

## 2022-06-22 PROCEDURE — 99214 OFFICE O/P EST MOD 30 MIN: CPT | Mod: 25 | Performed by: FAMILY MEDICINE

## 2022-06-22 PROCEDURE — 99397 PER PM REEVAL EST PAT 65+ YR: CPT | Performed by: FAMILY MEDICINE

## 2022-06-22 PROCEDURE — 80053 COMPREHEN METABOLIC PANEL: CPT | Performed by: FAMILY MEDICINE

## 2022-06-22 PROCEDURE — 36415 COLL VENOUS BLD VENIPUNCTURE: CPT | Performed by: FAMILY MEDICINE

## 2022-06-22 RX ORDER — PAROXETINE 40 MG/1
40 TABLET, FILM COATED ORAL EVERY MORNING
Qty: 90 TABLET | Refills: 3 | Status: SHIPPED | OUTPATIENT
Start: 2022-06-22 | End: 2023-03-15

## 2022-06-22 RX ORDER — BUPROPION HYDROCHLORIDE 300 MG/1
300 TABLET ORAL EVERY MORNING
Qty: 90 TABLET | Refills: 3 | Status: CANCELLED | OUTPATIENT
Start: 2022-06-22

## 2022-06-22 RX ORDER — AMLODIPINE BESYLATE 5 MG/1
TABLET ORAL
Qty: 90 TABLET | Refills: 3 | Status: SHIPPED | OUTPATIENT
Start: 2022-06-22 | End: 2023-03-15

## 2022-06-22 RX ORDER — ALBUTEROL SULFATE 90 UG/1
2 AEROSOL, METERED RESPIRATORY (INHALATION) EVERY 6 HOURS PRN
Qty: 18 G | Refills: 1 | Status: SHIPPED | OUTPATIENT
Start: 2022-06-22 | End: 2023-03-15

## 2022-06-22 ASSESSMENT — ENCOUNTER SYMPTOMS
PARESTHESIAS: 0
HEMATURIA: 0
ABDOMINAL PAIN: 0
HEADACHES: 0
FREQUENCY: 0
ARTHRALGIAS: 0
FEVER: 0
HEMATOCHEZIA: 0
PALPITATIONS: 0
WEAKNESS: 0
EYE PAIN: 0
MYALGIAS: 0
CHILLS: 0
NERVOUS/ANXIOUS: 0
NAUSEA: 0
CONSTIPATION: 0
COUGH: 0
SORE THROAT: 0
SHORTNESS OF BREATH: 1
JOINT SWELLING: 0
DYSURIA: 0
HEARTBURN: 0
DIARRHEA: 1
DIZZINESS: 0

## 2022-06-22 ASSESSMENT — PATIENT HEALTH QUESTIONNAIRE - PHQ9
SUM OF ALL RESPONSES TO PHQ QUESTIONS 1-9: 2
SUM OF ALL RESPONSES TO PHQ QUESTIONS 1-9: 2
10. IF YOU CHECKED OFF ANY PROBLEMS, HOW DIFFICULT HAVE THESE PROBLEMS MADE IT FOR YOU TO DO YOUR WORK, TAKE CARE OF THINGS AT HOME, OR GET ALONG WITH OTHER PEOPLE: NOT DIFFICULT AT ALL

## 2022-06-22 ASSESSMENT — ACTIVITIES OF DAILY LIVING (ADL): CURRENT_FUNCTION: NO ASSISTANCE NEEDED

## 2022-06-22 NOTE — PROGRESS NOTES
Hola is a 71 year old who is being evaluated via a billable video visit.      How would you like to obtain your AVS? MyChart  If the video visit is dropped, the invitation should be resent by: Text to cell phone: [phone]   Will anyone else be joining your video visit? HAI Guerrero/MARIAMA          Video-Visit Details    Video Start Time: 8:32 AM    Type of service:  Video Visit    Video End Time:9:00 AM    Originating Location (pt. Location): Home    Distant Location (provider location):  [facility] CANCER 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  The site of origin is unclear, so at the current time, it can be considered carcinoma of unknown primary, very likely of gastrointestinal origin, but the primary site is not known.    COVID vaccination status: completed Spring (~April) 2021    Oncology History:   He had a colonoscopy 03/30/2017 and another one 03/02/2021 that was delayed due to the COVID pandemic and other healthcare issues.  This was performed by Dr. Marin Steward on 03/02/2021 and the pathology was notable for tubular and tubulovillous adenomas.  Due to the extent and burden of the number of polyps, he was referred to Dr. Rodríguez for consideration of hemicolectomy.  A number of polyps in the distal transverse colon were tattooed.    Dr. Rodríguez took the patient to the operating room for right hemicolectomy 04/13/2021 due to this high polyp burden.    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        Tumor is located at the distal resection margin and situated within    the lamina propria/submucosa (distal   margin positive, proximal and deep margins  are free of malignancy)        An overlying tubular adenoma is present at this distal resection   margin, does not appear to be the source   of the underlying malignancy        Tumor origin is compatible with gastrointestinal site as it marks   positively with CK 20, pan cytokeratin   and CDX2 (negative for CK-7), see comment   - Multiple tubular adenomas (at least twenty-one), negative for high-grade    dysplasia, distal margin is   transected by a sessile tubular adenoma   - Benign appendix with focal surface serrated change, negative for   malignancy (submitted in its entirety)   - Unremarkable terminal ileum   - Sixteen benign lymph nodes (0/16)     COMMENT:   The origin of the adenocarcinoma is uncertain based on the current   resection however the immunoperoxidase   battery supports gastrointestinal origin.  Suggest clinical and   radiographic correlation to investigate   possible primary sites (including stomach, pancreaticobiliary and   remainder of the colon).  The appendix was   submitted in its entirety and does not appear to be the source of the   neoplasm.  Intradepartmental   consultation obtained, the case was discussed with Dr. Rodríguez on   4/19/21.     April 20, 2021--CT chest IMPRESSION:   In this patient with recently diagnosed colon cancer:  1. No evidence of metastatic disease in the chest.  2. Subcutaneous emphysema along the left lower chest/upper abdominal  wall may be secondary to transversus abdominis block done on  4/13/2021. No free intraperitoneal air or pneumothorax.  3. Coronary artery calcifications.  4. Sequelae of prior granulomatous disease.     [Access Center: Subcutaneous emphysema of the left lower chest/upper  abdominal wall, likely from transversus abdominis than on 4/13/2021.  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 FDG at the colonic surgical anastomosis presumably postsurgical  inflammation.   2. No definite FDG uptake to suggest metastatic disease.   3. No definite primary significant ring carcinoma is identified.   There are 3 equivocal findings:   3a. Mild uptake in the gastric cardia, favor physiologic  3b. Loop of slightly narrowed thickened small bowel with FDG uptake,  favor physiologic or postoperative inflammation but cannot rule out a  primary.   3c. Extensive FDG uptake in the entire sigmoid colon, favored to be  physiologic.  3d. If clinically indicated, an MR enterography in this case may  better define bowel abnormalities. Alternatively attention to these  areas on follow-up imaging.  4. Incidentally noted bilateral hydroceles and right peritesticular  calcification.     May 3, 2021--initial medical oncology consultation, Dr. Sánchez.    May 6, 2021--EGD - unremarkable; no masses seen.    FINAL DIAGNOSIS:   A. ANTRUM, BIOPSY:   - Gastric antral type mucosa with features of reactive gastropathy   - No H. pylori like organisms identified on routine staining   - Negative for intestinal metaplasia or dysplasia     B. STOMACH, POLYP:   - Gastric body type mucosa with chronic gastritis   - No H. pylori like organisms identified on routine staining   - Negative for intestinal metaplasia, neoplastic polyp or dysplasia     C. STOMACH, POLYPS, BIOPSY:   - Fundic gland polyp in one fragment   - Gastric antral type mucosa with mild chronic gastritis   - No H. pylori like organisms identified on routine staining or by   immunohistochemistry   - Negative for intestinal metaplasia or dysplasia     D. STOMACH, BIOPSY:   - Chronic active gastritis with focal intestinal and pancreatic acinar   type metaplasia, see comment   - No H. pylori like organisms identified on  routine staining or by   immunohistochemistry   - Negative for dysplasia, see comment       July 16, 2021 -- oncology follow-up/virtual visit, Dr. Sánchez.  11/8/21-PET/CT--IMPRESSION: Hx. of 2 mm focus of poorly differentiated adenocarcinoma  with significant ring cell features at the tip of the appendix.  No  recurrent or metastatic disease.  1. Postsurgical changes of right hemicolectomy with mild residual  uptake along the surgical anastomosis which is below that of  background, decreased from prior, and favored to be inflammatory.   2. Slight progression of peripheral reticular opacities in the  dependent right lung with a basilar predominance, differential  atelectasis, edema, inflammatory, infectious, aspiration.  This is on  a background of emphysema.  2a. A 9 mm groundglass nodule right upper lobe peripherally unchanged  from prior exam, indeterminant. Continued attention on follow-up.    November 19, 2021 -- oncology follow-up/virtual visit, Dr. Sánchez.  The 04/22/2022 surveillance colonoscopy with Dr. Rodríguez resected several polyps, no evidence of malignancy.      Final Diagnosis   A.  COLON, ASCENDING, POLYP:  - Tubular adenoma  - No high-grade dysplasia or malignancy identified     B.  COLON, DESCENDING, POLYP:  - Tubular adenoma  - No high-grade dysplasia or malignancy identified         June 6, 2022--PET/CT: CT FINDINGS: Mild senescent intracranial changes. Postoperative change of the right lens. Mild carotid artery bifurcation calcification. Moderate to severe coronary artery calcium. Moderate upper lung predominant emphysema. Splenic granulomas. Right   hemicolectomy changes. Pelvic phleboliths. Multilevel degenerative changes of the spine. The lower extremities are unremarkable.                                                                 IMPRESSION:     No metabolic evidence of active neoplasm.    June 24, 2022 -- oncology follow-up/virtual visit, Dr. Sánchez.        Interim History/History Of Present  Illness:    Mr. Tevin Guidry is a 71-year-old gentleman from [place].  He has a past medical history notable for cardiovascular disease, status post multivessel CABG 09/29/2014 and angioplasties.  He has hypertension, COPD from a history of smoking, which he quit in 2019.  He has a history of bladder cancer which was a noninvasive high grade form of urothelial carcinoma diagnosed in the summer and early fall of 2020.  He is status post multiple injections of BCG.  He was diagnosed in the summer of 2019 with a recurrence in mid winter of 2020.  He also has a history of innumerable polyps over the years. He has had nearly a dozen colonoscopies to date, dating back to age 48.  He has had numerous colonoscopies, but none of them have shown evidence of a neoplastic or malignant disease or significant dysplasia but he had tubulovillous forms of adenoma.     Today, he presents for follow up through video visit. He is doing well overall but does admit to diarrhea. His diarrhea has been persistent and more explosive recently. He has one episode in the morning and then feels well after having the bowel movement. No changes in his diet. Denies any flushing or palpitations. Has baseline shortness of breath when ambulating up and down the stairs. He recently went to see her primary care physician Dr. Quiroz who recommended him to see a dermatologist and gastroenterologist due to his MUTYH-associated polyposis.     Review Of Systems:  Comprehensive (14-point) ROS reviewed. Pertinent symptoms reviewed above per HPI.      Past medical, social, surgical, and family histories reviewed.  PAST MEDICAL HISTORY:  Notable for innumerable colon polyps.  It is not clear whether or not he has a germline or familial mutation that is causing this.  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  He has CAD, hypertension.  He he has had cardiac catheterization  including serum 2014 when he had a CABG with LIMA to LAD, 2014.  He has another malignancy in the form of noninvasive bladder carcinoma, left lateral wall of the bladder.  He has treatments with BCG and other interventions by our Urology team since 2019 for that.  COPD due to remote history of smoking.    PAST SURGICAL HISTORY:  Most remarkable for the hemicolectomy performed by Dr Colvin 2021 per above and the interventions for the bladder carcinoma in 2019 and .  Please see the Urology note for details.    FAMILY HISTORY:  Most notable for him being 1 of 3 sons from his parents.  His mom was a smoker and diagnosed with stage IV gastric carcinoid at age 65 and  within 4 months of diagnosis.  Father was a smoker and had lung cancer at age 40 and  at age 50 of this disease.  One brother had gastric carcinoma and  around age 72 after initial diagnosis at age 62.  Another brother is living at age 69 and has never had a colonoscopy due to lack of insurance and other reasons.  A maternal aunt also had ovarian cancer.    SOCIAL HISTORY:  The patient lives in [place].  He has an associate degree in respiratory therapy  Due to social phobia, he has not been employed in that practice.  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  He is single, never , no children.    Tobacco:  He smoked 1-1/2 packs a day of cigarettes for 30 years, quit .  Occasional use of marijuana.  Denied any alcohol use or other illicit drug use.    Allergies:  Allergies as of 2022 - Reviewed 2022   Allergen Reaction Noted     Codeine Nausea and Vomiting 10/14/2014       Current Medications:  Current Outpatient Medications   Medication Sig Dispense Refill     albuterol (PROAIR HFA/PROVENTIL HFA/VENTOLIN HFA) 108 (90 Base) MCG/ACT inhaler Inhale 2 puffs into the lungs every 6 hours as needed for shortness of breath / dyspnea  or wheezing 18 g 1     amLODIPine (NORVASC) 5 MG tablet TAKE ONE TABLET BY MOUTH EVERY NIGHT AT BEDTIME 90 tablet 0     aspirin 81 MG tablet Take 1 tablet (81 mg) by mouth daily 90 tablet 3     atorvastatin (LIPITOR) 80 MG tablet Take 1 tablet (80 mg) by mouth At Bedtime 90 tablet 3     buPROPion (WELLBUTRIN XL) 300 MG 24 hr tablet TAKE ONE TABLET BY MOUTH EVERY MORNING 90 tablet 0     ezetimibe (ZETIA) 10 MG tablet Take 1 tablet (10 mg) by mouth daily 90 tablet 3     lisinopril (ZESTRIL) 20 MG tablet Take 1 tablet (20 mg) by mouth 2 times daily 180 tablet 3     metoprolol succinate ER (TOPROL-XL) 50 MG 24 hr tablet Take 1 tablet (50 mg) by mouth At Bedtime 90 tablet 3     nicotine (NICORETTE) 4 MG lozenge Place 4 mg inside cheek every hour as needed for smoking cessation Chews up to 10-12 /day .       PARoxetine (PAXIL) 40 MG tablet Take 1 tablet (40 mg) by mouth every morning 90 tablet 3        Physical Exam:  Physical exam could not be performed today in context of a Virtual Visit during the COVID19/Coronavirus pandemic.       Observed physical assessments made today by visualizing the patient by video link:    General/Constitutional: Generally appears well, not acutely ill.  HEENT: no scleral icterus, not jaundiced.  Respiratory: no labored breathing.  Musculoskeletal: appears to have full range of motion and adequate physical strength.  Skin: no jaundice, discoloration or other notable lesions.  Neurological: no evidence of tremors.  Psychiatric: no evident anxiety; fully alert and oriented with fluent speech.      The rest of a comprehensive physical examination is deferred due to PHE (public health emergency) video visit restrictions.      Laboratory/Imaging Studies  No visits with results within 2 Week(s) from this visit.   Latest known visit with results is:   Virtual Visit on 05/05/2021   Component Date Value Ref Range Status     Copath Report 05/06/2021    Final                    Value:Patient Name:  DICK LYONS  MR#: [id number]  Specimen #: [id number]  Collected: 5/6/2021  Received: 5/6/2021  Reported: 5/13/2021 10:06  Ordering Phy(s): MANNIE DAWKINS  Additional Phy(s): [facility]: [facility]    For improved result formatting, select 'View Enhanced Report Format' under   Linked Documents section.    SPECIMEN(S):  A: Antral biopsy  B: Gastric polyp  C: Gastric biopsy  D: Gastric biopsy    FINAL DIAGNOSIS:  A. ANTRUM, BIOPSY:  - Gastric antral type mucosa with features of reactive gastropathy  - No H. pylori like organisms identified on routine staining  - Negative for intestinal metaplasia or dysplasia    B. STOMACH, POLYP:  - Gastric body type mucosa with chronic gastritis  - No H. pylori like organisms identified on routine staining  - Negative for intestinal metaplasia, neoplastic polyp or dysplasia    C. STOMACH, POLYPS, BIOPSY:  - Fundic gland polyp in one fragment  - Gastric antral type mucosa with mild chronic gastritis  - No H. pylor                          i like organisms identified on routine staining or by   immunohistochemistry  - Negative for intestinal metaplasia or dysplasia    D. STOMACH, BIOPSY:  - Chronic active gastritis with focal intestinal and pancreatic acinar   type metaplasia, see comment  - No H. pylori like organisms identified on routine staining or by   immunohistochemistry  - Negative for dysplasia, see comment    COMMENT:  D).  The biopsies show antral type mucosa with pancreatic acinar type   metaplasia and a minute focus of  intestinal metaplasia.  Immunohistochemistry was performed with   appropriate controls.  Chromogranin performed  on part D highlights linear enterochromaffin cell-like hyperplasia.    Gastrin stain shows rare positive cells  at the edge of one biopsy fragments.  If these biopsies were taken from   the fundus or body, these findings are  suggestive of autoimmune gastritis.  Clinical correlation is recommended.    I have personally  "reviewed all specimens and/or slides, including the   listed sp                          ecial stains, and used them  with my medical judgement to determine or confirm the final diagnosis.    Electronically signed out by:    Liliane Ta M.D., [facility]    CLINICAL HISTORY:  Screening endoscopy.  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    Striped erythema in gastric antrum.  Polyps in the gastric fundus.    GROSS:  A: The specimen is received in formalin with proper patient   identification, labeled \"gastric biopsy antrum\".  The specimen consists of 4 pieces of tan-pink tissue ranging from 0.2-0.5   cm in greatest dimension, submitted  in toto in cassette A1.    B: The specimen is received in formalin with proper patient   identification, labeled \"gastric polyps small\".  The specimen consists of 3 pieces of tan-pink tissue ranging from 0.2-0.5   cm in greatest dimension, submitted  in toto in cassette B1.    C: The specimen is received in formalin with proper patient   identification, labeled \"gastric biopsy large  polyps\".  The specimen consists of 5 pieces of                           tan-pink tissue ranging   from 0.2-0.6 cm in greatest dimension,  submitted in toto in cassette C 1.    D: The specimen is received in formalin with proper patient   identification, labeled \"gastric biopsy fundus\".  The specimen consists of 3 pieces of tan-pink tissue, each measuring   approximately 0.2 cm in greatest  dimension; submitted in toto in cassette D1. (Dictated by: MONICA 5/6/2021   02:53 PM)    MICROSCOPIC:  Microscopic examination was performed.  Immunohistochemistry was performed   with appropriate controls.  CK  AE1/AE3 performed on parts A1-D1 does not show evidence of signet ring   cell carcinoma.    The technical component of this testing was completed at the [facility], with the professional component performed   at the [facility]" [facility], [address] [place] [address],   [place] [address] ([phone])    CPT Codes:  A: 30432-RK4, 41820-HFG  B: 13106-RP9, 45115-DFC                            C: 57476-GZ9, 33787-LUY, 42218-GDD  D: 97228-OS2, 48916-LLL, 21741-HYF, 49248-ZIT, 37889-EOTOBP7IG    COLLECTION SITE:  Client: [facility]  Location: [facility] (B)    Resident  [id number]            RADIOLOGY:  Prior to and including the day of this visit, I personally reviewed the recent imaging scans. I released the pertinent recent imaging results to [employer] in advance of this visit, and reviewed the summary verbatim and in lay language during today's call.      ASSESSMENT/PLAN:  Mr. Hola Guidry is a 71-year-old gentleman with history of innumerable polyps.  He does not have a known germline mutation, although he has had multiple first-degree members of family with gastric carcinoma.  He had upper endoscopy by Dr. Tobar on 05/06/2021 to help clarify whether or not he had primary gastric carcinoma, which would have made sense in terms of signet cell features of the 2 mm focus in the distal appendix that was positive for adenocarcinoma; the evaluation was unremarkable.  Otherwise, the rest of the colon and terminal ileum were negative for carcinoma.  It is not out of the realm of possibility that signet ring cell features and type of histology can be found in small intestinal carcinomas including appendix, but more commonly, it is found in the upper GI tract.  Based on the location as well being more mucosa than through wall that there is consideration whether or not this represented an unusual form of metastasis to the appendiceal cavity rather than a primary appendiceal malignancy.  However, the PET CT was unremarkable and did not show any evidence of an alternate primary at that time.    It is difficult to ascertain the exact risk of recurrence of the appendix that was  incidentally found at the tip of the appendix.  It was very minimal in size at 2 mm and did comprise some high-risk form of histology with signet ring subset.  His symptoms remain stable and recent PET/CT on 06/06/2022 had no evidence of disease recurrence. He will follow up in one year with repeat CT CAP. If issues arise in the meantime, he can be seen earlier with an GHAZAL if needed.    As for his diarrhea, it is likely functional diarrhea given his hemicolectomy but will refer him to gastroenterology for further management of diarrhea and recommended surveillance associated with MUTYH-associated polyposis.     Patient was seen and plan of care was discussed with attending physician Dr. Tanner Sánchez.     Jasvir Ashley MD   Heme/Onc/Transplant Fellow  Pgr #[id number]        MEDICAL ONCOLOGY ATTENDING PHYSICIAN ADDENDUM:  I have seen and evaluated this patient with the medical oncology fellow. I have reviewed and edited this fellow's note, and agree with the assessment and plan stated above. A complete review of systems was assessed, and pertinent positives/negatives are discussed in detail above and as follows.    VIDEO VISIT    Mr. Guidry followed up with us today via [employer]-based virtual video visit.  He is generally feeling well but he has had ongoing issues; he is at a baseline, he has numerous noncancer-related comorbidities.  He followed up with his primary care physician, Dr. Preethi Quiroz, 2 days ago.  He has a history of a low-grade bladder cancer for which he last followed up with Dr. Guzman in May, just about a month ago.  His prior history of treatment included BCG x2 followed by a TURPT but, again, he had a low-grade form of bladder cancer.      Over a year ago in the spring of 2021, Dr. Rodríguez from Colorectal Surgery referred him to me after removing part of a bowel and finding an incidental finding of a 2 mm focus with tumor at the appendix tip.  There was no outside source seen so it was extremely  unlikely that this represented some form of disease that would metastasize to the appendix.  I was asked to follow up with him with a PET CT just in the last week that shows no evidence of recurrent malignancy.      Mr. Guidry had many questions, many which were not cancer related, so I deferred to his primary care physician.  He asked about his history of genetics evaluation last fall 2021.  He was diagnosed with an MYTH  mutation and recommended to have routine surveillance, upper and lower endoscopies in the years to come to surveil for possible gastrointestinal malignancies.  He had a colonoscopy for surveillance by Dr. Rodríguez on 04/22.  There were a few polyps found, approximately 3, all of which were about 0.5 cm or smaller in size.  Recommendation was for followup colonoscopy in 1 year's time.  I reviewed the pathology from those colonoscopies.  There was no evidence of tubular adenoma seen.  No evidence of high-grade dysplasia or malignancy identified.  I copied the results below.  Essentially, I think from a cancer standpoint, there is no exact recommendation or certainty of how to follow up on a 2 mm incidental focus but we can get a CT chest, abdomen and pelvis in 1 year's time and follow up appropriately.  It appears he has many questions about upper endoscopy and diarrhea and other issues and he may benefit from Gastroenterology referral and evaluation, so we went ahead and made a Gastroenterology referral.  He asked about a Dermatology referral which I have deferred to his primary care physician and other noncancer-related issues, I have deferred to Dr. Quiroz as well.  Please see the above followup note for further details.        VIRTUAL VISIT - DETAILS:    I have reviewed the note as documented above. This accurately captures the substance of my conversation with the patient.    Date of call: June 24, 2022   Start of call:  End of call:    Provider location: [facility] (academic office)  Patient  location: Home      Mode of Video Visit: [facility]       Mode of Video Visit: [employer]    Mode of Visit: Telephone      I spent 30 minutes in consultation, including history and discussion with the patient including review of recent lab values and radiologic imaging results.  An additional 30 minutes was spent on the day of the visit, including reviewing pertinent medical notes and documentation from other physicians and APPs who have evaluated and treated this patient, pertinent lab values, pathology and imaging results, personal review of radiologic images, discussing the case with referring providers and/or nurse coordinator team, post-visit orders, and all post-visit documentation.    Tanner Sánchez MD PhD            The above was transcribed using a voice recognition software.  While I reviewed and edited the transcription, I may miss errors.  Please let me know of any of serious errors and I will addend the note.

## 2022-06-22 NOTE — PROGRESS NOTES
Medication Therapy Management (MTM) Encounter    ASSESSMENT:                            Medication Adherence/Access: No issues identified          Smoking cessation: Patient is smoke free again x 4 days . Patient is ready to quit using tobacco for good now.  Based on patient preferences and history, patient would benefit from: continued  nicotine Lozenges and Bupropion,consider  Chantix or nicotrol inhaler in future if he fails current therapy .  We discussed: risks of smoking, benefits of quitting and ways to cope with cravings and manage triggers.        Hypertension: Stable. Patient is meeting blood pressure goal of < 140/90mmHg. Patient would benefit from the following changes - continued blood pressure monitoring, watching diet, increasing exercise and weight loss and smoking cessation-working to stay 100% smoke free again.       Hyperlipidemia: Stable.  Patient is on high intensity statin which is indicated based on 2019 ACC/AHA guidelines for lipid management.        PLAN:                              1. Congrats your 4 days Smoke FREE again -- continue Bupropion as is , also --use nicotine 4mg lozenge --suck on 8-10 /day as you are. Smoke free is the #1 goal, nicotine free would be 2nd goal but if it never occurred not that big an issue.         Follow-up: Return in about 9 weeks (around 8/25/2022), or 1:30 PM, for Medication Therapy Management Visit, tobacco abuse.      SUBJECTIVE/OBJECTIVE:                          Tevin Guidry is a 71 year old male coming in for a follow-up (4-14-22) visit. He was referred to me from Preethi Quiroz      Reason for visit:     He has relapsed smoking , but pet/ct scan was negative , but showed darkening lungs and calcium buildup in heart .             Allergies/ADRs: Reviewed in chart  Past Medical History: Per patient has a hemicolectomy with a hx of polyps (removed appendix). CAD (CABG), Social phobia, COPD, adenocarcinoma  Of bladder/colon. Had signet ring cancer  removed with colon as well.  Has MUTYH--associated polyposis.   Tobacco: He currently gets a hold a cigarette- relapsed recently -see below .  Never had chantix in the past .   Alcohol: not currently using  Other Substance Use: marijuana flower, occasional vape - smokes everyday --he is much more worried about cig smoking vs. Marijuana.   Caffeine: large starbucks in morning and sometimes in PM      Medication Adherence/Access: No issues          Smoking cessation:  Patient reports things not going so well.  What hasn't worked for you? Relapsed 1 month ago , boredom.   What side effects to nicotine replacement therapies or medications are you experiencing: none   What times or situations have you found the most challenging?Just when he gets the urge--maybe boredom/stress.    Is there anything that worked well for you?  Bupropion 300mg./day helps.   How can you get back on your plan? Back on 3 days now smoke free.   What is a first step-today or tomorrow or in the next few days-that will enable you to do this?   How can I support you in doing this? continue meds/counseling.       Today 6-23-22:    Has been smoking off/on , can go for days quitting --but then in his head he wakes up and wants a cigarette -- then he has to buy a pack and start 1 PPD .    His last cigarette was Sunday evening 6-19-22.  Currently has no cigs at home .     He just bought first lozenges today 4mg. -- he like them better than gum -last longer and taste better . They really help --smoke free x 4 days now.       He admits some coronary artery calcification - and darkening in lungs--he realizes this and wants to keep plugging along.       He is cancer free !!    He cannot afford to smoke --1 pack is 9.45 $ --!!            Previously :       4-14-22:    Smoking is sporadic , admitted 6-8 cigs/day x 2 + weeks not too long ago.   He states he doesn't know why he started up again, maybe boredom he thinks biggest reason.    Some marijuana daily  smoking still --maybe he ran out of that so that is trigger for him to restart cigs.     He did quit cigs 4-11-22 ; still smokes daily Marijuana though.     Back to chewing 10-12 nicotine pieces gum/day .     Denies overt loneliness but has a wonderful dog. Has a brother and his Gf that he can talk too.       Hypertension: Current medications include : amlodipine 5mg hs daily now , lisinopril 20mg twice daily, metoprolol succ. 50mg daily.  Patient does not self-monitor blood pressure.  Patient reports no current medication side effects. Patient reports they are somewhat anxious about the visit, thinks this could be related to blood pressure elevation at moment. Continue to monitor.     Currently takes aspirin 81mg daily based on history of CABG. Reports no side effects.    BP Readings from Last 3 Encounters:   06/23/22 130/78   06/22/22 130/78   04/14/22 116/70        Hyperlipidemia: Current therapy includes :atorvastatin 80mg daily and Ezetimibe (Zetia) 10mg once daily.  Patient reports no significant myalgias or other side effects.  The 10-year ASCVD risk score (Casperdon HINSON Jr., et al., 2013) is: 23.5%    Values used to calculate the score:      Age: 71 years      Sex: Male      Is Non- : No      Diabetic: No      Tobacco smoker: No      Systolic Blood Pressure: 130 mmHg      Is BP treated: Yes      HDL Cholesterol: 45 mg/dL      Total Cholesterol: 140 mg/dL  Recent Labs   Lab Test 09/22/21  1425 08/03/20  1443 10/27/16  1028 10/23/15  0850 09/30/14  0427   CHOL 140 140   < > 155 109   HDL 45 59   < > 43 51   LDL 78 64   < > 85 39   TRIG 86 84   < > 137 97   CHOLHDLRATIO  --   --   --  3.6 2.1    < > = values in this interval not displayed.           Today's Vitals: /78   Wt 169 lb 8 oz (76.9 kg)   BMI 25.77 kg/m    ----------------      I spent 30 minutes with this patient today. All changes were made via collaborative practice agreement with Preethi Quiroz MD, MD. A copy of the visit  note was provided to the patient's primary care provider.    The patient was given a summary of these recommendations.     Andrea Bah Rph.  Medication Therapy Management Provider  [phone]         Medication Therapy Recommendations  No medication therapy recommendations to display

## 2022-06-22 NOTE — PROGRESS NOTES
"SUBJECTIVE:   Tevin Guidry is a 71 year old male who presents for Preventive Visit.      Patient has been advised of split billing requirements and indicates understanding: Yes  Are you in the first 12 months of your Medicare coverage?  No    Healthy Habits:     In general, how would you rate your overall health?  Fair    Frequency of exercise:  4-5 days/week    Duration of exercise:  15-30 minutes    Do you usually eat at least 4 servings of fruit and vegetables a day, include whole grains    & fiber and avoid regularly eating high fat or \"junk\" foods?  No    Taking medications regularly:  Yes    Medication side effects:  None    Ability to successfully perform activities of daily living:  No assistance needed    Home Safety:  No safety concerns identified    Hearing Impairment:  No hearing concerns    In the past 6 months, have you been bothered by leaking of urine?  No    In general, how would you rate your overall mental or emotional health?  Fair      PHQ-2 Total Score: 0    Additional concerns today:  No  Answers for HPI/ROS submitted by the patient on 6/22/2022  If you checked off any problems, how difficult have these problems made it for you to do your work, take care of things at home, or get along with other people?: Not difficult at all       Has visit with Dr. Sánchez on Friday.     PHQ 6/20/2022 6/22/2022 6/22/2022   PHQ-9 Total Score 4 2 2   Q9: Thoughts of better off dead/self-harm past 2 weeks Not at all Not at all Not at all       Do you feel safe in your environment? Yes    Have you ever done Advance Care Planning? (For example, a Health Directive, POLST, or a discussion with a medical provider or your loved ones about your wishes): Yes, advance care planning is on file.       Fall risk  Fallen 2 or more times in the past year?: No  Any fall with injury in the past year?: No    Cognitive Screening   1) Repeat 3 items (Leader, Season, Table)    2) Clock draw: NORMAL  3) 3 item recall: Recalls 3 " objects  Results: 3 items recalled: COGNITIVE IMPAIRMENT LESS LIKELY    Mini-CogTM Copyright CHAGO Baez. Licensed by the author for use in [facility]; reprinted with permission (kiara@[email or web address]). All rights reserved.      Do you have sleep apnea, excessive snoring or daytime drowsiness?: no    Reviewed and updated as needed this visit by clinical staff   Tobacco  Allergies  Meds   Med Hx  Surg Hx  Fam Hx  Soc Hx          Reviewed and updated as needed this visit by Provider                   Social History     Tobacco Use     Smoking status: Former Smoker     Packs/day: 0.00     Years: 30.00     Pack years: 0.00     Types: Cigarettes     Quit date: 12/15/2021     Years since quittin.5     Smokeless tobacco: Never Used     Tobacco comment: quit successfully (smoike and nicotine free)   Substance Use Topics     Alcohol use: No     Comment: quit 35 years ago     If you drink alcohol do you typically have >3 drinks per day or >7 drinks per week? Not applicable    Alcohol Use 2022   Prescreen: >3 drinks/day or >7 drinks/week? Not Applicable   Prescreen: >3 drinks/day or >7 drinks/week? -   AUDIT SCORE  -            Current providers sharing in care for this patient include:   Patient Care Team:  Preethi Quiroz MD as PCP - General (Family Medicine)  Casa Evans MD as MD (Cardiology)  Almas Sunshine MD as MD (Urology)  Carissa Barnes, RN as Specialty Care Coordinator (Urology)  Preethi Quiroz MD as Referring Physician (Family Practice)  Preethi Quiroz MD as Assigned PCP  Kirt Guzman MD as MD (Urology)  Sofia Palencia, RN as Registered Nurse (Urology)  Aimee Marcus MD as MD (Ophthalmology)  Jitendra Rodríguez MD as Surgeon (Surgery)  Aimee Marcus MD as Assigned Surgical Provider  Andrea Bah RPH as Pharmacist (Pharmacist)  Azul Machuca MD as MD (Cardiology)  Azul Machuca MD as Assigned Heart and Vascular  "Provider  Tanner Sánchez MD as Assigned Cancer Care Provider  Elizabeth Martinez, RN as Lead Care Coordinator (Primary Care - CC)  Jitendra Rodríguez MD as Surgeon (Surgery)  Jitendra Rodríguez MD as Surgeon (Surgery)  Sarahy Steele MD as MD (Ophthalmology)  Andrea Bah RPH as Assigned MT Pharmacist    The following health maintenance items are reviewed in Epic and correct as of today:  Health Maintenance Due   Topic Date Due     CMP  2022              Review of Systems   Constitutional: Negative for chills and fever.   HENT: Positive for hearing loss. Negative for congestion, ear pain and sore throat.    Eyes: Positive for visual disturbance. Negative for pain.   Respiratory: Positive for shortness of breath. Negative for cough.    Cardiovascular: Negative for chest pain, palpitations and peripheral edema.   Gastrointestinal: Positive for diarrhea. Negative for abdominal pain, constipation, heartburn, hematochezia and nausea.   Genitourinary: Positive for impotence. Negative for dysuria, frequency, genital sores, hematuria, penile discharge and urgency.   Musculoskeletal: Negative for arthralgias, joint swelling and myalgias.   Skin: Positive for rash.   Neurological: Negative for dizziness, weakness, headaches and paresthesias.   Psychiatric/Behavioral: Negative for mood changes. The patient is not nervous/anxious.      Got his hearing aids, which work decently well.    Still working on quitting smoking.  Sometimes he can go 2 to 3 weeks, sometimes he smokes a pack a day.  Has an appointment with MTM tomorrow.  Continues bupropion.  Considering Chantix.    COPD-denies increased trouble breathing.  Needs refill of albuterol.  Not sure if his has .    OBJECTIVE:   /78 (BP Location: Right arm, Patient Position: Chair, Cuff Size: Adult Regular)   Pulse 71   Temp 98.7  F (37.1  C) (Temporal)   Resp 14   Ht 1.727 m (5' 8\")   Wt 76.7 kg (169 lb)   SpO2 98%   BMI 25.70 kg/m   Estimated body mass " "index is 25.7 kg/m  as calculated from the following:    Height as of this encounter: 1.727 m (5' 8\").    Weight as of this encounter: 76.7 kg (169 lb).  Physical Exam  GENERAL: healthy, alert and no distress  EYES: Eyes grossly normal to inspection, PERRL and conjunctivae and sclerae normal  HENT: ear canals and TM's normal, nose and mouth without ulcers or lesions  NECK: no adenopathy, no asymmetry, masses, or scars and thyroid normal to palpation  RESP: lungs clear to auscultation - no rales, rhonchi or wheezes  CV: regular rate and rhythm, normal S1 S2, no S3 or S4, no murmur, click or rub, no peripheral edema and peripheral pulses strong  ABDOMEN: soft, nontender, no hepatosplenomegaly, no masses and bowel sounds normal  MS: no gross musculoskeletal defects noted, no edema  SKIN: no suspicious lesions or rashes  NEURO: Normal strength and tone, mentation intact and speech normal  PSYCH: mentation appears normal, affect normal/bright    Diagnostic Test Results:  Labs reviewed in Epic    ASSESSMENT / PLAN:       ICD-10-CM    1. Encounter for Medicare annual wellness exam  Z00.00    2. Hypertension goal BP (blood pressure) < 140/90  I10 Comprehensive metabolic panel (BMP + Alb, Alk Phos, ALT, AST, Total. Bili, TP)     amLODIPine (NORVASC) 5 MG tablet     Comprehensive metabolic panel (BMP + Alb, Alk Phos, ALT, AST, Total. Bili, TP)   3. Hyperlipidemia LDL goal <70  E78.5    4. Tobacco dependence  F17.200    5. Chronic obstructive pulmonary disease, unspecified COPD type (H)  J44.9 albuterol (PROAIR HFA/PROVENTIL HFA/VENTOLIN HFA) 108 (90 Base) MCG/ACT inhaler   6. Social phobia  F40.10 PARoxetine (PAXIL) 40 MG tablet   7. Malignant neoplasm of overlapping sites of bladder (H)  C67.8    8. 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 Hyperlipidemia LDL goal <130  E78.5    11. Tubular " "adenoma of colon  D12.6    12. History of colonic polyps  Z86.010    13. MYH-associated colonic polyposis (MAP)  D12.6 Adult GI  Referral - Procedure Only     Adult Dermatology Referral     Wellness visit    Colonoscopy due in 4/2023  He has received both doses of the Pfizer COVID-19 vaccine and two boosters.  Recommend skin cancer screening - derm referral provided  Recommended EGD - see below     Essential hypertension with goal blood pressure less than 140/90  Controlled. Continues lisinopril 20mg twice daily, amlodipine 5mg daily, metoprolol 50mg daily.          Hyperlipidemia LDL goal <70  Controlled.  Continues atorvastatin 80mg daily and zetia 10mg daily.  Following with cardiology        History of bladder cancer  Follows with urology every 6 months.  Last visit 5/17/22-note reviewed today.   Smoking cessation encouraged.     Presented with gross hematuria in July 2019, evaluated by Urology and found to have non-invasive high grfade urothelial carcinoma s/p TURBT on 9/5/19.  9/19 - Noninvasive HG bladder cancer  Underwent induction BCG  2/20 - Noninvasive LG bladder cancer  Underwent repeat induction BCG    Per Urology visit note 5/17/22:  \"  71 year old male with recurrent bladder tumor. Has previously undergone BCG induction x 2. While office biopsy suggested possible HG disease, formal TURBT with only low-grade pathology. Has completed some BCG maintenance, but has since stopped. Given no recurrence today, will defer additional intravesical therapy at this time. Plan Cysto in 6 months.     - Cytology/FISH today  - office visit in 6 months for cysto\"             CAD, multiple vessel s/p CABG   Stable continues ASA 81mg daily and statin. Has not needed to take his nitroglycerin .  Encourage smoking cessation  Will follow up with cardiology in November.   - metoprolol succinate ER (TOPROL-XL) 50 MG 24 hr tablet; Take 1 tablet (50 mg) by mouth daily  Dispense: 90 tablet; Refill: 3      Social " "phobia  Stable on paroxetine 40mg daily.  Refilled today       Tobacco dependence  Has been working with MTM on smoking cessation. Goes from 2-3 weeks off cigarettes to back to up to a pack per day. Still working on it.   Continues bupropion         Chronic obstructive pulmonary disease, unspecified COPD type (H)   stable. Rare cough or use of albuterol.   Denies worsening shortness of breath.  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 Last visit on 11/19/2021.   Reviewed oncology notes today       He had repeat PET scan 6/6/2022 with no evidence of active neoplasm    Tubular adenoma of colon  History of colon polyps  Repeat colonoscopy recommended in a year.    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 Notes reviewed today.   Recommended further discussion with oncology regarding any additional screenings or surveillance that are recommended.   He is at increased risk for colon polyps and colon cancer.     Per genetics visit notes:   \"Cancer Risks:   We discussed that individuals with two mutations in the MUTYH gene have a diagnosis of MUTYH-associated polyposis (MAP).   ? Individuals with MAP can have hundreds to thousands of polyps, but most have fewer than 100. Multiple different types of colon polyps may be seen in individuals with MAP including adenomas, serrated adenomas, hyperplastic/sessile serrated polyps, and mixed (hyperplastic and adenomatous) polyps.  ? Individuals with MAP have an increased risk of colorectal cancer. In the absence of appropriate surveillance, the lifetime risk for colorectal cancer may be up to 80%.  ? There is also an increased risk for duodenal cancer in individuals with MAP.   ? Individuals with MAP may also be at increased risk for other cancers, including ovarian and bladder, although current risks are not well defined. Other cancers (including breast, uterine, gastric, pancreatic, skin, and thyroid) have also been " reported in individuals with MAP, although confirmed risks are not available at this time.   ? Individuals with MAP may also have these features:    CHRPE: freckle like spots on the inside of the eye    Dental abnormalities/cysts on the jaw bone    Benign and malignant tumors of the skin s oil glands (sebaceous tumors)      Cancer Screening and Prevention:  The following screening is recommended for individuals with MUTYH-associated polyposis (MAP) per current National Comprehensive Cancer Network (NCCN) guidelines:  ? High-quality colonoscopy and polypectomy every 1-2 years beginning at age 25-30 (or earlier based on family history). Depending upon colon polyp burden, removal of the colon may be recommended with continued surveillance of the rectum (as needed).  ? Baseline upper endoscopy (including complete visualization of the ampulla of Vater) beginning at age 30-35; repeated based on polyp findings.  ? Annual physical exam to begin at the time of diagnosis with MAP. Guidelines do not yet encourage screening for other cancers, but skin and thyroid exams could be done as part of the annual physical exam.  ? There are currently no other specific cancer screening guidelines for other cancers potentially associated with MAP. As such, additional screening should be based on personal and family history.     Other screening based on Tevin's personal and family history:    He was recently diagnosed with a cancer of unknown primary site (likely GI). He had extensive workup for this. He should continue with the plan for active surveillance as directed by his oncologist, Tanner Sánchez MD, PhD.    Tevin has already also had laparoscopic right hemicolectomy on 4/13/21 due to high colon polyp burden. He should continue with his colonoscopy surveillance as outlined above and as recommended by his physicians.    He also has a history of bladder cancer initially diagnosed in 2019. He should continue with his follow up for this as  "recommended by his urologist.      Of note, the above information is based on our current understanding of Tevin's genetic findings. Tevin is encouraged to reach out to me regularly regarding any pertinent updates to his personal and/or family history of cancer, as our understanding of the genetic findings in his family may change over time. \"    COUNSELING:  Reviewed preventive health counseling, as reflected in patient instructions    Estimated body mass index is 25.7 kg/m  as calculated from the following:    Height as of this encounter: 1.727 m (5' 8\").    Weight as of this encounter: 76.7 kg (169 lb).        He reports that he quit smoking about 6 months ago. His smoking use included cigarettes. He smoked 0.00 packs per day for 30.00 years. He has never used smokeless tobacco.      Appropriate preventive services were discussed with this patient, including applicable screening as appropriate for cardiovascular disease, diabetes, osteopenia/osteoporosis, and glaucoma.  As appropriate for age/gender, discussed screening for colorectal cancer, prostate cancer, breast cancer, and cervical cancer. Checklist reviewing preventive services available has been given to the patient.    Reviewed patients plan of care and provided an AVS. The Intermediate Care Plan ( asthma action plan, low back pain action plan, and migraine action plan) for Tevin meets the Care Plan requirement. This Care Plan has been established and reviewed with the Patient.    Counseling Resources:  ATP IV Guidelines  Pooled Cohorts Equation Calculator  Breast Cancer Risk Calculator  Breast Cancer: Medication to Reduce Risk  FRAX Risk Assessment  ICSI Preventive Guidelines  Dietary Guidelines for Americans, 2010  Razume's MyPlate  ASA Prophylaxis  Lung CA Screening    Preethi Quiroz MD   [facility]    Identified Health Risks:  "

## 2022-06-22 NOTE — PATIENT INSTRUCTIONS
Schedule with cardiology in November.   See me in 6 months      Patient Education   Personalized Prevention Plan  You are due for the preventive services outlined below.  Your care team is available to assist you in scheduling these services.  If you have already completed any of these items, please share that information with your care team to update in your medical record.  Health Maintenance Due   Topic Date Due    Discuss Advance Care Planning  08/20/2018    Comprehensive Metabolic Panel  04/09/2022

## 2022-06-23 ENCOUNTER — OFFICE VISIT (OUTPATIENT)
Dept: PHARMACY | Facility: CLINIC | Age: 72
End: 2022-06-23
Payer: COMMERCIAL

## 2022-06-23 VITALS — SYSTOLIC BLOOD PRESSURE: 130 MMHG | DIASTOLIC BLOOD PRESSURE: 78 MMHG | WEIGHT: 169.5 LBS | BODY MASS INDEX: 25.77 KG/M2

## 2022-06-23 DIAGNOSIS — F17.210 CIGARETTE NICOTINE DEPENDENCE WITHOUT COMPLICATION: ICD-10-CM

## 2022-06-23 DIAGNOSIS — E78.5 HYPERLIPIDEMIA LDL GOAL <130: ICD-10-CM

## 2022-06-23 DIAGNOSIS — I10 HYPERTENSION GOAL BP (BLOOD PRESSURE) < 140/90: Primary | ICD-10-CM

## 2022-06-23 LAB
ALBUMIN SERPL-MCNC: 3.8 G/DL (ref 3.4–5)
ALP SERPL-CCNC: 137 U/L (ref 40–150)
ALT SERPL W P-5'-P-CCNC: 22 U/L (ref 0–70)
ANION GAP SERPL CALCULATED.3IONS-SCNC: 6 MMOL/L (ref 3–14)
AST SERPL W P-5'-P-CCNC: 20 U/L (ref 0–45)
BILIRUB SERPL-MCNC: 0.6 MG/DL (ref 0.2–1.3)
BUN SERPL-MCNC: 11 MG/DL (ref 7–30)
CALCIUM SERPL-MCNC: 9.1 MG/DL (ref 8.5–10.1)
CHLORIDE BLD-SCNC: 107 MMOL/L (ref 94–109)
CO2 SERPL-SCNC: 26 MMOL/L (ref 20–32)
CREAT SERPL-MCNC: 0.99 MG/DL (ref 0.66–1.25)
GFR SERPL CREATININE-BSD FRML MDRD: 80 ML/MIN/1.73M2
GLUCOSE BLD-MCNC: 100 MG/DL (ref 70–99)
POTASSIUM BLD-SCNC: 4.6 MMOL/L (ref 3.4–5.3)
PROT SERPL-MCNC: 7.2 G/DL (ref 6.8–8.8)
SODIUM SERPL-SCNC: 139 MMOL/L (ref 133–144)

## 2022-06-23 PROCEDURE — 99606 MTMS BY PHARM EST 15 MIN: CPT | Performed by: PHARMACIST

## 2022-06-23 ASSESSMENT — PATIENT HEALTH QUESTIONNAIRE - PHQ9: SUM OF ALL RESPONSES TO PHQ QUESTIONS 1-9: 2

## 2022-06-23 NOTE — PATIENT INSTRUCTIONS
"Recommendations from today's MTM visit:                                                       Congrats your 4 days Smoke FREE again -- continue Bupropion as is , also --use nicotine 4mg lozenge --suck on 8-10 /day as you are. Smoke free is the #1 goal, nicotine free would be 2nd goal but if it never occurred not that big an issue.         Follow-up: Return in about 9 weeks (around 8/25/2022), or 1:30 PM, for Medication Therapy Management Visit, tobacco abuse.    It was great speaking with you today.  I value your experience and would be very thankful for your time in providing feedback in our clinic survey. In the next few days, you may receive an email or text message from [employer] with a link to a survey related to your  clinical pharmacist.\"     To schedule another MTM appointment, please call the clinic directly or you may call the MTM scheduling line at [phone] or toll-free at [phone].     My Clinical Pharmacist's contact information:                                                      Please feel free to contact me with any questions or concerns you have.      Andrea Bah Rph.  Medication Therapy Management Provider  [phone]    "

## 2022-06-23 NOTE — Clinical Note
Preethi--saw uriel today --he is smoke free x 4 days --much prefers nicotine lozenges now --so lets see how it  goes , f/up with me in august .  Amanda

## 2022-06-24 ENCOUNTER — VIRTUAL VISIT (OUTPATIENT)
Dept: ONCOLOGY | Facility: CLINIC | Age: 72
End: 2022-06-24
Attending: INTERNAL MEDICINE
Payer: COMMERCIAL

## 2022-06-24 DIAGNOSIS — K59.1 FUNCTIONAL DIARRHEA: Primary | ICD-10-CM

## 2022-06-24 DIAGNOSIS — C18.1 MALIGNANT NEOPLASM OF APPENDIX (H): ICD-10-CM

## 2022-06-24 PROCEDURE — G0463 HOSPITAL OUTPT CLINIC VISIT: HCPCS | Mod: PN,RTG | Performed by: INTERNAL MEDICINE

## 2022-06-24 PROCEDURE — 99214 OFFICE O/P EST MOD 30 MIN: CPT | Mod: 95 | Performed by: INTERNAL MEDICINE

## 2022-06-24 NOTE — LETTER
6/24/2022         RE: Tevin Guidry  [address]  [facility] [address]        Dear Colleague,    Thank you for referring your patient, Tevin Guidry, to the [facility]. Please see a copy of my visit note below.    Hola is a 71 year old who is being evaluated via a billable video visit.      How would you like to obtain your AVS? MyChart  If the video visit is dropped, the invitation should be resent by: Text to cell phone: [phone]   Will anyone else be joining your video visit? HAI Guerrero/MARIAMA          Video-Visit Details    Video Start Time: 8:32 AM    Type of service:  Video Visit    Video End Time:9:00 AM    Originating Location (pt. 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  The site of origin is unclear, so at the current time, it can be considered carcinoma of unknown primary, very likely of gastrointestinal origin, but the primary site is not known.    COVID vaccination status: completed Spring (~April) 2021    Oncology History:   He had a colonoscopy 03/30/2017 and another one 03/02/2021 that was delayed due to the COVID pandemic and other healthcare issues.  This was performed by Dr. Marin Steward on 03/02/2021 and the pathology was notable for tubular and tubulovillous adenomas.  Due to the extent and burden of the number of polyps, he was referred to Dr. Rodríguez for consideration of hemicolectomy.  A number of polyps in the distal transverse colon were tattooed.    Dr. Rodríguez took the patient to the operating room for right hemicolectomy 04/13/2021 due to this high polyp burden.    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        Tumor is located at the distal resection margin and situated within    the lamina propria/submucosa (distal   margin positive, proximal and deep margins are free of malignancy)        An overlying tubular adenoma is present at this distal resection   margin, does not appear to be the source   of the underlying malignancy        Tumor origin is compatible with gastrointestinal site as it marks   positively with CK 20, pan cytokeratin   and CDX2 (negative for CK-7), see comment   - Multiple tubular adenomas (at least twenty-one), negative for high-grade    dysplasia, distal margin is   transected by a sessile tubular adenoma   - Benign appendix with focal surface serrated change, negative for   malignancy (submitted in its entirety)   - Unremarkable terminal ileum   - Sixteen benign lymph nodes (0/16)     COMMENT:   The origin of the adenocarcinoma is uncertain based on the current   resection however the immunoperoxidase   battery supports gastrointestinal origin.  Suggest clinical and   radiographic correlation to investigate   possible primary sites (including stomach, pancreaticobiliary and   remainder of the colon).  The appendix was   submitted in its entirety and does not appear to be the source of the   neoplasm.  Intradepartmental   consultation obtained, the case was discussed with Dr. Rodríguez on   4/19/21.     April 20, 2021--CT chest IMPRESSION:   In this patient with recently diagnosed colon cancer:  1. No evidence of metastatic disease in the chest.  2. Subcutaneous emphysema along the left lower chest/upper abdominal  wall may be secondary to transversus abdominis block done on  4/13/2021. No free intraperitoneal air or pneumothorax.  3. Coronary artery calcifications.  4. Sequelae of prior granulomatous disease.     [Access Center: Subcutaneous emphysema of the left lower  chest/upper  abdominal wall, likely from transversus abdominis than on 4/13/2021.  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 FDG at the colonic surgical anastomosis presumably postsurgical  inflammation.   2. No definite FDG uptake to suggest metastatic disease.   3. No definite primary significant ring carcinoma is identified.   There are 3 equivocal findings:   3a. Mild uptake in the gastric cardia, favor physiologic  3b. Loop of slightly narrowed thickened small bowel with FDG uptake,  favor physiologic or postoperative inflammation but cannot rule out a  primary.   3c. Extensive FDG uptake in the entire sigmoid colon, favored to be  physiologic.  3d. If clinically indicated, an MR enterography in this case may  better define bowel abnormalities. Alternatively attention to these  areas on follow-up imaging.  4. Incidentally noted bilateral hydroceles and right peritesticular  calcification.     May 3, 2021--initial medical oncology consultation, Dr. Sánchez.    May 6, 2021--EGD - unremarkable; no masses seen.    FINAL DIAGNOSIS:   A. ANTRUM, BIOPSY:   - Gastric antral type mucosa with features of reactive gastropathy   - No H. pylori like organisms identified on routine staining   - Negative for intestinal metaplasia or dysplasia     B. STOMACH, POLYP:   - Gastric body type mucosa with chronic gastritis   - No H. pylori like organisms identified on routine staining   - Negative for intestinal metaplasia, neoplastic polyp or dysplasia     C. STOMACH, POLYPS, BIOPSY:   - Fundic gland polyp in one fragment   - Gastric antral type mucosa with mild chronic gastritis   - No H. pylori like organisms identified  on routine staining or by   immunohistochemistry   - Negative for intestinal metaplasia or dysplasia     D. STOMACH, BIOPSY:   - Chronic active gastritis with focal intestinal and pancreatic acinar   type metaplasia, see comment   - No H. pylori like organisms identified on routine staining or by   immunohistochemistry   - Negative for dysplasia, see comment       July 16, 2021 -- oncology follow-up/virtual visit, Dr. Sánchez.  11/8/21-PET/CT--IMPRESSION: Hx. of 2 mm focus of poorly differentiated adenocarcinoma  with significant ring cell features at the tip of the appendix.  No  recurrent or metastatic disease.  1. Postsurgical changes of right hemicolectomy with mild residual  uptake along the surgical anastomosis which is below that of  background, decreased from prior, and favored to be inflammatory.   2. Slight progression of peripheral reticular opacities in the  dependent right lung with a basilar predominance, differential  atelectasis, edema, inflammatory, infectious, aspiration.  This is on  a background of emphysema.  2a. A 9 mm groundglass nodule right upper lobe peripherally unchanged  from prior exam, indeterminant. Continued attention on follow-up.    November 19, 2021 -- oncology follow-up/virtual visit, Dr. Sánchez.  The 04/22/2022 surveillance colonoscopy with Dr. Rodríguez resected several polyps, no evidence of malignancy.      Final Diagnosis   A.  COLON, ASCENDING, POLYP:  - Tubular adenoma  - No high-grade dysplasia or malignancy identified     B.  COLON, DESCENDING, POLYP:  - Tubular adenoma  - No high-grade dysplasia or malignancy identified         June 6, 2022--PET/CT: CT FINDINGS: Mild senescent intracranial changes. Postoperative change of the right lens. Mild carotid artery bifurcation calcification. Moderate to severe coronary artery calcium. Moderate upper lung predominant emphysema. Splenic granulomas. Right   hemicolectomy changes. Pelvic phleboliths. Multilevel degenerative changes of the  spine. The lower extremities are unremarkable.                                                                 IMPRESSION:     No metabolic evidence of active neoplasm.    June 24, 2022 -- oncology follow-up/virtual visit, Dr. Sánchez.        Interim History/History Of Present Illness:    Mr. Tevin Guidry is a 71-year-old gentleman from [place].  He has a past medical history notable for cardiovascular disease, status post multivessel CABG 09/29/2014 and angioplasties.  He has hypertension, COPD from a history of smoking, which he quit in 2019.  He has a history of bladder cancer which was a noninvasive high grade form of urothelial carcinoma diagnosed in the summer and early fall of 2020.  He is status post multiple injections of BCG.  He was diagnosed in the summer of 2019 with a recurrence in mid winter of 2020.  He also has a history of innumerable polyps over the years. He has had nearly a dozen colonoscopies to date, dating back to age 48.  He has had numerous colonoscopies, but none of them have shown evidence of a neoplastic or malignant disease or significant dysplasia but he had tubulovillous forms of adenoma.     Today, he presents for follow up through video visit. He is doing well overall but does admit to diarrhea. His diarrhea has been persistent and more explosive recently. He has one episode in the morning and then feels well after having the bowel movement. No changes in his diet. Denies any flushing or palpitations. Has baseline shortness of breath when ambulating up and down the stairs. He recently went to see her primary care physician Dr. Quiroz who recommended him to see a dermatologist and gastroenterologist due to his MUTYH-associated polyposis.     Review Of Systems:  Comprehensive (14-point) ROS reviewed. Pertinent symptoms reviewed above per HPI.      Past medical, social, surgical, and family histories reviewed.  PAST MEDICAL HISTORY:  Notable for innumerable colon polyps.  It is not  clear whether or not he has a germline or familial mutation that is causing this.  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  He has CAD, hypertension.  He he has had cardiac catheterization including serum 2014 when he had a CABG with LIMA to LAD, 2014.  He has another malignancy in the form of noninvasive bladder carcinoma, left lateral wall of the bladder.  He has treatments with BCG and other interventions by our Urology team since 2019 for that.  COPD due to remote history of smoking.    PAST SURGICAL HISTORY:  Most remarkable for the hemicolectomy performed by Dr Colvin 2021 per above and the interventions for the bladder carcinoma in 2019 and .  Please see the Urology note for details.    FAMILY HISTORY:  Most notable for him being 1 of 3 sons from his parents.  His mom was a smoker and diagnosed with stage IV gastric carcinoid at age 65 and  within 4 months of diagnosis.  Father was a smoker and had lung cancer at age 40 and  at age 50 of this disease.  One brother had gastric carcinoma and  around age 72 after initial diagnosis at age 62.  Another brother is living at age 69 and has never had a colonoscopy due to lack of insurance and other reasons.  A maternal aunt also had ovarian cancer.    SOCIAL HISTORY:  The patient lives in [place].  He has an associate degree in respiratory therapy  Due to social phobia, he has not been employed in that practice.  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  He is single, never , no children.    Tobacco:  He smoked 1-1/2 packs a day of cigarettes for 30 years, quit 2019.  Occasional use of marijuana.  Denied any alcohol use or other illicit drug use.    Allergies:  Allergies as of 2022 - Reviewed 2022   Allergen Reaction Noted     Codeine Nausea and Vomiting  10/14/2014       Current Medications:  Current Outpatient Medications   Medication Sig Dispense Refill     albuterol (PROAIR HFA/PROVENTIL HFA/VENTOLIN HFA) 108 (90 Base) MCG/ACT inhaler Inhale 2 puffs into the lungs every 6 hours as needed for shortness of breath / dyspnea or wheezing 18 g 1     amLODIPine (NORVASC) 5 MG tablet TAKE ONE TABLET BY MOUTH EVERY NIGHT AT BEDTIME 90 tablet 0     aspirin 81 MG tablet Take 1 tablet (81 mg) by mouth daily 90 tablet 3     atorvastatin (LIPITOR) 80 MG tablet Take 1 tablet (80 mg) by mouth At Bedtime 90 tablet 3     buPROPion (WELLBUTRIN XL) 300 MG 24 hr tablet TAKE ONE TABLET BY MOUTH EVERY MORNING 90 tablet 0     ezetimibe (ZETIA) 10 MG tablet Take 1 tablet (10 mg) by mouth daily 90 tablet 3     lisinopril (ZESTRIL) 20 MG tablet Take 1 tablet (20 mg) by mouth 2 times daily 180 tablet 3     metoprolol succinate ER (TOPROL-XL) 50 MG 24 hr tablet Take 1 tablet (50 mg) by mouth At Bedtime 90 tablet 3     nicotine (NICORETTE) 4 MG lozenge Place 4 mg inside cheek every hour as needed for smoking cessation Chews up to 10-12 /day .       PARoxetine (PAXIL) 40 MG tablet Take 1 tablet (40 mg) by mouth every morning 90 tablet 3        Physical Exam:  Physical exam could not be performed today in context of a Virtual Visit during the COVID19/Coronavirus pandemic.       Observed physical assessments made today by visualizing the patient by video link:    General/Constitutional: Generally appears well, not acutely ill.  HEENT: no scleral icterus, not jaundiced.  Respiratory: no labored breathing.  Musculoskeletal: appears to have full range of motion and adequate physical strength.  Skin: no jaundice, discoloration or other notable lesions.  Neurological: no evidence of tremors.  Psychiatric: no evident anxiety; fully alert and oriented with fluent speech.      The rest of a comprehensive physical examination is deferred due to PHE (public health emergency) video visit  restrictions.      Laboratory/Imaging Studies  No visits with results within 2 Week(s) from this visit.   Latest known visit with results is:   Virtual Visit on 05/05/2021   Component Date Value Ref Range Status     Copath Report 05/06/2021    Final                    Value:Patient Name: DICK LYONS  MR#: [id number]  Specimen #: [id number]  Collected: 5/6/2021  Received: 5/6/2021  Reported: 5/13/2021 10:06  Ordering Phy(s): MANNIE DAWKINS  Additional Phy(s): [facility]: [facility]    For improved result formatting, select 'View Enhanced Report Format' under   Linked Documents section.    SPECIMEN(S):  A: Antral biopsy  B: Gastric polyp  C: Gastric biopsy  D: Gastric biopsy    FINAL DIAGNOSIS:  A. ANTRUM, BIOPSY:  - Gastric antral type mucosa with features of reactive gastropathy  - No H. pylori like organisms identified on routine staining  - Negative for intestinal metaplasia or dysplasia    B. STOMACH, POLYP:  - Gastric body type mucosa with chronic gastritis  - No H. pylori like organisms identified on routine staining  - Negative for intestinal metaplasia, neoplastic polyp or dysplasia    C. STOMACH, POLYPS, BIOPSY:  - Fundic gland polyp in one fragment  - Gastric antral type mucosa with mild chronic gastritis  - No H. pylor                          i like organisms identified on routine staining or by   immunohistochemistry  - Negative for intestinal metaplasia or dysplasia    D. STOMACH, BIOPSY:  - Chronic active gastritis with focal intestinal and pancreatic acinar   type metaplasia, see comment  - No H. pylori like organisms identified on routine staining or by   immunohistochemistry  - Negative for dysplasia, see comment    COMMENT:  D).  The biopsies show antral type mucosa with pancreatic acinar type   metaplasia and a minute focus of  intestinal metaplasia.  Immunohistochemistry was performed with   appropriate controls.  Chromogranin performed  on part D highlights linear  "enterochromaffin cell-like hyperplasia.    Gastrin stain shows rare positive cells  at the edge of one biopsy fragments.  If these biopsies were taken from   the fundus or body, these findings are  suggestive of autoimmune gastritis.  Clinical correlation is recommended.    I have personally reviewed all specimens and/or slides, including the   listed sp                          ecial stains, and used them  with my medical judgement to determine or confirm the final diagnosis.    Electronically signed out by:    Liliane Ta M.D., [facility]    CLINICAL HISTORY:  Screening endoscopy.  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    Striped erythema in gastric antrum.  Polyps in the gastric fundus.    GROSS:  A: The specimen is received in formalin with proper patient   identification, labeled \"gastric biopsy antrum\".  The specimen consists of 4 pieces of tan-pink tissue ranging from 0.2-0.5   cm in greatest dimension, submitted  in toto in cassette A1.    B: The specimen is received in formalin with proper patient   identification, labeled \"gastric polyps small\".  The specimen consists of 3 pieces of tan-pink tissue ranging from 0.2-0.5   cm in greatest dimension, submitted  in toto in cassette B1.    C: The specimen is received in formalin with proper patient   identification, labeled \"gastric biopsy large  polyps\".  The specimen consists of 5 pieces of                           tan-pink tissue ranging   from 0.2-0.6 cm in greatest dimension,  submitted in toto in cassette C 1.    D: The specimen is received in formalin with proper patient   identification, labeled \"gastric biopsy fundus\".  The specimen consists of 3 pieces of tan-pink tissue, each measuring   approximately 0.2 cm in greatest  dimension; submitted in toto in cassette D1. (Dictated by: MONICA 5/6/2021   02:53 PM)    MICROSCOPIC:  Microscopic examination was performed.  Immunohistochemistry was performed   with appropriate controls.  CK  AE1/AE3 " performed on parts A1-D1 does not show evidence of signet ring   cell carcinoma.    The technical component of this testing was completed at the [facility], with the professional component performed   at the [facility], [address] [place] [address],   [place] [address] ([phone])    CPT Codes:  A: 50068-ZG5, 25469-KLA  B: 74109-FI1, 24737-JKZ                            C: 17756-QV2, 69436-EWN, 72897-XNZ  D: 17203-OD7, 34842-JRA, 61642-ODO, 60529-GCD, 47199-DESCCQ4GQ    COLLECTION SITE:  Client: [facility]  Location: [facility] (B)    Resident  DXA            RADIOLOGY:  Prior to and including the day of this visit, I personally reviewed the recent imaging scans. I released the pertinent recent imaging results to [employer] in advance of this visit, and reviewed the summary verbatim and in lay language during today's call.      ASSESSMENT/PLAN:  Mr. Hola Guidry is a 71-year-old gentleman with history of innumerable polyps.  He does not have a known germline mutation, although he has had multiple first-degree members of family with gastric carcinoma.  He had upper endoscopy by Dr. Tobar on 05/06/2021 to help clarify whether or not he had primary gastric carcinoma, which would have made sense in terms of signet cell features of the 2 mm focus in the distal appendix that was positive for adenocarcinoma; the evaluation was unremarkable.  Otherwise, the rest of the colon and terminal ileum were negative for carcinoma.  It is not out of the realm of possibility that signet ring cell features and type of histology can be found in small intestinal carcinomas including appendix, but more commonly, it is found in the upper GI tract.  Based on the location as well being more mucosa than through wall that there is consideration whether or not this represented an unusual form of  metastasis to the appendiceal cavity rather than a primary appendiceal malignancy.  However, the PET CT was unremarkable and did not show any evidence of an alternate primary at that time.    It is difficult to ascertain the exact risk of recurrence of the appendix that was incidentally found at the tip of the appendix.  It was very minimal in size at 2 mm and did comprise some high-risk form of histology with signet ring subset.  His symptoms remain stable and recent PET/CT on 06/06/2022 had no evidence of disease recurrence. He will follow up in one year with repeat CT CAP. If issues arise in the meantime, he can be seen earlier with an GHAZAL if needed.    As for his diarrhea, it is likely functional diarrhea given his hemicolectomy but will refer him to gastroenterology for further management of diarrhea and recommended surveillance associated with MUTYH-associated polyposis.     Patient was seen and plan of care was discussed with attending physician Dr. Tanner Sánchez.     Jasvir Ashley MD   Heme/Onc/Transplant Fellow  Pgr #[phone]        MEDICAL ONCOLOGY ATTENDING PHYSICIAN ADDENDUM:  I have seen and evaluated this patient with the medical oncology fellow. I have reviewed and edited this fellow's note, and agree with the assessment and plan stated above. A complete review of systems was assessed, and pertinent positives/negatives are discussed in detail above and as follows.    VIDEO VISIT    Mr. Guidry followed up with us today via [place]-based virtual video visit.  He is generally feeling well but he has had ongoing issues; he is at a baseline, he has numerous noncancer-related comorbidities.  He followed up with his primary care physician, Dr. Preethi Quiroz, 2 days ago.  He has a history of a low-grade bladder cancer for which he last followed up with Dr. Guzman in May, just about a month ago.  His prior history of treatment included BCG x2 followed by a TURPT but, again, he had a low-grade form of bladder cancer.       Over a year ago in the spring of 2021, Dr. Rodríguez from Colorectal Surgery referred him to me after removing part of a bowel and finding an incidental finding of a 2 mm focus with tumor at the appendix tip.  There was no outside source seen so it was extremely unlikely that this represented some form of disease that would metastasize to the appendix.  I was asked to follow up with him with a PET CT just in the last week that shows no evidence of recurrent malignancy.      Mr. Guidry had many questions, many which were not cancer related, so I deferred to his primary care physician.  He asked about his history of genetics evaluation last fall 2021.  He was diagnosed with an MYTH  mutation and recommended to have routine surveillance, upper and lower endoscopies in the years to come to surveil for possible gastrointestinal malignancies.  He had a colonoscopy for surveillance by Dr. Rodríguez on 04/22.  There were a few polyps found, approximately 3, all of which were about 0.5 cm or smaller in size.  Recommendation was for followup colonoscopy in 1 year's time.  I reviewed the pathology from those colonoscopies.  There was no evidence of tubular adenoma seen.  No evidence of high-grade dysplasia or malignancy identified.  I copied the results below.  Essentially, I think from a cancer standpoint, there is no exact recommendation or certainty of how to follow up on a 2 mm incidental focus but we can get a CT chest, abdomen and pelvis in 1 year's time and follow up appropriately.  It appears he has many questions about upper endoscopy and diarrhea and other issues and he may benefit from Gastroenterology referral and evaluation, so we went ahead and made a Gastroenterology referral.  He asked about a Dermatology referral which I have deferred to his primary care physician and other noncancer-related issues, I have deferred to Dr. Quiroz as well.  Please see the above followup note for further  details.        VIRTUAL VISIT - DETAILS:    I have reviewed the note as documented above. This accurately captures the substance of my conversation with the patient.    Date of call: June 24, 2022   Start of call:  End of call:    Provider location: [facility] (academic office)  Patient location: Home      [facility]       Mode of Video Visit: [employer]    Mode of Visit: Telephone      I spent 30 minutes in consultation, including history and discussion with the patient including review of recent lab values and radiologic imaging results.  An additional 30 minutes was spent on the day of the visit, including reviewing pertinent medical notes and documentation from other physicians and APPs who have evaluated and treated this patient, pertinent lab values, pathology and imaging results, personal review of radiologic images, discussing the case with referring providers and/or nurse coordinator team, post-visit orders, and all post-visit documentation.      The above was transcribed using a voice recognition software.  While I reviewed and edited the transcription, I may miss errors.  Please let me know of any of serious errors and I will addend the note.         Again, thank you for allowing me to participate in the care of your patient.      Sincerely,    Tanner Sánchez MD

## 2022-07-01 ENCOUNTER — PATIENT OUTREACH (OUTPATIENT)
Dept: ONCOLOGY | Facility: CLINIC | Age: 72
End: 2022-07-01

## 2022-07-01 NOTE — PROGRESS NOTES
[facility]: Cancer Care Short Note                                                                                          Completed chart audit to assign Oncology Care Coordination enrollment status.

## 2022-07-06 ENCOUNTER — VIRTUAL VISIT (OUTPATIENT)
Dept: GASTROENTEROLOGY | Facility: CLINIC | Age: 72
End: 2022-07-06
Payer: COMMERCIAL

## 2022-07-06 VITALS — WEIGHT: 165 LBS | BODY MASS INDEX: 25.09 KG/M2

## 2022-07-06 DIAGNOSIS — Z80.0 FAMILY HISTORY OF GASTRIC CANCER: ICD-10-CM

## 2022-07-06 DIAGNOSIS — K31.A0 GASTRIC INTESTINAL METAPLASIA: ICD-10-CM

## 2022-07-06 DIAGNOSIS — K59.1 FUNCTIONAL DIARRHEA: ICD-10-CM

## 2022-07-06 DIAGNOSIS — C79.9 METASTATIC SIGNET RING CELL CARCINOMA (H): ICD-10-CM

## 2022-07-06 DIAGNOSIS — D12.6 MYH-ASSOCIATED COLONIC POLYPOSIS (MAP): ICD-10-CM

## 2022-07-06 DIAGNOSIS — Z86.2 HISTORY OF ANEMIA: ICD-10-CM

## 2022-07-06 DIAGNOSIS — Z86.0100 HISTORY OF COLONIC POLYPS: Primary | ICD-10-CM

## 2022-07-06 DIAGNOSIS — K29.40 ATROPHIC GASTRITIS WITHOUT HEMORRHAGE: ICD-10-CM

## 2022-07-06 PROCEDURE — 99214 OFFICE O/P EST MOD 30 MIN: CPT | Mod: 95 | Performed by: INTERNAL MEDICINE

## 2022-07-06 ASSESSMENT — PAIN SCALES - GENERAL: PAINLEVEL: NO PAIN (0)

## 2022-07-06 NOTE — PROGRESS NOTES
"Hola is a 71 year old who is being evaluated via a billable video visit.      Patient denies any changes since echeck-in regarding medication and allergies and states all information entered during echeck-in remains accurate.    How would you like to obtain your AVS? MyChart  If the video visit is dropped, the invitation should be resent by: Text to cell phone: [phone]  Will anyone else be joining your video visit? No      Jeanie Melgar        Video-Visit Details    Video Start Time: 10:10    Type of service:  Video Visit    Video End Time:10:32    Originating Location (pt. Location): Home    Distant Location (provider location):  [facility] GASTROENTEROLOGY [facility]IC [place]     Platform used for Video Visit: [employer]     GASTROENTEROLOGY Video Follow up visit    CC/REFERRING MD:    Preethi Quiroz    HISTORY OF PRESENT ILLNESS:    Tevin Guidry is a 71 year old male who is being evaluated via a billable video visit.    The patient has a history of bladder cancer and   The patient has the MUTYH mutation/MYH-associated polyposis (MAP)    Last colonoscopy 4/2022 - plan is for every 1-2 years    This clinic appt was made for assessment of diarrhea, but the patient's diarrhea has since resolved.  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 Valentino Laurent from pathology, Dr. 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 It was decided " "that he would in fact not benefit from further colonic resection. Instead I proposed performing a colonoscopy today to evaluate his anastomosis at this time to rule out a colonic source.\"   PET CT on 21 did not show any clear evidence of primary or metastatic lesions.   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    ALLERGIES  Codeine    PE/  Gen: pleasant NAD  HEENT: hearing intact  Psych: AOx3, normal mood and affect    PERTINENT STUDIES have been reviewed.    Impression/Recommendations:  Tevin Guidry is a 71 year old male who presents for follow up of MYH-associated polyposis, multiple TAs and TVAs removed since . He also has a history of bladder cancer and CAD (s/p CABG LIMA-LAD and ALENA in ).  S/p right hemicolectomy - no primary location for the signet ring cells (noted location at the distal edge of the colonic resection margin) were found via colonoscopy, PET, EGD   -- last time took Fe supps was .  Check CBC and iron studies and if abnl consider WCE  Colonoscopy last one 2022 -- annual  EGD - last 2021 - needs f/u of intestinal metaplasia (and family hx noted) - await CBC results, likely will schedule for this year  AIG on gastric biopsies: check B12  US thyroid - the patient has had a PET - I will discuss with Oncology  Patient has also been referred to Derm      Appointment duration: 22 minutes    RTC pending above    Thank you for this consultation.  It was a pleasure to participate in the care of this patient; please contact us with any further questions.        "

## 2022-07-06 NOTE — LETTER
"    2022         RE: Tevin Guidry  [address]  [place] [place] [address]        Dear Colleague,    Thank you for referring your patient, Tevin Guidry, to the [facility]STROENTEROLOGY [facility] [place]. Please see a copy of my visit note below.      GASTROENTEROLOGY Video Follow up visit    CC/REFERRING MD:    Preethi Quiroz    HISTORY OF PRESENT ILLNESS:    Tevin Guidry is a 71 year old male who is being evaluated via a billable video visit.    The patient has a history of bladder cancer and   The patient has the MUTYH mutation/MYH-associated polyposis (MAP)    Last colonoscopy 2022 - plan is for every 1-2 years    This clinic appt was made for assessment of diarrhea, but the patient's diarrhea has since resolved.  He also denies heartburn, dysphagia, abdominal pain    Has been on Fe supps in the past, last time     Med hx/  Multiple colon TA and TVA since 2021: right hemicolectomy, 2mm focus of poorly diff adenoca with signet ring features at distal resection margin; 21 TAs, 16 neg lymph nodes, benign appendix and TI    \"However, after discussion with my partners, as well as Dr. Valentino Laurent from pathology, Dr. 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 It was decided that he would in fact not benefit from further colonic resection. Instead I proposed performing a colonoscopy today to evaluate his anastomosis at this time to rule out a colonic source.\"   PET CT on 21 did not show any clear evidence of primary or metastatic lesions.   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    ALLERGIES  Codeine    PE/  Gen: pleasant NAD  HEENT: hearing intact  Psych: AOx3, normal mood and affect    PERTINENT STUDIES have been reviewed.    Impression/Recommendations:  Tevin Guidry is a 71 year old male who presents for follow up of MYH-associated polyposis, multiple TAs and TVAs removed since 2011. He also has a history of bladder cancer and CAD (s/p CABG LIMA-LAD and ALENA in 2014).  S/p right hemicolectomy - no primary location for the signet ring cells (noted location at the distal edge of the colonic resection margin) were found via colonoscopy, PET, EGD   -- last time took Fe supps was 2020.  Check CBC and iron studies and if abnl consider WCE  Colonoscopy last one 4/2022 -- annual  EGD - last 5/2021 - needs f/u of intestinal metaplasia (and family hx noted) - await CBC results, likely will schedule for this year  AIG on gastric biopsies: check B12  US thyroid - the patient has had a PET - I will discuss with Oncology  Patient has also been referred to Derm      Appointment duration: 22 minutes    RTC pending above    Thank you for this consultation.  It was a pleasure to participate in the care of this patient; please contact us with any further questions.          Sincerely,    Stephanie Zamora MD

## 2022-07-13 ENCOUNTER — PATIENT OUTREACH (OUTPATIENT)
Dept: GERIATRIC MEDICINE | Facility: CLINIC | Age: 72
End: 2022-07-13

## 2022-07-13 DIAGNOSIS — F17.200 TOBACCO DEPENDENCE: ICD-10-CM

## 2022-07-13 NOTE — PROGRESS NOTES
[facility] Care Coordination Contact    Called member to schedule annual HRA home visit. Patient answered twice, but hung up the phone both times.    Will attempt to call him again tomorrow.  Elizabeth Martinez RN  [facility]   [phone]

## 2022-07-14 NOTE — PROGRESS NOTES
[facility] Care Coordination Contact  Second attempt    Called member to schedule annual HRA home visit. HRA has been scheduled for Mon, 7/18/22, at 2:00 PM, via phone visit..   Elizabeth Martinez RN  [facility]   [phone]

## 2022-07-15 RX ORDER — BUPROPION HYDROCHLORIDE 300 MG/1
TABLET ORAL
Qty: 90 TABLET | Refills: 1 | Status: SHIPPED | OUTPATIENT
Start: 2022-07-15 | End: 2023-03-15

## 2022-07-15 NOTE — TELEPHONE ENCOUNTER
Prescription approved per [facility] Refill Protocol.  GEE Rae RN  [facility]

## 2022-07-18 ENCOUNTER — PATIENT OUTREACH (OUTPATIENT)
Dept: GERIATRIC MEDICINE | Facility: CLINIC | Age: 72
End: 2022-07-18

## 2022-07-18 SDOH — ECONOMIC STABILITY: TRANSPORTATION INSECURITY
IN THE PAST 12 MONTHS, HAS THE LACK OF TRANSPORTATION KEPT YOU FROM MEDICAL APPOINTMENTS OR FROM GETTING MEDICATIONS?: NO

## 2022-07-18 SDOH — ECONOMIC STABILITY: FOOD INSECURITY: WITHIN THE PAST 12 MONTHS, THE FOOD YOU BOUGHT JUST DIDN'T LAST AND YOU DIDN'T HAVE MONEY TO GET MORE.: NEVER TRUE

## 2022-07-18 SDOH — HEALTH STABILITY: PHYSICAL HEALTH: ON AVERAGE, HOW MANY MINUTES DO YOU ENGAGE IN EXERCISE AT THIS LEVEL?: 30 MIN

## 2022-07-18 SDOH — HEALTH STABILITY: PHYSICAL HEALTH: ON AVERAGE, HOW MANY DAYS PER WEEK DO YOU ENGAGE IN MODERATE TO STRENUOUS EXERCISE (LIKE A BRISK WALK)?: 5 DAYS

## 2022-07-18 SDOH — ECONOMIC STABILITY: TRANSPORTATION INSECURITY
IN THE PAST 12 MONTHS, HAS LACK OF TRANSPORTATION KEPT YOU FROM MEETINGS, WORK, OR FROM GETTING THINGS NEEDED FOR DAILY LIVING?: NO

## 2022-07-18 SDOH — ECONOMIC STABILITY: INCOME INSECURITY: IN THE LAST 12 MONTHS, WAS THERE A TIME WHEN YOU WERE NOT ABLE TO PAY THE MORTGAGE OR RENT ON TIME?: NO

## 2022-07-18 SDOH — ECONOMIC STABILITY: FOOD INSECURITY: WITHIN THE PAST 12 MONTHS, YOU WORRIED THAT YOUR FOOD WOULD RUN OUT BEFORE YOU GOT MONEY TO BUY MORE.: NEVER TRUE

## 2022-07-18 ASSESSMENT — SOCIAL DETERMINANTS OF HEALTH (SDOH)
HOW OFTEN DO YOU GET TOGETHER WITH FRIENDS OR RELATIVES?: ONCE A WEEK
HOW HARD IS IT FOR YOU TO PAY FOR THE VERY BASICS LIKE FOOD, HOUSING, MEDICAL CARE, AND HEATING?: SOMEWHAT HARD
IN A TYPICAL WEEK, HOW MANY TIMES DO YOU TALK ON THE PHONE WITH FAMILY, FRIENDS, OR NEIGHBORS?: MORE THAN THREE TIMES A WEEK
DO YOU BELONG TO ANY CLUBS OR ORGANIZATIONS SUCH AS CHURCH GROUPS UNIONS, FRATERNAL OR ATHLETIC GROUPS, OR SCHOOL GROUPS?: NO
HOW OFTEN DO YOU ATTEND CHURCH OR RELIGIOUS SERVICES?: NEVER
ARE YOU MARRIED, WIDOWED, DIVORCED, SEPARATED, NEVER MARRIED, OR LIVING WITH A PARTNER?: NEVER MARRIED

## 2022-07-18 ASSESSMENT — LIFESTYLE VARIABLES
HOW MANY STANDARD DRINKS CONTAINING ALCOHOL DO YOU HAVE ON A TYPICAL DAY: PATIENT DOES NOT DRINK
HOW OFTEN DO YOU HAVE A DRINK CONTAINING ALCOHOL: NEVER
SKIP TO QUESTIONS 9-10: 1
HOW OFTEN DO YOU HAVE SIX OR MORE DRINKS ON ONE OCCASION: NEVER
AUDIT-C TOTAL SCORE: 0

## 2022-07-18 ASSESSMENT — ACTIVITIES OF DAILY LIVING (ADL): DEPENDENT_IADLS:: INDEPENDENT

## 2022-07-19 ENCOUNTER — TELEPHONE (OUTPATIENT)
Dept: GASTROENTEROLOGY | Facility: CLINIC | Age: 72
End: 2022-07-19

## 2022-07-19 ENCOUNTER — PATIENT OUTREACH (OUTPATIENT)
Dept: GASTROENTEROLOGY | Facility: CLINIC | Age: 72
End: 2022-07-19

## 2022-07-19 NOTE — TELEPHONE ENCOUNTER
----- Message from Stephanie Zamora MD sent at 7/19/2022 11:45 AM CDT -----  Regarding: labs  Hi, I had a chance to review this patient's chart in detail and he needs labs.  I am concerned because he has a social phobia and it is important for him to go in for these tests.  Can you help this patient to get in for the tests I ordered today and let me know if any questions thank you

## 2022-07-19 NOTE — PROGRESS NOTES
Spoke with patient about labs ordered by Dr. Zamora. Offered to schedule lab appointment at [facility] with patient on phone. Patient states prefers to have labs drawn at [facility] [place], writer gave patient number to [facility]. Patient states will call and schedule.

## 2022-07-19 NOTE — PROGRESS NOTES
[facility] Care Coordination Contact    [facility] Home Visit Assessment     Home visit for Health Risk Assessment with Tevin Guidry completed on July 19, 2022    Type of residence:: Private home - stairs  Current living arrangement:: I live in a private home     Assessment completed with:: Patient    Current Care Plan  Member currently receiving the following home care services:     Member currently receiving the following community resources: None    Hola does not have any community or home care services at this time.    Medication Review  Medication reconciliation completed in Epic: Yes  Medication set-up & administration: Independent and sets up on own weekly.  Self-administers medications.  Medication Risk Assessment Medication (1 or more, place referral to MTM): N/A: No risk factors identified  MTM Referral Placed: No: No risk factors idenified     Member has a good understanding of what his medications are for and reports he rarely, if ever, forgets to take any of them.    Mental/Behavioral Health   Depression Screening:   PHQ-2 Total Score (Adult) - Positive if 3 or more points; Administer PHQ-9 if positive: 0     Mental health DX:: Yes - (social phobia) Mental health DX how managed:: Medication     Falls Assessment:   Fallen 2 or more times in the past year?: No   Any fall with injury in the past year?: No    ADL/IADL Dependencies:   Dependent ADLs:: Independent  Dependent IADLs:: Independent    [facility] Plan sponsored benefits: Shared information re: Silver Sneakers/gym memberships, ASA, Calcium +D.    PCA Assessment completed at visit: Not Applicable     Elderly Waiver Eligibility: No-does not meet criteria    Care Plan & Recommendations: Based on my assessment, Hola does not need any services or equipment at this time. He states he does not need help with anything and understands to call this care coordinator at any time if his health or needs should change.    See [facility] for detailed  assessment information.    Follow-Up Plan: Member informed of future contact, plan to f/u with member with a 6 month telephone assessment.  Contact information shared with member and family, encouraged member to call with any questions or concerns at any time.    [place] care continuum providers: Please see Snapshot and Care Management Flowsheets for Specific details of care plan.    This CC note routed to PCP.  Elizabeth Martinez RN  [facility]   [phone]

## 2022-07-20 ENCOUNTER — PATIENT OUTREACH (OUTPATIENT)
Dept: GERIATRIC MEDICINE | Facility: CLINIC | Age: 72
End: 2022-07-20

## 2022-07-20 NOTE — LETTER
July 20, 2022      TEVIN LYONS  [address]  [place] [address]      Dear Tevin:    At [facility], we are dedicated to improving your health and well-being. Enclosed is the Comprehensive Care Plan that we developed with you on 7/18/2022. Please review the Care Plan carefully.    As a reminder, some of the things we discussed at your visit include:    Your physical and mental health    Ways to reduce falls    Health care needs you may have    Don t forget to contact your care coordinator if you:    Have been hospitalized or plan to be hospitalized     Have had a fall     Have experienced a change in physical health    Are experiencing emotional problems     If you do not agree with your Care Plan, have questions about it, or have experienced a change in your needs, please call me at [phone]. If you are hearing impaired, please call the [place] Relay at 716 or [phone] (mkjnzy-zj-gosdmh relay service).    Sincerely,    Elizabeth Martinez RN  [phone]  [email or web address] ([facility]) is a health plan that contracts with both Medicare and the [place] Medical Assistance (Medicaid) program to provide benefits of both programs to enrollees. Enrollment in [facility] depends on contract renewal.    [id number])     [id number] (11/18)

## 2022-07-20 NOTE — PROGRESS NOTES
[facility] Care Coordination Contact    Received after visit chart from care coordinator.  Completed following tasks: Mailed copy of care plan to client, Mailed copy of POC signature sheet for member to sign and return in SASE  and Mailed [facility] Safe Medication Disposal      Rosaura Karimi  Case Management Specialist  [facility]  [phone]

## 2022-07-22 ENCOUNTER — LAB (OUTPATIENT)
Dept: LAB | Facility: CLINIC | Age: 72
End: 2022-07-22
Payer: COMMERCIAL

## 2022-07-22 DIAGNOSIS — K29.40 ATROPHIC GASTRITIS WITHOUT HEMORRHAGE: ICD-10-CM

## 2022-07-22 DIAGNOSIS — D12.6 MYH-ASSOCIATED COLONIC POLYPOSIS (MAP): ICD-10-CM

## 2022-07-22 DIAGNOSIS — C79.9 METASTATIC SIGNET RING CELL CARCINOMA (H): ICD-10-CM

## 2022-07-22 DIAGNOSIS — Z86.2 HISTORY OF ANEMIA: ICD-10-CM

## 2022-07-22 DIAGNOSIS — K31.A0 GASTRIC INTESTINAL METAPLASIA: ICD-10-CM

## 2022-07-22 LAB
ERYTHROCYTE [DISTWIDTH] IN BLOOD BY AUTOMATED COUNT: 13.3 % (ref 10–15)
FOLATE SERPL-MCNC: 14.4 NG/ML (ref 4.6–34.8)
HCT VFR BLD AUTO: 43 % (ref 40–53)
HGB BLD-MCNC: 14 G/DL (ref 13.3–17.7)
IRON SATN MFR SERPL: 15 % (ref 15–46)
IRON SERPL-MCNC: 47 UG/DL (ref 35–180)
MCH RBC QN AUTO: 30.5 PG (ref 26.5–33)
MCHC RBC AUTO-ENTMCNC: 32.6 G/DL (ref 31.5–36.5)
MCV RBC AUTO: 94 FL (ref 78–100)
PLATELET # BLD AUTO: 272 10E3/UL (ref 150–450)
RBC # BLD AUTO: 4.59 10E6/UL (ref 4.4–5.9)
TIBC SERPL-MCNC: 305 UG/DL (ref 240–430)
VIT B12 SERPL-MCNC: 240 PG/ML (ref 232–1245)
WBC # BLD AUTO: 9.5 10E3/UL (ref 4–11)

## 2022-07-22 PROCEDURE — 82746 ASSAY OF FOLIC ACID SERUM: CPT

## 2022-07-22 PROCEDURE — 82607 VITAMIN B-12: CPT

## 2022-07-22 PROCEDURE — 85027 COMPLETE CBC AUTOMATED: CPT

## 2022-07-22 PROCEDURE — 82728 ASSAY OF FERRITIN: CPT

## 2022-07-22 PROCEDURE — 83550 IRON BINDING TEST: CPT

## 2022-07-22 PROCEDURE — 36415 COLL VENOUS BLD VENIPUNCTURE: CPT

## 2022-07-23 LAB — FERRITIN SERPL-MCNC: 37 NG/ML (ref 26–388)

## 2022-07-27 ENCOUNTER — TELEPHONE (OUTPATIENT)
Dept: GASTROENTEROLOGY | Facility: CLINIC | Age: 72
End: 2022-07-27

## 2022-07-27 NOTE — TELEPHONE ENCOUNTER
Spoke to Hola, offered follow up virtual appointment 11/23/22, Hola accepted. Hola also states he would like to get his Vit B injections at his primary clinic, [facility].

## 2022-07-27 NOTE — TELEPHONE ENCOUNTER
----- Message from Stephanie Zamora MD sent at 7/27/2022 12:28 PM CDT -----  Regarding: RTC 6 mo, overbook  RTC 4-6 mo, overbook

## 2022-07-29 ENCOUNTER — PATIENT OUTREACH (OUTPATIENT)
Dept: GERIATRIC MEDICINE | Facility: CLINIC | Age: 72
End: 2022-07-29

## 2022-07-29 NOTE — PROGRESS NOTES
[facility] Care Coordination Contact    Per CC, mailed electronic JOSE MIGUEL to member w/HERMELINDO.    Rosaura Karimi  Case Management Specialist  [facility]  [phone]

## 2022-08-04 ENCOUNTER — TELEPHONE (OUTPATIENT)
Dept: FAMILY MEDICINE | Facility: CLINIC | Age: 72
End: 2022-08-04

## 2022-08-04 ENCOUNTER — TELEPHONE (OUTPATIENT)
Dept: GASTROENTEROLOGY | Facility: CLINIC | Age: 72
End: 2022-08-04

## 2022-08-04 DIAGNOSIS — E53.8 VITAMIN B12 DEFICIENCY (NON ANEMIC): Primary | ICD-10-CM

## 2022-08-04 RX ORDER — CYANOCOBALAMIN 1000 UG/ML
1000 INJECTION, SOLUTION INTRAMUSCULAR; SUBCUTANEOUS
Status: ACTIVE | OUTPATIENT
Start: 2022-08-04 | End: 2023-07-30

## 2022-08-04 NOTE — TELEPHONE ENCOUNTER
----- Message from Stephanie Zamora MD sent at 8/3/2022  4:51 PM CDT -----  Regarding: RE: RTC 6 mo, overbook  Hello, yes place the order, unless he would like for his PCP to order and follow.   Stephanie          ----- Message -----  From: Kaylin Lopes RN  Sent: 7/27/2022   3:36 PM CDT  To: Stephanie Zamora MD  Subject: FW: RTC 6 mo, overbook                           Helashley,     Hola would like to receive his Vit B injections at his primary clinic, [facility]. Would you like me to place the order?    Melisa  ----- Message -----  From: Stephanie Zamora MD  Sent: 7/27/2022  12:29 PM CDT  To: Rosalie Whyte, EVELYN, Kaylin Lopes, RN  Subject: RTC 6 mo, overbook                               RTC 4-6 mo, overbook

## 2022-08-04 NOTE — TELEPHONE ENCOUNTER
Writer called patient and reviewed message per Dr. Quiroz-she placed orders for monthly Vitamin B12 injections.    Patient verbalized understanding and in agreement with plan.    Patient already scheduled for MA appt on 8/12/22.    Confirmed appt date, time and location with patient.    SARINA CordovaN, RN  [facility]

## 2022-08-04 NOTE — TELEPHONE ENCOUNTER
Dr. Quiroz-Please review and advise if you would be willing to order Vitamin B-12 injections?    Triage-Please contact patient with Dr. Quiroz's response.    Call received from nurse Melisa with [facility]:  1. Dr. Zamora () recommends monthly Vitamin B-12 injections and patient prefers to receive injections at PCP clinic    Informed Melisa writer will send message to Dr. Quiroz.      Thank you!  SARINA CordovaN, RN  [facility]

## 2022-08-04 NOTE — TELEPHONE ENCOUNTER
Spoke with Xiomara RIVAS. Informed Xiomara, patient Hola Guidry  Needs monthly Vit B12 injections and follow up indicated by recent lab results. Hola prefers to receive injections at [facility], PCP Dr. Quiroz. Xiomara RIVAS states will send a message to Dr. Quiroz requesting order for the Vit b12 injections. Xiomara RIVAS states will follow up with Hola.

## 2022-08-08 ENCOUNTER — TELEPHONE (OUTPATIENT)
Dept: GASTROENTEROLOGY | Facility: CLINIC | Age: 72
End: 2022-08-08

## 2022-08-08 ENCOUNTER — TELEPHONE (OUTPATIENT)
Dept: FAMILY MEDICINE | Facility: CLINIC | Age: 72
End: 2022-08-08

## 2022-08-08 DIAGNOSIS — E53.8 VITAMIN B12 DEFICIENCY (NON ANEMIC): Primary | ICD-10-CM

## 2022-08-08 NOTE — TELEPHONE ENCOUNTER
Reason for Call: Other call back    Detailed comments: [facility] wondering if PCP will be doing follow up labs for B12 injections for monitoring. Please advise. Thanks!    Phone Number Patient can be reached at: [phone] ALEX Vincent    Best Time: Any    Can we leave a detailed message on this number? YES    Call taken on 8/8/2022 at 9:36 AM by Balbina Mckeon

## 2022-08-08 NOTE — TELEPHONE ENCOUNTER
Follow up call to [facility]. Left message with Balbina  for clinic nurse asking  if PCP Dr. Quiroz will be providing follow up labs for VIt B 12 injections. Balbina young nurse will call writer back.

## 2022-08-09 NOTE — TELEPHONE ENCOUNTER
Writer left detailed message on Melisa's identified voicemail relaying plan for Primary Care monitoring of Vitamin B-12 lab six weeks after initial injection, which is scheduled on 8/12/22.    Writer called patient: reviewed plan for Vitamin B12 lab check 6 weeks after initial Vitamin B-12 injection, which would be 9/23/22.  Patient verbalized understanding and in agreement with plan.    Lab appt scheduled on 9/23/22.  Lab visit date, time and location confirmed with patient.  Patient informed he does not need to fast.    SARINA CordovaN, RN  [facility]

## 2022-08-09 NOTE — TELEPHONE ENCOUNTER
Providers-thank you for the lab order.  Is there a particular time frame for Vitamin B12 lab check?  Patient's first B12 injection is scheduled on 8/12/22.    Thank you!  SARINA CordovaN, RN  [facility]

## 2022-08-11 ENCOUNTER — TRANSFERRED RECORDS (OUTPATIENT)
Dept: HEALTH INFORMATION MANAGEMENT | Facility: CLINIC | Age: 72
End: 2022-08-11

## 2022-08-12 ENCOUNTER — ALLIED HEALTH/NURSE VISIT (OUTPATIENT)
Dept: FAMILY MEDICINE | Facility: CLINIC | Age: 72
End: 2022-08-12
Payer: COMMERCIAL

## 2022-08-12 DIAGNOSIS — E53.8 VITAMIN B12 DEFICIENCY (NON ANEMIC): Primary | ICD-10-CM

## 2022-08-12 PROCEDURE — 96372 THER/PROPH/DIAG INJ SC/IM: CPT | Performed by: FAMILY MEDICINE

## 2022-08-12 PROCEDURE — 99207 PR NO CHARGE NURSE ONLY: CPT

## 2022-08-12 RX ADMIN — CYANOCOBALAMIN 1000 MCG: 1000 INJECTION, SOLUTION INTRAMUSCULAR; SUBCUTANEOUS at 13:08

## 2022-08-12 NOTE — NURSING NOTE
Clinic Administered Medication Documentation    Administrations This Visit     cyanocobalamin injection 1,000 mcg     Admin Date  08/12/2022 Action  Given Dose  1,000 mcg Route  Intramuscular Site  Left Deltoid Administered By  Carmen Washington    Ordering Provider: Preethi Quiroz MD    NDC: 3081-8858-29    Lot#: [id number]    : [employer]    Patient Supplied?: No                  Injectable Medication Documentation    Patient was given Cyanocobalamin (B-12). Prior to medication administration, verified patients identity using patient s name and date of birth. Please see MAR and medication order for additional information. Patient instructed to stay in clinic after the injection but patient declined.      Was entire vial of medication used? Yes  Vial/Syringe: Single dose vial  Expiration Date:  07/2023  Was this medication supplied by the patient? No     Carmen Washington on 8/12/2022 at 1:11 PM

## 2022-08-12 NOTE — PROGRESS NOTES
SUBJECTIVE:   CC: Tevin Guidry is an 71 year old woman who presents for preventive health visit.     {Split Bill scripting  The purpose of this visit is to discuss your medical history and prevent health problems before you are sick. You may be responsible for a co-pay, coinsurance, or deductible if your visit today includes services such as checking on a sore throat, having an x-ray or lab test, or treating and evaluating a new or existing condition :993433}  {Patient advised of split billing (Optional):797981}  HPI  {Add if <65 person on Medicare  - Required Questions (Optional):327516}  {Outside tests to abstract? :943688}    {additional problems to add (Optional):766886}    Today's PHQ-2 Score:   PHQ-2 (  [employer]) 2022   Q1: Little interest or pleasure in doing things 0   Q2: Feeling down, depressed or hopeless 0   PHQ-2 Score 0   PHQ-2 Total Score (12-17 Years)- Positive if 3 or more points; Administer PHQ-A if positive 0   Q1: Little interest or pleasure in doing things -   Q2: Feeling down, depressed or hopeless -   PHQ-2 Score -       Abuse: Current or Past (Physical, Sexual or Emotional) - { :187002}  Do you feel safe in your environment? { :995572}        Social History     Tobacco Use     Smoking status: Former Smoker     Packs/day: 0.00     Years: 30.00     Pack years: 0.00     Types: Cigarettes     Quit date: 2022     Years since quittin.1     Smokeless tobacco: Never Used     Tobacco comment: quit successfully (smoke and nicotine free)   Substance Use Topics     Alcohol use: No     Comment: quit 40 years ago     {Rooming Staff- Complete this question if Prescreen response is not shown below for today's visit. If you drink alcohol do you typically have >3 drinks per day or >7 drinks per week? (Optional):324177}    Alcohol Use 2022   Prescreen: >3 drinks/day or >7 drinks/week? Not Applicable   Prescreen: >3 drinks/day or >7 drinks/week? -   AUDIT SCORE  -   {add AUDIT  "responses (Optional) (A score of 7 for adult men is an indication of hazardous drinking; a score of 8 or more is an indication of an alcohol use disorder.  A score of 7 or more for adult women is an indication of hazardous drinking or an alchohol use disorder):344926}    Reviewed orders with patient.  Reviewed health maintenance and updated orders accordingly - { :994184::\"Yes\"}  {Chronicprobdata (optional):683903}    Breast Cancer Screening:        History of abnormal Pap smear: { :100866}     Reviewed and updated as needed this visit by clinical staff                    Reviewed and updated as needed this visit by Provider                   {HISTORY OPTIONS (Optional):452350}    Review of Systems  {FEMALE ROS (Optional):307286}     OBJECTIVE:   There were no vitals taken for this visit.  Physical Exam  {Exam Choices (Optional):744595}    {Diagnostic Test Results (Optional):277338::\"Diagnostic Test Results:\",\"Labs reviewed in Epic\"}    ASSESSMENT/PLAN:   {Diag Picklist:254185}    {Patient advised of split billing (Optional):128147}    COUNSELING:  {FEMALE COUNSELING MESSAGES:062971::\"Reviewed preventive health counseling, as reflected in patient instructions\"}    Estimated body mass index is 25.09 kg/m  as calculated from the following:    Height as of 6/22/22: 1.727 m (5' 8\").    Weight as of 7/6/22: 74.8 kg (165 lb).    {Weight Management Plan (ACO) Complete if BMI is abnormal-  Ages 18-64  BMI >24.9.  Age 65+ with BMI <23 or >30 (Optional):982804}    He reports that he quit smoking about 7 weeks ago. His smoking use included cigarettes. He smoked 0.00 packs per day for 30.00 years. He has never used smokeless tobacco.      Counseling Resources:  ATP IV Guidelines  Pooled Cohorts Equation Calculator  Breast Cancer Risk Calculator  BRCA-Related Cancer Risk Assessment: FHS-7 Tool  FRAX Risk Assessment  ICSI Preventive Guidelines  Dietary Guidelines for Americans, 2010  USDA's MyPlate  ASA Prophylaxis  Lung CA " Screening    [facility] MA  [facility]

## 2022-08-17 ENCOUNTER — PATIENT OUTREACH (OUTPATIENT)
Dept: GERIATRIC MEDICINE | Facility: CLINIC | Age: 72
End: 2022-08-17

## 2022-08-17 NOTE — PROGRESS NOTES
CC updated program tasks and targets for [employer] launch.  Elizabeth Martinez RN  [facility]   [phone]

## 2022-08-22 NOTE — PROGRESS NOTES
Medication Therapy Management (MTM) Encounter    ASSESSMENT:                            Medication Adherence/Access: No issues identified      Smoking cessation: Patient is smoke free.   We discussed: rewards for quitting-240$ more money/month . Eating a little better , will think about what to do in future with his extra $$.       Hypertension: Stable. Patient is meeting blood pressure goal of < 140/90mmHg. Patient would benefit from the following changes - continued blood pressure monitoring, watching diet, increasing exercise and weight loss and smoking cessation-working to remain 100% smoke free again.       Hyperlipidemia: Stable.  Patient is on high intensity statin which is indicated based on 2019 ACC/AHA guidelines for lipid management.        PLAN:                              1. Congrats on being smoke free since 6-23-22.  Stay on Bupropion daily as is .   Reward yourself for being smoke free.    2. BLOOD PRESSURE today 92/60mmhg. -- lower now due to not smoking --watch for possible side effects of low BLOOD PRESSURE --like dizziness, lightheadedness, unsteady after arising from sitting --let me know if those occur and I can decrease one of your BLOOD PRESSURE medications.         Follow-up: Return in about 14 weeks (around 12/1/2022), or 2 Pm, for BP Recheck, Medication Therapy Management Visit, tobacco abuse.          SUBJECTIVE/OBJECTIVE:                          Tevin Guidry is a 72 year old male coming in for a follow-up (6-23-22) visit. He was referred to me from Preethi Quiroz      Reason for visit:   Smoking cessation: he feels bupropion was the key to his quitting.   Recent derm check one basal cell carcinoma on his back.     My chart note 8-15-22:  Pteros Bird:    I should give you  an update on my tobacco use.  Totally quit since before our last appointment in June, about  seven weeks.  I feel pretty good and will see you  on August 26.     Hola Guidry            Allergies/ADRs: Reviewed in  chart  Past Medical History: Per patient has a hemicolectomy with a hx of polyps (removed appendix). CAD (CABG), Social phobia, COPD, adenocarcinoma  Of bladder/colon. Had signet ring cancer removed with colon as well.  Has MUTYH--associated polyposis.   Tobacco: He currently gets a hold a cigarette- relapsed recently -see below .  Never had chantix in the past .   Alcohol: not currently using  Other Substance Use: marijuana flower, occasional vape - smokes everyday --he is much more worried about cig smoking vs. Marijuana.   Caffeine: large starbucks in morning and sometimes in PM      Medication Adherence/Access: No issues          Smoking cessation:  Patient reports things going so well.  NO SMOKING SINCE 6-!!    He has relapsed smoking , but pet/ct scan was negative , but showed darkening lungs and calcium buildup in heart .   What side effects to nicotine replacement therapies or medications are you experiencing: none   What times or situations have you found the most challenging?Just when he gets the urge--maybe boredom/stress.    Is there anything that worked well for you?  Bupropion 300mg./day helps.   How can you get back on your plan? He feels he can resist temptation now --doing well.   What is a first step-today or tomorrow or in the next few days-that will enable you to do this? Periodic mtm appt. To hold him accountable.   How can I support you in doing this? continue meds/counseling.       Previously: 6-23-22:    Has been smoking off/on , can go for days quitting --but then in his head he wakes up and wants a cigarette -- then he has to buy a pack and start 1 PPD .    His last cigarette was Sunday evening 6-19-22.  Currently has no cigs at home .     He just bought first lozenges today 4mg. -- he like them better than gum -last longer and taste better . They really help --smoke free x 4 days now.       He admits some coronary artery calcification - and darkening in lungs--he realizes this and  wants to keep plugging along.       He is cancer free !!    He cannot afford to smoke --1 pack is 9.45 $ --!!            Previously :       4-14-22:    Smoking is sporadic , admitted 6-8 cigs/day x 2 + weeks not too long ago.   He states he doesn't know why he started up again, maybe boredom he thinks biggest reason.    Some marijuana daily smoking still --maybe he ran out of that so that is trigger for him to restart cigs.     He did quit cigs 4-11-22 ; still smokes daily Marijuana though.     Back to chewing 10-12 nicotine pieces gum/day .     Denies overt loneliness but has a wonderful dog. Has a brother and his Gf that he can talk too.       Hypertension: Current medications include : amlodipine 5mg hs daily now , lisinopril 20mg twice daily, metoprolol succ. 50mg daily.  Patient does not self-monitor blood pressure.  Patient reports no current medication side effects. Patient reports they are somewhat anxious about the visit, thinks this could be related to blood pressure elevation at moment. Continue to monitor.     Currently takes aspirin 81mg daily based on history of CABG. Reports no side effects.    BP Readings from Last 3 Encounters:   08/25/22 92/60   06/23/22 130/78   06/22/22 130/78        Hyperlipidemia: Current therapy includes :atorvastatin 80mg daily and Ezetimibe (Zetia) 10mg once daily.  Patient reports no significant myalgias or other side effects.  The 10-year ASCVD risk score (Cheri HINSON Jr., et al., 2013) is: 14.4%    Values used to calculate the score:      Age: 72 years      Sex: Male      Is Non- : No      Diabetic: No      Tobacco smoker: No      Systolic Blood Pressure: 92 mmHg      Is BP treated: Yes      HDL Cholesterol: 45 mg/dL      Total Cholesterol: 140 mg/dL  Recent Labs   Lab Test 09/22/21  1425 08/03/20  1443 10/27/16  1028 10/23/15  0850 09/30/14  0427   CHOL 140 140   < > 155 109   HDL 45 59   < > 43 51   LDL 78 64   < > 85 39   TRIG 86 84   < > 137 97    CHOLHDLRATIO  --   --   --  3.6 2.1    < > = values in this interval not displayed.           Today's Vitals: BP 92/60   Pulse 67   Wt 170 lb 12.8 oz (77.5 kg)   SpO2 93%   BMI 25.97 kg/m    ----------------      I spent 30 minutes with this patient today. All changes were made via collaborative practice agreement with Preethi Quiroz MD, MD. A copy of the visit note was provided to the patient's primary care provider.    The patient was given a summary of these recommendations.     Andrea Bah Rph.  Medication Therapy Management Provider  [phone]         Medication Therapy Recommendations  No medication therapy recommendations to display

## 2022-08-25 ENCOUNTER — OFFICE VISIT (OUTPATIENT)
Dept: PHARMACY | Facility: CLINIC | Age: 72
End: 2022-08-25
Payer: COMMERCIAL

## 2022-08-25 VITALS
BODY MASS INDEX: 25.97 KG/M2 | HEART RATE: 67 BPM | DIASTOLIC BLOOD PRESSURE: 60 MMHG | SYSTOLIC BLOOD PRESSURE: 92 MMHG | WEIGHT: 170.8 LBS | OXYGEN SATURATION: 93 %

## 2022-08-25 DIAGNOSIS — I10 HYPERTENSION GOAL BP (BLOOD PRESSURE) < 140/90: ICD-10-CM

## 2022-08-25 DIAGNOSIS — F17.210 CIGARETTE NICOTINE DEPENDENCE WITHOUT COMPLICATION: Primary | ICD-10-CM

## 2022-08-25 DIAGNOSIS — E78.5 HYPERLIPIDEMIA LDL GOAL <130: ICD-10-CM

## 2022-08-25 PROCEDURE — 99606 MTMS BY PHARM EST 15 MIN: CPT | Performed by: PHARMACIST

## 2022-08-25 NOTE — PATIENT INSTRUCTIONS
"Recommendations from today's MTM visit:                                                         1. Congrats on being smoke free since 6-23-22.  Stay on Bupropion daily as is .   Reward yourself for being smoke free.    2. BLOOD PRESSURE today 92/60mmhg. -- lower now due to not smoking --watch for possible side effects of low BLOOD PRESSURE --like dizziness, lightheadedness, unsteady after arising from sitting --let me know if those occur and I can decrease one of your BLOOD PRESSURE medications.         Follow-up: Return in about 14 weeks (around 12/1/2022), or 2 Pm, for BP Recheck, Medication Therapy Management Visit, tobacco abuse.    It was great speaking with you today.  I value your experience and would be very thankful for your time in providing feedback in our clinic survey. In the next few days, you may receive an email or text message from [employer] with a link to a survey related to your  clinical pharmacist.\"     To schedule another MTM appointment, please call the clinic directly or you may call the MTM scheduling line at [phone] or toll-free at [phone].     My Clinical Pharmacist's contact information:                                                      Please feel free to contact me with any questions or concerns you have.      Andrea Bah Rph.  Medication Therapy Management Provider  [phone]    "

## 2022-08-25 NOTE — Clinical Note
Great news- uriel smoke free now since 6-23-22-- wow --very impressive ,  his BLOOD PRESSURE is low today --will keep an eye on that might have to reduce one of his meds....   Amanda

## 2022-09-06 ENCOUNTER — TRANSFERRED RECORDS (OUTPATIENT)
Dept: HEALTH INFORMATION MANAGEMENT | Facility: CLINIC | Age: 72
End: 2022-09-06

## 2022-09-23 ENCOUNTER — LAB (OUTPATIENT)
Dept: LAB | Facility: CLINIC | Age: 72
End: 2022-09-23
Payer: COMMERCIAL

## 2022-09-23 DIAGNOSIS — Z13.220 SCREENING FOR HYPERLIPIDEMIA: Primary | ICD-10-CM

## 2022-09-23 PROCEDURE — 36415 COLL VENOUS BLD VENIPUNCTURE: CPT

## 2022-09-23 PROCEDURE — 80061 LIPID PANEL: CPT

## 2022-09-24 LAB
CHOLEST SERPL-MCNC: 112 MG/DL
FASTING STATUS PATIENT QL REPORTED: NO
HDLC SERPL-MCNC: 58 MG/DL
LDLC SERPL CALC-MCNC: 35 MG/DL
NONHDLC SERPL-MCNC: 54 MG/DL
TRIGL SERPL-MCNC: 94 MG/DL

## 2022-09-26 ENCOUNTER — MYC MEDICAL ADVICE (OUTPATIENT)
Dept: FAMILY MEDICINE | Facility: CLINIC | Age: 72
End: 2022-09-26

## 2022-09-27 NOTE — RESULT ENCOUNTER NOTE
Excellent! Please call or send a [employer] message if you have any questions. Preethi Quiroz M.D.

## 2022-09-28 NOTE — TELEPHONE ENCOUNTER
Writer responded via [employer].    SARINA CordovaN, RN-BC  [facility]

## 2022-09-30 ENCOUNTER — TELEPHONE (OUTPATIENT)
Dept: GASTROENTEROLOGY | Facility: CLINIC | Age: 72
End: 2022-09-30

## 2022-09-30 NOTE — TELEPHONE ENCOUNTER
[facility] Call Center    Phone Message    May a detailed message be left on voicemail: yes     Reason for Call: Other: Patient has a Vit B shot set up for 11/9/22 at his clinic; however, he is wondering if he could get a shot set up sooner at the GI Clinic.  Please follow up with patient.     Action Taken: Message routed to:  [facility]    Travel Screening: Not Applicable

## 2022-09-30 NOTE — TELEPHONE ENCOUNTER
No answer, left detailed voicemail for Hola with call back number.     Spoke to [facility] clinic. Are booked until late october for nurse visits to receive Vit B 12 injections.   [facility] has openings as soon as today. Scheduling number left for Hola if he prefers [place]. [phone]

## 2022-10-26 ENCOUNTER — ALLIED HEALTH/NURSE VISIT (OUTPATIENT)
Dept: FAMILY MEDICINE | Facility: CLINIC | Age: 72
End: 2022-10-26
Payer: COMMERCIAL

## 2022-10-26 DIAGNOSIS — E53.8 VITAMIN B12 DEFICIENCY (NON ANEMIC): Primary | ICD-10-CM

## 2022-10-26 PROCEDURE — 99207 PR NO CHARGE NURSE ONLY: CPT

## 2022-10-26 PROCEDURE — 96372 THER/PROPH/DIAG INJ SC/IM: CPT | Performed by: FAMILY MEDICINE

## 2022-10-26 RX ADMIN — CYANOCOBALAMIN 1000 MCG: 1000 INJECTION, SOLUTION INTRAMUSCULAR; SUBCUTANEOUS at 10:53

## 2022-10-26 NOTE — PROGRESS NOTES
Clinic Administered Medication Documentation    Administrations This Visit     cyanocobalamin injection 1,000 mcg     Admin Date  10/26/2022 Action  Given Dose  1,000 mcg Route  Intramuscular Site  Right Deltoid Administered By  Carmenza Brooks CMA    Ordering Provider: Preethi Quiroz MD    NDC: 91170-766-75    Lot#: [id number]    : [employer]    Patient Supplied?: No                  Injectable Medication Documentation    Patient was given Cyanocobalamin (B-12). Prior to medication administration, verified patients identity using patient s name and date of birth. Please see MAR and medication order for additional information. Patient instructed to remain in clinic for 15 minutes.      Was entire vial of medication used? Yes  Vial/Syringe: Single dose vial  Expiration Date:  03/2024  Was this medication supplied by the patient? No

## 2022-10-30 ENCOUNTER — HEALTH MAINTENANCE LETTER (OUTPATIENT)
Age: 72
End: 2022-10-30

## 2022-11-08 ENCOUNTER — PRE VISIT (OUTPATIENT)
Dept: UROLOGY | Facility: CLINIC | Age: 72
End: 2022-11-08

## 2022-11-08 NOTE — TELEPHONE ENCOUNTER
Reason for visit: Cystoscopy     Relevant information: Bladder cancer    Records/imaging/labs/orders: All records available    Pt called: Send [employer] message about cysto    At Rooming: Collect urine

## 2022-11-16 ENCOUNTER — TELEPHONE (OUTPATIENT)
Dept: FAMILY MEDICINE | Facility: CLINIC | Age: 72
End: 2022-11-16

## 2022-11-16 ENCOUNTER — OFFICE VISIT (OUTPATIENT)
Dept: OPHTHALMOLOGY | Facility: CLINIC | Age: 72
End: 2022-11-16
Attending: OPHTHALMOLOGY
Payer: COMMERCIAL

## 2022-11-16 DIAGNOSIS — H25.812 COMBINED FORM OF AGE-RELATED CATARACT, LEFT EYE: Primary | ICD-10-CM

## 2022-11-16 PROCEDURE — G0463 HOSPITAL OUTPT CLINIC VISIT: HCPCS | Mod: 25

## 2022-11-16 PROCEDURE — 99214 OFFICE O/P EST MOD 30 MIN: CPT | Mod: GC | Performed by: OPHTHALMOLOGY

## 2022-11-16 PROCEDURE — 76519 ECHO EXAM OF EYE: CPT | Performed by: OPHTHALMOLOGY

## 2022-11-16 ASSESSMENT — SLIT LAMP EXAM - LIDS
COMMENTS: NORMAL
COMMENTS: NORMAL

## 2022-11-16 ASSESSMENT — CONF VISUAL FIELD
OS_SUPERIOR_NASAL_RESTRICTION: 0
OS_SUPERIOR_TEMPORAL_RESTRICTION: 0
OS_NORMAL: 1
OD_INFERIOR_TEMPORAL_RESTRICTION: 0
OS_INFERIOR_TEMPORAL_RESTRICTION: 0
OD_SUPERIOR_NASAL_RESTRICTION: 0
OD_NORMAL: 1
OD_INFERIOR_NASAL_RESTRICTION: 0
METHOD: COUNTING FINGERS
OD_SUPERIOR_TEMPORAL_RESTRICTION: 0
OS_INFERIOR_NASAL_RESTRICTION: 0

## 2022-11-16 ASSESSMENT — EXTERNAL EXAM - LEFT EYE: OS_EXAM: NORMAL

## 2022-11-16 ASSESSMENT — VISUAL ACUITY
OS_SC: 20/150
METHOD: SNELLEN - LINEAR
OD_SC: 20/20-
OS_PH_SC: 20/100-

## 2022-11-16 ASSESSMENT — TONOMETRY
OS_IOP_MMHG: 09
OD_IOP_MMHG: 12
IOP_METHOD: TONOPEN

## 2022-11-16 ASSESSMENT — REFRACTION_MANIFEST
OS_AXIS: 030
OS_SPHERE: -2.00
OS_CYLINDER: +1.25

## 2022-11-16 ASSESSMENT — CUP TO DISC RATIO
OS_RATIO: 0.7
OD_RATIO: 0.8

## 2022-11-16 ASSESSMENT — EXTERNAL EXAM - RIGHT EYE: OD_EXAM: NORMAL

## 2022-11-16 NOTE — PROGRESS NOTES
HPI     Cataract Evaluation    In left eye.  Associated symptoms include blurred vision, glare and haloes.  Severity is moderate.  Onset was gradual.  Duration of months.  Frequency is constant.  Context:  distance vision, mid-range vision and near vision.  Since onset it is gradually worsening.  Affected activities include reading, driving, night driving, watching TV and daily activities.  Treatments tried include no treatments.  Pain was noted as 0/10.           Comments    Here for follow up cataract left eye.   Hola feels his vision is more impacted by the cataract in his left eye now than it was when he was here in May.  He would like cataract measurements done in anticipation of surgery today.    Using occasional AT's for dryness.     Lesly Miranda, COT 1:40 PM 2022              Last edited by Lesly Miranda on 2022  1:40 PM.        HPI:  Tevin Guidry is a 72 year old male with history of CAD s/p CABG, colon CA, HTN, HLD presenting for 6 month followup. Last saw Dr. Steele 22  Noticing worsening vision left eye since last exam      Past Ocular History:  Punched in both eyes age 20 or 21  CE/IOL right eye Maltry 18  Glaucoma suspect  Left eye cataract    PMH:  HTN  CAD s/p 6 stents and surgery  Bladder cancer in remission (treated with local chemotherapy and DCG per patient)  MUTYH associated polyposis s/p partial colectomy    SH:  smoker    FH:  No known family history of blindness, glaucoma, macular degeneration  No known FHx polyposis - brother has not had colonoscopy  Mother and older brother  of stomach cancer    ASSESSMENT and PLAN:  1. Combined form of age-related cataract, left eye  -Special equipment/needs:  Eye: left  Anesthesia: topical   Dilates to: 7  Iris expansion:  No  Pseudoexfoliation: No  Trypan Blue: Yes-possible  Trauma: Yes    Able lay to flat: Yes  Blood Thinner: Yes ASA 81  Tamsulosin: No  DM: No  Guttae: No    Dominant Eye: right    Plan: -0.75  left eye     We discussed the benefits, alternatives, and risks to cataract surgery, including blindness, decreased vision, and need for additional surgeries; and informed consent was obtained.  Proceed with CE/IOL OS.    (use AL from 2020, Ks from 2022)    2. Glaucoma suspect, both eyes  FH: Negative  Pachymetry:   Gonioscopy:  Tmax: 20/19  Today's IOP: 12/9  Target IOP:  Current medications: None  Meds to avoid: None  Visual field: OVF 24-2 (5/20/22)  right eye: reliable, superior depressed points (?lid) and inferonasal depressed spot, MD -3.8, PSD 4.4; superior depression not consistent with location from prior  Left eye: poor reliability with 14% FN, superior>inferior depression, MD -8.2, PSD 3.6; improved inferior and increased superior from prior  Nerve OCT: Spectralis optic nerve OCT (6/23/21)  OD: Central RNFL thickness 69, superior red, inferior and nasal yellow   OS: Central RNFL thickness 69, superotemporal red, nasal yellow     --> continue to monitor off drops for now   Based on c/d ratio  Recommend oct retinal nerve fiber layer in future    5. Pseudophakia, right eye  Maltry 12/11/2020 ZCB00 21.5    4. History of colonic polyps  - s/p partial colectomy for numerous colon polyps and adenomas  - no CHRPE on exam      Return for pod0.    Attending Physician Attestation:  Complete documentation of historical and exam elements from today's encounter can be found in the full encounter summary report (not reduplicated in this progress note).  I personally obtained the chief complaint(s) and history of present illness.  I confirmed and edited as necessary the review of systems, past medical/surgical history, family history, social history, and examination findings as documented by others; and I examined the patient myself.  I personally reviewed the relevant tests, images, and reports as documented above.  I formulated and edited as necessary the assessment and plan and discussed the findings and management  plan with the patient and family.  - Guido Massey MD

## 2022-11-18 ENCOUNTER — TELEPHONE (OUTPATIENT)
Dept: OPHTHALMOLOGY | Facility: CLINIC | Age: 72
End: 2022-11-18

## 2022-11-18 NOTE — TELEPHONE ENCOUNTER
Called patient to schedule surgery with Dr. Massey    Date of Surgery: 2/2    Location of surgery: [facility]    Pre-Op H&P: PCP    Pre/Post Imaging:  No    Discussed COVID-19 Testing: Yes    Post-Op Appt Date: 2/8,3/1    Surgery Packet Mailed: yes      Additional comments: Spoke with patient, he is aware of above dates, will review packet and call with any questions           Anna C. Schoenecker on 11/18/2022 at 11:50 AM

## 2022-11-23 ENCOUNTER — VIRTUAL VISIT (OUTPATIENT)
Dept: GASTROENTEROLOGY | Facility: CLINIC | Age: 72
End: 2022-11-23
Payer: COMMERCIAL

## 2022-11-23 DIAGNOSIS — D12.6 MYH-ASSOCIATED COLONIC POLYPOSIS (MAP): ICD-10-CM

## 2022-11-23 DIAGNOSIS — Z90.49 STATUS POST RIGHT HEMICOLECTOMY: ICD-10-CM

## 2022-11-23 DIAGNOSIS — K31.A0 GASTRIC INTESTINAL METAPLASIA: ICD-10-CM

## 2022-11-23 DIAGNOSIS — E53.8 VITAMIN B12 DEFICIENCY (NON ANEMIC): ICD-10-CM

## 2022-11-23 DIAGNOSIS — K29.40 ATROPHIC GASTRITIS WITHOUT HEMORRHAGE: Primary | ICD-10-CM

## 2022-11-23 PROCEDURE — 99214 OFFICE O/P EST MOD 30 MIN: CPT | Mod: 95 | Performed by: INTERNAL MEDICINE

## 2022-11-23 NOTE — LETTER
"    11/23/2022         RE: Tevin Guidry  [address]  [place] [address]        Dear Colleague,    Thank you for referring your patient, Tevin Guidry, to the [facility] [place]. Please see a copy of my visit note below.    Hola is a 72 year old who is being evaluated via a billable video visit.      How would you like to obtain your AVS? MyChart  If the video visit is dropped, the invitation should be resent by: Text to cell phone: [phone]  Will anyone else be joining your video visit? No    Carlos Barajas    Video-Visit Details    Video Start Time: 1:04    Type of service:  Video Visit    Video End Time:1:25    Originating Location (pt. Location): Home    Distant Location (provider location):  Off-site    Platform used for Video Visit: [employer]           GASTROENTEROLOGY Video Follow up visit    CC/REFERRING MD:    Preethi Quiroz    HISTORY OF PRESENT ILLNESS:    Tevin Guidry is a 72 year old male who is being evaluated via a billable video visit.      Last GI clinic appt 7/6/22.    Has had 2 Vit B12 injections so far.  Feels great  No more diarrhea  Cataract surgery scheduled for February    Recall:  Med hx/  Multiple colon TA and TVA since 2011 4/13/2021: right hemicolectomy, 2mm focus of poorly diff adenoca with signet ring features at distal resection margin; 21 TAs, 16 neg lymph nodes, benign appendix and TI              \"However, after discussion with my partners, as well as Dr. Valentino Laurent from pathology, Dr. 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 It was decided that he would in fact not benefit from further colonic resection. Instead I proposed performing a colonoscopy today to evaluate his " "anastomosis at this time to rule out a colonic source.\"              PET CT on 21 did not show any clear evidence of primary or metastatic lesions.   2021 EGD: gastric biopsies: suggestive of autoimmune gastritis, and focus of intestinal metaplasia     Family hx/  M  esophageal/gastric cancer age 63  Brother  of GIST age 72  M aunt - ovarian ca    I have reviewed and updated the patient's Past Medical History, Social History, Family History and Medication List.    ALLERGIES  Codeine    PE/  Gen: pleasant NAD  HEENT: hearing intact  Psych: AOx3, normal mood and affect    PERTINENT STUDIES have been reviewed.  Iron studies wnl 2022      Impression/Recommendations:  Tevin Guidry is a 72 year old male who presents for follow up of MYH-associated polyposis, multiple TAs and TVAs removed since . He also has a history of bladder cancer and CAD (s/p CABG LIMA-LAD and ALENA in ).  S/p right hemicolectomy - no primary location for the signet ring cells (noted location at the distal edge of the colonic resection margin) were found via colonoscopy, PET, EGD              -- last time took Fe supps was . Iron studies recently were robust.  Colonoscopy last one 2022 -- annual  EGD - last 2021 - needs f/u of intestinal metaplasia (and family hx noted)    EGD and colonoscopy planned for 2023  AIG on gastric biopsies, and loss of the TI: B12 was found to be low, patient is on injection repletion, plan to recheck 22  US thyroid - the patient has had a PET and Oncology is following  Patient has also been referred to Derm and they are following the patient      Appointment duration: 21 minutes    RTC prn    Thank you for this consultation.  It was a pleasure to participate in the care of this patient; please contact us with any further questions.            Again, thank you for allowing me to participate in the care of your patient.      Sincerely,    Stephanie Zamora MD    "

## 2022-11-23 NOTE — PROGRESS NOTES
"Hola is a 72 year old who is being evaluated via a billable video visit.      How would you like to obtain your AVS? MyChart  If the video visit is dropped, the invitation should be resent by: Text to cell phone: [phone]  Will anyone else be joining your video visit? No    Carlos Barajas    Video-Visit Details    Video Start Time: 1:04    Type of service:  Video Visit    Video End Time:1:25    Originating Location (pt. Location): Home    Distant Location (provider location):  Off-site    Platform used for Video Visit: [employer]           GASTROENTEROLOGY Video Follow up visit    CC/REFERRING MD:    Preethi Quiroz    HISTORY OF PRESENT ILLNESS:    Tevin Guidry is a 72 year old male who is being evaluated via a billable video visit.      Last GI clinic appt 22.    Has had 2 Vit B12 injections so far.  Feels great  No more diarrhea  Cataract surgery scheduled for February    Recall:  Med hx/  Multiple colon TA and TVA since 2021: right hemicolectomy, 2mm focus of poorly diff adenoca with signet ring features at distal resection margin; 21 TAs, 16 neg lymph nodes, benign appendix and TI              \"However, after discussion with my partners, as well as Dr. Valentino Laurent from pathology, Dr. 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 It was decided that he would in fact not benefit from further colonic resection. Instead I proposed performing a colonoscopy today to evaluate his anastomosis at this time to rule out a colonic source.\"              PET CT on 21 did not show any clear evidence of primary or metastatic lesions.   2021 EGD: gastric biopsies: suggestive of autoimmune gastritis, and focus of intestinal metaplasia     Family hx/  M  " esophageal/gastric cancer age 63  Brother  of GIST age 72  M aunt - ovarian ca    I have reviewed and updated the patient's Past Medical History, Social History, Family History and Medication List.    ALLERGIES  Codeine    PE/  Gen: pleasant NAD  HEENT: hearing intact  Psych: AOx3, normal mood and affect    PERTINENT STUDIES have been reviewed.  Iron studies wnl 2022      Impression/Recommendations:  Tevin Guidry is a 72 year old male who presents for follow up of MYH-associated polyposis, multiple TAs and TVAs removed since . He also has a history of bladder cancer and CAD (s/p CABG LIMA-LAD and ALENA in ).  S/p right hemicolectomy - no primary location for the signet ring cells (noted location at the distal edge of the colonic resection margin) were found via colonoscopy, PET, EGD              -- last time took Fe supps was . Iron studies recently were robust.  Colonoscopy last one 2022 -- annual  EGD - last 2021 - needs f/u of intestinal metaplasia (and family hx noted)    EGD and colonoscopy planned for 2023  AIG on gastric biopsies, and loss of the TI: B12 was found to be low, patient is on injection repletion, plan to recheck 22  US thyroid - the patient has had a PET and Oncology is following  Patient has also been referred to Derm and they are following the patient      Appointment duration: 21 minutes    RTC prn    Thank you for this consultation.  It was a pleasure to participate in the care of this patient; please contact us with any further questions.

## 2022-11-23 NOTE — NURSING NOTE
Patient declined individual medication review by support staff because no changes since last went thru meds on 11/16/22.    Carlos Barajas

## 2022-11-25 NOTE — TELEPHONE ENCOUNTER
DIAGNOSIS: Right wrist and lower forearm has been painful and swollen last 3 to 4 weeks.     APPOINTMENT DATE: 11.28.22   NOTES STATUS DETAILS   MEDICATION LIST Internal

## 2022-11-27 NOTE — PROGRESS NOTES
Medication Therapy Management (MTM) Encounter    ASSESSMENT:                            Medication Adherence/Access: No issues identified      Smoking cessation: Patient is smoke free.   We discussed: rewards for quitting-240$ more money/month . Eating a little better , will think about what to do in future with his extra $$.   Lets consider 150mg. bupropion at next OV.       Hypertension: Stable. Patient is meeting blood pressure goal of < 140/90mmHg. Patient would benefit from the following changes - continued blood pressure monitoring, watching diet, increasing exercise and weight loss and smoking cessation-working to remain 100% smoke free forever.       Hyperlipidemia: Stable.  Patient is on high intensity statin which is indicated based on 2019 ACC/AHA guidelines for lipid management.        PLAN:                                1. Congrats (very proud of you) your still smoke free since 6-23-22 --5 months + 1 week.  Fantastic --stay on Bupropion 300mg./day for now and see me periodically in 2023 for accountability.    2. Blood Pressure today is excellent 110/70mmhg.Pulse 59.     3. If you have shortness of breath in winter weather --use your albuterol inhaler now, if on going shortness of breath worsens we can discuss with cardilology a lower metoprolol dose?         Follow-up: Return in about 22 weeks (around 5/4/2023), or @ 1:30 PM, for tobacco abuse, BP Recheck, Medication Therapy Management Visit.          SUBJECTIVE/OBJECTIVE:                          Tevin Guidry is a 72 year old male coming in for a follow-up (8-25-22) visit. He was referred to me from Preethi Quiroz      Reason for visit:   Smoking cessation: he feels bupropion was the key to his quitting. Still smoke free since June 23rd.2022.       Recent derm check one basal cell carcinoma on his back. Taken care of now.       Some copd SOB now with weather changes. Using prn albuterol.           Allergies/ADRs: Reviewed in chart  Past Medical  History: Per patient has a hemicolectomy with a hx of polyps (removed appendix). CAD (CABG), Social phobia, COPD, adenocarcinoma  Of bladder/colon. Had signet ring cancer removed with colon as well.  Has MUTYH--associated polyposis.   Tobacco: He currently gets a hold a cigarette- relapsed recently -see below .  Never had chantix in the past .   Alcohol: not currently using  Other Substance Use: marijuana flower, occasional vape - smokes everyday --he is much more worried about cig smoking vs. Marijuana.   Caffeine: large starbucks in morning and sometimes in PM      Medication Adherence/Access: No issues      .Smoking cessation:  Patient reports things going well.  What has gone well for you? Bupropion working well. Smoke free since 6-23-22.   What strategies have you tried?   He doesn't want to start cigs again --he's made it this far feels great .   What side effects to nicotine replacement therapies or medications are you experiencing: not using any now.   What has helped you the most to keep from smoking? Bupropion for now.   Is there anything you might anticipate happening in the near future (e.g., next week, next month) that could present a challenge for you? None   How can I continue to support you going forward? Hold patient accountable.               Previously :   Smoking cessation:  Patient reports things going so well.  NO SMOKING SINCE 6-!!    He has relapsed smoking , but pet/ct scan was negative , but showed darkening lungs and calcium buildup in heart .   What side effects to nicotine replacement therapies or medications are you experiencing: none   What times or situations have you found the most challenging?Just when he gets the urge--maybe boredom/stress.    Is there anything that worked well for you?  Bupropion 300mg./day helps.   How can you get back on your plan? He feels he can resist temptation now --doing well.   What is a first step-today or tomorrow or in the next few days-that  will enable you to do this? Periodic mtm appt. To hold him accountable.   How can I support you in doing this? continue meds/counseling.       Previously: 6-23-22:    Has been smoking off/on , can go for days quitting --but then in his head he wakes up and wants a cigarette -- then he has to buy a pack and start 1 PPD .    His last cigarette was Sunday evening 6-19-22.  Currently has no cigs at home .     He just bought first lozenges today 4mg. -- he like them better than gum -last longer and taste better . They really help --smoke free x 4 days now.       He admits some coronary artery calcification - and darkening in lungs--he realizes this and wants to keep plugging along.       He is cancer free !!    He cannot afford to smoke --1 pack is 9.45 $ --!!            Previously :       4-14-22:    Smoking is sporadic , admitted 6-8 cigs/day x 2 + weeks not too long ago.   He states he doesn't know why he started up again, maybe boredom he thinks biggest reason.    Some marijuana daily smoking still --maybe he ran out of that so that is trigger for him to restart cigs.     He did quit cigs 4-11-22 ; still smokes daily Marijuana though.     Back to chewing 10-12 nicotine pieces gum/day .     Denies overt loneliness but has a wonderful dog. Has a brother and his Gf that he can talk too.       Hypertension: Current medications include : amlodipine 5mg hs daily now , lisinopril 20mg twice daily, metoprolol succ. 50mg daily.  Patient does not self-monitor blood pressure.  Patient reports no current medication side effects. Patient reports they are somewhat anxious about the visit, thinks this could be related to blood pressure elevation at moment. Continue to monitor.     Currently takes aspirin 81mg daily based on history of CABG. Reports no side effects.    BP Readings from Last 3 Encounters:   12/01/22 110/70   11/29/22 131/64   08/25/22 92/60        Hyperlipidemia: Current therapy includes :atorvastatin 80mg daily and  Ezetimibe (Zetia) 10mg once daily.  Patient reports no significant myalgias or other side effects.  The ASCVD Risk score (Lakeisha DK, et al., 2019) failed to calculate for the following reasons:    The valid total cholesterol range is 130 to 320 mg/dL  Recent Labs   Lab Test 09/23/22  1312 09/22/21  1425 10/27/16  1028 10/23/15  0850 09/30/14  0427   CHOL 112 140   < > 155 109   HDL 58 45   < > 43 51   LDL 35 78   < > 85 39   TRIG 94 86   < > 137 97   CHOLHDLRATIO  --   --   --  3.6 2.1    < > = values in this interval not displayed.           Today's Vitals: /70   Pulse 59   Wt 176 lb 8 oz (80.1 kg)   SpO2 96%   BMI 26.84 kg/m    ----------------      I spent 30 minutes with this patient today. All changes were made via collaborative practice agreement with Preethi Quiroz MD, MD. A copy of the visit note was provided to the patient's primary care provider.    The patient was given a summary of these recommendations.     Andrea Bah Rph.  Medication Therapy Management Provider  [phone]         Medication Therapy Recommendations  No medication therapy recommendations to display

## 2022-11-28 ENCOUNTER — ANCILLARY PROCEDURE (OUTPATIENT)
Dept: GENERAL RADIOLOGY | Facility: CLINIC | Age: 72
End: 2022-11-28
Attending: FAMILY MEDICINE
Payer: COMMERCIAL

## 2022-11-28 ENCOUNTER — PRE VISIT (OUTPATIENT)
Dept: ORTHOPEDICS | Facility: CLINIC | Age: 72
End: 2022-11-28

## 2022-11-28 ENCOUNTER — OFFICE VISIT (OUTPATIENT)
Dept: ORTHOPEDICS | Facility: CLINIC | Age: 72
End: 2022-11-28
Payer: COMMERCIAL

## 2022-11-28 DIAGNOSIS — M25.531 RIGHT WRIST PAIN: Primary | ICD-10-CM

## 2022-11-28 PROCEDURE — 99214 OFFICE O/P EST MOD 30 MIN: CPT | Performed by: FAMILY MEDICINE

## 2022-11-28 PROCEDURE — 73110 X-RAY EXAM OF WRIST: CPT | Mod: RT | Performed by: RADIOLOGY

## 2022-11-28 NOTE — LETTER
11/28/2022      RE: Tevin Guidry  [address]  [facility] [address]     Dear Colleague,    Thank you for referring your patient, Tevin Guidry, to the [facility]. Please see a copy of my visit note below.      [facility]  SPORTS & ORTHOPEDIC CLINIC VISIT     Nov 28, 2022        ASSESSMENT & PLAN    72-year-old with ulnar-sided wrist pain for the past month.  Suspect ECU tendinitis    Reviewed imaging and assessment with patient in detail  I recommended trial of wrist brace which was provided to patient today.  We will also ice daily and use topical anti-inflammatory regularly for the next 2 weeks.  Have recommended he follow-up with hand therapy.  We also discussed the indication for steroid injection.  He will contact us if he would like to pursue this option.  He should follow-up in clinic in 6 weeks if he is not improving.  Sooner if he is having worsening or new symptoms    Borwn Serrano MD  [facility]    -----  Chief Complaint   Patient presents with     Right Wrist - Pain       SUBJECTIVE  Tevin Guidry is a/an 72 year old male who is seen as a self referral for evaluation of  Right wrist pain.     The patient is seen by themselves.  The patient is Right handed    Onset: 1 month(s) ago. Reports insidious onset without acute precipitating event.  Location of Pain: right distal wrist   Worsened by: wrist extension and flexion, lifting   Better with: Ice  Treatments tried: ibuprofen, Ice  Associated symptoms: swelling    Orthopedic/Surgical history: NO  Social History/Occupation: retired       REVIEW OF SYSTEMS:    Do you have fever, chills, weight loss? No    Do you have any vision problems? No    Do you have any chest pain or edema? No    Do you have any shortness of breath or wheezing?  No    Do you have stomach problems? No    Do you have any numbness or focal weakness? No    Do  you have diabetes? No    Do you have problems with bleeding or clotting? No    Do you have an rashes or other skin lesions? No    OBJECTIVE:  There were no vitals taken for this visit.     General  - alert, pleasant, no distress  CV  - normal radial pulse, cap refill brisk  Musculoskeletal -right wrist  - inspection: Diffuse swelling over the ulnar side of the wrist.  Normal joint alignment, no obvious deformity,  - palpation: TTP over the ECU and ulnar styloid.  No bony or soft tissue tenderness, no tenderness at the anatomical snuffbox  - ROM:  90 deg flexion   70 deg extension   25 deg abduction   65 deg adduction  - strength: 5/5  strength, pain with ulnar deviation against resistance.  - special tests:  (-) Tinel's  (-) Finkelstein  (-) Phalen  (-) Bass click test  (-) ulnar impaction  No pain with loading extended wrist.  No foveal tenderness.    Neuro  - no sensory or motor deficit, grossly normal coordination, normal muscle tone  Skin  - no ecchymosis, erythema, warmth, or induration, no obvious rash      RADIOLOGY:    Three-view x-rays of the right wrist are performed and reviewed independently demonstrating ossicle in the ulnocarpal area possibly from remote injury.  Chronic appearing.  No acute findings.  See EMR for formal radiology report.           Again, thank you for allowing me to participate in the care of your patient.      Sincerely,    Brown Serrano MD

## 2022-11-28 NOTE — PATIENT INSTRUCTIONS
Wear wrist brace during the day for support with activities.  May wear at night if more comfortable.  Use ice on the wrist once daily in the evening  Use topical diclofenac (Voltaren gel) on the wrist 3 times daily for the next 2 weeks  Follow-up in hand therapy. To schedule a Physical Therapy appointment with the [facility], please call [phone].   Contact us if you would like to try a steroid injection for the wrist  Follow-up in clinic in 6 weeks if you are not improving.

## 2022-11-28 NOTE — PROGRESS NOTES
[facility]  SPORTS & ORTHOPEDIC CLINIC VISIT     Nov 28, 2022        ASSESSMENT & PLAN    72-year-old with ulnar-sided wrist pain for the past month.  Suspect ECU tendinitis    Reviewed imaging and assessment with patient in detail  I recommended trial of wrist brace which was provided to patient today.  We will also ice daily and use topical anti-inflammatory regularly for the next 2 weeks.  Have recommended he follow-up with hand therapy.  We also discussed the indication for steroid injection.  He will contact us if he would like to pursue this option.  He should follow-up in clinic in 6 weeks if he is not improving.  Sooner if he is having worsening or new symptoms    Brown Serrano MD  [facility] [place]    -----  Chief Complaint   Patient presents with     Right Wrist - Pain       SUBJECTIVE  Tevin Guidry is a/an 72 year old male who is seen as a self referral for evaluation of  Right wrist pain.     The patient is seen by themselves.  The patient is Right handed    Onset: 1 month(s) ago. Reports insidious onset without acute precipitating event.  Location of Pain: right distal wrist   Worsened by: wrist extension and flexion, lifting   Better with: Ice  Treatments tried: ibuprofen, Ice  Associated symptoms: swelling    Orthopedic/Surgical history: NO  Social History/Occupation: retired       REVIEW OF SYSTEMS:    Do you have fever, chills, weight loss? No    Do you have any vision problems? No    Do you have any chest pain or edema? No    Do you have any shortness of breath or wheezing?  No    Do you have stomach problems? No    Do you have any numbness or focal weakness? No    Do you have diabetes? No    Do you have problems with bleeding or clotting? No    Do you have an rashes or other skin lesions? No    OBJECTIVE:  There were no vitals taken for this visit.     General  - alert, pleasant, no distress  CV  - normal radial pulse, cap refill  brisk  Musculoskeletal -right wrist  - inspection: Diffuse swelling over the ulnar side of the wrist.  Normal joint alignment, no obvious deformity,  - palpation: TTP over the ECU and ulnar styloid.  No bony or soft tissue tenderness, no tenderness at the anatomical snuffbox  - ROM:  90 deg flexion   70 deg extension   25 deg abduction   65 deg adduction  - strength: 5/5  strength, pain with ulnar deviation against resistance.  - special tests:  (-) Tinel's  (-) Finkelstein  (-) Phalen  (-) Bass click test  (-) ulnar impaction  No pain with loading extended wrist.  No foveal tenderness.    Neuro  - no sensory or motor deficit, grossly normal coordination, normal muscle tone  Skin  - no ecchymosis, erythema, warmth, or induration, no obvious rash      RADIOLOGY:    Three-view x-rays of the right wrist are performed and reviewed independently demonstrating ossicle in the ulnocarpal area possibly from remote injury.  Chronic appearing.  No acute findings.  See EMR for formal radiology report.

## 2022-11-29 ENCOUNTER — OFFICE VISIT (OUTPATIENT)
Dept: UROLOGY | Facility: CLINIC | Age: 72
End: 2022-11-29
Payer: COMMERCIAL

## 2022-11-29 VITALS — DIASTOLIC BLOOD PRESSURE: 64 MMHG | HEART RATE: 73 BPM | SYSTOLIC BLOOD PRESSURE: 131 MMHG

## 2022-11-29 DIAGNOSIS — C67.8 MALIGNANT NEOPLASM OF OVERLAPPING SITES OF BLADDER (H): Primary | ICD-10-CM

## 2022-11-29 PROCEDURE — 52000 CYSTOURETHROSCOPY: CPT | Performed by: UROLOGY

## 2022-11-29 PROCEDURE — 88112 CYTOPATH CELL ENHANCE TECH: CPT | Mod: 26 | Performed by: PATHOLOGY

## 2022-11-29 PROCEDURE — 88120 CYTP URNE 3-5 PROBES EA SPEC: CPT | Mod: 26 | Performed by: PATHOLOGY

## 2022-11-29 PROCEDURE — 88120 CYTP URNE 3-5 PROBES EA SPEC: CPT | Mod: TC | Performed by: UROLOGY

## 2022-11-29 PROCEDURE — 88112 CYTOPATH CELL ENHANCE TECH: CPT | Mod: TC | Performed by: UROLOGY

## 2022-11-29 PROCEDURE — 99207 PR DROP WITH A PROCEDURE: CPT | Performed by: UROLOGY

## 2022-11-29 RX ORDER — LIDOCAINE HYDROCHLORIDE 20 MG/ML
JELLY TOPICAL ONCE
Status: COMPLETED | OUTPATIENT
Start: 2022-11-29 | End: 2022-11-29

## 2022-11-29 RX ADMIN — LIDOCAINE HYDROCHLORIDE: 20 JELLY TOPICAL at 11:23

## 2022-11-29 ASSESSMENT — PAIN SCALES - GENERAL: PAINLEVEL: NO PAIN (0)

## 2022-11-29 NOTE — NURSING NOTE
Chief Complaint   Patient presents with     Cystoscopy       Blood pressure 131/64, pulse 73. There is no height or weight on file to calculate BMI.    Patient Active Problem List   Diagnosis     Social phobia     Hypertension goal BP (blood pressure) < 140/90     Hyperlipidemia LDL goal <130     Tubular adenoma of colon     Advanced directives, counseling/discussion     Coronary artery disease involving native heart with angina pectoris, unspecified vessel or lesion type (H)     Cigarette nicotine dependence without complication     S/P CABG (coronary artery bypass graft)     Chronic obstructive pulmonary disease, unspecified COPD type (H)     Anemia, unspecified type     Bladder cancer (H)     Personal history of malignant neoplasm of bladder     Malignant neoplasm of overlapping sites of bladder (H)     Combined form of age-related cataract, both eyes     Total cataract of right eye     History of colonic polyps     Colon cancer (H)     Signet ring cell adenocarcinoma (H)       Allergies   Allergen Reactions     Codeine Nausea and Vomiting       Current Outpatient Medications   Medication Sig Dispense Refill     albuterol (PROAIR HFA/PROVENTIL HFA/VENTOLIN HFA) 108 (90 Base) MCG/ACT inhaler Inhale 2 puffs into the lungs every 6 hours as needed for shortness of breath / dyspnea or wheezing 18 g 1     amLODIPine (NORVASC) 5 MG tablet TAKE ONE TABLET BY MOUTH EVERY NIGHT AT BEDTIME 90 tablet 3     aspirin 81 MG tablet Take 1 tablet (81 mg) by mouth daily 90 tablet 3     atorvastatin (LIPITOR) 80 MG tablet Take 1 tablet (80 mg) by mouth At Bedtime 90 tablet 3     buPROPion (WELLBUTRIN XL) 300 MG 24 hr tablet TAKE ONE TABLET BY MOUTH EVERY MORNING 90 tablet 1     ezetimibe (ZETIA) 10 MG tablet Take 1 tablet (10 mg) by mouth daily 90 tablet 3     lisinopril (ZESTRIL) 20 MG tablet Take 1 tablet (20 mg) by mouth 2 times daily 180 tablet 3     metoprolol succinate ER (TOPROL-XL) 50 MG 24 hr tablet Take 1 tablet (50 mg) by  mouth At Bedtime 90 tablet 3     PARoxetine (PAXIL) 40 MG tablet Take 1 tablet (40 mg) by mouth every morning 90 tablet 3       Social History     Tobacco Use     Smoking status: Former     Packs/day: 0.00     Years: 30.00     Pack years: 0.00     Types: Cigarettes     Quit date: 2022     Years since quittin.4     Smokeless tobacco: Never     Tobacco comments:     quit successfully (smoke and nicotine free)   Vaping Use     Vaping Use: Never used   Substance Use Topics     Alcohol use: No     Comment: quit 40 years ago     Drug use: Yes     Types: Marijuana     Comment: occasionally uses marijuana       Invasive Procedure Safety Checklist:    Procedure: Cystoscopy    Action: Complete sections and checkboxes as appropriate.    Pre-procedure:  1. Patient ID Verified with 2 identifiers (Mary and  or MRN) : YES    2. Procedure and site verified with patient/designee (when able) : YES    3. Accurate consent documentation in medical record : YES    4. H&P (or appropriate assessment) documented in medical record : N/A  H&P must be up to 30 days prior to procedure an updated within 24 hours of                 Procedure as applicable.     5. Relevant diagnostic and radiology test results appropriately labeled and displayed as applicable : YES    6. Blood products, implants, devices, and/or special equipment available for the procedure as applicable : YES    7. Procedure site(s) marked with provider initials [Exclusions: none] : NO    8. Marking not required. Reason : Yes  Procedure does not require site marking    Time Out:     Time-Out performed immediately prior to starting procedure, including verbal and active participation of all team members addressing: YES    1. Correct patient identity.  2. Confirmed that the correct side and site are marked.  3. An accurate procedure to be done.  4. Agreement on the procedure to be done.  5. Correct patient position.  6. Relevant images and results are properly labeled  and appropriately displayed.  7. The need to administer antibiotics or fluids for irrigation purposes during the procedure as applicable.  8. Safety precautions based on patient history or medication use.    During Procedure: Verification of correct person, site, and procedure occurs any time the responsibility for care of the patient is transferred to another member of the care team.    The following medication was given:     MEDICATION:  Lidocaine without epinephrine 2% jelly  ROUTE: urethral   SITE: urethral   DOSE: 10 mL  LOT #: [id number]  : [employer]  EXPIRATION DATE: 6/24  NDC#: [address]-4   Was there drug waste? No    Prior to med admin, verified patient identity using patient's name and date of birth.  Due to med administration, patient instructed to remain in clinic for 15 minutes  afterwards, and to report any adverse reaction to me immediately.    Drug Amount Wasted:  None.  Vial/Syringe: Syringe      Devante Knox EMT-P  11/29/2022  11:24 AM

## 2022-11-29 NOTE — PATIENT INSTRUCTIONS
"Please follow up in six months for a cystoscopy.    It was a pleasure meeting with you today.  Thank you for allowing me and my team the privilege of caring for you today.  YOU are the reason we are here, and I truly hope we provided you with the excellent service you deserve.  Please let us know if there is anything else we can do for you so that we can be sure you are leaving completely satisfied with your care experience.            AFTER YOUR CYSTOSCOPY        You have just completed a cystoscopy, or \"cysto\", which allowed your physician to learn more about your bladder (or to remove a stent placed after surgery). We suggest that you continue to avoid caffeine, fruit juice, and alcohol for the next 24 hours, however, you are encouraged to return to your normal activities.         A few things that are considered normal after your cystoscopy:     * Small amount of bleeding (or spotting) that clears within the next 24 hours     * Slight burning sensation with urination     * Sensation to of needing to avoid more frequently     * The feeling of \"air\" in your urine     * Mild discomfort that is relieved with Tylenol        Please contact our office promptly if you:     * Develop a fever above 101 degrees     * Are unable to urinate     * Develop bright red blood that does not stop     * Severe pain or swelling         Please contact our office with any concerns or questions @[email or web address].  "

## 2022-11-29 NOTE — PROGRESS NOTES
CHIEF COMPLAINT   It was my pleasure to see Tevin Guidry who is a 72 year old male for follow-up of bladder cancer.      HPI  Tevin Guidry is a very pleasant 72 year old male who presents with a history of bladder cancer. 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 He had recurrence in 2/2020 and underwent repeat induction course of BCG. 2nd BCG induction completed 4/2020. Recurrence noted in 7/2020 and TURBT 9/2020 with low-grade disease. No issues since biopsy. No ongoing hematuria.     BCG maintenance 10/15/2020  Had colectomy Spring 2021     TURBT 9/2/2020  FINAL DIAGNOSIS:   A. 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 Right lateral wall:   - Dysplastic urothelium, consistent with incipient non invasive low grade papillary urothelial carcinoma     PHYSICAL EXAM  Patient is a 72 year old  male   Vitals: Blood pressure 131/64, pulse 73.  General Appearance Adult: There is no height or weight on file to calculate BMI.  Alert, no acute distress, oriented  Lungs: no respiratory distress, or pursed lip breathing  Abdomen: soft, nontender, no organomegaly or masses  Back: no CVAT  Neuro: Alert, oriented, speech and mentation normal  Psych: affect and mood normal  : penis, scrotum, testes normal    OFFICE CYSTOSCOPY 11/29/2022     Pre-procedure diagnosis:       Bladder Cancer  Post-procedure diagnosis:     Normal cystoscopy  Procedure performed:             Cystourethroscopy  Surgeon:                                 Kirt Guzman MD  Anesthesia:                             Local     Description of procedure:   After fully informed, voluntary consent was obtained, the patient was brought into the procedure room, identified and placed in a supine position on the cystoscopy table.  The groin/scrotum were prepped with betadine and draped in a sterile fashion.  Urojet lidocaine gel was introduced.  A 15F  flexible cystoscope was inserted into the urethra, and the bladder and urethra were examined in a systematic manner.  The patient tolerated the procedure well and there were no complications.       Cystoscopic findings:  The urethra was normal without strictures.  The prostate was 3 cm long and demonstrated mild bilobar hypertrophy.  There was no median lobe.  The external sphincter coapted well and the bladder neck was open. The bladder was completely surveyed.  There was mild trabeculation.  There were no stones or diverticula identifed.  The ureteric orifices were normal in position and number and effluxing clear urine. Previous resection sites noted with no suspicious lesions today.      ASSESSMENT and PLAN  72 year old male with recurrent bladder tumor. Has previously undergone BCG induction x 2. While office biopsy suggested possible HG disease, formal TURBT with only low-grade pathology. Has completed some BCG maintenance, but has since stopped. Given no recurrence today, will defer additional intravesical therapy at this time. Plan Cysto in 6 months.     - Cytology/FISH today  - Follow-up 6 months with office cystoscopy    Kirt Guzman MD  Urology  [facility]

## 2022-11-30 LAB
PATH REPORT.COMMENTS IMP SPEC: NORMAL
PATH REPORT.FINAL DX SPEC: NORMAL
PATH REPORT.GROSS SPEC: NORMAL
PATH REPORT.MICROSCOPIC SPEC OTHER STN: NORMAL
PATH REPORT.RELEVANT HX SPEC: NORMAL

## 2022-12-01 ENCOUNTER — OFFICE VISIT (OUTPATIENT)
Dept: PHARMACY | Facility: CLINIC | Age: 72
End: 2022-12-01
Payer: COMMERCIAL

## 2022-12-01 ENCOUNTER — ALLIED HEALTH/NURSE VISIT (OUTPATIENT)
Dept: FAMILY MEDICINE | Facility: CLINIC | Age: 72
End: 2022-12-01
Payer: COMMERCIAL

## 2022-12-01 VITALS
OXYGEN SATURATION: 96 % | SYSTOLIC BLOOD PRESSURE: 110 MMHG | WEIGHT: 176.5 LBS | HEART RATE: 59 BPM | DIASTOLIC BLOOD PRESSURE: 70 MMHG | BODY MASS INDEX: 26.84 KG/M2

## 2022-12-01 DIAGNOSIS — I10 HYPERTENSION GOAL BP (BLOOD PRESSURE) < 140/90: ICD-10-CM

## 2022-12-01 DIAGNOSIS — D64.9 ANEMIA, UNSPECIFIED TYPE: Primary | ICD-10-CM

## 2022-12-01 DIAGNOSIS — E78.5 HYPERLIPIDEMIA LDL GOAL <130: ICD-10-CM

## 2022-12-01 DIAGNOSIS — F17.210 CIGARETTE NICOTINE DEPENDENCE WITHOUT COMPLICATION: Primary | ICD-10-CM

## 2022-12-01 PROCEDURE — 96372 THER/PROPH/DIAG INJ SC/IM: CPT | Performed by: FAMILY MEDICINE

## 2022-12-01 PROCEDURE — 99606 MTMS BY PHARM EST 15 MIN: CPT | Performed by: PHARMACIST

## 2022-12-01 RX ADMIN — CYANOCOBALAMIN 1000 MCG: 1000 INJECTION, SOLUTION INTRAMUSCULAR; SUBCUTANEOUS at 11:55

## 2022-12-01 NOTE — Clinical Note
Preethi-- yoan--great news --Hola-- smoke free since 6-23-22 --feels great -- lets hope it keeps going .  Next f/up with me may-2023.  Roro

## 2022-12-01 NOTE — PATIENT INSTRUCTIONS
"Recommendations from today's MTM visit:                                                         1. Congrats (very proud of you) your still smoke free since 6-23-22 --5 months + 1 week.  Fantastic --stay on Bupropion 300mg./day for now and see me periodically in 2023 for accountability.    2. Blood Pressure today is excellent 110/70mmhg.Pulse 59.     3. If you have shortness of breath in winter weather --use your albuterol inhaler now, if on going shortness of breath worsens we can discuss with cardilology a lower metoprolol dose?         Follow-up: Return in about 22 weeks (around 5/4/2023), or @ 1:30 PM, for tobacco abuse, BP Recheck, Medication Therapy Management Visit.    It was great speaking with you today.  I value your experience and would be very thankful for your time in providing feedback in our clinic survey. In the next few days, you may receive an email or text message from [employer] with a link to a survey related to your  clinical pharmacist.\"     To schedule another MTM appointment, please call the clinic directly or you may call the MTM scheduling line at [phone] or toll-free at [phone].     My Clinical Pharmacist's contact information:                                                      Please feel free to contact me with any questions or concerns you have.      Andrea Bah Rph.  Medication Therapy Management Provider  [phone]    "

## 2022-12-10 DIAGNOSIS — I10 ESSENTIAL HYPERTENSION WITH GOAL BLOOD PRESSURE LESS THAN 140/90: ICD-10-CM

## 2022-12-10 DIAGNOSIS — I25.10 CAD, MULTIPLE VESSEL: ICD-10-CM

## 2022-12-10 DIAGNOSIS — E78.5 HYPERLIPIDEMIA LDL GOAL <70: ICD-10-CM

## 2022-12-12 RX ORDER — LISINOPRIL 20 MG/1
20 TABLET ORAL 2 TIMES DAILY
Qty: 180 TABLET | Refills: 3 | Status: CANCELLED | OUTPATIENT
Start: 2022-12-12

## 2022-12-13 ENCOUNTER — MYC MEDICAL ADVICE (OUTPATIENT)
Dept: FAMILY MEDICINE | Facility: CLINIC | Age: 72
End: 2022-12-13

## 2022-12-13 RX ORDER — EZETIMIBE 10 MG/1
10 TABLET ORAL DAILY
Qty: 90 TABLET | Refills: 3 | Status: SHIPPED | OUTPATIENT
Start: 2022-12-13 | End: 2024-01-15

## 2022-12-13 RX ORDER — METOPROLOL SUCCINATE 50 MG/1
50 TABLET, EXTENDED RELEASE ORAL AT BEDTIME
Qty: 90 TABLET | Refills: 3 | Status: SHIPPED | OUTPATIENT
Start: 2022-12-13 | End: 2023-08-30

## 2022-12-13 RX ORDER — ATORVASTATIN CALCIUM 80 MG/1
TABLET, FILM COATED ORAL
Qty: 90 TABLET | Refills: 3 | Status: SHIPPED | OUTPATIENT
Start: 2022-12-13 | End: 2023-08-30

## 2022-12-13 NOTE — TELEPHONE ENCOUNTER
Dr. Quiroz-     Looks like these were all last ordered by cardiology, seems he hasn't seen cardiology in over a year    Do you want to take over meds or route to cards?    Maricruz Duong, SARINAN RN  [facility]

## 2022-12-14 DIAGNOSIS — K31.A0 GASTRIC INTESTINAL METAPLASIA: ICD-10-CM

## 2022-12-14 DIAGNOSIS — D12.6 TUBULAR ADENOMA OF COLON: ICD-10-CM

## 2022-12-14 DIAGNOSIS — D12.6 MYH-ASSOCIATED COLONIC POLYPOSIS (MAP): Primary | ICD-10-CM

## 2022-12-14 DIAGNOSIS — D12.6 TUBULOVILLOUS ADENOMA OF COLON: ICD-10-CM

## 2022-12-14 DIAGNOSIS — Z86.0100 HISTORY OF COLONIC POLYPS: ICD-10-CM

## 2022-12-23 NOTE — PROGRESS NOTES
General Cardiology Clinic-[facility] [place]      HPI: Mr. 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 Ann 11/2021.  He was smoking and he was counseled to stop smoking.  From cardiac standpoint patient reported doing well at that time. Patient recently quit smoking 4 months ago. He is being seen today for annual follow-up.  He is hard to hearing.  He tells me that he is overall doing well.  Sometimes feels short of breath when he is shoveling snow or when the dog is pulling him. He walks 45 minutes at least 5 times a week.  No chest pain, dizziness, syncope or palpitations.  He recently had PET/CT which showed moderate to severe coronary artery calcification and mild carotid artery bifurcation calcification.  He is wondering if there anything else that he needs to do for these findings.  Patient has history of hybrid robotic assisted CABG with LIMA-LAD and ALENA to the RCA in 2014.  Most recent echocardiogram is from 2014 which shows normal biventricular function with no significant valve disease.  Current cardiac meds  Asa  Atorvastatin 80/d  Zetia 10 mg  Lisinopril 20mg BID  Metoprolol XL 50mg at bedtime  Amlodipine 5 mg    Medications, personal, family, and social history reviewed with patient and revised.    PAST MEDICAL HISTORY:  Past Medical History:   Diagnosis Date     Bladder cancer (H)      Colon polyps     multiple may need colectomy (adenoma)     COPD (chronic obstructive pulmonary disease) (H)      Hyperlipidemia LDL goal <130      Hypertension goal BP (blood pressure) < 140/90      Social phobia        CURRENT MEDICATIONS:  Current Outpatient Medications   Medication Sig Dispense Refill      albuterol (PROAIR HFA/PROVENTIL HFA/VENTOLIN HFA) 108 (90 Base) MCG/ACT inhaler Inhale 2 puffs into the lungs every 6 hours as needed for shortness of breath / dyspnea or wheezing 18 g 1     amLODIPine (NORVASC) 5 MG tablet TAKE ONE TABLET BY MOUTH EVERY NIGHT AT BEDTIME 90 tablet 3     aspirin 81 MG tablet Take 1 tablet (81 mg) by mouth daily 90 tablet 3     atorvastatin (LIPITOR) 80 MG tablet TAKE ONE TABLET BY MOUTH EVERY EVENING AT BEDTIME 90 tablet 3     buPROPion (WELLBUTRIN XL) 300 MG 24 hr tablet TAKE ONE TABLET BY MOUTH EVERY MORNING 90 tablet 1     ezetimibe (ZETIA) 10 MG tablet Take 1 tablet (10 mg) by mouth daily 90 tablet 3     lisinopril (ZESTRIL) 20 MG tablet Take 1 tablet (20 mg) by mouth 2 times daily 180 tablet 3     metoprolol succinate ER (TOPROL XL) 50 MG 24 hr tablet Take 1 tablet (50 mg) by mouth At Bedtime 90 tablet 3     PARoxetine (PAXIL) 40 MG tablet Take 1 tablet (40 mg) by mouth every morning 90 tablet 3       PAST SURGICAL HISTORY:  Past Surgical History:   Procedure Laterality Date     CATARACT IOL, RT/LT       CLAVICLE SURGERY Right     fracture in childhood     COLONOSCOPY N/A 3/30/2017    Procedure: COLONOSCOPY;  Surgeon: Jie Onofre MD;  Location: [facility]     COLONOSCOPY N/A 3/2/2021    Procedure: COLONOSCOPY, WITH POLYPECTOMY, hot snare, lift with elaview and epi, clip for hemorrhage control, tattoo;  Surgeon: Ra Cardoso MD;  Location: [facility]     COLONOSCOPY N/A 4/22/2021    Procedure: Colonoscopy;  Surgeon: Jitendra Rodríguez MD;  Location: [facility]     CYSTOSCOPY, BIOPSY BLADDER INSTILL OPTICAL AGENT N/A 9/2/2020    Procedure: CYSTOSCOPY, WITH INSTILLATION OF OPTICAL IMAGING AGENT INTO BLADDER AND BIOPSY OF BLADDER (CYSVIEW);  Surgeon: Kirt Guzman MD;  [facility]     CYSTOSCOPY, TRANSURETHRAL RESECTION (TUR) TUMOR BLADDER, COMBINED N/A 9/5/2019    Procedure: cystoscopy,Transurethral Resection Of Bladder Tumor;  Surgeon: Almas Sunshine  MD CHAGO;  [facility]     CYSTOSCOPY, TRANSURETHRAL RESECTION (TUR) TUMOR BLADDER, COMBINED N/A 2020    Procedure: CYSTOSCOPY, WITH TRANSURETHRAL RESECTION BLADDER TUMOR;  Surgeon: Almas Sunshine MD;  [facility]     DAVINCI BYPASS ARTERY CORONARY N/A 2014    Procedure: DAVINCI BYPASS ARTERY CORONARY;  Surgeon: Lam Solis MD;  [facility]: [facility]     ESOPHAGOSCOPY, GASTROSCOPY, DUODENOSCOPY (EGD), COMBINED N/A 3/30/2017    Procedure: COMBINED ESOPHAGOSCOPY, GASTROSCOPY, DUODENOSCOPY (EGD);  Surgeon: Jie Onofre MD;  [facility]: [facility]     PHACOEMULSIFICATION CLEAR CORNEA WITH STANDARD INTRAOCULAR LENS IMPLANT Right 2020    Procedure: RIGHT EYE COMPLEX CATARACT REMOVAL WITH INTRAOCULAR LENS IMPLANT;  Surgeon: Aimee Marcus MD;  Location: [facility] OR     SURGICAL HISTORY OF -       right clavicular fracture with ORIF     ZZHC COLONOSCOPY THRU STOMA, DIAGNOSTIC      polyps due        ALLERGIES:     Allergies   Allergen Reactions     Codeine Nausea and Vomiting       FAMILY HISTORY:  Family History   Problem Relation Age of Onset     Cancer Mother         esog/stomach     Cancer Father         lung     Hypertension Brother      Stomach Cancer Brother      Anesthesia Reaction No family hx of      Deep Vein Thrombosis (DVT) No family hx of      Glaucoma No family hx of      Macular Degeneration No family hx of          SOCIAL HISTORY:  Social History     Tobacco Use     Smoking status: Former     Packs/day: 0.00     Years: 30.00     Pack years: 0.00     Types: Cigarettes     Quit date: 2022     Years since quittin.5     Smokeless tobacco: Never     Tobacco comments:     quit successfully (smoke and nicotine free)   Vaping Use     Vaping Use: Never used   Substance Use Topics     Alcohol use: No     Comment: quit 40 years ago     Drug use: Yes     Types: Marijuana     Comment: occasionally uses marijuana       ROS:   Constitutional: No fever, chills, or  sweats. Weight stable.   Cardiovascular: As per HPI.       Exam:  /77 (BP Location: Right arm, Patient Position: Chair, Cuff Size: Adult Regular)   Pulse 58   Wt 80.3 kg (177 lb)   SpO2 98%   BMI 26.91 kg/m    GENERAL APPEARANCE: alert and no distress  HEENT: no icterus, no central cyanosis, hard to hearing  LYMPH/NECK: no adenopathy, no asymmetry, JVP not elevated, no carotid bruits.  RESPIRATORY: lungs clear to auscultation - no rales, rhonchi or wheezes, no use of accessory muscles, no retractions, respirations are unlabored, normal respiratory rate  CARDIOVASCULAR: regular rhythm, normal S1, S2, no S3 or S4 and no murmur, click or rub, precordium quiet with normal PMI.  GI: soft, non tender  EXTREMITIES: no edema  NEURO: alert, normal speech,and affect  SKIN: no ecchymoses, no rashes     I have reviewed the labs and personally reviewed the imaging below and made my comment in the assessment and plan.    Labs:  CBC RESULTS:   Lab Results   Component Value Date    WBC 9.5 07/22/2022    WBC 9.6 04/20/2021    RBC 4.59 07/22/2022    RBC 4.84 04/20/2021    HGB 14.0 07/22/2022    HGB 15.0 04/20/2021    HCT 43.0 07/22/2022    HCT 45.8 04/20/2021    MCV 94 07/22/2022    MCV 95 04/20/2021    MCH 30.5 07/22/2022    MCH 31.0 04/20/2021    MCHC 32.6 07/22/2022    MCHC 32.8 04/20/2021    RDW 13.3 07/22/2022    RDW 12.7 04/20/2021     07/22/2022     04/20/2021       BMP RESULTS:  Lab Results   Component Value Date     06/22/2022     04/20/2021    POTASSIUM 4.6 06/22/2022    POTASSIUM 4.3 04/20/2021    CHLORIDE 107 06/22/2022    CHLORIDE 107 04/20/2021    CO2 26 06/22/2022    CO2 30 04/20/2021    ANIONGAP 6 06/22/2022    ANIONGAP 4 04/20/2021     (H) 06/22/2022     (H) 04/20/2021    BUN 11 06/22/2022    BUN 17 04/20/2021    CR 0.99 06/22/2022    CR 0.85 04/20/2021    GFRESTIMATED 80 06/22/2022    GFRESTIMATED >60 06/06/2022    GFRESTIMATED 87 04/20/2021    GFRESTBLACK >90  04/20/2021    TAMMIE 9.1 06/22/2022    TAMMIE 8.9 04/20/2021            Echocardiogram 2014  Global and regional left ventricular function is normal with an EF of 60-65%.   Right ventricular function, chamber size, wall motion, and thickness are   normal. Pulmonary artery systolic pressure cannot be assessed. The inferior   vena cava  is normal. No pericardial effusion is present.      EKG 4/13/2021 shows sinus rhythm, T wave inversion V1 V2 with poor R wave progression V1 to V3.          Assessment and Plan:   Patient is overall doing well from cardiac standpoint.  I explained to the patient that is he is on optimal treatment from cardiology standpoint.  Recent finding of coronary and carotid calcifications on PET/CT will not change our management.    # CAD, s/p robotic-hybrid CABG (LIMA-LAD ) and ALENA to RCA in 2014  -Currently on asa, atorvastatin 80, Zetia, metoprolol 50 and lisinopril 20 mg twice daily.  Recently quit smoking.  -Continue current treatment.  Continue regular physical activity.     # HTN  - Well-controlled with current regimen.  He reports that his home blood pressure is 110/70 mmHg.    -Continue current treatment.     # HLP  -On atorvastatin 80 and Zetia 10 mg.  LDL is 35.    No medication changes today.  Return to clinic 2 years.    Total time spent today for this visit is 40 minutes including precharting, face-to-face clinic visit, review of labs/imaging and medical documentation.    Please donot hesitate to contact me if you have any questions or concerns. Again, thank you for allowing me to participate in the care of your patient.    Sayra SCHULTZ MD  [facility] Division of Cardiology  Pager [phone]

## 2022-12-26 ENCOUNTER — OFFICE VISIT (OUTPATIENT)
Dept: CARDIOLOGY | Facility: CLINIC | Age: 72
End: 2022-12-26
Attending: STUDENT IN AN ORGANIZED HEALTH CARE EDUCATION/TRAINING PROGRAM
Payer: COMMERCIAL

## 2022-12-26 VITALS
DIASTOLIC BLOOD PRESSURE: 77 MMHG | OXYGEN SATURATION: 98 % | BODY MASS INDEX: 26.91 KG/M2 | HEART RATE: 58 BPM | WEIGHT: 177 LBS | SYSTOLIC BLOOD PRESSURE: 134 MMHG

## 2022-12-26 DIAGNOSIS — E78.5 HYPERLIPIDEMIA LDL GOAL <70: ICD-10-CM

## 2022-12-26 DIAGNOSIS — I25.10 CORONARY ARTERY DISEASE INVOLVING NATIVE CORONARY ARTERY OF NATIVE HEART WITHOUT ANGINA PECTORIS: ICD-10-CM

## 2022-12-26 DIAGNOSIS — I10 ESSENTIAL HYPERTENSION WITH GOAL BLOOD PRESSURE LESS THAN 140/90: ICD-10-CM

## 2022-12-26 DIAGNOSIS — Z95.1 S/P CABG (CORONARY ARTERY BYPASS GRAFT): Primary | ICD-10-CM

## 2022-12-26 PROCEDURE — G0463 HOSPITAL OUTPT CLINIC VISIT: HCPCS | Performed by: INTERNAL MEDICINE

## 2022-12-26 PROCEDURE — 99215 OFFICE O/P EST HI 40 MIN: CPT | Performed by: INTERNAL MEDICINE

## 2022-12-26 PROCEDURE — G0463 HOSPITAL OUTPT CLINIC VISIT: HCPCS

## 2022-12-26 ASSESSMENT — PAIN SCALES - GENERAL: PAINLEVEL: NO PAIN (0)

## 2022-12-26 NOTE — PATIENT INSTRUCTIONS
Complete an echocardiogram.    Follow up in 2 years. You will receive a scheduling reminder in advance.

## 2022-12-26 NOTE — LETTER
12/26/2022      RE: Tevin Guidry  [address] [place] [address]       Dear Colleague,    Thank you for the opportunity to participate in the care of your patient, Tevin Guidry, at the [facility] [employer] [facility]. Please see a copy of my visit note below.         General Cardiology Clinic-[facility] [place]      HPI: Mr. 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 Ann 11/2021.  He was smoking and he was counseled to stop smoking.  From cardiac standpoint patient reported doing well at that time. Patient recently quit smoking 4 months ago. He is being seen today for annual follow-up.  He is hard to hearing.  He tells me that he is overall doing well.  Sometimes feels short of breath when he is shoveling snow or when the dog is pulling him. He walks 45 minutes at least 5 times a week.  No chest pain, dizziness, syncope or palpitations.  He recently had PET/CT which showed moderate to severe coronary artery calcification and mild carotid artery bifurcation calcification.  He is wondering if there anything else that he needs to do for these findings.  Patient has history of hybrid robotic assisted CABG with LIMA-LAD and ALENA to the RCA in 2014.  Most recent echocardiogram is from 2014 which shows normal biventricular function with no significant valve disease.  Current cardiac meds  Asa  Atorvastatin 80/d  Zetia 10 mg  Lisinopril 20mg BID  Metoprolol XL 50mg at bedtime  Amlodipine 5 mg    Medications, personal, family, and social history reviewed with patient and revised.    PAST MEDICAL HISTORY:  Past Medical History:    Diagnosis Date     Bladder cancer (H)      Colon polyps     multiple may need colectomy (adenoma)     COPD (chronic obstructive pulmonary disease) (H)      Hyperlipidemia LDL goal <130      Hypertension goal BP (blood pressure) < 140/90      Social phobia        CURRENT MEDICATIONS:  Current Outpatient Medications   Medication Sig Dispense Refill     albuterol (PROAIR HFA/PROVENTIL HFA/VENTOLIN HFA) 108 (90 Base) MCG/ACT inhaler Inhale 2 puffs into the lungs every 6 hours as needed for shortness of breath / dyspnea or wheezing 18 g 1     amLODIPine (NORVASC) 5 MG tablet TAKE ONE TABLET BY MOUTH EVERY NIGHT AT BEDTIME 90 tablet 3     aspirin 81 MG tablet Take 1 tablet (81 mg) by mouth daily 90 tablet 3     atorvastatin (LIPITOR) 80 MG tablet TAKE ONE TABLET BY MOUTH EVERY EVENING AT BEDTIME 90 tablet 3     buPROPion (WELLBUTRIN XL) 300 MG 24 hr tablet TAKE ONE TABLET BY MOUTH EVERY MORNING 90 tablet 1     ezetimibe (ZETIA) 10 MG tablet Take 1 tablet (10 mg) by mouth daily 90 tablet 3     lisinopril (ZESTRIL) 20 MG tablet Take 1 tablet (20 mg) by mouth 2 times daily 180 tablet 3     metoprolol succinate ER (TOPROL XL) 50 MG 24 hr tablet Take 1 tablet (50 mg) by mouth At Bedtime 90 tablet 3     PARoxetine (PAXIL) 40 MG tablet Take 1 tablet (40 mg) by mouth every morning 90 tablet 3       PAST SURGICAL HISTORY:  Past Surgical History:   Procedure Laterality Date     CATARACT IOL, RT/LT       CLAVICLE SURGERY Right     fracture in childhood     COLONOSCOPY N/A 3/30/2017    Procedure: COLONOSCOPY;  Surgeon: Jie Onofre MD;  Location: [facility]     COLONOSCOPY N/A 3/2/2021    Procedure: COLONOSCOPY, WITH POLYPECTOMY, hot snare, lift with elaview and epi, clip for hemorrhage control, tattoo;  Surgeon: Ra Cardoso MD;  Location: [facility]     COLONOSCOPY N/A 4/22/2021    Procedure: Colonoscopy;  Surgeon: Jitendra Rodríguez MD;  [facility]     CYSTOSCOPY, BIOPSY BLADDER INSTILL OPTICAL AGENT N/A  9/2/2020    Procedure: CYSTOSCOPY, WITH INSTILLATION OF OPTICAL IMAGING AGENT INTO BLADDER AND BIOPSY OF BLADDER (CYSVIEW);  Surgeon: Kirt Guzman MD;  [facility]     CYSTOSCOPY, TRANSURETHRAL RESECTION (TUR) TUMOR BLADDER, COMBINED N/A 9/5/2019    Procedure: cystoscopy,Transurethral Resection Of Bladder Tumor;  Surgeon: Almas Sunshine MD;  [facility]     CYSTOSCOPY, TRANSURETHRAL RESECTION (TUR) TUMOR BLADDER, COMBINED N/A 2/6/2020    Procedure: CYSTOSCOPY, WITH TRANSURETHRAL RESECTION BLADDER TUMOR;  Surgeon: Almas Sunshine MD;  [facility]     DAVINCI BYPASS ARTERY CORONARY N/A 9/29/2014    Procedure: DAVINCI BYPASS ARTERY CORONARY;  Surgeon: Lam Solis MD;  [facility]     ESOPHAGOSCOPY, GASTROSCOPY, DUODENOSCOPY (EGD), COMBINED N/A 3/30/2017    Procedure: COMBINED ESOPHAGOSCOPY, GASTROSCOPY, DUODENOSCOPY (EGD);  Surgeon: Jie Onofre MD;  Location: [facility]     PHACOEMULSIFICATION CLEAR CORNEA WITH STANDARD INTRAOCULAR LENS IMPLANT Right 12/11/2020    Procedure: RIGHT EYE COMPLEX CATARACT REMOVAL WITH INTRAOCULAR LENS IMPLANT;  Surgeon: Aimee Marcus MD;  Location: [facility]     SURGICAL HISTORY OF -       right clavicular fracture with ORIF     ZZHC COLONOSCOPY THRU STOMA, DIAGNOSTIC  2010    polyps due 2011       ALLERGIES:     Allergies   Allergen Reactions     Codeine Nausea and Vomiting       FAMILY HISTORY:  Family History   Problem Relation Age of Onset     Cancer Mother         esog/stomach     Cancer Father         lung     Hypertension Brother      Stomach Cancer Brother      Anesthesia Reaction No family hx of      Deep Vein Thrombosis (DVT) No family hx of      Glaucoma No family hx of      Macular Degeneration No family hx of          SOCIAL HISTORY:  Social History     Tobacco Use     Smoking status: Former     Packs/day: 0.00     Years: 30.00     Pack years: 0.00     Types: Cigarettes     Quit date: 6/18/2022     Years  since quittin.5     Smokeless tobacco: Never     Tobacco comments:     quit successfully (smoke and nicotine free)   Vaping Use     Vaping Use: Never used   Substance Use Topics     Alcohol use: No     Comment: quit 40 years ago     Drug use: Yes     Types: Marijuana     Comment: occasionally uses marijuana       ROS:   Constitutional: No fever, chills, or sweats. Weight stable.   Cardiovascular: As per HPI.       Exam:  /77 (BP Location: Right arm, Patient Position: Chair, Cuff Size: Adult Regular)   Pulse 58   Wt 80.3 kg (177 lb)   SpO2 98%   BMI 26.91 kg/m    GENERAL APPEARANCE: alert and no distress  HEENT: no icterus, no central cyanosis, hard to hearing  LYMPH/NECK: no adenopathy, no asymmetry, JVP not elevated, no carotid bruits.  RESPIRATORY: lungs clear to auscultation - no rales, rhonchi or wheezes, no use of accessory muscles, no retractions, respirations are unlabored, normal respiratory rate  CARDIOVASCULAR: regular rhythm, normal S1, S2, no S3 or S4 and no murmur, click or rub, precordium quiet with normal PMI.  GI: soft, non tender  EXTREMITIES: no edema  NEURO: alert, normal speech,and affect  SKIN: no ecchymoses, no rashes     I have reviewed the labs and personally reviewed the imaging below and made my comment in the assessment and plan.    Labs:  CBC RESULTS:   Lab Results   Component Value Date    WBC 9.5 2022    WBC 9.6 2021    RBC 4.59 2022    RBC 4.84 2021    HGB 14.0 2022    HGB 15.0 2021    HCT 43.0 2022    HCT 45.8 2021    MCV 94 2022    MCV 95 2021    MCH 30.5 2022    MCH 31.0 2021    MCHC 32.6 2022    MCHC 32.8 2021    RDW 13.3 2022    RDW 12.7 2021     2022     2021       BMP RESULTS:  Lab Results   Component Value Date     2022     2021    POTASSIUM 4.6 2022    POTASSIUM 4.3 2021    CHLORIDE 107 2022     CHLORIDE 107 04/20/2021    CO2 26 06/22/2022    CO2 30 04/20/2021    ANIONGAP 6 06/22/2022    ANIONGAP 4 04/20/2021     (H) 06/22/2022     (H) 04/20/2021    BUN 11 06/22/2022    BUN 17 04/20/2021    CR 0.99 06/22/2022    CR 0.85 04/20/2021    GFRESTIMATED 80 06/22/2022    GFRESTIMATED >60 06/06/2022    GFRESTIMATED 87 04/20/2021    GFRESTBLACK >90 04/20/2021    TAMMIE 9.1 06/22/2022    TAMMIE 8.9 04/20/2021            Echocardiogram 2014  Global and regional left ventricular function is normal with an EF of 60-65%.   Right ventricular function, chamber size, wall motion, and thickness are   normal. Pulmonary artery systolic pressure cannot be assessed. The inferior   vena cava  is normal. No pericardial effusion is present.      EKG 4/13/2021 shows sinus rhythm, T wave inversion V1 V2 with poor R wave progression V1 to V3.          Assessment and Plan:   Patient is overall doing well from cardiac standpoint.  I explained to the patient that is he is on optimal treatment from cardiology standpoint.  Recent finding of coronary and carotid calcifications on PET/CT will not change our management.    # CAD, s/p robotic-hybrid CABG (LIMA-LAD ) and ALENA to RCA in 2014  -Currently on asa, atorvastatin 80, Zetia, metoprolol 50 and lisinopril 20 mg twice daily.  Recently quit smoking.  -Continue current treatment.  Continue regular physical activity.     # HTN  - Well-controlled with current regimen.  He reports that his home blood pressure is 110/70 mmHg.    -Continue current treatment.     # HLP  -On atorvastatin 80 and Zetia 10 mg.  LDL is 35.    No medication changes today.  Return to clinic 2 years.    Total time spent today for this visit is 40 minutes including precharting, face-to-face clinic visit, review of labs/imaging and medical documentation.    Please donot hesitate to contact me if you have any questions or concerns. Again, thank you for allowing me to participate in the care of your patient.    Sayra  MD ANTOINE  [facility] Division of Cardiology  Pager [phone]

## 2022-12-26 NOTE — NURSING NOTE
Chief Complaint   Patient presents with     Follow Up      Return follow up     Vitals were taken and medications reconciled.    Doyle Vincent, EMT  1:48 PM

## 2022-12-27 ENCOUNTER — TELEPHONE (OUTPATIENT)
Dept: GASTROENTEROLOGY | Facility: CLINIC | Age: 72
End: 2022-12-27

## 2022-12-27 NOTE — TELEPHONE ENCOUNTER
Screening Questions    BlueKIND OF PREP RedLOCATION [review exclusion criteria] GreenSEDATION TYPE    N Have you had a positive covid test in the last 2 weeks?   If yes, what date? Schedule out 14 days from symptoms  Y Your able to give consent for your medical care?  Y Are you active on mychart?   [employer] MED[employer]ARE What insurance do you carry?    ROBI SUERO Ordering/Referring Provider:     26.9 BMI [BMI OVER 40-EXTENDED PREP]  BMI OVER 40 NEED PAC EVALUATION FOR [facility]    Respiratory Screening  [If yes to any of the following HOSPITAL setting only]     N      Do you use daily home oxygen?   N      Do you have mod to severe Obstructive Sleep Apnea?  N      Do you have Pulmonary Hypertension? NEED PAC APPT AT [facility]    N      Do you have UNCONTROLLED asthma?      N Have you had a heart or lung transplant?       N Are you currently on dialysis? [If yes, G-PREP & HOSPITAL setting only]   N  Do you have chronic kidney disease? [If yes, G-PREP]  N  Do you have a diagnosis of diabetes?[If yes, G-PREP]    N Have you had a stroke or Transient ischemic attack (TIA - aka  mini stroke ) within 6 months? (If yes, please review exclusion criteria)  N  In the past 6 months, have you had any heart related issues including cardiomyopathy or heart attack?   N  If yes, did it require cardiac stenting or other implantable device?       N Do you have any implantable devices in your body (pacemaker, defib, LVAD)? (If yes, please review exclusion criteria)    N Do you take nitroglycerin?   N If yes, how often?  (If yes, HOSPITAL setting ONLY)  N Are you currently taking any blood thinners?           [IF YES, INFORM PATIENT TO FOLLOW UP W/ ORDERING PROVIDER FOR BRIDGING INSTRUCTIONS]     N  Do you take Phentermine?   Yes-> Hold for 7 days before procedure.  Please consult your prescribing provider if you have questions about holding this  "medication.         [FEMALES] are you currently pregnant?       If yes, how many weeks? [Greater than 12 weeks, OR NEEDED]    N Any prescription pain medications taken daily like narcotics? [EXTENDED PREP AND MAC]    N  Any chemical dependencies such as alcohol, street drugs, or methadone? [If yes, MAC]    N Any post-traumatic stress syndrome, severe anxiety or history of psychosis? [If yes, MAC]    N Do you need help transferring? (If NO, please HOSPITAL setting  only)      N On a regular basis do you go 3-5 days without a bowel movement?[ If yes, EXTENDED PREP.]     Preferred LOCAL Pharmacy for Pre Prescription:       [facility] - [place], [place] - [address]                        Scheduling Details    Tevin Caller:   (Please ask for phone number if not scheduled by patient)    Type of Procedure Scheduled: Upper Endoscopy [EGD] COLONOSCOPY      GOLYTELY Which Colonoscopy Prep was Sent:   GEOFFREY CF PATIENTS & GROEN'S PATIENTS NEEDS EXTENDED PREP     4/6Date of Procedure:    JOSEPHINE Lang:   [facility] Location:     MAC Sedation Type:     Conscious Sedation- Needs  for 6 hours after the procedure  MAC/General-Needs  for 24 hours after procedure    Y Informed patient they will need an adult          Cannot take any type of public or medical transportation alone    Y Confirmed Nurse will call to complete assessment      Pre-Procedure Covid test to be completed [[facility] PCR Testing Required]    DOUBLE CHECK BMI AND MICHELLE  NEEDS ADULT -CANNOT TAKE PUBILC OR MEDICAL TRANSPOTATION  COVID TEST FOR [facility] 3-4 DAYS  COIVD TEST FOR [facility]OR CAN BE HOME     Additional comments: anyone else can scope this patient who can complete both. I would keep it with a GI provider. Do not schedule with Hep or General. In your scheduling smart phrase when you list who you scheduled with comment, \"Schedule with GI provider only-approval from RAJAN Zamora to schedule with provider who can scope for both " "upper & lower (in basket message).\"    "

## 2022-12-28 ENCOUNTER — TELEPHONE (OUTPATIENT)
Dept: GASTROENTEROLOGY | Facility: CLINIC | Age: 72
End: 2022-12-28

## 2022-12-28 NOTE — TELEPHONE ENCOUNTER
Screening Questions  BLUE  KIND OF PREP RED  LOCATION [review exclusion criteria] GREEN  SEDATION TYPE        y Are you active on mychart?       Wise Ordering/Referring Provider?        [employer] What type of coverage do you have?      n Have you had a positive covid test in the last 14 days?     26.9 1. BMI  [BMI 40+ - review exclusion criteria]    y  2. Are you able to give consent for your medical care? [IF NO,RN REVIEW]        n  3. Are you taking any prescription pain medications on a routine schedule?      n  3a. EXTENDED PREP What kind of prescription?     n 4. Do you have any chemical dependencies such as alcohol, street drugs, or methadone?    n 5. Do you have any history of post-traumatic stress syndrome, severe anxiety or history of psychosis?      **If yes 3- 5 , please schedule with MAC sedation.**          IF YES TO ANY 6 - 10 - HOSPITAL SETTING ONLY.     n 6.   Do you need assistance transferring?     n 7.   Have you had a heart or lung transplant?    n 8.   Are you currently on dialysis?   n 9.   Do you use daily home oxygen?   n 10. Do you take nitroglycerin?   10a. n If yes, how often?     11. [FEMALES]  n Are you currently pregnant?    11a. n If yes, how many weeks? [ Greater than 12 weeks, OR NEEDED]    n 12. Do you have Pulmonary Hypertension? *NEED PAC APPT AT [facility]*     n 13. [review exclusion criteria]  Do you have any implantable devices in your body (pacemaker, defib, LVAD)?    n 14. In the past 6 months, have you had any heart related issues including cardiomyopathy or heart attack?     14a. n If yes, did it require cardiac stenting if so when?     n 15. Have you had a stroke or Transient ischemic attack (TIA - aka  mini stroke ) within 6 months?      n 16. Do you have mod to severe Obstructive Sleep Apnea?  [Hospital only - Ok at [facility]]    n 17. Do you have SEVERE AND UNCONTROLLED asthma? *NEED PAC APPT AT [facility]*       n 18. Are you currently taking any blood thinners?     18a. If  "yes, inform patient to \"follow up w/ ordering provider for bridging instructions.\"    n 19. Do you take the medication Phentermine?    19a. If yes, \"Hold for 7 days before procedure.  Please consult your prescribing provider if you have questions about holding this medication.\"     n  20. Do you have chronic kidney disease?      n  21. Do you have a diagnosis of diabetes?     n  22. On a regular basis do you go 3-5 days between bowel movements?      23. Preferred LOCAL Pharmacy for Pre Prescription    [ LIST ONLY ONE PHARMACY]          [facility] - [place], [place] - [address]        - CLOSING REMINDERS -    Informed patient they will need an adult    Cannot take any type of public or medical transportation alone    Conscious Sedation- Needs  for 6 hours after the procedure       MAC/General-Needs  for 24 hours after procedure    Pre-Procedure Covid test to be completed [[facility] PCR Testing Required]    Confirmed Nurse will call to complete assessment       - SCHEDULING DETAILS -  n Hospital Setting Required? If yes, what is the exclusion?: n   Margarita  Surgeon    Date of Procedure  Upper and Lower Endoscopy [EGD and Colonoscopy]  Type of Procedure Scheduled  Location   [facility]-If you answer yes to questions #8, #20, #21Which Colonoscopy Prep was Sent?      Sedation Type     n PAC / Pre-op Required   Waiting for Degroen April sched per order request              "

## 2023-01-02 ENCOUNTER — ALLIED HEALTH/NURSE VISIT (OUTPATIENT)
Dept: FAMILY MEDICINE | Facility: CLINIC | Age: 73
End: 2023-01-02
Payer: COMMERCIAL

## 2023-01-02 DIAGNOSIS — E53.8 VITAMIN B12 DEFICIENCY (NON ANEMIC): Primary | ICD-10-CM

## 2023-01-02 PROCEDURE — 99207 PR NO CHARGE NURSE ONLY: CPT

## 2023-01-02 PROCEDURE — 96372 THER/PROPH/DIAG INJ SC/IM: CPT | Performed by: FAMILY MEDICINE

## 2023-01-02 RX ADMIN — CYANOCOBALAMIN 1000 MCG: 1000 INJECTION, SOLUTION INTRAMUSCULAR; SUBCUTANEOUS at 10:32

## 2023-01-02 NOTE — PROGRESS NOTES
Clinic Administered Medication Documentation    Administrations This Visit     cyanocobalamin injection 1,000 mcg     Admin Date  01/02/2023 Action  Given Dose  1,000 mcg Route  Intramuscular Site  Right Deltoid Administered By  Lucio Tinajero CMA    Ordering Provider: Preethi Quiroz MD    Patient Supplied?: No                  Injectable Medication Documentation    Patient was given Cyanocobalamin (B-12). Prior to medication administration, verified patients identity using patient s name and date of birth. Please see MAR and medication order for additional information. Patient instructed to report any adverse reaction to staff immediately .      Was entire vial of medication used? Yes  Vial/Syringe: Single dose vial  Expiration Date:  04/30/24  Was this medication supplied by the patient? No     Lucio Tinajero CMA on 1/2/2023 at 10:33 AM

## 2023-01-03 ENCOUNTER — HOSPITAL ENCOUNTER (OUTPATIENT)
Facility: AMBULATORY SURGERY CENTER | Age: 73
End: 2023-01-03
Attending: INTERNAL MEDICINE
Payer: COMMERCIAL

## 2023-01-06 ENCOUNTER — PATIENT OUTREACH (OUTPATIENT)
Dept: GERIATRIC MEDICINE | Facility: CLINIC | Age: 73
End: 2023-01-06

## 2023-01-06 NOTE — PROGRESS NOTES
"[facility] Care Coordination Contact      [facility] Six-Month Telephone Assessment    6 month telephone assessment completed on 1/6/23.    ER visits: No  Hospitalizations: No  TCU stays: No  Significant health status changes: No significant health changes. Member states he was found to have B12 deficiency, found by his new gastroenterologist. Member states he has been receiving monthly B12 injections that will continue for a year, before reassessing. Member also states his PCP found a skin abnormality on his back and referred member to his dermatologist. The dermatologist removed a basal cell skin abnormality and member will follow up in 6 months.  Falls/Injuries: No  ADL/IADL changes: No  Changes in services: No    Caregiver Assessment follow up:    Member is very independent and wonderful self health advocate.    -Member has maintained follow ups with GI regarding genetic disorder that requires frequent colon and stomach monitoring for polyps.   -Member follows up every 6 months with Urology regarding history of \"malignant neoplasm of overlapping sites of bladder\". Member has been in remission for 3 years and will continue monitoring closely for another 2 years.  -As stated above, GI physician found the member to have a B12 deficiency, so started member on month B12 injections for one year. Member states he has noticed an increase in energy.       Goals: See POC in chart for goal progress documentation.        Will see member in 6 months for an annual health risk assessment.   Encouraged member to call CC with any questions or concerns in the meantime.     Nenita Almazan RN  [facility]  [phone]        "

## 2023-01-11 ENCOUNTER — HOSPITAL ENCOUNTER (OUTPATIENT)
Dept: CARDIOLOGY | Facility: CLINIC | Age: 73
Discharge: HOME | End: 2023-01-11
Attending: INTERNAL MEDICINE | Admitting: INTERNAL MEDICINE
Payer: COMMERCIAL

## 2023-01-11 DIAGNOSIS — E78.5 HYPERLIPIDEMIA LDL GOAL <70: ICD-10-CM

## 2023-01-11 DIAGNOSIS — Z95.1 S/P CABG (CORONARY ARTERY BYPASS GRAFT): ICD-10-CM

## 2023-01-11 DIAGNOSIS — I25.10 CORONARY ARTERY DISEASE INVOLVING NATIVE CORONARY ARTERY OF NATIVE HEART WITHOUT ANGINA PECTORIS: ICD-10-CM

## 2023-01-11 DIAGNOSIS — I10 ESSENTIAL HYPERTENSION WITH GOAL BLOOD PRESSURE LESS THAN 140/90: ICD-10-CM

## 2023-01-11 LAB — LVEF ECHO: NORMAL

## 2023-01-11 PROCEDURE — 93306 TTE W/DOPPLER COMPLETE: CPT | Mod: 26 | Performed by: INTERNAL MEDICINE

## 2023-01-11 PROCEDURE — 93306 TTE W/DOPPLER COMPLETE: CPT

## 2023-01-16 ENCOUNTER — OFFICE VISIT (OUTPATIENT)
Dept: FAMILY MEDICINE | Facility: CLINIC | Age: 73
End: 2023-01-16
Payer: COMMERCIAL

## 2023-01-16 VITALS
OXYGEN SATURATION: 99 % | WEIGHT: 176 LBS | DIASTOLIC BLOOD PRESSURE: 58 MMHG | RESPIRATION RATE: 16 BRPM | TEMPERATURE: 98.1 F | HEART RATE: 60 BPM | BODY MASS INDEX: 26.07 KG/M2 | HEIGHT: 69 IN | SYSTOLIC BLOOD PRESSURE: 90 MMHG

## 2023-01-16 DIAGNOSIS — I10 HYPERTENSION GOAL BP (BLOOD PRESSURE) < 140/90: Chronic | ICD-10-CM

## 2023-01-16 DIAGNOSIS — J44.9 CHRONIC OBSTRUCTIVE PULMONARY DISEASE, UNSPECIFIED COPD TYPE (H): ICD-10-CM

## 2023-01-16 DIAGNOSIS — Z95.1 S/P CABG (CORONARY ARTERY BYPASS GRAFT): Chronic | ICD-10-CM

## 2023-01-16 DIAGNOSIS — I25.119 CORONARY ARTERY DISEASE INVOLVING NATIVE HEART WITH ANGINA PECTORIS, UNSPECIFIED VESSEL OR LESION TYPE (H): ICD-10-CM

## 2023-01-16 DIAGNOSIS — C79.9 METASTATIC SIGNET RING CELL CARCINOMA (H): ICD-10-CM

## 2023-01-16 DIAGNOSIS — H25.9 SENILE CATARACT OF LEFT EYE, UNSPECIFIED AGE-RELATED CATARACT TYPE: ICD-10-CM

## 2023-01-16 DIAGNOSIS — Z01.818 PREOP GENERAL PHYSICAL EXAM: Primary | ICD-10-CM

## 2023-01-16 PROCEDURE — 99214 OFFICE O/P EST MOD 30 MIN: CPT | Performed by: FAMILY MEDICINE

## 2023-01-16 ASSESSMENT — PATIENT HEALTH QUESTIONNAIRE - PHQ9
SUM OF ALL RESPONSES TO PHQ QUESTIONS 1-9: 1
SUM OF ALL RESPONSES TO PHQ QUESTIONS 1-9: 1

## 2023-01-16 NOTE — PROGRESS NOTES
[facility] [address] [place] [address]  Phone: [phone]  Primary Provider: Preethi Quiroz  Pre-op Performing Provider: LEONARDO REBOLLAR       PREOPERATIVE EVALUATION:  Today's date: 1/16/2023    Tevin Guidry is a 72 year old male who presents for a preoperative evaluation.    Surgical Information:  Surgery/Procedure: Left eye cataract repair  Surgery Location: [facility]  Surgeon: Dr. Monroy  Surgery Date: 2/2/23  Time of Surgery: TBD  Where patient plans to recover: At home with family  Fax number for surgical facility: Note does not need to be faxed, will be available electronically in Epic.    Type of Anesthesia Anticipated: Local with MAC    Assessment & Plan     The proposed surgical procedure is considered LOW risk.    Preop general physical exam       Senile cataract of left eye, unspecified age-related cataract type   low risk. rx as per surgeon.    Metastatic signet ring cell carcinoma (H)  Colonic resection. Right hemicolectomy.   Seeing GI. Needing colonoscopy. Scheduled to see them for follow up in April.     Chronic obstructive pulmonary disease, unspecified COPD type (H)  Recently quit smoking. Albuterol as needed. If worsening, consider further workup.    Coronary artery disease involving native heart with angina pectoris, unspecified vessel or lesion type (H)  S/p cabg. Been to cardiologist recently.     Essential HTN  todays bp somewhat on lower side. We agreed to cut down amlodipine to 2.5mg if bp persistently is running below 110/70. He agreed. He is seeing PCP and pharmD later on. Right now no changes in rx. He agreed.   To avoid hypotension, will skip lisinopril on the day of surgery. He understood written instructions given.                Risks and Recommendations:  The patient has the following additional risks and recommendations for perioperative complications:   - No identified additional risk factors other than previously  addressed    Medication Instructions:    For cataract surgery - do not take lisinopril on the day of surgery. Ok to continue rest of your medications with few sips of water on the day of your surgery.    RECOMMENDATION:  APPROVAL GIVEN to proceed with proposed procedure, without further diagnostic evaluation.            Subjective     HPI related to upcoming procedure: left eye cataract surgery.     Smoked for 55 yrs. Some exertional short of breath.   No pain or pressure. Uses inahler but not sure how much it helps.   Has been told to have emphysema.   Quit smoking 6 months ago.     bp fluctuates but tolerating all bp medications fine. Has a cuff at home.     Bladder cancer history  and history metastatic signet ring cell ca. Seeing GI. Colonoscopy in April.     Been to cardiologist recently. History of cabg.      Preop Questions 1/13/2023   1. Have you ever had a heart attack or stroke? No   2. Have you ever had surgery on your heart or blood vessels, such as a stent placement, a coronary artery bypass, or surgery on an artery in your head, neck, heart, or legs? YES     3. Do you have chest pain with activity? No   4. Do you have a history of  heart failure? No   5. Do you currently have a cold, bronchitis or symptoms of other infection? No   6. Do you have a cough, shortness of breath, or wheezing? No   7. Do you or anyone in your family have previous history of blood clots? No   8. Do you or does anyone in your family have a serious bleeding problem such as prolonged bleeding following surgeries or cuts? No   9. Have you ever had problems with anemia or been told to take iron pills? YES    10. Have you had any abnormal blood loss such as black, tarry or bloody stools? No   11. Have you ever had a blood transfusion? YES     11a. Have you ever had a transfusion reaction? No   12. Are you willing to have a blood transfusion if it is medically needed before, during, or after your surgery? Yes   13. Have you or any  of your relatives ever had problems with anesthesia? No   14. Do you have sleep apnea, excessive snoring or daytime drowsiness? No   15. Do you have any artifical heart valves or other implanted medical devices like a pacemaker, defibrillator, or continuous glucose monitor? No   16. Do you have artificial joints? No   17. Are you allergic to latex? No      Answers for HPI/ROS submitted by the patient on 1/16/2023  PHQ9 TOTAL SCORE: 1         Health Care Directive:  Patient has a Health Care Directive on file      Preoperative Review of :   reviewed - no record of controlled substances prescribed.           Review of Systems  Constitutional, neuro, ENT, endocrine, pulmonary, cardiac, gastrointestinal, genitourinary, musculoskeletal, integument and psychiatric systems are negative, except as otherwise noted.    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     Priority: Medium     Colon cancer (H) 04/19/2021     Priority: Medium     Added automatically from request for surgery [id number]       History of colonic polyps 03/30/2021     Priority: Medium     Added automatically from request for surgery [id number]       Combined form of age-related cataract, both eyes 11/04/2020     Priority: Medium     Added automatically from request for surgery [id number]       Total cataract of right eye 11/04/2020     Priority: Medium     Added automatically from request for surgery [id number]       Malignant neoplasm of overlapping sites of bladder (H) 08/27/2020     Priority: Medium     Added automatically from request for surgery [id number]       Personal history of malignant neoplasm of bladder 01/07/2020     Priority: Medium     Added automatically from request for surgery [id number]       Bladder cancer (H) 09/05/2019     Priority: Medium     Anemia, unspecified type 01/25/2017     Priority: Medium      Chronic obstructive pulmonary disease, unspecified COPD type (H) 12/08/2015     Priority: Medium     PFTs 12/8/15       S/P CABG (coronary artery bypass graft) 10/22/2015     Priority: Medium     Cigarette nicotine dependence without complication 10/14/2014     Priority: Medium     Coronary artery disease involving native heart with angina pectoris, unspecified vessel or lesion type (H) 09/26/2014     Priority: Medium     Advanced directives, counseling/discussion 08/20/2013     Priority: Medium     Tubular adenoma of colon 07/11/2012     Priority: Medium     9/6/13 colonoscopy revealed 4 polyps. 2 year follow up colonoscopy recommended       Hyperlipidemia LDL goal <130 05/09/2010     Priority: Medium     Hypertension goal BP (blood pressure) < 140/90      Priority: Medium     Social phobia 03/02/2005     Priority: Medium      Past Medical History:   Diagnosis Date     Bladder cancer (H)      Colon polyps     multiple may need colectomy (adenoma)     COPD (chronic obstructive pulmonary disease) (H)      Hyperlipidemia LDL goal <130      Hypertension goal BP (blood pressure) < 140/90      Social phobia      Past Surgical History:   Procedure Laterality Date     CATARACT IOL, RT/LT       CLAVICLE SURGERY Right     fracture in childhood     COLONOSCOPY N/A 3/30/2017    Procedure: COLONOSCOPY;  Surgeon: Jie Onofre MD;  Location: [facility]     COLONOSCOPY N/A 3/2/2021    Procedure: COLONOSCOPY, WITH POLYPECTOMY, hot snare, lift with elaview and epi, clip for hemorrhage control, tattoo;  Surgeon: Ra Cardoso MD;  Location: [facility] OR     COLONOSCOPY N/A 4/22/2021    Procedure: Colonoscopy;  Surgeon: Jitendra Rodríguez MD;  Location: [facility]     CYSTOSCOPY, BIOPSY BLADDER INSTILL OPTICAL AGENT N/A 9/2/2020    Procedure: CYSTOSCOPY, WITH INSTILLATION OF OPTICAL IMAGING AGENT INTO BLADDER AND BIOPSY OF BLADDER (CYSVIEW);  Surgeon: Kirt Guzman MD;  [facility]     CYSTOSCOPY, TRANSURETHRAL  RESECTION (TUR) TUMOR BLADDER, COMBINED N/A 9/5/2019    Procedure: cystoscopy,Transurethral Resection Of Bladder Tumor;  Surgeon: Almas Sunshine MD;  [facility]     CYSTOSCOPY, TRANSURETHRAL RESECTION (TUR) TUMOR BLADDER, COMBINED N/A 2/6/2020    Procedure: CYSTOSCOPY, WITH TRANSURETHRAL RESECTION BLADDER TUMOR;  Surgeon: Almas Sunshine MD;  [facility]     DAVINCI BYPASS ARTERY CORONARY N/A 9/29/2014    Procedure: DAVINCI BYPASS ARTERY CORONARY;  Surgeon: Lam Solis MD;  Location: [facility]     ESOPHAGOSCOPY, GASTROSCOPY, DUODENOSCOPY (EGD), COMBINED N/A 3/30/2017    Procedure: COMBINED ESOPHAGOSCOPY, GASTROSCOPY, DUODENOSCOPY (EGD);  Surgeon: Jie Onofre MD;  Location: [facility]     PHACOEMULSIFICATION CLEAR CORNEA WITH STANDARD INTRAOCULAR LENS IMPLANT Right 12/11/2020    Procedure: RIGHT EYE COMPLEX CATARACT REMOVAL WITH INTRAOCULAR LENS IMPLANT;  Surgeon: Aimee Marcus MD;  Location: [facility] OR     SURGICAL HISTORY OF -       right clavicular fracture with ORIF     ZZHC COLONOSCOPY THRU STOMA, DIAGNOSTIC  2010    polyps due 2011     Current Outpatient Medications   Medication Sig Dispense Refill     albuterol (PROAIR HFA/PROVENTIL HFA/VENTOLIN HFA) 108 (90 Base) MCG/ACT inhaler Inhale 2 puffs into the lungs every 6 hours as needed for shortness of breath / dyspnea or wheezing 18 g 1     amLODIPine (NORVASC) 5 MG tablet TAKE ONE TABLET BY MOUTH EVERY NIGHT AT BEDTIME 90 tablet 3     aspirin 81 MG tablet Take 1 tablet (81 mg) by mouth daily 90 tablet 3     atorvastatin (LIPITOR) 80 MG tablet TAKE ONE TABLET BY MOUTH EVERY EVENING AT BEDTIME 90 tablet 3     buPROPion (WELLBUTRIN XL) 300 MG 24 hr tablet TAKE ONE TABLET BY MOUTH EVERY MORNING 90 tablet 1     ezetimibe (ZETIA) 10 MG tablet Take 1 tablet (10 mg) by mouth daily 90 tablet 3     lisinopril (ZESTRIL) 20 MG tablet Take 1 tablet (20 mg) by mouth 2 times daily 180 tablet 3     metoprolol succinate ER (TOPROL XL)  "50 MG 24 hr tablet Take 1 tablet (50 mg) by mouth At Bedtime 90 tablet 3     PARoxetine (PAXIL) 40 MG tablet Take 1 tablet (40 mg) by mouth every morning 90 tablet 3       Allergies   Allergen Reactions     Codeine Nausea and Vomiting        Social History     Tobacco Use     Smoking status: Former     Packs/day: 0.00     Years: 30.00     Pack years: 0.00     Types: Cigarettes     Quit date: 2022     Years since quittin.5     Smokeless tobacco: Never     Tobacco comments:     quit successfully (smoke and nicotine free)   Substance Use Topics     Alcohol use: No     Comment: quit 40 years ago     Family History   Problem Relation Age of Onset     Cancer Mother         esog/stomach     Cancer Father         lung     Hypertension Brother      Stomach Cancer Brother      Anesthesia Reaction No family hx of      Deep Vein Thrombosis (DVT) No family hx of      Glaucoma No family hx of      Macular Degeneration No family hx of      History   Drug Use     Types: Marijuana     Comment: occasionally uses marijuana         Objective     BP 90/58   Pulse 60   Temp 98.1  F (36.7  C)   Resp 16   Ht 1.753 m (5' 9\")   Wt 79.8 kg (176 lb)   SpO2 99%   BMI 25.99 kg/m      Physical Exam    GENERAL APPEARANCE: healthy, alert and no distress     EYES: EOMI,  PERRL     HENT: ear canals and TM's normal and nose and mouth without ulcers or lesions     NECK: no adenopathy, no asymmetry, masses, or scars and thyroid normal to palpation     RESP: lungs clear to auscultation - no rales, rhonchi or wheezes     CV: regular rates and rhythm, normal S1 S2, no S3 or S4 and no murmur, click or rub     MS: extremities normal- no gross deformities noted, no evidence of inflammation in joints, FROM in all extremities.     SKIN: no suspicious lesions or rashes on visible parts      NEURO: Normal strength and tone, sensory exam grossly normal, mentation intact and speech normal     PSYCH: mentation appears normal. and affect " normal/bright     LYMPHATICS: No cervical adenopathy    Recent Labs   Lab Test 07/22/22  1146 06/22/22  1140 06/06/22  1423 09/22/21  1425 04/20/21  1231 04/13/21  0907 04/09/21  1533   HGB 14.0  --   --   --  15.0   < > 14.7     --   --   --  357   < > 228   INR  --   --   --   --  1.00  --  1.04   NA  --  139  --  138 141   < > 142   POTASSIUM  --  4.6  --  4.2 4.3   < > 4.4   CR  --  0.99 1.0 0.98 0.85   < > 0.80   A1C  --   --   --   --   --   --  5.4    < > = values in this interval not displayed.        Diagnostics:  No labs were ordered during this visit.   No EKG required for low risk surgery (cataract, skin procedure, breast biopsy, etc).    Revised Cardiac Risk Index (RCRI):  The patient has the following serious cardiovascular risks for perioperative complications:   - Coronary Artery Disease (MI, positive stress test, angina, Qs on EKG) = 1 point     RCRI Interpretation: 1 point: Class II (low risk - 0.9% complication rate)         Signed Electronically by: Joel Nair MD, MD  Copy of this evaluation report is provided to requesting physician.

## 2023-01-16 NOTE — PATIENT INSTRUCTIONS
For bp - if it is running below 110/70 - ok to cut down amlodipine to 2.5mg per day and monitor bp at home.     For cataract surgery - do not take lisinopril and aspirin on the day of surgery. Ok to continue rest of your medications with few sips of water on the day of your surgery.       For informational purposes only. Not to replace the advice of your health care provider. Copyright   2003,  [facility]. All rights reserved. Clinically reviewed by Ludmila Nunn MD. [employer] [id number] - REV .  Preparing for Your Surgery  Getting started  A nurse will call you to review your health history and instructions. They will give you an arrival time based on your scheduled surgery time. Please be ready to share:  Your doctor's clinic name and phone number  Your medical, surgical, and anesthesia history  A list of allergies and sensitivities  A list of medicines, including herbal treatments and over-the-counter drugs  Whether the patient has a legal guardian (ask how to send us the papers in advance)  Please tell us if you're pregnant--or if there's any chance you might be pregnant. Some surgeries may injure a fetus (unborn baby), so they require a pregnancy test. Surgeries that are safe for a fetus don't always need a test, and you can choose whether to have one.   If you have a child who's having surgery, please ask for a copy of Preparing for Your Child's Surgery.    Preparing for surgery  Within 10 to 30 days of surgery: Have a pre-op exam (sometimes called an H&P, or History and Physical). This can be done at a clinic or pre-operative center.  If you're having a , you may not need this exam. Talk to your care team.  At your pre-op exam, talk to your care team about all medicines you take. If you need to stop any medicines before surgery, ask when to start taking them again.  We do this for your safety. Many medicines can make you bleed too much during surgery. Some change how well surgery  (anesthesia) drugs work.  Call your insurance company to let them know you're having surgery. (If you don't have insurance, call [phone].)  Call your clinic if there's any change in your health. This includes signs of a cold or flu (sore throat, runny nose, cough, rash, fever). It also includes a scrape or scratch near the surgery site.  If you have questions on the day of surgery, call your hospital or surgery center.  Eating and drinking guidelines  For your safety: Unless your surgeon tells you otherwise, follow the guidelines below.  Eat and drink as usual until 8 hours before you arrive for surgery. After that, no food or milk.  Drink clear liquids until 2 hours before you arrive. These are liquids you can see through, like water, Gatorade, and Propel Water. They also include plain black coffee and tea (no cream or milk), candy, and breath mints. You can spit out gum when you arrive.  If you drink alcohol: Stop drinking it the night before surgery.  If your care team tells you to take medicine on the morning of surgery, it's okay to take it with a sip of water.  Preventing infection  Shower or bathe the night before and morning of your surgery. Follow the instructions your clinic gave you. (If no instructions, use regular soap.)  Don't shave or clip hair near your surgery site. We'll remove the hair if needed.  Don't smoke or vape the morning of surgery. You may chew nicotine gum up to 2 hours before surgery. A nicotine patch is okay.  Note: Some surgeries require you to completely quit smoking and nicotine. Check with your surgeon.  Your care team will make every effort to keep you safe from infection. We will:  Clean our hands often with soap and water (or an alcohol-based hand rub).  Clean the skin at your surgery site with a special soap that kills germs.  Give you a special gown to keep you warm. (Cold raises the risk of infection.)  Wear special hair covers, masks, gowns and gloves during  surgery.  Give antibiotic medicine, if prescribed. Not all surgeries need antibiotics.  What to bring on the day of surgery  Photo ID and insurance card  Copy of your health care directive, if you have one  Glasses and hearing aids (bring cases)  You can't wear contacts during surgery  Inhaler and eye drops, if you use them (tell us about these when you arrive)  CPAP machine or breathing device, if you use them  A few personal items, if spending the night  If you have . . .  A pacemaker, ICD (cardiac defibrillator) or other implant: Bring the ID card.  An implanted stimulator: Bring the remote control.  A legal guardian: Bring a copy of the certified (court-stamped) guardianship papers.  Please remove any jewelry, including body piercings. Leave jewelry and other valuables at home.  If you're going home the day of surgery  You must have a responsible adult drive you home. They should stay with you overnight as well.  If you don't have someone to stay with you, and you aren't safe to go home alone, we may keep you overnight. Insurance often won't pay for this.  After surgery  If it's hard to control your pain or you need more pain medicine, please call your surgeon's office.  Questions?   If you have any questions for your care team, list them here: _________________________________________________________________________________________________________________________________________________________________________ ____________________________________ ____________________________________ ____________________________________

## 2023-01-16 NOTE — H&P (VIEW-ONLY)
[facility] [address] [place] [address]  Phone: [phone]  Primary Provider: Preethi Quiroz  Pre-op Performing Provider: LEONARDO REBOLLAR       PREOPERATIVE EVALUATION:  Today's date: 1/16/2023    Tevin Guidry is a 72 year old male who presents for a preoperative evaluation.    Surgical Information:  Surgery/Procedure: Left eye cataract repair  Surgery Location: [facility]  Surgeon: Dr. Monroy  Surgery Date: 2/2/23  Time of Surgery: TBD  Where patient plans to recover: At home with family  Fax number for surgical facility: Note does not need to be faxed, will be available electronically in Epic.    Type of Anesthesia Anticipated: Local with MAC    Assessment & Plan     The proposed surgical procedure is considered LOW risk.    Preop general physical exam       Senile cataract of left eye, unspecified age-related cataract type   low risk. rx as per surgeon.    Metastatic signet ring cell carcinoma (H)  Colonic resection. Right hemicolectomy.   Seeing GI. Needing colonoscopy. Scheduled to see them for follow up in April.     Chronic obstructive pulmonary disease, unspecified COPD type (H)  Recently quit smoking. Albuterol as needed. If worsening, consider further workup.    Coronary artery disease involving native heart with angina pectoris, unspecified vessel or lesion type (H)  S/p cabg. Been to cardiologist recently.     Essential HTN  todays bp somewhat on lower side. We agreed to cut down amlodipine to 2.5mg if bp persistently is running below 110/70. He agreed. He is seeing PCP and pharmD later on. Right now no changes in rx. He agreed.   To avoid hypotension, will skip lisinopril on the day of surgery. He understood written instructions given.                Risks and Recommendations:  The patient has the following additional risks and recommendations for perioperative complications:   - No identified additional risk factors other than previously  addressed    Medication Instructions:    For cataract surgery - do not take lisinopril on the day of surgery. Ok to continue rest of your medications with few sips of water on the day of your surgery.    RECOMMENDATION:  APPROVAL GIVEN to proceed with proposed procedure, without further diagnostic evaluation.            Subjective     HPI related to upcoming procedure: left eye cataract surgery.     Smoked for 55 yrs. Some exertional short of breath.   No pain or pressure. Uses inahler but not sure how much it helps.   Has been told to have emphysema.   Quit smoking 6 months ago.     bp fluctuates but tolerating all bp medications fine. Has a cuff at home.     Bladder cancer history  and history metastatic signet ring cell ca. Seeing GI. Colonoscopy in April.     Been to cardiologist recently. History of cabg.      Preop Questions 1/13/2023   1. Have you ever had a heart attack or stroke? No   2. Have you ever had surgery on your heart or blood vessels, such as a stent placement, a coronary artery bypass, or surgery on an artery in your head, neck, heart, or legs? YES     3. Do you have chest pain with activity? No   4. Do you have a history of  heart failure? No   5. Do you currently have a cold, bronchitis or symptoms of other infection? No   6. Do you have a cough, shortness of breath, or wheezing? No   7. Do you or anyone in your family have previous history of blood clots? No   8. Do you or does anyone in your family have a serious bleeding problem such as prolonged bleeding following surgeries or cuts? No   9. Have you ever had problems with anemia or been told to take iron pills? YES    10. Have you had any abnormal blood loss such as black, tarry or bloody stools? No   11. Have you ever had a blood transfusion? YES     11a. Have you ever had a transfusion reaction? No   12. Are you willing to have a blood transfusion if it is medically needed before, during, or after your surgery? Yes   13. Have you or any  of your relatives ever had problems with anesthesia? No   14. Do you have sleep apnea, excessive snoring or daytime drowsiness? No   15. Do you have any artifical heart valves or other implanted medical devices like a pacemaker, defibrillator, or continuous glucose monitor? No   16. Do you have artificial joints? No   17. Are you allergic to latex? No      Answers for HPI/ROS submitted by the patient on 1/16/2023  PHQ9 TOTAL SCORE: 1         Health Care Directive:  Patient has a Health Care Directive on file      Preoperative Review of :   reviewed - no record of controlled substances prescribed.           Review of Systems  Constitutional, neuro, ENT, endocrine, pulmonary, cardiac, gastrointestinal, genitourinary, musculoskeletal, integument and psychiatric systems are negative, except as otherwise noted.    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     Priority: Medium     Colon cancer (H) 04/19/2021     Priority: Medium     Added automatically from request for surgery [id number]       History of colonic polyps 03/30/2021     Priority: Medium     Added automatically from request for surgery [id number]       Combined form of age-related cataract, both eyes 11/04/2020     Priority: Medium     Added automatically from request for surgery [id number]       Total cataract of right eye 11/04/2020     Priority: Medium     Added automatically from request for surgery [id number]       Malignant neoplasm of overlapping sites of bladder (H) 08/27/2020     Priority: Medium     Added automatically from request for surgery [id number]       Personal history of malignant neoplasm of bladder 01/07/2020     Priority: Medium     Added automatically from request for surgery [id number]       Bladder cancer (H) 09/05/2019     Priority: Medium     Anemia, unspecified type 01/25/2017     Priority: Medium      Chronic obstructive pulmonary disease, unspecified COPD type (H) 12/08/2015     Priority: Medium     PFTs 12/8/15       S/P CABG (coronary artery bypass graft) 10/22/2015     Priority: Medium     Cigarette nicotine dependence without complication 10/14/2014     Priority: Medium     Coronary artery disease involving native heart with angina pectoris, unspecified vessel or lesion type (H) 09/26/2014     Priority: Medium     Advanced directives, counseling/discussion 08/20/2013     Priority: Medium     Tubular adenoma of colon 07/11/2012     Priority: Medium     9/6/13 colonoscopy revealed 4 polyps. 2 year follow up colonoscopy recommended       Hyperlipidemia LDL goal <130 05/09/2010     Priority: Medium     Hypertension goal BP (blood pressure) < 140/90      Priority: Medium     Social phobia 03/02/2005     Priority: Medium      Past Medical History:   Diagnosis Date     Bladder cancer (H)      Colon polyps     multiple may need colectomy (adenoma)     COPD (chronic obstructive pulmonary disease) (H)      Hyperlipidemia LDL goal <130      Hypertension goal BP (blood pressure) < 140/90      Social phobia      Past Surgical History:   Procedure Laterality Date     CATARACT IOL, RT/LT       CLAVICLE SURGERY Right     fracture in childhood     COLONOSCOPY N/A 3/30/2017    Procedure: COLONOSCOPY;  Surgeon: Jie Onofre MD;  Location: [facility]     COLONOSCOPY N/A 3/2/2021    Procedure: COLONOSCOPY, WITH POLYPECTOMY, hot snare, lift with elaview and epi, clip for hemorrhage control, tattoo;  Surgeon: Ra Cardoso MD;  Location: [facility]     COLONOSCOPY N/A 4/22/2021    Procedure: Colonoscopy;  Surgeon: Jitendra Rodríguez MD;  Location: [facility]     CYSTOSCOPY, BIOPSY BLADDER INSTILL OPTICAL AGENT N/A 9/2/2020    Procedure: CYSTOSCOPY, WITH INSTILLATION OF OPTICAL IMAGING AGENT INTO BLADDER AND BIOPSY OF BLADDER (CYSVIEW);  Surgeon: Kirt Guzman MD;  [facility]     CYSTOSCOPY, TRANSURETHRAL  RESECTION (TUR) TUMOR BLADDER, COMBINED N/A 9/5/2019    Procedure: cystoscopy,Transurethral Resection Of Bladder Tumor;  Surgeon: Almas Sunshine MD;  [facility]     CYSTOSCOPY, TRANSURETHRAL RESECTION (TUR) TUMOR BLADDER, COMBINED N/A 2/6/2020    Procedure: CYSTOSCOPY, WITH TRANSURETHRAL RESECTION BLADDER TUMOR;  Surgeon: Almas Sunshine MD;  [facility]     DAVINCI BYPASS ARTERY CORONARY N/A 9/29/2014    Procedure: DAVINCI BYPASS ARTERY CORONARY;  Surgeon: Lam Solis MD;  Location: [facility]     ESOPHAGOSCOPY, GASTROSCOPY, DUODENOSCOPY (EGD), COMBINED N/A 3/30/2017    Procedure: COMBINED ESOPHAGOSCOPY, GASTROSCOPY, DUODENOSCOPY (EGD);  Surgeon: Jie Onofre MD;  Location: [facility]     PHACOEMULSIFICATION CLEAR CORNEA WITH STANDARD INTRAOCULAR LENS IMPLANT Right 12/11/2020    Procedure: RIGHT EYE COMPLEX CATARACT REMOVAL WITH INTRAOCULAR LENS IMPLANT;  Surgeon: Aimee Marcus MD;  Location: [facility]     SURGICAL HISTORY OF -       right clavicular fracture with ORIF     [facility] COLONOSCOPY THRU STOMA, DIAGNOSTIC  2010    polyps due 2011     Current Outpatient Medications   Medication Sig Dispense Refill     albuterol (PROAIR HFA/PROVENTIL HFA/VENTOLIN HFA) 108 (90 Base) MCG/ACT inhaler Inhale 2 puffs into the lungs every 6 hours as needed for shortness of breath / dyspnea or wheezing 18 g 1     amLODIPine (NORVASC) 5 MG tablet TAKE ONE TABLET BY MOUTH EVERY NIGHT AT BEDTIME 90 tablet 3     aspirin 81 MG tablet Take 1 tablet (81 mg) by mouth daily 90 tablet 3     atorvastatin (LIPITOR) 80 MG tablet TAKE ONE TABLET BY MOUTH EVERY EVENING AT BEDTIME 90 tablet 3     buPROPion (WELLBUTRIN XL) 300 MG 24 hr tablet TAKE ONE TABLET BY MOUTH EVERY MORNING 90 tablet 1     ezetimibe (ZETIA) 10 MG tablet Take 1 tablet (10 mg) by mouth daily 90 tablet 3     lisinopril (ZESTRIL) 20 MG tablet Take 1 tablet (20 mg) by mouth 2 times daily 180 tablet 3     metoprolol succinate ER (TOPROL XL)  "50 MG 24 hr tablet Take 1 tablet (50 mg) by mouth At Bedtime 90 tablet 3     PARoxetine (PAXIL) 40 MG tablet Take 1 tablet (40 mg) by mouth every morning 90 tablet 3       Allergies   Allergen Reactions     Codeine Nausea and Vomiting        Social History     Tobacco Use     Smoking status: Former     Packs/day: 0.00     Years: 30.00     Pack years: 0.00     Types: Cigarettes     Quit date: 2022     Years since quittin.5     Smokeless tobacco: Never     Tobacco comments:     quit successfully (smoke and nicotine free)   Substance Use Topics     Alcohol use: No     Comment: quit 40 years ago     Family History   Problem Relation Age of Onset     Cancer Mother         esog/stomach     Cancer Father         lung     Hypertension Brother      Stomach Cancer Brother      Anesthesia Reaction No family hx of      Deep Vein Thrombosis (DVT) No family hx of      Glaucoma No family hx of      Macular Degeneration No family hx of      History   Drug Use     Types: Marijuana     Comment: occasionally uses marijuana         Objective     BP 90/58   Pulse 60   Temp 98.1  F (36.7  C)   Resp 16   Ht 1.753 m (5' 9\")   Wt 79.8 kg (176 lb)   SpO2 99%   BMI 25.99 kg/m      Physical Exam    GENERAL APPEARANCE: healthy, alert and no distress     EYES: EOMI,  PERRL     HENT: ear canals and TM's normal and nose and mouth without ulcers or lesions     NECK: no adenopathy, no asymmetry, masses, or scars and thyroid normal to palpation     RESP: lungs clear to auscultation - no rales, rhonchi or wheezes     CV: regular rates and rhythm, normal S1 S2, no S3 or S4 and no murmur, click or rub     MS: extremities normal- no gross deformities noted, no evidence of inflammation in joints, FROM in all extremities.     SKIN: no suspicious lesions or rashes on visible parts      NEURO: Normal strength and tone, sensory exam grossly normal, mentation intact and speech normal     PSYCH: mentation appears normal. and affect " normal/bright     LYMPHATICS: No cervical adenopathy    Recent Labs   Lab Test 07/22/22  1146 06/22/22  1140 06/06/22  1423 09/22/21  1425 04/20/21  1231 04/13/21  0907 04/09/21  1533   HGB 14.0  --   --   --  15.0   < > 14.7     --   --   --  357   < > 228   INR  --   --   --   --  1.00  --  1.04   NA  --  139  --  138 141   < > 142   POTASSIUM  --  4.6  --  4.2 4.3   < > 4.4   CR  --  0.99 1.0 0.98 0.85   < > 0.80   A1C  --   --   --   --   --   --  5.4    < > = values in this interval not displayed.        Diagnostics:  No labs were ordered during this visit.   No EKG required for low risk surgery (cataract, skin procedure, breast biopsy, etc).    Revised Cardiac Risk Index (RCRI):  The patient has the following serious cardiovascular risks for perioperative complications:   - Coronary Artery Disease (MI, positive stress test, angina, Qs on EKG) = 1 point     RCRI Interpretation: 1 point: Class II (low risk - 0.9% complication rate)         Signed Electronically by: Joel Nair MD, MD  Copy of this evaluation report is provided to requesting physician.

## 2023-01-30 ENCOUNTER — TELEPHONE (OUTPATIENT)
Dept: GASTROENTEROLOGY | Facility: CLINIC | Age: 73
End: 2023-01-30
Payer: COMMERCIAL

## 2023-01-30 NOTE — TELEPHONE ENCOUNTER
Caller:   Reason for Reschedule/Cancellation (please be detailed, any staff messages or encounters to note?): Stephanie Zamora MD de Groen, Petrus, MD; Kristy Holguin; Robert Jimenez, EVELYN; Pierre Zafar thank you - I know the patient will appreciate seeing you again.   Stephanie       ----- Message -----   From: Ra Steward MD   Sent: 1/30/2023   9:50 AM CST   To: Robert Perez, RN, *     Hello,     Milyry, I have been mostly away the past month.     I am completely familiar with this patient. There is no need for MAC, and I can do both procedures during my scheduled time slots. His last colonoscopy was without sedation - why is anybody suggesting MAC? I do EGDs all the time - if scheduled.     Thanks,     Marin   ----- Message -----   From: Kristy Holguin   Sent: 12/27/2022  11:43 AM CST   To: Robert Jimenez RN, Pierre Zafar, *     Morning,     Dr Zamora I just wanted to confirm that your intention was for Dr Steward to do the upper endoscopy and colonoscopy. Dr Steward scopes on Mondays and Fridays and does not do MAC on these days.I also see that he does not do upper endoscopy's.       Cade would it be possible to get MAC  for Dr Steward on 3/20 for this case?       Dr Steward we would like to confirm, if its possible you would do an upper endoscopy for this patient as I show you only do colonoscopy's?       Please advise     Thank you                Prior to reschedule please review:    Ordering Provider:NIMO BURTON    Sedation per order:MAC    Does patient have any ASC Exclusions, please identify?:       Notes on Cancelled Procedure:    Procedure:Upper and Lower Endoscopy [EGD and Colonoscopy]     Date: 4/6    Location:[facility]; [address] [place] [address], [place] [address]    Surgeon: NIMO        Rescheduled: Yes    Procedure: Upper and Lower Endoscopy [EGD and Colonoscopy]     Date: 2/22    Location:[facility]  [place]; [address], 3rd Floor, [place] [address]    Surgeon: SHOAIB BEASLEY    Sedation Level Scheduled  CS,  Reason for Sedation Level PER DR BA    Prep/Instructions updated and sent: NO

## 2023-02-01 ENCOUNTER — ALLIED HEALTH/NURSE VISIT (OUTPATIENT)
Dept: FAMILY MEDICINE | Facility: CLINIC | Age: 73
End: 2023-02-01
Payer: COMMERCIAL

## 2023-02-01 ENCOUNTER — ANESTHESIA EVENT (OUTPATIENT)
Dept: SURGERY | Facility: AMBULATORY SURGERY CENTER | Age: 73
End: 2023-02-01
Payer: COMMERCIAL

## 2023-02-01 DIAGNOSIS — E53.8 VITAMIN B12 DEFICIENCY (NON ANEMIC): Primary | ICD-10-CM

## 2023-02-01 PROCEDURE — 99207 PR NO CHARGE NURSE ONLY: CPT

## 2023-02-01 PROCEDURE — 96372 THER/PROPH/DIAG INJ SC/IM: CPT | Performed by: INTERNAL MEDICINE

## 2023-02-01 RX ADMIN — CYANOCOBALAMIN 1000 MCG: 1000 INJECTION, SOLUTION INTRAMUSCULAR; SUBCUTANEOUS at 10:31

## 2023-02-02 ENCOUNTER — ANESTHESIA (OUTPATIENT)
Dept: SURGERY | Facility: AMBULATORY SURGERY CENTER | Age: 73
End: 2023-02-02
Payer: COMMERCIAL

## 2023-02-02 ENCOUNTER — HOSPITAL ENCOUNTER (OUTPATIENT)
Facility: AMBULATORY SURGERY CENTER | Age: 73
Discharge: HOME | End: 2023-02-02
Attending: OPHTHALMOLOGY
Payer: COMMERCIAL

## 2023-02-02 ENCOUNTER — OFFICE VISIT (OUTPATIENT)
Dept: OPHTHALMOLOGY | Facility: CLINIC | Age: 73
End: 2023-02-02

## 2023-02-02 VITALS
TEMPERATURE: 97 F | DIASTOLIC BLOOD PRESSURE: 75 MMHG | HEIGHT: 69 IN | RESPIRATION RATE: 16 BRPM | SYSTOLIC BLOOD PRESSURE: 128 MMHG | HEART RATE: 60 BPM | OXYGEN SATURATION: 98 % | WEIGHT: 176 LBS | BODY MASS INDEX: 26.07 KG/M2

## 2023-02-02 DIAGNOSIS — Z96.1 PSEUDOPHAKIA, LEFT EYE: Primary | ICD-10-CM

## 2023-02-02 DIAGNOSIS — H25.812 COMBINED FORM OF AGE-RELATED CATARACT, LEFT EYE: Primary | ICD-10-CM

## 2023-02-02 PROCEDURE — 66982 XCAPSL CTRC RMVL CPLX WO ECP: CPT | Mod: LT | Performed by: OPHTHALMOLOGY

## 2023-02-02 PROCEDURE — 99024 POSTOP FOLLOW-UP VISIT: CPT | Performed by: OPHTHALMOLOGY

## 2023-02-02 PROCEDURE — 66982 XCAPSL CTRC RMVL CPLX WO ECP: CPT | Mod: LT

## 2023-02-02 DEVICE — EYE IMP IOL TECNIS IOL DIB00 23.0 DIB00 23.0: Type: IMPLANTABLE DEVICE | Site: EYE | Status: FUNCTIONAL

## 2023-02-02 RX ORDER — LABETALOL HYDROCHLORIDE 5 MG/ML
10 INJECTION, SOLUTION INTRAVENOUS
Status: DISCONTINUED | OUTPATIENT
Start: 2023-02-02 | End: 2023-02-02 | Stop reason: HOSPADM

## 2023-02-02 RX ORDER — DICLOFENAC SODIUM 1 MG/ML
1 SOLUTION/ DROPS OPHTHALMIC
Status: COMPLETED | OUTPATIENT
Start: 2023-02-02 | End: 2023-02-02

## 2023-02-02 RX ORDER — MOXIFLOXACIN 5 MG/ML
1 SOLUTION/ DROPS OPHTHALMIC
Status: COMPLETED | OUTPATIENT
Start: 2023-02-02 | End: 2023-02-02

## 2023-02-02 RX ORDER — CYCLOPENTOLAT/TROPIC/PHENYLEPH 1%-1%-2.5%
1 DROPS (EA) OPHTHALMIC (EYE)
Status: COMPLETED | OUTPATIENT
Start: 2023-02-02 | End: 2023-02-02

## 2023-02-02 RX ORDER — SODIUM CHLORIDE, SODIUM LACTATE, POTASSIUM CHLORIDE, CALCIUM CHLORIDE 600; 310; 30; 20 MG/100ML; MG/100ML; MG/100ML; MG/100ML
INJECTION, SOLUTION INTRAVENOUS CONTINUOUS
Status: DISCONTINUED | OUTPATIENT
Start: 2023-02-02 | End: 2023-02-02 | Stop reason: HOSPADM

## 2023-02-02 RX ORDER — PREDNISOLONE ACETATE 10 MG/ML
1-2 SUSPENSION/ DROPS OPHTHALMIC 4 TIMES DAILY
Qty: 10 ML | Refills: 0 | Status: SHIPPED | OUTPATIENT
Start: 2023-02-02 | End: 2023-05-04

## 2023-02-02 RX ORDER — ONDANSETRON 2 MG/ML
4 INJECTION INTRAMUSCULAR; INTRAVENOUS EVERY 30 MIN PRN
Status: DISCONTINUED | OUTPATIENT
Start: 2023-02-02 | End: 2023-02-02 | Stop reason: HOSPADM

## 2023-02-02 RX ORDER — MOXIFLOXACIN 5 MG/ML
1 SOLUTION/ DROPS OPHTHALMIC 4 TIMES DAILY
Qty: 3 ML | Refills: 0 | Status: SHIPPED | OUTPATIENT
Start: 2023-02-02 | End: 2023-05-04

## 2023-02-02 RX ORDER — FENTANYL CITRATE 50 UG/ML
50 INJECTION, SOLUTION INTRAMUSCULAR; INTRAVENOUS EVERY 5 MIN PRN
Status: DISCONTINUED | OUTPATIENT
Start: 2023-02-02 | End: 2023-02-02 | Stop reason: HOSPADM

## 2023-02-02 RX ORDER — FENTANYL CITRATE 50 UG/ML
INJECTION, SOLUTION INTRAMUSCULAR; INTRAVENOUS PRN
Status: DISCONTINUED | OUTPATIENT
Start: 2023-02-02 | End: 2023-02-02

## 2023-02-02 RX ORDER — BALANCED SALT SOLUTION 6.4; .75; .48; .3; 3.9; 1.7 MG/ML; MG/ML; MG/ML; MG/ML; MG/ML; MG/ML
SOLUTION OPHTHALMIC PRN
Status: DISCONTINUED | OUTPATIENT
Start: 2023-02-02 | End: 2023-02-02 | Stop reason: HOSPADM

## 2023-02-02 RX ORDER — PROPARACAINE HYDROCHLORIDE 5 MG/ML
1 SOLUTION/ DROPS OPHTHALMIC
Status: DISCONTINUED | OUTPATIENT
Start: 2023-02-02 | End: 2023-02-02 | Stop reason: HOSPADM

## 2023-02-02 RX ORDER — SODIUM CHLORIDE, SODIUM LACTATE, POTASSIUM CHLORIDE, CALCIUM CHLORIDE 600; 310; 30; 20 MG/100ML; MG/100ML; MG/100ML; MG/100ML
INJECTION, SOLUTION INTRAVENOUS CONTINUOUS
Status: DISCONTINUED | OUTPATIENT
Start: 2023-02-02 | End: 2023-02-03 | Stop reason: HOSPADM

## 2023-02-02 RX ORDER — LIDOCAINE 40 MG/G
CREAM TOPICAL
Status: DISCONTINUED | OUTPATIENT
Start: 2023-02-02 | End: 2023-02-02 | Stop reason: HOSPADM

## 2023-02-02 RX ORDER — ONDANSETRON 4 MG/1
4 TABLET, ORALLY DISINTEGRATING ORAL EVERY 30 MIN PRN
Status: DISCONTINUED | OUTPATIENT
Start: 2023-02-02 | End: 2023-02-02 | Stop reason: HOSPADM

## 2023-02-02 RX ORDER — KETOROLAC TROMETHAMINE 5 MG/ML
1 SOLUTION OPHTHALMIC 4 TIMES DAILY
Qty: 10 ML | Refills: 0 | Status: SHIPPED | OUTPATIENT
Start: 2023-02-02 | End: 2023-05-04

## 2023-02-02 RX ORDER — GLYCOPYRROLATE 0.2 MG/ML
INJECTION, SOLUTION INTRAMUSCULAR; INTRAVENOUS PRN
Status: DISCONTINUED | OUTPATIENT
Start: 2023-02-02 | End: 2023-02-02

## 2023-02-02 RX ORDER — LIDOCAINE HYDROCHLORIDE 10 MG/ML
INJECTION, SOLUTION EPIDURAL; INFILTRATION; INTRACAUDAL; PERINEURAL PRN
Status: DISCONTINUED | OUTPATIENT
Start: 2023-02-02 | End: 2023-02-02 | Stop reason: HOSPADM

## 2023-02-02 RX ORDER — MOXIFLOXACIN IN NACL,ISO-OS/PF 0.3MG/0.3
SYRINGE (ML) INTRAOCULAR PRN
Status: DISCONTINUED | OUTPATIENT
Start: 2023-02-02 | End: 2023-02-02 | Stop reason: HOSPADM

## 2023-02-02 RX ORDER — TETRACAINE HYDROCHLORIDE 5 MG/ML
SOLUTION OPHTHALMIC PRN
Status: DISCONTINUED | OUTPATIENT
Start: 2023-02-02 | End: 2023-02-02 | Stop reason: HOSPADM

## 2023-02-02 RX ORDER — FENTANYL CITRATE 50 UG/ML
25 INJECTION, SOLUTION INTRAMUSCULAR; INTRAVENOUS EVERY 5 MIN PRN
Status: DISCONTINUED | OUTPATIENT
Start: 2023-02-02 | End: 2023-02-02 | Stop reason: HOSPADM

## 2023-02-02 RX ORDER — PROPARACAINE HYDROCHLORIDE 5 MG/ML
1 SOLUTION/ DROPS OPHTHALMIC ONCE
Status: DISCONTINUED | OUTPATIENT
Start: 2023-02-02 | End: 2023-02-02 | Stop reason: HOSPADM

## 2023-02-02 RX ADMIN — DICLOFENAC SODIUM 1 DROP: 1 SOLUTION/ DROPS OPHTHALMIC at 08:02

## 2023-02-02 RX ADMIN — FENTANYL CITRATE 50 MCG: 50 INJECTION, SOLUTION INTRAMUSCULAR; INTRAVENOUS at 09:31

## 2023-02-02 RX ADMIN — MOXIFLOXACIN 1 DROP: 5 SOLUTION/ DROPS OPHTHALMIC at 08:02

## 2023-02-02 RX ADMIN — DICLOFENAC SODIUM 1 DROP: 1 SOLUTION/ DROPS OPHTHALMIC at 08:06

## 2023-02-02 RX ADMIN — SODIUM CHLORIDE, SODIUM LACTATE, POTASSIUM CHLORIDE, CALCIUM CHLORIDE: 600; 310; 30; 20 INJECTION, SOLUTION INTRAVENOUS at 08:00

## 2023-02-02 RX ADMIN — PROPARACAINE HYDROCHLORIDE 1 DROP: 5 SOLUTION/ DROPS OPHTHALMIC at 07:57

## 2023-02-02 RX ADMIN — Medication 1 DROP: at 08:02

## 2023-02-02 RX ADMIN — Medication 1 DROP: at 08:06

## 2023-02-02 RX ADMIN — DICLOFENAC SODIUM 1 DROP: 1 SOLUTION/ DROPS OPHTHALMIC at 07:57

## 2023-02-02 RX ADMIN — MOXIFLOXACIN 1 DROP: 5 SOLUTION/ DROPS OPHTHALMIC at 07:57

## 2023-02-02 RX ADMIN — Medication 1 DROP: at 07:57

## 2023-02-02 RX ADMIN — MOXIFLOXACIN 1 DROP: 5 SOLUTION/ DROPS OPHTHALMIC at 08:06

## 2023-02-02 RX ADMIN — GLYCOPYRROLATE 0.2 MCG: 0.2 INJECTION, SOLUTION INTRAMUSCULAR; INTRAVENOUS at 09:32

## 2023-02-02 ASSESSMENT — TONOMETRY
IOP_METHOD: TONOPEN
OS_IOP_MMHG: 9

## 2023-02-02 ASSESSMENT — COPD QUESTIONNAIRES
CAT_SEVERITY: MILD
COPD: 1

## 2023-02-02 ASSESSMENT — VISUAL ACUITY
METHOD: SNELLEN - LINEAR
OS_SC: 20/63

## 2023-02-02 ASSESSMENT — SLIT LAMP EXAM - LIDS: COMMENTS: NORMAL

## 2023-02-02 NOTE — INTERVAL H&P NOTE
"I have reviewed the surgical (or preoperative) H&P that is linked to this encounter, and examined the patient. There are no significant changes    Clinical Conditions Present on Arrival:  Clinically Significant Risk Factors Present on Admission                    # Overweight: Estimated body mass index is 25.99 kg/m  as calculated from the following:    Height as of this encounter: 1.753 m (5' 9\").    Weight as of this encounter: 79.8 kg (176 lb).       "

## 2023-02-02 NOTE — ANESTHESIA CARE TRANSFER NOTE
Patient: Tevin VALLEJO Guidry    Procedure: Procedure(s):  COMPLEX LEFT EYE PHACOEMULSIFICATION, CATARACT, WITH INTRAOCULAR LENS IMPLANT       Diagnosis: Combined form of age-related cataract, left eye [H25.812]  Diagnosis Additional Information: No value filed.    Anesthesia Type:   MAC     Note:    Oropharynx: oropharynx clear of all foreign objects and spontaneously breathing  Level of Consciousness: awake  Oxygen Supplementation: room air    Independent Airway: airway patency satisfactory and stable  Dentition: dentition unchanged  Vital Signs Stable: post-procedure vital signs reviewed and stable  Report to RN Given: handoff report given  Patient transferred to: Phase II    Handoff Report: Identifed the Patient, Identified the Reponsible Provider, Reviewed the pertinent medical history, Discussed the surgical course, Reviewed Intra-OP anesthesia mangement and issues during anesthesia, Set expectations for post-procedure period and Allowed opportunity for questions and acknowledgement of understanding      Vitals:  Vitals Value Taken Time   /68 02/02/23 1000   Temp 36  C (96.8  F) 02/02/23 1000   Pulse 60 02/02/23 1000   Resp 16 02/02/23 1000   SpO2 98 % 02/02/23 1000       Electronically Signed By: FRANCISCA Taylor CRNA  February 2, 2023  10:07 AM

## 2023-02-02 NOTE — PROGRESS NOTES
HPI:  Tevin Guidry is a 72 year old male with history of CAD s/p CABG, colon CA, HTN, HLD presenting for Postoperative day 0 left eye     Past Ocular History:  Punched in both eyes age 20 or 21  CE/IOL right eye Maltry 20  Glaucoma suspect  Left eye cataract    PMH:  HTN  CAD s/p 6 stents and surgery  Bladder cancer in remission (treated with local chemotherapy and DCG per patient)  MUTYH associated polyposis s/p partial colectomy    SH:  smoker    FH:  No known family history of blindness, glaucoma, macular degeneration  No known FHx polyposis - brother has not had colonoscopy  Mother and older brother  of stomach cancer    ASSESSMENT and PLAN:  Pseudophakia, left eye, day 0  Massey OS 23   Doing well  Keep patch in place at night for 7 days  Start post-operative drops and taper according to instructions  Post-operative do's and don'ts reviewed, questions answered    Recheck 1 week      2. Glaucoma suspect, both eyes  FH: Negative  Pachymetry:   Gonioscopy:  Tmax: 20  Today's IOP: 12/9  Target IOP:  Current medications: None  Meds to avoid: None  Visual field: OVF 24-2 (22)  right eye: reliable, superior depressed points (?lid) and inferonasal depressed spot, MD -3.8, PSD 4.4; superior depression not consistent with location from prior  Left eye: poor reliability with 14% FN, superior>inferior depression, MD -8.2, PSD 3.6; improved inferior and increased superior from prior  Nerve OCT: Spectralis optic nerve OCT (21)  OD: Central RNFL thickness 69, superior red, inferior and nasal yellow   OS: Central RNFL thickness 69, superotemporal red, nasal yellow     --> continue to monitor off drops for now   Based on c/d ratio  Recommend oct retinal nerve fiber layer in future    5. Pseudophakia, right eye  Maltry 2020 ZCB00 21.5    4. History of colonic polyps  - s/p partial colectomy for numerous colon polyps and adenomas  - no CHRPE on exam      Return for as  scheduled.    Attending Physician Attestation:  Complete documentation of historical and exam elements from today's encounter can be found in the full encounter summary report (not reduplicated in this progress note).  I personally obtained the chief complaint(s) and history of present illness.  I confirmed and edited as necessary the review of systems, past medical/surgical history, family history, social history, and examination findings as documented by others; and I examined the patient myself.  I personally reviewed the relevant tests, images, and reports as documented above.  I formulated and edited as necessary the assessment and plan and discussed the findings and management plan with the patient and family.  - Guido Massey MD

## 2023-02-02 NOTE — OP NOTE
PREOPERATIVE DIAGNOSIS:   1.  2. Combined form of age-related cataract, left eye    Other Age-related cataract H25.89    Left eye   POSTOPERATIVE DIAGNOSIS: Same   PROCEDURES:   1.Complex  Cataract extraction with intraocular lens implant Left eye.  SURGEON: Guido Massey M.D.  INDICATIONS: The patient Tevin Guidry presented to the eye clinic with decreased vision secondary to cataract in the Left eye. The risks, benefits and alternatives to cataract extraction were discussed. The patient elected to proceed. All questions were answered to the patient's satisfaction.   DESCRIPTION OF PROCEDURE:   Prior to the procedure, appropriate cardiac and respiratory monitors were applied to the patient.  In the pre-operative holding area, a drop of topical tetracaine was placed in the operative eye.  The patient was brought to the operating room.  With adequate anesthesia, the Left eye was prepped and draped in the usual sterile fashion. A lid speculum was placed, and the operating microscope was rotated into position. A surgical pause was carried out to identify with all members of the surgical team the correct surgical site. A paracentesis was created.  Through this limbal paracentesis, the anterior chamber was filled with Trypan blue for 30 seconds followed by preservative-free lidocaine followed by viscoelastic.  A temporal wound was created at the limbus using a 2.6 mm blade. A capsulorrhexis was initiated using a bent 25-gauge needle and was completed in continuous and circular fashion using the capsulorrhexis forceps. The lens nucleus was hydrodissected using balanced salt solution.  The lens nucleus was rotated and removed using phacoemulsification in a divide and conquer technique.  Residual cortical material was removed using irrigation-aspiration.  The capsular bag was reinflated to its maximal extent with cohesive viscoelastic.  A 23.0 diopter DIB00 was inserted into the capsular bag.  The lens power selected  was reviewed using the intraocular lens power measurements that were obtained preoperatively to confirm that the correct lens was selected for the desired post-operative refractive state. The residual viscoelastic was removed in its entirety, the wound were hydrated and found to be self-sealing.  Intracameral moxifloxacin was administered. Tactile pressure was confirmed to be in a normal range.  The lid speculum was removed and a shield was applied.  The patient tolerated the procedure well, and there were no complications.    PLAN: The patient will be discharged to home and will follow up today  EBL:  None  Complications:  None  Implant Name Type Inv. Item Serial No.  Lot No. LRB No. Used Action   EYE IMP IOL TECNIS IOL DIB00 23.0 DIB00 23.0 - [id number] Lens/Eye Implant EYE IMP IOL TECNIS IOL DIB00 23.0 DIB00 23.0 [id number] [employer]  Left 1 Implanted

## 2023-02-02 NOTE — DISCHARGE INSTRUCTIONS
[facility]  Home Care Following Anesthesia  For 24 hours after surgery:  Get plenty of rest.  A responsible adult must stay with you for at least 24 hours after you leave the surgery center.  Do not drive or use heavy equipment.  If you have weakness or tingling, don't drive or use heavy equipment until this feeling goes away.   Do not drink alcohol.   Avoid strenuous or risky activities.  Ask for help when climbing stairs.  You may feel lightheaded.  IF so, sit for a few minutes before standing.  Have someone help you get up.   If you have nausea (feel sick to your stomach): Drink only clear liquids such as apple juice, ginger ale, broth or 7-Up.  Rest may also help.  Be sure to drink enough fluids.  Move to a regular diet as you feel able.   You may have a slight fever.  Call the doctor if your fever is over 100 F (37.7 C) (taken under the tongue) or lasts longer than 24 hours.  You may have a dry mouth, a sore throat, muscle aches or trouble sleeping. These should go away after 24 hours.  Do not make important or legal decisions.   It is recommended to avoid smoking.               Tips for taking pain medications  To get the best pain relief possible, remember these points:  Take pain medications as directed, before pain becomes severe.  Pain medication can upset your stomach: taking it with food may help.  Constipation is a common side effect of pain medication. Drink plenty of  fluids.  Eat foods high in fiber. Take a stool softener if recommended by your doctor or pharmacist.  Do not drink alcohol, drive or operate machinery while taking pain medications.  Ask about other ways to control pain, such as with heat, ice or relaxation.    Tylenol/Acetaminophen Consumption  To help encourage the safe use of acetaminophen, the makers of TYLENOL  have lowered the maximum daily dose for single-ingredient Extra Strength TYLENOL  (acetaminophen) products sold in the [place] from 8 pills  per day (4,000 mg) to 6 pills per day (3,000 mg). The dosing interval has also changed from 2 pills every 4-6 hours to 2 pills every 6 hours.  If you feel your pain relief is insufficient, you may take Tylenol/Acetaminophen in addition to your narcotic pain medication.   Be careful not to exceed 3,000 mg of Tylenol/Acetaminophen in a 24 hour period from all sources.  If you are taking extra strength Tylenol/acetaminophen (500 mg), the maximum dose is 6 tablets in 24 hours.  If you are taking regular strength acetaminophen (325 mg), the maximum dose is 9 tablets in 24 hours.    Call a doctor for any of the following:  Signs of infection (fever, growing tenderness at the surgery site, a large amount of drainage or bleeding, severe pain, foul-smelling drainage, redness, swelling).  It has been over 8 to 10 hours since surgery and you are still not able to urinate (pass water).  Headache for over 24 hours.  Numbness, tingling or weakness the day after surgery (if you had spinal anesthesia).  Signs of Covid-19 infection (temperature over 100 degrees, shortness of breath, cough, loss of taste/smell, generalized body aches, persistent headache, chills, sore throat, nausea/vomiting/diarrhea)  Your doctor is:  Dr. Guido Massey, Ophthalmology: [phone]                    Or dial [phone] and ask for the resident on call for:  Ophthalmology  For emergency care, call the:  [facility]:  [phone] (TTY for hearing impaired: [phone])

## 2023-02-02 NOTE — ANESTHESIA POSTPROCEDURE EVALUATION
Patient: Tevin VALLEJO Guidry    Procedure: Procedure(s):  COMPLEX LEFT EYE PHACOEMULSIFICATION, CATARACT, WITH INTRAOCULAR LENS IMPLANT       Anesthesia Type:  MAC    Note:  Disposition: Outpatient   Postop Pain Control: Uneventful            Sign Out: Well controlled pain   PONV: No   Neuro/Psych: Uneventful            Sign Out: Acceptable/Baseline neuro status   Airway/Respiratory: Uneventful            Sign Out: Acceptable/Baseline resp. status   CV/Hemodynamics: Uneventful            Sign Out: Acceptable CV status; No obvious hypovolemia; No obvious fluid overload   Other NRE: NONE   DID A NON-ROUTINE EVENT OCCUR? No           Last vitals:  Vitals Value Taken Time   /75 02/02/23 1030   Temp 36.1  C (97  F) 02/02/23 1030   Pulse 60 02/02/23 1030   Resp 16 02/02/23 1030   SpO2 98 % 02/02/23 1030       Electronically Signed By: Oswaldo Peña MD, MD  February 2, 2023  1:04 PM

## 2023-02-02 NOTE — ANESTHESIA PREPROCEDURE EVALUATION
Anesthesia Pre-Procedure Evaluation    Patient: Tevin Guidry   MRN: [id number] : 1950        Procedure : Procedure(s):  LEFT EYE PHACOEMULSIFICATION, CATARACT, WITH INTRAOCULAR LENS IMPLANT          Past Medical History:   Diagnosis Date     Bladder cancer (H)      Colon polyps     multiple may need colectomy (adenoma)     COPD (chronic obstructive pulmonary disease) (H)      Hyperlipidemia LDL goal <130      Hypertension goal BP (blood pressure) < 140/90      Social phobia       Past Surgical History:   Procedure Laterality Date     CATARACT IOL, RT/LT       CLAVICLE SURGERY Right     fracture in childhood     COLONOSCOPY N/A 3/30/2017    Procedure: COLONOSCOPY;  Surgeon: Jie Onofre MD;  Location: [facility]     COLONOSCOPY N/A 3/2/2021    Procedure: COLONOSCOPY, WITH POLYPECTOMY, hot snare, lift with elaview and epi, clip for hemorrhage control, tattoo;  Surgeon: Ra Cardoso MD;  Location: [facility]     COLONOSCOPY N/A 2021    Procedure: Colonoscopy;  Surgeon: Jitendra Rodríguez MD;  Location: [facility]     CYSTOSCOPY, BIOPSY BLADDER INSTILL OPTICAL AGENT N/A 2020    Procedure: CYSTOSCOPY, WITH INSTILLATION OF OPTICAL IMAGING AGENT INTO BLADDER AND BIOPSY OF BLADDER (CYSVIEW);  Surgeon: Kirt Guzman MD;  [facility]     CYSTOSCOPY, TRANSURETHRAL RESECTION (TUR) TUMOR BLADDER, COMBINED N/A 2019    Procedure: cystoscopy,Transurethral Resection Of Bladder Tumor;  Surgeon: Almas Sunshine MD;  [facility]     CYSTOSCOPY, TRANSURETHRAL RESECTION (TUR) TUMOR BLADDER, COMBINED N/A 2020    Procedure: CYSTOSCOPY, WITH TRANSURETHRAL RESECTION BLADDER TUMOR;  Surgeon: Almas Sunshine MD;  [facility]     DAVINCI BYPASS ARTERY CORONARY N/A 2014    Procedure: DAVINCI BYPASS ARTERY CORONARY;  Surgeon: Lam Solis MD;  Location: [facility]     ESOPHAGOSCOPY, GASTROSCOPY, DUODENOSCOPY (EGD), COMBINED N/A 3/30/2017    Procedure: COMBINED  ESOPHAGOSCOPY, GASTROSCOPY, DUODENOSCOPY (EGD);  Surgeon: Jie Onofre MD;  Location: [facility]     PHACOEMULSIFICATION CLEAR CORNEA WITH STANDARD INTRAOCULAR LENS IMPLANT Right 2020    Procedure: RIGHT EYE COMPLEX CATARACT REMOVAL WITH INTRAOCULAR LENS IMPLANT;  Surgeon: Aimee Marcus MD;  Location: [facility] OR     SURGICAL HISTORY OF -       right clavicular fracture with ORIF     ZZHC COLONOSCOPY THRU STOMA, DIAGNOSTIC  2010    polyps due       Allergies   Allergen Reactions     Codeine Nausea and Vomiting      Social History     Tobacco Use     Smoking status: Former     Packs/day: 0.00     Years: 30.00     Pack years: 0.00     Types: Cigarettes     Quit date: 2022     Years since quittin.6     Smokeless tobacco: Never     Tobacco comments:     quit successfully (smoke and nicotine free)   Substance Use Topics     Alcohol use: No     Comment: quit 40 years ago      Wt Readings from Last 1 Encounters:   23 79.8 kg (176 lb)        Anesthesia Evaluation            ROS/MED HX  ENT/Pulmonary:     (+) Intermittent, asthma Treatment: Inhaler prn,  mild,  COPD,     Neurologic:  - neg neurologic ROS     Cardiovascular:     (+) hypertension--CAD angina-at rest. --    METS/Exercise Tolerance:     Hematologic:  - neg hematologic  ROS     Musculoskeletal:  - neg musculoskeletal ROS     GI/Hepatic:  - neg GI/hepatic ROS     Renal/Genitourinary:  - neg Renal ROS     Endo:  - neg endo ROS     Psychiatric/Substance Use:       Infectious Disease:  - neg infectious disease ROS     Malignancy:  - neg malignancy ROS     Other:               OUTSIDE LABS:  CBC:   Lab Results   Component Value Date    WBC 9.5 2022    WBC 9.6 2021    HGB 14.0 2022    HGB 15.0 2021    HCT 43.0 2022    HCT 45.8 2021     2022     2021     BMP:   Lab Results   Component Value Date     2022     2021    POTASSIUM 4.6 2022     POTASSIUM 4.2 09/22/2021    CHLORIDE 107 06/22/2022    CHLORIDE 104 09/22/2021    CO2 26 06/22/2022    CO2 30 09/22/2021    BUN 11 06/22/2022    BUN 13 09/22/2021    CR 0.99 06/22/2022    CR 1.0 06/06/2022     (H) 06/22/2022    GLC 95 09/22/2021     COAGS:   Lab Results   Component Value Date    PTT 30 04/09/2021    INR 1.00 04/20/2021     POC:   Lab Results   Component Value Date     (H) 04/22/2021     HEPATIC:   Lab Results   Component Value Date    ALBUMIN 3.8 06/22/2022    PROTTOTAL 7.2 06/22/2022    ALT 22 06/22/2022    AST 20 06/22/2022    ALKPHOS 137 06/22/2022    BILITOTAL 0.6 06/22/2022     OTHER:   Lab Results   Component Value Date    PH 7.36 09/30/2014    LACT 0.6 (L) 09/29/2014    A1C 5.4 04/09/2021    TAMMIE 9.1 06/22/2022    PHOS 4.3 09/30/2014    MAG 2.1 04/14/2021    TSH 0.70 01/25/2017       Anesthesia Plan    ASA Status:  3      Anesthesia Type: MAC.     - Reason for MAC: immobility needed, straight local not clinically adequate   Induction: Intravenous.           Consents    Anesthesia Plan(s) and associated risks, benefits, and realistic alternatives discussed. Questions answered and patient/representative(s) expressed understanding.     - Discussed: Risks, Benefits and Alternatives for BOTH SEDATION and the PROCEDURE were discussed     - Discussed with:  Patient      - Extended Intubation/Ventilatory Support Discussed: No.      - Patient is DNR/DNI Status: No    Use of blood products discussed: No .     Postoperative Care    Pain management: IV analgesics, Oral pain medications.   PONV prophylaxis: Ondansetron (or other 5HT-3), Dexamethasone or Solumedrol     Comments:                Oswaldo Peña MD, MD

## 2023-02-08 ENCOUNTER — OFFICE VISIT (OUTPATIENT)
Dept: OPHTHALMOLOGY | Facility: CLINIC | Age: 73
End: 2023-02-08
Attending: OPHTHALMOLOGY
Payer: COMMERCIAL

## 2023-02-08 DIAGNOSIS — Z96.1 PSEUDOPHAKIA, LEFT EYE: Primary | ICD-10-CM

## 2023-02-08 PROCEDURE — 99024 POSTOP FOLLOW-UP VISIT: CPT | Performed by: OPHTHALMOLOGY

## 2023-02-08 PROCEDURE — G0463 HOSPITAL OUTPT CLINIC VISIT: HCPCS

## 2023-02-08 ASSESSMENT — SLIT LAMP EXAM - LIDS
COMMENTS: NORMAL
COMMENTS: NORMAL

## 2023-02-08 ASSESSMENT — VISUAL ACUITY
OS_PH_SC: 20/20
OD_SC: 20/20
METHOD: SNELLEN - LINEAR
OS_SC: 20/40
OS_PH_SC+: -1

## 2023-02-08 ASSESSMENT — EXTERNAL EXAM - LEFT EYE: OS_EXAM: NORMAL

## 2023-02-08 ASSESSMENT — EXTERNAL EXAM - RIGHT EYE: OD_EXAM: NORMAL

## 2023-02-08 ASSESSMENT — TONOMETRY
OS_IOP_MMHG: 10
OD_IOP_MMHG: 11
IOP_METHOD: TONOPEN

## 2023-02-08 NOTE — PROGRESS NOTES
HPI     Post Op (Ophthalmology) Left Eye    In left eye.  This started 1 week ago.           Comments    1 week s/p CE/IOL LE-Pt. States that VA is much improved LE. No pain or dryness BE. No flashes or floaters BE.   Helen Daily COT 9:05 AM 2023             Last edited by Helen Daily on 2023  9:05 AM.        HPI:  Tevin Guidry is a 72 year old male with history of CAD s/p CABG, colon CA, HTN, HLD presenting for Postoperative week 1left eye     Past Ocular History:  Punched in both eyes age 20 or 21  CE/IOL right eye Maltry 20, left eye Massey 23  Glaucoma suspect      PMH:  HTN  CAD s/p 6 stents and surgery  Bladder cancer in remission (treated with local chemotherapy and DCG per patient)  MUTYH associated polyposis s/p partial colectomy    SH:  smoker    FH:  No known family history of blindness, glaucoma, macular degeneration  No known FHx polyposis - brother has not had colonoscopy  Mother and older brother  of stomach cancer    Eye Medications  Moxifloxacin/pred forte/ketorolac four times a day left eye     ASSESSMENT and PLAN:  1. Pseudophakia, left eye  Massey OS 23   Doing well  Continue drop taper    2. Glaucoma suspect, both eyes  FH: Negative  Pachymetry:   Gonioscopy:  Tmax: 20/19  Today's IOP: 12/9  Target IOP:  Current medications: None  Meds to avoid: None  Visual field: OVF 24-2 (22)  right eye: reliable, superior depressed points (?lid) and inferonasal depressed spot, MD -3.8, PSD 4.4; superior depression not consistent with location from prior  Left eye: poor reliability with 14% FN, superior>inferior depression, MD -8.2, PSD 3.6; improved inferior and increased superior from prior  Nerve OCT: Spectralis optic nerve OCT (21)  OD: Central RNFL thickness 69, superior red, inferior and nasal yellow   OS: Central RNFL thickness 69, superotemporal red, nasal yellow     --> continue to monitor off drops for now   Based on c/d ratio  Recommend oct  retinal nerve fiber layer in future    5. Pseudophakia, right eye  Maltry 12/11/2020 ZCB00 21.5    4. History of colonic polyps  - s/p partial colectomy for numerous colon polyps and adenomas  - no CHRPE on exam      Return for as scheduled, v/t/d/mrx/oct rnfl.    Attending Physician Attestation:  Complete documentation of historical and exam elements from today's encounter can be found in the full encounter summary report (not reduplicated in this progress note).  I personally obtained the chief complaint(s) and history of present illness.  I confirmed and edited as necessary the review of systems, past medical/surgical history, family history, social history, and examination findings as documented by others; and I examined the patient myself.  I personally reviewed the relevant tests, images, and reports as documented above.  I formulated and edited as necessary the assessment and plan and discussed the findings and management plan with the patient and family.  - Guido Massey MD

## 2023-02-08 NOTE — NURSING NOTE
Chief Complaints and History of Present Illnesses   Patient presents with     Post Op (Ophthalmology) Left Eye     Chief Complaint(s) and History of Present Illness(es)     Post Op (Ophthalmology) Left Eye            Laterality: left eye    Onset: 1 week ago          Comments    1 week s/p CE/IOL LE-Pt. States that VA is much improved LE. No pain or dryness BE. No flashes or floaters BE.   Helen Daily COT 9:05 AM February 8, 2023

## 2023-02-09 ENCOUNTER — TELEPHONE (OUTPATIENT)
Dept: GASTROENTEROLOGY | Facility: CLINIC | Age: 73
End: 2023-02-09
Payer: COMMERCIAL

## 2023-02-09 DIAGNOSIS — Z86.0100 HISTORY OF COLONIC POLYPS: Primary | ICD-10-CM

## 2023-02-09 RX ORDER — BISACODYL 5 MG
TABLET, DELAYED RELEASE (ENTERIC COATED) ORAL
Qty: 4 TABLET | Refills: 0 | Status: SHIPPED | OUTPATIENT
Start: 2023-02-09 | End: 2023-05-04

## 2023-02-09 NOTE — TELEPHONE ENCOUNTER
Attempted to contact patient regarding upcoming Colonoscopy/Upper endoscopy (EGD) procedure on 2/22/23 for pre assessment questions. No answer.     Left message to return call to [phone] #[phone]      Valerie Montgomery RN  Endoscopy Procedure Pre Assessment RN

## 2023-02-09 NOTE — TELEPHONE ENCOUNTER
Patient scheduled for Colonoscopy/Upper endoscopy (EGD) on 2/22/23.     Discuss Covid policy.     Pre op exam scheduled: N/A    Arrival time: 1230. Procedure time 1330    Facility location: [facility]; [address] [place] [address], [address] Floor, [place] [address]    Sedation type: Conscious sedation (approved per Dr. Brown although orders requests MAC)    Anticoagulations? No    Electronic implanted devices? No    Diabetic? No    Indication for procedure: Hx polyps, surveillance gastric intestinal metaplasia    Bowel prep recommendation: Extended prep Golytely d/t scheduled with Dr. Brown    Prep instructions sent via [facility]     Bowel prep script sent to    [facility] - [place], [place] - [address]    Pre visit planning completed.      Valerie Montgomery RN  Endoscopy Procedure Pre Assessment RN

## 2023-02-09 NOTE — TELEPHONE ENCOUNTER
Patient returned call.     Pre assessment questions completed for upcoming Colonoscopy/Upper endoscopy (EGD) procedure scheduled on 2/22/2023    COVID policy reviewed.     Pre-op scheduled  N/A    Reviewed procedural arrival time 1230, procedure time 1330 and facility location [facility]; [address], [address], [place] [address]    Designated  policy reviewed. Instructed to have someone stay 6 hours post procedure.     Reviewed procedure prep instructions.     Patient verbalized understanding and had no questions or concerns at this time.    Vielka Rodriguez RN  Endoscopy Procedure Pre Assessment RN

## 2023-02-16 ENCOUNTER — TELEPHONE (OUTPATIENT)
Dept: GASTROENTEROLOGY | Facility: CLINIC | Age: 73
End: 2023-02-16
Payer: COMMERCIAL

## 2023-02-16 DIAGNOSIS — I10 ESSENTIAL HYPERTENSION WITH GOAL BLOOD PRESSURE LESS THAN 140/90: ICD-10-CM

## 2023-02-16 NOTE — TELEPHONE ENCOUNTER
Caller: Tevin Guidry    Reason for Reschedule/Cancellation (please be detailed, any staff messages or encounters to note?): Provider out, reached out to patient to reschedule.      Prior to reschedule please review:    Ordering Provider:Stephanie Zamora MD    Sedation per order:MAC    Does patient have any ASC Exclusions, please identify?: NO      Notes on Cancelled Procedure:    Procedure:Upper and Lower Endoscopy [EGD and Colonoscopy]     Date: 2/22    Location:[facility]; [address] [facility], [address] [place] [address]    Surgeon: SHOAIB BEASLEY        Rescheduled: Yes    Procedure: Upper and Lower Endoscopy [EGD and Colonoscopy]     Date: 3/22    Location:[facility]; [address] [facility], [address] [place] [address]    Surgeon: SHOAIB BEASLEY    Sedation Level Scheduled  MODERATE,  Reason for Sedation Level Per TE dated 1/30     Prep/Instructions updated and sent: resent via [email or web address]

## 2023-02-22 ENCOUNTER — MYC MEDICAL ADVICE (OUTPATIENT)
Dept: FAMILY MEDICINE | Facility: CLINIC | Age: 73
End: 2023-02-22
Payer: COMMERCIAL

## 2023-02-22 RX ORDER — LISINOPRIL 20 MG/1
20 TABLET ORAL 2 TIMES DAILY
Qty: 180 TABLET | Refills: 3 | Status: SHIPPED | OUTPATIENT
Start: 2023-02-22 | End: 2024-05-06

## 2023-02-23 DIAGNOSIS — H40.003 GLAUCOMA SUSPECT, BOTH EYES: Primary | ICD-10-CM

## 2023-02-24 NOTE — TELEPHONE ENCOUNTER
Creatinine   Date Value Ref Range Status   06/22/2022 0.99 0.66 - 1.25 mg/dL Final   04/20/2021 0.85 0.66 - 1.25 mg/dL Final     BP Readings from Last 3 Encounters:   02/02/23 128/75   01/16/23 90/58   12/26/22 134/77     Lisinopril refilled already, pt advised to make appt for yearly now, due in June    Mariah Edwards, RN, BSN  [facility]

## 2023-03-01 ENCOUNTER — OFFICE VISIT (OUTPATIENT)
Dept: OPHTHALMOLOGY | Facility: CLINIC | Age: 73
End: 2023-03-01
Attending: OPHTHALMOLOGY
Payer: COMMERCIAL

## 2023-03-01 DIAGNOSIS — H40.003 GLAUCOMA SUSPECT, BOTH EYES: ICD-10-CM

## 2023-03-01 DIAGNOSIS — Z96.1 PSEUDOPHAKIA OF BOTH EYES: Primary | ICD-10-CM

## 2023-03-01 DIAGNOSIS — Z86.0100 HISTORY OF COLONIC POLYPS: ICD-10-CM

## 2023-03-01 DIAGNOSIS — H40.1111 PRIMARY OPEN ANGLE GLAUCOMA (POAG) OF RIGHT EYE, MILD STAGE: ICD-10-CM

## 2023-03-01 PROCEDURE — G0463 HOSPITAL OUTPT CLINIC VISIT: HCPCS | Mod: 25

## 2023-03-01 PROCEDURE — 92015 DETERMINE REFRACTIVE STATE: CPT

## 2023-03-01 PROCEDURE — 99024 POSTOP FOLLOW-UP VISIT: CPT | Performed by: OPHTHALMOLOGY

## 2023-03-01 PROCEDURE — 92133 CPTRZD OPH DX IMG PST SGM ON: CPT | Performed by: OPHTHALMOLOGY

## 2023-03-01 ASSESSMENT — REFRACTION_MANIFEST
OS_CYLINDER: SPHERE
OD_ADD: +2.50
OS_ADD: +2.50
OD_SPHERE: PLANO
OD_CYLINDER: SPHERE
OS_SPHERE: -0.25

## 2023-03-01 ASSESSMENT — CONF VISUAL FIELD
OD_INFERIOR_TEMPORAL_RESTRICTION: 3
OD_INFERIOR_NASAL_RESTRICTION: 3
OS_SUPERIOR_NASAL_RESTRICTION: 3
OD_SUPERIOR_NASAL_RESTRICTION: 3
OD_SUPERIOR_TEMPORAL_RESTRICTION: 3
OS_INFERIOR_NASAL_RESTRICTION: 3

## 2023-03-01 ASSESSMENT — SLIT LAMP EXAM - LIDS
COMMENTS: NORMAL
COMMENTS: NORMAL

## 2023-03-01 ASSESSMENT — CUP TO DISC RATIO
OD_RATIO: 0.8
OS_RATIO: 0.7

## 2023-03-01 ASSESSMENT — TONOMETRY
IOP_METHOD: TONOPEN
OD_IOP_MMHG: 17
OS_IOP_MMHG: 18

## 2023-03-01 ASSESSMENT — VISUAL ACUITY
OD_SC: 20/20
OS_SC+: -1
OS_SC: 20/30
METHOD: SNELLEN - LINEAR
OS_PH_SC+: +3
OS_PH_SC: 20/25

## 2023-03-01 ASSESSMENT — EXTERNAL EXAM - RIGHT EYE: OD_EXAM: NORMAL

## 2023-03-01 ASSESSMENT — EXTERNAL EXAM - LEFT EYE: OS_EXAM: NORMAL

## 2023-03-01 NOTE — PROGRESS NOTES
"HPI     Post Op (Ophthalmology) Left Eye    In left eye.  This started weeks ago.  Associated symptoms include Negative for dryness, eye pain, redness, photophobia, flashes and floaters.  Treatments tried include eye drops.  Pain was noted as 0/10. Additional comments: 4 week S/P Complex CE/IOL left eye 23.     Patient states vision is \"pretty good\" each eye. \"I can see the bottom line.\" Patient states compliant with surgical drops. \"I'm down to one a day on my last week.\"     MERCEDEZ Herrera 8:04 AM 2023            Last edited by Maira Franco on 3/1/2023  8:04 AM.        HPI:  Tevin Guidry is a 72 year old male with history of CAD s/p CABG, colon CA, HTN, HLD presenting for Postoperative week 4 left eye.      Past Ocular History:  Punched in both eyes age 20 or 21  CE/IOL right eye Maltry 20, left eye Massey 23  Glaucoma suspect      PMH:  HTN  CAD s/p 6 stents and surgery  Bladder cancer in remission (treated with local chemotherapy and DCG per patient)  MUTYH associated polyposis s/p partial colectomy    SH:  smoker    FH:  No known family history of blindness, glaucoma, macular degeneration  No known FHx polyposis - brother has not had colonoscopy  Mother and older brother  of stomach cancer    Eye Medications      ASSESSMENT and PLAN:  (H40.1111) Primary open angle glaucoma (POAG) of right eye, mild stage  FH: Negative  Pachymetry:   Gonioscopy:  Tmax: 20/19  Today's IOP:   Target IOP: mid-teens  Current medications: None  Meds to avoid: None    Visual field: OVF 24-2 (22)  Right eye: reliable, superior depressed points (?lid) and inferonasal depressed spot, MD -3.8, PSD 4.4; superior depression not consistent with location from prior  Left eye: poor reliability with 14% FN, superior>inferior depression, MD -8.2, PSD 3.6; improved inferior and increased superior from prior    OCT nerve fiber layer 23:   Right eye - G(r) 60 NI (g) 76 TI (y) 96 NS (r) 51 TS (r) 89    "   Left eye - G(r) 72 NI (g) 83 TI (g) 121 NS (g) 75 TS (r) 86  Nerve OCT: Spectralis optic nerve OCT (6/23/21)  OD: Central RNFL thickness 69, superior red, inferior and nasal yellow   OS: Central RNFL thickness 69, superotemporal red, nasal yellow     Glaucoma diagnosis discussed in detail and options available including drops, laser, or surgery  Return for Selected laser trabeculoplasty (SLT) OD    (Z96.1) Pseudophakia of both eyes  (primary encounter diagnosis)  Massey left eye  02/02/23   Maltry right eye  12/11/2020 ZCB00 21.5  Doing well    (Z86.010) History of colonic polyps  - s/p partial colectomy for numerous colon polyps and adenomas  - no CHRPE on exam      Return in about 4 weeks (around 3/29/2023) for SLT OD, v/t pilocarpine.    Attending Physician Attestation:  Complete documentation of historical and exam elements from today's encounter can be found in the full encounter summary report (not reduplicated in this progress note).  I personally obtained the chief complaint(s) and history of present illness.  I confirmed and edited as necessary the review of systems, past medical/surgical history, family history, social history, and examination findings as documented by others; and I examined the patient myself.  I personally reviewed the relevant tests, images, and reports as documented above.  I formulated and edited as necessary the assessment and plan and discussed the findings and management plan with the patient and family.  - Guido Massey MD

## 2023-03-01 NOTE — NURSING NOTE
"Chief Complaints and History of Present Illnesses   Patient presents with     Post Op (Ophthalmology) Left Eye     4 week S/P Complex CE/IOL left eye 2/2/23.     Patient states vision is \"pretty good\" each eye. \"I can see the bottom line.\" Patient states compliant with surgical drops. \"I'm down to one a day on my last week.\"     Maira Franco, COT 8:04 AM 03/01/2023       Chief Complaint(s) and History of Present Illness(es)     Post Op (Ophthalmology) Left Eye            Laterality: left eye    Onset: weeks ago    Associated symptoms: Negative for dryness, eye pain, redness, photophobia, flashes and floaters    Treatments tried: eye drops    Pain scale: 0/10    Comments: 4 week S/P Complex CE/IOL left eye 2/2/23.     Patient states vision is \"pretty good\" each eye. \"I can see the bottom line.\" Patient states compliant with surgical drops. \"I'm down to one a day on my last week.\"     Maira Franco, COT 8:04 AM 03/01/2023                  "

## 2023-03-03 ENCOUNTER — ALLIED HEALTH/NURSE VISIT (OUTPATIENT)
Dept: FAMILY MEDICINE | Facility: CLINIC | Age: 73
End: 2023-03-03
Payer: COMMERCIAL

## 2023-03-03 DIAGNOSIS — E53.8 VITAMIN B12 DEFICIENCY (NON ANEMIC): Primary | ICD-10-CM

## 2023-03-03 PROCEDURE — 96372 THER/PROPH/DIAG INJ SC/IM: CPT | Performed by: FAMILY MEDICINE

## 2023-03-03 PROCEDURE — 99207 PR NO CHARGE NURSE ONLY: CPT

## 2023-03-03 RX ADMIN — CYANOCOBALAMIN 1000 MCG: 1000 INJECTION, SOLUTION INTRAMUSCULAR; SUBCUTANEOUS at 10:00

## 2023-03-08 RX ORDER — BISACODYL 5 MG
TABLET, DELAYED RELEASE (ENTERIC COATED) ORAL
Qty: 4 TABLET | Refills: 0 | Status: SHIPPED | OUTPATIENT
Start: 2023-03-08 | End: 2023-05-04

## 2023-03-08 NOTE — TELEPHONE ENCOUNTER
Rescheduled Colonoscopy    Ok for MICHELLE and Luis to do an EGD per TE on 01.30.2023    Patient scheduled for Colonoscopy/Upper endoscopy (EGD) on 03.22.2023.     Discuss Covid policy.     Pre op exam needed? N/A    Arrival time: 1130. Procedure time 1230    Facility location: [facility]; [address] [place] [address], [address] Floor, [place] [address]    Sedation type: Conscious sedation     Anticoagulations? No    Electronic implanted devices? No    Diabetic? No    Indication for procedure:   MYH-associated colonic polyposis (MAP) [D12.6]  - Primary       History of colonic polyps [Z86.010]       Tubular adenoma of colon [D12.6]       Tubulovillous adenoma of colon [D12.6]       Gastric intestinal metaplasia    Bowel prep recommendation: Extended prep Golytely d/t Dr. Hinkle patient    Prep instructions sent via [employer] Bowel prep script sent to    [facility] - [place], [place] - [address]    Pre visit planning completed.    Vielka Carroll RN  Endoscopy Procedure Pre Assessment RN

## 2023-03-08 NOTE — TELEPHONE ENCOUNTER
Attempted to contact patient regarding upcoming Colonoscopy/Upper endoscopy (EGD) procedure on 03.22.2023 for pre assessment questions. No answer.     Left message to return call to [phone] #[phone]    Vielka Carroll RN  Endoscopy Procedure Pre Assessment RN

## 2023-03-15 ENCOUNTER — OFFICE VISIT (OUTPATIENT)
Dept: OPHTHALMOLOGY | Facility: CLINIC | Age: 73
End: 2023-03-15
Attending: OPHTHALMOLOGY
Payer: COMMERCIAL

## 2023-03-15 ENCOUNTER — TELEPHONE (OUTPATIENT)
Dept: OPHTHALMOLOGY | Facility: CLINIC | Age: 73
End: 2023-03-15

## 2023-03-15 ENCOUNTER — OFFICE VISIT (OUTPATIENT)
Dept: FAMILY MEDICINE | Facility: CLINIC | Age: 73
End: 2023-03-15
Payer: COMMERCIAL

## 2023-03-15 VITALS
DIASTOLIC BLOOD PRESSURE: 80 MMHG | RESPIRATION RATE: 14 BRPM | HEART RATE: 87 BPM | TEMPERATURE: 97.3 F | BODY MASS INDEX: 26.07 KG/M2 | HEIGHT: 69 IN | OXYGEN SATURATION: 97 % | SYSTOLIC BLOOD PRESSURE: 120 MMHG | WEIGHT: 176 LBS

## 2023-03-15 DIAGNOSIS — H40.003 GLAUCOMA SUSPECT, BOTH EYES: Primary | ICD-10-CM

## 2023-03-15 DIAGNOSIS — I25.119 CORONARY ARTERY DISEASE INVOLVING NATIVE HEART WITH ANGINA PECTORIS, UNSPECIFIED VESSEL OR LESION TYPE (H): Chronic | ICD-10-CM

## 2023-03-15 DIAGNOSIS — J44.9 CHRONIC OBSTRUCTIVE PULMONARY DISEASE, UNSPECIFIED COPD TYPE (H): Chronic | ICD-10-CM

## 2023-03-15 DIAGNOSIS — Z87.891 FORMER SMOKER: ICD-10-CM

## 2023-03-15 DIAGNOSIS — C80.1 SIGNET RING CELL ADENOCARCINOMA (H): ICD-10-CM

## 2023-03-15 DIAGNOSIS — Z86.0100 HISTORY OF COLONIC POLYPS: ICD-10-CM

## 2023-03-15 DIAGNOSIS — F40.10 SOCIAL PHOBIA: Chronic | ICD-10-CM

## 2023-03-15 DIAGNOSIS — H40.1111 PRIMARY OPEN ANGLE GLAUCOMA (POAG) OF RIGHT EYE, MILD STAGE: ICD-10-CM

## 2023-03-15 DIAGNOSIS — E78.5 HYPERLIPIDEMIA LDL GOAL <130: Chronic | ICD-10-CM

## 2023-03-15 DIAGNOSIS — I10 HYPERTENSION GOAL BP (BLOOD PRESSURE) < 140/90: Primary | Chronic | ICD-10-CM

## 2023-03-15 DIAGNOSIS — D12.6 TUBULAR ADENOMA OF COLON: ICD-10-CM

## 2023-03-15 DIAGNOSIS — F17.200 TOBACCO DEPENDENCE: ICD-10-CM

## 2023-03-15 DIAGNOSIS — Z85.51 PERSONAL HISTORY OF MALIGNANT NEOPLASM OF BLADDER: ICD-10-CM

## 2023-03-15 PROCEDURE — 99214 OFFICE O/P EST MOD 30 MIN: CPT | Performed by: FAMILY MEDICINE

## 2023-03-15 PROCEDURE — 99207 PR DROP WITH A PROCEDURE: CPT | Performed by: OPHTHALMOLOGY

## 2023-03-15 PROCEDURE — 250N000009 HC RX 250: Performed by: OPHTHALMOLOGY

## 2023-03-15 PROCEDURE — 65855 TRABECULOPLASTY LASER SURG: CPT | Mod: RT | Performed by: OPHTHALMOLOGY

## 2023-03-15 RX ORDER — AMLODIPINE BESYLATE 5 MG/1
TABLET ORAL
Qty: 90 TABLET | Refills: 3 | Status: SHIPPED | OUTPATIENT
Start: 2023-03-15 | End: 2024-05-17

## 2023-03-15 RX ORDER — BUPROPION HYDROCHLORIDE 300 MG/1
300 TABLET ORAL EVERY MORNING
Qty: 90 TABLET | Refills: 1 | Status: SHIPPED | OUTPATIENT
Start: 2023-03-15 | End: 2023-08-30

## 2023-03-15 RX ORDER — PAROXETINE 40 MG/1
40 TABLET, FILM COATED ORAL EVERY MORNING
Qty: 90 TABLET | Refills: 3 | Status: SHIPPED | OUTPATIENT
Start: 2023-03-15 | End: 2023-08-30

## 2023-03-15 RX ORDER — DICLOFENAC SODIUM 1 MG/ML
1 SOLUTION/ DROPS OPHTHALMIC 4 TIMES DAILY
Qty: 2.5 ML | Refills: 0 | Status: SHIPPED | OUTPATIENT
Start: 2023-03-15 | End: 2023-05-04

## 2023-03-15 RX ORDER — ALBUTEROL SULFATE 90 UG/1
2 AEROSOL, METERED RESPIRATORY (INHALATION) EVERY 6 HOURS PRN
Qty: 18 G | Refills: 1 | Status: SHIPPED | OUTPATIENT
Start: 2023-03-15 | End: 2024-04-11

## 2023-03-15 RX ADMIN — APRACLONIDINE HYDROCHLORIDE 1 DROP: 10 SOLUTION/ DROPS OPHTHALMIC at 15:37

## 2023-03-15 ASSESSMENT — CONF VISUAL FIELD
OD_INFERIOR_NASAL_RESTRICTION: 3
OS_SUPERIOR_NASAL_RESTRICTION: 0
OS_NORMAL: 1
OS_SUPERIOR_TEMPORAL_RESTRICTION: 0
OS_INFERIOR_NASAL_RESTRICTION: 0
OS_INFERIOR_TEMPORAL_RESTRICTION: 0
METHOD: COUNTING FINGERS
OD_SUPERIOR_NASAL_RESTRICTION: 3

## 2023-03-15 ASSESSMENT — PAIN SCALES - GENERAL: PAINLEVEL: NO PAIN (0)

## 2023-03-15 ASSESSMENT — GONIOSCOPY: METHOD: SUSSMAN, FOUR MIRROR

## 2023-03-15 ASSESSMENT — VISUAL ACUITY
METHOD: SNELLEN - LINEAR
OS_SC: 20/25
OD_SC: 20/20

## 2023-03-15 ASSESSMENT — TONOMETRY
IOP_METHOD: TONOPEN
OD_IOP_MMHG: 15
OS_IOP_MMHG: 11
OD_IOP_MMHG: 13
OS_IOP_MMHG: 13
IOP_METHOD: APPLANATION
OD_IOP_MMHG: 15
IOP_METHOD: TONOPEN

## 2023-03-15 ASSESSMENT — SLIT LAMP EXAM - LIDS
COMMENTS: NORMAL
COMMENTS: NORMAL

## 2023-03-15 ASSESSMENT — EXTERNAL EXAM - LEFT EYE: OS_EXAM: NORMAL

## 2023-03-15 ASSESSMENT — CUP TO DISC RATIO
OD_RATIO: 0.8
OS_RATIO: 0.7

## 2023-03-15 ASSESSMENT — EXTERNAL EXAM - RIGHT EYE: OD_EXAM: NORMAL

## 2023-03-15 NOTE — NURSING NOTE
Chief Complaints and History of Present Illnesses   Patient presents with     Glaucoma Follow-Up     2 week follow up POAG right eye      Chief Complaint(s) and History of Present Illness(es)     Glaucoma Follow-Up            Laterality: right eye    Associated symptoms: Negative for eye pain, redness, flashes and floaters    Pain scale: 0/10    Comments: 2 week follow up POAG right eye           Comments    Pt feels like vision greatly improved and stable since cataracts surgery.  No pain, redness or irritation. No flashes or floaters.  Not currently taking any eyedrops    Hola Ho 2:55 PM March 15, 2023

## 2023-03-15 NOTE — PROGRESS NOTES
HPI     Glaucoma Follow-Up    In right eye.  Associated symptoms include Negative for eye pain, redness, flashes and floaters.  Pain was noted as 0/10. Additional comments: 2 week follow up POAG right eye            Comments    Pt feels like vision greatly improved and stable since cataracts surgery.  No pain, redness or irritation. No flashes or floaters.  Not currently taking any eyedrops    Hola David 2:55 PM March 15, 2023             Last edited by Hola David on 3/15/2023  2:55 PM.        HPI:  Tevin Guidry is a 72 year old male with history of CAD s/p CABG, colon CA, HTN, HLD presenting for Selected laser trabeculoplasty (SLT) right eye.       Past Ocular History:  Punched in both eyes age 20 or 21  CE/IOL right eye Maltry 20, left eye Massey 23  Glaucoma suspect      PMH:  HTN  CAD s/p 6 stents and surgery  Bladder cancer in remission (treated with local chemotherapy and DCG per patient)  MUTYH associated polyposis s/p partial colectomy    SH:  smoker    FH:  No known family history of blindness, glaucoma, macular degeneration  No known FHx polyposis - brother has not had colonoscopy  Mother and older brother  of stomach cancer    Eye Medications      ASSESSMENT and PLAN:  (H40.1111) Primary open angle glaucoma (POAG) of right eye, mild stage  FH: Negative  Pachymetry:   Gonioscopy: open to thin CBB, 3+ pigment, flat  Tmax: 20/19  Today's IOP:   Target IOP: mid-teens  Current medications: None  Meds to avoid: None    Visual field: OVF 24-2 (22)  Right eye: reliable, superior depressed points (?lid) and inferonasal depressed spot, MD -3.8, PSD 4.4; superior depression not consistent with location from prior  Left eye: poor reliability with 14% FN, superior>inferior depression, MD -8.2, PSD 3.6; improved inferior and increased superior from prior    OCT nerve fiber layer 23:   Right eye - G(r) 60 NI (g) 76 TI (y) 96 NS (r) 51 TS (r) 89      Left eye - G(r) 72 NI (g) 83 TI (g) 121 NS (g)  75 TS (r) 86  Nerve OCT: Spectralis optic nerve OCT (6/23/21)  OD: Central RNFL thickness 69, superior red, inferior and nasal yellow   OS: Central RNFL thickness 69, superotemporal red, nasal yellow     Risks, benefits and alternatives discussed  Patient elects to proceed with Selected laser trabeculoplasty (SLT) right eye   Start diclofenac four times a day  X 4 days       (Z96.1) Pseudophakia of both eyes  (primary encounter diagnosis)  Massey left eye 02/02/23   Maltry right eye 12/11/2020 ZCB00 21.5  Doing well    (Z86.010) History of colonic polyps  - s/p partial colectomy for numerous colon polyps and adenomas  - no CHRPE on exam      Return in about 2 months (around 5/15/2023) for v/t 24-2 VF.    Attending Physician Attestation:  Complete documentation of historical and exam elements from today's encounter can be found in the full encounter summary report (not reduplicated in this progress note).  I personally obtained the chief complaint(s) and history of present illness.  I confirmed and edited as necessary the review of systems, past medical/surgical history, family history, social history, and examination findings as documented by others; and I examined the patient myself.  I personally reviewed the relevant tests, images, and reports as documented above.  I formulated and edited as necessary the assessment and plan and discussed the findings and management plan with the patient and family.  - Guido Massey MD

## 2023-03-15 NOTE — TELEPHONE ENCOUNTER
Pre assessment questions completed for upcoming Colonoscopy/Upper endoscopy (EGD) procedure scheduled on 3/22/23    COVID policy reviewed.     Reviewed procedural arrival time 1130, procedure time 1230 and facility location [facility]; [address] [facility], [place] [address]    Designated  policy reviewed. Instructed to have someone stay 6 hours post procedure.     Reviewed procedure prep instructions.     Patient verbalized understanding and had no questions or concerns at this time.    Vielka Le RN  Endoscopy Procedure Pre Assessment RN

## 2023-03-15 NOTE — PROGRESS NOTES
"  Assessment & Plan        Essential hypertension with goal blood pressure less than 140/90  Controlled. Continues lisinopril 20mg twice daily, amlodipine 5mg daily, metoprolol 50mg daily.           Hyperlipidemia LDL goal <70  Controlled.  Continues atorvastatin 80mg daily and zetia 10mg daily.  Following with cardiology        History of bladder cancer  Follows with urology every 6 months.  Last visit 1/29/22-note reviewed today.        Presented with gross hematuria in July 2019, evaluated by Urology and found to have non-invasive high grfade urothelial carcinoma s/p TURBT on 9/5/19.  9/19 - Noninvasive HG bladder cancer  Underwent induction BCG  2/20 - Noninvasive LG bladder cancer  Underwent repeat induction BCG       Per Urology visit note 11/29/22:  \"  72 year old male with recurrent bladder tumor. Has previously undergone BCG induction x 2. While office biopsy suggested possible HG disease, formal TURBT with only low-grade pathology. Has completed some BCG maintenance, but has since stopped. Given no recurrence today, will defer additional intravesical therapy at this time. Plan Cysto in 6 months.       - Cytology/FISH today  - office visit in 6 months for cysto\"              CAD, multiple vessel s/p CABG   Stable continues ASA 81mg daily and statin. Has not needed to take his nitroglycerin . he quit smoking!   Saw cardiology in 12/22 and he was told to follow up in two years.   - metoprolol succinate ER (TOPROL-XL) 50 MG 24 hr tablet; Take 1 tablet (50 mg) by mouth daily  Dispense: 90 tablet; Refill: 3      Social phobia  Stable on paroxetine 40mg daily.  Refilled today        Tobacco dependence  He quit smoking! Has been working with [facility].   Continues bupropion         Chronic obstructive pulmonary disease, unspecified COPD type (H)   stable. Rare cough or use of albuterol.   Denies worsening shortness of breath.        Signet ring cell adenocarcinoma   Tubular adenoma of colon  History of colon " "polyps  Colonoscopy is scheduled    Per GI note from 11/23/22:   \"MYH-associated polyposis, multiple TAs and TVAs removed since 2011. He also has a history of bladder cancer and CAD (s/p CABG LIMA-LAD and ALENA in 2014).  S/p right hemicolectomy - no primary location for the signet ring cells (noted location at the distal edge of the colonic resection margin) were found via colonoscopy, PET, EGD              -- last time took Fe supps was 2020. Iron studies recently were robust.  Colonoscopy last one 4/2022 -- annual  EGD - last 5/2021 - needs f/u of intestinal metaplasia (and family hx noted)               EGD and colonoscopy planned for 4/2023  AIG on gastric biopsies, and loss of the TI: B12 was found to be low, patient is on injection repletion, plan to recheck 1/23/22  US thyroid - the patient has had a PET and Oncology is following  Patient has also been referred to Derm and they are following the patient        MUTYH ASSOCIATED POLYPOSIS - saw genetic counselor 8/4/2021.       He is at increased risk for colon polyps and colon cancer.      Per genetics visit notes:   \"Cancer Risks:   We discussed that individuals with two mutations in the MUTYH gene have a diagnosis of MUTYH-associated polyposis (MAP).   ? Individuals with MAP can have hundreds to thousands of polyps, but most have fewer than 100. Multiple different types of colon polyps may be seen in individuals with MAP including adenomas, serrated adenomas, hyperplastic/sessile serrated polyps, and mixed (hyperplastic and adenomatous) polyps.  ? Individuals with MAP have an increased risk of colorectal cancer. In the absence of appropriate surveillance, the lifetime risk for colorectal cancer may be up to 80%.  ? There is also an increased risk for duodenal cancer in individuals with MAP.   ? Individuals with MAP may also be at increased risk for other cancers, including ovarian and bladder, although current risks are not well defined. Other " cancers (including breast, uterine, gastric, pancreatic, skin, and thyroid) have also been reported in individuals with MAP, although confirmed risks are not available at this time.   ? Individuals with MAP may also have these features:    CHRPE: freckle like spots on the inside of the eye    Dental abnormalities/cysts on the jaw bone    Benign and malignant tumors of the skin s oil glands (sebaceous tumors)      Cancer Screening and Prevention:  The following screening is recommended for individuals with MUTYH-associated polyposis (MAP) per current National Comprehensive Cancer Network (NCCN) guidelines:  ? High-quality colonoscopy and polypectomy every 1-2 years beginning at age 25-30 (or earlier based on family history). Depending upon colon polyp burden, removal of the colon may be recommended with continued surveillance of the rectum (as needed).  ? Baseline upper endoscopy (including complete visualization of the ampulla of Vater) beginning at age 30-35; repeated based on polyp findings.  ? Annual physical exam to begin at the time of diagnosis with MAP. Guidelines do not yet encourage screening for other cancers, but skin and thyroid exams could be done as part of the annual physical exam.  ? There are currently no other specific cancer screening guidelines for other cancers potentially associated with MAP. As such, additional screening should be based on personal and family history.     Other screening based on Tevin's personal and family history:  - He was recently diagnosed with a cancer of unknown primary site (likely GI). He had extensive workup for this. He should continue with the plan for active surveillance as directed by his oncologist, Tanner Sánchez MD, PhD.  - Tevin has already also had laparoscopic right hemicolectomy on 4/13/21 due to high colon polyp burden. He should continue with his colonoscopy surveillance as outlined above and as recommended by his physicians.  - He also has a history of  "bladder cancer initially diagnosed in 2019. He should continue with his follow up for this as recommended by his urologist.      Of note, the above information is based on our current understanding of Tevin's genetic findings. Tevin is encouraged to reach out to me regularly regarding any pertinent updates to his personal and/or family history of cancer, as our understanding of the genetic findings in his family may change over time. \"            BMI:   Estimated body mass index is 25.99 kg/m  as calculated from the following:    Height as of this encounter: 1.753 m (5' 9\").    Weight as of this encounter: 79.8 kg (176 lb).             No follow-ups on file.   Follow-up Visit   Expected date:  Jul 15, 2023 (Approximate)      Follow Up Appointment Details:     Follow-up with whom?: Me    Follow-Up for what?: Medicare Wellness    Any Additional Chronic Condition Management?:  Hyperlipidemia  Hypertension       Welcome or Annual?: Annual Wellness    How?: In Person    Is this an as-needed follow-up?: No                    Preethi Quiroz MD   [facility]    Chace Simental is a 72 year old , presenting for the following health issues:  Hypertension, Lipids (/), and Follow Up      History of Present Illness       Reason for visit:  Six month follow up appointment    He eats 2-3 servings of fruits and vegetables daily.He consumes 0 sweetened beverage(s) daily.He exercises with enough effort to increase his heart rate 20 to 29 minutes per day.  He exercises with enough effort to increase his heart rate 6 days per week. He is missing 1 dose(s) of medications per week.  He is not taking prescribed medications regularly due to remembering to take.     Overall doing well. Quit smoking     Review of Systems          Objective    /80 (BP Location: Left arm, Patient Position: Chair, Cuff Size: Adult Regular)   Pulse 87   Temp 97.3  F (36.3  C) (Temporal)   Resp 14   Ht 1.753 m (5' 9\")   " Wt 79.8 kg (176 lb)   SpO2 97%   BMI 25.99 kg/m    Body mass index is 25.99 kg/m .  Physical Exam   GENERAL: healthy, alert and no distress    Hospital Outpatient Visit on 01/11/2023   Component Date Value Ref Range Status     LVEF  01/11/2023 60-65%   Final

## 2023-03-15 NOTE — TELEPHONE ENCOUNTER
Called and spoke to Hola     Confirmed It was ok for him to be seen today     Aracelis Barnett Communication Facilitator on 3/15/2023 at 12:38 PM'

## 2023-03-15 NOTE — TELEPHONE ENCOUNTER
[facility] Call Center    Phone Message    May a detailed message be left on voicemail: yes     Reason for Call: Other: Patient calling in that he has an apt for today 3/15 at 2:30pm with Dr Massey for post op. Per last visit notes it says to come in 3/29 or after. Is it too early to come in? Also, notes says to return for Selected laser trabeculoplasty (SLT). Does patient need a ?     Please call patient back at [phone]. Thank you.    Action Taken: Message routed to:  [facility] Eye    Travel Screening: Not Applicable

## 2023-03-22 ENCOUNTER — HOSPITAL ENCOUNTER (OUTPATIENT)
Facility: CLINIC | Age: 73
Discharge: HOME | End: 2023-03-22
Attending: INTERNAL MEDICINE | Admitting: INTERNAL MEDICINE
Payer: COMMERCIAL

## 2023-03-22 VITALS
OXYGEN SATURATION: 95 % | SYSTOLIC BLOOD PRESSURE: 122 MMHG | RESPIRATION RATE: 16 BRPM | DIASTOLIC BLOOD PRESSURE: 88 MMHG | HEART RATE: 52 BPM

## 2023-03-22 LAB
COLONOSCOPY: NORMAL
UPPER GI ENDOSCOPY: NORMAL

## 2023-03-22 PROCEDURE — 99153 MOD SED SAME PHYS/QHP EA: CPT | Performed by: INTERNAL MEDICINE

## 2023-03-22 PROCEDURE — 88341 IMHCHEM/IMCYTCHM EA ADD ANTB: CPT | Mod: 26 | Performed by: PATHOLOGY

## 2023-03-22 PROCEDURE — 43251 EGD REMOVE LESION SNARE: CPT | Performed by: INTERNAL MEDICINE

## 2023-03-22 PROCEDURE — 45385 COLONOSCOPY W/LESION REMOVAL: CPT | Mod: PT

## 2023-03-22 PROCEDURE — 88305 TISSUE EXAM BY PATHOLOGIST: CPT | Mod: 26 | Performed by: PATHOLOGY

## 2023-03-22 PROCEDURE — 45380 COLONOSCOPY AND BIOPSY: CPT | Performed by: INTERNAL MEDICINE

## 2023-03-22 PROCEDURE — G0500 MOD SEDAT ENDO SERVICE >5YRS: HCPCS | Mod: PT | Performed by: INTERNAL MEDICINE

## 2023-03-22 PROCEDURE — 88342 IMHCHEM/IMCYTCHM 1ST ANTB: CPT | Mod: 26 | Performed by: PATHOLOGY

## 2023-03-22 PROCEDURE — 88305 TISSUE EXAM BY PATHOLOGIST: CPT | Mod: TC | Performed by: INTERNAL MEDICINE

## 2023-03-22 PROCEDURE — 250N000009 HC RX 250: Performed by: INTERNAL MEDICINE

## 2023-03-22 PROCEDURE — 250N000011 HC RX IP 250 OP 636: Performed by: INTERNAL MEDICINE

## 2023-03-22 RX ORDER — ONDANSETRON 2 MG/ML
4 INJECTION INTRAMUSCULAR; INTRAVENOUS
Status: DISCONTINUED | OUTPATIENT
Start: 2023-03-22 | End: 2023-03-22 | Stop reason: HOSPADM

## 2023-03-22 RX ORDER — FENTANYL CITRATE 50 UG/ML
INJECTION, SOLUTION INTRAMUSCULAR; INTRAVENOUS PRN
Status: DISCONTINUED | OUTPATIENT
Start: 2023-03-22 | End: 2023-03-22 | Stop reason: HOSPADM

## 2023-03-22 RX ORDER — LIDOCAINE 40 MG/G
CREAM TOPICAL
Status: DISCONTINUED | OUTPATIENT
Start: 2023-03-22 | End: 2023-03-22 | Stop reason: HOSPADM

## 2023-03-22 ASSESSMENT — ACTIVITIES OF DAILY LIVING (ADL)
ADLS_ACUITY_SCORE: 33
ADLS_ACUITY_SCORE: 35

## 2023-03-22 NOTE — H&P
ENDOSCOPY PRE-SEDATION H&P FOR OUTPATIENT PROCEDURES    Tevin Guidry  [id number]  1950    Procedure: Colonoscopy & EGD  Pre-procedure diagnosis: surveillance, hx of MYH-associated polyposis, incident signet ring carcinoma    Past medical history:   Past Medical History:   Diagnosis Date     Bladder cancer (H)      Colon polyps     multiple may need colectomy (adenoma)     COPD (chronic obstructive pulmonary disease) (H)      Hyperlipidemia LDL goal <130      Hypertension goal BP (blood pressure) < 140/90      Social phobia      Patient Active Problem List   Diagnosis     Social phobia     Hypertension goal BP (blood pressure) < 140/90     Hyperlipidemia LDL goal <130     Tubular adenoma of colon     Advanced directives, counseling/discussion     Coronary artery disease involving native heart with angina pectoris, unspecified vessel or lesion type (H)     Former smoker     S/P CABG (coronary artery bypass graft)     Chronic obstructive pulmonary disease, unspecified COPD type (H)     Anemia, unspecified type     Bladder cancer (H)     Personal history of malignant neoplasm of bladder     Malignant neoplasm of overlapping sites of bladder (H)     Combined form of age-related cataract, both eyes     Total cataract of right eye     History of colonic polyps     Colon cancer (H)     Signet ring cell adenocarcinoma (H)     Metastatic signet ring cell carcinoma (H)       Past surgical history:   Past Surgical History:   Procedure Laterality Date     CATARACT IOL, RT/LT       CLAVICLE SURGERY Right     fracture in childhood     COLONOSCOPY N/A 3/30/2017    Procedure: COLONOSCOPY;  Surgeon: Jie Onofre MD;  Location:  GI     COLONOSCOPY N/A 3/2/2021    Procedure: COLONOSCOPY, WITH POLYPECTOMY, hot snare, lift with elaview and epi, clip for hemorrhage control, tattoo;  Surgeon: Ra Cardoso MD;  Location: [facility] OR     COLONOSCOPY N/A 4/22/2021    Procedure: Colonoscopy;  Surgeon: Marcos  MD Jitendra;  Location: [facility] OR     CYSTOSCOPY, BIOPSY BLADDER INSTILL OPTICAL AGENT N/A 9/2/2020    Procedure: CYSTOSCOPY, WITH INSTILLATION OF OPTICAL IMAGING AGENT INTO BLADDER AND BIOPSY OF BLADDER (CYSVIEW);  Surgeon: Kirt Guzman MD;  Location: [facility] OR     CYSTOSCOPY, TRANSURETHRAL RESECTION (TUR) TUMOR BLADDER, COMBINED N/A 9/5/2019    Procedure: cystoscopy,Transurethral Resection Of Bladder Tumor;  Surgeon: Almas Sunshine MD;  Location: [facility] OR     CYSTOSCOPY, TRANSURETHRAL RESECTION (TUR) TUMOR BLADDER, COMBINED N/A 2/6/2020    Procedure: CYSTOSCOPY, WITH TRANSURETHRAL RESECTION BLADDER TUMOR;  Surgeon: Almas Sunshine MD;  Location: [facility] OR     DAVINCI BYPASS ARTERY CORONARY N/A 9/29/2014    Procedure: DAVINCI BYPASS ARTERY CORONARY;  Surgeon: Lam Solis MD;  Location: [facility] OR     ESOPHAGOSCOPY, GASTROSCOPY, DUODENOSCOPY (EGD), COMBINED N/A 3/30/2017    Procedure: COMBINED ESOPHAGOSCOPY, GASTROSCOPY, DUODENOSCOPY (EGD);  Surgeon: Jie Onofre MD;  Location: [facility] GI     PHACOEMULSIFICATION CLEAR CORNEA WITH STANDARD INTRAOCULAR LENS IMPLANT Right 12/11/2020    Procedure: RIGHT EYE COMPLEX CATARACT REMOVAL WITH INTRAOCULAR LENS IMPLANT;  Surgeon: Aimee Marcus MD;  Location: [facility] OR     PHACOEMULSIFICATION CLEAR CORNEA WITH STANDARD INTRAOCULAR LENS IMPLANT Left 2/2/2023    Procedure: COMPLEX LEFT EYE PHACOEMULSIFICATION, CATARACT, WITH INTRAOCULAR LENS IMPLANT;  Surgeon: Guido Massey MD;  Location: [facility] OR     SURGICAL HISTORY OF -       right clavicular fracture with ORIF     ZZHC COLONOSCOPY THRU STOMA, DIAGNOSTIC  2010    polyps due 2011       Current Facility-Administered Medications   Medication     lidocaine (LMX4) cream     lidocaine 1 % 0.1-1 mL     ondansetron (ZOFRAN) injection 4 mg     sodium chloride (PF) 0.9% PF flush 3 mL     sodium chloride (PF) 0.9% PF flush 3 mL       Allergies   Allergen Reactions     Codeine Nausea and  Vomiting       History of Anesthesia/Sedation Problems: no    Physical Exam:    Mental status: alert  Heart: Normal  Lung: Normal  Assessment of patient's airway: Normal  Other as pertinent for procedure: None     Lab Results   Component Value Date    WBC 9.5 07/22/2022    WBC 9.6 04/20/2021     Lab Results   Component Value Date    RBC 4.59 07/22/2022    RBC 4.84 04/20/2021     Lab Results   Component Value Date    HGB 14.0 07/22/2022    HGB 15.0 04/20/2021     Lab Results   Component Value Date    HCT 43.0 07/22/2022    HCT 45.8 04/20/2021     Lab Results   Component Value Date    MCV 94 07/22/2022    MCV 95 04/20/2021     Lab Results   Component Value Date    MCH 30.5 07/22/2022    MCH 31.0 04/20/2021     Lab Results   Component Value Date    MCHC 32.6 07/22/2022    MCHC 32.8 04/20/2021     Lab Results   Component Value Date    RDW 13.3 07/22/2022    RDW 12.7 04/20/2021     Lab Results   Component Value Date     07/22/2022     04/20/2021     INR   Date Value Ref Range Status   04/20/2021 1.00 0.86 - 1.14 Final        ASA Score: See Provation note    Mallampati score:  II - Faucial pillars and soft palate may be seen, but uvula is masked by the base of the tongue    Assessment/Plan:     The patient is an appropriate candidate to receive sedation.    Informed consent was discussed with the patient/family, including the risks, benefits, potential complications and any alternative options associated with sedation.    Patient assessment completed just prior to sedation and while under constant observation by the provider. Condition determined to be adequate for proceeding with sedation.    The specific risks for the procedure were discussed with the patient at the time of informed consent and include but are not limited to perforation which could require surgery, missing significant neoplasm or lesion, hemorrhage and adverse sedative complication.      Meryl Devine MD

## 2023-03-22 NOTE — OR NURSING
Patient had colonoscopy+biopsies+polypectomies and EGD with polypectomy x1  Patient tolerated procedure under conscious sedation and 2 liters nasal cannula.

## 2023-03-27 LAB
PATH REPORT.ADDENDUM SPEC: NORMAL
PATH REPORT.COMMENTS IMP SPEC: NORMAL
PATH REPORT.COMMENTS IMP SPEC: NORMAL
PATH REPORT.FINAL DX SPEC: NORMAL
PATH REPORT.GROSS SPEC: NORMAL
PATH REPORT.MICROSCOPIC SPEC OTHER STN: NORMAL
PATH REPORT.RELEVANT HX SPEC: NORMAL
PHOTO IMAGE: NORMAL

## 2023-04-03 ENCOUNTER — ALLIED HEALTH/NURSE VISIT (OUTPATIENT)
Dept: FAMILY MEDICINE | Facility: CLINIC | Age: 73
End: 2023-04-03
Payer: COMMERCIAL

## 2023-04-03 DIAGNOSIS — E53.8 VITAMIN B12 DEFICIENCY (NON ANEMIC): Primary | ICD-10-CM

## 2023-04-03 PROCEDURE — 96372 THER/PROPH/DIAG INJ SC/IM: CPT | Performed by: FAMILY MEDICINE

## 2023-04-03 PROCEDURE — 99207 PR NO CHARGE NURSE ONLY: CPT

## 2023-04-03 RX ADMIN — CYANOCOBALAMIN 1000 MCG: 1000 INJECTION, SOLUTION INTRAMUSCULAR; SUBCUTANEOUS at 11:06

## 2023-04-27 NOTE — PROGRESS NOTES
Medication Therapy Management (MTM) Encounter    ASSESSMENT:                            Medication Adherence/Access: No issues identified      Smoking cessation: Patient is smoke free.   Continue zyban long term.     Hypertension: Patient is not meeting blood pressure goal of < 140/90mmHg. Patient would benefit from the following changes - Start HOME blood pressure monitoring, watching diet, increasing exercise and weight loss and limiting/reducing sodium.    Hyperlipidemia: Stable.  Patient is on high intensity statin which is indicated based on 2019 ACC/AHA guidelines for lipid management.      COPD: Patient would benefit from the following medication changes: START once daily Anoro ellipta (laba/lama) inhaler  and further education of using controller medications regularly and the administration technique of a disk inhaler        PLAN:                              1. Congrats -your still smoke free since June 23rd -2022!     Keep it going --very proud of you!    2. For your COPD--new 2023 guidelines -Lets try 1 puff daily for Anoro ellipta inhaler (combination LABA/LAMA) as I demonstrated at your visit today . Carry your albuterol inhaler with you as a rescue inhaler .     3. Blood Pressure a little high today 148/74mmhg. --stay on same 3 Blood Pressure medications for now --check Blood Pressure at home --goal is <140/90mmhg.  Optimum is <130/80mmhg.         Follow-up: Return in about 4 weeks (around 6/1/2023), or @ 1:30 PM, for Medication Therapy Management Visit, BP Recheck, COPD.        SUBJECTIVE/OBJECTIVE:                          Tevin Guidry is a 72 year old male coming in for a follow-up (12-1-22 ) visit. He was referred to me from Preethi Quiroz      Reason for visit:   Smoking cessation: he feels bupropion was the key to his quitting. Still smoke free since June 23rd.2022.       Recent derm check one basal cell carcinoma on his back. Taken care of now.       Some copd SOB now with weather changes.  Using prn albuterol.           Allergies/ADRs: Reviewed in chart  Past Medical History: Per patient has a hemicolectomy with a hx of polyps (removed appendix). CAD (CABG), Social phobia, COPD, adenocarcinoma  Of bladder/colon. Had signet ring cancer removed with colon as well.  Has MUTYH--associated polyposis.   Tobacco: He currently gets a hold a cigarette- relapsed recently -see below .  Never had chantix in the past .   Alcohol: not currently using  Other Substance Use: marijuana flower, occasional vape - smokes most days/week  --he is much more worried about cig smoking vs. Marijuana.   Caffeine: large starbucks in morning and sometimes in PM      Medication Adherence/Access: No issues      .Smoking cessation:  Patient reports things going well.  What has gone well for you? Bupropion working well. Smoke free since 6-23-22.   What strategies have you tried?   He doesn't want to start cigs again --he's made it this far feels great .   What side effects to nicotine replacement therapies or medications are you experiencing: not using any now.   What has helped you the most to keep from smoking? Bupropion for now and long term.   Is there anything you might anticipate happening in the near future (e.g., next week, next month) that could present a challenge for you? None   How can I continue to support you going forward? Hold patient accountable.               Previously :   Smoking cessation:  Patient reports things going so well.  NO SMOKING SINCE 6-!!    He has relapsed smoking , but pet/ct scan was negative , but showed darkening lungs and calcium buildup in heart .   What side effects to nicotine replacement therapies or medications are you experiencing: none   What times or situations have you found the most challenging?Just when he gets the urge--maybe boredom/stress.    Is there anything that worked well for you?  Bupropion 300mg./day helps.   How can you get back on your plan? He feels he can resist  temptation now --doing well.   What is a first step-today or tomorrow or in the next few days-that will enable you to do this? Periodic mtm appt. To hold him accountable.   How can I support you in doing this? continue meds/counseling.       Previously: 6-23-22:    Has been smoking off/on , can go for days quitting --but then in his head he wakes up and wants a cigarette -- then he has to buy a pack and start 1 PPD .    His last cigarette was Sunday evening 6-19-22.  Currently has no cigs at home .     He just bought first lozenges today 4mg. -- he like them better than gum -last longer and taste better . They really help --smoke free x 4 days now.       He admits some coronary artery calcification - and darkening in lungs--he realizes this and wants to keep plugging along.       He is cancer free !!    He cannot afford to smoke --1 pack is 9.45 $ --!!            Previously :       4-14-22:    Smoking is sporadic , admitted 6-8 cigs/day x 2 + weeks not too long ago.   He states he doesn't know why he started up again, maybe boredom he thinks biggest reason.    Some marijuana daily smoking still --maybe he ran out of that so that is trigger for him to restart cigs.     He did quit cigs 4-11-22 ; still smokes daily Marijuana though.     Back to chewing 10-12 nicotine pieces gum/day .     Denies overt loneliness but has a wonderful dog. Has a brother and his Gf that he can talk too.       Hypertension: Current medications include : amlodipine 5mg hs daily now , lisinopril 20mg twice daily, metoprolol succ. 50mg daily.  Patient does not self-monitor blood pressure but states [facility] sent him a home Blood Pressure kit .  Patient reports no current medication side effects. Patient reports they are somewhat anxious about the visit, thinks this could be related to blood pressure elevation at moment.     Currently takes aspirin 81mg daily based on history of CABG. Reports no side effects.    BP Readings from Last 3 Encounters:    05/04/23 (!) 148/74   03/22/23 122/88   03/15/23 120/80        Hyperlipidemia: Current therapy includes :atorvastatin 80mg daily and Ezetimibe (Zetia) 10mg once daily.  Patient reports no significant myalgias or other side effects.  The ASCVD Risk score (Lakeisha BILLY, et al., 2019) failed to calculate for the following reasons:    The valid total cholesterol range is 130 to 320 mg/dL  Recent Labs   Lab Test 09/23/22  1312 09/22/21  1425 10/27/16  1028 10/23/15  0850 09/30/14  0427   CHOL 112 140   < > 155 109   HDL 58 45   < > 43 51   LDL 35 78   < > 85 39   TRIG 94 86   < > 137 97   CHOLHDLRATIO  --   --   --  3.6 2.1    < > = values in this interval not displayed.       COPD: Current medications: Short-Acting Bronchodilator: Albuterol Nebs and pt reports using every 2-3 days with dog walking --he notices SOB a little more often now with exertion.      Patient is not experiencing side effects.   Patient reports the following symptoms: increased need of albuterol and increased shortness of breath upon exertion .  Patient does not have an COPD Action Plan on file.   Has spirometry been completed: Doesn't know  Inhaler/device technique reviewed: Disk inhaler (ellipta)  Oxygen today 97%.            Today's Vitals: BP (!) 148/74   Pulse 58   Wt 172 lb (78 kg)   SpO2 97%   BMI 25.40 kg/m    ----------------      I spent 30 minutes with this patient today. All changes were made via collaborative practice agreement with Preethi Quiroz MD, MD. A copy of the visit note was provided to the patient's primary care provider.    The patient was given a summary of these recommendations.     Andrea Bah rhoda.  Medication Therapy Management Provider  [phone]         Medication Therapy Recommendations  Chronic obstructive pulmonary disease, unspecified COPD type (H)    Current Medication: umeclidinium-vilanterol (ANORO ELLIPTA) 62.5-25 MCG/ACT oral inhaler   Rationale: Untreated condition - Needs additional medication  therapy - Indication   Recommendation: Start Medication   Status: Accepted per [facility]         Hypertension goal BP (blood pressure) < 140/90    Rationale: Medication requires monitoring - Needs additional monitoring   Recommendation: Self-Monitoring - Blood Pressure Kit - start daily home BP checks.   Status: Accepted per [facility]   Note: see plan for BP goals.

## 2023-05-03 ENCOUNTER — ALLIED HEALTH/NURSE VISIT (OUTPATIENT)
Dept: FAMILY MEDICINE | Facility: CLINIC | Age: 73
End: 2023-05-03
Payer: COMMERCIAL

## 2023-05-03 DIAGNOSIS — E53.8 VITAMIN B12 DEFICIENCY (NON ANEMIC): Primary | ICD-10-CM

## 2023-05-03 PROCEDURE — 96372 THER/PROPH/DIAG INJ SC/IM: CPT | Performed by: FAMILY MEDICINE

## 2023-05-03 PROCEDURE — 99207 PR NO CHARGE NURSE ONLY: CPT

## 2023-05-03 RX ADMIN — CYANOCOBALAMIN 1000 MCG: 1000 INJECTION, SOLUTION INTRAMUSCULAR; SUBCUTANEOUS at 11:34

## 2023-05-03 NOTE — PROGRESS NOTES
Clinic Administered Medication Documentation      Injectable Medication Documentation    Is there an active order (written within the past 365 days, with administrations remaining, not ) in the chart? Yes.     Patient was given Cyanocobalamin (B-12). Prior to medication administration, verified patient's identity using patient s name and date of birth. Please see MAR and medication order for additional information. Patient instructed to report any adverse reaction to staff immediately.    Vial/Syringe: Single dose vial. Was entire vial of medication used? Yes  Was this medication supplied by the patient? No  Is this a medication the patient will need to receive again? Yes. Verified that the patient has refills remaining in their prescription.  Massiel Maldonado, CMA

## 2023-05-04 ENCOUNTER — OFFICE VISIT (OUTPATIENT)
Dept: PHARMACY | Facility: CLINIC | Age: 73
End: 2023-05-04
Payer: COMMERCIAL

## 2023-05-04 VITALS
BODY MASS INDEX: 25.4 KG/M2 | HEART RATE: 58 BPM | OXYGEN SATURATION: 97 % | DIASTOLIC BLOOD PRESSURE: 74 MMHG | SYSTOLIC BLOOD PRESSURE: 148 MMHG | WEIGHT: 172 LBS

## 2023-05-04 DIAGNOSIS — I10 HYPERTENSION GOAL BP (BLOOD PRESSURE) < 140/90: ICD-10-CM

## 2023-05-04 DIAGNOSIS — F17.210 CIGARETTE NICOTINE DEPENDENCE WITHOUT COMPLICATION: Primary | ICD-10-CM

## 2023-05-04 DIAGNOSIS — E78.5 HYPERLIPIDEMIA LDL GOAL <130: ICD-10-CM

## 2023-05-04 DIAGNOSIS — J44.9 CHRONIC OBSTRUCTIVE PULMONARY DISEASE, UNSPECIFIED COPD TYPE (H): ICD-10-CM

## 2023-05-04 PROCEDURE — 99607 MTMS BY PHARM ADDL 15 MIN: CPT | Performed by: PHARMACIST

## 2023-05-04 PROCEDURE — 99605 MTMS BY PHARM NP 15 MIN: CPT | Performed by: PHARMACIST

## 2023-05-04 NOTE — LETTER
May 4, 2023  Tevin Guidry  [address]  [place] [address]    Dear Mr. Guidry, [facility]     Thank you for talking with me on May 4, 2023 about your health and medications. As a follow-up to our conversation, I have included two documents:      1. Your Recommended To-Do List has steps you should take to get the best results from your medications.  2. Your Medication List will help you keep track of your medications and how to take them.    If you want to talk about these documents, please call Andrea Bah RPH at phone: [phone], Monday-Friday 8-4:30pm.    I look forward to working with you and your doctors to make sure your medications work well for you.    Sincerely,  Andrea Bah RPH  [facility] Pharmacist, [facility]

## 2023-05-04 NOTE — LETTER
_  Medication List        Prepared on: May 4, 2023     Bring your Medication List when you go to the doctor, hospital, or   emergency room. And, share it with your family or caregivers.     Note any changes to how you take your medications.  Cross out medications when you no longer use them.    Medication How I take it Why I use it Prescriber   albuterol (PROAIR HFA/PROVENTIL HFA/VENTOLIN HFA) 108 (90 Base) MCG/ACT inhaler Inhale 2 puffs into the lungs every 6 hours as needed for shortness of breath or wheezing Chronic obstructive pulmonary disease, unspecified COPD type (H) Preethi Quiroz MD   amLODIPine (NORVASC) 5 MG tablet TAKE ONE TABLET BY MOUTH EVERY NIGHT AT BEDTIME Hypertension Goal BP (Blood Pressure) < 140/90 Preethi Quiroz MD   aspirin 81 MG tablet Take 1 tablet (81 mg) by mouth daily Coronary artery disease involving native artery of transplanted heart without angina pectoris Casa Evans MD   atorvastatin (LIPITOR) 80 MG tablet TAKE ONE TABLET BY MOUTH EVERY EVENING AT BEDTIME Hyperlipidemia LDL Goal <70 Preethi Quiroz MD   buPROPion (WELLBUTRIN XL) 300 MG 24 hr tablet Take 1 tablet (300 mg) by mouth every morning Tobacco Dependence Preethi Quiroz MD   cyanocobalamin injection 1,000 mcg   Vitamin B12 Deficiency (Non Anemic) Preethi Quiroz MD   ezetimibe (ZETIA) 10 MG tablet Take 1 tablet (10 mg) by mouth daily Hyperlipidemia LDL Goal <70; Essential hypertension with goal blood pressure less than 140/90 Preethi Quiroz MD   lisinopril (ZESTRIL) 20 MG tablet Take 1 tablet (20 mg) by mouth 2 times daily Essential hypertension with goal blood pressure less than 140/90 Azul Breaux MD   metoprolol succinate ER (TOPROL XL) 50 MG 24 hr tablet Take 1 tablet (50 mg) by mouth At Bedtime CAD, multiple vessel Preethi Quiroz MD   PARoxetine (PAXIL) 40 MG tablet Take 1 tablet (40 mg) by mouth every morning Social Phobia Preethi Quiroz MD   umeclidinium-vilanterol (ANORO ELLIPTA) 62.5-25  MCG/ACT oral inhaler Inhale 1 puff into the lungs daily Chronic obstructive pulmonary disease, unspecified COPD type (H) Pretehi Quiroz MD         Add new medications, over-the-counter drugs, herbals, vitamins, or  minerals in the blank rows below.    Medication How I take it Why I use it Prescriber                                      Allergies:      codeine        Side effects I have had:               Other Information:              My notes and questions:

## 2023-05-04 NOTE — Clinical Note
Preethi--happy to report uriel still smoke free x 11 months now, his copd /sob still an issue more so now --so I started him on combo laba/lama inhaler per new 2023 gold guidelines --see my email to the providers for further info on that .  Also today may be an anomaly but clinic Blood Pressure above goal --he has a Blood Pressure kit at home will start checking --then see me in 4 weeks for recheck.  Roro

## 2023-05-04 NOTE — PATIENT INSTRUCTIONS
"Recommendations from today's MTM visit:                                                         1. Congrats -your still smoke free since June 23rd -2022!     Keep it going --very proud of you!    2. For your COPD--new 2023 guidelines -Lets try 1 puff daily for Anoro ellipta inhaler (combination LABA/LAMA) as I demonstrated at your visit today . Carry your albuterol inhaler with you as a rescue inhaler .     3. Blood Pressure a little high today 148/74mmhg. --stay on same 3 Blood Pressure medications for now --check Blood Pressure at home --goal is <140/90mmhg.  Optimum is <130/80mmhg.         Follow-up: Return in about 4 weeks (around 6/1/2023), or @ 1:30 PM, for Medication Therapy Management Visit, BP Recheck, COPD.    It was great speaking with you today.  I value your experience and would be very thankful for your time in providing feedback in our clinic survey. In the next few days, you may receive an email or text message from [employer] with a link to a survey related to your  clinical pharmacist.\"     To schedule another MTM appointment, please call the clinic directly or you may call the MTM scheduling line at [phone] or toll-free at [phone].     My Clinical Pharmacist's contact information:                                                      Please feel free to contact me with any questions or concerns you have.      Andrea Bah Rph.  Medication Therapy Management Provider  [phone]    "

## 2023-05-04 NOTE — LETTER
"Recommended To-Do List      Prepared on: May 4, 2023       You can get the best results from your medications by completing the items on this \"To-Do List.\"      Bring your To-Do List when you go to your doctor. And, share it with your family or caregivers.    My To-Do List:  What we talked about: What I should do:   A new medication that may be of benefit to you    Start taking : one puff by mouth daily of Anoro inhaler for improved breathing of your COPD.           What we talked about: What I should do:   Needing additional monitoring    Self-monitor : Start daily home Blood Pressure checks with goal of <140/90mmhg.            What we talked about: What I should do:                     "

## 2023-05-05 ENCOUNTER — PRE VISIT (OUTPATIENT)
Dept: UROLOGY | Facility: CLINIC | Age: 73
End: 2023-05-05
Payer: COMMERCIAL

## 2023-05-05 NOTE — TELEPHONE ENCOUNTER
Reason for visit: Cystoscopy     Dx/Hx/Sx: Bladder Cancer    Records/imaging/labs/orders: in epic    At Rooming: Urine Sample

## 2023-05-16 DIAGNOSIS — H40.1111 PRIMARY OPEN ANGLE GLAUCOMA (POAG) OF RIGHT EYE, MILD STAGE: Primary | ICD-10-CM

## 2023-05-17 ENCOUNTER — OFFICE VISIT (OUTPATIENT)
Dept: OPHTHALMOLOGY | Facility: CLINIC | Age: 73
End: 2023-05-17
Attending: OPHTHALMOLOGY
Payer: COMMERCIAL

## 2023-05-17 DIAGNOSIS — H40.1111 PRIMARY OPEN ANGLE GLAUCOMA (POAG) OF RIGHT EYE, MILD STAGE: ICD-10-CM

## 2023-05-17 DIAGNOSIS — H04.123 DRY EYE SYNDROME OF BOTH EYES: Primary | ICD-10-CM

## 2023-05-17 PROCEDURE — G0463 HOSPITAL OUTPT CLINIC VISIT: HCPCS | Performed by: OPHTHALMOLOGY

## 2023-05-17 PROCEDURE — 99213 OFFICE O/P EST LOW 20 MIN: CPT | Performed by: OPHTHALMOLOGY

## 2023-05-17 PROCEDURE — 92083 EXTENDED VISUAL FIELD XM: CPT | Performed by: OPHTHALMOLOGY

## 2023-05-17 ASSESSMENT — CONF VISUAL FIELD
OD_SUPERIOR_NASAL_RESTRICTION: 3
OS_INFERIOR_NASAL_RESTRICTION: 3
OD_INFERIOR_NASAL_RESTRICTION: 0
OD_SUPERIOR_TEMPORAL_RESTRICTION: 3
METHOD: COUNTING FINGERS

## 2023-05-17 ASSESSMENT — VISUAL ACUITY
OD_SC: 20/20
OS_SC: 20/25
OD_SC+: -2
METHOD: SNELLEN - LINEAR
OS_SC+: -2

## 2023-05-17 ASSESSMENT — SLIT LAMP EXAM - LIDS
COMMENTS: NORMAL
COMMENTS: NORMAL

## 2023-05-17 ASSESSMENT — EXTERNAL EXAM - LEFT EYE: OS_EXAM: NORMAL

## 2023-05-17 ASSESSMENT — REFRACTION_WEARINGRX
OD_ADD: +2.50
OS_CYLINDER: SPHERE
OS_ADD: +2.50
OD_CYLINDER: SPHERE
OS_SPHERE: -0.25
OD_SPHERE: PLANO

## 2023-05-17 ASSESSMENT — CUP TO DISC RATIO
OD_RATIO: 0.8
OS_RATIO: 0.7

## 2023-05-17 ASSESSMENT — EXTERNAL EXAM - RIGHT EYE: OD_EXAM: NORMAL

## 2023-05-17 ASSESSMENT — TONOMETRY
IOP_METHOD: APPLANATION
OD_IOP_MMHG: 13
OS_IOP_MMHG: 11

## 2023-05-17 NOTE — PROGRESS NOTES
HPI     Glaucoma Follow-Up    In both eyes.  Associated symptoms include dryness.  Negative for flashes and floaters.  Pain was noted as 0/10.           Comments    Glaucoma follow up.  The patient notes intermittent irritation or dryness.  He uses artificial tears if needed.    He reports good vision.  ALIVIA Lo, ALIVIA 2:31 PM 2023            Last edited by Trang Robins COA on 2023  2:31 PM.        HPI:  Tevin Guidry is a 72 year old male with history of CAD s/p CABG, colon CA, HTN, HLD presenting for visual field and followup after Selected laser trabeculoplasty (SLT). Doing well. Notices occasional FBS    Past Ocular History:  Punched in both eyes age 20 or 21  CE/IOL right eye Maltry 18, left eye Massey 21  POAG      PMH:  HTN  CAD s/p 6 stents and surgery  Bladder cancer in remission (treated with local chemotherapy and DCG per patient)  MUTYH associated polyposis s/p partial colectomy    SH:  smoker    FH:  No known family history of blindness, glaucoma, macular degeneration  No known FHx polyposis - brother has not had colonoscopy  Mother and older brother  of stomach cancer    Eye Medications      ASSESSMENT and PLAN:  (H40.1111) Primary open angle glaucoma (POAG) of right eye, mild stage  FH: Negative  Pachymetry:   Gonioscopy: open to thin CBB, 3+ pigment, flat  Tmax: 20/19  Today's IOP: 13/11  Target IOP: mid-teens  Current medications: None  Meds to avoid: None  Selected laser trabeculoplasty (SLT) 3/17/23 right eye     Visual field: OVF 24-2 (23)  Right eye: reliable, superior depressed points (?lid) and inferonasal depressed spot, MD -0.6, PSD 2.6;  Left eye: reliable, superior arcuate MD -2.2, PSD 2.9; improved inferior and increased superior from prior    Visual field: OVF 24-2 (22)  Right eye: reliable, superior depressed points (?lid) and inferonasal depressed spot, MD -3.8, PSD 4.4; superior depression not consistent with location from  prior  Left eye: poor reliability with 14% FN, superior>inferior depression, MD -8.2, PSD 3.6; improved inferior and increased superior from prior    OCT nerve fiber layer 03/01/23:   Right eye - G(r) 60 NI (g) 76 TI (y) 96 NS (r) 51 TS (r) 89      Left eye - G(r) 72 NI (g) 83 TI (g) 121 NS (g) 75 TS (r) 86  Nerve OCT: Spectralis optic nerve OCT (6/23/21)  OD: Central RNFL thickness 69, superior red, inferior and nasal yellow   OS: Central RNFL thickness 69, superotemporal red, nasal yellow     Both eyes with improved IOP despite only receiving Selected laser trabeculoplasty (SLT) right eye   Doing well today, visual field improved   return to clinic 6 months, oct rnfl    (Z96.1) Pseudophakia of both eyes    Massey left eye 02/02/23   Maltry right eye 12/11/2020 ZCB00 21.5  Doing well    (Z86.010) History of colonic polyps  - s/p partial colectomy for numerous colon polyps and adenomas  - no CHRPE on exam    (H04.123) Dry eye syndrome of both eyes    Recommend artificial tears PRN      Return in about 6 months (around 11/17/2023) for v/t/d/mrx/OCT RNFL, Annual Visit.    Attending Physician Attestation:  Complete documentation of historical and exam elements from today's encounter can be found in the full encounter summary report (not reduplicated in this progress note).  I personally obtained the chief complaint(s) and history of present illness.  I confirmed and edited as necessary the review of systems, past medical/surgical history, family history, social history, and examination findings as documented by others; and I examined the patient myself.  I personally reviewed the relevant tests, images, and reports as documented above.  I formulated and edited as necessary the assessment and plan and discussed the findings and management plan with the patient and family.  - Guido Massey MD

## 2023-05-17 NOTE — NURSING NOTE
Chief Complaints and History of Present Illnesses   Patient presents with     Glaucoma Follow-Up     Chief Complaint(s) and History of Present Illness(es)     Glaucoma Follow-Up            Laterality: both eyes    Associated symptoms: dryness.  Negative for flashes and floaters    Pain scale: 0/10          Comments    Glaucoma follow up.  The patient notes intermittent irritation or dryness.  He uses artificial tears if needed.    He reports good vision.  Trang Robins, COA, COA 2:31 PM 05/17/2023

## 2023-05-30 ENCOUNTER — OFFICE VISIT (OUTPATIENT)
Dept: UROLOGY | Facility: CLINIC | Age: 73
End: 2023-05-30
Payer: COMMERCIAL

## 2023-05-30 VITALS — DIASTOLIC BLOOD PRESSURE: 73 MMHG | SYSTOLIC BLOOD PRESSURE: 115 MMHG | HEART RATE: 102 BPM

## 2023-05-30 DIAGNOSIS — C67.8 MALIGNANT NEOPLASM OF OVERLAPPING SITES OF BLADDER (H): Primary | ICD-10-CM

## 2023-05-30 DIAGNOSIS — C67.9 MALIGNANT NEOPLASM OF URINARY BLADDER, UNSPECIFIED SITE (H): ICD-10-CM

## 2023-05-30 PROCEDURE — 52000 CYSTOURETHROSCOPY: CPT | Performed by: UROLOGY

## 2023-05-30 PROCEDURE — 88120 CYTP URNE 3-5 PROBES EA SPEC: CPT | Mod: TC | Performed by: UROLOGY

## 2023-05-30 PROCEDURE — 88120 CYTP URNE 3-5 PROBES EA SPEC: CPT | Mod: 26 | Performed by: PATHOLOGY

## 2023-05-30 PROCEDURE — 88112 CYTOPATH CELL ENHANCE TECH: CPT | Mod: TC | Performed by: UROLOGY

## 2023-05-30 PROCEDURE — 88112 CYTOPATH CELL ENHANCE TECH: CPT | Mod: 26 | Performed by: PATHOLOGY

## 2023-05-30 RX ORDER — LIDOCAINE HYDROCHLORIDE 20 MG/ML
JELLY TOPICAL ONCE
Status: COMPLETED | OUTPATIENT
Start: 2023-05-30 | End: 2023-05-30

## 2023-05-30 RX ADMIN — LIDOCAINE HYDROCHLORIDE: 20 JELLY TOPICAL at 10:21

## 2023-05-30 NOTE — PROGRESS NOTES
Medication Therapy Management (MTM) Encounter    ASSESSMENT:                            Medication Adherence/Access: No issues identified      Smoking cessation: Patient is smoke free.   Continue zyban long term.     Hypertension: Stable. Patient is meeting blood pressure goal of < 140/90mmHg. Patient would benefit from the following changes - continued blood pressure monitoring, watching diet, increasing exercise and weight loss and limiting/reducing sodium.    Hyperlipidemia: Stable.  Patient is on high intensity statin which is indicated based on 2019 ACC/AHA guidelines for lipid management.      COPD: Stable  Patient would benefit from no changes at this time and further education of Control vs. Quick relief medications  Due to PIF score, pt would benefit from no change in therapy  Patient would also benefit from no changes at this time    PLAN:                            1. Congratulations!! Smoke free for one year now !!!   2. Please continue one puff daily of the Anoro inhaler(combination LABA/LAMA) , it helps with your breathing .  3. Carry your albuterol inhaler with you as a rescue inhaler .  4. /60 mmHg,pulse 54. Excellent !      Follow up :Return in about 9 months (around 3/12/2024), or @ 1:30 pm, for Medication Therapy Management Visit, BP Recheck.  SUBJECTIVE/OBJECTIVE:                          Tevin Guidry is a 72 year old male coming in for a follow-up (5-4-23 ) visit. He was referred to me from Preethi Quiroz  .      Reason for visit:   Blood pressure recheck.        Allergies/ADRs: Reviewed in chart  Past Medical History: Per patient has a hemicolectomy with a hx of polyps (removed appendix). CAD (CABG), Social phobia, COPD, adenocarcinoma  Of bladder/colon. Had signet ring cancer removed with colon as well.  Has MUTYH--associated polyposis.   Tobacco: He currently gets a hold a cigarette- relapsed recently -see below .  Never had chantix in the past .   Alcohol: not currently using  Other  Substance Use: marijuana flower, occasional vape - smokes most days/week  --he is much more worried about cig smoking vs. Marijuana.   Caffeine: large starbucks in morning and sometimes in PM      Medication Adherence/Access: No issues      Smoking cessation: Patient reports things going well.  Patient smoke free for one year.   What has gone well for you? bupropion working well and cystoscopy reported no bladder cancer .  What has helped you the most to keep from smoking? Desire to remain smoke free and cancer free.   Is there anything you might anticipate happening in the near future (e.g., next week, next month) that could present a challenge for you? None.  -         Previously :  .Smoking cessation:  Patient reports things going well.  What has gone well for you? Bupropion working well. Smoke free since 6-23-22.   What strategies have you tried?   He doesn't want to start cigs again --he's made it this far feels great .   What side effects to nicotine replacement therapies or medications are you experiencing: not using any now.   What has helped you the most to keep from smoking? Bupropion for now and long term.   Is there anything you might anticipate happening in the near future (e.g., next week, next month) that could present a challenge for you? None   How can I continue to support you going forward? Hold patient accountable.               Previously :   Smoking cessation:  Patient reports things going so well.  NO SMOKING SINCE 6-!!    He has relapsed smoking , but pet/ct scan was negative , but showed darkening lungs and calcium buildup in heart .   What side effects to nicotine replacement therapies or medications are you experiencing: none   What times or situations have you found the most challenging?Just when he gets the urge--maybe boredom/stress.    Is there anything that worked well for you?  Bupropion 300mg./day helps.   How can you get back on your plan? He feels he can resist temptation  now --doing well.   What is a first step-today or tomorrow or in the next few days-that will enable you to do this? Periodic mtm appt. To hold him accountable.   How can I support you in doing this? continue meds/counseling.       Previously: 6-23-22:    Has been smoking off/on , can go for days quitting --but then in his head he wakes up and wants a cigarette -- then he has to buy a pack and start 1 PPD .    His last cigarette was Sunday evening 6-19-22.  Currently has no cigs at home .     He just bought first lozenges today 4mg. -- he like them better than gum -last longer and taste better . They really help --smoke free x 4 days now.       He admits some coronary artery calcification - and darkening in lungs--he realizes this and wants to keep plugging along.       He is cancer free !!    He cannot afford to smoke --1 pack is 9.45 $ --!!            Previously :       4-14-22:    Smoking is sporadic , admitted 6-8 cigs/day x 2 + weeks not too long ago.   He states he doesn't know why he started up again, maybe boredom he thinks biggest reason.    Some marijuana daily smoking still --maybe he ran out of that so that is trigger for him to restart cigs.     He did quit cigs 4-11-22 ; still smokes daily Marijuana though.     Back to chewing 10-12 nicotine pieces gum/day .     Denies overt loneliness but has a wonderful dog. Has a brother and his Gf that he can talk too.       Hypertension: Current medications include : amlodipine 5mg hs daily now , lisinopril 20mg twice daily, metoprolol succ. 50mg daily.  Patient does not self-monitor blood pressure but states [facility] sent him a home Blood Pressure kit .  Patient reports no current medication side effects. Patient reports they are somewhat anxious about the visit, thinks this could be related to blood pressure elevation at moment.     Currently takes aspirin 81mg daily based on history of CABG. Reports no side effects.    BP Readings from Last 3 Encounters:   06/01/23  120/60   05/30/23 115/73   05/04/23 (!) 148/74        Hyperlipidemia: Current therapy includes :atorvastatin 80mg daily and Ezetimibe (Zetia) 10mg once daily.  Patient reports no significant myalgias or other side effects.  The ASCVD Risk score (Lakeisha BILLY, et al., 2019) failed to calculate for the following reasons:    The valid total cholesterol range is 130 to 320 mg/dL  Recent Labs   Lab Test 09/23/22  1312 09/22/21  1425 10/27/16  1028 10/23/15  0850 09/30/14  0427   CHOL 112 140   < > 155 109   HDL 58 45   < > 43 51   LDL 35 78   < > 85 39   TRIG 94 86   < > 137 97   CHOLHDLRATIO  --   --   --  3.6 2.1    < > = values in this interval not displayed.       COPD: Current medications:  Now taking 1 puff daily anoro (LABA/LAMA ), Short-Acting Bronchodilator: Albuterol rescue inhaler- he has an inhaler but has not needed to use it recently, he admits not carrying the inhaler with him.   Patient is not experiencing side effects.   Patient reports the following symptoms: No current symptoms unless he works a flight of stairs, he states breathing is better since he started daily anoro.   Patient does not have an COPD Action Plan on file.   Has spirometry been completed: Doesn't know  Inhaler/device technique reviewed: Disk inhaler (ellipta)  Oxygen today 97%.            Today's Vitals: /60   Pulse 54   Wt 168 lb 8 oz (76.4 kg)   SpO2 97%   BMI 24.88 kg/m    ----------------      I spent 30 minutes with this patient today. All changes were made via collaborative practice agreement with Preethi Quiroz MD, MD. A copy of the visit note was provided to the patient's primary care provider.    The patient was given a summary of these recommendations.     Andrea Bah [facility].  Medication Therapy Management Provider  [phone]  Martine Mata D4 student            Medication Therapy Recommendations  No medication therapy recommendations to display

## 2023-05-30 NOTE — PROGRESS NOTES
CHIEF COMPLAINT   It was my pleasure to see Tevin Guidry who is a 72 year old male for follow-up of bladder cancer.      HPI  Tevin Guidry is a very pleasant 72 year old male who presents with a history of bladder cancer. 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 He had recurrence in 2/2020 and underwent repeat induction course of BCG. 2nd BCG induction completed 4/2020. Recurrence noted in 7/2020 and TURBT 9/2020 with low-grade disease. No issues since biopsy. No ongoing hematuria.     BCG maintenance 10/15/2020  Had colectomy Spring 2021     TURBT 9/2/2020  FINAL DIAGNOSIS:   A. 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 Right lateral wall:   - Dysplastic urothelium, consistent with incipient non invasive low grade papillary urothelial carcinoma     PHYSICAL EXAM  Patient is a 72 year old  male   Vitals: Blood pressure 115/73, pulse 102.  General Appearance Adult: There is no height or weight on file to calculate BMI.  Alert, no acute distress, oriented  Lungs: no respiratory distress, or pursed lip breathing  Abdomen: soft, nontender, no organomegaly or masses  Back: no CVAT  Neuro: Alert, oriented, speech and mentation normal  Psych: affect and mood normal  : penis, scrotum, testes normal    OFFICE CYSTOSCOPY 5/30/2023     Pre-procedure diagnosis:       Bladder Cancer  Post-procedure diagnosis:     Normal cystoscopy  Procedure performed:             Cystourethroscopy  Surgeon:                                 Kirt Guzman MD  Anesthesia:                             Local     Description of procedure:   After fully informed, voluntary consent was obtained, the patient was brought into the procedure room, identified and placed in a supine position on the cystoscopy table.  The groin/scrotum were prepped with betadine and draped in a sterile fashion.  Urojet lidocaine gel was introduced.  A 15F  flexible cystoscope was inserted into the urethra, and the bladder and urethra were examined in a systematic manner.  The patient tolerated the procedure well and there were no complications.       Cystoscopic findings:  The urethra was normal without strictures.  The prostate was 3 cm long and demonstrated mild bilobar hypertrophy.  There was no median lobe.  The external sphincter coapted well and the bladder neck was open. The bladder was completely surveyed.  There was mild trabeculation.  There were no stones or diverticula identifed.  The ureteric orifices were normal in position and number and effluxing clear urine. Previous resection sites noted with no suspicious lesions today.      ASSESSMENT and PLAN  72 year old male with recurrent bladder tumor. Has previously undergone BCG induction x 2. While office biopsy suggested possible HG disease, formal TURBT with only low-grade pathology. Has completed some BCG maintenance, but has since stopped. Given no recurrence today, will defer additional intravesical therapy at this time. Plan Cysto in 6 months.     - Cytology/FISH today  - Follow-up 6 months with office cystoscopy    Kirt Guzman MD  Urology  [facility]

## 2023-05-30 NOTE — NURSING NOTE
Chief Complaint   Patient presents with     Cystoscopy       Blood pressure 115/73, pulse 102. There is no height or weight on file to calculate BMI.    Patient Active Problem List   Diagnosis     Social phobia     Hypertension goal BP (blood pressure) < 140/90     Hyperlipidemia LDL goal <130     Tubular adenoma of colon     Advanced directives, counseling/discussion     Coronary artery disease involving native heart with angina pectoris, unspecified vessel or lesion type (H)     Former smoker     S/P CABG (coronary artery bypass graft)     Chronic obstructive pulmonary disease, unspecified COPD type (H)     Anemia, unspecified type     Bladder cancer (H)     Personal history of malignant neoplasm of bladder     Malignant neoplasm of overlapping sites of bladder (H)     Combined form of age-related cataract, both eyes     Total cataract of right eye     History of colonic polyps     Colon cancer (H)     Signet ring cell adenocarcinoma (H)     Metastatic signet ring cell carcinoma (H)       Allergies   Allergen Reactions     Codeine Nausea and Vomiting       Current Outpatient Medications   Medication Sig Dispense Refill     albuterol (PROAIR HFA/PROVENTIL HFA/VENTOLIN HFA) 108 (90 Base) MCG/ACT inhaler Inhale 2 puffs into the lungs every 6 hours as needed for shortness of breath or wheezing 18 g 1     amLODIPine (NORVASC) 5 MG tablet TAKE ONE TABLET BY MOUTH EVERY NIGHT AT BEDTIME 90 tablet 3     aspirin 81 MG tablet Take 1 tablet (81 mg) by mouth daily 90 tablet 3     atorvastatin (LIPITOR) 80 MG tablet TAKE ONE TABLET BY MOUTH EVERY EVENING AT BEDTIME 90 tablet 3     buPROPion (WELLBUTRIN XL) 300 MG 24 hr tablet Take 1 tablet (300 mg) by mouth every morning 90 tablet 1     ezetimibe (ZETIA) 10 MG tablet Take 1 tablet (10 mg) by mouth daily 90 tablet 3     lisinopril (ZESTRIL) 20 MG tablet Take 1 tablet (20 mg) by mouth 2 times daily 180 tablet 3     metoprolol succinate ER (TOPROL XL) 50 MG 24 hr tablet Take 1  tablet (50 mg) by mouth At Bedtime 90 tablet 3     PARoxetine (PAXIL) 40 MG tablet Take 1 tablet (40 mg) by mouth every morning 90 tablet 3     umeclidinium-vilanterol (ANORO ELLIPTA) 62.5-25 MCG/ACT oral inhaler Inhale 1 puff into the lungs daily 30 each 5       Social History     Tobacco Use     Smoking status: Former     Packs/day: 0.00     Years: 30.00     Pack years: 0.00     Types: Cigarettes     Quit date: 2022     Years since quittin.9     Smokeless tobacco: Never     Tobacco comments:     quit successfully (smoke and nicotine free)-22.    Vaping Use     Vaping status: Never Used     Passive vaping exposure: Yes   Substance Use Topics     Alcohol use: No     Comment: quit 40 years ago     Drug use: Yes     Types: Marijuana     Comment: occasionally uses marijuana       Invasive Procedure Safety Checklist:    Procedure: Cystoscopy    Action: Complete sections and checkboxes as appropriate.    Pre-procedure:  1. Patient ID Verified with 2 identifiers (Mary and  or MRN) : YES    2. Procedure and site verified with patient/designee (when able) : YES    3. Accurate consent documentation in medical record : YES    4. H&P (or appropriate assessment) documented in medical record : N/A  H&P must be up to 30 days prior to procedure an updated within 24 hours of                 Procedure as applicable.     5. Relevant diagnostic and radiology test results appropriately labeled and displayed as applicable : YES    6. Blood products, implants, devices, and/or special equipment available for the procedure as applicable : YES    7. Procedure site(s) marked with provider initials [Exclusions: none] : NO    8. Marking not required. Reason : Yes  Procedure does not require site marking    Time Out:     Time-Out performed immediately prior to starting procedure, including verbal and active participation of all team members addressing: YES    1. Correct patient identity.  2. Confirmed that the correct side and  site are marked.  3. An accurate procedure to be done.  4. Agreement on the procedure to be done.  5. Correct patient position.  6. Relevant images and results are properly labeled and appropriately displayed.  7. The need to administer antibiotics or fluids for irrigation purposes during the procedure as applicable.  8. Safety precautions based on patient history or medication use.    During Procedure: Verification of correct person, site, and procedure occurs any time the responsibility for care of the patient is transferred to another member of the care team.    The following medication was given:     MEDICATION:  Lidocaine without epinephrine 2% jelly  ROUTE: urethral   SITE: urethral   DOSE: 10 mL  LOT #: [id number]  : [employer]  EXPIRATION DATE: 2/25  NDC#: [address]-2   Was there drug waste? No    Prior to med admin, verified patient identity using patient's name and date of birth.  Due to med administration, patient instructed to remain in clinic for 15 minutes  afterwards, and to report any adverse reaction to me immediately.    Drug Amount Wasted:  None.  Vial/Syringe: Syringe      Devante Knox EMT-P  5/30/2023  10:21 AM

## 2023-06-01 ENCOUNTER — OFFICE VISIT (OUTPATIENT)
Dept: PHARMACY | Facility: CLINIC | Age: 73
End: 2023-06-01
Payer: COMMERCIAL

## 2023-06-01 VITALS
HEART RATE: 54 BPM | WEIGHT: 168.5 LBS | OXYGEN SATURATION: 97 % | SYSTOLIC BLOOD PRESSURE: 120 MMHG | BODY MASS INDEX: 24.88 KG/M2 | DIASTOLIC BLOOD PRESSURE: 60 MMHG

## 2023-06-01 DIAGNOSIS — E78.5 HYPERLIPIDEMIA LDL GOAL <130: ICD-10-CM

## 2023-06-01 DIAGNOSIS — J44.9 CHRONIC OBSTRUCTIVE PULMONARY DISEASE, UNSPECIFIED COPD TYPE (H): ICD-10-CM

## 2023-06-01 DIAGNOSIS — F17.200 NICOTINE DEPENDENCE, UNCOMPLICATED, UNSPECIFIED NICOTINE PRODUCT TYPE: ICD-10-CM

## 2023-06-01 DIAGNOSIS — I10 HYPERTENSION GOAL BP (BLOOD PRESSURE) < 140/90: Primary | ICD-10-CM

## 2023-06-01 PROCEDURE — 99606 MTMS BY PHARM EST 15 MIN: CPT | Performed by: PHARMACIST

## 2023-06-01 NOTE — Clinical Note
Preethi--uriel to se you in august --still smoke free x 1 year now , bp excellent as well, anoro helping his copd --seemed quite happy today . Bladder cancer free he stated as well.  Roro/Pratik

## 2023-06-01 NOTE — PATIENT INSTRUCTIONS
"Recommendations from today's MTM visit:                                                         1. Congratulations!! Smoke free for one year now !!!   2. Please continue one puff daily of the Anoro inhaler(combination LABA/LAMA) , it helps with your breathing .  3. Carry your albuterol inhaler with you as a rescue inhaler .  4. /60 mmHg,pulse 54. Excellent !      Follow-up: Return in about 9 months (around 3/12/2024), or @ 1:30 pm, for Medication Therapy Management Visit, BP Recheck.    It was great speaking with you today.  I value your experience and would be very thankful for your time in providing feedback in our clinic survey. In the next few days, you may receive an email or text message from [employer] with a link to a survey related to your  clinical pharmacist.\"     To schedule another MTM appointment, please call the clinic directly or you may call the MTM scheduling line at [phone] or toll-free at [phone].     My Clinical Pharmacist's contact information:                                                      Please feel free to contact me with any questions or concerns you have.      Andrea Bah [facility].  Medication Therapy Management Provider  [phone]  Martine Mata D4 student     "

## 2023-06-02 ENCOUNTER — PATIENT OUTREACH (OUTPATIENT)
Dept: GERIATRIC MEDICINE | Facility: CLINIC | Age: 73
End: 2023-06-02
Payer: COMMERCIAL

## 2023-06-02 NOTE — PROGRESS NOTES
[facility] Care Coordination Contact    Called member to schedule annual HRA home visit. HRA has been scheduled for Tuesday, 6/13, at 11:00 AM.   Elizabeth Martinez RN  [facility]   [phone]

## 2023-06-05 ENCOUNTER — ALLIED HEALTH/NURSE VISIT (OUTPATIENT)
Dept: FAMILY MEDICINE | Facility: CLINIC | Age: 73
End: 2023-06-05
Payer: COMMERCIAL

## 2023-06-05 DIAGNOSIS — E53.8 VITAMIN B12 DEFICIENCY (NON ANEMIC): Primary | ICD-10-CM

## 2023-06-05 PROCEDURE — 99207 PR NO CHARGE NURSE ONLY: CPT

## 2023-06-05 PROCEDURE — 96372 THER/PROPH/DIAG INJ SC/IM: CPT | Performed by: INTERNAL MEDICINE

## 2023-06-05 RX ADMIN — CYANOCOBALAMIN 1000 MCG: 1000 INJECTION, SOLUTION INTRAMUSCULAR; SUBCUTANEOUS at 11:11

## 2023-06-13 ENCOUNTER — PATIENT OUTREACH (OUTPATIENT)
Dept: GERIATRIC MEDICINE | Facility: CLINIC | Age: 73
End: 2023-06-13
Payer: COMMERCIAL

## 2023-06-13 SDOH — SOCIAL STABILITY: SOCIAL NETWORK
IN A TYPICAL WEEK, HOW MANY TIMES DO YOU TALK ON THE PHONE WITH FAMILY, FRIENDS, OR NEIGHBORS?: MORE THAN THREE TIMES A WEEK

## 2023-06-13 SDOH — HEALTH STABILITY: PHYSICAL HEALTH: ON AVERAGE, HOW MANY DAYS PER WEEK DO YOU ENGAGE IN MODERATE TO STRENUOUS EXERCISE (LIKE A BRISK WALK)?: 0 DAYS

## 2023-06-13 SDOH — SOCIAL STABILITY: SOCIAL NETWORK: HOW OFTEN DO YOU GET TOGETHER WITH FRIENDS OR RELATIVES?: ONCE A WEEK

## 2023-06-13 SDOH — ECONOMIC STABILITY: HOUSING INSECURITY
IN THE LAST 12 MONTHS, WAS THERE A TIME WHEN YOU DID NOT HAVE A STEADY PLACE TO SLEEP OR SLEPT IN A SHELTER (INCLUDING NOW)?: NO

## 2023-06-13 SDOH — HEALTH STABILITY: PHYSICAL HEALTH: ON AVERAGE, HOW MANY MINUTES DO YOU ENGAGE IN EXERCISE AT THIS LEVEL?: 0 MIN

## 2023-06-13 SDOH — HEALTH STABILITY: MENTAL HEALTH: HOW OFTEN DO YOU HAVE SIX OR MORE DRINKS ON ONE OCCASION?: NEVER

## 2023-06-13 SDOH — SOCIAL STABILITY: SOCIAL NETWORK: HOW OFTEN DO YOU ATTEND CHURCH OR RELIGIOUS SERVICES?: NEVER

## 2023-06-13 SDOH — SOCIAL STABILITY: SOCIAL NETWORK
DO YOU BELONG TO ANY CLUBS OR ORGANIZATIONS SUCH AS CHURCH GROUPS, UNIONS, FRATERNAL OR ATHLETIC GROUPS, OR SCHOOL GROUPS?: NO

## 2023-06-13 SDOH — ECONOMIC STABILITY: FOOD INSECURITY: WITHIN THE PAST 12 MONTHS, THE FOOD YOU BOUGHT JUST DIDN'T LAST AND YOU DIDN'T HAVE MONEY TO GET MORE.: NEVER TRUE

## 2023-06-13 SDOH — ECONOMIC STABILITY: FOOD INSECURITY: HOW HARD IS IT FOR YOU TO PAY FOR THE VERY BASICS LIKE FOOD, HOUSING, MEDICAL CARE, AND HEATING?: SOMEWHAT HARD

## 2023-06-13 SDOH — ECONOMIC STABILITY: HOUSING INSECURITY: IN THE LAST 12 MONTHS, HOW MANY PLACES HAVE YOU LIVED?: 1

## 2023-06-13 SDOH — HEALTH STABILITY: PHYSICAL HEALTH: ON AVERAGE, HOW MANY DAYS PER WEEK DO YOU ENGAGE IN MODERATE TO STRENUOUS EXERCISE (LIKE A BRISK WALK)?: 5 DAYS

## 2023-06-13 SDOH — ECONOMIC STABILITY: FOOD INSECURITY: WITHIN THE PAST 12 MONTHS, YOU WORRIED THAT YOUR FOOD WOULD RUN OUT BEFORE YOU GOT THE MONEY TO BUY MORE.: NEVER TRUE

## 2023-06-13 SDOH — ECONOMIC STABILITY: HOUSING INSECURITY: IN THE LAST 12 MONTHS, WAS THERE A TIME WHEN YOU WERE NOT ABLE TO PAY THE MORTGAGE OR RENT ON TIME?: NO

## 2023-06-13 SDOH — SOCIAL STABILITY: SOCIAL NETWORK

## 2023-06-13 SDOH — ECONOMIC STABILITY: TRANSPORTATION INSECURITY: IN THE PAST 12 MONTHS, HAS LACK OF TRANSPORTATION KEPT YOU FROM MEDICAL APPOINTMENTS OR FROM GETTING MEDICATIONS?: NO

## 2023-06-13 SDOH — HEALTH STABILITY: PHYSICAL HEALTH: ON AVERAGE, HOW MANY MINUTES DO YOU ENGAGE IN EXERCISE AT THIS LEVEL?: 30 MIN

## 2023-06-13 SDOH — HEALTH STABILITY: MENTAL HEALTH: HOW OFTEN DO YOU HAVE A DRINK CONTAINING ALCOHOL?: NEVER

## 2023-06-13 SDOH — HEALTH STABILITY: MENTAL HEALTH
DO YOU FEEL STRESS - TENSE, RESTLESS, NERVOUS, OR ANXIOUS, OR UNABLE TO SLEEP AT NIGHT BECAUSE YOUR MIND IS TROUBLED ALL THE TIME - THESE DAYS?: NOT AT ALL

## 2023-06-13 SDOH — HEALTH STABILITY: MENTAL HEALTH: HOW MANY DRINKS CONTAINING ALCOHOL DO YOU HAVE ON A TYPICAL DAY WHEN YOU ARE DRINKING?: PATIENT DOES NOT DRINK

## 2023-06-13 SDOH — SOCIAL STABILITY: SOCIAL INSECURITY: ARE YOU MARRIED, WIDOWED, DIVORCED, SEPARATED, NEVER MARRIED, OR LIVING WITH A PARTNER?: NEVER MARRIED

## 2023-06-13 ASSESSMENT — LIFESTYLE VARIABLES
AUDIT-C TOTAL SCORE: 0
SKIP TO QUESTIONS 9-10: 1

## 2023-06-13 ASSESSMENT — ACTIVITIES OF DAILY LIVING (ADL)
DEPENDENT_IADLS:: INDEPENDENT
LACK_OF_TRANSPORTATION: NO

## 2023-06-13 NOTE — Clinical Note
Hi Dr. Quiroz, I saw Hola today for his annual health risk assessment. This is an FYI, per Krystina. Hola seems to be doing pretty well living with his brother and brothers significant other and their sweet [place] Gustavo, Babs. He has dealt with a lot of anxiety over his life and reports feeling really good emotionally - on Paxil and bupropion (for smoking cessation). He reports feeling well and does not have any health concerns at this time. I will reach out to Hola every 6 months and after any hospitalizations or ED visits. Please let me know if I can be of any assistance with this patient in the future. Thank you, Elizabeth Martinez RN [facility]  [phone]

## 2023-06-14 ENCOUNTER — PATIENT OUTREACH (OUTPATIENT)
Dept: GERIATRIC MEDICINE | Facility: CLINIC | Age: 73
End: 2023-06-14
Payer: COMMERCIAL

## 2023-06-14 NOTE — LETTER
June 14, 2023      TEVIN LYONS  [address]  [facility] [address]      Dear Tevin:    At [facility], we re dedicated to improving your health and wellness. Enclosed is the Care Plan developed with you on 6/13/2023. Please review the Care Plan carefully.    As a reminder, during your visit we talked about:  Ways to manage your physical and mental health  Using health care to maintain and improve your health   Your preventive care needs     Remember to contact your care coordinator if you:  Are hospitalized, or plan to be hospitalized   Have a fall    Have a change in your physical or mental health  Need help finding support or services    If you have questions, or don t agree with your Care Plan, call me at [phone]. You can also call me if your needs change. TTY users, call the [place] [employer] at (003) or 1-979.251.5929 (pduden-nd-qqarsh relay service).    Sincerely,    Elizabeth Martinez RN  [phone]  [email or web address] is a health plan that contracts with both Medicare and the [place] Medical Assistance (Medicaid) program to provide benefits of both programs to enrollees. Enrollment in [facility] depends on contract renewal.    [id number] accepted    [id number] (07/2022)

## 2023-06-14 NOTE — PROGRESS NOTES
[facility] Care Coordination Contact    Received after visit chart from care coordinator.  Completed following tasks: Mailed copy of care plan to client and Mailed Safe Medication Disposal      Rosaura Karimi  Case Management Specialist  [facility]  [phone]

## 2023-06-20 ENCOUNTER — HOSPITAL ENCOUNTER (OUTPATIENT)
Dept: CT IMAGING | Facility: CLINIC | Age: 73
Discharge: HOME | End: 2023-06-20
Attending: INTERNAL MEDICINE | Admitting: INTERNAL MEDICINE
Payer: COMMERCIAL

## 2023-06-20 DIAGNOSIS — C18.1 MALIGNANT NEOPLASM OF APPENDIX (H): ICD-10-CM

## 2023-06-20 LAB
CREAT BLD-MCNC: 1.3 MG/DL (ref 0.7–1.3)
GFR SERPL CREATININE-BSD FRML MDRD: 58 ML/MIN/1.73M2

## 2023-06-20 PROCEDURE — 74177 CT ABD & PELVIS W/CONTRAST: CPT

## 2023-06-20 PROCEDURE — 250N000011 HC RX IP 250 OP 636: Performed by: INTERNAL MEDICINE

## 2023-06-20 PROCEDURE — 250N000009 HC RX 250: Performed by: INTERNAL MEDICINE

## 2023-06-20 PROCEDURE — 82565 ASSAY OF CREATININE: CPT

## 2023-06-20 RX ORDER — IOPAMIDOL 755 MG/ML
82 INJECTION, SOLUTION INTRAVASCULAR ONCE
Status: COMPLETED | OUTPATIENT
Start: 2023-06-20 | End: 2023-06-20

## 2023-06-20 RX ADMIN — IOPAMIDOL 82 ML: 755 INJECTION, SOLUTION INTRAVENOUS at 11:03

## 2023-06-20 RX ADMIN — SODIUM CHLORIDE 63 ML: 9 INJECTION, SOLUTION INTRAVENOUS at 11:03

## 2023-06-21 NOTE — PROGRESS NOTES
Virtual Visit Details    Type of service:  Video Visit       Originating Location (pt. 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  The site of origin is unclear, so at the current time, it can be considered carcinoma of unknown primary, very likely of gastrointestinal origin, but the primary site is not known.    COVID vaccination status: completed Spring (~April) 2021    Oncology History:   He had a colonoscopy 03/30/2017 and another one 03/02/2021 that was delayed due to the COVID pandemic and other healthcare issues.  This was performed by Dr. Marin Steward on 03/02/2021 and the pathology was notable for tubular and tubulovillous adenomas.  Due to the extent and burden of the number of polyps, he was referred to Dr. Rodríguez for consideration of hemicolectomy.  A number of polyps in the distal transverse colon were tattooed.    Dr. Rodríguez took the patient to the operating room for right hemicolectomy 04/13/2021 due to this high polyp burden.    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        Tumor is located at the distal resection margin and situated within    the lamina propria/submucosa (distal   margin positive, proximal and deep margins are free of malignancy)        An overlying tubular adenoma is present at this distal resection   margin, does not appear to be the source   of the underlying malignancy        Tumor origin is compatible with gastrointestinal site as it marks   positively with CK 20, pan cytokeratin   and CDX2 (negative for CK-7), see comment   - Multiple tubular adenomas (at least twenty-one), negative  for high-grade    dysplasia, distal margin is   transected by a sessile tubular adenoma   - Benign appendix with focal surface serrated change, negative for   malignancy (submitted in its entirety)   - Unremarkable terminal ileum   - Sixteen benign lymph nodes (0/16)     COMMENT:   The origin of the adenocarcinoma is uncertain based on the current   resection however the immunoperoxidase   battery supports gastrointestinal origin.  Suggest clinical and   radiographic correlation to investigate   possible primary sites (including stomach, pancreaticobiliary and   remainder of the colon).  The appendix was   submitted in its entirety and does not appear to be the source of the   neoplasm.  Intradepartmental   consultation obtained, the case was discussed with Dr. Rodríguez on   4/19/21.     April 20, 2021--CT chest IMPRESSION:   In this patient with recently diagnosed colon cancer:  1. No evidence of metastatic disease in the chest.  2. Subcutaneous emphysema along the left lower chest/upper abdominal  wall may be secondary to transversus abdominis block done on  4/13/2021. No free intraperitoneal air or pneumothorax.  3. Coronary artery calcifications.  4. Sequelae of prior granulomatous disease.     [Access Center: Subcutaneous emphysema of the left lower chest/upper  abdominal wall, likely from transversus abdominis than on 4/13/2021.  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 FDG at the colonic surgical anastomosis presumably postsurgical  inflammation.   2. No definite FDG uptake to suggest metastatic disease.   3. No definite primary significant ring carcinoma is identified.   There are 3 equivocal  findings:   3a. Mild uptake in the gastric cardia, favor physiologic  3b. Loop of slightly narrowed thickened small bowel with FDG uptake,  favor physiologic or postoperative inflammation but cannot rule out a  primary.   3c. Extensive FDG uptake in the entire sigmoid colon, favored to be  physiologic.  3d. If clinically indicated, an MR enterography in this case may  better define bowel abnormalities. Alternatively attention to these  areas on follow-up imaging.  4. Incidentally noted bilateral hydroceles and right peritesticular  calcification.     May 3, 2021--initial medical oncology consultation, Dr. Sánchez.    May 6, 2021--EGD - unremarkable; no masses seen.    FINAL DIAGNOSIS:   A. ANTRUM, BIOPSY:   - Gastric antral type mucosa with features of reactive gastropathy   - No H. pylori like organisms identified on routine staining   - Negative for intestinal metaplasia or dysplasia     B. STOMACH, POLYP:   - Gastric body type mucosa with chronic gastritis   - No H. pylori like organisms identified on routine staining   - Negative for intestinal metaplasia, neoplastic polyp or dysplasia     C. STOMACH, POLYPS, BIOPSY:   - Fundic gland polyp in one fragment   - Gastric antral type mucosa with mild chronic gastritis   - No H. pylori like organisms identified on routine staining or by   immunohistochemistry   - Negative for intestinal metaplasia or dysplasia     D. STOMACH, BIOPSY:   - Chronic active gastritis with focal intestinal and pancreatic acinar   type metaplasia, see comment   - No H. pylori like organisms identified on routine staining or by   immunohistochemistry   - Negative for dysplasia, see comment       July 16, 2021 -- oncology follow-up/virtual visit, Dr. Sánchez.  11/8/21-PET/CT--IMPRESSION: Hx. of 2 mm focus of poorly differentiated adenocarcinoma  with significant ring cell features at the tip of the appendix.  No  recurrent or metastatic disease.  1. Postsurgical changes of right hemicolectomy with  mild residual  uptake along the surgical anastomosis which is below that of  background, decreased from prior, and favored to be inflammatory.   2. Slight progression of peripheral reticular opacities in the  dependent right lung with a basilar predominance, differential  atelectasis, edema, inflammatory, infectious, aspiration.  This is on  a background of emphysema.  2a. A 9 mm groundglass nodule right upper lobe peripherally unchanged  from prior exam, indeterminant. Continued attention on follow-up.    November 19, 2021 -- oncology follow-up/virtual visit, Dr. Sánchez.  The 04/22/2022 surveillance colonoscopy with Dr. Rodríguez resected several polyps, no evidence of malignancy.      Final Diagnosis   A.  COLON, ASCENDING, POLYP:  - Tubular adenoma  - No high-grade dysplasia or malignancy identified     B.  COLON, DESCENDING, POLYP:  - Tubular adenoma  - No high-grade dysplasia or malignancy identified         June 6, 2022--PET/CT: CT FINDINGS: Mild senescent intracranial changes. Postoperative change of the right lens. Mild carotid artery bifurcation calcification. Moderate to severe coronary artery calcium. Moderate upper lung predominant emphysema. Splenic granulomas. Right   hemicolectomy changes. Pelvic phleboliths. Multilevel degenerative changes of the spine. The lower extremities are unremarkable. IMPRESSION: No metabolic evidence of active neoplasm.    June 24, 2022 -- oncology follow-up/virtual visit, Dr. Sánchez.    3/23/23--EGD Impression:            - Normal examined duodenum.                          - Multiple gastric polyps. Resected and retrieved.                          - Esophagogastric landmarks identified.     3/23/23--surveillance colonoscopy--Impression:            - Patent functional end-to-end ileo-colonic                          anastomosis, characterized by ulceration. Biopsied.                          - The examined portion of the ileum was normal.                          - Six 2 to 4 mm  polyps at the recto-sigmoid colon and                          at the splenic flexure, removed with a cold snare.                          Resected and retrieved.                          - Diverticulosis in the sigmoid colon.                          - The examination was otherwise normal on direct and                          retroflexion views.   Recommendation:        - Return to referring physician.                          - Repeat colonoscopy in 2 years for surveillance based                          on pathology results.   Final Diagnosis   A. COLON, ANASTOMOSIS SITE, BIOPSY#1:  Enteric mucosa with hyperplastic/reactive changes, chronic inflammation and lamina propria fibrosis; negative for dysplasia or malignancy     B. COLON, ANASTOMOSIS SITE, BIOPSY#2:  Enteric mucosa with hyperplastic/reactive changes, chronic inflammation and lamina propria fibrosis; negative for dysplasia or malignancy     C. COLON, SPLENIC FLEXURE, POLYPS X2, BIOPSY:   -Tubulovillous adenoma; negative for high-grade dysplasia   -Hyperplastic polyp      D. COLON, LOWER LEFT, POLYPS X4, BIOPSY:  Tubular adenomas; negative for high-grade dysplasia      E. STOMACH, GREATER CURVATURE, POLYP, BIOPSY:  Gastric mucosa with chronic inactive gastritis with intestinal metaplasia (complete type); negative for dysplasia or malignancy       6/20/23--CT c/a/p--                                                                IMPRESSION:  No evidence of metastatic disease in the chest, abdomen, or pelvis.    June 23, 2023 -- oncology follow-up/virtual visit, Dr. Sánchez.      Interim History/History Of Present Illness:    Mr. Tevin Guidry is a 72-year-old gentleman from [place].  He has a past medical history notable for cardiovascular disease, status post multivessel CABG 09/29/2014 and angioplasties.  He has hypertension, COPD from a history of smoking, which he quit in 2019.  He has a history of bladder cancer which was a noninvasive high grade  form of urothelial carcinoma diagnosed in the summer and early fall of 2020.  He is status post multiple injections of BCG.  He was diagnosed in the summer of 2019 with a recurrence in mid winter of 2020.  He also has a history of innumerable polyps over the years. He has had nearly a dozen colonoscopies to date, dating back to age 48.  He has had numerous colonoscopies, but none of them have shown evidence of a neoplastic or malignant disease or significant dysplasia but he had tubulovillous forms of adenoma.     He joins me from home for an [employer]-based virtual video visit.  He is generally doing well.  His prior history is well documented of an incidental finding of a 2 mm, poorly differentiated carcinoma with signet ring features at the tip of the appendix.  He had surgery, and this was incidentally detected during the surgery comprising a right hemicolectomy initially for noncancerous reasons in 04/2021.  I have been performing active surveillance since that time.    In the interim, he has had a number of noncancer-related issues addressed, including the last 6 months he had cataracts addressed through his ophthalmologist, and he follows up with Dr. Preethi Quiroz from his primary care team, the next appointment which is the end of August.  He had a surveillance colonoscopy most recently performed 03/23/2023 as well as an upper endoscopy at the same time. There were a number of small polyps, including tubulovillous adenomas, splenic flexure and hyperplastic polyps, but no evidence of malignancy seen in the upper and lower gastrointestinal system.      He had a CT chest, abdomen and pelvis on 06/20 that did not show any evidence of recurrent malignancy.  He has no cancer-related questions at this time.  He had a question on a CT scan about severe coronary artery disease.  Looking through his prior past medical history, he does have a history of CAD, status post 2 vessel CABG, including LIMA to LAD.  He had a  cardiac catheterization 2014 comprising angioplasty and RCA x3.  I encouraged him to follow up with his primary physician and Cardiology team about this issue.  It is not related to his history of cancer.  He denies to be sure any symptoms of chest pain or any cardiac type of symptoms, otherwise.        He has a primary care physician Dr. Quiroz who recommended last year that he see a dermatologist and gastroenterologist due to his MUTYH-associated polyposis.     Review Of Systems:  Comprehensive (14-point) ROS reviewed. Pertinent symptoms reviewed above per HPI.      Past medical, social, surgical, and family histories reviewed.  PAST MEDICAL HISTORY:  Notable for innumerable colon polyps.  It is not clear whether or not he has a germline or familial mutation that is causing this.  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  He has CAD, hypertension.  He he has had cardiac catheterization including serum 2014 when he had a CABG with LIMA to LAD, 2014.  He has another malignancy in the form of noninvasive bladder carcinoma, left lateral wall of the bladder.  He has treatments with BCG and other interventions by our Urology team since 2019 for that.  COPD due to remote history of smoking.    PAST SURGICAL HISTORY:  Most remarkable for the hemicolectomy performed by Dr Colvin 2021 per above and the interventions for the bladder carcinoma in 2019 and .  Please see the Urology note for details.    FAMILY HISTORY:  Most notable for him being 1 of 3 sons from his parents.  His mom was a smoker and diagnosed with stage IV gastric carcinoid at age 65 and  within 4 months of diagnosis.  Father was a smoker and had lung cancer at age 40 and  at age 50 of this disease.  One brother had gastric carcinoma and  around age 72 after initial diagnosis at age 62.  Another brother is living at age 69 and has never  had a colonoscopy due to lack of insurance and other reasons.  A maternal aunt also had ovarian cancer.    SOCIAL HISTORY:  The patient lives in [place].  He has an associate degree in respiratory therapy  Due to social phobia, he has not been employed in that practice.  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  He is single, never , no children.    Tobacco:  He smoked 1-1/2 packs a day of cigarettes for 30 years, quit 2019.  Occasional use of marijuana.  Denied any alcohol use or other illicit drug use.    Allergies:  Allergies as of 06/23/2023 - Reviewed 05/30/2023   Allergen Reaction Noted     Codeine Nausea and Vomiting 10/14/2014       Current Medications:  Current Outpatient Medications   Medication Sig Dispense Refill     albuterol (PROAIR HFA/PROVENTIL HFA/VENTOLIN HFA) 108 (90 Base) MCG/ACT inhaler Inhale 2 puffs into the lungs every 6 hours as needed for shortness of breath or wheezing 18 g 1     amLODIPine (NORVASC) 5 MG tablet TAKE ONE TABLET BY MOUTH EVERY NIGHT AT BEDTIME 90 tablet 3     aspirin 81 MG tablet Take 1 tablet (81 mg) by mouth daily 90 tablet 3     atorvastatin (LIPITOR) 80 MG tablet TAKE ONE TABLET BY MOUTH EVERY EVENING AT BEDTIME 90 tablet 3     buPROPion (WELLBUTRIN XL) 300 MG 24 hr tablet Take 1 tablet (300 mg) by mouth every morning 90 tablet 1     ezetimibe (ZETIA) 10 MG tablet Take 1 tablet (10 mg) by mouth daily 90 tablet 3     lisinopril (ZESTRIL) 20 MG tablet Take 1 tablet (20 mg) by mouth 2 times daily 180 tablet 3     metoprolol succinate ER (TOPROL XL) 50 MG 24 hr tablet Take 1 tablet (50 mg) by mouth At Bedtime 90 tablet 3     PARoxetine (PAXIL) 40 MG tablet Take 1 tablet (40 mg) by mouth every morning 90 tablet 3     umeclidinium-vilanterol (ANORO ELLIPTA) 62.5-25 MCG/ACT oral inhaler Inhale 1 puff into the lungs daily 30 each 5        Physical Exam:  In-person physical exam could not be  "performed today in context of a Virtual Visit. Observed physical assessments made today by visualizing the patient by video link:  Vitals - Patient Reported  Systolic (Patient Reported):  (unable to obtain BP)  Weight (Patient Reported): 77.1 kg (170 lb)  Height (Patient Reported): 175.3 cm (5' 9\")  BMI (Based on Pt Reported Ht/Wt): 25.1  Pain Score: No Pain (0)             General/Constitutional: Generally appears well, not acutely ill.  HEENT: no scleral icterus, not jaundiced.  Respiratory: no labored breathing.  Musculoskeletal: appears to have full range of motion and adequate physical strength.  Skin: no jaundice, discoloration or other notable lesions.  Neurological: no evidence of tremors.  Psychiatric: no evident anxiety; fully alert and oriented with fluent speech.      The rest of a comprehensive physical examination is deferred due to nature of video visits.    Laboratory/Imaging Studies  No visits with results within 2 Week(s) from this visit.   Latest known visit with results is:   Virtual Visit on 05/05/2021   Component Date Value Ref Range Status     Copath Report 05/06/2021    Final                    Value:Patient Name: DICK LYONS  MR#: [id number]  Specimen #: [id number]  Collected: 5/6/2021  Received: 5/6/2021  Reported: 5/13/2021 10:06  Ordering Phy(s): MANNIE DAWKINS  Additional Phy(s): [facility]: [facility]    For improved result formatting, select 'View Enhanced Report Format' under   Linked Documents section.    SPECIMEN(S):  A: Antral biopsy  B: Gastric polyp  C: Gastric biopsy  D: Gastric biopsy    FINAL DIAGNOSIS:  A. ANTRUM, BIOPSY:  - Gastric antral type mucosa with features of reactive gastropathy  - No H. pylori like organisms identified on routine staining  - Negative for intestinal metaplasia or dysplasia    B. STOMACH, POLYP:  - Gastric body type mucosa with chronic gastritis  - No H. pylori like organisms identified on routine staining  - Negative for " "intestinal metaplasia, neoplastic polyp or dysplasia    C. STOMACH, POLYPS, BIOPSY:  - Fundic gland polyp in one fragment  - Gastric antral type mucosa with mild chronic gastritis  - No H. pylor                          i like organisms identified on routine staining or by   immunohistochemistry  - Negative for intestinal metaplasia or dysplasia    D. STOMACH, BIOPSY:  - Chronic active gastritis with focal intestinal and pancreatic acinar   type metaplasia, see comment  - No H. pylori like organisms identified on routine staining or by   immunohistochemistry  - Negative for dysplasia, see comment    COMMENT:  D).  The biopsies show antral type mucosa with pancreatic acinar type   metaplasia and a minute focus of  intestinal metaplasia.  Immunohistochemistry was performed with   appropriate controls.  Chromogranin performed  on part D highlights linear enterochromaffin cell-like hyperplasia.    Gastrin stain shows rare positive cells  at the edge of one biopsy fragments.  If these biopsies were taken from   the fundus or body, these findings are  suggestive of autoimmune gastritis.  Clinical correlation is recommended.    I have personally reviewed all specimens and/or slides, including the   listed sp                          ecial stains, and used them  with my medical judgement to determine or confirm the final diagnosis.    Electronically signed out by:    Liliane Ta M.D., [facility]    CLINICAL HISTORY:  Screening endoscopy.  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    Striped erythema in gastric antrum.  Polyps in the gastric fundus.    GROSS:  A: The specimen is received in formalin with proper patient   identification, labeled \"gastric biopsy antrum\".  The specimen consists of 4 pieces of tan-pink tissue ranging from 0.2-0.5   cm in greatest dimension, submitted  in toto in cassette A1.    B: The specimen is received in formalin with proper patient   identification, labeled \"gastric polyps " "small\".  The specimen consists of 3 pieces of tan-pink tissue ranging from 0.2-0.5   cm in greatest dimension, submitted  in toto in cassette B1.    C: The specimen is received in formalin with proper patient   identification, labeled \"gastric biopsy large  polyps\".  The specimen consists of 5 pieces of                           tan-pink tissue ranging   from 0.2-0.6 cm in greatest dimension,  submitted in toto in cassette C 1.    D: The specimen is received in formalin with proper patient   identification, labeled \"gastric biopsy fundus\".  The specimen consists of 3 pieces of tan-pink tissue, each measuring   approximately 0.2 cm in greatest  dimension; submitted in toto in cassette D1. (Dictated by: MONICA 5/6/2021   02:53 PM)    MICROSCOPIC:  Microscopic examination was performed.  Immunohistochemistry was performed   with appropriate controls.  CK  AE1/AE3 performed on parts A1-D1 does not show evidence of signet ring   cell carcinoma.    The technical component of this testing was completed at the General acute hospital, with the professional component performed   at the [facility], [address] [place] [phone] ([phone])    CPT Codes:  A: 36338-VS0, 85015-SZJ  B: 67575-CX4, 72227-EUR                            C: 05783-EP1, 86620-ZXH, 90047-NMD  D: [address]-QJ6, 05553-WKH, 61080-WJR, 40288-EYE, 66386-RSXAFL2BV    COLLECTION SITE:  Client: [facility]  Location: [facility] (B)    Resident  DXA            RADIOLOGY:  Prior to and including the day of this visit, I personally reviewed the recent imaging scans. I released the pertinent recent imaging results to [facility] in advance of this visit, and reviewed the summary verbatim and in lay language during today's call.      ASSESSMENT/PLAN:  Mr. Hola Guidry is a 71-year-old gentleman with history of innumerable " polyps.  He does not have a known germline mutation, although he has had multiple first-degree members of family with gastric carcinoma.  He had upper endoscopy by Dr. Tobar on 05/06/2021 to help clarify whether or not he had primary gastric carcinoma, which would have made sense in terms of signet cell features of the 2 mm focus in the distal appendix that was positive for adenocarcinoma; the evaluation was unremarkable.  Otherwise, the rest of the colon and terminal ileum were negative for carcinoma.  It is not out of the realm of possibility that signet ring cell features and type of histology can be found in small intestinal carcinomas including appendix, but more commonly, it is found in the upper GI tract.  Based on the location as well being more mucosa than through wall that there is consideration whether or not this represented an unusual form of metastasis to the appendiceal cavity rather than a primary appendiceal malignancy.  However, the PET CT was unremarkable and did not show any evidence of an alternate primary at that time.    It is difficult to ascertain the exact risk of recurrence of the appendix that was incidentally found at the tip of the appendix.  It was very minimal in size at 2 mm and did comprise some high-risk form of histology with signet ring subset.      At this time, he is more than 2 years out from the initial incidental diagnosis during right hemicolectomy of appendiceal poorly differentiated signet ring carcinoma.  No evidence of disease recurrence based on recent CT scan. I suggested a continuation 1-year followup.  The recent colonoscopy and EGD are also very reassuring in that there is no evidence of malignancy in the remaining intestinal tract.  Per his gastroenterology team, he will follow up in 2 years for those endoscopies. For radiologic surveillance, I have ordered a CT chest, abdomen and pelvis and CEA to be done the same day.  He can met with me within a few days to  review the results.  That can take place 1 year from now.  Otherwise, in terms of his history of CAD and his questions about severe coronary artery disease based on the CT scan report, I will defer to his Cardiology team and I did ask him specifically to connect with his Cardiology team and/or primary care doctor for any further questions on that topic.          VIRTUAL VISIT - DETAILS:    I have reviewed the note as documented above. This accurately captures the substance of my conversation with the patient.    Date of call: June 23, 2023   Start of call: 8:32 am  End of call: 8:40 am    Provider location: [facility] (academic office)  Patient location: Home      Mode of Video Visit: Amwell             I spent 8 minutes in consultation, including history and discussion with the patient including review of recent lab values and radiologic imaging results.  An additional 17 minutes was spent on the day of the visit, including reviewing pertinent medical notes and documentation from other physicians and APPs who have evaluated and treated this patient, pertinent lab values, pathology and imaging results, personal review of radiologic images, discussing the case with referring providers and/or nurse coordinator team, post-visit orders, and all post-visit documentation.    Tanner Sánchez MD PhD                The above was transcribed using a voice recognition software.  While I reviewed and edited the transcription, I may miss errors.  Please let me know of any of serious errors and I will addend the note.

## 2023-06-23 ENCOUNTER — VIRTUAL VISIT (OUTPATIENT)
Dept: ONCOLOGY | Facility: CLINIC | Age: 73
End: 2023-06-23
Attending: INTERNAL MEDICINE
Payer: COMMERCIAL

## 2023-06-23 DIAGNOSIS — C18.1 APPENDIX CARCINOMA (H): Primary | ICD-10-CM

## 2023-06-23 PROCEDURE — 99213 OFFICE O/P EST LOW 20 MIN: CPT | Mod: 95 | Performed by: INTERNAL MEDICINE

## 2023-06-23 NOTE — LETTER
6/23/2023         RE: Tevin Guidry  [address]  [facility] [address]        Dear Colleague,    Thank you for referring your patient, Tevin Guidry, to the [facility]. Please see a copy of my visit note below.    Virtual Visit Details    Type of service:  Video Visit       Originating Location (pt. 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  The site of origin is unclear, so at the current time, it can be considered carcinoma of unknown primary, very likely of gastrointestinal origin, but the primary site is not known.    COVID vaccination status: completed Spring (~April) 2021    Oncology History:   He had a colonoscopy 03/30/2017 and another one 03/02/2021 that was delayed due to the COVID pandemic and other healthcare issues.  This was performed by Dr. Marin Steward on 03/02/2021 and the pathology was notable for tubular and tubulovillous adenomas.  Due to the extent and burden of the number of polyps, he was referred to Dr. Rodríguez for consideration of hemicolectomy.  A number of polyps in the distal transverse colon were tattooed.    Dr. Rodríguez took the patient to the operating room for right hemicolectomy 04/13/2021 due to this high polyp burden.    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        Tumor is located at the distal resection margin and situated within    the lamina propria/submucosa (distal   margin positive, proximal and deep margins are free of malignancy)        An overlying tubular adenoma is present at this distal resection   margin, does not appear to  be the source   of the underlying malignancy        Tumor origin is compatible with gastrointestinal site as it marks   positively with CK 20, pan cytokeratin   and CDX2 (negative for CK-7), see comment   - Multiple tubular adenomas (at least twenty-one), negative for high-grade    dysplasia, distal margin is   transected by a sessile tubular adenoma   - Benign appendix with focal surface serrated change, negative for   malignancy (submitted in its entirety)   - Unremarkable terminal ileum   - Sixteen benign lymph nodes (0/16)     COMMENT:   The origin of the adenocarcinoma is uncertain based on the current   resection however the immunoperoxidase   battery supports gastrointestinal origin.  Suggest clinical and   radiographic correlation to investigate   possible primary sites (including stomach, pancreaticobiliary and   remainder of the colon).  The appendix was   submitted in its entirety and does not appear to be the source of the   neoplasm.  Intradepartmental   consultation obtained, the case was discussed with Dr. Rodríguez on   4/19/21.     April 20, 2021--CT chest IMPRESSION:   In this patient with recently diagnosed colon cancer:  1. No evidence of metastatic disease in the chest.  2. Subcutaneous emphysema along the left lower chest/upper abdominal  wall may be secondary to transversus abdominis block done on  4/13/2021. No free intraperitoneal air or pneumothorax.  3. Coronary artery calcifications.  4. Sequelae of prior granulomatous disease.     [Access Center: Subcutaneous emphysema of the left lower chest/upper  abdominal wall, likely from transversus abdominis than on 4/13/2021.  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 FDG at the colonic surgical anastomosis presumably postsurgical  inflammation.   2. No definite FDG uptake to suggest metastatic disease.   3. No definite primary significant ring carcinoma is identified.   There are 3 equivocal findings:   3a. Mild uptake in the gastric cardia, favor physiologic  3b. Loop of slightly narrowed thickened small bowel with FDG uptake,  favor physiologic or postoperative inflammation but cannot rule out a  primary.   3c. Extensive FDG uptake in the entire sigmoid colon, favored to be  physiologic.  3d. If clinically indicated, an MR enterography in this case may  better define bowel abnormalities. Alternatively attention to these  areas on follow-up imaging.  4. Incidentally noted bilateral hydroceles and right peritesticular  calcification.     May 3, 2021--initial medical oncology consultation, Dr. Sánchez.    May 6, 2021--EGD - unremarkable; no masses seen.    FINAL DIAGNOSIS:   A. ANTRUM, BIOPSY:   - Gastric antral type mucosa with features of reactive gastropathy   - No H. pylori like organisms identified on routine staining   - Negative for intestinal metaplasia or dysplasia     B. STOMACH, POLYP:   - Gastric body type mucosa with chronic gastritis   - No H. pylori like organisms identified on routine staining   - Negative for intestinal metaplasia, neoplastic polyp or dysplasia     C. STOMACH, POLYPS, BIOPSY:   - Fundic gland polyp in one fragment   - Gastric antral type mucosa with mild chronic gastritis   - No H. pylori like organisms identified on routine staining or by   immunohistochemistry   - Negative for intestinal metaplasia or dysplasia     D. STOMACH, BIOPSY:   - Chronic active gastritis with focal intestinal and pancreatic acinar   type metaplasia, see comment   - No H. pylori like organisms identified on routine staining or by   immunohistochemistry   - Negative for dysplasia, see comment       July 16, 2021 -- oncology  follow-up/virtual visit, Dr. Sánchez.  11/8/21-PET/CT--IMPRESSION: Hx. of 2 mm focus of poorly differentiated adenocarcinoma  with significant ring cell features at the tip of the appendix.  No  recurrent or metastatic disease.  1. Postsurgical changes of right hemicolectomy with mild residual  uptake along the surgical anastomosis which is below that of  background, decreased from prior, and favored to be inflammatory.   2. Slight progression of peripheral reticular opacities in the  dependent right lung with a basilar predominance, differential  atelectasis, edema, inflammatory, infectious, aspiration.  This is on  a background of emphysema.  2a. A 9 mm groundglass nodule right upper lobe peripherally unchanged  from prior exam, indeterminant. Continued attention on follow-up.    November 19, 2021 -- oncology follow-up/virtual visit, Dr. Sánchez.  The 04/22/2022 surveillance colonoscopy with Dr. Rodríguez resected several polyps, no evidence of malignancy.      Final Diagnosis   A.  COLON, ASCENDING, POLYP:  - Tubular adenoma  - No high-grade dysplasia or malignancy identified     B.  COLON, DESCENDING, POLYP:  - Tubular adenoma  - No high-grade dysplasia or malignancy identified         June 6, 2022--PET/CT: CT FINDINGS: Mild senescent intracranial changes. Postoperative change of the right lens. Mild carotid artery bifurcation calcification. Moderate to severe coronary artery calcium. Moderate upper lung predominant emphysema. Splenic granulomas. Right   hemicolectomy changes. Pelvic phleboliths. Multilevel degenerative changes of the spine. The lower extremities are unremarkable. IMPRESSION: No metabolic evidence of active neoplasm.    June 24, 2022 -- oncology follow-up/virtual visit, Dr. Sánchez.    3/23/23--EGD Impression:            - Normal examined duodenum.                          - Multiple gastric polyps. Resected and retrieved.                          - Esophagogastric landmarks identified.      3/23/23--surveillance colonoscopy--Impression:            - Patent functional end-to-end ileo-colonic                          anastomosis, characterized by ulceration. Biopsied.                          - The examined portion of the ileum was normal.                          - Six 2 to 4 mm polyps at the recto-sigmoid colon and                          at the splenic flexure, removed with a cold snare.                          Resected and retrieved.                          - Diverticulosis in the sigmoid colon.                          - The examination was otherwise normal on direct and                          retroflexion views.   Recommendation:        - Return to referring physician.                          - Repeat colonoscopy in 2 years for surveillance based                          on pathology results.   Final Diagnosis   A. COLON, ANASTOMOSIS SITE, BIOPSY#1:  Enteric mucosa with hyperplastic/reactive changes, chronic inflammation and lamina propria fibrosis; negative for dysplasia or malignancy     B. COLON, ANASTOMOSIS SITE, BIOPSY#2:  Enteric mucosa with hyperplastic/reactive changes, chronic inflammation and lamina propria fibrosis; negative for dysplasia or malignancy     C. COLON, SPLENIC FLEXURE, POLYPS X2, BIOPSY:   -Tubulovillous adenoma; negative for high-grade dysplasia   -Hyperplastic polyp      D. COLON, LOWER LEFT, POLYPS X4, BIOPSY:  Tubular adenomas; negative for high-grade dysplasia      E. STOMACH, GREATER CURVATURE, POLYP, BIOPSY:  Gastric mucosa with chronic inactive gastritis with intestinal metaplasia (complete type); negative for dysplasia or malignancy       6/20/23--CT c/a/p--                                                                IMPRESSION:  No evidence of metastatic disease in the chest, abdomen, or pelvis.    June 23, 2023 -- oncology follow-up/virtual visit, Dr. Sánchez.      Interim History/History Of Present Illness:    Mr. Tevin Guidry is a 72-year-old  gentleman from [place].  He has a past medical history notable for cardiovascular disease, status post multivessel CABG 09/29/2014 and angioplasties.  He has hypertension, COPD from a history of smoking, which he quit in 2019.  He has a history of bladder cancer which was a noninvasive high grade form of urothelial carcinoma diagnosed in the summer and early fall of 2020.  He is status post multiple injections of BCG.  He was diagnosed in the summer of 2019 with a recurrence in mid winter of 2020.  He also has a history of innumerable polyps over the years. He has had nearly a dozen colonoscopies to date, dating back to age 48.  He has had numerous colonoscopies, but none of them have shown evidence of a neoplastic or malignant disease or significant dysplasia but he had tubulovillous forms of adenoma.     He joins me from home for an [place]-based virtual video visit.  He is generally doing well.  His prior history is well documented of an incidental finding of a 2 mm, poorly differentiated carcinoma with signet ring features at the tip of the appendix.  He had surgery, and this was incidentally detected during the surgery comprising a right hemicolectomy initially for noncancerous reasons in 04/2021.  I have been performing active surveillance since that time.    In the interim, he has had a number of noncancer-related issues addressed, including the last 6 months he had cataracts addressed through his ophthalmologist, and he follows up with Dr. Preethi Quiroz from his primary care team, the next appointment which is the end of August.  He had a surveillance colonoscopy most recently performed 03/23/2023 as well as an upper endoscopy at the same time. There were a number of small polyps, including tubulovillous adenomas, splenic flexure and hyperplastic polyps, but no evidence of malignancy seen in the upper and lower gastrointestinal system.      He had a CT chest, abdomen and pelvis on 06/20 that did not show  any evidence of recurrent malignancy.  He has no cancer-related questions at this time.  He had a question on a CT scan about severe coronary artery disease.  Looking through his prior past medical history, he does have a history of CAD, status post 2 vessel CABG, including LIMA to LAD.  He had a cardiac catheterization 09/29/2014 comprising angioplasty and RCA x3.  I encouraged him to follow up with his primary physician and Cardiology team about this issue.  It is not related to his history of cancer.  He denies to be sure any symptoms of chest pain or any cardiac type of symptoms, otherwise.        He has a primary care physician Dr. Quiroz who recommended last year that he see a dermatologist and gastroenterologist due to his MUTYH-associated polyposis.     Review Of Systems:  Comprehensive (14-point) ROS reviewed. Pertinent symptoms reviewed above per HPI.      Past medical, social, surgical, and family histories reviewed.  PAST MEDICAL HISTORY:  Notable for innumerable colon polyps.  It is not clear whether or not he has a germline or familial mutation that is causing this.  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  He has CAD, hypertension.  He he has had cardiac catheterization including serum 09/2014 when he had a CABG with LIMA to LAD, 09/29/2014.  He has another malignancy in the form of noninvasive bladder carcinoma, left lateral wall of the bladder.  He has treatments with BCG and other interventions by our Urology team since 2019 for that.  COPD due to remote history of smoking.    PAST SURGICAL HISTORY:  Most remarkable for the hemicolectomy performed by Dr Colvin 04/13/2021 per above and the interventions for the bladder carcinoma in 2019 and 2020.  Please see the Urology note for details.    FAMILY HISTORY:  Most notable for him being 1 of 3 sons from his parents.  His mom was a smoker and diagnosed with stage IV  gastric carcinoid at age 65 and  within 4 months of diagnosis.  Father was a smoker and had lung cancer at age 40 and  at age 50 of this disease.  One brother had gastric carcinoma and  around age 72 after initial diagnosis at age 62.  Another brother is living at age 69 and has never had a colonoscopy due to lack of insurance and other reasons.  A maternal aunt also had ovarian cancer.    SOCIAL HISTORY:  The patient lives in [place].  He has an associate degree in respiratory therapy  Due to social phobia, he has not been employed in that practice.  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  He is single, never , no children.    Tobacco:  He smoked 1-1/2 packs a day of cigarettes for 30 years, quit .  Occasional use of marijuana.  Denied any alcohol use or other illicit drug use.    Allergies:  Allergies as of 2023 - Reviewed 2023   Allergen Reaction Noted    Codeine Nausea and Vomiting 10/14/2014       Current Medications:  Current Outpatient Medications   Medication Sig Dispense Refill    albuterol (PROAIR HFA/PROVENTIL HFA/VENTOLIN HFA) 108 (90 Base) MCG/ACT inhaler Inhale 2 puffs into the lungs every 6 hours as needed for shortness of breath or wheezing 18 g 1    amLODIPine (NORVASC) 5 MG tablet TAKE ONE TABLET BY MOUTH EVERY NIGHT AT BEDTIME 90 tablet 3    aspirin 81 MG tablet Take 1 tablet (81 mg) by mouth daily 90 tablet 3    atorvastatin (LIPITOR) 80 MG tablet TAKE ONE TABLET BY MOUTH EVERY EVENING AT BEDTIME 90 tablet 3    buPROPion (WELLBUTRIN XL) 300 MG 24 hr tablet Take 1 tablet (300 mg) by mouth every morning 90 tablet 1    ezetimibe (ZETIA) 10 MG tablet Take 1 tablet (10 mg) by mouth daily 90 tablet 3    lisinopril (ZESTRIL) 20 MG tablet Take 1 tablet (20 mg) by mouth 2 times daily 180 tablet 3    metoprolol succinate ER (TOPROL XL) 50 MG 24 hr tablet Take 1 tablet (50 mg) by mouth At Bedtime  "90 tablet 3    PARoxetine (PAXIL) 40 MG tablet Take 1 tablet (40 mg) by mouth every morning 90 tablet 3    umeclidinium-vilanterol (ANORO ELLIPTA) 62.5-25 MCG/ACT oral inhaler Inhale 1 puff into the lungs daily 30 each 5        Physical Exam:  In-person physical exam could not be performed today in context of a Virtual Visit. Observed physical assessments made today by visualizing the patient by video link:  Vitals - Patient Reported  Systolic (Patient Reported):  (unable to obtain BP)  Weight (Patient Reported): 77.1 kg (170 lb)  Height (Patient Reported): 175.3 cm (5' 9\")  BMI (Based on Pt Reported Ht/Wt): 25.1  Pain Score: No Pain (0)             General/Constitutional: Generally appears well, not acutely ill.  HEENT: no scleral icterus, not jaundiced.  Respiratory: no labored breathing.  Musculoskeletal: appears to have full range of motion and adequate physical strength.  Skin: no jaundice, discoloration or other notable lesions.  Neurological: no evidence of tremors.  Psychiatric: no evident anxiety; fully alert and oriented with fluent speech.      The rest of a comprehensive physical examination is deferred due to nature of video visits.    Laboratory/Imaging Studies  No visits with results within 2 Week(s) from this visit.   Latest known visit with results is:   Virtual Visit on 05/05/2021   Component Date Value Ref Range Status    Copath Report 05/06/2021    Final                    Value:Patient Name: DICK LYONS  MR#: [id number]  Specimen #: [id number]  Collected: 5/6/2021  Received: 5/6/2021  Reported: 5/13/2021 10:06  Ordering Phy(s): MANNIE DAWKINS  Additional Phy(s): [facility]: [facility]    For improved result formatting, select 'View Enhanced Report Format' under   Linked Documents section.    SPECIMEN(S):  A: Antral biopsy  B: Gastric polyp  C: Gastric biopsy  D: Gastric biopsy    FINAL DIAGNOSIS:  A. ANTRUM, BIOPSY:  - Gastric antral type mucosa with features of " reactive gastropathy  - No H. pylori like organisms identified on routine staining  - Negative for intestinal metaplasia or dysplasia    B. STOMACH, POLYP:  - Gastric body type mucosa with chronic gastritis  - No H. pylori like organisms identified on routine staining  - Negative for intestinal metaplasia, neoplastic polyp or dysplasia    C. STOMACH, POLYPS, BIOPSY:  - Fundic gland polyp in one fragment  - Gastric antral type mucosa with mild chronic gastritis  - No H. pylor                          i like organisms identified on routine staining or by   immunohistochemistry  - Negative for intestinal metaplasia or dysplasia    D. STOMACH, BIOPSY:  - Chronic active gastritis with focal intestinal and pancreatic acinar   type metaplasia, see comment  - No H. pylori like organisms identified on routine staining or by   immunohistochemistry  - Negative for dysplasia, see comment    COMMENT:  D).  The biopsies show antral type mucosa with pancreatic acinar type   metaplasia and a minute focus of  intestinal metaplasia.  Immunohistochemistry was performed with   appropriate controls.  Chromogranin performed  on part D highlights linear enterochromaffin cell-like hyperplasia.    Gastrin stain shows rare positive cells  at the edge of one biopsy fragments.  If these biopsies were taken from   the fundus or body, these findings are  suggestive of autoimmune gastritis.  Clinical correlation is recommended.    I have personally reviewed all specimens and/or slides, including the   listed sp                          ecial stains, and used them  with my medical judgement to determine or confirm the final diagnosis.    Electronically signed out by:    Liliane Ta M.D., [facility]    CLINICAL HISTORY:  Screening endoscopy.  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    Striped erythema in gastric antrum.  Polyps in the gastric fundus.    GROSS:  A: The specimen is received in formalin with proper patient  "  identification, labeled \"gastric biopsy antrum\".  The specimen consists of 4 pieces of tan-pink tissue ranging from 0.2-0.5   cm in greatest dimension, submitted  in toto in cassette A1.    B: The specimen is received in formalin with proper patient   identification, labeled \"gastric polyps small\".  The specimen consists of 3 pieces of tan-pink tissue ranging from 0.2-0.5   cm in greatest dimension, submitted  in toto in cassette B1.    C: The specimen is received in formalin with proper patient   identification, labeled \"gastric biopsy large  polyps\".  The specimen consists of 5 pieces of                           tan-pink tissue ranging   from 0.2-0.6 cm in greatest dimension,  submitted in toto in cassette C 1.    D: The specimen is received in formalin with proper patient   identification, labeled \"gastric biopsy fundus\".  The specimen consists of 3 pieces of tan-pink tissue, each measuring   approximately 0.2 cm in greatest  dimension; submitted in toto in cassette D1. (Dictated by: MONICA 5/6/2021   02:53 PM)    MICROSCOPIC:  Microscopic examination was performed.  Immunohistochemistry was performed   with appropriate controls.  CK  AE1/AE3 performed on parts A1-D1 does not show evidence of signet ring   cell carcinoma.    The technical component of this testing was completed at the [facility], with the professional component performed   at the [facility], [address] [place] [id number] ([phone])    CPT Codes:  A: 14804-NY4, 49030-WRQ  B: 16616-ZT8, 52940-VUQ                            C: 37989-MH4, 50977-RDA, 66792-TSC  D: 75278-TJ0, 00153-HVO, 30178-ARQ, 56132-LBB, 49680-IRGLHF1GT    COLLECTION SITE:  Client[facility]  [facility]    Resident  [id number]            RADIOLOGY:  Prior to and including the day of this visit, I personally " reviewed the recent imaging scans. I released the pertinent recent imaging results to [employer] in advance of this visit, and reviewed the summary verbatim and in lay language during today's call.      ASSESSMENT/PLAN:  Mr. Hola Guidry is a 71-year-old gentleman with history of innumerable polyps.  He does not have a known germline mutation, although he has had multiple first-degree members of family with gastric carcinoma.  He had upper endoscopy by Dr. Tobar on 05/06/2021 to help clarify whether or not he had primary gastric carcinoma, which would have made sense in terms of signet cell features of the 2 mm focus in the distal appendix that was positive for adenocarcinoma; the evaluation was unremarkable.  Otherwise, the rest of the colon and terminal ileum were negative for carcinoma.  It is not out of the realm of possibility that signet ring cell features and type of histology can be found in small intestinal carcinomas including appendix, but more commonly, it is found in the upper GI tract.  Based on the location as well being more mucosa than through wall that there is consideration whether or not this represented an unusual form of metastasis to the appendiceal cavity rather than a primary appendiceal malignancy.  However, the PET CT was unremarkable and did not show any evidence of an alternate primary at that time.    It is difficult to ascertain the exact risk of recurrence of the appendix that was incidentally found at the tip of the appendix.  It was very minimal in size at 2 mm and did comprise some high-risk form of histology with signet ring subset.      At this time, he is more than 2 years out from the initial incidental diagnosis during right hemicolectomy of appendiceal poorly differentiated signet ring carcinoma.  No evidence of disease recurrence based on recent CT scan. I suggested a continuation 1-year followup.  The recent colonoscopy and EGD are also very reassuring in that there is no  evidence of malignancy in the remaining intestinal tract.  Per his gastroenterology team, he will follow up in 2 years for those endoscopies. For radiologic surveillance, I have ordered a CT chest, abdomen and pelvis and CEA to be done the same day.  He can met with me within a few days to review the results.  That can take place 1 year from now.  Otherwise, in terms of his history of CAD and his questions about severe coronary artery disease based on the CT scan report, I will defer to his Cardiology team and I did ask him specifically to connect with his Cardiology team and/or primary care doctor for any further questions on that topic.          VIRTUAL VISIT - DETAILS:    I have reviewed the note as documented above. This accurately captures the substance of my conversation with the patient.    Date of call: June 23, 2023   Start of call: 8:32 am  End of call: 8:40 am    Provider location: [facility] (academic office)  Patient location: Home      Mode of Video Visit: Amwell             I spent 8 minutes in consultation, including history and discussion with the patient including review of recent lab values and radiologic imaging results.  An additional 17 minutes was spent on the day of the visit, including reviewing pertinent medical notes and documentation from other physicians and APPs who have evaluated and treated this patient, pertinent lab values, pathology and imaging results, personal review of radiologic images, discussing the case with referring providers and/or nurse coordinator team, post-visit orders, and all post-visit documentation.    Tanner Sánchez MD PhD        The above was transcribed using a voice recognition software.  While I reviewed and edited the transcription, I may miss errors.  Please let me know of any of serious errors and I will addend the note.

## 2023-06-23 NOTE — NURSING NOTE
Is the patient currently in the state of [place]? YES    Visit mode:VIDEO    If the visit is dropped, the patient can be reconnected by: VIDEO VISIT: Text to cell phone: [phone]    Will anyone else be joining the visit? NO      How would you like to obtain your AVS? MyChart    Are changes needed to the allergy or medication list? NO    Reason for visit: RECHECK (Oncology Video Visit Return, Malignant neoplasm of appendix, review labs and CT results)

## 2023-07-06 ENCOUNTER — ALLIED HEALTH/NURSE VISIT (OUTPATIENT)
Dept: FAMILY MEDICINE | Facility: CLINIC | Age: 73
End: 2023-07-06
Payer: COMMERCIAL

## 2023-07-06 DIAGNOSIS — E53.8 VITAMIN B12 DEFICIENCY (NON ANEMIC): Primary | ICD-10-CM

## 2023-07-06 PROCEDURE — 96372 THER/PROPH/DIAG INJ SC/IM: CPT | Performed by: FAMILY MEDICINE

## 2023-07-06 PROCEDURE — 99207 PR NO CHARGE NURSE ONLY: CPT

## 2023-07-06 RX ADMIN — CYANOCOBALAMIN 1000 MCG: 1000 INJECTION, SOLUTION INTRAMUSCULAR; SUBCUTANEOUS at 09:40

## 2023-08-01 ENCOUNTER — ALLIED HEALTH/NURSE VISIT (OUTPATIENT)
Dept: FAMILY MEDICINE | Facility: CLINIC | Age: 73
End: 2023-08-01
Payer: COMMERCIAL

## 2023-08-01 DIAGNOSIS — E53.8 VITAMIN B12 DEFICIENCY (NON ANEMIC): Primary | ICD-10-CM

## 2023-08-01 PROCEDURE — 99207 PR NO CHARGE NURSE ONLY: CPT

## 2023-08-01 PROCEDURE — 96372 THER/PROPH/DIAG INJ SC/IM: CPT | Performed by: FAMILY MEDICINE

## 2023-08-01 RX ADMIN — CYANOCOBALAMIN 1000 MCG: 1000 INJECTION, SOLUTION INTRAMUSCULAR; SUBCUTANEOUS at 11:01

## 2023-08-27 ASSESSMENT — ENCOUNTER SYMPTOMS
EYE PAIN: 0
ABDOMINAL PAIN: 0
SHORTNESS OF BREATH: 1
DIZZINESS: 0
HEMATURIA: 0
FREQUENCY: 0
FEVER: 0
CHILLS: 0
PARESTHESIAS: 0
DIARRHEA: 0
MYALGIAS: 0
NAUSEA: 0
HEARTBURN: 0
HEMATOCHEZIA: 0
ARTHRALGIAS: 0
HEADACHES: 0
COUGH: 0
DYSURIA: 0
CONSTIPATION: 1
NERVOUS/ANXIOUS: 0
SORE THROAT: 0
WEAKNESS: 0
JOINT SWELLING: 0
PALPITATIONS: 0

## 2023-08-27 ASSESSMENT — ACTIVITIES OF DAILY LIVING (ADL): CURRENT_FUNCTION: NO ASSISTANCE NEEDED

## 2023-08-30 ENCOUNTER — OFFICE VISIT (OUTPATIENT)
Dept: FAMILY MEDICINE | Facility: CLINIC | Age: 73
End: 2023-08-30
Payer: COMMERCIAL

## 2023-08-30 VITALS
SYSTOLIC BLOOD PRESSURE: 126 MMHG | BODY MASS INDEX: 25.24 KG/M2 | DIASTOLIC BLOOD PRESSURE: 70 MMHG | OXYGEN SATURATION: 98 % | RESPIRATION RATE: 18 BRPM | HEART RATE: 50 BPM | HEIGHT: 67 IN | TEMPERATURE: 97.8 F | WEIGHT: 160.8 LBS

## 2023-08-30 DIAGNOSIS — Z85.51 PERSONAL HISTORY OF MALIGNANT NEOPLASM OF BLADDER: ICD-10-CM

## 2023-08-30 DIAGNOSIS — D12.6 MYH-ASSOCIATED COLONIC POLYPOSIS (MAP): ICD-10-CM

## 2023-08-30 DIAGNOSIS — Z90.49 STATUS POST RIGHT HEMICOLECTOMY: ICD-10-CM

## 2023-08-30 DIAGNOSIS — Z00.00 ENCOUNTER FOR MEDICARE ANNUAL WELLNESS EXAM: Primary | ICD-10-CM

## 2023-08-30 DIAGNOSIS — F40.10 SOCIAL PHOBIA: Chronic | ICD-10-CM

## 2023-08-30 DIAGNOSIS — J44.9 CHRONIC OBSTRUCTIVE PULMONARY DISEASE, UNSPECIFIED COPD TYPE (H): ICD-10-CM

## 2023-08-30 DIAGNOSIS — I10 HYPERTENSION GOAL BP (BLOOD PRESSURE) < 140/90: ICD-10-CM

## 2023-08-30 DIAGNOSIS — I25.119 CORONARY ARTERY DISEASE INVOLVING NATIVE HEART WITH ANGINA PECTORIS, UNSPECIFIED VESSEL OR LESION TYPE (H): ICD-10-CM

## 2023-08-30 DIAGNOSIS — K29.40 ATROPHIC GASTRITIS WITHOUT HEMORRHAGE: ICD-10-CM

## 2023-08-30 DIAGNOSIS — E53.8 VITAMIN B12 DEFICIENCY (NON ANEMIC): ICD-10-CM

## 2023-08-30 DIAGNOSIS — F17.200 TOBACCO DEPENDENCE: ICD-10-CM

## 2023-08-30 DIAGNOSIS — Z87.891 FORMER SMOKER: ICD-10-CM

## 2023-08-30 DIAGNOSIS — E78.5 HYPERLIPIDEMIA LDL GOAL <70: ICD-10-CM

## 2023-08-30 DIAGNOSIS — E78.5 HYPERLIPIDEMIA LDL GOAL <130: ICD-10-CM

## 2023-08-30 DIAGNOSIS — C80.1 SIGNET RING CELL ADENOCARCINOMA (H): ICD-10-CM

## 2023-08-30 DIAGNOSIS — I25.10 CAD, MULTIPLE VESSEL: ICD-10-CM

## 2023-08-30 DIAGNOSIS — Z86.0100 HISTORY OF COLONIC POLYPS: ICD-10-CM

## 2023-08-30 LAB
ALBUMIN SERPL BCG-MCNC: 4.5 G/DL (ref 3.5–5.2)
ALP SERPL-CCNC: 122 U/L (ref 40–129)
ALT SERPL W P-5'-P-CCNC: 15 U/L (ref 0–70)
ANION GAP SERPL CALCULATED.3IONS-SCNC: 10 MMOL/L (ref 7–15)
AST SERPL W P-5'-P-CCNC: 26 U/L (ref 0–45)
BILIRUB SERPL-MCNC: 0.5 MG/DL
BUN SERPL-MCNC: 10.3 MG/DL (ref 8–23)
CALCIUM SERPL-MCNC: 9.7 MG/DL (ref 8.8–10.2)
CHLORIDE SERPL-SCNC: 103 MMOL/L (ref 98–107)
CHOLEST SERPL-MCNC: 137 MG/DL
CREAT SERPL-MCNC: 0.95 MG/DL (ref 0.67–1.17)
CREAT UR-MCNC: 67.2 MG/DL
DEPRECATED HCO3 PLAS-SCNC: 28 MMOL/L (ref 22–29)
GFR SERPL CREATININE-BSD FRML MDRD: 83 ML/MIN/1.73M2
GLUCOSE SERPL-MCNC: 104 MG/DL (ref 70–99)
HDLC SERPL-MCNC: 53 MG/DL
LDLC SERPL CALC-MCNC: 67 MG/DL
MICROALBUMIN UR-MCNC: <12 MG/L
MICROALBUMIN/CREAT UR: NORMAL MG/G{CREAT}
NONHDLC SERPL-MCNC: 84 MG/DL
POTASSIUM SERPL-SCNC: 4.8 MMOL/L (ref 3.4–5.3)
PROT SERPL-MCNC: 7.3 G/DL (ref 6.4–8.3)
SODIUM SERPL-SCNC: 141 MMOL/L (ref 136–145)
TRIGL SERPL-MCNC: 86 MG/DL
VIT B12 SERPL-MCNC: 591 PG/ML (ref 232–1245)

## 2023-08-30 PROCEDURE — 82570 ASSAY OF URINE CREATININE: CPT | Performed by: FAMILY MEDICINE

## 2023-08-30 PROCEDURE — 80053 COMPREHEN METABOLIC PANEL: CPT | Performed by: FAMILY MEDICINE

## 2023-08-30 PROCEDURE — 82607 VITAMIN B-12: CPT | Performed by: FAMILY MEDICINE

## 2023-08-30 PROCEDURE — G0439 PPPS, SUBSEQ VISIT: HCPCS | Performed by: FAMILY MEDICINE

## 2023-08-30 PROCEDURE — 80061 LIPID PANEL: CPT | Performed by: FAMILY MEDICINE

## 2023-08-30 PROCEDURE — 82043 UR ALBUMIN QUANTITATIVE: CPT | Performed by: FAMILY MEDICINE

## 2023-08-30 PROCEDURE — 99214 OFFICE O/P EST MOD 30 MIN: CPT | Mod: 25 | Performed by: FAMILY MEDICINE

## 2023-08-30 PROCEDURE — 36415 COLL VENOUS BLD VENIPUNCTURE: CPT | Performed by: FAMILY MEDICINE

## 2023-08-30 RX ORDER — ATORVASTATIN CALCIUM 80 MG/1
TABLET, FILM COATED ORAL
Qty: 90 TABLET | Refills: 3 | Status: SHIPPED | OUTPATIENT
Start: 2023-08-30 | End: 2024-09-03

## 2023-08-30 RX ORDER — METOPROLOL SUCCINATE 50 MG/1
50 TABLET, EXTENDED RELEASE ORAL AT BEDTIME
Qty: 90 TABLET | Refills: 3 | Status: SHIPPED | OUTPATIENT
Start: 2023-08-30 | End: 2024-09-03

## 2023-08-30 RX ORDER — BUPROPION HYDROCHLORIDE 300 MG/1
300 TABLET ORAL EVERY MORNING
Qty: 90 TABLET | Refills: 1 | Status: SHIPPED | OUTPATIENT
Start: 2023-08-30 | End: 2024-05-17

## 2023-08-30 RX ORDER — PAROXETINE 40 MG/1
40 TABLET, FILM COATED ORAL EVERY MORNING
Qty: 90 TABLET | Refills: 3 | Status: SHIPPED | OUTPATIENT
Start: 2023-08-30 | End: 2024-09-03

## 2023-08-30 ASSESSMENT — ENCOUNTER SYMPTOMS
HEMATOCHEZIA: 0
DIZZINESS: 0
MYALGIAS: 0
DIARRHEA: 0
COUGH: 0
HEARTBURN: 0
SHORTNESS OF BREATH: 1
NERVOUS/ANXIOUS: 0
WEAKNESS: 0
ARTHRALGIAS: 0
HEADACHES: 0
CONSTIPATION: 1
FREQUENCY: 0
ABDOMINAL PAIN: 0
SORE THROAT: 0
HEMATURIA: 0
PALPITATIONS: 0
FEVER: 0
JOINT SWELLING: 0
PARESTHESIAS: 0
CHILLS: 0
NAUSEA: 0
EYE PAIN: 0
DYSURIA: 0

## 2023-08-30 ASSESSMENT — ACTIVITIES OF DAILY LIVING (ADL): CURRENT_FUNCTION: NO ASSISTANCE NEEDED

## 2023-08-30 ASSESSMENT — PAIN SCALES - GENERAL: PAINLEVEL: NO PAIN (0)

## 2023-08-30 NOTE — PATIENT INSTRUCTIONS
For constipation, I recommend starting a daily fiber supplement like metamucil to keep your bowel movements soft and regular.   See me in a year for your wellness visit.       Patient Education   Personalized Prevention Plan  You are due for the preventive services outlined below.  Your care team is available to assist you in scheduling these services.  If you have already completed any of these items, please share that information with your care team to update in your medical record.  Health Maintenance Due   Topic Date Due    ANNUAL REVIEW OF HM ORDERS  09/08/2022    COVID-19 Vaccine (6 - Pfizer series) 03/14/2023    Annual Wellness Visit  06/22/2023    Comprehensive Metabolic Panel  06/22/2023    Cholesterol Lab  09/23/2023

## 2023-08-30 NOTE — PROGRESS NOTES
"SUBJECTIVE:   Hola is a 73 year old who presents for Preventive Visit.      8/30/2023    11:12 AM   Additional Questions   Roomed by Florecita   Accompanied by Self         8/30/2023    11:12 AM   Patient Reported Additional Medications   Patient reports taking the following new medications None       Are you in the first 12 months of your Medicare coverage?  No    Healthy Habits:     In general, how would you rate your overall health?  Good    Frequency of exercise:  2-3 days/week    Duration of exercise:  15-30 minutes    Do you usually eat at least 4 servings of fruit and vegetables a day, include whole grains    & fiber and avoid regularly eating high fat or \"junk\" foods?  No    Taking medications regularly:  Yes    Medication side effects:  None    Ability to successfully perform activities of daily living:  No assistance needed    Home Safety:  No safety concerns identified    Hearing Impairment:  Need to ask people to speak up or repeat themselves and difficulty understanding soft or whispered speech    In the past 6 months, have you been bothered by leaking of urine?  No    In general, how would you rate your overall mental or emotional health?  Fair    Additional concerns today:  Yes    Couple of things: tomorrow is the 12th of her B12 shots. He is now seeing Dr Hurley who diagnosed low B12. He wonders if he can be tested     Constipation on and off lately. Wondering about things that can help.     He is back to seeing the dentist regularly for gum disease.     Scheduled with dermatology in February. Wonders if anything is changing on his back because he cannot see.     Have you ever done Advance Care Planning? (For example, a Health Directive, POLST, or a discussion with a medical provider or your loved ones about your wishes): Yes, patient states has an Advance Care Planning document and will bring a copy to the clinic.     Fall risk  Fallen 2 or more times in the past year?: No  Any fall with injury in the " past year?: No    Cognitive Screening   1) Repeat 3 items (Leader, Season, Table)    2) Clock draw: NORMAL  3) 3 item recall:  Recalls 3 objects  Results: 3 items recalled: COGNITIVE IMPAIRMENT LESS LIKELY    Mini-CogTM Copyright S Sasha. Licensed by the author for use in [facility]; reprinted with permission (ngaangeles@[facility]). All rights reserved.      Do you have sleep apnea, excessive snoring or daytime drowsiness? : no    Reviewed and updated as needed this visit by clinical staff   Tobacco  Allergies  Meds              Reviewed and updated as needed this visit by Provider                 Social History     Tobacco Use    Smoking status: Former     Packs/day: 0.00     Years: 30.00     Pack years: 0.00     Types: Cigarettes     Quit date: 2022     Years since quittin.2    Smokeless tobacco: Never    Tobacco comments:     quit successfully (smoke and nicotine free)-22.    Substance Use Topics    Alcohol use: No     Comment: quit 40 years ago             2023     9:21 AM   Alcohol Use   Prescreen: >3 drinks/day or >7 drinks/week? Not Applicable     Do you have a current opioid prescription? No  Do you use any other controlled substances or medications that are not prescribed by a provider? Cannabis              Current providers sharing in care for this patient include:   Patient Care Team:  Preethi Quiroz MD as PCP - General (Family Medicine)  Casa Evans MD as MD (Cardiology)  Almas Sunshine MD as MD (Urology)  Carissa Barnes RN as Specialty Care Coordinator (Urology)  Preethi Quiroz MD as Referring Physician (Family Practice)  Kirt Guzman MD as MD (Urology)  Aimee Marcus MD as MD (Ophthalmology)  Jitendra Rodríguez MD as Surgeon (Surgery)  Andrea Bah [facility] as Pharmacist (Pharmacist)  Azul Machuca MD as MD (Cardiology)  Elizabeth Martinez, EVELYN as Lead Care Coordinator (Primary Care - CC)  Jitendra Rodríguez MD as Surgeon  (Surgery)  Jitendra Rodríguez MD as Surgeon (Surgery)  Sarahy Steele MD (Inactive) as MD (Ophthalmology)  Jasvir Ashley MD as Fellow (Student in organized health care education/training program)  Stephanie Zamora MD as MD (Gastroenterology)  Stephanie Zamora MD as Assigned Gastroenterology Provider  Andrea Bah [facility] as Assigned MTM Pharmacist  Sayra eHath MD as MD (Cardiovascular Disease)  Sayra Heath MD as MD (Cardiovascular Disease)  Guido Massey MD as Assigned Surgical Provider  Kirt Guzman MD as Assigned Cancer Care Provider  Brown Serrano MD as Assigned Musculoskeletal Provider  Preethi Quiroz MD as Assigned PCP  Sayra Heath MD as Assigned Heart and Vascular Provider    The following health maintenance items are reviewed in Epic and correct as of today:  Health Maintenance   Topic Date Due    ANNUAL REVIEW OF HM ORDERS  09/08/2022    COVID-19 Vaccine (6 - Pfizer series) 03/14/2023    MEDICARE ANNUAL WELLNESS VISIT  06/22/2023    CMP  06/22/2023    LIPID  09/23/2023    INFLUENZA VACCINE (1) 09/01/2023    PHQ-9  02/29/2024    LUNG CANCER SCREENING  06/20/2024    FALL RISK ASSESSMENT  08/30/2024    COLORECTAL CANCER SCREENING  03/22/2025    ADVANCE CARE PLANNING  06/22/2027    DTAP/TDAP/TD IMMUNIZATION (3 - Td or Tdap) 08/03/2030    SPIROMETRY  Completed    HEPATITIS C SCREENING  Completed    COPD ACTION PLAN  Completed    PHQ-2 (once per calendar year)  Completed    Pneumococcal Vaccine: 65+ Years  Completed    ZOSTER IMMUNIZATION  Completed    AORTIC ANEURYSM SCREENING (SYSTEM ASSIGNED)  Completed    IPV IMMUNIZATION  Aged Out    HPV IMMUNIZATION  Aged Out    MENINGITIS IMMUNIZATION  Aged Out               Review of Systems   Constitutional:  Negative for chills and fever.   HENT:  Positive for hearing loss. Negative for congestion, ear pain and sore throat.    Eyes:  Negative for pain and visual disturbance.   Respiratory:  Positive for shortness of  "breath. Negative for cough.    Cardiovascular:  Negative for chest pain, palpitations and peripheral edema.   Gastrointestinal:  Positive for constipation. Negative for abdominal pain, diarrhea, heartburn, hematochezia and nausea.   Genitourinary:  Positive for urgency. Negative for dysuria, frequency, genital sores, hematuria, impotence and penile discharge.   Musculoskeletal:  Negative for arthralgias, joint swelling and myalgias.   Skin:  Negative for rash.   Neurological:  Negative for dizziness, weakness, headaches and paresthesias.   Psychiatric/Behavioral:  Negative for mood changes. The patient is not nervous/anxious.          OBJECTIVE:   /70 (BP Location: Right arm, Patient Position: Sitting, Cuff Size: Adult Regular)   Pulse 50   Temp 97.8  F (36.6  C) (Temporal)   Resp 18   Ht 1.71 m (5' 7.32\")   Wt 72.9 kg (160 lb 12.8 oz)   SpO2 98%   BMI 24.94 kg/m   Estimated body mass index is 24.94 kg/m  as calculated from the following:    Height as of this encounter: 1.71 m (5' 7.32\").    Weight as of this encounter: 72.9 kg (160 lb 12.8 oz).  Physical Exam  GENERAL: healthy, alert and no distress  EYES: Eyes grossly normal to inspection, PERRL and conjunctivae and sclerae normal  HENT: ear canals and TM's normal, nose and mouth without ulcers or lesions, poor dentition  NECK: no adenopathy, no asymmetry, masses, or scars and thyroid normal to palpation  RESP: lungs clear to auscultation - no rales, rhonchi or wheezes  CV: regular rate and rhythm, normal S1 S2, no S3 or S4, no murmur, click or rub, no peripheral edema and peripheral pulses strong  ABDOMEN: soft, nontender, no hepatosplenomegaly, no masses and bowel sounds normal  MS: no gross musculoskeletal defects noted, no edema  SKIN: no suspicious lesions or rashes  NEURO: Normal strength and tone, mentation intact and speech normal  PSYCH: mentation appears normal, affect normal/bright    Diagnostic Test Results:  Labs reviewed in " Epic    ASSESSMENT / PLAN:       ICD-10-CM    1. Encounter for Medicare annual wellness exam  Z00.00       2. Hypertension goal BP (blood pressure) < 140/90  I10         Wellness visit     Colonoscopy done in March and was told to return in 2 years.    COVID-19 vaccine recommended this fall   Recommend skin cancer screening - he has this scheduled in February   Is following with his dentist regularly again as well        Essential hypertension with goal blood pressure less than 140/90  Controlled. Continues lisinopril 20mg twice daily, amlodipine 5mg daily, metoprolol 50mg daily.           Hyperlipidemia LDL goal <70  Controlled.  Continues atorvastatin 80mg daily and zetia 10mg daily.  Following with cardiology        History of bladder cancer  Follows with urology.  Last visit 5/30/23-note reviewed today.  No recurrence. Cysto recommended in November.       Presented with gross hematuria in July 2019, evaluated by Urology and found to have non-invasive high grfade urothelial carcinoma s/p TURBT on 9/5/19.  9/19 - Noninvasive HG bladder cancer  Underwent induction BCG  2/20 - Noninvasive LG bladder cancer  Underwent repeat induction BCG    CAD, multiple vessel s/p CABG   Stable continues ASA 81mg daily and statin. Has not needed to take his nitroglycerin . He has been smoke free for 1 year!    Saw cardiology in 12/22 and he was told to follow up in two years.   - metoprolol succinate ER (TOPROL-XL) 50 MG 24 hr tablet; Take 1 tablet (50 mg) by mouth daily  Dispense: 90 tablet; Refill: 3      Social phobia  Stable on paroxetine 40mg daily.  Refilled today        History of Tobacco dependence  Smoke free for 1 year! Has been working with MT.   Continues bupropion         Chronic obstructive pulmonary disease, unspecified COPD type (H)   stable. Rare cough or use of albuterol.   Denies worsening shortness of breath.   continues LAMA/LABA      Signet ring cell adenocarcinoma   Tubular adenoma of colon  History of colon  "polyps  Following with oncology and GI.       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       He is at increased risk for colon polyps and colon cancer.      Per genetics visit notes:   \"Cancer Risks:   We discussed that individuals with two mutations in the MUTYH gene have a diagnosis of MUTYH-associated polyposis (MAP).   Individuals with MAP can have hundreds to thousands of polyps, but most have fewer than 100. Multiple different types of colon polyps may be seen in individuals with MAP including adenomas, serrated adenomas, hyperplastic/sessile serrated polyps, and mixed (hyperplastic and adenomatous) polyps.  Individuals with MAP have an increased risk of colorectal cancer. In the absence of appropriate surveillance, the lifetime risk for colorectal cancer may be up to 80%.  There is also an increased risk for duodenal cancer in individuals with MAP.   Individuals with MAP may also be at increased risk for other cancers, including ovarian and bladder, although current risks are not well defined. Other cancers (including breast, uterine, gastric, pancreatic, skin, and thyroid) have also been reported in individuals with MAP, although confirmed risks are not available at this time.   Individuals with MAP may also have these features:  CHRPE: freckle like spots on the inside of the eye  Dental abnormalities/cysts on the jaw bone  Benign and malignant tumors of the skin s oil glands (sebaceous tumors)      Cancer Screening and Prevention:  The following screening is recommended for individuals with MUTYH-associated polyposis (MAP) per current National Comprehensive Cancer Network (NCCN) guidelines:  High-quality colonoscopy and polypectomy every 1-2 years beginning at age 25-30 (or earlier based on family history). Depending upon colon polyp burden, removal of the colon may be recommended with continued surveillance of the rectum (as needed).  Baseline upper endoscopy (including complete " "visualization of the ampulla of Vater) beginning at age 30-35; repeated based on polyp findings.  Annual physical exam to begin at the time of diagnosis with MAP. Guidelines do not yet encourage screening for other cancers, but skin and thyroid exams could be done as part of the annual physical exam.  There are currently no other specific cancer screening guidelines for other cancers potentially associated with MAP. As such, additional screening should be based on personal and family history.     Other screening based on Tevin's personal and family history:  He was recently diagnosed with a cancer of unknown primary site (likely GI). He had extensive workup for this. He should continue with the plan for active surveillance as directed by his oncologist, Tanner Sánchez MD, PhD.  Tevin has already also had laparoscopic right hemicolectomy on 4/13/21 due to high colon polyp burden. He should continue with his colonoscopy surveillance as outlined above and as recommended by his physicians.  He also has a history of bladder cancer initially diagnosed in 2019. He should continue with his follow up for this as recommended by his urologist.      Of note, the above information is based on our current understanding of Tevin's genetic findings. Tevin is encouraged to reach out to me regularly regarding any pertinent updates to his personal and/or family history of cancer, as our understanding of the genetic findings in his family may change over time. \"          vitamin B12 deficiency  Identified by GI and he has been on vitamin B12 injections for a year. GI has orders for repeat b12 will complete today       COUNSELING:  Reviewed preventive health counseling, as reflected in patient instructions      BMI:   Estimated body mass index is 24.94 kg/m  as calculated from the following:    Height as of this encounter: 1.71 m (5' 7.32\").    Weight as of this encounter: 72.9 kg (160 lb 12.8 oz).         He reports that he quit " smoking about 14 months ago. His smoking use included cigarettes. He has never used smokeless tobacco.      Appropriate preventive services were discussed with this patient, including applicable screening as appropriate for cardiovascular disease, diabetes, osteopenia/osteoporosis, and glaucoma.  As appropriate for age/gender, discussed screening for colorectal cancer, prostate cancer, breast cancer, and cervical cancer. Checklist reviewing preventive services available has been given to the patient.    Reviewed patients plan of care and provided an AVS. The Intermediate Care Plan ( asthma action plan, low back pain action plan, and migraine action plan) for Tevin meets the Care Plan requirement. This Care Plan has been established and reviewed with the Patient.          Preethi Quiroz MD, MD  [facility]    Identified Health Risks:  I have reviewed Opioid Use Disorder and Substance Use Disorder risk factors and made any needed referrals. Answers submitted by the patient for this visit:  Patient Health Questionnaire (Submitted on 8/30/2023)  If you checked off any problems, how difficult have these problems made it for you to do your work, take care of things at home, or get along with other people?: Not difficult at all  PHQ9 TOTAL SCORE: 2

## 2023-08-31 PROCEDURE — 96372 THER/PROPH/DIAG INJ SC/IM: CPT | Performed by: FAMILY MEDICINE

## 2023-08-31 RX ADMIN — CYANOCOBALAMIN 1000 MCG: 1000 INJECTION, SOLUTION INTRAMUSCULAR; SUBCUTANEOUS at 13:01

## 2023-08-31 NOTE — RESULT ENCOUNTER NOTE
The results of your recent lipid (cholesterol) profile look great.    Here are the results:  Lab Results       Component                Value               Date                       CHOL                     137                 08/30/2023                 CHOL                     140                 08/03/2020            Lab Results       Component                Value               Date                       HDL                      53                  08/30/2023                 HDL                      59                  08/03/2020            Lab Results       Component                Value               Date                       LDL                      67                  08/30/2023                 LDL                      64                  08/03/2020            Lab Results       Component                Value               Date                       TRIG                     86                  08/30/2023                 TRIG                     84                  08/03/2020            Lab Results       Component                Value               Date                       CHOLHDLRATIO             3.6                 10/23/2015              Desired or goal levels are:  CHOLESTEROL: Desirable is less than 200.   HDL (Good Cholesterol): Desirable is greater than 40 (for men) greater than 50 (for women).  LDL (Bad Cholesterol): Desirable is less than 130 (or less than 100 if you have heart disease or diabetes). Borderline 130-160.  TRIGLYCERIDES: Desirable is less than 150.  Borderline is 150-200.    Keep up the good work!.    As you may know, an elevated cholesterol is one factor that increases your risk for heart disease and stroke. You can improve your cholesterol by controlling the amount and type of fat you eat and by increasing your daily activity level.    Here are some ways to improve your nutrition:  Eat less fat (especially butter, Crisco and other saturated fats)  Buy lean cuts of meat, reduce your  portions of red meat or substitute poultry or fish  Use skim milk and low-fat dairy products  Eat no more than 4 egg yolks per week  Avoid fried or fast foods that are high in fat  Eat more fruits and vegetables      Also consider starting or increasing your aerobic activity. Aerobic activity is the best way to improve HDL (good) cholesterol. If this would be new to you, please talk with me first about what activities are safe for you.      Other lab results:    The testing of your kidney function, liver function and electrolytes was normal.     Your blood glucose (blood sugar) was also slightly elevated. You do not have diabetes, but we consider this a pre-diabetic state.  I would recommend rechecking your blood glucose in one year. Improving lifestyle habits such as regular exercise, good diet and weight loss will hopefully slow or stop the progression towards diabetes.     Your urine protein test was normal.     Please feel free to contact us with any questions or if you would like more information.      Preethi Quiroz M.D.

## 2023-09-01 ENCOUNTER — TELEPHONE (OUTPATIENT)
Dept: FAMILY MEDICINE | Facility: CLINIC | Age: 73
End: 2023-09-01

## 2023-09-01 ENCOUNTER — ALLIED HEALTH/NURSE VISIT (OUTPATIENT)
Dept: FAMILY MEDICINE | Facility: CLINIC | Age: 73
End: 2023-09-01
Payer: COMMERCIAL

## 2023-09-01 ENCOUNTER — DOCUMENTATION ONLY (OUTPATIENT)
Dept: OTHER | Facility: CLINIC | Age: 73
End: 2023-09-01

## 2023-09-01 DIAGNOSIS — E53.8 VITAMIN B12 DEFICIENCY (NON ANEMIC): Primary | ICD-10-CM

## 2023-09-01 PROCEDURE — 99207 PR NO CHARGE NURSE ONLY: CPT

## 2023-09-01 NOTE — PROGRESS NOTES
B12 given     NDC: 29152-050-71   LOT: [id number]  EXP: 07/01/2024  LEFT ARM      AWAITING SIGNED ORDER FROM PCP     Perez Santo CMA on 9/1/2023 at 2:26 PM

## 2023-09-01 NOTE — TELEPHONE ENCOUNTER
Pt in clinic for B12 today, but need order signed for the mar for MA to give.    Pended for provider review.

## 2023-09-05 ENCOUNTER — DOCUMENTATION ONLY (OUTPATIENT)
Dept: OTHER | Facility: CLINIC | Age: 73
End: 2023-09-05
Payer: COMMERCIAL

## 2023-09-05 RX ORDER — CYANOCOBALAMIN 1000 UG/ML
1000 INJECTION, SOLUTION INTRAMUSCULAR; SUBCUTANEOUS
Status: ACTIVE | OUTPATIENT
Start: 2023-09-05 | End: 2024-08-30

## 2023-09-05 RX ORDER — CYANOCOBALAMIN 1000 UG/ML
1000 INJECTION, SOLUTION INTRAMUSCULAR; SUBCUTANEOUS
Status: ACTIVE | OUTPATIENT
Start: 2023-09-05

## 2023-09-12 NOTE — RESULT ENCOUNTER NOTE
Vit B12 wnl.  Colonoscopy and EGD report/path reviewed and per endoscopist recommended to return for surveillance colonoscopy in 2 years and no recommendation for surveillance EGD.

## 2023-09-21 ENCOUNTER — TELEPHONE (OUTPATIENT)
Dept: AUDIOLOGY | Facility: CLINIC | Age: 73
End: 2023-09-21
Payer: COMMERCIAL

## 2023-09-21 NOTE — TELEPHONE ENCOUNTER
Walk-in hearing aid services on 9/21/23: The patient reported he lost his left dome while attempting to change the  filter.  Both hearing aids were cleaned and the  filters and domes were replaced.  A listening check found both hearing aids to be working well and they were returned to the patient.  He stated he has not been wearing his hearing aids very much but was recently advised by his primary care physician to do so more often.

## 2023-10-05 ENCOUNTER — DOCUMENTATION ONLY (OUTPATIENT)
Dept: OTHER | Facility: CLINIC | Age: 73
End: 2023-10-05
Payer: COMMERCIAL

## 2023-10-25 ENCOUNTER — PRE VISIT (OUTPATIENT)
Dept: UROLOGY | Facility: CLINIC | Age: 73
End: 2023-10-25
Payer: COMMERCIAL

## 2023-10-25 DIAGNOSIS — C67.8 MALIGNANT NEOPLASM OF OVERLAPPING SITES OF BLADDER (H): Primary | ICD-10-CM

## 2023-10-25 NOTE — TELEPHONE ENCOUNTER
Reason for visit: cystoscopy      Dx/Hx/Sx: bladder cancer     Records/imaging/labs/orders: in epic    At Rooming: collect urine

## 2023-11-01 DIAGNOSIS — J44.9 CHRONIC OBSTRUCTIVE PULMONARY DISEASE, UNSPECIFIED COPD TYPE (H): ICD-10-CM

## 2023-11-01 RX ORDER — UMECLIDINIUM BROMIDE AND VILANTEROL TRIFENATATE 62.5; 25 UG/1; UG/1
1 POWDER RESPIRATORY (INHALATION) DAILY
Qty: 60 EACH | Refills: 0 | Status: SHIPPED | OUTPATIENT
Start: 2023-11-01 | End: 2023-11-28

## 2023-11-20 ENCOUNTER — ALLIED HEALTH/NURSE VISIT (OUTPATIENT)
Dept: FAMILY MEDICINE | Facility: CLINIC | Age: 73
End: 2023-11-20
Payer: COMMERCIAL

## 2023-11-20 VITALS — DIASTOLIC BLOOD PRESSURE: 66 MMHG | SYSTOLIC BLOOD PRESSURE: 118 MMHG

## 2023-11-20 DIAGNOSIS — I10 HYPERTENSION GOAL BP (BLOOD PRESSURE) < 140/90: Primary | Chronic | ICD-10-CM

## 2023-11-20 PROCEDURE — 99207 PR NO CHARGE NURSE ONLY: CPT | Performed by: FAMILY MEDICINE

## 2023-11-20 NOTE — PROGRESS NOTES
Tevin Guidry was evaluated at [facility] on November 20, 2023 at which time his blood pressure was:    BP Readings from Last 3 Encounters:   11/20/23 118/66   08/30/23 126/70   06/01/23 120/60     Pulse Readings from Last 3 Encounters:   08/30/23 50   06/01/23 54   05/30/23 102       Reviewed lifestyle modifications for blood pressure control and reduction: including making healthy food choices, managing weight, getting regular exercise, smoking cessation, reducing alcohol consumption, monitoring blood pressure regularly.     Symptoms: None    BP Goal:< 140/90 mmHg    BP Assessment:  BP at goal    Potential Reasons for BP too high: NA - Not applicable    BP Follow-Up Plan: Recheck BP in 6 months at pharmacy    Recommendation to Provider: bp check in 6 months      Note completed by: Melisa Vásquez, PharmD, [facility]  Pharmacy Specialist  [facility]  [phone]

## 2023-11-21 DIAGNOSIS — H40.1111 PRIMARY OPEN ANGLE GLAUCOMA (POAG) OF RIGHT EYE, MILD STAGE: Primary | ICD-10-CM

## 2023-11-22 ENCOUNTER — OFFICE VISIT (OUTPATIENT)
Dept: OPHTHALMOLOGY | Facility: CLINIC | Age: 73
End: 2023-11-22
Attending: OPHTHALMOLOGY
Payer: COMMERCIAL

## 2023-11-22 DIAGNOSIS — H40.1131 PRIMARY OPEN ANGLE GLAUCOMA (POAG) OF BOTH EYES, MILD STAGE: ICD-10-CM

## 2023-11-22 PROCEDURE — G0463 HOSPITAL OUTPT CLINIC VISIT: HCPCS | Performed by: OPHTHALMOLOGY

## 2023-11-22 PROCEDURE — 92133 CPTRZD OPH DX IMG PST SGM ON: CPT | Performed by: OPHTHALMOLOGY

## 2023-11-22 PROCEDURE — 92014 COMPRE OPH EXAM EST PT 1/>: CPT | Performed by: OPHTHALMOLOGY

## 2023-11-22 ASSESSMENT — VISUAL ACUITY
METHOD_MR: PT DECLINES REFRACTION
OD_SC: 20/20
OS_SC: 20/30
OD_SC+: -1
OS_PH_SC: 20/20
METHOD: SNELLEN - LINEAR
OS_CC: 20/25
OS_SC+: +2
OS_CC+: +1
OS_PH_SC+: -1

## 2023-11-22 ASSESSMENT — TONOMETRY
OS_IOP_MMHG: 10
OD_IOP_MMHG: 11
IOP_METHOD: APPLANATION

## 2023-11-22 ASSESSMENT — CONF VISUAL FIELD
OD_SUPERIOR_TEMPORAL_RESTRICTION: 3
OS_SUPERIOR_NASAL_RESTRICTION: 0
OS_SUPERIOR_TEMPORAL_RESTRICTION: 0
OD_INFERIOR_TEMPORAL_RESTRICTION: 3
METHOD: COUNTING FINGERS
OS_INFERIOR_TEMPORAL_RESTRICTION: 0
OS_INFERIOR_NASAL_RESTRICTION: 0
OS_NORMAL: 1

## 2023-11-22 ASSESSMENT — REFRACTION_WEARINGRX
OS_CYLINDER: SPHERE
OS_SPHERE: -0.25

## 2023-11-22 ASSESSMENT — EXTERNAL EXAM - RIGHT EYE: OD_EXAM: NORMAL

## 2023-11-22 ASSESSMENT — SLIT LAMP EXAM - LIDS
COMMENTS: NORMAL
COMMENTS: NORMAL

## 2023-11-22 ASSESSMENT — CUP TO DISC RATIO
OS_RATIO: 0.7
OD_RATIO: 0.8

## 2023-11-22 ASSESSMENT — EXTERNAL EXAM - LEFT EYE: OS_EXAM: NORMAL

## 2023-11-22 NOTE — PROGRESS NOTES
"HPI       Follow Up    In right eye.  Since onset it is stable.  Associated symptoms include dryness (rare).  Negative for eye pain, headache and photophobia.  Treatments tried include no treatments and artificial tears.  Pain was noted as 0/10. Additional comments: Primary open angle glaucoma (POAG) of right eye             Comments    Patient tells me that his vision is \"great\".   Both eyes seem intermittently dry.  Using artificial tears does help the discomfort, though use is rare.    MERCEDEZ Gardner 12:27 PM  2023               Last edited by Nirmala Sommers COT on 2023 12:27 PM.        HPI:  Tevin Guidry is a 73 year old male with history of CAD s/p CABG, colon CA, HTN, HLD presenting for glaucoma followup. Doing well. Happy with vision    Past Ocular History:  Punched in both eyes age 20 or 21  CE/IOL right eye Maltry 20, left eye Massey 21  POAG      PMH:  HTN  CAD s/p 6 stents and surgery  Bladder cancer in remission (treated with local chemotherapy and DCG per patient)  MUTYH associated polyposis s/p partial colectomy    SH:  smoker    FH:  No known family history of blindness, glaucoma, macular degeneration  No known FHx polyposis - brother has not had colonoscopy  Mother and older brother  of stomach cancer    Eye Medications  Artificial tears as needed     ASSESSMENT and PLAN:  (H40.0116) Primary open angle glaucoma (POAG) of both eyes, mild stage  FH: Negative  Pachymetry:   Gonioscopy: open to thin CBB, 3+ pigment, flat  Tmax: 20/19  Today's IOP: 11/10  Target IOP: mid-teens  Current medications: None  Meds to avoid: None  Selected laser trabeculoplasty (SLT) 3/17/23 right eye     Visual field: OVF 24-2 (23)  Right eye: reliable, superior depressed points (?lid) and inferonasal depressed spot, MD -0.6, PSD 2.6;  Left eye: reliable, superior arcuate MD -2.2, PSD 2.9; improved inferior and increased superior from prior    Visual field: OVF 24-2 " (5/20/22)  Right eye: reliable, superior depressed points (?lid) and inferonasal depressed spot, MD -3.8, PSD 4.4; superior depression not consistent with location from prior  Left eye: poor reliability with 14% FN, superior>inferior depression, MD -8.2, PSD 3.6; improved inferior and increased superior from prior    OCT nerve fiber layer 11/22/23:   Right eye - G(r) 60 NI (g) 77 TI (y) 97 NS (r) 49 TS (r) 91      Left eye - G(r) 65 NI (g) 85 TI (g) 117 NS (y) 61 TS (r) 72  OCT nerve fiber layer 03/01/23:   Right eye - G(r) 60 NI (g) 76 TI (y) 96 NS (r) 51 TS (r) 89      Left eye - G(r) 72 NI (g) 83 TI (g) 121 NS (g) 75 TS (r) 86  Nerve OCT: Spectralis optic nerve OCT (6/23/21)  OD: Central RNFL thickness 69, superior red, inferior and nasal yellow   OS: Central RNFL thickness 69, superotemporal red, nasal yellow     IOP doing well but questionable progression left eye superiorly  Repeat oct retinal nerve fiber layer and visual field in 1-2 months  If demonstrated progression, recommend Selected laser trabeculoplasty (SLT) left eye     (Z96.1) Pseudophakia of both eyes    Massey left eye 02/02/23   Maltry right eye 12/11/2020 ZCB00 21.5  Doing well    (Z86.010) History of colonic polyps  - s/p partial colectomy for numerous colon polyps and adenomas  - no CHRPE on exam    (H04.123) Dry eye syndrome of both eyes    Recommend artificial tears PRN      Return in about 6 weeks (around 1/3/2024) for Follow Up-v/t, 24-2 VF, OCT RNFL.    Attending Physician Attestation:  Complete documentation of historical and exam elements from today's encounter can be found in the full encounter summary report (not reduplicated in this progress note).  I personally obtained the chief complaint(s) and history of present illness.  I confirmed and edited as necessary the review of systems, past medical/surgical history, family history, social history, and examination findings as documented by others; and I examined the patient myself.   I personally reviewed the relevant tests, images, and reports as documented above.  I formulated and edited as necessary the assessment and plan and discussed the findings and management plan with the patient and family.  - Guido Massey MD

## 2023-11-22 NOTE — NURSING NOTE
"Chief Complaints and History of Present Illnesses   Patient presents with    Follow Up     Primary open angle glaucoma (POAG) of right eye     Chief Complaint(s) and History of Present Illness(es)       Follow Up              Laterality: right eye    Course: stable    Associated symptoms: dryness (rare).  Negative for eye pain, headache and photophobia    Treatments tried: no treatments and artificial tears    Pain scale: 0/10    Comments: Primary open angle glaucoma (POAG) of right eye              Comments    Patient tells me that his vision is \"great\".   Both eyes seem intermittently dry.  Using artificial tears does help the discomfort, though use is rare.    Nirmala Sommers, MERCEDEZ 12:27 PM  November 22, 2023                        "

## 2023-11-27 RX ORDER — LIDOCAINE HYDROCHLORIDE 20 MG/ML
JELLY TOPICAL ONCE
Status: ACTIVE | OUTPATIENT
Start: 2023-11-28

## 2023-11-28 ENCOUNTER — OFFICE VISIT (OUTPATIENT)
Dept: UROLOGY | Facility: CLINIC | Age: 73
End: 2023-11-28
Payer: COMMERCIAL

## 2023-11-28 VITALS
DIASTOLIC BLOOD PRESSURE: 75 MMHG | SYSTOLIC BLOOD PRESSURE: 112 MMHG | BODY MASS INDEX: 26.68 KG/M2 | HEART RATE: 54 BPM | HEIGHT: 67 IN | WEIGHT: 170 LBS

## 2023-11-28 DIAGNOSIS — C67.8 MALIGNANT NEOPLASM OF OVERLAPPING SITES OF BLADDER (H): Primary | ICD-10-CM

## 2023-11-28 DIAGNOSIS — J44.9 CHRONIC OBSTRUCTIVE PULMONARY DISEASE, UNSPECIFIED COPD TYPE (H): ICD-10-CM

## 2023-11-28 PROCEDURE — 52000 CYSTOURETHROSCOPY: CPT | Performed by: UROLOGY

## 2023-11-28 PROCEDURE — 88112 CYTOPATH CELL ENHANCE TECH: CPT | Mod: 26 | Performed by: PATHOLOGY

## 2023-11-28 PROCEDURE — 99207 PR DROP WITH A PROCEDURE: CPT | Performed by: UROLOGY

## 2023-11-28 PROCEDURE — 88112 CYTOPATH CELL ENHANCE TECH: CPT | Mod: TC | Performed by: UROLOGY

## 2023-11-28 PROCEDURE — 88120 CYTP URNE 3-5 PROBES EA SPEC: CPT | Mod: TC | Performed by: UROLOGY

## 2023-11-28 PROCEDURE — 88120 CYTP URNE 3-5 PROBES EA SPEC: CPT | Mod: 26 | Performed by: PATHOLOGY

## 2023-11-28 RX ORDER — UMECLIDINIUM BROMIDE AND VILANTEROL TRIFENATATE 62.5; 25 UG/1; UG/1
1 POWDER RESPIRATORY (INHALATION) DAILY
Qty: 60 EACH | Refills: 4 | Status: SHIPPED | OUTPATIENT
Start: 2023-11-28 | End: 2024-05-03

## 2023-11-28 RX ORDER — LIDOCAINE HYDROCHLORIDE 20 MG/ML
JELLY TOPICAL ONCE
Status: COMPLETED | OUTPATIENT
Start: 2023-11-28 | End: 2023-11-28

## 2023-11-28 RX ADMIN — LIDOCAINE HYDROCHLORIDE: 20 JELLY TOPICAL at 10:58

## 2023-11-28 ASSESSMENT — PAIN SCALES - GENERAL: PAINLEVEL: NO PAIN (0)

## 2023-11-28 NOTE — NURSING NOTE
"Chief Complaint   Patient presents with    Cystoscopy       Blood pressure 112/75, pulse 54, height 1.702 m (5' 7\"), weight 77.1 kg (170 lb). Body mass index is 26.63 kg/m .    Patient Active Problem List   Diagnosis    Social phobia    Hypertension goal BP (blood pressure) < 140/90    Hyperlipidemia LDL goal <130    Tubular adenoma of colon    Coronary artery disease involving native heart with angina pectoris, unspecified vessel or lesion type (H24)    Former smoker    S/P CABG (coronary artery bypass graft)    Chronic obstructive pulmonary disease, unspecified COPD type (H)    Anemia, unspecified type    Bladder cancer (H)    Personal history of malignant neoplasm of bladder    Malignant neoplasm of overlapping sites of bladder (H)    Combined form of age-related cataract, both eyes    Total cataract of right eye    History of colonic polyps    Colon cancer (H)    Signet ring cell adenocarcinoma (H)    Metastatic signet ring cell carcinoma (H)       Allergies   Allergen Reactions    Codeine Nausea and Vomiting       Current Outpatient Medications   Medication Sig Dispense Refill    albuterol (PROAIR HFA/PROVENTIL HFA/VENTOLIN HFA) 108 (90 Base) MCG/ACT inhaler Inhale 2 puffs into the lungs every 6 hours as needed for shortness of breath or wheezing 18 g 1    amLODIPine (NORVASC) 5 MG tablet TAKE ONE TABLET BY MOUTH EVERY NIGHT AT BEDTIME 90 tablet 3    aspirin 81 MG tablet Take 1 tablet (81 mg) by mouth daily 90 tablet 3    atorvastatin (LIPITOR) 80 MG tablet TAKE ONE TABLET BY MOUTH EVERY EVENING AT BEDTIME 90 tablet 3    buPROPion (WELLBUTRIN XL) 300 MG 24 hr tablet Take 1 tablet (300 mg) by mouth every morning 90 tablet 1    ezetimibe (ZETIA) 10 MG tablet Take 1 tablet (10 mg) by mouth daily 90 tablet 3    lisinopril (ZESTRIL) 20 MG tablet Take 1 tablet (20 mg) by mouth 2 times daily 180 tablet 3    metoprolol succinate ER (TOPROL XL) 50 MG 24 hr tablet Take 1 tablet (50 mg) by mouth At Bedtime 90 tablet 3    " PARoxetine (PAXIL) 40 MG tablet Take 1 tablet (40 mg) by mouth every morning 90 tablet 3    umeclidinium-vilanterol (ANORO ELLIPTA) 62.5-25 MCG/ACT oral inhaler INHALE 1 PUFF INTO THE LUNGS DAILY 60 each 0       Social History     Tobacco Use    Smoking status: Former     Packs/day: 0.00     Years: 30.00     Additional pack years: 0.00     Total pack years: 0.00     Types: Cigarettes     Quit date: 2022     Years since quittin.4    Smokeless tobacco: Never    Tobacco comments:     quit successfully (smoke and nicotine free)-22.    Vaping Use    Vaping Use: Never used   Substance Use Topics    Alcohol use: No     Comment: quit 40 years ago    Drug use: Yes     Types: Marijuana     Comment: occasionally uses marijuana       Invasive Procedure Safety Checklist:    Procedure: Cystoscopy    Action: Complete sections and checkboxes as appropriate.    Pre-procedure:  1. Patient ID Verified with 2 identifiers (Mary and  or MRN) : YES    2. Procedure and site verified with patient/designee (when able) : YES    3. Accurate consent documentation in medical record : YES    4. H&P (or appropriate assessment) documented in medical record : N/A  H&P must be up to 30 days prior to procedure an updated within 24 hours of                 Procedure as applicable.     5. Relevant diagnostic and radiology test results appropriately labeled and displayed as applicable : YES    6. Blood products, implants, devices, and/or special equipment available for the procedure as applicable : YES    7. Procedure site(s) marked with provider initials [Exclusions: none] : NO    8. Marking not required. Reason : Yes  Procedure does not require site marking    Time Out:     Time-Out performed immediately prior to starting procedure, including verbal and active participation of all team members addressing: YES    1. Correct patient identity.  2. Confirmed that the correct side and site are marked.  3. An accurate procedure to be  done.  4. Agreement on the procedure to be done.  5. Correct patient position.  6. Relevant images and results are properly labeled and appropriately displayed.  7. The need to administer antibiotics or fluids for irrigation purposes during the procedure as applicable.  8. Safety precautions based on patient history or medication use.    During Procedure: Verification of correct person, site, and procedure occurs any time the responsibility for care of the patient is transferred to another member of the care team.    The following medication was given:     MEDICATION:  Lidocaine without epinephrine 2% jelly  ROUTE: urethral   SITE: urethral   DOSE: 10 mL  LOT #: [id number]  : [employer]  EXPIRATION DATE: 6-25  NDC#: [address]-8   Was there drug waste? No    Prior to med admin, verified patient identity using patient's name and date of birth.  Due to med administration, patient instructed to remain in clinic for 15 minutes  afterwards, and to report any adverse reaction to me immediately.    Drug Amount Wasted:  None.  Vial/Syringe: Syringe      Heena Flores  11/28/2023  10:49 AM

## 2023-11-28 NOTE — PROGRESS NOTES
"  CHIEF COMPLAINT   It was my pleasure to see Tevin Guidry who is a 73 year old male for follow-up of bladder cancer.      HPI   Tevin Guidry is a very pleasant 73 year old male who presents with a history of bladder cancer. 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 He had recurrence in 2/2020 and underwent repeat induction course of BCG. 2nd BCG induction completed 4/2020. Recurrence noted in 7/2020 and TURBT 9/2020 with low-grade disease. No issues since biopsy. No ongoing hematuria.     BCG maintenance 10/15/2020  Had colectomy Spring 2021     TURBT 9/2/2020  FINAL DIAGNOSIS:   A. 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 Right lateral wall:   - Dysplastic urothelium, consistent with incipient non invasive low grade papillary urothelial carcinoma      PHYSICAL EXAM  Patient is a 73 year old  male   Vitals: Blood pressure 112/75, pulse 54, height 1.702 m (5' 7\"), weight 77.1 kg (170 lb).  General Appearance Adult: Body mass index is 26.63 kg/m .  Alert, no acute distress, oriented  Lungs: no respiratory distress, or pursed lip breathing  Abdomen: soft, nontender, no organomegaly or masses  Back: no CVAT  Neuro: Alert, oriented, speech and mentation normal  Psych: affect and mood normal  : penis, scrotum, testes normal    OFFICE CYSTOSCOPY 11/28/2023     Pre-procedure diagnosis:       Bladder Cancer  Post-procedure diagnosis:     Normal cystoscopy  Procedure performed:             Cystourethroscopy  Surgeon:                                 Kirt Guzman MD  Anesthesia:                             Local     Description of procedure:   After fully informed, voluntary consent was obtained, the patient was brought into the procedure room, identified and placed in a supine position on the cystoscopy table.  The groin/scrotum were prepped with betadine and draped in a sterile fashion.  Urojet lidocaine " gel was introduced.  A 15F flexible cystoscope was inserted into the urethra, and the bladder and urethra were examined in a systematic manner.  The patient tolerated the procedure well and there were no complications.       Cystoscopic findings:  The urethra was normal without strictures.  The prostate was 3 cm long and demonstrated mild bilobar hypertrophy.  There was no median lobe.  The external sphincter coapted well and the bladder neck was open. The bladder was completely surveyed.  There was mild trabeculation.  There were no stones or diverticula identifed.  The ureteric orifices were normal in position and number and effluxing clear urine. Previous resection sites noted with no suspicious lesions today.      ASSESSMENT and PLAN  73 year old male with recurrent bladder tumor. Has previously undergone BCG induction x 2. While office biopsy suggested possible HG disease, formal TURBT with only low-grade pathology. Has completed some BCG maintenance, but has since stopped. Given no recurrence today, will defer additional intravesical therapy at this time. Plan Cysto in 6 months.     - Cytology/FISH today  - Follow-up 6 months with office cystoscopy    Kirt Guzman MD  Urology  [facility]

## 2023-12-18 ENCOUNTER — PATIENT OUTREACH (OUTPATIENT)
Dept: GASTROENTEROLOGY | Facility: CLINIC | Age: 73
End: 2023-12-18
Payer: COMMERCIAL

## 2024-01-09 DIAGNOSIS — H40.1131 PRIMARY OPEN ANGLE GLAUCOMA (POAG) OF BOTH EYES, MILD STAGE: Primary | ICD-10-CM

## 2024-01-15 DIAGNOSIS — H40.1131 PRIMARY OPEN ANGLE GLAUCOMA (POAG) OF BOTH EYES, MILD STAGE: Primary | ICD-10-CM

## 2024-01-15 DIAGNOSIS — I10 ESSENTIAL HYPERTENSION WITH GOAL BLOOD PRESSURE LESS THAN 140/90: ICD-10-CM

## 2024-01-15 DIAGNOSIS — E78.5 HYPERLIPIDEMIA LDL GOAL <70: ICD-10-CM

## 2024-01-15 RX ORDER — EZETIMIBE 10 MG/1
10 TABLET ORAL DAILY
Qty: 90 TABLET | Refills: 1 | Status: SHIPPED | OUTPATIENT
Start: 2024-01-15 | End: 2024-08-13

## 2024-01-17 ENCOUNTER — TELEPHONE (OUTPATIENT)
Dept: OPHTHALMOLOGY | Facility: CLINIC | Age: 74
End: 2024-01-17
Payer: COMMERCIAL

## 2024-01-17 NOTE — TELEPHONE ENCOUNTER
[facility] Call Center    Phone Message    May a detailed message be left on voicemail: yes     Reason for Call: Other: Pt was recently in contact with someone that tested positive or COVID and is wondering if he can still come in to his appt today.     States the person tested positive yesterday but he has not been experiencing any symptoms and hasn't been able to get tested.     Per protocol writer to send TE to clinic for final decisions on COVID concerns.    Please reach out to pt and advise if okay to come in for 12:30 appt.    Thank You!    Action Taken: Message routed to:  [facility] ([facility] eye    Travel Screening: Not Applicable

## 2024-01-17 NOTE — TELEPHONE ENCOUNTER
Spoke to pt at [phone]    Reviewed ok to reschedule today's non-urgent appt if exposed to COVID and not tested for COVID.    Offered January 30th or February 7th for follow up and pt preferred February 7th and scheduled patient at 2:15 PM    Levar Osborne RN 8:59 AM 01/17/24

## 2024-01-25 ENCOUNTER — PATIENT OUTREACH (OUTPATIENT)
Dept: GERIATRIC MEDICINE | Facility: CLINIC | Age: 74
End: 2024-01-25
Payer: COMMERCIAL

## 2024-01-26 NOTE — PROGRESS NOTES
[facility] Care Coordination Contact      [facility] Six-Month Telephone Assessment    6 month telephone assessment completed on 1/26/24.    ER visits: No  Hospitalizations: No  TCU stays: No  Significant health status changes: No    Falls/Injuries: No  ADL/IADL changes: No  Changes in services: No    Caregiver Assessment follow up:  NA    Goals: See POC in chart for goal progress documentation.      Will see member in 6 months for an annual health risk assessment.   Encouraged member to call CC with any questions or concerns in the meantime.   Elizabeth Martinez RN  [facility]   [phone]

## 2024-02-07 ENCOUNTER — OFFICE VISIT (OUTPATIENT)
Dept: OPHTHALMOLOGY | Facility: CLINIC | Age: 74
End: 2024-02-07
Attending: OPHTHALMOLOGY
Payer: COMMERCIAL

## 2024-02-07 DIAGNOSIS — H40.1131 PRIMARY OPEN ANGLE GLAUCOMA (POAG) OF BOTH EYES, MILD STAGE: ICD-10-CM

## 2024-02-07 PROCEDURE — 65855 TRABECULOPLASTY LASER SURG: CPT | Mod: LT | Performed by: OPHTHALMOLOGY

## 2024-02-07 PROCEDURE — 92133 CPTRZD OPH DX IMG PST SGM ON: CPT | Performed by: OPHTHALMOLOGY

## 2024-02-07 PROCEDURE — G0463 HOSPITAL OUTPT CLINIC VISIT: HCPCS | Mod: 25 | Performed by: OPHTHALMOLOGY

## 2024-02-07 PROCEDURE — 92083 EXTENDED VISUAL FIELD XM: CPT | Performed by: OPHTHALMOLOGY

## 2024-02-07 RX ORDER — KETOROLAC TROMETHAMINE 5 MG/ML
1 SOLUTION OPHTHALMIC 4 TIMES DAILY
Qty: 3 ML | Refills: 0 | Status: SHIPPED | OUTPATIENT
Start: 2024-02-07 | End: 2024-03-12

## 2024-02-07 ASSESSMENT — VISUAL ACUITY
OS_SC: 20/25
METHOD: SNELLEN - LINEAR
OD_SC+: -1
OD_SC: 20/20

## 2024-02-07 ASSESSMENT — SLIT LAMP EXAM - LIDS
COMMENTS: NORMAL
COMMENTS: NORMAL

## 2024-02-07 ASSESSMENT — TONOMETRY
IOP_METHOD: TONOPEN
OD_IOP_MMHG: 9
OS_IOP_MMHG: 12

## 2024-02-07 ASSESSMENT — EXTERNAL EXAM - RIGHT EYE: OD_EXAM: NORMAL

## 2024-02-07 ASSESSMENT — EXTERNAL EXAM - LEFT EYE: OS_EXAM: NORMAL

## 2024-02-07 ASSESSMENT — CUP TO DISC RATIO
OD_RATIO: 0.8
OS_RATIO: 0.7

## 2024-02-07 NOTE — PROGRESS NOTES
HPI       Glaucoma Follow-Up    Associated symptoms include Negative for dryness, eye pain and redness.  Pain was noted as 0/10. Additional comments: 3 month follow up Primary open angle glaucoma (POAG) of both eyes, mild stage             Comments    Pt states vision has been stable since last visit.   Denies eye pain or discomfort.   Occasionally feels dryness twice per month.   Uses gtts for dryness relief, unsure which.     Hola David 2:04 PM 2024            Last edited by Hola David on 2024  2:05 PM.        HPI:  Tevin Guidry is a 73 year old male with history of CAD s/p CABG, colon CA, HTN, HLD presenting for glaucoma followup. Vision is stable. Notes occasional dryness relieve with artificial tears    Past Ocular History:  Punched in both eyes age 20 or 21  CE/IOL right eye Maltry 20, left eye Massey 21  POAG      PMH:  HTN  CAD s/p 6 stents and surgery  Bladder cancer in remission (treated with local chemotherapy and DCG per patient)  MUTYH associated polyposis s/p partial colectomy    SH:  smoker    FH:  No known family history of blindness, glaucoma, macular degeneration  No known FHx polyposis - brother has not had colonoscopy  Mother and older brother  of stomach cancer    Eye Medications  Artificial tears as needed     ASSESSMENT and PLAN:  (X92.0186) Primary open angle glaucoma (POAG) of both eyes, mild stage  FH: Negative  Pachymetry:   Gonioscopy: open to thin CBB, 3+ pigment, flat  Tmax: 20/19  Today's IOP: 11/10  Target IOP: mid-teens  Current medications: None  Meds to avoid: None  Selected laser trabeculoplasty (SLT) 3/17/23 right eye   Selected laser trabeculoplasty (SLT)0 24 left eye      Visual field: OVF 24-2 (24 )  Right eye: reliable, superior depressed points (?lid) and inferonasal depressed spot, MD -1.1 PSD 3.3;  Left eye: reliable, inferior nasal step, superior arcuate MD -3.1, PSD 6.6; improved inferior and increased superior from prior  Worse  left eye compared to previous    Visual field: OVF 24-2 (5/17/23)  Right eye: reliable, superior depressed points (?lid) and inferonasal depressed spot, MD -0.6, PSD 2.6;  Left eye: reliable, superior arcuate MD -2.2, PSD 2.9; improved inferior and increased superior from prior    Visual field: OVF 24-2 (5/20/22)  Right eye: reliable, superior depressed points (?lid) and inferonasal depressed spot, MD -3.8, PSD 4.4; superior depression not consistent with location from prior  Left eye: poor reliability with 14% FN, superior>inferior depression, MD -8.2, PSD 3.6; improved inferior and increased superior from prior    OCT nerve fiber layer 02/07/24:   Right eye - G(r) 58 NI (g) 78 TI (r) 91 NS (r) 48 TS (r) 83 (worse ST      Left eye - G(r) 65 NI (g) 81 TI (g) 115 NS (y) 63 TS (r) 73  OCT nerve fiber layer 11/22/23:   Right eye - G(r) 60 NI (g) 77 TI (y) 97 NS (r) 49 TS (r) 91      Left eye - G(r) 65 NI (g) 85 TI (g) 117 NS (y) 61 TS (r) 72  OCT nerve fiber layer 03/01/23:   Right eye - G(r) 60 NI (g) 76 TI (y) 96 NS (r) 51 TS (r) 89      Left eye - G(r) 72 NI (g) 83 TI (g) 121 NS (g) 75 TS (r) 86  Nerve OCT: Spectralis optic nerve OCT (6/23/21)  OD: Central RNFL thickness 69, superior red, inferior and nasal yellow   OS: Central RNFL thickness 69, superotemporal red, nasal yellow     IOP doing well but questionable progression left eye superiorly  Risks, benefits, and alternatives to Selected laser trabeculoplasty (SLT) discussed  Patient elects to proceed today     Start ketorolac four times a day  x 4 days    (Z96.1) Pseudophakia of both eyes    Massey left eye 02/02/23   Maltry right eye 12/11/2020 ZCB00 21.5  Doing well    (Z86.010) History of colonic polyps  - s/p partial colectomy for numerous colon polyps and adenomas  - no CHRPE on exam    (H04.123) Dry eye syndrome of both eyes    Recommend artificial tears PRN      Return in about 8 weeks (around 4/3/2024) for Follow Up-v/t-post slt IOP  check.    Attending Physician Attestation:  Complete documentation of historical and exam elements from today's encounter can be found in the full encounter summary report (not reduplicated in this progress note).  I personally obtained the chief complaint(s) and history of present illness.  I confirmed and edited as necessary the review of systems, past medical/surgical history, family history, social history, and examination findings as documented by others; and I examined the patient myself.  I personally reviewed the relevant tests, images, and reports as documented above.  I formulated and edited as necessary the assessment and plan and discussed the findings and management plan with the patient and family.  - Guido Massey MD

## 2024-02-07 NOTE — NURSING NOTE
Chief Complaints and History of Present Illnesses   Patient presents with    Glaucoma Follow-Up     3 month follow up Primary open angle glaucoma (POAG) of both eyes, mild stage     Chief Complaint(s) and History of Present Illness(es)       Glaucoma Follow-Up              Associated symptoms: Negative for dryness, eye pain and redness    Pain scale: 0/10    Comments: 3 month follow up Primary open angle glaucoma (POAG) of both eyes, mild stage              Comments    Pt states vision has been stable since last visit.   Denies eye pain or discomfort.   Occasionally feels dryness twice per month.   Uses gtts for dryness relief, unsure which.     Hola David 2:04 PM February 7, 2024

## 2024-03-11 NOTE — PROGRESS NOTES
Medication Therapy Management (MTM) Encounter    ASSESSMENT:                            Medication Adherence/Access: No issues identified      Smoking cessation:   Patient continues to use tobacco. Patient is ready to quit using tobacco.  Based on patient preferences and history, patient would benefit from: keep chewing nicotine gum , stay on 300mg./day bupropion, donot pick a quit date as that is too stressful, START low dose 0.5mg Varenicline as we discussed (1-2 x day) . risks of smoking-stroke --see plan for BEFAST info, benefits of quitting, and ways to cope with cravings and manage triggers--walk dog etc. To distract himself.      Hypertension: Stable. Patient is meeting blood pressure goal of < 140/90mmHg. Patient would benefit from the following changes - continued blood pressure monitoring, watching diet, increasing exercise and weight loss and limiting/reducing sodium.    Hyperlipidemia: Stable.  Patient is on high intensity statin which is indicated based on 2019 ACC/AHA guidelines for lipid management.      COPD: Stable  Conitnue daily lama/laba inhaler as well as using albuterol prior to walking the dogs.     PLAN:                              1. Please keep BEFAST card in your wallet-- be familiar with the signs/symptoms-- call 911 asap if any of these occur.   Your back to smoking again -- you understand the risk of stroke /heart attack increases again now with smoking.    Stay on Bupropion 300mg./day , chew at least 9 pieces of 4mg. Nicotine gum /day , distract yourself with walking the dog, or someother activity you like to do when you get the urge to light up a cigarette.    Lets also try ADDING in low dose Varenicline 0.5mg tablets --start with 1 tab daily after bfast x 3-7 days --then if needed increase to 1 tab 2 x day --take second tab after dinner.         Follow-up: Return in about 30 days (around 4/11/2024), or @ 1:30 PM, for Medication Therapy Management Visit, tobacco  abuse.          SUBJECTIVE/OBJECTIVE:                          Tevin Guidry is a 73 year old male coming in for a follow-up (6-1-23) visit. He was referred to me from Preethi Quiroz      Reason for visit:   Today he states he fell off the wagon --back to smoking...        Allergies/ADRs: Reviewed in chart  Past Medical History: Per patient has a hemicolectomy with a hx of polyps (removed appendix). CAD (CABG), Social phobia, COPD, adenocarcinoma  Of bladder/colon. Had signet ring cancer removed with colon as well.  Has MUTYH--associated polyposis.   Tobacco: He currently gets a hold a cigarette- relapsed recently -see below .  Never had chantix in the past .   Alcohol: not currently using  Other Substance Use: marijuana flower, occasional vape - smokes most days/week  --he is much more worried about cig smoking vs. Marijuana.   Caffeine: large starbucks in morning and sometimes in PM      Medication Adherence/Access: No issues        Smoking cessation:  Patient reports things not going well.  What hasn't worked for you? Relapsed--if he buys a pack --lasts 3 day -about 7 cigs /day . Today only 1 cig so far as he feels guilty admitting to me that he relapsed.    Why did he relapse:   Enjoyment --desperately needs a couple hits /day .      He has a copd  now at [facility].    What side effects to nicotine replacement therapies or medications are you experiencing: does chew 4mg nicotine gum --8 pieces /day now.   What times or situations have you found the most challenging?Around other tobacco users  When he is bored.   Is there anything that worked well for you? Bupropion 300mg./day and NRT .  How can you get back on your plan? Hold patient accountable .  What is a first step-today or tomorrow or in the next few days-that will enable you to do this? We discussed at this point lets add in chantix.   How can I support you in doing this? See patient monthly until smoke free then quarterly x 1-2 years.          Previously :   Smoking cessation: Patient reports things going well.  Patient smoke free for one year.   What has gone well for you? bupropion working well and cystoscopy reported no bladder cancer .  What has helped you the most to keep from smoking? Desire to remain smoke free and cancer free.   Is there anything you might anticipate happening in the near future (e.g., next week, next month) that could present a challenge for you? None.  -   Previously :  .Smoking cessation:  Patient reports things going well.  What has gone well for you? Bupropion working well. Smoke free since 6-23-22.   What strategies have you tried?   He doesn't want to start cigs again --he's made it this far feels great .   What side effects to nicotine replacement therapies or medications are you experiencing: not using any now.   What has helped you the most to keep from smoking? Bupropion for now and long term.   Is there anything you might anticipate happening in the near future (e.g., next week, next month) that could present a challenge for you? None   How can I continue to support you going forward? Hold patient accountable.               Previously :   Smoking cessation:  Patient reports things going so well.  NO SMOKING SINCE 6-!!    He has relapsed smoking , but pet/ct scan was negative , but showed darkening lungs and calcium buildup in heart .   What side effects to nicotine replacement therapies or medications are you experiencing: none   What times or situations have you found the most challenging?Just when he gets the urge--maybe boredom/stress.    Is there anything that worked well for you?  Bupropion 300mg./day helps.   How can you get back on your plan? He feels he can resist temptation now --doing well.   What is a first step-today or tomorrow or in the next few days-that will enable you to do this? Periodic mtm appt. To hold him accountable.   How can I support you in doing this? continue meds/counseling.        Previously: 6-23-22:    Has been smoking off/on , can go for days quitting --but then in his head he wakes up and wants a cigarette -- then he has to buy a pack and start 1 PPD .    His last cigarette was Sunday evening 6-19-22.  Currently has no cigs at home .     He just bought first lozenges today 4mg. -- he like them better than gum -last longer and taste better . They really help --smoke free x 4 days now.       He admits some coronary artery calcification - and darkening in lungs--he realizes this and wants to keep plugging along.       He is cancer free !!    He cannot afford to smoke --1 pack is 9.45 $ --!!            Previously :       4-14-22:    Smoking is sporadic , admitted 6-8 cigs/day x 2 + weeks not too long ago.   He states he doesn't know why he started up again, maybe boredom he thinks biggest reason.    Some marijuana daily smoking still --maybe he ran out of that so that is trigger for him to restart cigs.     He did quit cigs 4-11-22 ; still smokes daily Marijuana though.     Back to chewing 10-12 nicotine pieces gum/day .     Denies overt loneliness but has a wonderful dog. Has a brother and his Gf that he can talk too.       Hypertension: Current medications include : amlodipine 5mg hs daily now , lisinopril 20mg twice daily, metoprolol succ. 50mg daily.  Patient does not self-monitor blood pressure but states [facility] sent him a home Blood Pressure kit .  Patient reports no current medication side effects. Patient reports they are somewhat anxious about the visit, thinks this could be related to blood pressure elevation at moment.     Currently takes aspirin 81mg daily based on history of CABG. Reports no side effects.    BP Readings from Last 3 Encounters:   03/12/24 122/66   11/28/23 112/75   11/20/23 118/66        Hyperlipidemia: Current therapy includes :atorvastatin 80mg daily and Ezetimibe (Zetia) 10mg once daily.  Patient reports no significant myalgias or other side effects.  The  10-year ASCVD risk score (Lakeisha BILLY, et al., 2019) is: 24.9%    Values used to calculate the score:      Age: 73 years      Sex: Male      Is Non- : No      Diabetic: No      Tobacco smoker: Yes      Systolic Blood Pressure: 122 mmHg      Is BP treated: Yes      HDL Cholesterol: 53 mg/dL      Total Cholesterol: 137 mg/dL  Recent Labs   Lab Test 08/30/23  1218 09/23/22  1312   CHOL 137 112   HDL 53 58   LDL 67 35   TRIG 86 94           COPD: Current medications:  Now taking 1 puff daily anoro (LABA/LAMA ), Short-Acting Bronchodilator: Albuterol rescue inhaler- he takes 2 puffs 2 x day before he goes on walks with his brothers dog.  Patient is not experiencing side effects.   Patient reports the following symptoms: No current symptoms unless he works a flight of stairs, he states breathing is better since he started daily anoro.   Patient does not have an COPD Action Plan on file.   Has spirometry been completed: Doesn't know  Inhaler/device technique reviewed: Disk inhaler (ellipta)  Oxygen today 96%.            Today's Vitals: /66   Pulse 62   Wt 175 lb (79.4 kg)   SpO2 96%   BMI 27.41 kg/m    ----------------      I spent 30 minutes with this patient today. All changes were made via collaborative practice agreement with Preethi Quiroz MD, MD. A copy of the visit note was provided to the patient's primary care provider.    The patient was given a summary of these recommendations.     Andrea Bah [facility].  Medication Therapy Management Provider  [phone]           Medication Therapy Recommendations  Nicotine dependence, uncomplicated, unspecified nicotine product type    Current Medication: varenicline (CHANTIX) 0.5 MG tablet   Rationale: Synergistic therapy - Needs additional medication therapy - Indication   Recommendation: Start Medication   Status: Accepted per CPA

## 2024-03-12 ENCOUNTER — OFFICE VISIT (OUTPATIENT)
Dept: PHARMACY | Facility: CLINIC | Age: 74
End: 2024-03-12
Payer: COMMERCIAL

## 2024-03-12 VITALS
SYSTOLIC BLOOD PRESSURE: 122 MMHG | OXYGEN SATURATION: 96 % | DIASTOLIC BLOOD PRESSURE: 66 MMHG | BODY MASS INDEX: 27.41 KG/M2 | WEIGHT: 175 LBS | HEART RATE: 62 BPM

## 2024-03-12 DIAGNOSIS — E78.5 HYPERLIPIDEMIA LDL GOAL <130: ICD-10-CM

## 2024-03-12 DIAGNOSIS — J44.9 CHRONIC OBSTRUCTIVE PULMONARY DISEASE, UNSPECIFIED COPD TYPE (H): ICD-10-CM

## 2024-03-12 DIAGNOSIS — I10 HYPERTENSION GOAL BP (BLOOD PRESSURE) < 140/90: ICD-10-CM

## 2024-03-12 DIAGNOSIS — F17.200 NICOTINE DEPENDENCE, UNCOMPLICATED, UNSPECIFIED NICOTINE PRODUCT TYPE: Primary | ICD-10-CM

## 2024-03-12 PROCEDURE — 99607 MTMS BY PHARM ADDL 15 MIN: CPT | Performed by: PHARMACIST

## 2024-03-12 PROCEDURE — 99605 MTMS BY PHARM NP 15 MIN: CPT | Performed by: PHARMACIST

## 2024-03-12 RX ORDER — VARENICLINE TARTRATE 0.5 MG/1
0.5 TABLET, FILM COATED ORAL 2 TIMES DAILY
Qty: 60 TABLET | Refills: 5 | Status: SHIPPED | OUTPATIENT
Start: 2024-03-12 | End: 2024-10-06

## 2024-03-12 NOTE — LETTER
"Recommended To-Do List      Prepared on: 03/12/2024       You can get the best results from your medications by completing the items on this \"To-Do List.\"      Bring your To-Do List when you go to your doctor. And, share it with your family or caregivers.    My To-Do List:  What we talked about: What I should do:   A new medication that may be of benefit to you    Start taking : Varenicline 0.5mg tablet 1 -2 x day as we discussed.           What we talked about: What I should do:                     "

## 2024-03-12 NOTE — PATIENT INSTRUCTIONS
"Recommendations from today's MTM visit:                                                         1. Please keep BEFAST card in your wallet-- be familiar with the signs/symptoms-- call 911 asap if any of these occur.   Your back to smoking again -- you understand the risk of stroke /heart attack increases again now with smoking.    Stay on Bupropion 300mg./day , chew at least 9 pieces of 4mg. Nicotine gum /day , distract yourself with walking the dog, or someother activity you like to do when you get the urge to light up a cigarette.    Lets also try ADDING in low dose Varenicline 0.5mg tablets --start with 1 tab daily after bfast x 3-7 days --then if needed increase to 1 tab 2 x day --take second tab after dinner.         Follow-up: Return in about 30 days (around 4/11/2024), or @ 1:30 PM, for Medication Therapy Management Visit, tobacco abuse.    It was great speaking with you today.  I value your experience and would be very thankful for your time in providing feedback in our clinic survey. In the next few days, you may receive an email or text message from [employer] with a link to a survey related to your  clinical pharmacist.\"     To schedule another MTM appointment, please call the clinic directly or you may call the MTM scheduling line at [phone] or toll-free at [phone].     My Clinical Pharmacist's contact information:                                                      Please feel free to contact me with any questions or concerns you have.      Andrea Bah [facility].  Medication Therapy Management Provider  [phone]    "

## 2024-03-12 NOTE — Clinical Note
Preethi--dana trevino came to see me today --very contrite --back to smoking about 7 cigs /day --he wants to quit again --I added low dose chantix now to his quit plan regimen --f/up with me in 4 weeks.  Amanda

## 2024-03-12 NOTE — LETTER
March 12, 2024  Tevin R Brea  [address]  [place] [address]    Dear Mr. Guidry, [facility]     Thank you for talking with me on Mar 12, 2024 about your health and medications. As a follow-up to our conversation, I have included two documents:      Your Recommended To-Do List has steps you should take to get the best results from your medications.  Your Medication List will help you keep track of your medications and how to take them.    If you want to talk about these documents, please call Andrea Bah RPH at phone: [phone], Monday-Friday 8-4:30pm.    I look forward to working with you and your doctors to make sure your medications work well for you.    Sincerely,  Andrea Bah RPH  [facility] Pharmacist, [facility]

## 2024-03-12 NOTE — LETTER
_  Medication List        Prepared on: 03/12/2024     Bring your Medication List when you go to the doctor, hospital, or   emergency room. And, share it with your family or caregivers.     Note any changes to how you take your medications.  Cross out medications when you no longer use them.    Medication How I take it Why I use it Prescriber   albuterol (PROAIR HFA/PROVENTIL HFA/VENTOLIN HFA) 108 (90 Base) MCG/ACT inhaler Inhale 2 puffs into the lungs every 6 hours as needed for shortness of breath or wheezing Chronic obstructive pulmonary disease, unspecified COPD type (H) Preethi Quiroz MD   amLODIPine (NORVASC) 5 MG tablet TAKE ONE TABLET BY MOUTH EVERY NIGHT AT BEDTIME Hypertension Goal BP (Blood Pressure) < 140/90 Preethi Quiroz MD   aspirin 81 MG tablet Take 1 tablet (81 mg) by mouth daily Coronary artery disease involving native artery of transplanted heart without angina pectoris Casa Evans MD   atorvastatin (LIPITOR) 80 MG tablet TAKE ONE TABLET BY MOUTH EVERY EVENING AT BEDTIME Hyperlipidemia LDL Goal <70 Preethi Quiroz MD   buPROPion (WELLBUTRIN XL) 300 MG 24 hr tablet Take 1 tablet (300 mg) by mouth every morning Tobacco Dependence Preethi Quiroz MD   cyanocobalamin injection 1,000 mcg   Vitamin B12 Deficiency (Non Anemic) Almas Foote PA-C   cyanocobalamin injection 1,000 mcg   Vitamin B12 Deficiency (Non Anemic) Preethi Quiroz MD   ezetimibe (ZETIA) 10 MG tablet TAKE ONE TABLET BY MOUTH ONCE DAILY Hyperlipidemia LDL Goal <70; Essential hypertension with goal blood pressure less than 140/90 Preethi Quiroz MD   lidocaine (XYLOCAINE) 2 % external gel   Malignant neoplasm of overlapping sites of bladder (H) Kirt Guzman MD   lisinopril (ZESTRIL) 20 MG tablet Take 1 tablet (20 mg) by mouth 2 times daily Essential hypertension with goal blood pressure less than 140/90 Azul Breaux MD   metoprolol succinate ER (TOPROL XL) 50 MG 24 hr tablet Take 1 tablet (50  mg) by mouth At Bedtime CAD, multiple vessel Preethi Quiroz MD   nicotine polacrilex (NICORETTE) 4 MG gum Place 4 mg inside cheek every hour as needed for nicotine withdrawal symptoms Nicotine Addiction Patient Reported   PARoxetine (PAXIL) 40 MG tablet Take 1 tablet (40 mg) by mouth every morning Social Phobia Preethi Quiroz MD   umeclidinium-vilanterol (ANORO ELLIPTA) 62.5-25 MCG/ACT oral inhaler INHALE 1 PUFF INTO THE LUNGS DAILY Chronic obstructive pulmonary disease, unspecified COPD type (H) Preethi Quiroz MD   varenicline (CHANTIX) 0.5 MG tablet Take 1 tablet (0.5 mg) by mouth 2 times daily Nicotine dependence, uncomplicated, unspecified nicotine product type Preethi Quiroz MD         Add new medications, over-the-counter drugs, herbals, vitamins, or  minerals in the blank rows below.    Medication How I take it Why I use it Prescriber                                      Allergies:      codeine        Side effects I have had:               Other Information:              My notes and questions:

## 2024-04-07 NOTE — PROGRESS NOTES
Medication Therapy Management (MTM) Encounter    ASSESSMENT:                            Medication Adherence/Access: No issues identified    Tobacco Use Disorder:  Patient is smoke free.patient is committed to remaining smoke free now.  Based on patient preferences and history, patient would benefit from as needed use of 4mg. nicotine gum, 300mg ./day  bupropion long term, 0.5mg Varenicline 2 x day for now. We discussed: risks of smoking, benefits of quitting, and total abstinence.        Hypertension: Stable. Patient is meeting blood pressure goal of < 140/90mmHg. Patient would benefit from the following changes - continued blood pressure monitoring, watching diet, increasing exercise and weight loss and limiting/reducing sodium.    Hyperlipidemia: Stable.  Patient is on high intensity statin which is indicated based on 2019 ACC/AHA guidelines for lipid management.      COPD: Stable  Conitnue daily lama/laba inhaler as well as using albuterol prior to walking the dogs.     PLAN:                              1. Congratulations you are now smoke free for a few days --lets keep it going--stay on the bupropion and 2 x day varenicline and as needed 4mg. Nicotine chewing gum --this triple combo works wonders for you !!        Follow-up: Return in about 3 months (around 7/9/2024), or @ 1 PM, for Medication Therapy Management Visit, tobacco abuse, BP Recheck.        SUBJECTIVE/OBJECTIVE:                          Tevin Guidry is a 73 year old male coming in for a follow-up (3-12-24) visit. He was referred to me from Preethi Quiroz  .      Reason for visit:   Smoking cessation f/up.         Allergies/ADRs: Reviewed in chart  Past Medical History: Per patient has a hemicolectomy with a hx of polyps (removed appendix). CAD (CABG), Social phobia, COPD, adenocarcinoma  Of bladder/colon. Had signet ring cancer removed with colon as well.  Has MUTYH--associated polyposis.   Tobacco: He currently gets a hold a cigarette- relapsed  recently -see below .  Never had chantix in the past .   Alcohol: not currently using  Other Substance Use: marijuana flower, occasional vape - smokes most days/week  --he is much more worried about cig smoking vs. Marijuana.   Caffeine: large starbucks in morning and sometimes in PM      Medication Adherence/Access: No issues      Smoking cessation:  Patient reports things going well.  What has gone well for you? Turned the corner --varenicline  0.5mg 2 x day (works great)   and bupropion 300mg er tab daily and gum - nicotine 4mg./day --as needed .(4-5 pieces /day)     Last time he smoked was a few days ago -- feels he can past ashtray and not grab a cig. Butt.       What strategies have you tried? Current plan works well.   What side effects to nicotine replacement therapies or medications are you experiencing: none   What has helped you the most to keep from smoking?   Is there anything you might anticipate happening in the near future (e.g., next week, next month) that could present a challenge for you? None.   How can I continue to support you going forward? Qtr. Mtm visits for accountability.         Previously :   Smoking cessation:  Patient reports things not going well.  What hasn't worked for you? Relapsed--if he buys a pack --lasts 3 day -about 7 cigs /day . Today only 1 cig so far as he feels guilty admitting to me that he relapsed.    Why did he relapse:   Enjoyment --desperately needs a couple hits /day .      He has a copd  now at [facility].    What side effects to nicotine replacement therapies or medications are you experiencing: does chew 4mg nicotine gum --8 pieces /day now.   What times or situations have you found the most challenging?Around other tobacco users  When he is bored.   Is there anything that worked well for you? Bupropion 300mg./day and NRT .  How can you get back on your plan? Hold patient accountable .  What is a first step-today or tomorrow or in the next few days-that will  enable you to do this? We discussed at this point lets add in chantix.   How can I support you in doing this? See patient monthly until smoke free then quarterly x 1-2 years.         Previously :   Smoking cessation: Patient reports things going well.  Patient smoke free for one year.   What has gone well for you? bupropion working well and cystoscopy reported no bladder cancer .  What has helped you the most to keep from smoking? Desire to remain smoke free and cancer free.   Is there anything you might anticipate happening in the near future (e.g., next week, next month) that could present a challenge for you? None.  -   Previously :  .Smoking cessation:  Patient reports things going well.  What has gone well for you? Bupropion working well. Smoke free since 6-23-22.   What strategies have you tried?   He doesn't want to start cigs again --he's made it this far feels great .   What side effects to nicotine replacement therapies or medications are you experiencing: not using any now.   What has helped you the most to keep from smoking? Bupropion for now and long term.   Is there anything you might anticipate happening in the near future (e.g., next week, next month) that could present a challenge for you? None   How can I continue to support you going forward? Hold patient accountable.               Previously :   Smoking cessation:  Patient reports things going so well.  NO SMOKING SINCE 6-!!    He has relapsed smoking , but pet/ct scan was negative , but showed darkening lungs and calcium buildup in heart .   What side effects to nicotine replacement therapies or medications are you experiencing: none   What times or situations have you found the most challenging?Just when he gets the urge--maybe boredom/stress.    Is there anything that worked well for you?  Bupropion 300mg./day helps.   How can you get back on your plan? He feels he can resist temptation now --doing well.   What is a first step-today or  tomorrow or in the next few days-that will enable you to do this? Periodic mtm appt. To hold him accountable.   How can I support you in doing this? continue meds/counseling.       Previously: 6-23-22:    Has been smoking off/on , can go for days quitting --but then in his head he wakes up and wants a cigarette -- then he has to buy a pack and start 1 PPD .    His last cigarette was Sunday evening 6-19-22.  Currently has no cigs at home .     He just bought first lozenges today 4mg. -- he like them better than gum -last longer and taste better . They really help --smoke free x 4 days now.       He admits some coronary artery calcification - and darkening in lungs--he realizes this and wants to keep plugging along.       He is cancer free !!    He cannot afford to smoke --1 pack is 9.45 $ --!!            Previously :       4-14-22:    Smoking is sporadic , admitted 6-8 cigs/day x 2 + weeks not too long ago.   He states he doesn't know why he started up again, maybe boredom he thinks biggest reason.    Some marijuana daily smoking still --maybe he ran out of that so that is trigger for him to restart cigs.     He did quit cigs 4-11-22 ; still smokes daily Marijuana though.     Back to chewing 10-12 nicotine pieces gum/day .     Denies overt loneliness but has a wonderful dog. Has a brother and his Gf that he can talk too.       Hypertension: Current medications include : amlodipine 5mg hs daily now , lisinopril 20mg twice daily, metoprolol succ. 50mg daily.  Patient does not self-monitor blood pressure but states [facility] sent him a home Blood Pressure kit .  Patient reports no current medication side effects. Patient reports they are somewhat anxious about the visit, thinks this could be related to blood pressure elevation at moment.     Currently takes aspirin 81mg daily based on history of CABG. Reports no side effects.    BP Readings from Last 3 Encounters:   04/11/24 134/79   03/12/24 122/66   11/28/23 112/75         Hyperlipidemia: Current therapy includes :atorvastatin 80mg daily and Ezetimibe (Zetia) 10mg once daily.  Patient reports no significant myalgias or other side effects.  The 10-year ASCVD risk score (Lakeisha BILLY, et al., 2019) is: 26.7%    Values used to calculate the score:      Age: 73 years      Sex: Male      Is Non- : No      Diabetic: No      Tobacco smoker: No      Systolic Blood Pressure: 134 mmHg      Is BP treated: Yes      HDL Cholesterol: 53 mg/dL      Total Cholesterol: 137 mg/dL  Recent Labs   Lab Test 08/30/23  1218 09/23/22  1312   CHOL 137 112   HDL 53 58   LDL 67 35   TRIG 86 94           COPD: Current medications:  Now taking 1 puff daily anoro (LABA/LAMA ), Short-Acting Bronchodilator: Albuterol rescue inhaler- he takes 2 puffs 2 x day before he goes on walks with his brothers dog.  Patient is not experiencing side effects.   Patient reports the following symptoms: No current symptoms unless he works a flight of stairs, he states breathing is better since he started daily anoro.   Patient does not have an COPD Action Plan on file.   Has spirometry been completed: Doesn't know  Inhaler/device technique reviewed: Disk inhaler (ellipta)  Oxygen today 96%.            Today's Vitals: /79   Pulse 63   Wt 172 lb 12.8 oz (78.4 kg)   SpO2 95%   BMI 27.06 kg/m    ----------------      I spent 30 minutes with this patient today. All changes were made via collaborative practice agreement with Preethi Quiroz MD, MD. A copy of the visit note was provided to the patient's primary care provider.    The patient was given a summary of these recommendations.     Andrea Bah [facility].  Medication Therapy Management Provider  [phone]           Medication Therapy Recommendations  No medication therapy recommendations to display

## 2024-04-10 ENCOUNTER — OFFICE VISIT (OUTPATIENT)
Dept: OPHTHALMOLOGY | Facility: CLINIC | Age: 74
End: 2024-04-10
Attending: OPHTHALMOLOGY
Payer: COMMERCIAL

## 2024-04-10 DIAGNOSIS — H04.123 DRY EYE SYNDROME OF BOTH EYES: ICD-10-CM

## 2024-04-10 DIAGNOSIS — H40.1131 PRIMARY OPEN ANGLE GLAUCOMA (POAG) OF BOTH EYES, MILD STAGE: Primary | ICD-10-CM

## 2024-04-10 DIAGNOSIS — Z96.1 PSEUDOPHAKIA OF BOTH EYES: ICD-10-CM

## 2024-04-10 PROCEDURE — G0463 HOSPITAL OUTPT CLINIC VISIT: HCPCS | Performed by: OPHTHALMOLOGY

## 2024-04-10 PROCEDURE — 99213 OFFICE O/P EST LOW 20 MIN: CPT | Performed by: OPHTHALMOLOGY

## 2024-04-10 ASSESSMENT — CUP TO DISC RATIO
OD_RATIO: 0.8
OS_RATIO: 0.7

## 2024-04-10 ASSESSMENT — TONOMETRY
OD_IOP_MMHG: 11
OS_IOP_MMHG: 10
IOP_METHOD: TONOPEN

## 2024-04-10 ASSESSMENT — VISUAL ACUITY
OS_SC: 20/25
METHOD: SNELLEN - LINEAR
OD_SC: 20/20

## 2024-04-10 ASSESSMENT — EXTERNAL EXAM - RIGHT EYE: OD_EXAM: NORMAL

## 2024-04-10 ASSESSMENT — SLIT LAMP EXAM - LIDS
COMMENTS: NORMAL
COMMENTS: NORMAL

## 2024-04-10 ASSESSMENT — EXTERNAL EXAM - LEFT EYE: OS_EXAM: NORMAL

## 2024-04-10 NOTE — PROGRESS NOTES
HPI       Glaucoma Follow-Up    In both eyes.             Comments    Pt. States that he is doing well. No change in VA BE. No pain BE. No flashes or floaters BE.  Helen Daily COT 2:36 PM April 10, 2024             Last edited by Helen Daily on 4/10/2024  2:36 PM.        HPI:  Tevin Guidry is a 73 year old male with history of CAD s/p CABG, colon CA, HTN, HLD presenting for  IOP check after Selected laser trabeculoplasty (SLT) and glaucoma followup. Doing well without acute complaints today    Past Ocular History:  Punched in both eyes age 20 or 21  CE/IOL right eye Maltry 18, left eye Massey 21  POAG      PMH:  HTN  CAD s/p 6 stents and surgery  Bladder cancer in remission (treated with local chemotherapy and DCG per patient)  MUTYH associated polyposis s/p partial colectomy    SH:  smoker    FH:  No known family history of blindness, glaucoma, macular degeneration  No known FHx polyposis - brother has not had colonoscopy  Mother and older brother  of stomach cancer    Eye Medications  Artificial tears as needed     ASSESSMENT and PLAN:  (H40.2466) Primary open angle glaucoma (POAG) of both eyes, mild stage  FH: Negative  Pachymetry:   Gonioscopy: open to thin CBB, 3+ pigment, flat  Tmax: 20/19  Today's IOP: 11/10  Target IOP: mid-teens  Current medications: None  Meds to avoid: None  Selected laser trabeculoplasty (SLT) 3/17/23 right eye   Selected laser trabeculoplasty (SLT)0 24 left eye      Visual field: OVF 24-2 (24 )  Right eye: reliable, superior depressed points (?lid) and inferonasal depressed spot, MD -1.1 PSD 3.3;  Left eye: reliable, inferior nasal step, superior arcuate MD -3.1, PSD 6.6; improved inferior and increased superior from prior  Worse left eye compared to previous    Visual field: OVF 24-2 (23)  Right eye: reliable, superior depressed points (?lid) and inferonasal depressed spot, MD -0.6, PSD 2.6;  Left eye: reliable, superior arcuate MD -2.2, PSD 2.9;  improved inferior and increased superior from prior    Visual field: OVF 24-2 (5/20/22)  Right eye: reliable, superior depressed points (?lid) and inferonasal depressed spot, MD -3.8, PSD 4.4; superior depression not consistent with location from prior  Left eye: poor reliability with 14% FN, superior>inferior depression, MD -8.2, PSD 3.6; improved inferior and increased superior from prior    OCT nerve fiber layer 02/07/24:   Right eye - G(r) 58 NI (g) 78 TI (r) 91 NS (r) 48 TS (r) 83 (worse ST      Left eye - G(r) 65 NI (g) 81 TI (g) 115 NS (y) 63 TS (r) 73  OCT nerve fiber layer 11/22/23:   Right eye - G(r) 60 NI (g) 77 TI (y) 97 NS (r) 49 TS (r) 91      Left eye - G(r) 65 NI (g) 85 TI (g) 117 NS (y) 61 TS (r) 72  OCT nerve fiber layer 03/01/23:   Right eye - G(r) 60 NI (g) 76 TI (y) 96 NS (r) 51 TS (r) 89      Left eye - G(r) 72 NI (g) 83 TI (g) 121 NS (g) 75 TS (r) 86  Nerve OCT: Spectralis optic nerve OCT (6/23/21)  OD: Central RNFL thickness 69, superior red, inferior and nasal yellow   OS: Central RNFL thickness 69, superotemporal red, nasal yellow     IOP doing great today both eyes s/p Selected laser trabeculoplasty (SLT)  Continue off drops  Return to clinic 6 months for repeat glaucoma testing    (Z96.1) Pseudophakia of both eyes    Massey left eye 02/02/23   Maltry right eye 12/11/2020 ZCB00 21.5  Doing well    (Z86.010) History of colonic polyps  - s/p partial colectomy for numerous colon polyps and adenomas  - no CHRPE on exam    (H04.123) Dry eye syndrome of both eyes    Recommend artificial tears PRN      Return in about 6 months (around 10/10/2024) for Follow Up-v/t, OCT RNFL, 24-2 VF.    Attending Physician Attestation:  Complete documentation of historical and exam elements from today's encounter can be found in the full encounter summary report (not reduplicated in this progress note).  I personally obtained the chief complaint(s) and history of present illness.  I confirmed and edited as  necessary the review of systems, past medical/surgical history, family history, social history, and examination findings as documented by others; and I examined the patient myself.  I personally reviewed the relevant tests, images, and reports as documented above.  I formulated and edited as necessary the assessment and plan and discussed the findings and management plan with the patient and family.  - Guido Massey MD

## 2024-04-10 NOTE — NURSING NOTE
Chief Complaints and History of Present Illnesses   Patient presents with    Glaucoma Follow-Up     Chief Complaint(s) and History of Present Illness(es)       Glaucoma Follow-Up              Laterality: both eyes              Comments    Pt. States that he is doing well. No change in VA BE. No pain BE. No flashes or floaters BE.  Helen Daily COT 2:36 PM April 10, 2024

## 2024-04-11 ENCOUNTER — OFFICE VISIT (OUTPATIENT)
Dept: PHARMACY | Facility: CLINIC | Age: 74
End: 2024-04-11
Payer: COMMERCIAL

## 2024-04-11 VITALS
WEIGHT: 172.8 LBS | DIASTOLIC BLOOD PRESSURE: 79 MMHG | OXYGEN SATURATION: 95 % | BODY MASS INDEX: 27.06 KG/M2 | HEART RATE: 63 BPM | SYSTOLIC BLOOD PRESSURE: 134 MMHG

## 2024-04-11 DIAGNOSIS — I10 HYPERTENSION GOAL BP (BLOOD PRESSURE) < 140/90: ICD-10-CM

## 2024-04-11 DIAGNOSIS — J44.9 CHRONIC OBSTRUCTIVE PULMONARY DISEASE, UNSPECIFIED COPD TYPE (H): ICD-10-CM

## 2024-04-11 DIAGNOSIS — J44.9 CHRONIC OBSTRUCTIVE PULMONARY DISEASE, UNSPECIFIED COPD TYPE (H): Chronic | ICD-10-CM

## 2024-04-11 DIAGNOSIS — F17.200 NICOTINE DEPENDENCE, UNCOMPLICATED, UNSPECIFIED NICOTINE PRODUCT TYPE: Primary | ICD-10-CM

## 2024-04-11 DIAGNOSIS — E78.5 HYPERLIPIDEMIA LDL GOAL <130: ICD-10-CM

## 2024-04-11 PROCEDURE — 99606 MTMS BY PHARM EST 15 MIN: CPT | Performed by: PHARMACIST

## 2024-04-11 RX ORDER — ALBUTEROL SULFATE 90 UG/1
2 AEROSOL, METERED RESPIRATORY (INHALATION) EVERY 6 HOURS PRN
Qty: 8.5 G | Refills: 1 | Status: SHIPPED | OUTPATIENT
Start: 2024-04-11 | End: 2024-09-03

## 2024-04-11 NOTE — Clinical Note
Preethi--happy to report uriel has again earlier this month --chantix and nicotine gum and zyban all working well together for him --3 month f/up with me --see if it works....  Amanda

## 2024-04-11 NOTE — PATIENT INSTRUCTIONS
"Recommendations from today's MTM visit:                                                         1. Congratulations you are now smoke free for a few days --lets keep it going--stay on the bupropion and 2 x day varenicline and as needed 4mg. Nicotine chewing gum --this triple combo works wonders for you !!        Follow-up: Return in about 3 months (around 7/9/2024), or @ 1 PM, for Medication Therapy Management Visit, tobacco abuse, BP Recheck.    It was great speaking with you today.  I value your experience and would be very thankful for your time in providing feedback in our clinic survey. In the next few days, you may receive an email or text message from [employer] with a link to a survey related to your  clinical pharmacist.\"     To schedule another MTM appointment, please call the clinic directly or you may call the MTM scheduling line at [phone] or toll-free at [phone].     My Clinical Pharmacist's contact information:                                                      Please feel free to contact me with any questions or concerns you have.      Andrea Bah Rph.  Medication Therapy Management Provider  [phone]    "

## 2024-04-18 NOTE — OP NOTE
OPERATIVE REPORT    PREOPERATIVE DIAGNOSIS:   1) Bladder tumor    POSTOPERATIVE DIAGNOSIS:  Same    PROCEDURE PERFORMED: Transurethral resection of Bladder Tumor 2 cm to 5 cm     ATTENDING SURGEON: Almas Sunshine, present for all key portions of the procedure    RESIDENT SURGEON: Valerie Pacheco MD  FINDINGS:  3 cm tumor located on the left lateral wall     ANESTHESIA: General   INTRAVENOUS FLUIDS: See dictated anesthesia record  ESTIMATED BLOOD LOSS: less than 10 mL.     SPECIMENS:   1. Bladder tumor chips  2. Base of bladder tumor (deep)    DRAINS:   28F 2 way jordan catheter    INDICATIONS FOR PROCEDURE: Tevin Guidry is a(n) 69 year old male who was seen in consultation for hematuria and found to have a solitary left bladder tumor. After discussion of the risks, benefits and alternatives of the procedure, the patient agreed to proceed with transurethral resection of his bladder tumor.     DESCRIPTION OF PROCEDURE: After verifying informed consent, the patient was taken to the operating room. Adequate anesthesia was induced, the patient was placed in the dorsal lithotomy position and prepped and draped in standard sterile fashion. A timeout was performed to verify correct patient and procedure. Pneumo boots and perioperative antibiotics were in place before the procedure commenced.     A 22-Irish rigid cystoscope was inserted into a well lubricated urethra. We began by performing a white light cystourethroscopy. The urethra was patent, and the prostate was mildly enlarged with moderate lateral lobe hypertrophy. The ureteral orifices were in their orthotopic positions bilaterally. Bladder tumor appeared high grade and was located on the left lateral wall.  It was solitary.    We then introduced the 26-Irish bipolar resectoscope. We resected tissue down to the base using cut electrocautery. Once we had completed our dissection, we evacuated the specimens and send the body of the tumor. We then reintroduced the  Willis Aguillon MD 1 hour ago (10:49 AM)       Can switch to pill form. Same dose please       Writer called Ba back to let her know. Spoke with Ba. She verbalized understanding. Script sent to preferred pharmacy. Previous script discontinued.    resectoscope and obtained a deep layer for analysis.  We emptied the bladder and fulgurated the periphery and base of the tumor to ensure meticulous hemostasis.     A 22-Tajik 3-way catheter was placed, and 10 mL was instilled into the balloon. Catheter was hooked up to continuous irrigation which was clear;  the patient was awoken from anesthesia and transferred to the recovery room in stable condition.     POSTOPERATIVE PLAN:   1. Follow up in 1-2 weeks in clinic to review pathology

## 2024-05-02 DIAGNOSIS — J44.9 CHRONIC OBSTRUCTIVE PULMONARY DISEASE, UNSPECIFIED COPD TYPE (H): ICD-10-CM

## 2024-05-03 ENCOUNTER — PATIENT OUTREACH (OUTPATIENT)
Dept: GERIATRIC MEDICINE | Facility: CLINIC | Age: 74
End: 2024-05-03
Payer: COMMERCIAL

## 2024-05-03 RX ORDER — UMECLIDINIUM BROMIDE AND VILANTEROL TRIFENATATE 62.5; 25 UG/1; UG/1
1 POWDER RESPIRATORY (INHALATION) DAILY
Qty: 60 EACH | Refills: 4 | Status: SHIPPED | OUTPATIENT
Start: 2024-05-03 | End: 2024-10-04

## 2024-05-03 NOTE — PROGRESS NOTES
[facility] Care Coordination Contact    Called member to schedule annual HRA home visit. HRA has been scheduled for Wed, 5/8/24, at 1:45 PM .  Elizabeth Martinez RN  [facility]   [phone]

## 2024-05-06 DIAGNOSIS — I10 ESSENTIAL HYPERTENSION WITH GOAL BLOOD PRESSURE LESS THAN 140/90: ICD-10-CM

## 2024-05-06 RX ORDER — LISINOPRIL 20 MG/1
20 TABLET ORAL 2 TIMES DAILY
Qty: 180 TABLET | Refills: 3 | Status: SHIPPED | OUTPATIENT
Start: 2024-05-06 | End: 2024-07-09

## 2024-05-06 NOTE — TELEPHONE ENCOUNTER
5-6-24    BP Readings from Last 3 Encounters:   04/11/24 134/79   03/12/24 122/66   11/28/23 112/75       Mtm juan bp med refill simone -eloy

## 2024-05-08 ENCOUNTER — PATIENT OUTREACH (OUTPATIENT)
Dept: GERIATRIC MEDICINE | Facility: CLINIC | Age: 74
End: 2024-05-08
Payer: COMMERCIAL

## 2024-05-08 ASSESSMENT — ACTIVITIES OF DAILY LIVING (ADL): DEPENDENT_IADLS:: INDEPENDENT

## 2024-05-10 ENCOUNTER — PRE VISIT (OUTPATIENT)
Dept: UROLOGY | Facility: CLINIC | Age: 74
End: 2024-05-10
Payer: COMMERCIAL

## 2024-05-10 DIAGNOSIS — C67.9 MALIGNANT NEOPLASM OF URINARY BLADDER, UNSPECIFIED SITE (H): Primary | ICD-10-CM

## 2024-05-10 NOTE — TELEPHONE ENCOUNTER
Reason for visit: cystoscopy      Dx/Hx/Sx: bladder cancer     Records/imaging/labs/orders: in epic     At Rooming: consent - collect urine -

## 2024-05-13 RX ORDER — LIDOCAINE HYDROCHLORIDE 20 MG/ML
JELLY TOPICAL ONCE
Status: COMPLETED | OUTPATIENT
Start: 2024-05-28 | End: 2024-05-28

## 2024-05-17 DIAGNOSIS — I10 HYPERTENSION GOAL BP (BLOOD PRESSURE) < 140/90: Chronic | ICD-10-CM

## 2024-05-17 DIAGNOSIS — F17.200 TOBACCO DEPENDENCE: ICD-10-CM

## 2024-05-17 RX ORDER — AMLODIPINE BESYLATE 5 MG/1
TABLET ORAL
Qty: 90 TABLET | Refills: 3 | Status: SHIPPED | OUTPATIENT
Start: 2024-05-17

## 2024-05-17 RX ORDER — BUPROPION HYDROCHLORIDE 300 MG/1
300 TABLET ORAL EVERY MORNING
Qty: 90 TABLET | Refills: 1 | Status: SHIPPED | OUTPATIENT
Start: 2024-05-17

## 2024-05-28 ENCOUNTER — TRANSFERRED RECORDS (OUTPATIENT)
Dept: HEALTH INFORMATION MANAGEMENT | Facility: CLINIC | Age: 74
End: 2024-05-28

## 2024-05-28 ENCOUNTER — OFFICE VISIT (OUTPATIENT)
Dept: UROLOGY | Facility: CLINIC | Age: 74
End: 2024-05-28
Payer: COMMERCIAL

## 2024-05-28 VITALS — SYSTOLIC BLOOD PRESSURE: 112 MMHG | HEART RATE: 58 BPM | DIASTOLIC BLOOD PRESSURE: 65 MMHG

## 2024-05-28 DIAGNOSIS — C67.8 MALIGNANT NEOPLASM OF OVERLAPPING SITES OF BLADDER (H): Primary | ICD-10-CM

## 2024-05-28 PROCEDURE — 88112 CYTOPATH CELL ENHANCE TECH: CPT | Mod: 26 | Performed by: PATHOLOGY

## 2024-05-28 PROCEDURE — 88120 CYTP URNE 3-5 PROBES EA SPEC: CPT | Mod: TC | Performed by: UROLOGY

## 2024-05-28 PROCEDURE — 52000 CYSTOURETHROSCOPY: CPT | Performed by: UROLOGY

## 2024-05-28 PROCEDURE — 88112 CYTOPATH CELL ENHANCE TECH: CPT | Mod: TC | Performed by: UROLOGY

## 2024-05-28 PROCEDURE — 88120 CYTP URNE 3-5 PROBES EA SPEC: CPT | Mod: 26 | Performed by: PATHOLOGY

## 2024-05-28 PROCEDURE — 99207 PR DROP WITH A PROCEDURE: CPT | Performed by: UROLOGY

## 2024-05-28 RX ORDER — LIDOCAINE HYDROCHLORIDE 20 MG/ML
JELLY TOPICAL ONCE
Status: COMPLETED | OUTPATIENT
Start: 2024-05-28 | End: 2024-05-28

## 2024-05-28 RX ADMIN — LIDOCAINE HYDROCHLORIDE: 20 JELLY TOPICAL at 09:52

## 2024-05-28 RX ADMIN — LIDOCAINE HYDROCHLORIDE: 20 JELLY TOPICAL at 09:46

## 2024-05-28 ASSESSMENT — PAIN SCALES - GENERAL: PAINLEVEL: NO PAIN (0)

## 2024-05-28 NOTE — PROGRESS NOTES
CHIEF COMPLAINT   It was my pleasure to see Tevin Guidry who is a 73 year old male for follow-up of bladder cancer.      HPI  Tevin Guidry is a very pleasant 73 year old male who presents with a history of bladder cancer. 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 He had recurrence in 2/2020 and underwent repeat induction course of BCG. 2nd BCG induction completed 4/2020. Recurrence noted in 7/2020 and TURBT 9/2020 with low-grade disease. No issues since biopsy. No ongoing hematuria.     BCG maintenance 10/15/2020  Had colectomy Spring 2021     TURBT 9/2/2020  FINAL DIAGNOSIS:   A. 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 Right lateral wall:   - Dysplastic urothelium, consistent with incipient non invasive low grade papillary urothelial carcinoma     PHYSICAL EXAM  Patient is a 73 year old  male   Vitals: Blood pressure 112/65, pulse 58.  General Appearance Adult: There is no height or weight on file to calculate BMI.  Alert, no acute distress, oriented  Lungs: no respiratory distress, or pursed lip breathing  Abdomen: soft, nontender, no organomegaly or masses  Back: no CVAT  Neuro: Alert, oriented, speech and mentation normal  Psych: affect and mood normal  : penis, scrotum, testes normal    OFFICE CYSTOSCOPY 5/28/2024     Pre-procedure diagnosis:       Bladder Cancer  Post-procedure diagnosis:     Normal cystoscopy  Procedure performed:             Cystourethroscopy  Surgeon:                                 Kirt Guzman MD  Anesthesia:                             Local     Description of procedure:   After fully informed, voluntary consent was obtained, the patient was brought into the procedure room, identified and placed in a supine position on the cystoscopy table.  The groin/scrotum were prepped with betadine and draped in a sterile fashion.  Urojet lidocaine gel was introduced.  A 15F  flexible cystoscope was inserted into the urethra, and the bladder and urethra were examined in a systematic manner.  The patient tolerated the procedure well and there were no complications.       Cystoscopic findings:  The urethra was normal without strictures.  The prostate was 3 cm long and demonstrated mild bilobar hypertrophy.  There was no median lobe.  The external sphincter coapted well and the bladder neck was open. The bladder was completely surveyed.  There was mild trabeculation.  There were no stones or diverticula identifed.  The ureteric orifices were normal in position and number and effluxing clear urine. Previous resection sites noted with no suspicious lesions today.      ASSESSMENT and PLAN  73 year old male with recurrent bladder tumor. Has previously undergone BCG induction x 2. While office biopsy suggested possible HG disease, formal TURBT with only low-grade pathology. Has completed some BCG maintenance, but has since stopped. Given no recurrence today, will defer additional intravesical therapy at this time. Plan Cysto in 6 months.     - Cytology/FISH today  - Follow-up 6 months with office cystoscopy    Kirt Guzman MD  Urology  [facility]

## 2024-05-28 NOTE — LETTER
5/28/2024       RE: Tevin Guidry  [address]  [place] [address]     Dear Colleague,    Thank you for referring your patient, Tevin Guidry, to the [facility] [place] at [facility]. Please see a copy of my visit note below.      CHIEF COMPLAINT   It was my pleasure to see Tevin Guidry who is a 73 year old male for follow-up of bladder cancer.      HPI  Tevin Guidry is a very pleasant 73 year old male who presents with a history of bladder cancer. 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 He had recurrence in 2/2020 and underwent repeat induction course of BCG. 2nd BCG induction completed 4/2020. Recurrence noted in 7/2020 and TURBT 9/2020 with low-grade disease. No issues since biopsy. No ongoing hematuria.     BCG maintenance 10/15/2020  Had colectomy Spring 2021     TURBT 9/2/2020  FINAL DIAGNOSIS:   A. 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 Right lateral wall:   - Dysplastic urothelium, consistent with incipient non invasive low grade papillary urothelial carcinoma     PHYSICAL EXAM  Patient is a 73 year old  male   Vitals: Blood pressure 112/65, pulse 58.  General Appearance Adult: There is no height or weight on file to calculate BMI.  Alert, no acute distress, oriented  Lungs: no respiratory distress, or pursed lip breathing  Abdomen: soft, nontender, no organomegaly or masses  Back: no CVAT  Neuro: Alert, oriented, speech and mentation normal  Psych: affect and mood normal  : penis, scrotum, testes normal    OFFICE CYSTOSCOPY 5/28/2024     Pre-procedure diagnosis:       Bladder Cancer  Post-procedure diagnosis:     Normal cystoscopy  Procedure performed:             Cystourethroscopy  Surgeon:                                 Kirt Guzman MD  Anesthesia:                             Local      Description of procedure:   After fully informed, voluntary consent was obtained, the patient was brought into the procedure room, identified and placed in a supine position on the cystoscopy table.  The groin/scrotum were prepped with betadine and draped in a sterile fashion.  Urojet lidocaine gel was introduced.  A 15F flexible cystoscope was inserted into the urethra, and the bladder and urethra were examined in a systematic manner.  The patient tolerated the procedure well and there were no complications.       Cystoscopic findings:  The urethra was normal without strictures.  The prostate was 3 cm long and demonstrated mild bilobar hypertrophy.  There was no median lobe.  The external sphincter coapted well and the bladder neck was open. The bladder was completely surveyed.  There was mild trabeculation.  There were no stones or diverticula identifed.  The ureteric orifices were normal in position and number and effluxing clear urine. Previous resection sites noted with no suspicious lesions today.      ASSESSMENT and PLAN  73 year old male with recurrent bladder tumor. Has previously undergone BCG induction x 2. While office biopsy suggested possible HG disease, formal TURBT with only low-grade pathology. Has completed some BCG maintenance, but has since stopped. Given no recurrence today, will defer additional intravesical therapy at this time. Plan Cysto in 6 months.     - Cytology/FISH today  - Follow-up 6 months with office cystoscopy    Kirt Guzman MD  Urology  [facility]

## 2024-05-28 NOTE — PATIENT INSTRUCTIONS
"Please schedule a cystoscopy with dr holloway in 6 months    It was a pleasure meeting with you today.  Thank you for allowing me and my team the privilege of caring for you today.  YOU are the reason we are here, and I truly hope we provided you with the excellent service you deserve.  Please let us know if there is anything else we can do for you so that we can be sure you are leaving completely satisfied with your care experience.            AFTER YOUR CYSTOSCOPY        You have just completed a cystoscopy, or \"cysto\", which allowed your physician to learn more about your bladder (or to remove a stent placed after surgery). We suggest that you continue to avoid caffeine, fruit juice, and alcohol for the next 24 hours, however, you are encouraged to return to your normal activities.         A few things that are considered normal after your cystoscopy:     * Small amount of bleeding (or spotting) that clears within the next 24 hours     * Slight burning sensation with urination     * Sensation to of needing to avoid more frequently     * The feeling of \"air\" in your urine     * Mild discomfort that is relieved with Tylenol        Please contact our office promptly if you:     * Develop a fever above 101 degrees     * Are unable to urinate     * Develop bright red blood that does not stop     * Severe pain or swelling         Please contact our office with any concerns or questions @ [phone]   AFTER YOUR CYSTOSCOPY        You have just completed a cystoscopy, or \"cysto\", which allowed your physician to learn more about your bladder (or to remove a stent placed after surgery). We suggest that you continue to avoid caffeine, fruit juice, and alcohol for the next 24 hours, however, you are encouraged to return to your normal activities.         A few things that are considered normal after your cystoscopy:     * Small amount of bleeding (or spotting) that clears within the next 24 hours     * Slight burning " "sensation with urination     * Sensation to of needing to avoid more frequently     * The feeling of \"air\" in your urine     * Mild discomfort that is relieved with Tylenol        Please contact our office promptly if you:     * Develop a fever above 101 degrees     * Are unable to urinate     * Develop bright red blood that does not stop     * Severe pain or swelling         Please contact our office with any concerns or questions @[facility].  "

## 2024-05-28 NOTE — NURSING NOTE
Chief Complaint   Patient presents with    Cystoscopy       Blood pressure 112/65, pulse 58. There is no height or weight on file to calculate BMI.    Patient Active Problem List   Diagnosis    Social phobia    Hypertension goal BP (blood pressure) < 140/90    Hyperlipidemia LDL goal <130    Tubular adenoma of colon    Coronary artery disease involving native heart with angina pectoris, unspecified vessel or lesion type (H24)    Former smoker    S/P CABG (coronary artery bypass graft)    Chronic obstructive pulmonary disease, unspecified COPD type (H)    Anemia, unspecified type    Bladder cancer (H)    Personal history of malignant neoplasm of bladder    Malignant neoplasm of overlapping sites of bladder (H)    Combined form of age-related cataract, both eyes    Total cataract of right eye    History of colonic polyps    Colon cancer (H)    Signet ring cell adenocarcinoma (H)    Metastatic signet ring cell carcinoma (H)       Allergies   Allergen Reactions    Codeine Nausea and Vomiting       Current Outpatient Medications   Medication Sig Dispense Refill    albuterol (PROAIR HFA/PROVENTIL HFA/VENTOLIN HFA) 108 (90 Base) MCG/ACT inhaler INHALE 2 PUFFS INTO THE LUNGS EVERY 6 HOURS AS NEEDED FOR SHORTNESS OF BREATH OR WHEEZING 8.5 g 1    amLODIPine (NORVASC) 5 MG tablet TAKE ONE TABLET BY MOUTH EVERY NIGHT AT BEDTIME 90 tablet 3    aspirin 81 MG tablet Take 1 tablet (81 mg) by mouth daily 90 tablet 3    atorvastatin (LIPITOR) 80 MG tablet TAKE ONE TABLET BY MOUTH EVERY EVENING AT BEDTIME 90 tablet 3    buPROPion (WELLBUTRIN XL) 300 MG 24 hr tablet TAKE ONE TABLET BY MOUTH EVERY MORNING 90 tablet 1    ezetimibe (ZETIA) 10 MG tablet TAKE ONE TABLET BY MOUTH ONCE DAILY 90 tablet 1    lisinopril (ZESTRIL) 20 MG tablet Take 1 tablet (20 mg) by mouth 2 times daily 180 tablet 3    metoprolol succinate ER (TOPROL XL) 50 MG 24 hr tablet Take 1 tablet (50 mg) by mouth At Bedtime 90 tablet 3    PARoxetine (PAXIL) 40 MG tablet  Take 1 tablet (40 mg) by mouth every morning 90 tablet 3    umeclidinium-vilanterol (ANORO ELLIPTA) 62.5-25 MCG/ACT oral inhaler INHALE ONE PUFF BY MOUTH ONCE DAILY 60 each 4    varenicline (CHANTIX) 0.5 MG tablet Take 1 tablet (0.5 mg) by mouth 2 times daily 60 tablet 5    nicotine polacrilex (NICORETTE) 4 MG gum Place 4 mg inside cheek every hour as needed for nicotine withdrawal symptoms (Patient not taking: Reported on 2024)         Social History     Tobacco Use    Smoking status: Former     Current packs/day: 0.00     Types: Cigarettes     Quit date:      Years since quittin.4    Smokeless tobacco: Never    Tobacco comments:     Restarted smoking again  -- 3-12-24 --now avg. 7 cigs/day --wants to quit again.      Quit again now 2024.    Vaping Use    Vaping status: Never Used   Substance Use Topics    Alcohol use: No     Comment: quit 40 years ago    Drug use: Yes     Types: Marijuana     Comment: occasionally uses marijuana       Invasive Procedure Safety Checklist:    Procedure: Cystoscopy    Action: Complete sections and checkboxes as appropriate.    Pre-procedure:  1. Patient ID Verified with 2 identifiers (Mary and  or MRN) : YES    2. Procedure and site verified with patient/designee (when able) : YES    3. Accurate consent documentation in medical record : YES    4. H&P (or appropriate assessment) documented in medical record : N/A  H&P must be up to 30 days prior to procedure an updated within 24 hours of                 Procedure as applicable.     5. Relevant diagnostic and radiology test results appropriately labeled and displayed as applicable : YES    6. Blood products, implants, devices, and/or special equipment available for the procedure as applicable : YES    7. Procedure site(s) marked with provider initials [Exclusions: none] : NO    8. Marking not required. Reason : Yes  Procedure does not require site marking    Time Out:     Time-Out performed immediately prior  to starting procedure, including verbal and active participation of all team members addressing: YES    1. Correct patient identity.  2. Confirmed that the correct side and site are marked.  3. An accurate procedure to be done.  4. Agreement on the procedure to be done.  5. Correct patient position.  6. Relevant images and results are properly labeled and appropriately displayed.  7. The need to administer antibiotics or fluids for irrigation purposes during the procedure as applicable.  8. Safety precautions based on patient history or medication use.    During Procedure: Verification of correct person, site, and procedure occurs any time the responsibility for care of the patient is transferred to another member of the care team.    The following medication was given:     MEDICATION:  Lidocaine without epinephrine 2% jelly  ROUTE: urethral   SITE: urethral   DOSE: 10 mL  LOT #: [id number]  : [employer]  EXPIRATION DATE: 1-26  NDC#: [address]-1   Was there drug waste? No    Prior to med admin, verified patient identity using patient's name and date of birth.  Due to med administration, patient instructed to remain in clinic for 15 minutes  afterwards, and to report any adverse reaction to me immediately.    Drug Amount Wasted:  None.  Vial/Syringe: Syringe      Catalina Singh  5/28/2024  9:35 AM

## 2024-05-30 NOTE — PROGRESS NOTES
[facility] Care Coordination Contact    [facility] Home Visit Assessment     Home visit for Health Risk Assessment with Tevin Guidry completed on May 8, 2024    Type of residence:: Private home - stairs  Current living arrangement:: I live in a private home with family (with his brother and girlfriend)     Assessment completed with:: Patient    Current Care Plan  Member currently receiving the following home care services: NA    Member currently receiving the following community resources: Transportation Services, County Worker      Medication Review  Medication reconciliation completed in Epic: Yes  Medication set-up & administration: Independent-does not set up.  Self-administers medications.  Medication Risk Assessment Medication (1 or more, place referral to MTM): N/A: No risk factors identified  MTM Referral Placed: No: Member is already followed by MTM. Will follow up with current MTM.    Mental/Behavioral Health   Depression Screening: Denied issues with depression at this time. Deferred PHQ     Mental health DX:: Yes (social phobia)   Mental health DX how managed:: Medication    Falls Assessment:   Fallen 2 or more times in the past year?: No   Any fall with injury in the past year?: No    ADL/IADL Dependencies:   Dependent ADLs:: Independent  Dependent IADLs:: Independent    [facility]: Shared information regarding [employer]. Reviewed preventative health screening and health plan supplemental benefits/incentives. Reviewed medication disposal form.    PCA Assessment completed at visit: Not Applicable     Elderly Waiver Eligibility: No-does not meet criteria    Care Plan & Recommendations: Recommend Hoal continue to follow-up with his primary doctor, his specialists, screenings, MTM visits and to take his medications as directed. Encouraged him to continue working on smoking cessation. Also discussed healthy eating including less candy and more  "fruits and vegetables. Hola would like to set a goal to walk at least 3 days per week for at least 20 minutes and I encouraged him to follow through with this. Also encouraged Hola to get his hearing aids adjusted so he can wear them and hear better. Finally I encouraged Hola to keep his dental appointment so he could have the pain in his gums assessed.    Care coordinator will order the [facility] \"Med Kit\" that includes AM/PM pill boxes, pill splitter. Advised Hola it would be safer and more reliable if he set his meds up weekly in the pill boxes and he agreed with this. Will also order him the GrandPad and a Reemo watch through his [facility] insurance policy.    See [facility] for detailed assessment information.    Follow-Up Plan: Member informed of future contact, plan to f/u with member with a 6 month telephone assessment.  Contact information shared with member and family, encouraged member to call with any questions or concerns at any time.    [place] care continuum providers: Please see Snapshot and Care Management Flowsheets for Specific details of care plan.    This CC note routed to PCP, Preethi Quiroz RN  [facility]   [phone]        "

## 2024-06-05 ENCOUNTER — PATIENT OUTREACH (OUTPATIENT)
Dept: GERIATRIC MEDICINE | Facility: CLINIC | Age: 74
End: 2024-06-05
Payer: COMMERCIAL

## 2024-06-05 NOTE — PROGRESS NOTES
[facility] Care Coordination Contact    Received after visit chart from care coordinator.  Completed following tasks: Mailed copy of care plan/support plan to member, Mailed [place] Choices signature sheet pages 3-4, Mailed Safe Medication Disposal , and Submitted referrals/auths for Grandpad, med management toolkit, reemo activity tracker.      Kajal Barnes  Care Management Specialist  [facility]  [phone]

## 2024-06-05 NOTE — LETTER
June 5, 2024      TEVIN LYONS  [address]  [place] [address]      Dear Tevin:    At [facility], we re dedicated to improving your health and wellness. Enclosed is the Care Plan developed with you on 5/8/2024. Please review the Care Plan carefully.    As a reminder, during your visit we talked about:  Ways to manage your physical and mental health  Using health care to maintain and improve your health   Your preventive care needs     Remember to contact your care coordinator if you:  Are hospitalized, or plan to be hospitalized   Have a fall    Have a change in your physical or mental health  Need help finding support or services    If you have questions, or don t agree with your Care Plan, call me at [phone]. You can also call me if your needs change. TTY users, call the [place] [employer] at (032) or 1-574.241.6642 (xkvgfv-jr-sjmjth relay service).    Sincerely,    Elizabeth Martinez RN  [phone]  [email or web address] ([facility]) is a health plan that contracts with both Medicare and the [place] Medical Assistance (Medicaid) program to provide benefits of both programs to enrollees. Enrollment in [facility] depends on contract renewal.    [id number] accepted    [id number] (07/2022)

## 2024-06-24 ENCOUNTER — LAB (OUTPATIENT)
Dept: LAB | Facility: CLINIC | Age: 74
End: 2024-06-24
Attending: INTERNAL MEDICINE
Payer: COMMERCIAL

## 2024-06-24 ENCOUNTER — ANCILLARY PROCEDURE (OUTPATIENT)
Dept: CT IMAGING | Facility: CLINIC | Age: 74
End: 2024-06-24
Attending: INTERNAL MEDICINE
Payer: COMMERCIAL

## 2024-06-24 DIAGNOSIS — C18.1 APPENDIX CARCINOMA (H): ICD-10-CM

## 2024-06-24 LAB
CEA SERPL-MCNC: 6.5 NG/ML
CREAT BLD-MCNC: 1.2 MG/DL (ref 0.7–1.3)
EGFRCR SERPLBLD CKD-EPI 2021: >60 ML/MIN/1.73M2

## 2024-06-24 PROCEDURE — 71260 CT THORAX DX C+: CPT | Performed by: STUDENT IN AN ORGANIZED HEALTH CARE EDUCATION/TRAINING PROGRAM

## 2024-06-24 PROCEDURE — 74177 CT ABD & PELVIS W/CONTRAST: CPT | Performed by: STUDENT IN AN ORGANIZED HEALTH CARE EDUCATION/TRAINING PROGRAM

## 2024-06-24 PROCEDURE — 82378 CARCINOEMBRYONIC ANTIGEN: CPT

## 2024-06-24 PROCEDURE — 82565 ASSAY OF CREATININE: CPT | Performed by: PATHOLOGY

## 2024-06-24 PROCEDURE — 36415 COLL VENOUS BLD VENIPUNCTURE: CPT

## 2024-06-24 RX ORDER — IOPAMIDOL 755 MG/ML
88 INJECTION, SOLUTION INTRAVASCULAR ONCE
Status: COMPLETED | OUTPATIENT
Start: 2024-06-24 | End: 2024-06-24

## 2024-06-24 RX ADMIN — IOPAMIDOL 88 ML: 755 INJECTION, SOLUTION INTRAVASCULAR at 12:05

## 2024-06-24 NOTE — NURSING NOTE
Chief Complaint   Patient presents with    Blood Draw     Labs drawn via PIV placed by RN in lab     Labs drawn from PIV placed by RN.     Valerie Jain RN

## 2024-06-24 NOTE — DISCHARGE INSTRUCTIONS

## 2024-06-28 ENCOUNTER — VIRTUAL VISIT (OUTPATIENT)
Dept: ONCOLOGY | Facility: CLINIC | Age: 74
End: 2024-06-28
Attending: INTERNAL MEDICINE
Payer: COMMERCIAL

## 2024-06-28 VITALS
WEIGHT: 165 LBS | SYSTOLIC BLOOD PRESSURE: 115 MMHG | HEIGHT: 68 IN | BODY MASS INDEX: 25.01 KG/M2 | DIASTOLIC BLOOD PRESSURE: 80 MMHG

## 2024-06-28 DIAGNOSIS — C18.1 ADENOCARCINOMA OF APPENDIX (H): Primary | ICD-10-CM

## 2024-06-28 LAB — RADIOLOGIST FLAGS: ABNORMAL

## 2024-06-28 PROCEDURE — 99213 OFFICE O/P EST LOW 20 MIN: CPT | Mod: 95 | Performed by: INTERNAL MEDICINE

## 2024-06-28 ASSESSMENT — PAIN SCALES - GENERAL: PAINLEVEL: NO PAIN (0)

## 2024-06-28 NOTE — NURSING NOTE
Is the patient currently in the state of [place]? YES    Visit mode:VIDEO    If the visit is dropped, the patient can be reconnected by: VIDEO VISIT: Text to cell phone:   Telephone Information:   Mobile [phone]       Will anyone else be joining the visit? NO  (If patient encounters technical issues they should call [phone]    How would you like to obtain your AVS? MyChart    Are changes needed to the allergy or medication list? No    Are refills needed on medications prescribed by this physician? NO    Reason for visit: RECHECK    Marni PAZ

## 2024-06-28 NOTE — PROGRESS NOTES
"Virtual Visit Details    Type of service:  Video Visit     Originating Location (pt. 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  The site of origin is unclear, so at the current time, it can be considered carcinoma of unknown primary, very likely of gastrointestinal origin, but the primary site is not known.    GI Cancer Treatment to date: right hemicolectomy 04/13/2021     Surgeon: Dr. Rodríguez     Other cancers: bladder cancer, under the care of Dr. Guzman from our Urology team. Per his notes: \"recurrent bladder tumor. Has previously undergone BCG induction x 2. While office biopsy suggested possible HG disease, formal TURBT with only low-grade pathology. Has completed some BCG maintenance, but has since stopped.\" Followed by 6-month cystoscopies.    PCP: Dr. Preethi Quiroz    GI Oncology History:   He had a colonoscopy 03/30/2017 and another one 03/02/2021 that was delayed due to the COVID pandemic and other healthcare issues.  This was performed by Dr. Marin Steward on 03/02/2021 and the pathology was notable for tubular and tubulovillous adenomas.  Due to the extent and burden of the number of polyps, he was referred to Dr. Rodríguez for consideration of hemicolectomy.  A number of polyps in the distal transverse colon were tattooed.    Dr. Rodríguez took the patient to the operating room for right hemicolectomy 04/13/2021 due to this high polyp burden.    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        Tumor is located at the distal resection margin and situated within    the lamina propria/submucosa " (distal   margin positive, proximal and deep margins are free of malignancy)        An overlying tubular adenoma is present at this distal resection   margin, does not appear to be the source   of the underlying malignancy        Tumor origin is compatible with gastrointestinal site as it marks   positively with CK 20, pan cytokeratin   and CDX2 (negative for CK-7), see comment   - Multiple tubular adenomas (at least twenty-one), negative for high-grade    dysplasia, distal margin is   transected by a sessile tubular adenoma   - Benign appendix with focal surface serrated change, negative for   malignancy (submitted in its entirety)   - Unremarkable terminal ileum   - Sixteen benign lymph nodes (0/16)     COMMENT:   The origin of the adenocarcinoma is uncertain based on the current   resection however the immunoperoxidase   battery supports gastrointestinal origin.  Suggest clinical and   radiographic correlation to investigate   possible primary sites (including stomach, pancreaticobiliary and   remainder of the colon).  The appendix was   submitted in its entirety and does not appear to be the source of the   neoplasm.  Intradepartmental   consultation obtained, the case was discussed with Dr. Rodríguez on   4/19/21.     April 20, 2021--CT chest IMPRESSION:   In this patient with recently diagnosed colon cancer:  1. No evidence of metastatic disease in the chest.  2. Subcutaneous emphysema along the left lower chest/upper abdominal  wall may be secondary to transversus abdominis block done on  4/13/2021. No free intraperitoneal air or pneumothorax.  3. Coronary artery calcifications.  4. Sequelae of prior granulomatous disease.     [Access Center: Subcutaneous emphysema of the left lower chest/upper  abdominal wall, likely from transversus abdominis than on 4/13/2021.  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 FDG at the colonic surgical anastomosis presumably postsurgical  inflammation.   2. No definite FDG uptake to suggest metastatic disease.   3. No definite primary significant ring carcinoma is identified.   There are 3 equivocal findings:   3a. Mild uptake in the gastric cardia, favor physiologic  3b. Loop of slightly narrowed thickened small bowel with FDG uptake,  favor physiologic or postoperative inflammation but cannot rule out a  primary.   3c. Extensive FDG uptake in the entire sigmoid colon, favored to be  physiologic.  3d. If clinically indicated, an MR enterography in this case may  better define bowel abnormalities. Alternatively attention to these  areas on follow-up imaging.  4. Incidentally noted bilateral hydroceles and right peritesticular  calcification.     May 3, 2021--initial medical oncology consultation, Dr. Sánchez.    May 6, 2021--EGD - unremarkable; no masses seen.    FINAL DIAGNOSIS:   A. ANTRUM, BIOPSY:   - Gastric antral type mucosa with features of reactive gastropathy   - No H. pylori like organisms identified on routine staining   - Negative for intestinal metaplasia or dysplasia     B. STOMACH, POLYP:   - Gastric body type mucosa with chronic gastritis   - No H. pylori like organisms identified on routine staining   - Negative for intestinal metaplasia, neoplastic polyp or dysplasia     C. STOMACH, POLYPS, BIOPSY:   - Fundic gland polyp in one fragment   - Gastric antral type mucosa with mild chronic gastritis   - No H. pylori like organisms identified on routine staining or by   immunohistochemistry   - Negative for intestinal metaplasia or dysplasia     D. STOMACH, BIOPSY:   - Chronic active gastritis with focal intestinal and pancreatic acinar   type metaplasia, see comment    - No H. pylori like organisms identified on routine staining or by   immunohistochemistry   - Negative for dysplasia, see comment       July 16, 2021 -- oncology follow-up/virtual visit, Dr. Sánchez.  11/8/21-PET/CT--IMPRESSION: Hx. of 2 mm focus of poorly differentiated adenocarcinoma  with significant ring cell features at the tip of the appendix.  No  recurrent or metastatic disease.  1. Postsurgical changes of right hemicolectomy with mild residual  uptake along the surgical anastomosis which is below that of  background, decreased from prior, and favored to be inflammatory.   2. Slight progression of peripheral reticular opacities in the  dependent right lung with a basilar predominance, differential  atelectasis, edema, inflammatory, infectious, aspiration.  This is on  a background of emphysema.  2a. A 9 mm groundglass nodule right upper lobe peripherally unchanged  from prior exam, indeterminant. Continued attention on follow-up.    November 19, 2021 -- oncology follow-up/virtual visit, Dr. Sánchez.  The 04/22/2022 surveillance colonoscopy with Dr. Rodríguez resected several polyps, no evidence of malignancy.      Final Diagnosis   A.  COLON, ASCENDING, POLYP:  - Tubular adenoma  - No high-grade dysplasia or malignancy identified     B.  COLON, DESCENDING, POLYP:  - Tubular adenoma  - No high-grade dysplasia or malignancy identified         June 6, 2022--PET/CT: CT FINDINGS: Mild senescent intracranial changes. Postoperative change of the right lens. Mild carotid artery bifurcation calcification. Moderate to severe coronary artery calcium. Moderate upper lung predominant emphysema. Splenic granulomas. Right   hemicolectomy changes. Pelvic phleboliths. Multilevel degenerative changes of the spine. The lower extremities are unremarkable. IMPRESSION: No metabolic evidence of active neoplasm.    June 24, 2022 -- oncology follow-up/virtual visit, Dr. Sánchez.    3/23/23--EGD Impression:            - Normal examined  duodenum.                          - Multiple gastric polyps. Resected and retrieved.                          - Esophagogastric landmarks identified.     3/23/23--surveillance colonoscopy--Impression:            - Patent functional end-to-end ileo-colonic                          anastomosis, characterized by ulceration. Biopsied.                          - The examined portion of the ileum was normal.                          - Six 2 to 4 mm polyps at the recto-sigmoid colon and                          at the splenic flexure, removed with a cold snare.                          Resected and retrieved.                          - Diverticulosis in the sigmoid colon.                          - The examination was otherwise normal on direct and                          retroflexion views.   Recommendation:        - Return to referring physician.                          - Repeat colonoscopy in 2 years for surveillance based                          on pathology results.   Final Diagnosis   A. COLON, ANASTOMOSIS SITE, BIOPSY#1:  Enteric mucosa with hyperplastic/reactive changes, chronic inflammation and lamina propria fibrosis; negative for dysplasia or malignancy     B. COLON, ANASTOMOSIS SITE, BIOPSY#2:  Enteric mucosa with hyperplastic/reactive changes, chronic inflammation and lamina propria fibrosis; negative for dysplasia or malignancy     C. COLON, SPLENIC FLEXURE, POLYPS X2, BIOPSY:   -Tubulovillous adenoma; negative for high-grade dysplasia   -Hyperplastic polyp      D. COLON, LOWER LEFT, POLYPS X4, BIOPSY:  Tubular adenomas; negative for high-grade dysplasia      E. STOMACH, GREATER CURVATURE, POLYP, BIOPSY:  Gastric mucosa with chronic inactive gastritis with intestinal metaplasia (complete type); negative for dysplasia or malignancy       6/20/23--CT c/a/p--IMPRESSION:  No evidence of metastatic disease in the chest, abdomen, or pelvis.    June 23, 2023 -- oncology follow-up/virtual visit,   [facility].    6/24/24--CT c/a/p--IMPRESSION: Compared to prior CT from 6/20/2023, the current scan  shows:  1. Postsurgical changes of right hemicolectomy. No recurrent mass.  2. Slight increase in size of right hilar lymph node, consider  attention on follow-up  3. No new metastatic disease in the abdomen or pelvis  4. Similar additional incidental findings as above including sequelae  of granulomatous disease    June 28, 2024 -- oncology follow-up/virtual visit, Dr. Sánchez.       Interim History/History Of Present Illness:    Mr. Tevin Guidry is a 73-year-old gentleman from [place].  He has a past medical history notable for cardiovascular disease, status post multivessel CABG 09/29/2014 and angioplasties.  He has hypertension, COPD from a history of smoking, which he quit in 2019.  He also has a history of bladder cancer which was a noninvasive high grade form of urothelial carcinoma diagnosed in the summer and early fall of 2020.  He is status post multiple injections of BCG.  He was diagnosed in the summer of 2019 with a recurrence in mid winter of 2020.  He also has a history of innumerable polyps over the years. He has had nearly a dozen colonoscopies to date, dating back to age 48.  He has had numerous colonoscopies, but none of them have shown evidence of a neoplastic or malignant disease or significant dysplasia but he had tubulovillous forms of adenoma.     He joins me from home for an [employer]-based virtual video visit. His prior history is well documented of an incidental finding of a 2 mm, poorly differentiated carcinoma with signet ring features at the tip of the appendix.  He had surgery, and this was incidentally detected during the surgery comprising a right hemicolectomy initially for noncancerous reasons in 04/2021.  I have been performing active surveillance since that time. The preliminary report from his 6/24 CT shows only a 1 mm enlargement of a rt hilar node; otherwise no new masses or lesions. He  inquires about the CEA elevation to 6.5, higher since 2021 value; he continues to smoke cigarettes but is actively undergoing attempts at tobacco cessation through [facility] team and his PCP Dr. Preethi Quiroz.       Latest Reference Range & Units 24 10:58   CEA ng/mL 6.5      Latest Reference Range & Units 21 12:31   CEA 0 - 2.5 ug/L 1.5         Review Of Systems:  Comprehensive (14-point) ROS reviewed. Pertinent symptoms reviewed above per HPI.      Past medical, social, surgical, and family histories reviewed.  PAST MEDICAL HISTORY:  Notable for innumerable colon polyps.  It is not clear whether or not he has a germline or familial mutation that is causing this.  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  He has CAD, hypertension.  He he has had cardiac catheterization including serum 2014 when he had a CABG with LIMA to LAD, 2014.  He has another malignancy in the form of noninvasive bladder carcinoma, left lateral wall of the bladder.  He has treatments with BCG and other interventions by our Urology team since 2019 for that.  COPD due to remote history of smoking.    PAST SURGICAL HISTORY:  Most remarkable for the hemicolectomy performed by Dr Colvin 2021 per above and the interventions for the bladder carcinoma in 2019 and .  Please see the Urology note for details.    FAMILY HISTORY:  Most notable for him being 1 of 3 sons from his parents.  His mom was a smoker and diagnosed with stage IV gastric carcinoid at age 65 and  within 4 months of diagnosis.  Father was a smoker and had lung cancer at age 40 and  at age 50 of this disease.  One brother had gastric carcinoma and  around age 72 after initial diagnosis at age 62.  Another brother is living at age 69 and has never had a colonoscopy due to lack of insurance and other reasons.  A maternal aunt also had ovarian  cancer.    SOCIAL HISTORY:  The patient lives in [place].  He has an associate degree in respiratory therapy  Due to social phobia, he has not been employed in that practice.  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  He is single, never , no children.    Tobacco:  He smoked 1-1/2 packs a day of cigarettes for 30 years, quit 2019.  Occasional use of marijuana.  Denied any alcohol use or other illicit drug use.    Allergies:  Allergies as of 06/28/2024 - Reviewed 06/24/2024   Allergen Reaction Noted    Codeine Nausea and Vomiting 10/14/2014       Current Medications:  Current Outpatient Medications   Medication Sig Dispense Refill    albuterol (PROAIR HFA/PROVENTIL HFA/VENTOLIN HFA) 108 (90 Base) MCG/ACT inhaler INHALE 2 PUFFS INTO THE LUNGS EVERY 6 HOURS AS NEEDED FOR SHORTNESS OF BREATH OR WHEEZING 8.5 g 1    amLODIPine (NORVASC) 5 MG tablet TAKE ONE TABLET BY MOUTH EVERY NIGHT AT BEDTIME 90 tablet 3    aspirin 81 MG tablet Take 1 tablet (81 mg) by mouth daily 90 tablet 3    atorvastatin (LIPITOR) 80 MG tablet TAKE ONE TABLET BY MOUTH EVERY EVENING AT BEDTIME 90 tablet 3    buPROPion (WELLBUTRIN XL) 300 MG 24 hr tablet TAKE ONE TABLET BY MOUTH EVERY MORNING 90 tablet 1    ezetimibe (ZETIA) 10 MG tablet TAKE ONE TABLET BY MOUTH ONCE DAILY 90 tablet 1    lisinopril (ZESTRIL) 20 MG tablet Take 1 tablet (20 mg) by mouth 2 times daily 180 tablet 3    metoprolol succinate ER (TOPROL XL) 50 MG 24 hr tablet Take 1 tablet (50 mg) by mouth At Bedtime 90 tablet 3    nicotine polacrilex (NICORETTE) 4 MG gum Place 4 mg inside cheek every hour as needed for nicotine withdrawal symptoms (Patient not taking: Reported on 5/28/2024)      PARoxetine (PAXIL) 40 MG tablet Take 1 tablet (40 mg) by mouth every morning 90 tablet 3    umeclidinium-vilanterol (ANORO ELLIPTA) 62.5-25 MCG/ACT oral inhaler INHALE ONE PUFF BY MOUTH ONCE DAILY 60 each 4     varenicline (CHANTIX) 0.5 MG tablet Take 1 tablet (0.5 mg) by mouth 2 times daily 60 tablet 5        Physical Exam:  In-person physical exam could not be performed today in context of a Virtual Visit. Observed physical assessments made today by visualizing the patient by video link:  Vitals - Patient Reported  Pain Score: No Pain (0)             General/Constitutional: Generally appears well, not acutely ill.  HEENT: no scleral icterus, not jaundiced.  Respiratory: no labored breathing.  Musculoskeletal: appears to have full range of motion and adequate physical strength.  Skin: no jaundice, discoloration or other notable lesions.  Neurological: no evidence of tremors.  Psychiatric: no evident anxiety; fully alert and oriented with fluent speech.      The rest of a comprehensive physical examination is deferred due to nature of video visits.     Laboratory/Imaging Studies  No visits with results within 2 Week(s) from this visit.   Latest known visit with results is:   Virtual Visit on 05/05/2021   Component Date Value Ref Range Status    Copath Report 05/06/2021    Final                    Value:Patient Name: DICK LYONS  MR#: [id number]  Specimen #: [id number]  Collected: 5/6/2021  Received: 5/6/2021  Reported: 5/13/2021 10:06  Ordering Phy(s): MANNIE DAWKINS  Additional Phy(s): [facility]: [facility]    For improved result formatting, select 'View Enhanced Report Format' under   Linked Documents section.    SPECIMEN(S):  A: Antral biopsy  B: Gastric polyp  C: Gastric biopsy  D: Gastric biopsy    FINAL DIAGNOSIS:  A. ANTRUM, BIOPSY:  - Gastric antral type mucosa with features of reactive gastropathy  - No H. pylori like organisms identified on routine staining  - Negative for intestinal metaplasia or dysplasia    B. STOMACH, POLYP:  - Gastric body type mucosa with chronic gastritis  - No H. pylori like organisms identified on routine staining  - Negative for intestinal metaplasia,  "neoplastic polyp or dysplasia    C. STOMACH, POLYPS, BIOPSY:  - Fundic gland polyp in one fragment  - Gastric antral type mucosa with mild chronic gastritis  - No H. pylor                          i like organisms identified on routine staining or by   immunohistochemistry  - Negative for intestinal metaplasia or dysplasia    D. STOMACH, BIOPSY:  - Chronic active gastritis with focal intestinal and pancreatic acinar   type metaplasia, see comment  - No H. pylori like organisms identified on routine staining or by   immunohistochemistry  - Negative for dysplasia, see comment    COMMENT:  D).  The biopsies show antral type mucosa with pancreatic acinar type   metaplasia and a minute focus of  intestinal metaplasia.  Immunohistochemistry was performed with   appropriate controls.  Chromogranin performed  on part D highlights linear enterochromaffin cell-like hyperplasia.    Gastrin stain shows rare positive cells  at the edge of one biopsy fragments.  If these biopsies were taken from   the fundus or body, these findings are  suggestive of autoimmune gastritis.  Clinical correlation is recommended.    I have personally reviewed all specimens and/or slides, including the   listed sp                          ecial stains, and used them  with my medical judgement to determine or confirm the final diagnosis.    Electronically signed out by:    Liliane Ta M.D., [facility]    CLINICAL HISTORY:  Screening endoscopy.  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    Striped erythema in gastric antrum.  Polyps in the gastric fundus.    GROSS:  A: The specimen is received in formalin with proper patient   identification, labeled \"gastric biopsy antrum\".  The specimen consists of 4 pieces of tan-pink tissue ranging from 0.2-0.5   cm in greatest dimension, submitted  in toto in cassette A1.    B: The specimen is received in formalin with proper patient   identification, labeled \"gastric polyps small\".  The specimen consists " "of 3 pieces of tan-pink tissue ranging from 0.2-0.5   cm in greatest dimension, submitted  in toto in cassette B1.    C: The specimen is received in formalin with proper patient   identification, labeled \"gastric biopsy large  polyps\".  The specimen consists of 5 pieces of                           tan-pink tissue ranging   from 0.2-0.6 cm in greatest dimension,  submitted in toto in cassette C 1.    D: The specimen is received in formalin with proper patient   identification, labeled \"gastric biopsy fundus\".  The specimen consists of 3 pieces of tan-pink tissue, each measuring   approximately 0.2 cm in greatest  dimension; submitted in toto in cassette D1. (Dictated by: MONICA 5/6/2021   02:53 PM)    MICROSCOPIC:  Microscopic examination was performed.  Immunohistochemistry was performed   with appropriate controls.  CK  AE1/AE3 performed on parts A1-D1 does not show evidence of signet ring   cell carcinoma.    The technical component of this testing was completed at the [facility], with the professional component performed   at the [facility], [address] [place] [phone] ([phone])    CPT Codes:  A: 21304-XM2, 75789-QYZ  B: 09425-VD2, 30064-URY                            C: 15075-AE7, 14145-YVI, 61595-EKT  D: 49810-AA0, 82334-NWC, 84680-MIG, 51383-BSN, [id number]    COLLECTION SITE:  Client: [facility]  Location: [facility] (B)    Resident  DXA            RADIOLOGY:  Prior to and including the day of this visit, I personally reviewed the recent imaging scans. I released the pertinent recent imaging results to [facility] in advance of this visit, and reviewed the summary verbatim and in lay language during today's call.      ASSESSMENT/PLAN:  Mr. Hola Guidry is a 73-year-old gentleman with history of innumerable polyps.  He does not have a known " germline mutation, although he has had multiple first-degree members of family with gastric carcinoma.  He had upper endoscopy by Dr. Tobar on 05/06/2021 to help clarify whether or not he had primary gastric carcinoma, which would have made sense in terms of signet cell features of the 2 mm focus in the distal appendix that was positive for adenocarcinoma; the evaluation was unremarkable.  Otherwise, the rest of the colon and terminal ileum were negative for carcinoma.  It is not out of the realm of possibility that signet ring cell features and type of histology can be found in small intestinal carcinomas including appendix, but more commonly, it is found in the upper GI tract.  Based on the location as well being more mucosa than through wall that there is consideration whether or not this represented an unusual form of metastasis to the appendiceal cavity rather than a primary appendiceal malignancy.  However, the PET CT was unremarkable and did not show any evidence of an alternate primary at that time.    It is difficult to ascertain the exact risk of recurrence of the appendix that was incidentally found at the tip of the appendix.  It was very minimal in size at 2 mm and did comprise some high-risk form of histology with signet ring subset.  At this time, he is more than three years out from the initial incidental diagnosis during right hemicolectomy of appendiceal poorly differentiated signet ring carcinoma. His next surveillance colonoscopy will be due in early spring 2025.    He had a rise in CEA that may be explained by non-cancer related causes due to relatively low specificity of this marker, particularly in context of his unremarkable CT scan from 6/24/24 and ongoing cigarette use that can confound results. I suggested that we repeat CEA in approximately two months, and follow up with me or a [facility] team member in person for full inperson evaluation and review of that result. He has difficulty hearing  so I strongly encouraged in-person visits moving forward.     Otherwise, in terms of his history of bladder cancer, he follows with Dr Guzman from Urology for [id number] cystoscopies, and for his CAD and his questions about severe coronary artery disease based on the CT scan report, I will defer to his Cardiology team and I did ask him specifically to connect with his Cardiology team and/or primary care doctor for any further questions on that topic.      VIRTUAL VISIT - DETAILS:    I have reviewed the note as documented above. This accurately captures the substance of my conversation with the patient.    Date of call: June 28, 2026   Start of call: 9:00 am  End of call: 9:15 am    Provider location: [facility] (academic office)  Patient location: Home      Mode of Video Visit: Amwell           I spent 15 minutes in consultation, including history and discussion with the patient including review of recent lab values and radiologic imaging results.  An additional 14 minutes was spent on the day of the visit, including reviewing pertinent medical notes and documentation from other physicians and APPs who have evaluated and treated this patient, pertinent lab values, pathology and imaging results, personal review of radiologic images, discussing the case with referring providers and/or nurse coordinator team, post-visit orders, and all post-visit documentation.    Tanner Sánchez MD PhD               The above was transcribed using a voice recognition software.  While I reviewed and edited the transcription, I may miss errors.  Please let me know of any of serious errors and I will addend the note.

## 2024-06-28 NOTE — LETTER
"6/28/2024      Tevin Guidry  [address]  [facility] [address]      Dear Colleague,    Thank you for referring your patient, Tevin Guidry, to the [facility]. Please see a copy of my visit note below.    Virtual Visit Details    Type of service:  Video Visit     Originating Location (pt. 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  The site of origin is unclear, so at the current time, it can be considered carcinoma of unknown primary, very likely of gastrointestinal origin, but the primary site is not known.    GI Cancer Treatment to date: right hemicolectomy 04/13/2021     Surgeon: Dr. Rodríguez     Other cancers: bladder cancer, under the care of Dr. Guzman from our Urology team. Per his notes: \"recurrent bladder tumor. Has previously undergone BCG induction x 2. While office biopsy suggested possible HG disease, formal TURBT with only low-grade pathology. Has completed some BCG maintenance, but has since stopped.\" Followed by 6-month cystoscopies.    PCP: Dr. Preethi Quiroz    GI Oncology History:   He had a colonoscopy 03/30/2017 and another one 03/02/2021 that was delayed due to the COVID pandemic and other healthcare issues.  This was performed by Dr. Marin Steward on 03/02/2021 and the pathology was notable for tubular and tubulovillous adenomas.  Due to the extent and burden of the number of polyps, he was referred to Dr. Rodríguez for consideration of hemicolectomy.  A number of polyps in the distal transverse colon were tattooed.    Dr. Rodríguez took the patient to the operating room for right hemicolectomy 04/13/2021 due to this high polyp burden.    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        Tumor is located at the distal resection margin and situated within    the lamina propria/submucosa (distal   margin positive, proximal and deep margins are free of malignancy)        An overlying tubular adenoma is present at this distal resection   margin, does not appear to be the source   of the underlying malignancy        Tumor origin is compatible with gastrointestinal site as it marks   positively with CK 20, pan cytokeratin   and CDX2 (negative for CK-7), see comment   - Multiple tubular adenomas (at least twenty-one), negative for high-grade    dysplasia, distal margin is   transected by a sessile tubular adenoma   - Benign appendix with focal surface serrated change, negative for   malignancy (submitted in its entirety)   - Unremarkable terminal ileum   - Sixteen benign lymph nodes (0/16)     COMMENT:   The origin of the adenocarcinoma is uncertain based on the current   resection however the immunoperoxidase   battery supports gastrointestinal origin.  Suggest clinical and   radiographic correlation to investigate   possible primary sites (including stomach, pancreaticobiliary and   remainder of the colon).  The appendix was   submitted in its entirety and does not appear to be the source of the   neoplasm.  Intradepartmental   consultation obtained, the case was discussed with Dr. Rodríguez on   4/19/21.     April 20, 2021--CT chest IMPRESSION:   In this patient with recently diagnosed colon cancer:  1. No evidence of metastatic disease in the chest.  2. Subcutaneous emphysema along the left lower chest/upper abdominal  wall may be secondary to transversus abdominis block done on  4/13/2021. No free intraperitoneal air or pneumothorax.  3. Coronary artery calcifications.  4. Sequelae of prior granulomatous disease.     [Access Center: Subcutaneous  emphysema of the left lower chest/upper  abdominal wall, likely from transversus abdominis than on 4/13/2021.  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 FDG at the colonic surgical anastomosis presumably postsurgical  inflammation.   2. No definite FDG uptake to suggest metastatic disease.   3. No definite primary significant ring carcinoma is identified.   There are 3 equivocal findings:   3a. Mild uptake in the gastric cardia, favor physiologic  3b. Loop of slightly narrowed thickened small bowel with FDG uptake,  favor physiologic or postoperative inflammation but cannot rule out a  primary.   3c. Extensive FDG uptake in the entire sigmoid colon, favored to be  physiologic.  3d. If clinically indicated, an MR enterography in this case may  better define bowel abnormalities. Alternatively attention to these  areas on follow-up imaging.  4. Incidentally noted bilateral hydroceles and right peritesticular  calcification.     May 3, 2021--initial medical oncology consultation, Dr. Sánchez.    May 6, 2021--EGD - unremarkable; no masses seen.    FINAL DIAGNOSIS:   A. ANTRUM, BIOPSY:   - Gastric antral type mucosa with features of reactive gastropathy   - No H. pylori like organisms identified on routine staining   - Negative for intestinal metaplasia or dysplasia     B. STOMACH, POLYP:   - Gastric body type mucosa with chronic gastritis   - No H. pylori like organisms identified on routine staining   - Negative for intestinal metaplasia, neoplastic polyp or dysplasia     C. STOMACH, POLYPS, BIOPSY:   - Fundic gland polyp in one fragment   - Gastric antral type mucosa with mild chronic gastritis   - No H.  pylori like organisms identified on routine staining or by   immunohistochemistry   - Negative for intestinal metaplasia or dysplasia     D. STOMACH, BIOPSY:   - Chronic active gastritis with focal intestinal and pancreatic acinar   type metaplasia, see comment   - No H. pylori like organisms identified on routine staining or by   immunohistochemistry   - Negative for dysplasia, see comment       July 16, 2021 -- oncology follow-up/virtual visit, Dr. Sánchez.  11/8/21-PET/CT--IMPRESSION: Hx. of 2 mm focus of poorly differentiated adenocarcinoma  with significant ring cell features at the tip of the appendix.  No  recurrent or metastatic disease.  1. Postsurgical changes of right hemicolectomy with mild residual  uptake along the surgical anastomosis which is below that of  background, decreased from prior, and favored to be inflammatory.   2. Slight progression of peripheral reticular opacities in the  dependent right lung with a basilar predominance, differential  atelectasis, edema, inflammatory, infectious, aspiration.  This is on  a background of emphysema.  2a. A 9 mm groundglass nodule right upper lobe peripherally unchanged  from prior exam, indeterminant. Continued attention on follow-up.    November 19, 2021 -- oncology follow-up/virtual visit, Dr. Sánchez.  The 04/22/2022 surveillance colonoscopy with Dr. Rodríguez resected several polyps, no evidence of malignancy.      Final Diagnosis   A.  COLON, ASCENDING, POLYP:  - Tubular adenoma  - No high-grade dysplasia or malignancy identified     B.  COLON, DESCENDING, POLYP:  - Tubular adenoma  - No high-grade dysplasia or malignancy identified         June 6, 2022--PET/CT: CT FINDINGS: Mild senescent intracranial changes. Postoperative change of the right lens. Mild carotid artery bifurcation calcification. Moderate to severe coronary artery calcium. Moderate upper lung predominant emphysema. Splenic granulomas. Right   hemicolectomy changes. Pelvic phleboliths.  Multilevel degenerative changes of the spine. The lower extremities are unremarkable. IMPRESSION: No metabolic evidence of active neoplasm.    June 24, 2022 -- oncology follow-up/virtual visit, Dr. Sánchez.    3/23/23--EGD Impression:            - Normal examined duodenum.                          - Multiple gastric polyps. Resected and retrieved.                          - Esophagogastric landmarks identified.     3/23/23--surveillance colonoscopy--Impression:            - Patent functional end-to-end ileo-colonic                          anastomosis, characterized by ulceration. Biopsied.                          - The examined portion of the ileum was normal.                          - Six 2 to 4 mm polyps at the recto-sigmoid colon and                          at the splenic flexure, removed with a cold snare.                          Resected and retrieved.                          - Diverticulosis in the sigmoid colon.                          - The examination was otherwise normal on direct and                          retroflexion views.   Recommendation:        - Return to referring physician.                          - Repeat colonoscopy in 2 years for surveillance based                          on pathology results.   Final Diagnosis   A. COLON, ANASTOMOSIS SITE, BIOPSY#1:  Enteric mucosa with hyperplastic/reactive changes, chronic inflammation and lamina propria fibrosis; negative for dysplasia or malignancy     B. COLON, ANASTOMOSIS SITE, BIOPSY#2:  Enteric mucosa with hyperplastic/reactive changes, chronic inflammation and lamina propria fibrosis; negative for dysplasia or malignancy     C. COLON, SPLENIC FLEXURE, POLYPS X2, BIOPSY:   -Tubulovillous adenoma; negative for high-grade dysplasia   -Hyperplastic polyp      D. COLON, LOWER LEFT, POLYPS X4, BIOPSY:  Tubular adenomas; negative for high-grade dysplasia      E. STOMACH, GREATER CURVATURE, POLYP, BIOPSY:  Gastric mucosa with chronic inactive  gastritis with intestinal metaplasia (complete type); negative for dysplasia or malignancy       6/20/23--CT c/a/p--IMPRESSION:  No evidence of metastatic disease in the chest, abdomen, or pelvis.    June 23, 2023 -- oncology follow-up/virtual visit, Dr. Sánchez.    6/24/24--CT c/a/p--IMPRESSION: Compared to prior CT from 6/20/2023, the current scan  shows:  1. Postsurgical changes of right hemicolectomy. No recurrent mass.  2. Slight increase in size of right hilar lymph node, consider  attention on follow-up  3. No new metastatic disease in the abdomen or pelvis  4. Similar additional incidental findings as above including sequelae  of granulomatous disease    June 28, 2024 -- oncology follow-up/virtual visit, Dr. Sánchez.       Interim History/History Of Present Illness:    Mr. Tevin Guidry is a 73-year-old gentleman from [place].  He has a past medical history notable for cardiovascular disease, status post multivessel CABG 09/29/2014 and angioplasties.  He has hypertension, COPD from a history of smoking, which he quit in 2019.  He also has a history of bladder cancer which was a noninvasive high grade form of urothelial carcinoma diagnosed in the summer and early fall of 2020.  He is status post multiple injections of BCG.  He was diagnosed in the summer of 2019 with a recurrence in mid winter of 2020.  He also has a history of innumerable polyps over the years. He has had nearly a dozen colonoscopies to date, dating back to age 48.  He has had numerous colonoscopies, but none of them have shown evidence of a neoplastic or malignant disease or significant dysplasia but he had tubulovillous forms of adenoma.     He joins me from home for an VideoStep-based virtual video visit. His prior history is well documented of an incidental finding of a 2 mm, poorly differentiated carcinoma with signet ring features at the tip of the appendix.  He had surgery, and this was incidentally detected during the surgery comprising a  right hemicolectomy initially for noncancerous reasons in 2021.  I have been performing active surveillance since that time. The preliminary report from his  CT shows only a 1 mm enlargement of a rt hilar node; otherwise no new masses or lesions. He inquires about the CEA elevation to 6.5, higher since 2021 value; he continues to smoke cigarettes but is actively undergoing attempts at tobacco cessation through [facility] team and his PCP Dr. Preethi Quiroz.       Latest Reference Range & Units 24 10:58   CEA ng/mL 6.5      Latest Reference Range & Units 21 12:31   CEA 0 - 2.5 ug/L 1.5         Review Of Systems:  Comprehensive (14-point) ROS reviewed. Pertinent symptoms reviewed above per HPI.      Past medical, social, surgical, and family histories reviewed.  PAST MEDICAL HISTORY:  Notable for innumerable colon polyps.  It is not clear whether or not he has a germline or familial mutation that is causing this.  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  He has CAD, hypertension.  He he has had cardiac catheterization including serum 2014 when he had a CABG with LIMA to LAD, 2014.  He has another malignancy in the form of noninvasive bladder carcinoma, left lateral wall of the bladder.  He has treatments with BCG and other interventions by our Urology team since 2019 for that.  COPD due to remote history of smoking.    PAST SURGICAL HISTORY:  Most remarkable for the hemicolectomy performed by Dr Colvin 2021 per above and the interventions for the bladder carcinoma in  and .  Please see the Urology note for details.    FAMILY HISTORY:  Most notable for him being 1 of 3 sons from his parents.  His mom was a smoker and diagnosed with stage IV gastric carcinoid at age 65 and  within 4 months of diagnosis.  Father was a smoker and had lung cancer at age 40 and  at age 50 of  this disease.  One brother had gastric carcinoma and  around age 72 after initial diagnosis at age 62.  Another brother is living at age 69 and has never had a colonoscopy due to lack of insurance and other reasons.  A maternal aunt also had ovarian cancer.    SOCIAL HISTORY:  The patient lives in [place].  He has an associate degree in respiratory therapy  Due to social phobia, he has not been employed in that practice.  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  He is single, never , no children.    Tobacco:  He smoked 1-1/2 packs a day of cigarettes for 30 years, quit .  Occasional use of marijuana.  Denied any alcohol use or other illicit drug use.    Allergies:  Allergies as of 2024 - Reviewed 2024   Allergen Reaction Noted     Codeine Nausea and Vomiting 10/14/2014       Current Medications:  Current Outpatient Medications   Medication Sig Dispense Refill     albuterol (PROAIR HFA/PROVENTIL HFA/VENTOLIN HFA) 108 (90 Base) MCG/ACT inhaler INHALE 2 PUFFS INTO THE LUNGS EVERY 6 HOURS AS NEEDED FOR SHORTNESS OF BREATH OR WHEEZING 8.5 g 1     amLODIPine (NORVASC) 5 MG tablet TAKE ONE TABLET BY MOUTH EVERY NIGHT AT BEDTIME 90 tablet 3     aspirin 81 MG tablet Take 1 tablet (81 mg) by mouth daily 90 tablet 3     atorvastatin (LIPITOR) 80 MG tablet TAKE ONE TABLET BY MOUTH EVERY EVENING AT BEDTIME 90 tablet 3     buPROPion (WELLBUTRIN XL) 300 MG 24 hr tablet TAKE ONE TABLET BY MOUTH EVERY MORNING 90 tablet 1     ezetimibe (ZETIA) 10 MG tablet TAKE ONE TABLET BY MOUTH ONCE DAILY 90 tablet 1     lisinopril (ZESTRIL) 20 MG tablet Take 1 tablet (20 mg) by mouth 2 times daily 180 tablet 3     metoprolol succinate ER (TOPROL XL) 50 MG 24 hr tablet Take 1 tablet (50 mg) by mouth At Bedtime 90 tablet 3     nicotine polacrilex (NICORETTE) 4 MG gum Place 4 mg inside cheek every hour as needed for nicotine withdrawal symptoms  (Patient not taking: Reported on 5/28/2024)       PARoxetine (PAXIL) 40 MG tablet Take 1 tablet (40 mg) by mouth every morning 90 tablet 3     umeclidinium-vilanterol (ANORO ELLIPTA) 62.5-25 MCG/ACT oral inhaler INHALE ONE PUFF BY MOUTH ONCE DAILY 60 each 4     varenicline (CHANTIX) 0.5 MG tablet Take 1 tablet (0.5 mg) by mouth 2 times daily 60 tablet 5        Physical Exam:  In-person physical exam could not be performed today in context of a Virtual Visit. Observed physical assessments made today by visualizing the patient by video link:  Vitals - Patient Reported  Pain Score: No Pain (0)             General/Constitutional: Generally appears well, not acutely ill.  HEENT: no scleral icterus, not jaundiced.  Respiratory: no labored breathing.  Musculoskeletal: appears to have full range of motion and adequate physical strength.  Skin: no jaundice, discoloration or other notable lesions.  Neurological: no evidence of tremors.  Psychiatric: no evident anxiety; fully alert and oriented with fluent speech.      The rest of a comprehensive physical examination is deferred due to nature of video visits.     Laboratory/Imaging Studies  No visits with results within 2 Week(s) from this visit.   Latest known visit with results is:   Virtual Visit on 05/05/2021   Component Date Value Ref Range Status     Copath Report 05/06/2021    Final                    Value:Patient Name: DICK LYONS  MR#: [id number]  Specimen #: [id number]  Collected: 5/6/2021  Received: 5/6/2021  Reported: 5/13/2021 10:06  Ordering Phy(s): MANNIE DAWKINS  Additional Phy(s): [facility]: [facility]    For improved result formatting, select 'View Enhanced Report Format' under   Linked Documents section.    SPECIMEN(S):  A: Antral biopsy  B: Gastric polyp  C: Gastric biopsy  D: Gastric biopsy    FINAL DIAGNOSIS:  A. ANTRUM, BIOPSY:  - Gastric antral type mucosa with features of reactive gastropathy  - No H. pylori like organisms  "identified on routine staining  - Negative for intestinal metaplasia or dysplasia    B. STOMACH, POLYP:  - Gastric body type mucosa with chronic gastritis  - No H. pylori like organisms identified on routine staining  - Negative for intestinal metaplasia, neoplastic polyp or dysplasia    C. STOMACH, POLYPS, BIOPSY:  - Fundic gland polyp in one fragment  - Gastric antral type mucosa with mild chronic gastritis  - No H. pylor                          i like organisms identified on routine staining or by   immunohistochemistry  - Negative for intestinal metaplasia or dysplasia    D. STOMACH, BIOPSY:  - Chronic active gastritis with focal intestinal and pancreatic acinar   type metaplasia, see comment  - No H. pylori like organisms identified on routine staining or by   immunohistochemistry  - Negative for dysplasia, see comment    COMMENT:  D).  The biopsies show antral type mucosa with pancreatic acinar type   metaplasia and a minute focus of  intestinal metaplasia.  Immunohistochemistry was performed with   appropriate controls.  Chromogranin performed  on part D highlights linear enterochromaffin cell-like hyperplasia.    Gastrin stain shows rare positive cells  at the edge of one biopsy fragments.  If these biopsies were taken from   the fundus or body, these findings are  suggestive of autoimmune gastritis.  Clinical correlation is recommended.    I have personally reviewed all specimens and/or slides, including the   listed sp                          ecial stains, and used them  with my medical judgement to determine or confirm the final diagnosis.    Electronically signed out by:    Liliane Ta M.D., Physicians    CLINICAL HISTORY:  Screening endoscopy.  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    Striped erythema in gastric antrum.  Polyps in the gastric fundus.    GROSS:  A: The specimen is received in formalin with proper patient   identification, labeled \"gastric biopsy antrum\".  The specimen " "consists of 4 pieces of tan-pink tissue ranging from 0.2-0.5   cm in greatest dimension, submitted  in toto in cassette A1.    B: The specimen is received in formalin with proper patient   identification, labeled \"gastric polyps small\".  The specimen consists of 3 pieces of tan-pink tissue ranging from 0.2-0.5   cm in greatest dimension, submitted  in toto in cassette B1.    C: The specimen is received in formalin with proper patient   identification, labeled \"gastric biopsy large  polyps\".  The specimen consists of 5 pieces of                           tan-pink tissue ranging   from 0.2-0.6 cm in greatest dimension,  submitted in toto in cassette C 1.    D: The specimen is received in formalin with proper patient   identification, labeled \"gastric biopsy fundus\".  The specimen consists of 3 pieces of tan-pink tissue, each measuring   approximately 0.2 cm in greatest  dimension; submitted in toto in cassette D1. (Dictated by: MONICA 5/6/2021   02:53 PM)    MICROSCOPIC:  Microscopic examination was performed.  Immunohistochemistry was performed   with appropriate controls.  CK  AE1/AE3 performed on parts A1-D1 does not show evidence of signet ring   cell carcinoma.    The technical component of this testing was completed at the [facility], with the professional component performed   at the [facility], [address] [place] [id number] ([phone])    CPT Codes:  A: 94889-OQ7, 69332-TFA  B: 67729-VK1, 51344-DHU                            C: 00909-WE1, 19522-TWF, 65377-FQP  D: 42082-SK9, 91093-KZN, 31433-WOU, 36539-DKO, 36253-ZBJSUK1DO    COLLECTION SITE:  Client: [facility]  [facility]    Resident  [id number]            RADIOLOGY:  Prior to and including the day of this visit, I personally reviewed the recent imaging scans. I [facility] the pertinent " [facility] in advance of this visit, and reviewed the summary verbatim and in lay language during today's call.      ASSESSMENT/PLAN:  Mr. Hola Guidry is a 73-year-old gentleman with history of innumerable polyps.  He does not have a known germline mutation, although he has had multiple first-degree members of family with gastric carcinoma.  He had upper endoscopy by Dr. Tobar on 05/06/2021 to help clarify whether or not he had primary gastric carcinoma, which would have made sense in terms of signet cell features of the 2 mm focus in the distal appendix that was positive for adenocarcinoma; the evaluation was unremarkable.  Otherwise, the rest of the colon and terminal ileum were negative for carcinoma.  It is not out of the realm of possibility that signet ring cell features and type of histology can be found in small intestinal carcinomas including appendix, but more commonly, it is found in the upper GI tract.  Based on the location as well being more mucosa than through wall that there is consideration whether or not this represented an unusual form of metastasis to the appendiceal cavity rather than a primary appendiceal malignancy.  However, the PET CT was unremarkable and did not show any evidence of an alternate primary at that time.    It is difficult to ascertain the exact risk of recurrence of the appendix that was incidentally found at the tip of the appendix.  It was very minimal in size at 2 mm and did comprise some high-risk form of histology with signet ring subset.  At this time, he is more than three years out from the initial incidental diagnosis during right hemicolectomy of appendiceal poorly differentiated signet ring carcinoma. His next surveillance colonoscopy will be due in early spring 2025.    He had a rise in CEA that may be explained by non-cancer related causes due to relatively low specificity of this marker, particularly in context of his unremarkable CT scan  from 6/24/24 and ongoing cigarette use that can confound results. I suggested that we repeat CEA in approximately two months, and follow up with me or a [facility] team member in person for full inperson evaluation and review of that result. He has difficulty hearing so I strongly encouraged in-person visits moving forward.     Otherwise, in terms of his history of bladder cancer, he follows with Dr Guzman from Urology for [id number] cystoscopies, and for his CAD and his questions about severe coronary artery disease based on the CT scan report, I will defer to his Cardiology team and I did ask him specifically to connect with his Cardiology team and/or primary care doctor for any further questions on that topic.      VIRTUAL VISIT - DETAILS:    I have reviewed the note as documented above. This accurately captures the substance of my conversation with the patient.    Date of call: June 28, 2024   Start of call: 9:00 am  End of call: 9:15 am    Provider location: [facility] (academic office)  Patient location: Home      Mode of Video Visit: Amwell           I spent 15 minutes in consultation, including history and discussion with the patient including review of recent lab values and radiologic imaging results.  An additional 14 minutes was spent on the day of the visit, including reviewing pertinent medical notes and documentation from other physicians and APPs who have evaluated and treated this patient, pertinent lab values, pathology and imaging results, personal review of radiologic images, discussing the case with referring providers and/or nurse coordinator team, post-visit orders, and all post-visit documentation.    Tanner Sánchez MD PhD               The above was transcribed using a voice recognition software.  While I reviewed and edited the transcription, I may miss errors.  Please let me know of any of serious errors and I will addend the note.                   Again, thank you for allowing me to participate in the  care of your patient.        Sincerely,        Tanner Sánchez MD

## 2024-07-03 NOTE — PROGRESS NOTES
Medication Therapy Management (MTM) Encounter    ASSESSMENT:                            Medication Adherence/Access: No issues identified    Tobacco Use Disorder:  Patient is smoke free.patient is committed to remaining smoke free now.  Based on patient preferences and history, patient would benefit from : 300mg ./day  bupropion long term, 0.5mg Varenicline 2 x day for now. We discussed: risks of smoking, benefits of quitting, and total abstinence.      Hypertension: Stable. Patient is meeting blood pressure goal of < 140/90mmHg. Patient would benefit from the following changes - Due to low Blood Pressure- lets reduce lisinopril back to 20mg./day in AM.   continued blood pressure monitoring, watching diet, increasing exercise and weight loss and limiting/reducing sodium.    Hyperlipidemia: Stable.  Patient is on high intensity statin which is indicated based on 2019 ACC/AHA guidelines for lipid management.      COPD: Stable  Conitnue daily lama/laba inhaler as well as using albuterol prior to walking the dogs.   as well as no smoking help a lot.    PLAN:                            1. Blood Pressure 98/58mmhg--lets reduce your lisinopril back to 20mg. Once daily in the morning.     2. Congrats - Still -smoke free --fantastic --continue Bupropion and Varenicline twice for sure!        Follow-up: Return in about 22 weeks (around 12/10/2024), or @ 1 PM, for Medication Therapy Management Visit, tobacco abuse, BP Recheck.        SUBJECTIVE/OBJECTIVE:                          Tevin Guidry is a 73 year old male coming in for a follow-up (4-11-24) visit. He was referred to me from Preethi Quiroz      Reason for visit:   Smoking cessation f/up.     Feels good --breaths better with no smoking.     Allergies/ADRs: Reviewed in chart  Past Medical History: Per patient has a hemicolectomy with a hx of polyps (removed appendix). CAD (CABG), Social phobia, COPD, adenocarcinoma  Of bladder/colon. Had signet ring cancer  removed with colon as well.  Has MUTYH--associated polyposis.   Tobacco: He currently gets a hold a cigarette- relapsed recently -see below .  Never had chantix in the past .   Alcohol: not currently using  Other Substance Use: marijuana flower, occasional vape - smokes most days/week  --he is much more worried about cig smoking vs. Marijuana.   Caffeine: large starbucks in morning and sometimes in PM      Medication Adherence/Access: No issues  Smoking cessation:  Patient reports things going well.  What has gone well for you? Has not smoked sine early 4-2024.   Has COPD  calls every 3 weeks --somewhat helpful.  What strategies have you tried? Chantix 2 x day and zyban .(not using nrt gum).  What side effects to nicotine replacement therapies or medications are you experiencing: none --not using any NRT.  What has helped you the most to keep from smoking? Combo of rx meds  Is there anything you might anticipate happening in the near future (e.g., next week, next month) that could present a challenge for you?  Annual CT scan - colon cancer- has elevated CEA lab - f/up scan 8-2024.  How can I continue to support you going forward?       Previously :   Smoking cessation:  Patient reports things going well.  What has gone well for you? Turned the corner --varenicline  0.5mg 2 x day (works great)   and bupropion 300mg er tab daily and gum - nicotine 4mg./day --as needed .(4-5 pieces /day)     Last time he smoked was a few days ago -- feels he can past ashtray and not grab a cig. Butt.       What strategies have you tried? Current plan works well.   What side effects to nicotine replacement therapies or medications are you experiencing: none   What has helped you the most to keep from smoking?   Is there anything you might anticipate happening in the near future (e.g., next week, next month) that could present a challenge for you? None.   How can I continue to support you going forward? Qtr. Mtm visits for  accountability.         Previously :   Smoking cessation:  Patient reports things not going well.  What hasn't worked for you? Relapsed--if he buys a pack --lasts 3 day -about 7 cigs /day . Today only 1 cig so far as he feels guilty admitting to me that he relapsed.    Why did he relapse:   Enjoyment --desperately needs a couple hits /day .      He has a copd  now at [facility].    What side effects to nicotine replacement therapies or medications are you experiencing: does chew 4mg nicotine gum --8 pieces /day now.   What times or situations have you found the most challenging?Around other tobacco users  When he is bored.   Is there anything that worked well for you? Bupropion 300mg./day and NRT .  How can you get back on your plan? Hold patient accountable .  What is a first step-today or tomorrow or in the next few days-that will enable you to do this? We discussed at this point lets add in chantix.   How can I support you in doing this? See patient monthly until smoke free then quarterly x 1-2 years.         Previously :   Smoking cessation: Patient reports things going well.  Patient smoke free for one year.   What has gone well for you? bupropion working well and cystoscopy reported no bladder cancer .  What has helped you the most to keep from smoking? Desire to remain smoke free and cancer free.   Is there anything you might anticipate happening in the near future (e.g., next week, next month) that could present a challenge for you? None.  -   Previously :  .Smoking cessation:  Patient reports things going well.  What has gone well for you? Bupropion working well. Smoke free since 6-23-22.   What strategies have you tried?   He doesn't want to start cigs again --he's made it this far feels great .   What side effects to nicotine replacement therapies or medications are you experiencing: not using any now.   What has helped you the most to keep from smoking? Bupropion for now and long term.   Is there  anything you might anticipate happening in the near future (e.g., next week, next month) that could present a challenge for you? None   How can I continue to support you going forward? Hold patient accountable.         Previously :   Smoking cessation:  Patient reports things going so well.  NO SMOKING SINCE 6-!!    He has relapsed smoking , but pet/ct scan was negative , but showed darkening lungs and calcium buildup in heart .   What side effects to nicotine replacement therapies or medications are you experiencing: none   What times or situations have you found the most challenging?Just when he gets the urge--maybe boredom/stress.    Is there anything that worked well for you?  Bupropion 300mg./day helps.   How can you get back on your plan? He feels he can resist temptation now --doing well.   What is a first step-today or tomorrow or in the next few days-that will enable you to do this? Periodic mtm appt. To hold him accountable.   How can I support you in doing this? continue meds/counseling.       Previously: 6-23-22:    Has been smoking off/on , can go for days quitting --but then in his head he wakes up and wants a cigarette -- then he has to buy a pack and start 1 PPD .    His last cigarette was Sunday evening 6-19-22.  Currently has no cigs at home .     He just bought first lozenges today 4mg. -- he like them better than gum -last longer and taste better . They really help --smoke free x 4 days now.       He admits some coronary artery calcification - and darkening in lungs--he realizes this and wants to keep plugging along.       He is cancer free !!    He cannot afford to smoke --1 pack is 9.45 $ --!!            Previously :       4-14-22:    Smoking is sporadic , admitted 6-8 cigs/day x 2 + weeks not too long ago.   He states he doesn't know why he started up again, maybe boredom he thinks biggest reason.    Some marijuana daily smoking still --maybe he ran out of that so that is trigger for  him to restart cigs.     He did quit cigs 4-11-22 ; still smokes daily Marijuana though.     Back to chewing 10-12 nicotine pieces gum/day .     Denies overt loneliness but has a wonderful dog. Has a brother and his Gf that he can talk too.       Hypertension: Current medications include : amlodipine 5mg hs daily now , lisinopril 20mg twice daily, metoprolol succ. 50mg daily in pm.  Patient does not self-monitor blood pressure but states [facility] sent him a home Blood Pressure kit .  Patient reports no current medication side effects. Patient reports they are somewhat anxious about the visit, thinks this could be related to blood pressure elevation at moment.     Currently takes aspirin 81mg daily based on history of CABG. Reports no side effects.    BP Readings from Last 3 Encounters:   07/09/24 98/58   06/28/24 115/80   05/28/24 112/65        Hyperlipidemia: Current therapy includes :atorvastatin 80mg daily and Ezetimibe (Zetia) 10mg once daily.  Patient reports no significant myalgias or other side effects.  The 10-year ASCVD risk score (Lakeisha BILLY, et al., 2019) is: 16.2%    Values used to calculate the score:      Age: 73 years      Sex: Male      Is Non- : No      Diabetic: No      Tobacco smoker: No      Systolic Blood Pressure: 98 mmHg      Is BP treated: Yes      HDL Cholesterol: 53 mg/dL      Total Cholesterol: 137 mg/dL  Recent Labs   Lab Test 08/30/23  1218 09/23/22  1312   CHOL 137 112   HDL 53 58   LDL 67 35   TRIG 86 94           COPD: Current medications:  Now taking 1 puff daily anoro (LABA/LAMA ), Short-Acting Bronchodilator: Albuterol rescue inhaler- he takes 2 puffs as neded before he goes on walks with his brothers dog.  Patient is not experiencing side effects.   Patient reports the following symptoms: No current symptoms unless he works a flight of stairs, he states breathing is better since he started daily anoro.   Patient does not have an COPD Action Plan on file.   Has  spirometry been completed: Doesn't know  Inhaler/device technique reviewed: Disk inhaler (ellipta)  Oxygen today 95%.            Today's Vitals: BP 98/58   Pulse 66   Wt 174 lb 11.2 oz (79.2 kg)   SpO2 95%   BMI 26.56 kg/m    ----------------      I spent 30 minutes with this patient today. All changes were made via collaborative practice agreement with Preethi Quiroz MD, MD. A copy of the visit note was provided to the patient's primary care provider.    The patient was given a summary of these recommendations.     Andrea Bah [facility].  Medication Therapy Management Provider  [phone]           Medication Therapy Recommendations  Hypertension goal BP (blood pressure) < 140/90    Current Medication: lisinopril (ZESTRIL) 20 MG tablet   Rationale: Dose too high - Dosage too high - Safety   Recommendation: Decrease Dose   Status: Accepted per CPA

## 2024-07-09 ENCOUNTER — OFFICE VISIT (OUTPATIENT)
Dept: PHARMACY | Facility: CLINIC | Age: 74
End: 2024-07-09
Payer: COMMERCIAL

## 2024-07-09 VITALS
BODY MASS INDEX: 26.56 KG/M2 | DIASTOLIC BLOOD PRESSURE: 58 MMHG | SYSTOLIC BLOOD PRESSURE: 98 MMHG | WEIGHT: 174.7 LBS | OXYGEN SATURATION: 95 % | HEART RATE: 66 BPM

## 2024-07-09 DIAGNOSIS — E78.5 HYPERLIPIDEMIA LDL GOAL <130: ICD-10-CM

## 2024-07-09 DIAGNOSIS — F17.200 NICOTINE DEPENDENCE, UNCOMPLICATED, UNSPECIFIED NICOTINE PRODUCT TYPE: Primary | ICD-10-CM

## 2024-07-09 DIAGNOSIS — J44.9 CHRONIC OBSTRUCTIVE PULMONARY DISEASE, UNSPECIFIED COPD TYPE (H): ICD-10-CM

## 2024-07-09 DIAGNOSIS — I10 ESSENTIAL HYPERTENSION WITH GOAL BLOOD PRESSURE LESS THAN 140/90: ICD-10-CM

## 2024-07-09 PROCEDURE — 99607 MTMS BY PHARM ADDL 15 MIN: CPT | Performed by: PHARMACIST

## 2024-07-09 PROCEDURE — 99606 MTMS BY PHARM EST 15 MIN: CPT | Performed by: PHARMACIST

## 2024-07-09 RX ORDER — LISINOPRIL 20 MG/1
20 TABLET ORAL
COMMUNITY
Start: 2024-07-09 | End: 2024-10-04

## 2024-07-09 NOTE — Clinical Note
Preethi-- uriel still smoke free bin 4-7-24 --breathing better , feels good, does have low bp today so I cut back on his lisinopril to 1 tab /day now. He will see you in sept. For f/up.  -eloy

## 2024-07-09 NOTE — PATIENT INSTRUCTIONS
"Recommendations from today's MTM visit:                                                         1. Blood Pressure 98/58mmhg--lets reduce your lisinopril back to 20mg. Once daily in the morning.     2. Congrats - Still -smoke free --fantastic --continue Bupropion and Varenicline twice for sure!        Follow-up: Return in about 22 weeks (around 12/10/2024), or @ 1 PM, for Medication Therapy Management Visit, tobacco abuse, BP Recheck.    It was great speaking with you today.  I value your experience and would be very thankful for your time in providing feedback in our clinic survey. In the next few days, you may receive an email or text message from [employer] with a link to a survey related to your  clinical pharmacist.\"     To schedule another MTM appointment, please call the clinic directly or you may call the MTM scheduling line at [phone] or toll-free at [phone].     My Clinical Pharmacist's contact information:                                                      Please feel free to contact me with any questions or concerns you have.      Andrea Bah [facility].  Medication Therapy Management Provider  [phone]'    "

## 2024-08-13 DIAGNOSIS — I10 ESSENTIAL HYPERTENSION WITH GOAL BLOOD PRESSURE LESS THAN 140/90: ICD-10-CM

## 2024-08-13 DIAGNOSIS — E78.5 HYPERLIPIDEMIA LDL GOAL <70: ICD-10-CM

## 2024-08-13 RX ORDER — EZETIMIBE 10 MG/1
10 TABLET ORAL DAILY
Qty: 30 TABLET | Refills: 0 | Status: SHIPPED | OUTPATIENT
Start: 2024-08-13 | End: 2024-09-03

## 2024-08-14 ENCOUNTER — LAB (OUTPATIENT)
Dept: LAB | Facility: CLINIC | Age: 74
End: 2024-08-14
Payer: COMMERCIAL

## 2024-08-14 ENCOUNTER — ANCILLARY PROCEDURE (OUTPATIENT)
Dept: CT IMAGING | Facility: CLINIC | Age: 74
End: 2024-08-14
Attending: INTERNAL MEDICINE
Payer: COMMERCIAL

## 2024-08-14 DIAGNOSIS — C18.1 ADENOCARCINOMA OF APPENDIX (H): ICD-10-CM

## 2024-08-14 LAB
CEA SERPL-MCNC: 7.1 NG/ML
CREAT BLD-MCNC: 1 MG/DL (ref 0.7–1.3)
EGFRCR SERPLBLD CKD-EPI 2021: >60 ML/MIN/1.73M2

## 2024-08-14 PROCEDURE — 82378 CARCINOEMBRYONIC ANTIGEN: CPT | Performed by: INTERNAL MEDICINE

## 2024-08-14 PROCEDURE — 82565 ASSAY OF CREATININE: CPT | Performed by: PATHOLOGY

## 2024-08-14 PROCEDURE — 36415 COLL VENOUS BLD VENIPUNCTURE: CPT | Performed by: PATHOLOGY

## 2024-08-14 PROCEDURE — 71260 CT THORAX DX C+: CPT | Performed by: RADIOLOGY

## 2024-08-14 PROCEDURE — 99000 SPECIMEN HANDLING OFFICE-LAB: CPT | Performed by: PATHOLOGY

## 2024-08-14 RX ORDER — IOPAMIDOL 755 MG/ML
85 INJECTION, SOLUTION INTRAVASCULAR ONCE
Status: COMPLETED | OUTPATIENT
Start: 2024-08-14 | End: 2024-08-14

## 2024-08-14 RX ADMIN — IOPAMIDOL 85 ML: 755 INJECTION, SOLUTION INTRAVASCULAR at 11:20

## 2024-08-19 ENCOUNTER — ONCOLOGY SURVIVORSHIP VISIT (OUTPATIENT)
Dept: ONCOLOGY | Facility: CLINIC | Age: 74
End: 2024-08-19
Attending: NURSE PRACTITIONER
Payer: COMMERCIAL

## 2024-08-19 VITALS
BODY MASS INDEX: 27.06 KG/M2 | TEMPERATURE: 98.2 F | WEIGHT: 178 LBS | OXYGEN SATURATION: 96 % | RESPIRATION RATE: 18 BRPM | SYSTOLIC BLOOD PRESSURE: 111 MMHG | HEART RATE: 64 BPM | DIASTOLIC BLOOD PRESSURE: 68 MMHG

## 2024-08-19 DIAGNOSIS — C18.1 ADENOCARCINOMA OF APPENDIX (H): Primary | ICD-10-CM

## 2024-08-19 DIAGNOSIS — D12.6 MYH-ASSOCIATED POLYPOSIS (MAP): ICD-10-CM

## 2024-08-19 PROCEDURE — 99214 OFFICE O/P EST MOD 30 MIN: CPT | Performed by: NURSE PRACTITIONER

## 2024-08-19 PROCEDURE — G0463 HOSPITAL OUTPT CLINIC VISIT: HCPCS | Performed by: NURSE PRACTITIONER

## 2024-08-19 ASSESSMENT — PAIN SCALES - GENERAL: PAINLEVEL: NO PAIN (0)

## 2024-08-19 NOTE — PROGRESS NOTES
Reason for Visit: seen in follow-up of resected appendiceal carcinoma, bladder cancer, follow-up on pulmonary nodules    Oncology HPI:       Mr. Tevin Guidry is a 73-year-old gentleman from [place].  He has a past medical history notable for cardiovascular disease, status post multivessel CABG 09/29/2014 and angioplasties.  He has hypertension, COPD from a history of smoking, which he quit in 2019.  He also has a history of bladder cancer which was a noninvasive high grade form of urothelial carcinoma diagnosed in the summer and early fall of 2020.  He is status post multiple injections of BCG.  He was diagnosed in the summer of 2019 with a recurrence in mid winter of 2020.  He also has a history of innumerable polyps over the years. He has had nearly a dozen colonoscopies to date, dating back to age 48.  He has had numerous colonoscopies, but none of them have shown evidence of a neoplastic or malignant disease or significant dysplasia but he had tubulovillous forms of adenoma.     Interval history:   Hola is feeling fairly well. Has good energy and appetite. Weight is stable. No abdominal pain, bloating. Bowels are regular. No N/V. No cough or shortness of breath. Stopped smoking tobacco. Smokes cannabis regularly. Notes a lump in the right testicle. No pain or redness. No urinary changes. Has set up a visit with his PCP to evaluate this.    Current Outpatient Medications   Medication Sig Dispense Refill    albuterol (PROAIR HFA/PROVENTIL HFA/VENTOLIN HFA) 108 (90 Base) MCG/ACT inhaler INHALE 2 PUFFS INTO THE LUNGS EVERY 6 HOURS AS NEEDED FOR SHORTNESS OF BREATH OR WHEEZING 8.5 g 1    amLODIPine (NORVASC) 5 MG tablet TAKE ONE TABLET BY MOUTH EVERY NIGHT AT BEDTIME 90 tablet 3    aspirin 81 MG tablet Take 1 tablet (81 mg) by mouth daily 90 tablet 3    atorvastatin (LIPITOR) 80 MG tablet TAKE ONE TABLET BY MOUTH EVERY EVENING AT BEDTIME 90 tablet 3    buPROPion (WELLBUTRIN XL) 300 MG 24 hr tablet TAKE ONE TABLET  BY MOUTH EVERY MORNING 90 tablet 1    ezetimibe (ZETIA) 10 MG tablet TAKE ONE TABLET BY MOUTH ONCE DAILY 30 tablet 0    lisinopril (ZESTRIL) 20 MG tablet Take 1 tablet (20 mg) by mouth daily (with breakfast)      metoprolol succinate ER (TOPROL XL) 50 MG 24 hr tablet Take 1 tablet (50 mg) by mouth At Bedtime 90 tablet 3    PARoxetine (PAXIL) 40 MG tablet Take 1 tablet (40 mg) by mouth every morning 90 tablet 3    umeclidinium-vilanterol (ANORO ELLIPTA) 62.5-25 MCG/ACT oral inhaler INHALE ONE PUFF BY MOUTH ONCE DAILY 60 each 4    varenicline (CHANTIX) 0.5 MG tablet Take 1 tablet (0.5 mg) by mouth 2 times daily 60 tablet 5          Allergies   Allergen Reactions    Codeine Nausea and Vomiting         Exam: alert, appears in NAD.  Blood pressure 111/68, pulse 64, temperature 98.2  F (36.8  C), temperature source Oral, resp. rate 18, weight 80.7 kg (178 lb), SpO2 96%.  Wt Readings from Last 4 Encounters:   08/19/24 80.7 kg (178 lb)   07/09/24 79.2 kg (174 lb 11.2 oz)   06/28/24 74.8 kg (165 lb)   04/11/24 78.4 kg (172 lb 12.8 oz)     Oropharynx is moist and without lesion. Neck supple and without adenopathy, no thyromegaly. Lungs:CTA. Heart: RRR, no murmur or rub. Abdomen: soft, nontender, BS active, no masses or organomegaly.  Extremities: warm, no edema. Speech is clear. No palpable axillae, supraclavicular or neck nodes.      Labs:    Latest Reference Range & Units 08/14/24 11:18   Creatinine POCT 0.7 - 1.3 mg/dL 1.0   GFR, ESTIMATED POCT >60 mL/min/1.73m2 >60        Latest Reference Range & Units 06/24/24 10:58 08/14/24 11:03   CEA ng/mL 6.5 7.1       Imaging: CT chest with contrast     INDICATION: Adenocarcinoma of the appendix     COMPARISON: Chest abdomen pelvis CT 6/24/2024     CONTRAST: 85 mL intravenous [id number]     FINDINGS: Good contrast enhancement throughout. Left vertebral artery  changes strictly from the aortic arch is now anatomical variant.  Included thyroid appears unremarkable. No enlarged  axillary lymph  nodes. There are scattered nonenlarged multistation mediastinal lymph  nodes present. A borderline right hilar lymph node measures 1.3 x 1.3  cm unchanged.  No pleural or pericardial effusion.  Multiple calcified splenic granulomas in the upper abdomen. Adrenals  normal. No other suspicious upper abdominal mass. There is some wall  thickening throughout the duodenum which is not significantly  different from April indication prior duodenitis, please correlate for  any eating dysfunction/obstructive symptoms.  Bone detail shows osteopenia. Degenerative changes throughout the  spine from the lower cervical on down through the upper lumbar levels.  Osteoarthritic DJD at the shoulders, right glenohumeral more than left  glenohumeral joint involvement noted.  Incidentally noted is sharp downsloping without significant stenosis  of the celiac axis origin from the aorta.     Detail of the lungs shows mild subpleural fibrosis with other mild  upper lung oligemia. There is a solid noncalcified pulmonary nodule  adjacent to the right minor fissure (4/197) measuring 4 mm in mean  diameter unchanged. Calcified right lung base nodule unchanged.  Subsegmental atelectasis in the left lower lobe and in the lingula. No  dominant left-sided pulmonary nodules.     IMPRESSION no CT evidence of metastatic disease to the chest.  Benign-appearing pulmonary nodules again noted on the right unchanged  from April. Unchanged borderline enlarged right hilar and multistation  mediastinal lymph nodes. History sclerosis. Granulomatous disease.     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 Stopped cigarette smoking about 3 weeks ago, still lives in a house with a smoker. Also is a regular smoker of cannabis.  -plan to repeat in 6 months    Bladder cancer, recurrent  -diagnosed in 2029, treated with BCG induction. Then recurred in Feb 2020, repeat BG, completed in April 2020. Recurrence noted in July 2020, treated with TURBT in Sept 2020 with low-grade disease. No recurrence since then.  -followed by Dr. Villalba, every 6 month cystoscopy and urine cytology. Last in May 2024, negative for recurrence.    MUTYH-associated polyposis (MAP), met with Dee Schaefer GC 8/2021  -is up to date on endoscopic screening, both upper and lower GI due again in March 2025  -should have skin and thyroid exams as part of his annual physical exam.  -bladder cancer screening per urology  -I have a message out to  Dee Schaefer GC and review whether guidelines for screening have changed.    R scrotal swelling-nontender  -he has set up a time to meet with his PCP to review this. Reviewed his most recent scan, has a R hydrocele, possibly contributing. Discussed the possibility of US to confirm. He will discuss with Dr. Quiroz.

## 2024-08-19 NOTE — NURSING NOTE
"Oncology Rooming Note    August 19, 2024 4:00 PM   Tevin Guidry is a 74 year old male who presents for:    Chief Complaint   Patient presents with    Oncology Clinic Visit     Bladder Ca     Initial Vitals: /68 (BP Location: Right arm, Patient Position: Sitting, Cuff Size: Adult Regular)   Pulse 64   Temp 98.2  F (36.8  C) (Oral)   Resp 18   Wt 80.7 kg (178 lb)   SpO2 96%   BMI 27.06 kg/m   Estimated body mass index is 27.06 kg/m  as calculated from the following:    Height as of 6/28/24: 1.727 m (5' 8\").    Weight as of this encounter: 80.7 kg (178 lb). Body surface area is 1.97 meters squared.  No Pain (0) Comment: Data Unavailable   No LMP for male patient.  Allergies reviewed: Yes  Medications reviewed: Yes    Medications: Medication refills not needed today.  Pharmacy name entered into [employer]:    [facility] [place], [place] - [address]  [facility] - [place], [place] - [address] [facility] [facility]    Frailty Screening:   Is the patient here for a new oncology consult visit in cancer care? 2. No      Clinical concerns:        Annie Parrish CMA              "

## 2024-08-29 ENCOUNTER — TELEPHONE (OUTPATIENT)
Dept: CARDIOLOGY | Facility: CLINIC | Age: 74
End: 2024-08-29
Payer: COMMERCIAL

## 2024-08-29 NOTE — TELEPHONE ENCOUNTER
Patient Contacted for the patient to call back and schedule the following:    Appointment type: return cardio  Provider: Soto  Return date: 12/23/24  Specialty phone number: [phone] opt 1  Additional appointment(s) needed: N/A  Additonal Notes: N/A

## 2024-09-03 ENCOUNTER — OFFICE VISIT (OUTPATIENT)
Dept: FAMILY MEDICINE | Facility: CLINIC | Age: 74
End: 2024-09-03
Payer: COMMERCIAL

## 2024-09-03 VITALS
RESPIRATION RATE: 18 BRPM | OXYGEN SATURATION: 98 % | TEMPERATURE: 97.6 F | DIASTOLIC BLOOD PRESSURE: 78 MMHG | WEIGHT: 178.1 LBS | HEIGHT: 68 IN | HEART RATE: 53 BPM | SYSTOLIC BLOOD PRESSURE: 129 MMHG | BODY MASS INDEX: 26.99 KG/M2

## 2024-09-03 DIAGNOSIS — E78.5 HYPERLIPIDEMIA LDL GOAL <70: ICD-10-CM

## 2024-09-03 DIAGNOSIS — E53.8 VITAMIN B12 DEFICIENCY (NON ANEMIC): ICD-10-CM

## 2024-09-03 DIAGNOSIS — I10 ESSENTIAL HYPERTENSION WITH GOAL BLOOD PRESSURE LESS THAN 140/90: ICD-10-CM

## 2024-09-03 DIAGNOSIS — Z00.00 ENCOUNTER FOR MEDICARE ANNUAL WELLNESS EXAM: Primary | ICD-10-CM

## 2024-09-03 DIAGNOSIS — I25.119 CORONARY ARTERY DISEASE INVOLVING NATIVE HEART WITH ANGINA PECTORIS, UNSPECIFIED VESSEL OR LESION TYPE (H): ICD-10-CM

## 2024-09-03 DIAGNOSIS — I10 HYPERTENSION GOAL BP (BLOOD PRESSURE) < 140/90: ICD-10-CM

## 2024-09-03 DIAGNOSIS — J44.9 CHRONIC OBSTRUCTIVE PULMONARY DISEASE, UNSPECIFIED COPD TYPE (H): Chronic | ICD-10-CM

## 2024-09-03 DIAGNOSIS — I25.10 CAD, MULTIPLE VESSEL: ICD-10-CM

## 2024-09-03 DIAGNOSIS — F40.10 SOCIAL PHOBIA: Chronic | ICD-10-CM

## 2024-09-03 DIAGNOSIS — Z29.11 NEED FOR VACCINATION AGAINST RESPIRATORY SYNCYTIAL VIRUS: ICD-10-CM

## 2024-09-03 LAB
ALBUMIN SERPL BCG-MCNC: 4.2 G/DL (ref 3.5–5.2)
ALP SERPL-CCNC: 119 U/L (ref 40–150)
ALT SERPL W P-5'-P-CCNC: 19 U/L (ref 0–70)
ANION GAP SERPL CALCULATED.3IONS-SCNC: 11 MMOL/L (ref 7–15)
AST SERPL W P-5'-P-CCNC: 24 U/L (ref 0–45)
BILIRUB SERPL-MCNC: 0.3 MG/DL
BUN SERPL-MCNC: 13.1 MG/DL (ref 8–23)
CALCIUM SERPL-MCNC: 9.1 MG/DL (ref 8.8–10.4)
CHLORIDE SERPL-SCNC: 103 MMOL/L (ref 98–107)
CHOLEST SERPL-MCNC: 137 MG/DL
CREAT SERPL-MCNC: 0.98 MG/DL (ref 0.67–1.17)
CREAT UR-MCNC: 41.3 MG/DL
EGFRCR SERPLBLD CKD-EPI 2021: 80 ML/MIN/1.73M2
FASTING STATUS PATIENT QL REPORTED: YES
FASTING STATUS PATIENT QL REPORTED: YES
GLUCOSE SERPL-MCNC: 103 MG/DL (ref 70–99)
HCO3 SERPL-SCNC: 27 MMOL/L (ref 22–29)
HDLC SERPL-MCNC: 59 MG/DL
LDLC SERPL CALC-MCNC: 61 MG/DL
MICROALBUMIN UR-MCNC: <12 MG/L
MICROALBUMIN/CREAT UR: NORMAL MG/G{CREAT}
NONHDLC SERPL-MCNC: 78 MG/DL
POTASSIUM SERPL-SCNC: 4.6 MMOL/L (ref 3.4–5.3)
PROT SERPL-MCNC: 6.9 G/DL (ref 6.4–8.3)
SODIUM SERPL-SCNC: 141 MMOL/L (ref 135–145)
TRIGL SERPL-MCNC: 87 MG/DL
VIT B12 SERPL-MCNC: 399 PG/ML (ref 232–1245)

## 2024-09-03 PROCEDURE — 36415 COLL VENOUS BLD VENIPUNCTURE: CPT | Performed by: FAMILY MEDICINE

## 2024-09-03 PROCEDURE — 82043 UR ALBUMIN QUANTITATIVE: CPT | Performed by: FAMILY MEDICINE

## 2024-09-03 PROCEDURE — 80053 COMPREHEN METABOLIC PANEL: CPT | Performed by: FAMILY MEDICINE

## 2024-09-03 PROCEDURE — G0439 PPPS, SUBSEQ VISIT: HCPCS | Performed by: FAMILY MEDICINE

## 2024-09-03 PROCEDURE — 99214 OFFICE O/P EST MOD 30 MIN: CPT | Mod: 25 | Performed by: FAMILY MEDICINE

## 2024-09-03 PROCEDURE — 82570 ASSAY OF URINE CREATININE: CPT | Performed by: FAMILY MEDICINE

## 2024-09-03 PROCEDURE — 82607 VITAMIN B-12: CPT | Performed by: FAMILY MEDICINE

## 2024-09-03 PROCEDURE — 80061 LIPID PANEL: CPT | Performed by: FAMILY MEDICINE

## 2024-09-03 RX ORDER — METOPROLOL SUCCINATE 50 MG/1
50 TABLET, EXTENDED RELEASE ORAL AT BEDTIME
Qty: 90 TABLET | Refills: 3 | Status: SHIPPED | OUTPATIENT
Start: 2024-09-03

## 2024-09-03 RX ORDER — ALBUTEROL SULFATE 90 UG/1
2 AEROSOL, METERED RESPIRATORY (INHALATION) EVERY 6 HOURS PRN
Qty: 8.5 G | Refills: 1 | Status: SHIPPED | OUTPATIENT
Start: 2024-09-03

## 2024-09-03 RX ORDER — EZETIMIBE 10 MG/1
10 TABLET ORAL DAILY
Qty: 90 TABLET | Refills: 3 | Status: SHIPPED | OUTPATIENT
Start: 2024-09-03

## 2024-09-03 RX ORDER — ATORVASTATIN CALCIUM 80 MG/1
TABLET, FILM COATED ORAL
Qty: 90 TABLET | Refills: 3 | Status: SHIPPED | OUTPATIENT
Start: 2024-09-03

## 2024-09-03 RX ORDER — PAROXETINE 40 MG/1
40 TABLET, FILM COATED ORAL EVERY MORNING
Qty: 90 TABLET | Refills: 3 | Status: SHIPPED | OUTPATIENT
Start: 2024-09-03

## 2024-09-03 ASSESSMENT — PATIENT HEALTH QUESTIONNAIRE - PHQ9
SUM OF ALL RESPONSES TO PHQ QUESTIONS 1-9: 3
SUM OF ALL RESPONSES TO PHQ QUESTIONS 1-9: 3
10. IF YOU CHECKED OFF ANY PROBLEMS, HOW DIFFICULT HAVE THESE PROBLEMS MADE IT FOR YOU TO DO YOUR WORK, TAKE CARE OF THINGS AT HOME, OR GET ALONG WITH OTHER PEOPLE: NOT DIFFICULT AT ALL

## 2024-09-03 ASSESSMENT — PAIN SCALES - GENERAL: PAINLEVEL: NO PAIN (0)

## 2024-09-03 NOTE — PATIENT INSTRUCTIONS
Patient Education   I recommend getting the RSV, COVID and flu vaccine(s) at a local pharmacy.  Preventive Care Advice   This is general advice given by our system to help you stay healthy. However, your care team may have specific advice just for you. Please talk to your care team about your preventive care needs.  Nutrition  Eat 5 or more servings of fruits and vegetables each day.  Try wheat bread, brown rice and whole grain pasta (instead of white bread, rice, and pasta).  Get enough calcium and vitamin D. Check the label on foods and aim for 100% of the RDA (recommended daily allowance).  Lifestyle  Exercise at least 150 minutes each week  (30 minutes a day, 5 days a week).  Do muscle strengthening activities 2 days a week. These help control your weight and prevent disease.  No smoking.  Wear sunscreen to prevent skin cancer.  Have a dental exam and cleaning every 6 months.  Yearly exams  See your health care team every year to talk about:  Any changes in your health.  Any medicines your care team has prescribed.  Preventive care, family planning, and ways to prevent chronic diseases.  Shots (vaccines)   HPV shots (up to age 26), if you've never had them before.  Hepatitis B shots (up to age 59), if you've never had them before.  COVID-19 shot: Get this shot when it's due.  Flu shot: Get a flu shot every year.  Tetanus shot: Get a tetanus shot every 10 years.  Pneumococcal, hepatitis A, and RSV shots: Ask your care team if you need these based on your risk.  Shingles shot (for age 50 and up)  General health tests  Diabetes screening:  Starting at age 35, Get screened for diabetes at least every 3 years.  If you are younger than age 35, ask your care team if you should be screened for diabetes.  Cholesterol test: At age 39, start having a cholesterol test every 5 years, or more often if advised.  Bone density scan (DEXA): At age 50, ask your care team if you should have this scan for osteoporosis (brittle  bones).  Hepatitis C: Get tested at least once in your life.  STIs (sexually transmitted infections)  Before age 24: Ask your care team if you should be screened for STIs.  After age 24: Get screened for STIs if you're at risk. You are at risk for STIs (including HIV) if:  You are sexually active with more than one person.  You don't use condoms every time.  You or a partner was diagnosed with a sexually transmitted infection.  If you are at risk for HIV, ask about PrEP medicine to prevent HIV.  Get tested for HIV at least once in your life, whether you are at risk for HIV or not.  Cancer screening tests  Cervical cancer screening: If you have a cervix, begin getting regular cervical cancer screening tests starting at age 21.  Breast cancer scan (mammogram): If you've ever had breasts, begin having regular mammograms starting at age 40. This is a scan to check for breast cancer.  Colon cancer screening: It is important to start screening for colon cancer at age 45.  Have a colonoscopy test every 10 years (or more often if you're at risk) Or, ask your provider about stool tests like a FIT test every year or Cologuard test every 3 years.  To learn more about your testing options, visit:   .  For help making a decision, visit:   https://bit.ly/or99374.  Prostate cancer screening test: If you have a prostate, ask your care team if a prostate cancer screening test (PSA) at age 55 is right for you.  Lung cancer screening: If you are a current or former smoker ages 50 to 80, ask your care team if ongoing lung cancer screenings are right for you.  For informational purposes only. Not to replace the advice of your health care provider. Copyright   2023 [employer]. All rights reserved. Clinically reviewed by the [facility]. [employer] - Blog[employer].it 640540 - REV 01/24.  Hearing Loss: Care Instructions  Overview     Hearing loss is a sudden or slow decrease in how well you hear. It can range from  slight to profound. Permanent hearing loss can occur with aging. It also can happen when you are exposed long-term to loud noise. Examples include listening to loud music, riding motorcycles, or being around other loud machines.  Hearing loss can affect your work and home life. It can make you feel lonely or depressed. You may feel that you have lost your independence. But hearing aids and other devices can help you hear better and feel connected to others.  Follow-up care is a key part of your treatment and safety. Be sure to make and go to all appointments, and call your doctor if you are having problems. It's also a good idea to know your test results and keep a list of the medicines you take.  How can you care for yourself at home?  Avoid loud noises whenever possible. This helps keep your hearing from getting worse.  Always wear hearing protection around loud noises.  Wear a hearing aid as directed.  A professional can help you pick a hearing aid that will work best for you.  You can also get hearing aids over the counter for mild to moderate hearing loss.  Have hearing tests as your doctor suggests. They can show whether your hearing has changed. Your hearing aid may need to be adjusted.  Use other devices as needed. These may include:  Telephone amplifiers and hearing aids that can connect to a television, stereo, radio, or microphone.  Devices that use lights or vibrations. These alert you to the doorbell, a ringing telephone, or a baby monitor.  Television closed-captioning. This shows the words at the bottom of the screen. Most new TVs can do this.  TTY (text telephone). This lets you type messages back and forth on the telephone instead of talking or listening. These devices are also called TDD. When messages are typed on the keyboard, they are sent over the phone line to a receiving TTY. The message is shown on a monitor.  Use text messaging, social media, and email if it is hard for you to communicate  "by telephone.  Try to learn a listening technique called speechreading. It is not lipreading. You pay attention to people's gestures, expressions, posture, and tone of voice. These clues can help you understand what a person is saying. Face the person you are talking to, and have them face you. Make sure the lighting is good. You need to see the other person's face clearly.  Think about counseling if you need help to adjust to your hearing loss.  When should you call for help?  Watch closely for changes in your health, and be sure to contact your doctor if:    You think your hearing is getting worse.     You have new symptoms, such as dizziness or nausea.   Where can you learn more?  Go to https://www.[employer].net/patiented  Enter R798 in the search box to learn more about \"Hearing Loss: Care Instructions.\"  Current as of: September 27, 2023               Content Version: 14.0    7795-9761 [employer].   Care instructions adapted under license by your healthcare professional. If you have questions about a medical condition or this instruction, always ask your healthcare professional. [employer] disclaims any warranty or liability for your use of this information.      Substance Use Disorder: Care Instructions  Overview     You can improve your life and health by stopping your use of alcohol or drugs. When you don't drink or use drugs, you may feel and sleep better. You may get along better with your family, friends, and coworkers. There are medicines and programs that can help with substance use disorder.  How can you care for yourself at home?  Here are some ways to help you stay sober and prevent relapse.  If you have been given medicine to help keep you sober or reduce your cravings, be sure to take it exactly as prescribed.  Talk to your doctor about programs that can help you stop using drugs or drinking alcohol.  Do not keep alcohol or drugs in your home.  Plan ahead. Think about " what you'll say if other people ask you to drink or use drugs. Try not to spend time with people who drink or use drugs.  Use the time and money spent on drinking or drugs to do something that's important to you.  Preventing a relapse  Have a plan to deal with relapse. Learn to recognize changes in your thinking that lead you to drink or use drugs. Get help before you start to drink or use drugs again.  Try to stay away from situations, friends, or places that may lead you to drink or use drugs.  If you feel the need to drink alcohol or use drugs again, seek help right away. Call a trusted friend or family member. Some people get support from organizations such as Narcotics Anonymous or [employer] or from treatment facilities.  If you relapse, get help as soon as you can. Some people make a plan with another person that outlines what they want that person to do for them if they relapse. The plan usually includes how to handle the relapse and who to notify in case of relapse.  Don't give up. Remember that a relapse doesn't mean that you have failed. Use the experience to learn the triggers that lead you to drink or use drugs. Then quit again. Recovery is a lifelong process. Many people have several relapses before they are able to quit for good.  Follow-up care is a key part of your treatment and safety. Be sure to make and go to all appointments, and call your doctor if you are having problems. It's also a good idea to know your test results and keep a list of the medicines you take.  When should you call for help?   Call 911  anytime you think you may need emergency care. For example, call if you or someone else:    Has overdosed or has withdrawal signs. Be sure to tell the emergency workers that you are or someone else is using or trying to quit using drugs. Overdose or withdrawal signs may include:  Losing consciousness.  Seizure.  Seeing or hearing things that aren't there (hallucinations).     Is thinking  "or talking about suicide or harming others.   Where to get help 24 hours a day, 7 days a week   If you or someone you know talks about suicide, self-harm, a mental health crisis, a substance use crisis, or any other kind of emotional distress, get help right away. You can:    Call the Suicide and Crisis Lifeline at 988.     Call 7-509-169-TALK (1-621.265.4221).     Text HOME to 257501 to access the Crisis Text Line.   Consider saving these numbers in your phone.  Go to [employer] for more information or to chat online.  Call your doctor now or seek immediate medical care if:    You are having withdrawal symptoms. These may include nausea or vomiting, sweating, shakiness, and anxiety.   Watch closely for changes in your health, and be sure to contact your doctor if:    You have a relapse.     You need more help or support to stop.   Where can you learn more?  Go to https://www.[employer].net/patiented  Enter H573 in the search box to learn more about \"Substance Use Disorder: Care Instructions.\"  Current as of: November 15, 2023               Content Version: 14.0    6121-7994 [employer].   Care instructions adapted under license by your healthcare professional. If you have questions about a medical condition or this instruction, always ask your healthcare professional. [employer] disclaims any warranty or liability for your use of this information.         "

## 2024-09-03 NOTE — PROGRESS NOTES
Preventive Care Visit  [facility]  Preethi Quiroz MD, Family Medicine  Sep 3, 2024      Assessment & Plan       ICD-10-CM    1. Encounter for Medicare annual wellness exam  Z00.00       2. Need for vaccination against respiratory syncytial virus  Z29.11       3. Hypertension goal BP (blood pressure) < 140/90  I10 COMPREHENSIVE METABOLIC PANEL     Albumin Random Urine Quantitative with Creat Ratio     COMPREHENSIVE METABOLIC PANEL     Albumin Random Urine Quantitative with Creat Ratio      4. Coronary artery disease involving native heart with angina pectoris, unspecified vessel or lesion type (H24)  I25.119 Lipid panel reflex to direct LDL Non-fasting     Lipid panel reflex to direct LDL Non-fasting      5. Chronic obstructive pulmonary disease, unspecified COPD type (H)  J44.9 albuterol (PROAIR HFA/PROVENTIL HFA/VENTOLIN HFA) 108 (90 Base) MCG/ACT inhaler      6. Hyperlipidemia LDL goal <70  E78.5 atorvastatin (LIPITOR) 80 MG tablet     ezetimibe (ZETIA) 10 MG tablet      7. Essential hypertension with goal blood pressure less than 140/90  I10 ezetimibe (ZETIA) 10 MG tablet      8. CAD, multiple vessel  I25.10 metoprolol succinate ER (TOPROL XL) 50 MG 24 hr tablet      9. Social phobia  F40.10 PARoxetine (PAXIL) 40 MG tablet      10. Vitamin B12 deficiency (non anemic)  E53.8 Vitamin B12     Vitamin B12         Wellness visit     Colonoscopy done in March 2023 and was told to return in 2 years.   Recommend skin cancer screening - he says he went 3 months ago (will get JOSE MIGUEL)   Is following with his dentist regularly again as well   Sees ophtho regularly for glaucoma  Immunizations: recommend flu, COVID and RSV vaccines at a local pharmacy      Essential hypertension with goal blood pressure less than 140/90  Controlled. Continues lisinopril 20mg once daily, amlodipine 5mg daily, metoprolol 50mg daily.          Hyperlipidemia LDL goal <70  Controlled.  Continues atorvastatin 80mg daily and  "zetia 10mg daily.  Following with cardiology.         History of bladder cancer  Follows with urology.  Last visit 5/24-note reviewed today.  No recurrence. Cysto recommended in November.       Presented with gross hematuria in July 2019, evaluated by Urology and found to have non-invasive high grfade urothelial carcinoma s/p TURBT on 9/5/19.  9/19 - Noninvasive HG bladder cancer  Underwent induction BCG  2/20 - Noninvasive LG bladder cancer  Underwent repeat induction BCG     CAD, multiple vessel s/p CABG  Stable continues ASA 81mg daily and statin. Has not needed to take his nitroglycerin. He has been smoke free for 2 months again  Saw cardiology in 12/22 and has appt scheduled this fall          Social phobia  Stable on paroxetine 40mg daily.  Refilled today        History of Tobacco dependence  Smoke free for 2 months. Has been working with [facility].   Continues bupropion         Chronic obstructive pulmonary disease, unspecified COPD type (H)   stable. Rare cough or use of albuterol.   Denies worsening shortness of breath.   continues LAMA/LABA      Signet ring cell adenocarcinoma   Tubular adenoma of colon  History of colon polyps  Following with oncology and GI.       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       He is at increased risk for colon polyps and colon cancer.      Per genetics visit notes:   \"Cancer Risks:   We discussed that individuals with two mutations in the MUTYH gene have a diagnosis of MUTYH-associated polyposis (MAP).   Individuals with MAP can have hundreds to thousands of polyps, but most have fewer than 100. Multiple different types of colon polyps may be seen in individuals with MAP including adenomas, serrated adenomas, hyperplastic/sessile serrated polyps, and mixed (hyperplastic and adenomatous) polyps.  Individuals with MAP have an increased risk of colorectal cancer. In the absence of appropriate surveillance, the lifetime risk for colorectal cancer may be up to " 80%.  There is also an increased risk for duodenal cancer in individuals with MAP.   Individuals with MAP may also be at increased risk for other cancers, including ovarian and bladder, although current risks are not well defined. Other cancers (including breast, uterine, gastric, pancreatic, skin, and thyroid) have also been reported in individuals with MAP, although confirmed risks are not available at this time.   Individuals with MAP may also have these features:  CHRPE: freckle like spots on the inside of the eye  Dental abnormalities/cysts on the jaw bone  Benign and malignant tumors of the skin s oil glands (sebaceous tumors)      Cancer Screening and Prevention:  The following screening is recommended for individuals with MUTYH-associated polyposis (MAP) per current National Comprehensive Cancer Network (NCCN) guidelines:  High-quality colonoscopy and polypectomy every 1-2 years beginning at age 25-30 (or earlier based on family history). Depending upon colon polyp burden, removal of the colon may be recommended with continued surveillance of the rectum (as needed).  Baseline upper endoscopy (including complete visualization of the ampulla of Vater) beginning at age 30-35; repeated based on polyp findings.  Annual physical exam to begin at the time of diagnosis with MAP. Guidelines do not yet encourage screening for other cancers, but skin and thyroid exams could be done as part of the annual physical exam.  There are currently no other specific cancer screening guidelines for other cancers potentially associated with MAP. As such, additional screening should be based on personal and family history.     Other screening based on Tevin's personal and family history:  He was recently diagnosed with a cancer of unknown primary site (likely GI). He had extensive workup for this. He should continue with the plan for active surveillance as directed by his oncologist, Tanner Sánchez MD, PhD.  Tevin has already also  "had laparoscopic right hemicolectomy on 4/13/21 due to high colon polyp burden. He should continue with his colonoscopy surveillance as outlined above and as recommended by his physicians.  He also has a history of bladder cancer initially diagnosed in 2019. He should continue with his follow up for this as recommended by his urologist.      Of note, the above information is based on our current understanding of Tevin's genetic findings. Tevin is encouraged to reach out to me regularly regarding any pertinent updates to his personal and/or family history of cancer, as our understanding of the genetic findings in his family may change over time. \"        Vitamin B12 deficiency  Identified by GI and he had been on vitamin B12 injections for a year and is not taking any OTC B12 at this point. Recheck B12 today. If on the low side, would recommend starting daily B12 supplement.         BMI  Estimated body mass index is 27.31 kg/m  as calculated from the following:    Height as of this encounter: 1.72 m (5' 7.72\").    Weight as of this encounter: 80.8 kg (178 lb 1.6 oz).       Counseling  Appropriate preventive services were addressed with this patient via screening, questionnaire, or discussion as appropriate for fall prevention, nutrition, physical activity, Tobacco-use cessation, social engagement, weight loss and cognition.  Checklist reviewing preventive services available has been given to the patient.  Reviewed patient's diet, addressing concerns and/or questions.   He is at risk for lack of exercise and has been provided with information to increase physical activity for the benefit of his well-being.   The patient was provided with written information regarding signs of hearing loss.            Subjective   Hola is a 74 year old, presenting for the following:  Annual Visit        9/3/2024    10:50 AM   Additional Questions   Roomed by Raad TERESA   Accompanied by self         Health Care Directive  Patient has a " "Health Care Directive on file  Advance care planning document is on file and is current.    HPI     Gained weight since he quit smoking permanently. Has COPD  that calls him every 3 weeks. Eating better and walking more.     Notes the CEA was elevated.     He quit smoking for 6 months. Then he has \"snuck in a butt\" occasionally from another person in the house who smokes. It has been about 2 months since his last puff.           8/29/2024   General Health   How would you rate your overall physical health? Good   Feel stress (tense, anxious, or unable to sleep) Not at all            8/29/2024   Nutrition   Diet: Regular (no restrictions)            8/29/2024   Exercise   Days per week of moderate/strenous exercise 2 days   Average minutes spent exercising at this level 10 min      (!) EXERCISE CONCERN      8/29/2024   Social Factors   Worry food won't last until get money to buy more No   Food not last or not have enough money for food? No   Do you have housing? (Housing is defined as stable permanent housing and does not include staying ouside in a car, in a tent, in an abandoned building, in an overnight shelter, or couch-surfing.) Patient declined   Are you worried about losing your housing? No   Lack of transportation? No   Unable to get utilities (heat,electricity)? No            8/29/2024   Fall Risk   Fallen 2 or more times in the past year? No    No   Trouble with walking or balance? No    No       Multiple values from one day are sorted in reverse-chronological order          8/29/2024   Activities of Daily Living- Home Safety   Needs help with the following daily activites None of the above   Safety concerns in the home None of the above            8/29/2024   Dental   Dentist two times every year? Yes            8/29/2024   Hearing Screening   Hearing concerns? (!) I FEEL THAT PEOPLE ARE MUMBLING OR NOT SPEAKING CLEARLY.    (!) IT'S HARDER TO UNDERSTAND WOMEN'S VOICES THAN MEN'S VOICES.    (!) TROUBLE " UNDERSTANDING SOFT OR WHISPERED SPEECH.       Multiple values from one day are sorted in reverse-chronological order         2024   Driving Risk Screening   Patient/family members have concerns about driving No            2024   General Alertness/Fatigue Screening   Have you been more tired than usual lately? No            2024   Urinary Incontinence Screening   Bothered by leaking urine in past 6 months No            2024   TB Screening   Were you born outside of the [place]? No          Today's PHQ-9 Score:       9/3/2024    10:26 AM   PHQ-9 SCORE   PHQ-9 Total Score MyChart 3 (Minimal depression)   PHQ-9 Total Score 3         2024   Substance Use   If I could quit smoking, I would Completely agree   I want to quit somking, worry about health affects Completely agree   Willing to make a plan to quit smoking Completely agree   Willing to cut down before quitting Completely agree   Alcohol more than 3/day or more than 7/wk Not Applicable   Do you have a current opioid prescription? No   How severe/bad is pain from 1 to 10? 1/10   Do you use any other substances recreationally? (!) CANNABIS PRODUCTS        Social History     Tobacco Use    Smoking status: Former     Current packs/day: 0.00     Types: Cigarettes     Quit date:      Years since quittin.6     Passive exposure: Past    Smokeless tobacco: Never    Tobacco comments:     Quit again now 2024.    Vaping Use    Vaping status: Never Used   Substance Use Topics    Alcohol use: No     Comment: quit 40 years ago    Drug use: Yes     Types: Marijuana     Comment: occasionally uses marijuana       ASCVD Risk   The 10-year ASCVD risk score (Lakeisha BILLY, et al., 2019) is: 26.3%    Values used to calculate the score:      Age: 74 years      Sex: Male      Is Non- : No      Diabetic: No      Tobacco smoker: No      Systolic Blood Pressure: 129 mmHg      Is BP treated: Yes      HDL Cholesterol: 59 mg/dL       Total Cholesterol: 137 mg/dL           Reviewed and updated as needed this visit by Provider                    Past Medical History:   Diagnosis Date    Bladder cancer (H)     Colon polyps     multiple may need colectomy (adenoma)    COPD (chronic obstructive pulmonary disease) (H)     Hyperlipidemia LDL goal <130     Hypertension goal BP (blood pressure) < 140/90     Social phobia      Past Surgical History:   Procedure Laterality Date    CATARACT IOL, RT/LT      CLAVICLE SURGERY Right     fracture in childhood    COLONOSCOPY N/A 3/30/2017    Procedure: COLONOSCOPY;  Surgeon: Jie Onofre MD;  Location: [facility]    COLONOSCOPY N/A 3/2/2021    Procedure: COLONOSCOPY, WITH POLYPECTOMY, hot snare, lift with elaview and epi, clip for hemorrhage control, tattoo;  Surgeon: Ra Cardoso MD;  Location: [facility]    COLONOSCOPY N/A 4/22/2021    Procedure: Colonoscopy;  Surgeon: Jitendra Rodríguez MD;  Location: [facility]    COLONOSCOPY N/A 3/22/2023    Procedure: COLONOSCOPY, WITH POLYPECTOMY AND BIOPSY;  Surgeon: Ra Cardoso MD;  Location: [facility]    CYSTOSCOPY, BIOPSY BLADDER INSTILL OPTICAL AGENT N/A 9/2/2020    Procedure: CYSTOSCOPY, WITH INSTILLATION OF OPTICAL IMAGING AGENT INTO BLADDER AND BIOPSY OF BLADDER (CYSVIEW);  Surgeon: Kirt Guzman MD;  [facility]    CYSTOSCOPY, TRANSURETHRAL RESECTION (TUR) TUMOR BLADDER, COMBINED N/A 9/5/2019    Procedure: cystoscopy,Transurethral Resection Of Bladder Tumor;  Surgeon: Almas Sunshine MD;  [facility]    CYSTOSCOPY, TRANSURETHRAL RESECTION (TUR) TUMOR BLADDER, COMBINED N/A 2/6/2020    Procedure: CYSTOSCOPY, WITH TRANSURETHRAL RESECTION BLADDER TUMOR;  Surgeon: Almas Sunshine MD;  [facility]    DAVINCI BYPASS ARTERY CORONARY N/A 9/29/2014    Procedure: DAVINCI BYPASS ARTERY CORONARY;  Surgeon: Lam Solis MD;  Location: [facility]    ESOPHAGOSCOPY, GASTROSCOPY, DUODENOSCOPY (EGD), COMBINED N/A 3/30/2017     Procedure: COMBINED ESOPHAGOSCOPY, GASTROSCOPY, DUODENOSCOPY (EGD);  Surgeon: Jie Onofre MD;  Location: [facility]    PHACOEMULSIFICATION CLEAR CORNEA WITH STANDARD INTRAOCULAR LENS IMPLANT Right 12/11/2020    Procedure: RIGHT EYE COMPLEX CATARACT REMOVAL WITH INTRAOCULAR LENS IMPLANT;  Surgeon: Aimee Marcus MD;  Location: [facility]    PHACOEMULSIFICATION CLEAR CORNEA WITH STANDARD INTRAOCULAR LENS IMPLANT Left 2/2/2023    Procedure: COMPLEX LEFT EYE PHACOEMULSIFICATION, CATARACT, WITH INTRAOCULAR LENS IMPLANT;  Surgeon: Guido Massey MD;  Location: [facility]    SURGICAL HISTORY OF -       right clavicular fracture with ORIF    ZZHC COLONOSCOPY THRU STOMA, DIAGNOSTIC  2010    polyps due 2011     Current providers sharing in care for this patient include:  Patient Care Team:  Preethi Quiroz MD as PCP - General (Family Medicine)  Casa Evans MD as MD (Cardiology)  Almas Sunshine MD as MD (Urology)  Carissa Barnes RN as Specialty Care Coordinator (Urology)  Preethi Quiroz MD as Referring Physician (Family Practice)  Kirt Guzman MD as MD (Urology)  Aimee Marcus MD as MD (Ophthalmology)  Jitendra Rodríguez MD as Surgeon (Surgery)  Andrea Bah RPH as Pharmacist (Pharmacist)  Azul Machuca MD as MD (Cardiology)  Elizabeth Martinez, RN as Lead Care Coordinator (Primary Care - CC)  Jitendra Rodríguez MD as Surgeon (Surgery)  Jitendra Rodríguez MD as Surgeon (Surgery)  Sarahy Steele MD as MD (Ophthalmology)  Jasvir Ashley MD as Fellow (Student in organized health care education/training program)  Stephanie Zamora MD as MD (Gastroenterology)  Andrea Bah RPH as Assigned MTM Pharmacist  Sayra Heath MD as MD (Cardiovascular Disease)  Sayra Heath MD as MD (Cardiovascular Disease)  Guido Massey MD as Assigned Surgical Provider  Preethi Quiroz MD as Assigned PCP  Kirt Guzman MD as Assigned Cancer  "Care Provider    The following health maintenance items are reviewed in Epic and correct as of today:  Health Maintenance   Topic Date Due    RSV VACCINE (1 - 1-dose 60+ series) Never done    ANNUAL REVIEW OF HM ORDERS  09/08/2022    INFLUENZA VACCINE (1) 09/01/2024    COVID-19 Vaccine (7 - 2023-24 season) 09/01/2024    PHQ-9  03/03/2025    COLORECTAL CANCER SCREENING  03/22/2025    LUNG CANCER SCREENING  08/14/2025    MEDICARE ANNUAL WELLNESS VISIT  09/03/2025    CMP  09/03/2025    LIPID  09/03/2025    FALL RISK ASSESSMENT  09/03/2025    GLUCOSE  09/03/2027    ADVANCE CARE PLANNING  09/03/2029    DTAP/TDAP/TD IMMUNIZATION (3 - Td or Tdap) 08/03/2030    SPIROMETRY  Completed    HEPATITIS C SCREENING  Completed    COPD ACTION PLAN  Completed    PHQ-2 (once per calendar year)  Completed    Pneumococcal Vaccine: 65+ Years  Completed    ZOSTER IMMUNIZATION  Completed    HPV IMMUNIZATION  Aged Out    MENINGITIS IMMUNIZATION  Aged Out    RSV MONOCLONAL ANTIBODY  Aged Out           Objective    Exam  /78 (BP Location: Left arm, Patient Position: Sitting, Cuff Size: Adult Regular)   Pulse 53   Temp 97.6  F (36.4  C) (Temporal)   Resp 18   Ht 1.72 m (5' 7.72\")   Wt 80.8 kg (178 lb 1.6 oz)   SpO2 98%   BMI 27.31 kg/m     Estimated body mass index is 27.31 kg/m  as calculated from the following:    Height as of this encounter: 1.72 m (5' 7.72\").    Weight as of this encounter: 80.8 kg (178 lb 1.6 oz).    Physical Exam  GENERAL: alert and no distress  EYES: Eyes grossly normal to inspection, PERRL and conjunctivae and sclerae normal  HENT: ear canals and TM's normal, nose and mouth without ulcers or lesions  NECK: no adenopathy, no asymmetry, masses, or scars  RESP: lungs clear to auscultation - no rales, rhonchi or wheezes  CV: regular rate and rhythm, normal S1 S2, no S3 or S4, no murmur, click or rub, no peripheral edema  ABDOMEN: soft, nontender, no hepatosplenomegaly, no masses and bowel sounds normal  MS: no " gross musculoskeletal defects noted, no edema  SKIN: no suspicious lesions or rashes  NEURO: Normal strength and tone, mentation intact and speech normal  PSYCH: mentation appears normal, affect normal/bright         9/3/2024   Mini Cog   Clock Draw Score 2 Normal   3 Item Recall 3 objects recalled   Mini Cog Total Score 5                 Signed Electronically by: Preethi Quiroz MD     Answers submitted by the patient for this visit:  Patient Health Questionnaire (Submitted on 9/3/2024)  If you checked off any problems, how difficult have these problems made it for you to do your work, take care of things at home, or get along with other people?: Not difficult at all  PHQ9 TOTAL SCORE: 3

## 2024-09-04 RX ORDER — ATORVASTATIN CALCIUM 80 MG/1
TABLET, FILM COATED ORAL
Qty: 90 TABLET | Refills: 3 | OUTPATIENT
Start: 2024-09-04

## 2024-10-04 DIAGNOSIS — J44.9 CHRONIC OBSTRUCTIVE PULMONARY DISEASE, UNSPECIFIED COPD TYPE (H): ICD-10-CM

## 2024-10-04 DIAGNOSIS — I10 ESSENTIAL HYPERTENSION WITH GOAL BLOOD PRESSURE LESS THAN 140/90: ICD-10-CM

## 2024-10-04 RX ORDER — UMECLIDINIUM BROMIDE AND VILANTEROL TRIFENATATE 62.5; 25 UG/1; UG/1
1 POWDER RESPIRATORY (INHALATION) DAILY
Qty: 60 EACH | Refills: 2 | Status: SHIPPED | OUTPATIENT
Start: 2024-10-04

## 2024-10-04 RX ORDER — LISINOPRIL 20 MG/1
20 TABLET ORAL
Qty: 90 TABLET | Refills: 0 | Status: SHIPPED | OUTPATIENT
Start: 2024-10-04

## 2024-10-06 DIAGNOSIS — F17.200 NICOTINE DEPENDENCE, UNCOMPLICATED, UNSPECIFIED NICOTINE PRODUCT TYPE: ICD-10-CM

## 2024-10-06 RX ORDER — VARENICLINE TARTRATE 0.5 MG/1
0.5 TABLET, FILM COATED ORAL 2 TIMES DAILY
Qty: 60 TABLET | Refills: 10 | Status: SHIPPED | OUTPATIENT
Start: 2024-10-06

## 2024-10-15 DIAGNOSIS — H40.1131 PRIMARY OPEN ANGLE GLAUCOMA (POAG) OF BOTH EYES, MILD STAGE: Primary | ICD-10-CM

## 2024-10-16 ENCOUNTER — OFFICE VISIT (OUTPATIENT)
Dept: OPHTHALMOLOGY | Facility: CLINIC | Age: 74
End: 2024-10-16
Attending: OPHTHALMOLOGY
Payer: COMMERCIAL

## 2024-10-16 DIAGNOSIS — H40.1131 PRIMARY OPEN ANGLE GLAUCOMA (POAG) OF BOTH EYES, MILD STAGE: ICD-10-CM

## 2024-10-16 PROCEDURE — 99213 OFFICE O/P EST LOW 20 MIN: CPT | Performed by: OPHTHALMOLOGY

## 2024-10-16 PROCEDURE — 92133 CPTRZD OPH DX IMG PST SGM ON: CPT | Performed by: OPHTHALMOLOGY

## 2024-10-16 PROCEDURE — 92083 EXTENDED VISUAL FIELD XM: CPT | Performed by: OPHTHALMOLOGY

## 2024-10-16 PROCEDURE — G0463 HOSPITAL OUTPT CLINIC VISIT: HCPCS | Performed by: OPHTHALMOLOGY

## 2024-10-16 ASSESSMENT — SLIT LAMP EXAM - LIDS
COMMENTS: NORMAL
COMMENTS: NORMAL

## 2024-10-16 ASSESSMENT — VISUAL ACUITY
METHOD: SNELLEN - LINEAR
OS_SC: 20/25
OD_SC: 20/20
OD_SC+: -2

## 2024-10-16 ASSESSMENT — EXTERNAL EXAM - LEFT EYE: OS_EXAM: NORMAL

## 2024-10-16 ASSESSMENT — CUP TO DISC RATIO
OD_RATIO: 0.8
OS_RATIO: 0.7

## 2024-10-16 ASSESSMENT — TONOMETRY
OS_IOP_MMHG: 8
OD_IOP_MMHG: 9
IOP_METHOD: TONOPEN

## 2024-10-16 ASSESSMENT — EXTERNAL EXAM - RIGHT EYE: OD_EXAM: NORMAL

## 2024-10-17 NOTE — PROGRESS NOTES
HPI       Follow Up    In both eyes.  Since onset it is stable.  Associated symptoms include Negative for eye pain, flashes and floaters.  Treatments tried include no treatments.             Comments    Here for follow up. VA is about the same. No gtts. No pain. No flashes or floaters.    Juwan NEWSOME 1:17 PM 2024             Last edited by Juwan German on 10/16/2024  1:17 PM.        HPI:  Tevin Guidry is a 74 year old male with history of CAD s/p CABG, colon CA, HTN, HLD presenting for glaucoma followup. Off drops since Selected laser trabeculoplasty (SLT). Vision stable.     Past Ocular History:  Punched in both eyes age 20 or 21  CE/IOL right eye Maltry 20, left eye Massey 21  POAG      PMH:  HTN  CAD s/p 6 stents and surgery  Bladder cancer in remission (treated with local chemotherapy and DCG per patient)  MUTYH associated polyposis s/p partial colectomy    SH:  smoker    FH:  No known family history of blindness, glaucoma, macular degeneration  No known FHx polyposis - brother has not had colonoscopy  Mother and older brother  of stomach cancer    Eye Medications  Artificial tears as needed     ASSESSMENT and PLAN:  (H4.8500) Primary open angle glaucoma (POAG) of both eyes, mild stage  FH: Negative  Pachymetry:   Gonioscopy: open to thin CBB, 3+ pigment, flat  Tmax: 20/19  Today's IOP: 11/10  Target IOP: mid-teens  Current medications: None  Meds to avoid: None  Selected laser trabeculoplasty (SLT) 3/17/23 right eye   Selected laser trabeculoplasty (SLT)0 24 left eye      Visual field OVF 24-2 10/16/24  Right eye sup nasal step, superior depressed point (?lid), MD -1.0 PSD 4.2  Left eye nonspecific defects, MD -1.0 PSD 3.2  Visual field: OVF 24-2 (24 )  Right eye: reliable, superior depressed points (?lid) and inferonasal depressed spot, MD -1.1 PSD 3.3;  Left eye: reliable, inferior nasal step, superior arcuate MD -3.1, PSD 6.6; improved inferior and increased superior  from prior  Worse left eye compared to previous  Visual field: OVF 24-2 (5/17/23)  Right eye: reliable, superior depressed points (?lid) and inferonasal depressed spot, MD -0.6, PSD 2.6;  Left eye: reliable, superior arcuate MD -2.2, PSD 2.9; improved inferior and increased superior from prior  Visual field: OVF 24-2 (5/20/22)  Right eye: reliable, superior depressed points (?lid) and inferonasal depressed spot, MD -3.8, PSD 4.4; superior depression not consistent with location from prior  Left eye: poor reliability with 14% FN, superior>inferior depression, MD -8.2, PSD 3.6; improved inferior and increased superior from prior    OCT nerve fiber layer 10/16/24:   Right eye - G(r) 58 NI (g) 78 TI (y) 93 NS (r) 46 TS (r) 83   Left eye - G(r) 64 NI (g) 78 TI (g) 116 NS (y) 62 TS (r) 72  OCT nerve fiber layer 02/07/24:   Right eye - G(r) 58 NI (g) 78 TI (r) 91 NS (r) 48 TS (r) 83 (worse ST      Left eye - G(r) 65 NI (g) 81 TI (g) 115 NS (y) 63 TS (r) 73  OCT nerve fiber layer 11/22/23:   Right eye - G(r) 60 NI (g) 77 TI (y) 97 NS (r) 49 TS (r) 91      Left eye - G(r) 65 NI (g) 85 TI (g) 117 NS (y) 61 TS (r) 72  OCT nerve fiber layer 03/01/23:   Right eye - G(r) 60 NI (g) 76 TI (y) 96 NS (r) 51 TS (r) 89      Left eye - G(r) 72 NI (g) 83 TI (g) 121 NS (g) 75 TS (r) 86  Nerve OCT: Spectralis optic nerve OCT (6/23/21)  OD: Central RNFL thickness 69, superior red, inferior and nasal yellow   OS: Central RNFL thickness 69, superotemporal red, nasal yellow     IOP doing great today both eyes s/p Selected laser trabeculoplasty (SLT)  Continue off drops  Return to clinic 6 months for annual exam    (Z96.1) Pseudophakia of both eyes    Massey left eye 02/02/23   Maltry right eye 12/11/2020 ZCB00 21.5  Doing well    (Z86.010) History of colonic polyps  - s/p partial colectomy for numerous colon polyps and adenomas  - no CHRPE on exam    (H04.123) Dry eye syndrome of both eyes    Recommend artificial tears PRN      Return in  about 6 months (around 4/16/2025) for Annual Visit-v/t/d/MRx, OCT RNFL, 24-2 VF.    Attending Physician Attestation:  Complete documentation of historical and exam elements from today's encounter can be found in the full encounter summary report (not reduplicated in this progress note).  I personally obtained the chief complaint(s) and history of present illness.  I confirmed and edited as necessary the review of systems, past medical/surgical history, family history, social history, and examination findings as documented by others; and I examined the patient myself.  I personally reviewed the relevant tests, images, and reports as documented above.  I formulated and edited as necessary the assessment and plan and discussed the findings and management plan with the patient and family.  - Guido Massey MD

## 2024-11-20 ENCOUNTER — PRE VISIT (OUTPATIENT)
Dept: UROLOGY | Facility: CLINIC | Age: 74
End: 2024-11-20
Payer: COMMERCIAL

## 2024-11-20 NOTE — TELEPHONE ENCOUNTER
Reason for visit: cystoscopy      Dx/Hx/Sx: Malignant neoplasm of overlapping sites of bladder     Records/imaging/labs/orders: in epic     At Rooming: collect urine - consent - ask pt if pt would like lido

## 2024-12-03 ENCOUNTER — OFFICE VISIT (OUTPATIENT)
Dept: UROLOGY | Facility: CLINIC | Age: 74
End: 2024-12-03
Payer: COMMERCIAL

## 2024-12-03 VITALS
HEART RATE: 61 BPM | DIASTOLIC BLOOD PRESSURE: 80 MMHG | WEIGHT: 170 LBS | SYSTOLIC BLOOD PRESSURE: 128 MMHG | HEIGHT: 68 IN | OXYGEN SATURATION: 96 % | BODY MASS INDEX: 25.76 KG/M2

## 2024-12-03 DIAGNOSIS — C67.8 MALIGNANT NEOPLASM OF OVERLAPPING SITES OF BLADDER (H): Primary | ICD-10-CM

## 2024-12-03 PROCEDURE — 88112 CYTOPATH CELL ENHANCE TECH: CPT | Mod: 26 | Performed by: PATHOLOGY

## 2024-12-03 PROCEDURE — 88112 CYTOPATH CELL ENHANCE TECH: CPT | Mod: TC | Performed by: UROLOGY

## 2024-12-03 PROCEDURE — 52000 CYSTOURETHROSCOPY: CPT | Performed by: UROLOGY

## 2024-12-03 PROCEDURE — 88120 CYTP URNE 3-5 PROBES EA SPEC: CPT | Mod: TC | Performed by: UROLOGY

## 2024-12-03 PROCEDURE — 88120 CYTP URNE 3-5 PROBES EA SPEC: CPT | Mod: 26 | Performed by: PATHOLOGY

## 2024-12-03 RX ORDER — LIDOCAINE HYDROCHLORIDE 20 MG/ML
JELLY TOPICAL ONCE
Status: COMPLETED | OUTPATIENT
Start: 2024-12-03 | End: 2024-12-03

## 2024-12-03 RX ADMIN — LIDOCAINE HYDROCHLORIDE: 20 JELLY TOPICAL at 09:57

## 2024-12-03 ASSESSMENT — PAIN SCALES - GENERAL: PAINLEVEL_OUTOF10: NO PAIN (0)

## 2024-12-03 NOTE — PATIENT INSTRUCTIONS
"Please follow up for a repeat cystoscopy with Dr. Guzman in one year.     It was a pleasure meeting with you today.  Thank you for allowing me and my team the privilege of caring for you today.  YOU are the reason we are here, and I truly hope we provided you with the excellent service you deserve.  Please let us know if there is anything else we can do for you so that we can be sure you are leaving completely satisfied with your care experience.            AFTER YOUR CYSTOSCOPY        You have just completed a cystoscopy, or \"cysto\", which allowed your physician to learn more about your bladder (or to remove a stent placed after surgery). We suggest that you continue to avoid caffeine, fruit juice, and alcohol for the next 24 hours, however, you are encouraged to return to your normal activities.         A few things that are considered normal after your cystoscopy:     * Small amount of bleeding (or spotting) that clears within the next 24 hours     * Slight burning sensation with urination     * Sensation to of needing to avoid more frequently     * The feeling of \"air\" in your urine     * Mild discomfort that is relieved with Tylenol        Please contact our office promptly if you:     * Develop a fever above 101 degrees     * Are unable to urinate     * Develop bright red blood that does not stop     * Severe pain or swelling         Please contact our office with any concerns or questions @[email or web address].  "

## 2024-12-03 NOTE — NURSING NOTE
"Chief Complaint   Patient presents with    Cystoscopy     6 month cystoscopy        Blood pressure 128/80, pulse 61, height 1.727 m (5' 8\"), weight 77.1 kg (170 lb), SpO2 96%. Body mass index is 25.85 kg/m .    Patient Active Problem List   Diagnosis    Social phobia    Hypertension goal BP (blood pressure) < 140/90    Hyperlipidemia LDL goal <130    Tubular adenoma of colon    Coronary artery disease involving native heart with angina pectoris, unspecified vessel or lesion type (H)    Former smoker    S/P CABG (coronary artery bypass graft)    Chronic obstructive pulmonary disease, unspecified COPD type (H)    Anemia, unspecified type    Bladder cancer (H)    Personal history of malignant neoplasm of bladder    Malignant neoplasm of overlapping sites of bladder (H)    Combined form of age-related cataract, both eyes    Total cataract of right eye    History of colonic polyps    Colon cancer (H)    Signet ring cell adenocarcinoma (H)    Metastatic signet ring cell carcinoma (H)       Allergies   Allergen Reactions    Codeine Nausea and Vomiting       Current Outpatient Medications   Medication Sig Dispense Refill    albuterol (PROAIR HFA/PROVENTIL HFA/VENTOLIN HFA) 108 (90 Base) MCG/ACT inhaler Inhale 2 puffs into the lungs every 6 hours as needed for shortness of breath or wheezing. 8.5 g 1    amLODIPine (NORVASC) 5 MG tablet TAKE ONE TABLET BY MOUTH EVERY NIGHT AT BEDTIME 90 tablet 3    ANORO ELLIPTA 62.5-25 MCG/ACT oral inhaler INHALE ONE PUFF BY MOUTH ONCE DAILY 60 each 2    aspirin 81 MG tablet Take 1 tablet (81 mg) by mouth daily 90 tablet 3    atorvastatin (LIPITOR) 80 MG tablet TAKE ONE TABLET BY MOUTH EVERY EVENING AT BEDTIME 90 tablet 3    buPROPion (WELLBUTRIN XL) 300 MG 24 hr tablet TAKE ONE TABLET BY MOUTH EVERY MORNING 90 tablet 1    ezetimibe (ZETIA) 10 MG tablet Take 1 tablet (10 mg) by mouth daily. 90 tablet 3    lisinopril (ZESTRIL) 20 MG tablet Take 1 tablet (20 mg) by mouth daily (with " breakfast). 90 tablet 0    metoprolol succinate ER (TOPROL XL) 50 MG 24 hr tablet Take 1 tablet (50 mg) by mouth at bedtime. 90 tablet 3    PARoxetine (PAXIL) 40 MG tablet Take 1 tablet (40 mg) by mouth every morning. 90 tablet 3    varenicline (CHANTIX) 0.5 MG tablet TAKE ONE TABLET BY MOUTH TWICE A DAY 60 tablet 10       Social History     Tobacco Use    Smoking status: Former     Current packs/day: 0.00     Types: Cigarettes     Quit date:      Years since quittin.9     Passive exposure: Past    Smokeless tobacco: Never    Tobacco comments:     Quit again now 2024.    Vaping Use    Vaping status: Never Used   Substance Use Topics    Alcohol use: No     Comment: quit 40 years ago    Drug use: Yes     Types: Marijuana     Comment: occasionally uses marijuana       Invasive Procedure Safety Checklist:    Procedure: Cystoscopy    Action: Complete sections and checkboxes as appropriate.    Pre-procedure:  1. Patient ID Verified with 2 identifiers (Mary and  or MRN) : YES    2. Procedure and site verified with patient/designee (when able) : YES    3. Accurate consent documentation in medical record : YES    4. H&P (or appropriate assessment) documented in medical record : N/A  H&P must be up to 30 days prior to procedure an updated within 24 hours of                 Procedure as applicable.     5. Relevant diagnostic and radiology test results appropriately labeled and displayed as applicable : YES    6. Blood products, implants, devices, and/or special equipment available for the procedure as applicable : YES    7. Procedure site(s) marked with provider initials [Exclusions: none] : NO    8. Marking not required. Reason : Yes  Procedure does not require site marking    Time Out:     Time-Out performed immediately prior to starting procedure, including verbal and active participation of all team members addressing: YES    1. Correct patient identity.  2. Confirmed that the correct side and site are  marked.  3. An accurate procedure to be done.  4. Agreement on the procedure to be done.  5. Correct patient position.  6. Relevant images and results are properly labeled and appropriately displayed.  7. The need to administer antibiotics or fluids for irrigation purposes during the procedure as applicable.  8. Safety precautions based on patient history or medication use.    During Procedure: Verification of correct person, site, and procedure occurs any time the responsibility for care of the patient is transferred to another member of the care team.    The following medication was given:     MEDICATION:  Lidocaine without epinephrine 2% jelly  ROUTE: urethral   SITE: urethral   DOSE: 10 mL  LOT #: [id number]  : [employer]  EXPIRATION DATE: 6-26  NDC#: [address]-8   Was there drug waste? No    Prior to med admin, verified patient identity using patient's name and date of birth.  Due to med administration, patient instructed to remain in clinic for 15 minutes  afterwards, and to report any adverse reaction to me immediately.    Drug Amount Wasted:  None.  Vial/Syringe: Syringe      Heena Flores  12/3/2024  9:38 AM

## 2024-12-03 NOTE — LETTER
"12/3/2024       RE: Tevin Guidry  [address]  [facility] [address]     Dear Colleague,    Thank you for referring your patient, Tevin Guidry, to the [facility] [place] at [facility]. Please see a copy of my visit note below.      CHIEF COMPLAINT   It was my pleasure to see Tevin Guidry who is a 74 year old male for follow-up of bladder cancer.      HPI  Tevin Guidry is a very pleasant 74 year old male who presents with a history of bladder cancer. 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 He had recurrence in 2/2020 and underwent repeat induction course of BCG. 2nd BCG induction completed 4/2020. Recurrence noted in 7/2020 and TURBT 9/2020 with low-grade disease.      BCG maintenance 10/15/2020  Had colectomy Spring 2021     TURBT 9/2/2020  FINAL DIAGNOSIS:   A. 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 Right lateral wall:   - Dysplastic urothelium, consistent with incipient non invasive low grade papillary urothelial carcinoma      PHYSICAL EXAM  Patient is a 74 year old  male   Vitals: Blood pressure 128/80, pulse 61, height 1.727 m (5' 8\"), weight 77.1 kg (170 lb), SpO2 96%.  General Appearance Adult: Body mass index is 25.85 kg/m .  Alert, no acute distress, oriented  Lungs: no respiratory distress, or pursed lip breathing  Abdomen: soft, nontender, no organomegaly or masses  Back: no CVAT  Neuro: Alert, oriented, speech and mentation normal  Psych: affect and mood normal  : penis, scrotum, testes normal    OFFICE CYSTOSCOPY 12/3/2024     Pre-procedure diagnosis:       Bladder Cancer  Post-procedure diagnosis:     Normal cystoscopy  Procedure performed:             Cystourethroscopy  Surgeon:                                 Kirt Guzman MD  Anesthesia:                             Local     Description of " procedure:   After fully informed, voluntary consent was obtained, the patient was brought into the procedure room, identified and placed in a supine position on the cystoscopy table.  The groin/scrotum were prepped with betadine and draped in a sterile fashion.  Urojet lidocaine gel was introduced.  A 15F flexible cystoscope was inserted into the urethra, and the bladder and urethra were examined in a systematic manner.  The patient tolerated the procedure well and there were no complications.       Cystoscopic findings:  The urethra was normal without strictures.  The prostate was 3 cm long and demonstrated mild bilobar hypertrophy.  There was no median lobe.  The external sphincter coapted well and the bladder neck was open. The bladder was completely surveyed.  There was mild trabeculation.  There were no stones or diverticula identifed.  The ureteric orifices were normal in position and number and effluxing clear urine. Previous resection sites noted with no suspicious lesions today.      ASSESSMENT and PLAN  74 year old male with recurrent bladder tumor. Has previously undergone BCG induction x 2. While office biopsy suggested possible HG disease, formal TURBT with only low-grade pathology. Has completed some BCG maintenance, but has since stopped. Given no recurrence today, will defer additional intravesical therapy at this time. Plan Cysto in 12 months as it has been 5 years since prior high-grade disease.     - Cytology/FISH today  - Follow-up 12 months with office cystoscopy    Kirt Guzman MD  Urology  [facility]    Again, thank you for allowing me to participate in the care of your patient.      Sincerely,    Kirt Guzman MD

## 2024-12-03 NOTE — PROGRESS NOTES
"  CHIEF COMPLAINT   It was my pleasure to see Tevin Guidry who is a 74 year old male for follow-up of bladder cancer.      HPI  Tevin Guidry is a very pleasant 74 year old male who presents with a history of bladder cancer. 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 He had recurrence in 2/2020 and underwent repeat induction course of BCG. 2nd BCG induction completed 4/2020. Recurrence noted in 7/2020 and TURBT 9/2020 with low-grade disease.      BCG maintenance 10/15/2020  Had colectomy Spring 2021     TURBT 9/2/2020  FINAL DIAGNOSIS:   A. 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 Right lateral wall:   - Dysplastic urothelium, consistent with incipient non invasive low grade papillary urothelial carcinoma      PHYSICAL EXAM  Patient is a 74 year old  male   Vitals: Blood pressure 128/80, pulse 61, height 1.727 m (5' 8\"), weight 77.1 kg (170 lb), SpO2 96%.  General Appearance Adult: Body mass index is 25.85 kg/m .  Alert, no acute distress, oriented  Lungs: no respiratory distress, or pursed lip breathing  Abdomen: soft, nontender, no organomegaly or masses  Back: no CVAT  Neuro: Alert, oriented, speech and mentation normal  Psych: affect and mood normal  : penis, scrotum, testes normal    OFFICE CYSTOSCOPY 12/3/2024     Pre-procedure diagnosis:       Bladder Cancer  Post-procedure diagnosis:     Normal cystoscopy  Procedure performed:             Cystourethroscopy  Surgeon:                                 Kirt Guzman MD  Anesthesia:                             Local     Description of procedure:   After fully informed, voluntary consent was obtained, the patient was brought into the procedure room, identified and placed in a supine position on the cystoscopy table.  The groin/scrotum were prepped with betadine and draped in a sterile fashion.  Urojet lidocaine gel was introduced.  A 15F flexible " cystoscope was inserted into the urethra, and the bladder and urethra were examined in a systematic manner.  The patient tolerated the procedure well and there were no complications.       Cystoscopic findings:  The urethra was normal without strictures.  The prostate was 3 cm long and demonstrated mild bilobar hypertrophy.  There was no median lobe.  The external sphincter coapted well and the bladder neck was open. The bladder was completely surveyed.  There was mild trabeculation.  There were no stones or diverticula identifed.  The ureteric orifices were normal in position and number and effluxing clear urine. Previous resection sites noted with no suspicious lesions today.      ASSESSMENT and PLAN  74 year old male with recurrent bladder tumor. Has previously undergone BCG induction x 2. While office biopsy suggested possible HG disease, formal TURBT with only low-grade pathology. Has completed some BCG maintenance, but has since stopped. Given no recurrence today, will defer additional intravesical therapy at this time. Plan Cysto in 12 months as it has been 5 years since prior high-grade disease.     - Cytology/FISH today  - Follow-up 12 months with office cystoscopy    Kirt Guzman MD  Urology  [facility]

## 2024-12-05 NOTE — NURSING NOTE
Chief Complaint   Patient presents with     Consult     Gross hematuria       Preethi Ramos MA  
Opt out

## 2024-12-08 NOTE — PROGRESS NOTES
Medication Therapy Management (MTM) Encounter    ASSESSMENT:                            Medication Adherence/Access: No issues identified    Tobacco Use Disorder:  Patient is smoke free.patient is committed to remaining smoke free now.  Based on patient preferences and history, patient would benefit from : 300mg ./day  bupropion long term, 0.5mg Varenicline 2 x day for now. We discussed: risks of smoking, benefits of quitting, and total abstinence.      Hypertension   Stable. Patient is meeting blood pressure goal of < 140/90mmHg.      Hyperlipidemia: Stable.  Patient is on high intensity statin which is indicated based on 2019 ACC/AHA guidelines for lipid management.      COPD: Stable  Conitnue daily lama/laba inhaler as well as using albuterol prior to walking the dogs.   as well as no smoking helps a lot.    PLAN:                            1. Blood Pressure 116/68mmhg- excellent -continue current Blood Pressure medications.  2. Congrats still smoke free since 4-2024. Continue Chantix and Bupropion as is.        Follow-up: Return in about 40 weeks (around 9/16/2025), or @ 1 PM, for Medication Therapy Management Visit, tobacco abuse, BP Recheck.      SUBJECTIVE/OBJECTIVE:                          Tevin Guidry is a 74 year old male coming in for a follow-up (7-9-24) visit. He was referred to me from Preethi Quiroz  .      Reason for visit:   Smoking cessation f/up. Blood Pressure recheck.     Feels good --breaths better with no smoking.     Allergies/ADRs: Reviewed in chart  Past Medical History: Per patient has a hemicolectomy with a hx of polyps (removed appendix). CAD (CABG), Social phobia, COPD, adenocarcinoma  Of bladder/colon. Had signet ring cancer removed with colon as well.  Has MUTYH--associated polyposis.   Tobacco: He currently is smoke free since 4-2024.   Alcohol: not currently using  Other Substance Use: marijuana flower, occasional vape - smokes most days/week  --he is much more worried  about cig smoking vs. Marijuana.   Caffeine: large starbucks in morning and sometimes in PM      Medication Adherence/Access: No issues      Smoking cessation:  Patient reports things going well.  What has gone well for you? Has not smoked sine early 4-2024. Bupropion and 2 x day chantix working well --getting some breath back --walks dog --easier to do so.   Has COPD  calls every 3 weeks --somewhat helpful.  What strategies have you tried? Chantix 2 x day and zyban .(not using nrt gum).  What side effects to nicotine replacement therapies or medications are you experiencing: none --not using any NRT.  What has helped you the most to keep from smoking? Combo of rx meds  Is there anything you might anticipate happening in the near future (e.g., next week, next month) that could present a challenge for you?  Annual CT scan - colon cancer- has elevated CEA lab - f/up scan 8-2024.  How can I continue to support you going forward?       Previously :   Smoking cessation:  Patient reports things going well.  What has gone well for you? Turned the corner --varenicline  0.5mg 2 x day (works great)   and bupropion 300mg er tab daily and gum - nicotine 4mg./day --as needed .(4-5 pieces /day)     Last time he smoked was a few days ago -- feels he can past ashtray and not grab a cig. Butt.       What strategies have you tried? Current plan works well.   What side effects to nicotine replacement therapies or medications are you experiencing: none   What has helped you the most to keep from smoking?   Is there anything you might anticipate happening in the near future (e.g., next week, next month) that could present a challenge for you? None.   How can I continue to support you going forward? Qtr. Mtm visits for accountability.         Previously :   Smoking cessation:  Patient reports things not going well.  What hasn't worked for you? Relapsed--if he buys a pack --lasts 3 day -about 7 cigs /day . Today only 1 cig so far as  he feels guilty admitting to me that he relapsed.    Why did he relapse:   Enjoyment --desperately needs a couple hits /day .      [facility].    What side effects to nicotine replacement therapies or medications are you experiencing: does chew 4mg nicotine gum --8 pieces /day now.   What times or situations have you found the most challenging?Around other tobacco users  When he is bored.   Is there anything that worked well for you? Bupropion 300mg./day and NRT .  How can you get back on your plan? Hold patient accountable .  What is a first step-today or tomorrow or in the next few days-that will enable you to do this? We discussed at this point lets add in chantix.   How can I support you in doing this? See patient monthly until smoke free then quarterly x 1-2 years.         Previously :   Smoking cessation: Patient reports things going well.  Patient smoke free for one year.   What has gone well for you? bupropion working well and cystoscopy reported no bladder cancer .  What has helped you the most to keep from smoking? Desire to remain smoke free and cancer free.   Is there anything you might anticipate happening in the near future (e.g., next week, next month) that could present a challenge for you? None.  -   Previously :  .Smoking cessation:  Patient reports things going well.  What has gone well for you? Bupropion working well. Smoke free since 6-23-22.   What strategies have you tried?   He doesn't want to start cigs again --he's made it this far feels great .   What side effects to nicotine replacement therapies or medications are you experiencing: not using any now.   What has helped you the most to keep from smoking? Bupropion for now and long term.   Is there anything you might anticipate happening in the near future (e.g., next week, next month) that could present a challenge for you? None   How can I continue to support you going forward? Hold patient accountable.          Previously :   Smoking cessation:  Patient reports things going so well.  NO SMOKING SINCE 6-!!    He has relapsed smoking , but pet/ct scan was negative , but showed darkening lungs and calcium buildup in heart .   What side effects to nicotine replacement therapies or medications are you experiencing: none   What times or situations have you found the most challenging?Just when he gets the urge--maybe boredom/stress.    Is there anything that worked well for you?  Bupropion 300mg./day helps.   How can you get back on your plan? He feels he can resist temptation now --doing well.   What is a first step-today or tomorrow or in the next few days-that will enable you to do this? Periodic mtm appt. To hold him accountable.   How can I support you in doing this? continue meds/counseling.       Previously: 6-23-22:    Has been smoking off/on , can go for days quitting --but then in his head he wakes up and wants a cigarette -- then he has to buy a pack and start 1 PPD .    His last cigarette was Sunday evening 6-19-22.  Currently has no cigs at home .     He just bought first lozenges today 4mg. -- he like them better than gum -last longer and taste better . They really help --smoke free x 4 days now.       He admits some coronary artery calcification - and darkening in lungs--he realizes this and wants to keep plugging along.       He is cancer free !!    He cannot afford to smoke --1 pack is 9.45 $ --!!            Previously :       4-14-22:    Smoking is sporadic , admitted 6-8 cigs/day x 2 + weeks not too long ago.   He states he doesn't know why he started up again, maybe boredom he thinks biggest reason.    Some marijuana daily smoking still --maybe he ran out of that so that is trigger for him to restart cigs.     He did quit cigs 4-11-22 ; still smokes daily Marijuana though.     Back to chewing 10-12 nicotine pieces gum/day .     Denies overt loneliness but has a wonderful dog. Has a brother and  his Gf that he can talk too.       Hypertension: Current medications include : amlodipine 5mg hs daily now , lisinopril 20mg. once daily, metoprolol succ. 50mg daily in pm.  Patient does not self-monitor blood pressure but states [facility] sent him a home Blood Pressure kit .  Patient reports no current medication side effects. Patient reports they are somewhat anxious about the visit, thinks this could be related to blood pressure elevation at moment.     Currently takes aspirin 81mg daily based on history of CABG. Reports no side effects.    BP Readings from Last 3 Encounters:   12/10/24 116/68   12/03/24 128/80   09/03/24 129/78        Hyperlipidemia: Current therapy includes :atorvastatin 80mg daily and Ezetimibe (Zetia) 10mg once daily.  Patient reports no significant myalgias or other side effects.  The 10-year ASCVD risk score (Lakeisha BILLY, et al., 2019) is: 22.2%    Values used to calculate the score:      Age: 74 years      Sex: Male      Is Non- : No      Diabetic: No      Tobacco smoker: No      Systolic Blood Pressure: 116 mmHg      Is BP treated: Yes      HDL Cholesterol: 59 mg/dL      Total Cholesterol: 137 mg/dL  Recent Labs   Lab Test 09/03/24  1200 08/30/23  1218   CHOL 137 137   HDL 59 53   LDL 61 67   TRIG 87 86             COPD: Current medications:  Now taking 1 puff daily anoro (LABA/LAMA ), Short-Acting Bronchodilator: Albuterol rescue inhaler- he takes 2 puffs as needed before he goes on walks with his brothers dog.  Patient is not experiencing side effects.   Patient reports the following symptoms: No current symptoms unless he works a flight of stairs, he states breathing is better since he started daily anoro.   Patient does not have an COPD Action Plan on file.   Has spirometry been completed: Doesn't know  Inhaler/device technique reviewed: Disk inhaler (ellipta)  Oxygen today 95%.  Immunizations: had covid, flu, rsv, due for pnemo pcv21 in 2025.             Today's  Vitals: /68   Pulse 64   Wt 176 lb (79.8 kg)   SpO2 95%   BMI 26.76 kg/m    ----------------      I spent 30 minutes with this patient today. All changes were made via collaborative practice agreement with Preethi Quiroz MD, MD. A copy of the visit note was provided to the patient's primary care provider.    The patient was given a summary of these recommendations.     [facility]rea [facility].  Medication Therapy Management Provider  [phone]           Medication Therapy Recommendations  No medication therapy recommendations to display

## 2024-12-10 ENCOUNTER — OFFICE VISIT (OUTPATIENT)
Dept: PHARMACY | Facility: CLINIC | Age: 74
End: 2024-12-10
Payer: COMMERCIAL

## 2024-12-10 VITALS
OXYGEN SATURATION: 95 % | SYSTOLIC BLOOD PRESSURE: 116 MMHG | HEART RATE: 64 BPM | WEIGHT: 176 LBS | BODY MASS INDEX: 26.76 KG/M2 | DIASTOLIC BLOOD PRESSURE: 68 MMHG

## 2024-12-10 DIAGNOSIS — J44.9 CHRONIC OBSTRUCTIVE PULMONARY DISEASE, UNSPECIFIED COPD TYPE (H): ICD-10-CM

## 2024-12-10 DIAGNOSIS — F17.200 NICOTINE DEPENDENCE, UNCOMPLICATED, UNSPECIFIED NICOTINE PRODUCT TYPE: Primary | ICD-10-CM

## 2024-12-10 DIAGNOSIS — I10 ESSENTIAL HYPERTENSION WITH GOAL BLOOD PRESSURE LESS THAN 140/90: ICD-10-CM

## 2024-12-10 DIAGNOSIS — E78.5 HYPERLIPIDEMIA LDL GOAL <130: ICD-10-CM

## 2024-12-10 PROCEDURE — 99606 MTMS BY PHARM EST 15 MIN: CPT | Performed by: PHARMACIST

## 2024-12-10 PROCEDURE — 99607 MTMS BY PHARM ADDL 15 MIN: CPT | Performed by: PHARMACIST

## 2024-12-10 NOTE — PATIENT INSTRUCTIONS
"Recommendations from today's MTM visit:                                                         1. Blood Pressure 116/68mmhg- excellent -continue current Blood Pressure medications.  2. Congrats still smoke free since 4-2024. Continue Chantix and Bupropion as is.        Follow-up: Return in about 40 weeks (around 9/16/2025), or @ 1 PM, for Medication Therapy Management Visit, tobacco abuse, BP Recheck.    It was great speaking with you today.  I value your experience and would be very thankful for your time in providing feedback in our clinic survey. In the next few days, you may receive an email or text message from "[employer]" with a link to a survey related to your  clinical pharmacist.\"     To schedule another MTM appointment, please call the clinic directly or you may call the MTM scheduling line at [phone] or toll-free at [phone].     My Clinical Pharmacist's contact information:                                                      Please feel free to contact me with any questions or concerns you have.      Andrea Bah Rph.  Medication Therapy Management Provider  [phone]    "

## 2024-12-10 NOTE — Clinical Note
Fbarizio--saw uriel -- still smoke free and Blood Pressure wnl--he will see you inmarch and then me in sept --if stable then he will juts see you going forward.  Amanda

## 2024-12-11 LAB
PATH REPORT.COMMENTS IMP SPEC: NORMAL
PATH REPORT.FINAL DX SPEC: NORMAL
PATH REPORT.GROSS SPEC: NORMAL

## 2024-12-22 NOTE — PROGRESS NOTES
[facility] [place] [facility] [place]      HPI:   Mr. Tevin Guidry is a pleasant 74 year old male with medical history pertinent for CAD s/p hybrid robotic-assisted CABG (LIMA-LAD) + PCI prox-distal RCA (), HTN, HLD, colon CA s/p R hemicolectomy (), former smoker, and h/o bladder cancer. He presents to cardiology clinic for follow up.    Tevin was most recently seen in cardiology clinic in 2022 by Dr. Heath, with no changes to medications. .    Today in clinic, he denies chest pain, palpitations, dizziness, syncope, or lower extremity edema. He feels breathless when walking through snow, carrying items, or going up stairs. Home BP has been 120/70s. He quit smoking ~6 months ago.    PAST MEDICAL HISTORY:  Past Medical History:   Diagnosis Date    Bladder cancer (H)     Colon polyps     multiple may need colectomy (adenoma)    COPD (chronic obstructive pulmonary disease) (H)     Hyperlipidemia LDL goal <130     Hypertension goal BP (blood pressure) < 140/90     Social phobia        FAMILY HISTORY:  Family History   Problem Relation Age of Onset    Cancer Mother         esog/stomach    Cancer Father         lung    Hypertension Brother     Stomach Cancer Brother     Anesthesia Reaction No family hx of     Deep Vein Thrombosis (DVT) No family hx of     Glaucoma No family hx of     Macular Degeneration No family hx of        SOCIAL HISTORY:  Social History     Socioeconomic History    Marital status: Single    Number of children: 0   Tobacco Use    Smoking status: Former     Current packs/day: 0.00     Types: Cigarettes     Quit date:      Years since quittin.9     Passive exposure: Past    Smokeless tobacco: Never    Tobacco comments:     Quit again now 2024.    Vaping Use    Vaping status: Never Used   Substance and Sexual Activity    Alcohol use: No     Comment: quit 40 years ago    Drug use: Yes     Types: Marijuana     Comment: occasionally uses marijuana    Sexual  activity: Yes     Partners: Female   Other Topics Concern     Service No    Blood Transfusions No    Exercise No    Seat Belt Yes    Self-Exams No    Parent/sibling w/ CABG, MI or angioplasty before 65F 55M? No     Social Drivers of Health     Financial Resource Strain: Low Risk  (8/29/2024)    Financial Resource Strain     Within the past 12 months, have you or your family members you live with been unable to get utilities (heat, electricity) when it was really needed?: No   Food Insecurity: Low Risk  (8/29/2024)    Food Insecurity     Within the past 12 months, did you worry that your food would run out before you got money to buy more?: No     Within the past 12 months, did the food you bought just not last and you didn t have money to get more?: No   Transportation Needs: Low Risk  (8/29/2024)    Transportation Needs     Within the past 12 months, has lack of transportation kept you from medical appointments, getting your medicines, non-medical meetings or appointments, work, or from getting things that you need?: No   Physical Activity: Insufficiently Active (8/29/2024)    Exercise Vital Sign     Days of Exercise per Week: 2 days     Minutes of Exercise per Session: 10 min   Stress: No Stress Concern Present (8/29/2024)    [employer] - Occupational Stress Questionnaire     Feeling of Stress : Not at all   Social Connections: Socially Isolated (5/8/2024)    Social Connection and Isolation Panel [NHANES]     Frequency of Communication with Friends and Family: More than three times a week     Frequency of Social Gatherings with Friends and Family: Once a week     Attends Spiritism Services: Never     Active Member of Clubs or Organizations: No     Marital Status: Never    Interpersonal Safety: Low Risk  (9/3/2024)    Interpersonal Safety     Do you feel physically and emotionally safe where you currently live?: Yes     Within the past 12 months, have you been hit,  slapped, kicked or otherwise physically hurt by someone?: No     Within the past 12 months, have you been humiliated or emotionally abused in other ways by your partner or ex-partner?: No   Housing Stability: Low Risk  (8/29/2024)    Housing Stability     Do you have housing? : Patient declined     Are you worried about losing your housing?: No       CURRENT MEDICATIONS:  Current Outpatient Medications   Medication Sig Dispense Refill    albuterol (PROAIR HFA/PROVENTIL HFA/VENTOLIN HFA) 108 (90 Base) MCG/ACT inhaler Inhale 2 puffs into the lungs every 6 hours as needed for shortness of breath or wheezing. 8.5 g 1    amLODIPine (NORVASC) 5 MG tablet TAKE ONE TABLET BY MOUTH EVERY NIGHT AT BEDTIME 90 tablet 3    ANORO ELLIPTA 62.5-25 MCG/ACT oral inhaler INHALE ONE PUFF BY MOUTH ONCE DAILY 60 each 2    aspirin 81 MG tablet Take 1 tablet (81 mg) by mouth daily 90 tablet 3    atorvastatin (LIPITOR) 80 MG tablet TAKE ONE TABLET BY MOUTH EVERY EVENING AT BEDTIME 90 tablet 3    buPROPion (WELLBUTRIN XL) 300 MG 24 hr tablet TAKE ONE TABLET BY MOUTH EVERY MORNING 90 tablet 1    ezetimibe (ZETIA) 10 MG tablet Take 1 tablet (10 mg) by mouth daily. 90 tablet 3    lisinopril (ZESTRIL) 20 MG tablet Take 1 tablet (20 mg) by mouth daily (with breakfast). 90 tablet 0    metoprolol succinate ER (TOPROL XL) 50 MG 24 hr tablet Take 1 tablet (50 mg) by mouth at bedtime. 90 tablet 3    PARoxetine (PAXIL) 40 MG tablet Take 1 tablet (40 mg) by mouth every morning. 90 tablet 3    varenicline (CHANTIX) 0.5 MG tablet TAKE ONE TABLET BY MOUTH TWICE A DAY 60 tablet 10     Current Facility-Administered Medications   Medication Dose Route Frequency Provider Last Rate Last Admin    cyanocobalamin injection 1,000 mcg  1,000 mcg Intramuscular Q30 Days Almas Foote PA-C        lidocaine (XYLOCAINE) 2 % external gel   Urethral Once Kirt Guzman MD           ROS:   Refer to HPI    EXAM:  /79 (BP Location: Right arm,  Patient Position: Chair, Cuff Size: Adult Regular)   Pulse 65   Wt 78.3 kg (172 lb 11.2 oz)   SpO2 97%   BMI 26.26 kg/m    GENERAL: Appears comfortable, in no acute distress.   HEENT: Eye symmetrical, no discharge or icterus bilaterally. Mucous membranes moist and without lesions.  CV: RRR, +S1S2, + systolic murmur  RESPIRATORY: Respirations regular, even, and unlabored. Lungs CTA throughout.   GI: Soft and non distended with normoactive bowel sounds present in all quadrants. No tenderness, rebound, guarding.   EXTREMITIES: no peripheral edema. 2+ bilateral pedal pulses.   NEUROLOGIC: Alert and oriented x 3. No focal deficits.   MUSCULOSKELETAL: No joint swelling or tenderness.   SKIN: No jaundice. No rashes or lesions.     Labs, reviewed with patient in clinic today:  CBC RESULTS:  Lab Results   Component Value Date    WBC 9.5 07/22/2022    WBC 9.6 04/20/2021    RBC 4.59 07/22/2022    RBC 4.84 04/20/2021    HGB 14.0 07/22/2022    HGB 15.0 04/20/2021    HCT 43.0 07/22/2022    HCT 45.8 04/20/2021    MCV 94 07/22/2022    MCV 95 04/20/2021    MCH 30.5 07/22/2022    MCH 31.0 04/20/2021    MCHC 32.6 07/22/2022    MCHC 32.8 04/20/2021    RDW 13.3 07/22/2022    RDW 12.7 04/20/2021     07/22/2022     04/20/2021       CMP RESULTS:  Lab Results   Component Value Date     09/03/2024     04/20/2021    POTASSIUM 4.6 09/03/2024    POTASSIUM 4.6 06/22/2022    POTASSIUM 4.3 04/20/2021    CHLORIDE 103 09/03/2024    CHLORIDE 107 06/22/2022    CHLORIDE 107 04/20/2021    CO2 27 09/03/2024    CO2 26 06/22/2022    CO2 30 04/20/2021    ANIONGAP 11 09/03/2024    ANIONGAP 6 06/22/2022    ANIONGAP 4 04/20/2021     (H) 09/03/2024     (H) 06/22/2022     (H) 04/20/2021    BUN 13.1 09/03/2024    BUN 11 06/22/2022    BUN 17 04/20/2021    CR 0.98 09/03/2024    CR 0.85 04/20/2021    GFRESTIMATED 80 09/03/2024    GFRESTIMATED >60 08/14/2024    GFRESTIMATED 87 04/20/2021    GFRESTBLACK >90 04/20/2021  "   TAMMIE 9.1 09/03/2024    TAMMIE 8.9 04/20/2021    BILITOTAL 0.3 09/03/2024    BILITOTAL 0.4 04/09/2021    ALBUMIN 4.2 09/03/2024    ALBUMIN 3.8 06/22/2022    ALBUMIN 3.7 04/09/2021    ALKPHOS 119 09/03/2024    ALKPHOS 130 04/09/2021    ALT 19 09/03/2024    ALT 23 04/09/2021    AST 24 09/03/2024    AST 18 04/09/2021        INR RESULTS:  Lab Results   Component Value Date    INR 1.00 04/20/2021       Lab Results   Component Value Date    MAG 2.1 04/14/2021     No results found for: \"NTBNPI\"  No results found for: \"NTBNP\"    LIPIDS:  Recent Labs   Lab Test 09/03/24  1200 08/30/23  1218   CHOL 137 137   HDL 59 53   LDL 61 67   TRIG 87 86         Echocardiogram 1/2023:Interpretation Summary     Left ventricular systolic function is normal.  The visual ejection fraction is 60-65%.  The right ventricle is normal in structure, function and size.  Technically difficult, suboptimal study. No hemodynamically significant  valvular abnormalities on 2D or color flow imaging.  ______________________________________________________________________________  Left Ventricle  The left ventricle is normal in size. Left ventricular systolic function is  normal. The visual ejection fraction is 60-65%. Grade I or early diastolic  dysfunction. The left ventricular apex is not well visualized.     Right Ventricle  The right ventricle is normal in structure, function and size.     Atria  Normal left atrial size. Right atrial size is normal.     Mitral Valve  The mitral valve is not well visualized. There is trace mitral regurgitation.  There is no mitral valve stenosis.     Tricuspid Valve  The tricuspid valve is not well visualized. Right ventricular systolic  pressure could not be approximated due to inadequate tricuspid regurgitation.     Aortic Valve  The aortic valve is not well visualized. There is trace aortic regurgitation.  No hemodynamically significant valvular aortic stenosis.     Pulmonic Valve  The pulmonic valve is not well " visualized.     Vessels  Normal size aorta. The inferior vena cava is normal.     Pericardium  There is no pericardial effusion.    Assessment and Plan:   Mr. Guidry is a 74 year old male with a PMH of CAD s/p hybrid robotic-assisted CABG (LIMA-LAD) + PCI prox-distal RCA (2014), HTN, HLD, colon CA s/p R hemicolectomy (2021), former smoker, and h/o bladder cancer.    # Shortness of breath  Significant tobacco history which likely contributing. Systolic murmur heard on exam, with trace aortic & mitral insufficiency noted on prior echo 2023.   - echo to reassess valve function     # Coronary artery disease s/p hybrid robotic-assisted CABG (LIMA-LAD) + PCI prox-distal RCA (2014)  # HLD  Most recent LDL 61 (2024), goal <70  - DAPT: aspirin 81mg daily   - continue atorvastatin 80mg every day   - continue ezetimibe 10mg daily     # HTN, well controlled  - lisinopril 20mg every day  - Toprol XL 50mg every day     # H/o Bladder cancer   Follows with OP urology annually     # Tobacco use history  Working with MTM. Quit ~6 months ago, applaud efforts.  - continue bupropion     Follow up:  2 years   Chart review time today: 10 minutes  Visit time today: 18 minutes  Total time spent today: 28 minutes      Trinity Valera CNP  General Cardiology   12/23/24

## 2024-12-23 ENCOUNTER — OFFICE VISIT (OUTPATIENT)
Dept: CARDIOLOGY | Facility: CLINIC | Age: 74
End: 2024-12-23
Attending: INTERNAL MEDICINE
Payer: COMMERCIAL

## 2024-12-23 VITALS
DIASTOLIC BLOOD PRESSURE: 79 MMHG | HEART RATE: 65 BPM | WEIGHT: 172.7 LBS | OXYGEN SATURATION: 97 % | BODY MASS INDEX: 26.26 KG/M2 | SYSTOLIC BLOOD PRESSURE: 124 MMHG

## 2024-12-23 DIAGNOSIS — I25.10 CORONARY ARTERY DISEASE INVOLVING NATIVE CORONARY ARTERY OF NATIVE HEART WITHOUT ANGINA PECTORIS: Primary | ICD-10-CM

## 2024-12-23 DIAGNOSIS — E78.5 HYPERLIPIDEMIA LDL GOAL <70: ICD-10-CM

## 2024-12-23 DIAGNOSIS — I10 ESSENTIAL HYPERTENSION WITH GOAL BLOOD PRESSURE LESS THAN 140/90: ICD-10-CM

## 2024-12-23 DIAGNOSIS — I35.1 NONRHEUMATIC AORTIC VALVE INSUFFICIENCY: ICD-10-CM

## 2024-12-23 DIAGNOSIS — Z95.1 S/P CABG (CORONARY ARTERY BYPASS GRAFT): ICD-10-CM

## 2024-12-23 PROCEDURE — G0463 HOSPITAL OUTPT CLINIC VISIT: HCPCS | Performed by: CASE MANAGER/CARE COORDINATOR

## 2024-12-23 ASSESSMENT — PAIN SCALES - GENERAL: PAINLEVEL_OUTOF10: NO PAIN (0)

## 2024-12-23 NOTE — NURSING NOTE
Chief Complaint   Patient presents with    Follow Up     medication check, last seen 2 years ago       Vitals were take, medications reconciled     Ilan Wade, EMT    12:55 PM

## 2024-12-23 NOTE — PATIENT INSTRUCTIONS
You were seen today in the Cardiovascular Clinic at the [facility] by:       FRANCISCA BAKER CNP    Your visit summary and instructions are as follows:    Schedule echocardiogram (heart ultrasound)      Return to cardiology clinic in 2 years      Thank you for your visit today!     Please MyChart message me or call my nurse if you have any questions or concerns.      During Business Hours:  [phone], option # 1 (University) then option # 4 (medical questions) and ask to speak with my nurse.     After hours, weekends or holidays:   [phone], Option #4  Ask to speak to the On-Call Cardiologist. Inform them you are a cardiology patient at the [facility].

## 2024-12-23 NOTE — LETTER
12/23/2024      RE: Tevin Guidry  [address]  [facility] [address]       Dear Colleague,    Thank you for the opportunity to participate in the care of your patient, Tevin Guidry, at the [facility] [place] [facility]. Please see a copy of my visit note below.      [facility]   Cardiology Clinic Note      HPI:   Mr. Tevin Guidry is a pleasant 74 year old male with medical history pertinent for CAD s/p hybrid robotic-assisted CABG (LIMA-LAD) + PCI prox-distal RCA (2014), HTN, HLD, colon CA s/p R hemicolectomy (2021), former smoker, and h/o bladder cancer. He presents to cardiology clinic for follow up.    Tevin was most recently seen in cardiology clinic in November 2022 by Dr. Heath, with no changes to medications. .    Today in clinic, he denies chest pain, palpitations, dizziness, syncope, or lower extremity edema. He feels breathless when walking through snow, carrying items, or going up stairs. Home BP has been 120/70s. He quit smoking ~6 months ago.    PAST MEDICAL HISTORY:  Past Medical History:   Diagnosis Date     Bladder cancer (H)      Colon polyps     multiple may need colectomy (adenoma)     COPD (chronic obstructive pulmonary disease) (H)      Hyperlipidemia LDL goal <130      Hypertension goal BP (blood pressure) < 140/90      Social phobia        FAMILY HISTORY:  Family History   Problem Relation Age of Onset     Cancer Mother         esog/stomach     Cancer Father         lung     Hypertension Brother      Stomach Cancer Brother      Anesthesia Reaction No family hx of      Deep Vein Thrombosis (DVT) No family hx of      Glaucoma No family hx of      Macular Degeneration No family hx of        SOCIAL HISTORY:  Social History     Socioeconomic History     Marital status: Single     Number of children: 0   Tobacco Use     Smoking status: Former     Current packs/day: 0.00     Types: Cigarettes     Quit  date:      Years since quittin.9     Passive exposure: Past     Smokeless tobacco: Never     Tobacco comments:     Quit again now 2024.    Vaping Use     Vaping status: Never Used   Substance and Sexual Activity     Alcohol use: No     Comment: quit 40 years ago     Drug use: Yes     Types: Marijuana     Comment: occasionally uses marijuana     Sexual activity: Yes     Partners: Female   Other Topics Concern      Service No     Blood Transfusions No     Exercise No     Seat Belt Yes     Self-Exams No     Parent/sibling w/ CABG, MI or angioplasty before 65F 55M? No     Social Drivers of Health     Financial Resource Strain: Low Risk  (2024)    Financial Resource Strain      Within the past 12 months, have you or your family members you live with been unable to get utilities (heat, electricity) when it was really needed?: No   Food Insecurity: Low Risk  (2024)    Food Insecurity      Within the past 12 months, did you worry that your food would run out before you got money to buy more?: No      Within the past 12 months, did the food you bought just not last and you didn t have money to get more?: No   Transportation Needs: Low Risk  (2024)    Transportation Needs      Within the past 12 months, has lack of transportation kept you from medical appointments, getting your medicines, non-medical meetings or appointments, work, or from getting things that you need?: No   Physical Activity: Insufficiently Active (2024)    Exercise Vital Sign      Days of Exercise per Week: 2 days      Minutes of Exercise per Session: 10 min   Stress: No Stress Concern Present (2024)    [employer] - Occupational Stress Questionnaire      Feeling of Stress : Not at all   Social Connections: Socially Isolated (2024)    Social Connection and Isolation Panel [NHANES]      Frequency of Communication with Friends and Family: More than three times a week       Frequency of Social Gatherings with Friends and Family: Once a week      Attends Restorationist Services: Never      Active Member of Clubs or Organizations: No      Marital Status: Never    Interpersonal Safety: Low Risk  (9/3/2024)    Interpersonal Safety      Do you feel physically and emotionally safe where you currently live?: Yes      Within the past 12 months, have you been hit, slapped, kicked or otherwise physically hurt by someone?: No      Within the past 12 months, have you been humiliated or emotionally abused in other ways by your partner or ex-partner?: No   Housing Stability: Low Risk  (8/29/2024)    Housing Stability      Do you have housing? : Patient declined      Are you worried about losing your housing?: No       CURRENT MEDICATIONS:  Current Outpatient Medications   Medication Sig Dispense Refill     albuterol (PROAIR HFA/PROVENTIL HFA/VENTOLIN HFA) 108 (90 Base) MCG/ACT inhaler Inhale 2 puffs into the lungs every 6 hours as needed for shortness of breath or wheezing. 8.5 g 1     amLODIPine (NORVASC) 5 MG tablet TAKE ONE TABLET BY MOUTH EVERY NIGHT AT BEDTIME 90 tablet 3     ANORO ELLIPTA 62.5-25 MCG/ACT oral inhaler INHALE ONE PUFF BY MOUTH ONCE DAILY 60 each 2     aspirin 81 MG tablet Take 1 tablet (81 mg) by mouth daily 90 tablet 3     atorvastatin (LIPITOR) 80 MG tablet TAKE ONE TABLET BY MOUTH EVERY EVENING AT BEDTIME 90 tablet 3     buPROPion (WELLBUTRIN XL) 300 MG 24 hr tablet TAKE ONE TABLET BY MOUTH EVERY MORNING 90 tablet 1     ezetimibe (ZETIA) 10 MG tablet Take 1 tablet (10 mg) by mouth daily. 90 tablet 3     lisinopril (ZESTRIL) 20 MG tablet Take 1 tablet (20 mg) by mouth daily (with breakfast). 90 tablet 0     metoprolol succinate ER (TOPROL XL) 50 MG 24 hr tablet Take 1 tablet (50 mg) by mouth at bedtime. 90 tablet 3     PARoxetine (PAXIL) 40 MG tablet Take 1 tablet (40 mg) by mouth every morning. 90 tablet 3     varenicline (CHANTIX) 0.5 MG tablet TAKE ONE TABLET BY MOUTH  TWICE A DAY 60 tablet 10     Current Facility-Administered Medications   Medication Dose Route Frequency Provider Last Rate Last Admin     cyanocobalamin injection 1,000 mcg  1,000 mcg Intramuscular Q30 Days Alams Foote PA-C         lidocaine (XYLOCAINE) 2 % external gel   Urethral Once Kirt Guzman MD           ROS:   Refer to HPI    EXAM:  /79 (BP Location: Right arm, Patient Position: Chair, Cuff Size: Adult Regular)   Pulse 65   Wt 78.3 kg (172 lb 11.2 oz)   SpO2 97%   BMI 26.26 kg/m    GENERAL: Appears comfortable, in no acute distress.   HEENT: Eye symmetrical, no discharge or icterus bilaterally. Mucous membranes moist and without lesions.  CV: RRR, +S1S2, + systolic murmur  RESPIRATORY: Respirations regular, even, and unlabored. Lungs CTA throughout.   GI: Soft and non distended with normoactive bowel sounds present in all quadrants. No tenderness, rebound, guarding.   EXTREMITIES: no peripheral edema. 2+ bilateral pedal pulses.   NEUROLOGIC: Alert and oriented x 3. No focal deficits.   MUSCULOSKELETAL: No joint swelling or tenderness.   SKIN: No jaundice. No rashes or lesions.     Labs, reviewed with patient in clinic today:  CBC RESULTS:  Lab Results   Component Value Date    WBC 9.5 07/22/2022    WBC 9.6 04/20/2021    RBC 4.59 07/22/2022    RBC 4.84 04/20/2021    HGB 14.0 07/22/2022    HGB 15.0 04/20/2021    HCT 43.0 07/22/2022    HCT 45.8 04/20/2021    MCV 94 07/22/2022    MCV 95 04/20/2021    MCH 30.5 07/22/2022    MCH 31.0 04/20/2021    MCHC 32.6 07/22/2022    MCHC 32.8 04/20/2021    RDW 13.3 07/22/2022    RDW 12.7 04/20/2021     07/22/2022     04/20/2021       CMP RESULTS:  Lab Results   Component Value Date     09/03/2024     04/20/2021    POTASSIUM 4.6 09/03/2024    POTASSIUM 4.6 06/22/2022    POTASSIUM 4.3 04/20/2021    CHLORIDE 103 09/03/2024    CHLORIDE 107 06/22/2022    CHLORIDE 107 04/20/2021    CO2 27 09/03/2024    CO2 26 06/22/2022  "   CO2 30 04/20/2021    ANIONGAP 11 09/03/2024    ANIONGAP 6 06/22/2022    ANIONGAP 4 04/20/2021     (H) 09/03/2024     (H) 06/22/2022     (H) 04/20/2021    BUN 13.1 09/03/2024    BUN 11 06/22/2022    BUN 17 04/20/2021    CR 0.98 09/03/2024    CR 0.85 04/20/2021    GFRESTIMATED 80 09/03/2024    GFRESTIMATED >60 08/14/2024    GFRESTIMATED 87 04/20/2021    GFRESTBLACK >90 04/20/2021    TAMMIE 9.1 09/03/2024    TAMMIE 8.9 04/20/2021    BILITOTAL 0.3 09/03/2024    BILITOTAL 0.4 04/09/2021    ALBUMIN 4.2 09/03/2024    ALBUMIN 3.8 06/22/2022    ALBUMIN 3.7 04/09/2021    ALKPHOS 119 09/03/2024    ALKPHOS 130 04/09/2021    ALT 19 09/03/2024    ALT 23 04/09/2021    AST 24 09/03/2024    AST 18 04/09/2021        INR RESULTS:  Lab Results   Component Value Date    INR 1.00 04/20/2021       Lab Results   Component Value Date    MAG 2.1 04/14/2021     No results found for: \"NTBNPI\"  No results found for: \"NTBNP\"    LIPIDS:  Recent Labs   Lab Test 09/03/24  1200 08/30/23  1218   CHOL 137 137   HDL 59 53   LDL 61 67   TRIG 87 86         Echocardiogram 1/2023:Interpretation Summary     Left ventricular systolic function is normal.  The visual ejection fraction is 60-65%.  The right ventricle is normal in structure, function and size.  Technically difficult, suboptimal study. No hemodynamically significant  valvular abnormalities on 2D or color flow imaging.  ______________________________________________________________________________  Left Ventricle  The left ventricle is normal in size. Left ventricular systolic function is  normal. The visual ejection fraction is 60-65%. Grade I or early diastolic  dysfunction. The left ventricular apex is not well visualized.     Right Ventricle  The right ventricle is normal in structure, function and size.     Atria  Normal left atrial size. Right atrial size is normal.     Mitral Valve  The mitral valve is not well visualized. There is trace mitral regurgitation.  There is no " mitral valve stenosis.     Tricuspid Valve  The tricuspid valve is not well visualized. Right ventricular systolic  pressure could not be approximated due to inadequate tricuspid regurgitation.     Aortic Valve  The aortic valve is not well visualized. There is trace aortic regurgitation.  No hemodynamically significant valvular aortic stenosis.     Pulmonic Valve  The pulmonic valve is not well visualized.     Vessels  Normal size aorta. The inferior vena cava is normal.     Pericardium  There is no pericardial effusion.    Assessment and Plan:   Mr. Guidry is a 74 year old male with a PMH of CAD s/p hybrid robotic-assisted CABG (LIMA-LAD) + PCI prox-distal RCA (2014), HTN, HLD, colon CA s/p R hemicolectomy (2021), former smoker, and h/o bladder cancer.    # Shortness of breath  Significant tobacco history which likely contributing. Systolic murmur heard on exam, with trace aortic & mitral insufficiency noted on prior echo 2023.   - echo to reassess valve function     # Coronary artery disease s/p hybrid robotic-assisted CABG (LIMA-LAD) + PCI prox-distal RCA (2014)  # HLD  Most recent LDL 61 (2024), goal <70  - DAPT: aspirin 81mg daily   - continue atorvastatin 80mg every day   - continue ezetimibe 10mg daily     # HTN, well controlled  - lisinopril 20mg every day  - Toprol XL 50mg every day     # H/o Bladder cancer   Follows with OP urology annually     # Tobacco use history  Working with [employer]. Quit ~6 months ago, applaud efforts.  - continue bupropion     Follow up:  2 years   Chart review time today: 10 minutes  Visit time today: 18 minutes  Total time spent today: 28 minutes      Trinity Valera CNP  General Cardiology   12/23/24        Please do not hesitate to contact me if you have any questions/concerns.     Sincerely,     FRANCISCA THURMAN CNP

## 2024-12-30 DIAGNOSIS — J44.9 CHRONIC OBSTRUCTIVE PULMONARY DISEASE, UNSPECIFIED COPD TYPE (H): ICD-10-CM

## 2024-12-30 DIAGNOSIS — I10 ESSENTIAL HYPERTENSION WITH GOAL BLOOD PRESSURE LESS THAN 140/90: ICD-10-CM

## 2024-12-30 DIAGNOSIS — F17.200 TOBACCO DEPENDENCE: ICD-10-CM

## 2024-12-30 RX ORDER — LISINOPRIL 20 MG/1
20 TABLET ORAL
Qty: 90 TABLET | Refills: 1 | Status: SHIPPED | OUTPATIENT
Start: 2024-12-30

## 2024-12-30 RX ORDER — UMECLIDINIUM BROMIDE AND VILANTEROL TRIFENATATE 62.5; 25 UG/1; UG/1
1 POWDER RESPIRATORY (INHALATION) DAILY
Qty: 60 EACH | Refills: 3 | Status: SHIPPED | OUTPATIENT
Start: 2024-12-30

## 2024-12-31 RX ORDER — BUPROPION HYDROCHLORIDE 300 MG/1
300 TABLET ORAL EVERY MORNING
Qty: 90 TABLET | Refills: 1 | Status: SHIPPED | OUTPATIENT
Start: 2024-12-31

## 2025-01-06 ENCOUNTER — ANCILLARY PROCEDURE (OUTPATIENT)
Dept: CARDIOLOGY | Facility: CLINIC | Age: 75
End: 2025-01-06
Attending: CASE MANAGER/CARE COORDINATOR
Payer: COMMERCIAL

## 2025-01-06 DIAGNOSIS — I35.1 NONRHEUMATIC AORTIC VALVE INSUFFICIENCY: ICD-10-CM

## 2025-01-06 LAB — LVEF ECHO: NORMAL

## 2025-01-06 PROCEDURE — 93306 TTE W/DOPPLER COMPLETE: CPT | Performed by: INTERNAL MEDICINE

## 2025-02-24 ENCOUNTER — LAB (OUTPATIENT)
Dept: LAB | Facility: CLINIC | Age: 75
End: 2025-02-24
Attending: NURSE PRACTITIONER
Payer: COMMERCIAL

## 2025-02-24 DIAGNOSIS — C18.1 ADENOCARCINOMA OF APPENDIX (H): ICD-10-CM

## 2025-02-24 DIAGNOSIS — D12.6 MYH-ASSOCIATED POLYPOSIS (MAP): ICD-10-CM

## 2025-02-24 PROCEDURE — 36415 COLL VENOUS BLD VENIPUNCTURE: CPT | Performed by: PATHOLOGY

## 2025-02-24 PROCEDURE — 82378 CARCINOEMBRYONIC ANTIGEN: CPT | Performed by: NURSE PRACTITIONER

## 2025-02-24 PROCEDURE — 99000 SPECIMEN HANDLING OFFICE-LAB: CPT | Performed by: PATHOLOGY

## 2025-02-25 LAB — CEA SERPL-MCNC: 6 NG/ML

## 2025-03-03 ENCOUNTER — OFFICE VISIT (OUTPATIENT)
Dept: FAMILY MEDICINE | Facility: CLINIC | Age: 75
End: 2025-03-03
Payer: COMMERCIAL

## 2025-03-03 VITALS
HEIGHT: 68 IN | BODY MASS INDEX: 26.24 KG/M2 | TEMPERATURE: 97.2 F | HEART RATE: 68 BPM | WEIGHT: 173.1 LBS | OXYGEN SATURATION: 96 % | DIASTOLIC BLOOD PRESSURE: 66 MMHG | RESPIRATION RATE: 18 BRPM | SYSTOLIC BLOOD PRESSURE: 126 MMHG

## 2025-03-03 DIAGNOSIS — I10 HYPERTENSION GOAL BP (BLOOD PRESSURE) < 140/90: Primary | ICD-10-CM

## 2025-03-03 DIAGNOSIS — I25.119 CORONARY ARTERY DISEASE INVOLVING NATIVE HEART WITH ANGINA PECTORIS, UNSPECIFIED VESSEL OR LESION TYPE: Chronic | ICD-10-CM

## 2025-03-03 DIAGNOSIS — F40.10 SOCIAL PHOBIA: Chronic | ICD-10-CM

## 2025-03-03 DIAGNOSIS — F17.200 NICOTINE DEPENDENCE, UNCOMPLICATED, UNSPECIFIED NICOTINE PRODUCT TYPE: ICD-10-CM

## 2025-03-03 DIAGNOSIS — C67.9 MALIGNANT NEOPLASM OF URINARY BLADDER, UNSPECIFIED SITE (H): ICD-10-CM

## 2025-03-03 DIAGNOSIS — J44.9 CHRONIC OBSTRUCTIVE PULMONARY DISEASE, UNSPECIFIED COPD TYPE (H): ICD-10-CM

## 2025-03-03 DIAGNOSIS — Z95.1 S/P CABG (CORONARY ARTERY BYPASS GRAFT): Chronic | ICD-10-CM

## 2025-03-03 DIAGNOSIS — E78.5 HYPERLIPIDEMIA LDL GOAL <70: ICD-10-CM

## 2025-03-03 DIAGNOSIS — I10 ESSENTIAL HYPERTENSION WITH GOAL BLOOD PRESSURE LESS THAN 140/90: ICD-10-CM

## 2025-03-03 PROBLEM — D13.91 AUTOSOMAL RECESSIVE COLORECTAL ADENOMATOUS POLYPOSIS ASSOCIATED WITH MUTATION IN MUTYH GENE: Status: ACTIVE | Noted: 2025-03-03

## 2025-03-03 PROBLEM — D64.9 ANEMIA, UNSPECIFIED TYPE: Status: RESOLVED | Noted: 2017-01-25 | Resolved: 2025-03-03

## 2025-03-03 PROCEDURE — 3078F DIAST BP <80 MM HG: CPT | Performed by: FAMILY MEDICINE

## 2025-03-03 PROCEDURE — G2211 COMPLEX E/M VISIT ADD ON: HCPCS | Performed by: FAMILY MEDICINE

## 2025-03-03 PROCEDURE — 99214 OFFICE O/P EST MOD 30 MIN: CPT | Performed by: FAMILY MEDICINE

## 2025-03-03 PROCEDURE — 1126F AMNT PAIN NOTED NONE PRSNT: CPT | Performed by: FAMILY MEDICINE

## 2025-03-03 PROCEDURE — 3074F SYST BP LT 130 MM HG: CPT | Performed by: FAMILY MEDICINE

## 2025-03-03 RX ORDER — LISINOPRIL 20 MG/1
20 TABLET ORAL
Qty: 90 TABLET | Refills: 1 | Status: SHIPPED | OUTPATIENT
Start: 2025-03-03

## 2025-03-03 RX ORDER — PAROXETINE 40 MG/1
40 TABLET, FILM COATED ORAL EVERY MORNING
Qty: 90 TABLET | Refills: 3 | Status: SHIPPED | OUTPATIENT
Start: 2025-03-03

## 2025-03-03 RX ORDER — UMECLIDINIUM BROMIDE AND VILANTEROL TRIFENATATE 62.5; 25 UG/1; UG/1
1 POWDER RESPIRATORY (INHALATION) DAILY
Qty: 60 EACH | Refills: 3 | Status: SHIPPED | OUTPATIENT
Start: 2025-03-03

## 2025-03-03 RX ORDER — VARENICLINE TARTRATE 0.5 MG/1
0.5 TABLET, FILM COATED ORAL 2 TIMES DAILY
Qty: 60 TABLET | Refills: 10 | Status: SHIPPED | OUTPATIENT
Start: 2025-03-03

## 2025-03-03 RX ORDER — AMLODIPINE BESYLATE 5 MG/1
TABLET ORAL
Qty: 90 TABLET | Refills: 3 | Status: SHIPPED | OUTPATIENT
Start: 2025-03-03

## 2025-03-03 ASSESSMENT — PATIENT HEALTH QUESTIONNAIRE - PHQ9
SUM OF ALL RESPONSES TO PHQ QUESTIONS 1-9: 3
10. IF YOU CHECKED OFF ANY PROBLEMS, HOW DIFFICULT HAVE THESE PROBLEMS MADE IT FOR YOU TO DO YOUR WORK, TAKE CARE OF THINGS AT HOME, OR GET ALONG WITH OTHER PEOPLE: NOT DIFFICULT AT ALL
SUM OF ALL RESPONSES TO PHQ QUESTIONS 1-9: 3

## 2025-03-03 ASSESSMENT — PAIN SCALES - GENERAL: PAINLEVEL_OUTOF10: NO PAIN (0)

## 2025-03-03 NOTE — PROGRESS NOTES
"  Assessment & Plan        Essential hypertension with goal blood pressure less than 140/90  Controlled. Continues lisinopril 20mg once daily, amlodipine 5mg daily, metoprolol 50mg daily.          Hyperlipidemia LDL goal <70  Controlled.  Continues atorvastatin 80mg daily and ezetimibe 10mg daily.  Following with cardiology.         Bladder cancer  Follows with urology.  Last visit 12/24-note reviewed today.  No recurrence. Office cystoscopy recommended in 12/25.       Presented with gross hematuria in July 2019, evaluated by Urology and found to have non-invasive high grade urothelial carcinoma s/p TURBT on 9/5/19.  9/19 - Noninvasive HG bladder cancer  Underwent induction BCG  Recurrence in 2/20 - Noninvasive LG bladder cancer. Underwent repeat induction BCG with repeat induction complete 4/2020. Recurrence noted 7/2020 and TURBT 9/2020 with low grade disease.   BCG maintenance 10/15/2020       CAD, multiple vessel s/p CABG  Stable continues ASA 81mg daily and statin. Has not needed to take his nitroglycerin. He has been smoke free for over 6 months   Saw cardiology in 12/24 - note reviewed today         Social phobia  Stable on paroxetine 40mg daily.  Refilled today        History of Tobacco dependence  Smoke free for over 6 months . Has been working with [employer].   Continues bupropion         Chronic obstructive pulmonary disease, unspecified COPD type (H)   stable. Rare cough or use of albuterol.   Denies worsening shortness of breath.   continues LAMA/LABA      Signet ring cell adenocarcinoma  Tubular adenoma of colon  History of colon polyps  Following with oncology and GI. 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       He is at increased risk for colon polyps and colon cancer.      Per genetics visit notes:   \"Cancer Risks:   We discussed that individuals with two mutations in the MUTYH gene have a diagnosis of MUTYH-associated polyposis " (MAP).   Individuals with MAP can have hundreds to thousands of polyps, but most have fewer than 100. Multiple different types of colon polyps may be seen in individuals with MAP including adenomas, serrated adenomas, hyperplastic/sessile serrated polyps, and mixed (hyperplastic and adenomatous) polyps.  Individuals with MAP have an increased risk of colorectal cancer. In the absence of appropriate surveillance, the lifetime risk for colorectal cancer may be up to 80%.  There is also an increased risk for duodenal cancer in individuals with MAP.   Individuals with MAP may also be at increased risk for other cancers, including ovarian and bladder, although current risks are not well defined. Other cancers (including breast, uterine, gastric, pancreatic, skin, and thyroid) have also been reported in individuals with MAP, although confirmed risks are not available at this time.   Individuals with MAP may also have these features:  CHRPE: freckle like spots on the inside of the eye  Dental abnormalities/cysts on the jaw bone  Benign and malignant tumors of the skin s oil glands (sebaceous tumors)      Cancer Screening and Prevention:  The following screening is recommended for individuals with MUTYH-associated polyposis (MAP) per current National Comprehensive Cancer Network (NCCN) guidelines:  High-quality colonoscopy and polypectomy every 1-2 years beginning at age 25-30 (or earlier based on family history). Depending upon colon polyp burden, removal of the colon may be recommended with continued surveillance of the rectum (as needed).  Baseline upper endoscopy (including complete visualization of the ampulla of Vater) beginning at age 30-35; repeated based on polyp findings.  Annual physical exam to begin at the time of diagnosis with MAP. Guidelines do not yet encourage screening for other cancers, but skin and thyroid exams could be done as part of the annual physical exam.  There are currently no other specific  "cancer screening guidelines for other cancers potentially associated with MAP. As such, additional screening should be based on personal and family history.     Other screening based on Tevin's personal and family history:  He was recently diagnosed with a cancer of unknown primary site (likely GI). He had extensive workup for this. He should continue with the plan for active surveillance as directed by his oncologist, Tanner Sánchez MD, PhD.  Tevin has already also had laparoscopic right hemicolectomy on 4/13/21 due to high colon polyp burden. He should continue with his colonoscopy surveillance as outlined above and as recommended by his physicians.  He also has a history of bladder cancer initially diagnosed in 2019. He should continue with his follow up for this as recommended by his urologist.      Of note, the above information is based on our current understanding of Tevin's genetic findings. Tevin is encouraged to reach out to me regularly regarding any pertinent updates to his personal and/or family history of cancer, as our understanding of the genetic findings in his family may change over time. \"        Vitamin B12 deficiency  Identified by GI and he had been on vitamin B12 injections for a year and is not taking any OTC B12 at this point. Recheck B12 at the time of his next preventive visit. If on the low side, would recommend starting daily B12 supplement.          BMI  Estimated body mass index is 26.09 kg/m  as calculated from the following:    Height as of this encounter: 1.735 m (5' 8.3\").    Weight as of this encounter: 78.5 kg (173 lb 1.6 oz).            Subjective   Hola is a 74 year old, presenting for the following health issues:  Follow Up (-6 month )    History of Present Illness       Reason for visit:  Six month follow up He is missing 2 dose(s) of medications per week.  He is not taking prescribed medications regularly due to remembering to take.       His teeth are all fixed. Doing " "well. Feels well and no complaints today. Still not smoking. It has been over 6 months.      His younger brother coming up on age 73 had hematuria. Just diagnosed with bladder cancer last month. Had chemo, then had a stroke after chemo. Recovering okay at this point. Had his bladder removed and lymph nodes, no mets.       Objective    /66 (BP Location: Right arm, Patient Position: Sitting, Cuff Size: Adult Regular)   Pulse 68   Temp 97.2  F (36.2  C) (Temporal)   Resp 18   Ht 1.735 m (5' 8.3\")   Wt 78.5 kg (173 lb 1.6 oz)   SpO2 96%   BMI 26.09 kg/m    Body mass index is 26.09 kg/m .  Physical Exam   GENERAL: alert and no distress    Lab on 02/24/2025   Component Date Value Ref Range Status    CEA 02/24/2025 6.0  ng/mL Final    Nonsmoker (past/never) <=5.0 ng/mL   Current smoker <=6.5 ng/mL     This result is obtained using the Roche Elecsys CEA method on the rubio [id number] immunoassay analyzer. Results obtained with different assay methods or kits cannot be used interchangeably.           Signed Electronically by: Preethi Quiroz MD     "

## 2025-03-03 NOTE — PATIENT INSTRUCTIONS
You are due for colonoscopy and upper endoscopy- check to make sure this gets scheduled.     Schedule your next wellness with me in September

## 2025-04-01 ENCOUNTER — ALLIED HEALTH/NURSE VISIT (OUTPATIENT)
Dept: FAMILY MEDICINE | Facility: CLINIC | Age: 75
End: 2025-04-01
Payer: COMMERCIAL

## 2025-04-01 VITALS — SYSTOLIC BLOOD PRESSURE: 120 MMHG | DIASTOLIC BLOOD PRESSURE: 76 MMHG

## 2025-04-01 DIAGNOSIS — Z01.30 BLOOD PRESSURE CHECK: Primary | ICD-10-CM

## 2025-04-01 PROCEDURE — 99207 PR NO CHARGE NURSE ONLY: CPT | Performed by: FAMILY MEDICINE

## 2025-04-01 PROCEDURE — 3078F DIAST BP <80 MM HG: CPT | Performed by: FAMILY MEDICINE

## 2025-04-01 PROCEDURE — 3074F SYST BP LT 130 MM HG: CPT | Performed by: FAMILY MEDICINE

## 2025-04-01 NOTE — PROGRESS NOTES
Tevin Guidry was evaluated at [facility] on April 1, 2025 at which time his blood pressure was:    BP Readings from Last 3 Encounters:   04/01/25 120/76   03/03/25 126/66   12/23/24 124/79     Pulse Readings from Last 3 Encounters:   03/03/25 68   12/23/24 65   12/10/24 64       Reviewed lifestyle modifications for blood pressure control and reduction: including making healthy food choices, managing weight, getting regular exercise, smoking cessation, reducing alcohol consumption, monitoring blood pressure regularly.     Symptoms: None    BP Goal:< 140/90 mmHg    BP Assessment:  BP at goal    Potential Reasons for BP too high: NA - Not applicable    BP Follow-Up Plan: Recheck BP in 6 months at pharmacy    Continue current HTN medication regimen with regular BP monitoring, once every 6 months in pharmacy if at goal, or sooner if patient is unwell or at home monitoring is abnormal.      Thank you,  Sada Padilla, PharmD.   [facility]   [phone]

## 2025-04-15 ENCOUNTER — MYC MEDICAL ADVICE (OUTPATIENT)
Dept: FAMILY MEDICINE | Facility: CLINIC | Age: 75
End: 2025-04-15
Payer: COMMERCIAL

## 2025-04-15 DIAGNOSIS — H40.1131 PRIMARY OPEN ANGLE GLAUCOMA (POAG) OF BOTH EYES, MILD STAGE: Primary | ICD-10-CM

## 2025-04-15 DIAGNOSIS — D13.91 AUTOSOMAL RECESSIVE COLORECTAL ADENOMATOUS POLYPOSIS ASSOCIATED WITH MUTATION IN MUTYH GENE: Primary | ICD-10-CM

## 2025-04-15 DIAGNOSIS — C80.1 SIGNET RING CELL ADENOCARCINOMA (H): ICD-10-CM

## 2025-04-16 ENCOUNTER — OFFICE VISIT (OUTPATIENT)
Dept: OPHTHALMOLOGY | Facility: CLINIC | Age: 75
End: 2025-04-16
Attending: OPHTHALMOLOGY
Payer: COMMERCIAL

## 2025-04-16 DIAGNOSIS — H04.123 DRY EYE SYNDROME OF BOTH EYES: Primary | ICD-10-CM

## 2025-04-16 DIAGNOSIS — H40.1131 PRIMARY OPEN ANGLE GLAUCOMA (POAG) OF BOTH EYES, MILD STAGE: ICD-10-CM

## 2025-04-16 DIAGNOSIS — Z96.1 PSEUDOPHAKIA OF BOTH EYES: ICD-10-CM

## 2025-04-16 PROCEDURE — 92083 EXTENDED VISUAL FIELD XM: CPT | Performed by: OPHTHALMOLOGY

## 2025-04-16 PROCEDURE — 92133 CPTRZD OPH DX IMG PST SGM ON: CPT | Performed by: OPHTHALMOLOGY

## 2025-04-16 PROCEDURE — G0463 HOSPITAL OUTPT CLINIC VISIT: HCPCS | Performed by: OPHTHALMOLOGY

## 2025-04-16 PROCEDURE — 92014 COMPRE OPH EXAM EST PT 1/>: CPT | Performed by: OPHTHALMOLOGY

## 2025-04-16 ASSESSMENT — CONF VISUAL FIELD
METHOD: COUNTING FINGERS
OD_SUPERIOR_NASAL_RESTRICTION: 0
OD_INFERIOR_TEMPORAL_RESTRICTION: 0
OS_NORMAL: 1
OD_NORMAL: 1
OD_SUPERIOR_TEMPORAL_RESTRICTION: 0
OD_INFERIOR_NASAL_RESTRICTION: 0
OS_SUPERIOR_TEMPORAL_RESTRICTION: 0
OS_SUPERIOR_NASAL_RESTRICTION: 0
OS_INFERIOR_NASAL_RESTRICTION: 0
OS_INFERIOR_TEMPORAL_RESTRICTION: 0

## 2025-04-16 ASSESSMENT — REFRACTION_MANIFEST
OS_AXIS: 144
OD_AXIS: 168
OD_CYLINDER: +0.50
OS_ADD: +2.50
OD_ADD: +2.50
OD_SPHERE: PLANO
OS_SPHERE: -0.50
OS_CYLINDER: +0.50

## 2025-04-16 ASSESSMENT — TONOMETRY
OD_IOP_MMHG: 13
IOP_METHOD: APPLANATION
OD_IOP_MMHG: 14
OS_IOP_MMHG: 13
OS_IOP_MMHG: 13
IOP_METHOD: TONOPEN

## 2025-04-16 ASSESSMENT — SLIT LAMP EXAM - LIDS
COMMENTS: NORMAL
COMMENTS: NORMAL

## 2025-04-16 ASSESSMENT — VISUAL ACUITY
OS_SC: 20/30
OS_PH_SC+: -1
OS_SC+: -1
OS_PH_SC: 20/25
METHOD: SNELLEN - LINEAR
OD_SC: 20/25
OD_SC+: -1

## 2025-04-16 ASSESSMENT — EXTERNAL EXAM - LEFT EYE: OS_EXAM: NORMAL

## 2025-04-16 ASSESSMENT — CUP TO DISC RATIO
OD_RATIO: 0.8
OS_RATIO: 0.7

## 2025-04-16 ASSESSMENT — EXTERNAL EXAM - RIGHT EYE: OD_EXAM: NORMAL

## 2025-04-16 NOTE — PROGRESS NOTES
HPI       Primary Open Angle Glaucoma Follow Up    In both eyes.  Associated symptoms include Negative for dryness, eye pain, tearing, photophobia, flashes, floaters and itching. Additional comments: Primary open angle glaucoma (POAG) of both eyes, mild stage             Comments    Pt states vision is the same.  No pain.  Pt has been rubbing his eyes but no tearing or itching.  No flashes/floaters.  No ocular meds.  No DM.    MERCEDEZ Simon 2025 1:27 PM              Last edited by Miller Schaefer COT on 2025  1:27 PM.        HPI:  Tevin Guidry is a 74 year old male with history of CAD s/p CABG, colon CA, HTN, HLD presenting for annual exam. His vision is stable. He is off all drops. Doing well.     Past Ocular History:  Punched in both eyes age 20 or 21  CE/IOL right eye Maltry 18, left eye Massey 21  POAG      PMH:  HTN  CAD s/p 6 stents and surgery  Bladder cancer in remission (treated with local chemotherapy and DCG per patient)  MUTYH associated polyposis s/p partial colectomy    SH:  smoker    FH:  No known family history of blindness, glaucoma, macular degeneration  No known FHx polyposis - brother has not had colonoscopy  Mother and older brother  of stomach cancer    Eye Medications      ASSESSMENT and PLAN:  (H40.1130) Primary open angle glaucoma (POAG) of both eyes, mild stage  FH: Negative  Pachymetry:   Gonioscopy: open to thin CBB, 3+ pigment, flat  Tmax: 20/19  Today's IOP: 11/10  Target IOP: mid-teens  Current medications: None  Meds to avoid: None  Selected laser trabeculoplasty (SLT) 3/17/23 right eye   Selected laser trabeculoplasty (SLT)0 24 left eye      Godoy visual field (HVF) 24-2 SUHA Std 25  Right eye normal, reliable, VFI 99%, mD -1.05, pSD 1.74  Left eye normal, reliable, %, MD 1.13, PSD 1.36  Visual field OVF 24-2 10/16/24  Right eye sup nasal step, superior depressed point (?lid), MD -1.0 PSD 4.2  Left eye nonspecific defects,  MD -1.0 PSD 3.2  Visual field: OVF 24-2 (02/07/24 )  Right eye: reliable, superior depressed points (?lid) and inferonasal depressed spot, MD -1.1 PSD 3.3;  Left eye: reliable, inferior nasal step, superior arcuate MD -3.1, PSD 6.6; improved inferior and increased superior from prior  Worse left eye compared to previous  Visual field: OVF 24-2 (5/17/23)  Right eye: reliable, superior depressed points (?lid) and inferonasal depressed spot, MD -0.6, PSD 2.6;  Left eye: reliable, superior arcuate MD -2.2, PSD 2.9; improved inferior and increased superior from prior  Visual field: OVF 24-2 (5/20/22)  Right eye: reliable, superior depressed points (?lid) and inferonasal depressed spot, MD -3.8, PSD 4.4; superior depression not consistent with location from prior  Left eye: poor reliability with 14% FN, superior>inferior depression, MD -8.2, PSD 3.6; improved inferior and increased superior from prior    OCT nerve fiber layer 04/16/25 :   Right eye - G(r) 61  NI (g) 80 TI (r) 90 NS (r) 56 TS (r) 86   Left eye - G(r) 63 NI (g) 78 TI (g) 116 NS (y) 61 TS (r) 72  OCT nerve fiber layer 10/16/24:   Right eye - G(r) 58 NI (g) 78 TI (y) 93 NS (r) 46 TS (r) 83   Left eye - G(r) 64 NI (g) 78 TI (g) 116 NS (y) 62 TS (r) 72  OCT nerve fiber layer 02/07/24:   Right eye - G(r) 58 NI (g) 78 TI (r) 91 NS (r) 48 TS (r) 83 (worse ST      Left eye - G(r) 65 NI (g) 81 TI (g) 115 NS (y) 63 TS (r) 73  OCT nerve fiber layer 11/22/23:   Right eye - G(r) 60 NI (g) 77 TI (y) 97 NS (r) 49 TS (r) 91      Left eye - G(r) 65 NI (g) 85 TI (g) 117 NS (y) 61 TS (r) 72  OCT nerve fiber layer 03/01/23:   Right eye - G(r) 60 NI (g) 76 TI (y) 96 NS (r) 51 TS (r) 89      Left eye - G(r) 72 NI (g) 83 TI (g) 121 NS (g) 75 TS (r) 86  Nerve OCT: Spectralis optic nerve OCT (6/23/21)  OD: Central RNFL thickness 69, superior red, inferior and nasal yellow   OS: Central RNFL thickness 69, superotemporal red, nasal yellow     IOP doing great today both eyes s/p  Selected laser trabeculoplasty (SLT)  Continue off drops  Return to clinic 6 months for v/t/oct    (Z96.1) Pseudophakia of both eyes    Massey left eye 02/02/23   Maltry right eye 12/11/2020 ZCB00 21.5  Doing well  Defers MRx, doing well without correction     (Z86.010) History of colonic polyps  - s/p partial colectomy for numerous colon polyps and adenomas  - no CHRPE on exam    (H04.123) Dry eye syndrome of both eyes    Recommend artificial tears PRN      Return in about 6 months (around 10/16/2025) for Follow Up-v/t, OCT RNFL.    Attending Physician Attestation:  Complete documentation of historical and exam elements from today's encounter can be found in the full encounter summary report (not reduplicated in this progress note).  I personally obtained the chief complaint(s) and history of present illness.  I confirmed and edited as necessary the review of systems, past medical/surgical history, family history, social history, and examination findings as documented by others; and I examined the patient myself.  I personally reviewed the relevant tests, images, and reports as documented above.  I formulated and edited as necessary the assessment and plan and discussed the findings and management plan with the patient and family.  - Guido Massey MD

## 2025-04-16 NOTE — NURSING NOTE
Chief Complaints and History of Present Illnesses   Patient presents with    Primary Open Angle Glaucoma Follow Up     Primary open angle glaucoma (POAG) of both eyes, mild stage     Chief Complaint(s) and History of Present Illness(es)       Primary Open Angle Glaucoma Follow Up              Laterality: both eyes    Associated symptoms: Negative for dryness, eye pain, tearing, photophobia, flashes, floaters and itching    Comments: Primary open angle glaucoma (POAG) of both eyes, mild stage              Comments    Pt states vision is the same.  No pain.  Pt has been rubbing his eyes but no tearing or itching.  No flashes/floaters.  No ocular meds.  No DM.    MERCEDEZ Simon April 16, 2025 1:27 PM

## 2025-04-17 NOTE — TELEPHONE ENCOUNTER
Dr. Quiroz-Please review and sign if agree.  GI procedures referral pended to the best of writer's ability with chart review.    Thank you!  SARINA CordovaN, RN-[facility]

## 2025-04-17 NOTE — TELEPHONE ENCOUNTER
Pagegonzalez and made MD Jose Shabazz aware of pt's HR sustaining 140-150 resting. Orders placed and followed. Will continue to monitor. Relayed plan of care to patient via [employer].    SARINA SandovalN, RN (she/her)  [facility] RN

## 2025-04-23 ENCOUNTER — TELEPHONE (OUTPATIENT)
Dept: GASTROENTEROLOGY | Facility: CLINIC | Age: 75
End: 2025-04-23
Payer: COMMERCIAL

## 2025-04-23 NOTE — TELEPHONE ENCOUNTER
"Endoscopy Scheduling Screen    Caller: patient    Have you had any respiratory illness or flu-like symptoms in the last 10 days?  No    What is your communication preference for Instructions and/or Bowel Prep?   MyChart    What insurance is in the chart?  Other:  [facility]    Ordering/Referring Provider: Pretehi Quiroz MD   (If ordering provider performs procedure, schedule with ordering provider unless otherwise instructed. )    BMI: Estimated body mass index is 26.09 kg/m  as calculated from the following:    Height as of 3/3/25: 1.735 m (5' 8.3\").    Weight as of 3/3/25: 78.5 kg (173 lb 1.6 oz).     Sedation Ordered  MAC/deep sedation.   BMI<= 45 45 < BMI <= 48 48 < BMI < = 50  BMI > 50   No Restrictions No [facility]  No [facility]  [facility] with exceptions Hospital Only OR Only       Do you have a history of malignant hyperthermia?  No    (Females) Are you currently pregnant?   No     Have you been diagnosed or told you have pulmonary hypertension?   No    Do you have an LVAD?  No    Have you been told you have moderate to severe sleep apnea?  No.    Have you been told you have COPD, asthma, or any other lung disease?  Yes     What breathing problems do you have?  COPD     Do you use home oxygen?  No    Have your breathing problems required an ED visit or hospitalization in the last year?  No.    Has your doctor ordered any cardiac tests like echo, angiogram, stress test, ablation, or EKG, that you have not completed yet?  No    Do you  have a history of any heart conditions?  No   Heart Surgery 2013  Have you ever had or are you waiting for an organ transplant?  No. Continue scheduling, no site restrictions.    Have you had a stroke or transient ischemic attack (TIA aka \"mini stroke\") in the last 2 years?   No.    Have you been diagnosed with or been told you have cirrhosis of the liver?   No.    Are you currently on dialysis?   No    Do you need assistance transferring?   No    BMI: Estimated body mass index is " "26.09 kg/m  as calculated from the following:    Height as of 3/3/25: 1.735 m (5' 8.3\").    Weight as of 3/3/25: 78.5 kg (173 lb 1.6 oz).     Is patients BMI > 40 and scheduling location [facility]?  No    Do you take an injectable or oral medication for weight loss or diabetes (excluding insulin)?  No    Do you take the medication Naltrexone?  No    Do you take blood thinners?  No       Prep   Are you currently on dialysis or do you have chronic kidney disease?  No    Do you have a diagnosis of diabetes?  No    Do you have a diagnosis of cystic fibrosis (CF)?  No    On a regular basis do you go 3 -5 days between bowel movements?  No    BMI > 40?  No    Preferred Pharmacy:    [facility] - [place], [place] - [address] [place] [address]  Phone: [phone] Fax: [phone]    Final Scheduling Details     Procedure scheduled  Colonoscopy / Upper endoscopy (EGD)    Surgeon:  Earnest     Date of procedure:  7/2     Pre-OP / PAC:   No - Not required for this site.    Location  [facility] - ASC - Patient preference.    Sedation   MAC/Deep Sedation - Per order.      Patient Reminders:   You will receive a call from a Nurse to review instructions and health history.  This assessment must be completed prior to your procedure.  Failure to complete the Nurse assessment may result in the procedure being cancelled.      On the day of your procedure, please designate an adult(s) who can drive you home stay with you for the next 24 hours. The medicines used in the exam will make you sleepy. You will not be able to drive.      You cannot take public transportation, ride share services, or non-medical taxi service without a responsible caregiver.  Medical transport services are allowed with the requirement that a responsible caregiver will receive you at your destination.  We require that drivers and caregivers are confirmed prior to your procedure.  "

## 2025-05-27 NOTE — ANESTHESIA PREPROCEDURE EVALUATION
Anesthesia Pre-Procedure Evaluation    Patient: Tevin Guidry   MRN:     [id number] Gender:   male   Age:    69 year old :      1950        Preoperative Diagnosis: * No surgery found *        Past Medical History:   Diagnosis Date     Colon polyps     multiple may need colectomy (adenoma)     Hyperlipidemia LDL goal <130      Hypertension goal BP (blood pressure) < 140/90      Social phobia       Past Surgical History:   Procedure Laterality Date     CLAVICLE SURGERY Right     fracture in childhood     COLONOSCOPY N/A 3/30/2017    Procedure: COLONOSCOPY;  Surgeon: Jie Onofre MD;  Location: [facility]     DAVINCI BYPASS ARTERY CORONARY N/A 2014    Procedure: DAVINCI BYPASS ARTERY CORONARY;  Surgeon: Lam Solis MD;  Location: [facility]     ESOPHAGOSCOPY, GASTROSCOPY, DUODENOSCOPY (EGD), COMBINED N/A 3/30/2017    Procedure: COMBINED ESOPHAGOSCOPY, GASTROSCOPY, DUODENOSCOPY (EGD);  Surgeon: Jie Onofre MD;  Location: [facility]     HC COLONOSCOPY THRU STOMA, DIAGNOSTIC      polyps due      SURGICAL HISTORY OF -       right clavicular fracture with ORIF          Anesthesia Evaluation     . Pt has had prior anesthetic. Type: General and MAC           ROS/MED HX    ENT/Pulmonary: Comment: Albuterol doesn't seem to help per pt report.    PFT 12/8/15:  Although the FVC and FEV1 are within normal limits, the FEV1/FVC ratio is reduced.  Patient effort was erratic, but the normal FEV1 indicates no significant obstruction.  The inspiratory flow rates are within normal limits.  Lung volumes are within normal limits.  Following administration of bronchodilators, there is no significant response.  The diffusing capacity is normal.  However, the diffusing capacity was not corrected for the patient's hemoglobin.  Mild airway obstruction is present.  IMPRESSION:  Mild Airflow Obstruction   No significant change with bronchodilator  Normal lung volumes  Normal diffusion  Pt notified. -MAINE Diaz   capacity        (+)tobacco use, Past use 45 pk yr hx, quit 8/1/19 packs/day  Intermittent asthma Treatment: Inhaler prn,  mild COPD, , . .    Neurologic:  - neg neurologic ROS     Cardiovascular:     (+) Dyslipidemia, hypertension--CAD, -CABG-date: 9/2014 robotic, stent,9/2014  1 Drug Eluting Stent .. Taking blood thinners : Instructions Given to patient: last dose ASA 9/2/19. . . :. . Previous cardiac testing Echodate:9/27/14results:Interpretation Summary  Global and regional left ventricular function is normal with an EF of 60-65%.   Right ventricular function, chamber size, wall motion, and thickness are   normal. Pulmonary artery systolic pressure cannot be assessed. The inferior   vena cava is normal. No pericardial effusion is present.    date: results: date: results:Cath date: 9/29/14 results:Successful PCI with ALENA from distal to prox segments of RCA. Good flow of LIMA to LAD with stenosis as noted.          METS/Exercise Tolerance: Comment: Walks 1 y/o [place] enc[place] dog daily. Able to ascend stairs w/ mild SOB, denies CP & angina. >4 METS   Hematologic: Comments: Iron deficiency anemia    (+) Anemia, History of Transfusion no previous transfusion reaction -      Musculoskeletal:  - neg musculoskeletal ROS       GI/Hepatic:  - neg GI/hepatic ROS       Renal/Genitourinary:  - ROS Renal section negative       Endo:  - neg endo ROS       Psychiatric:     (+) psychiatric history anxiety      Infectious Disease:  - neg infectious disease ROS       Malignancy:   (+) Malignancy History of Other  Other CA bladder Active status post         Other:    (+) No chance of pregnancy C-spine cleared: N/A, no H/O Chronic Pain,                       PHYSICAL EXAM:   Mental Status/Neuro: A/A/O; Age Appropriate   Airway: Facies: Feasible  Mallampati: II  Mouth/Opening: Full  TM distance: > 6 cm  Neck ROM: Limited   Respiratory: Auscultation: CTAB     Resp. Rate: Normal     Resp. Effort: Normal      CV: Rhythm: Regular  Rate:  "Age appropriate  Heart: Normal Sounds  Edema: None   Comments:      Dental: Normal Dentition                LABS:  CBC:   Lab Results   Component Value Date    WBC 10.5 12/10/2018    WBC 7.9 03/06/2018    HGB 14.9 12/10/2018    HGB 15.3 03/06/2018    HCT 44.9 12/10/2018    HCT 46.2 03/06/2018     12/10/2018     03/06/2018     BMP:   Lab Results   Component Value Date     07/03/2019     12/10/2018    POTASSIUM 4.8 07/03/2019    POTASSIUM 4.7 12/10/2018    CHLORIDE 109 07/03/2019    CHLORIDE 103 12/10/2018    CO2 29 07/03/2019    CO2 30 12/10/2018    BUN 11 07/03/2019    BUN 11 12/10/2018    CR 0.80 07/03/2019    CR 0.84 12/10/2018    GLC 95 07/03/2019    GLC 90 12/10/2018     COAGS:   Lab Results   Component Value Date    PTT 31 10/07/2014    INR 1.11 10/07/2014     POC:   Lab Results   Component Value Date     (H) 10/01/2014     OTHER:   Lab Results   Component Value Date    PH 7.36 09/30/2014    LACT 0.6 (L) 09/29/2014    A1C 5.9 09/28/2014    TAMMIE 8.3 (L) 07/03/2019    PHOS 4.3 09/30/2014    MAG 1.9 10/09/2014    ALBUMIN 4.0 12/10/2018    PROTTOTAL 6.9 12/10/2018    ALT 23 12/10/2018    AST 19 12/10/2018    ALKPHOS 107 12/10/2018    BILITOTAL 0.5 12/10/2018    TSH 0.70 01/25/2017        Preop Vitals    BP Readings from Last 3 Encounters:   09/03/19 (!) 145/84   09/03/19 (!) 155/86   07/30/19 130/84    Pulse Readings from Last 3 Encounters:   09/03/19 51   09/03/19 65   07/30/19 58      Resp Readings from Last 3 Encounters:   09/03/19 19   07/03/19 16   12/10/18 16    SpO2 Readings from Last 3 Encounters:   09/03/19 97%   07/03/19 95%   12/10/18 98%      Temp Readings from Last 1 Encounters:   09/03/19 98.2  F (36.8  C) (Oral)    Ht Readings from Last 1 Encounters:   09/03/19 1.753 m (5' 9\")      Wt Readings from Last 1 Encounters:   09/03/19 75.5 kg (166 lb 8 oz)    Estimated body mass index is 24.59 kg/m  as calculated from the following:    Height as of this encounter: 1.753 m (5' " "9\").    Weight as of this encounter: 75.5 kg (166 lb 8 oz).     LDA:  Peripheral IV 09/03/19 Left Upper forearm (Active)   Number of days: 0        Assessment:   ASA SCORE: 2    H&P: History and physical reviewed and following examination; no interval change.    NPO Status: NPO Appropriate     Plan:   Anes. Type:  General   Pre-Medication: None   Induction:  IV (Standard)   Airway: LMA   Access/Monitoring: PIV   Maintenance: Balanced     Postop Plan:   Postop Pain: Opioids  Postop Sedation/Airway: Not planned  Disposition: Outpatient     PONV Management:   Adult Risk Factors:, Postop Opioids   Prevention: Ondansetron, Dexamethasone     CONSENT: Direct conversation   Plan and risks discussed with: Patient                   PAC Discussion and Assessment    ASA Classification: 2  Case is suitable for: ASC  Anesthetic techniques and relevant risks discussed: GA  Invasive monitoring and risk discussed: No  Types:   Possibility and Risk of blood transfusion discussed: No  NPO instructions given:   Additional anesthetic preparation and risks discussed:   Needs early admission to pre-op area:   Other:     PAC Resident/NP Anesthesia Assessment:  Tevin Guidry is a 69 year old male scheduled to undergo transurethral resection of bladder tumor with Almas Sunshine MD on 9/5/19 at [facility] for treatment of a bladder mass.    Mr. Guidry was evaluated by Dr. Sunshine today for gross hematuria. Cystoscopy today shows a bladder tumor, likely low grade. He also endorses intermittent urgency and rare urge incontinence at times, mainly when he drinks too much coffee or water. He otherwise denied any dysuria, frequency, hesitancy, intermittency, feelings of incomplete emptying, flank pain, abdominal pain, or any recent history of urinary tract infections or stones. He recently quit smoking cigarettes on 8/1/19 after a 45 pk yr hx. He now presents for the above procedure.     He has the following specific " operative considerations:     Pt has had prior anesthetic. Type: General and MAC - no complications per pt  - Anesthesia considerations:  Refer to PAC assessment in anesthesia records  - Risk of PONV score = 1.  If > 2, anti-emetic intervention recommended.    CARDIAC: METS >4,  Walks 1 y/o [place] enc[place] dog daily. Able to ascend stairs w/ mild SOB, denies CP & angina.      - RCRI : No serious cardiac risks.  0.4% risk of major adverse cardiac event.    - CAD s/p hybrid robotic-assisted CABG (LIMA - LAD) and PCI with ALENA to RCA (distal to prox) - see HPI    -  HTN, taking lisinopril & metoprolol     -  HLD    PULMONARY:     - MICHELLE 3/8 = intermediate risk    - Former smoker, 45 pk yr hx, quit 8/1/19     - Mild COPD, doesn't use albuterol    GI: no GERD      ENDO: BMI 24    - No DM    HEME: VTE risk: 3%  - On ASA 81 mg, last dose on 9/2/19    ORTHO: mildly limited neck extension ROM, no TMJ    NEURO/PSYCH:  Anxiety stable on Paxil     Patient was discussed with Dr Washington. Patient is optimized and is acceptable candidate for the proposed procedure, provided labs today are within acceptable range. No further diagnostic evaluation is needed.        Reviewed and Signed by PAC Mid-Level Provider/Resident  Mid-Level Provider/Resident: Becky Cardona PA-C  Date: 9/3/19  Time: 1557    Attending Anesthesiologist Anesthesia Assessment:        Anesthesiologist:   Date:   Time:   Pass/Fail:   Disposition:     PAC Pharmacist Assessment:        Pharmacist:   Date:   Time:    Becky Cardona PA-C     ANESTHESIA PREOP EVALUATION    PROCEDURE: Procedure(s):  Left Transurethral Resection Of Bladder Tumor    HPI: Tevin Guidry is a 69 year old male who presents for above procedure 2/2 bladder tumor.     PAST MEDICAL HISTORY:    Past Medical History:   Diagnosis Date     Colon polyps     multiple may need colectomy (adenoma)     Hyperlipidemia LDL goal <130      Hypertension goal BP (blood pressure) < 140/90      Social phobia         PAST SURGICAL HISTORY:    Past Surgical History:   Procedure Laterality Date     CLAVICLE SURGERY Right     fracture in childhood     COLONOSCOPY N/A 3/30/2017    Procedure: COLONOSCOPY;  Surgeon: Jie Onofre MD;  Location: [facility]     DAVINCI BYPASS ARTERY CORONARY N/A 2014    Procedure: DAVINCI BYPASS ARTERY CORONARY;  Surgeon: Lam Solis MD;  Location: [facility]     ESOPHAGOSCOPY, GASTROSCOPY, DUODENOSCOPY (EGD), COMBINED N/A 3/30/2017    Procedure: COMBINED ESOPHAGOSCOPY, GASTROSCOPY, DUODENOSCOPY (EGD);  Surgeon: Jie Onofre MD;  Location: [facility]     HC COLONOSCOPY THRU STOMA, DIAGNOSTIC      polyps due      SURGICAL HISTORY OF -       right clavicular fracture with ORIF       SOCIAL HISTORY:       Social History     Tobacco Use     Smoking status: Former Smoker     Packs/day: 1.50     Years: 30.00     Pack years: 45.00     Last attempt to quit: 2019     Years since quittin.0     Smokeless tobacco: Never Used   Substance Use Topics     Alcohol use: No     Comment: quit 35 years ago       ALLERGIES:     Allergies   Allergen Reactions     Codeine        MEDICATIONS:       (Not in a hospital admission)    Current Outpatient Medications   Medication Sig Dispense Refill     albuterol (PROAIR HFA/PROVENTIL HFA/VENTOLIN HFA) 108 (90 Base) MCG/ACT inhaler Inhale 2 puffs into the lungs every 6 hours as needed for shortness of breath / dyspnea or wheezing 1 Inhaler 1     aspirin 81 MG tablet Take 1 tablet (81 mg) by mouth daily (Patient taking differently: Take 81 mg by mouth At Bedtime Pt. Last took 2019) 90 tablet 3     atorvastatin (LIPITOR) 80 MG tablet Take 1 tablet (80 mg) by mouth daily (Patient taking differently: Take 80 mg by mouth At Bedtime ) 90 tablet 3     ferrous sulfate (IRON) 325 (65 FE) MG tablet Take 325 mg by mouth every morning  60 tablet 3     lisinopril (PRINIVIL/ZESTRIL) 20 MG tablet Take 1 tablet (20 mg) by mouth daily  (Patient taking differently: Take 20 mg by mouth At Bedtime ) 90 tablet 3     metoprolol succinate ER (TOPROL-XL) 50 MG 24 hr tablet Take 1 tablet (50 mg) by mouth daily (Patient taking differently: Take 50 mg by mouth At Bedtime ) 90 tablet 3     nitroglycerin (NITROSTAT) 0.4 MG sublingual tablet Place 1 tablet (0.4 mg) under the tongue every 5 minutes as needed for chest pain 25 tablet 3     PARoxetine (PAXIL) 40 MG tablet Take 1 tablet (40 mg) by mouth every morning 90 tablet 3       Current Outpatient Medications Ordered in Epic   Medication Sig Dispense Refill     albuterol (PROAIR HFA/PROVENTIL HFA/VENTOLIN HFA) 108 (90 Base) MCG/ACT inhaler Inhale 2 puffs into the lungs every 6 hours as needed for shortness of breath / dyspnea or wheezing 1 Inhaler 1     aspirin 81 MG tablet Take 1 tablet (81 mg) by mouth daily (Patient taking differently: Take 81 mg by mouth At Bedtime Pt. Last took 09/02/2019) 90 tablet 3     atorvastatin (LIPITOR) 80 MG tablet Take 1 tablet (80 mg) by mouth daily (Patient taking differently: Take 80 mg by mouth At Bedtime ) 90 tablet 3     ferrous sulfate (IRON) 325 (65 FE) MG tablet Take 325 mg by mouth every morning  60 tablet 3     lisinopril (PRINIVIL/ZESTRIL) 20 MG tablet Take 1 tablet (20 mg) by mouth daily (Patient taking differently: Take 20 mg by mouth At Bedtime ) 90 tablet 3     metoprolol succinate ER (TOPROL-XL) 50 MG 24 hr tablet Take 1 tablet (50 mg) by mouth daily (Patient taking differently: Take 50 mg by mouth At Bedtime ) 90 tablet 3     nitroglycerin (NITROSTAT) 0.4 MG sublingual tablet Place 1 tablet (0.4 mg) under the tongue every 5 minutes as needed for chest pain 25 tablet 3     PARoxetine (PAXIL) 40 MG tablet Take 1 tablet (40 mg) by mouth every morning 90 tablet 3     No current Epic-ordered facility-administered medications on file.        PHYSICAL EXAM:    Vitals: T Data Unavailable, P Data Unavailable, BP Data Unavailable, R Data Unavailable, SpO2  , Weight Wt  Readings from Last 2 Encounters:   09/03/19 75.5 kg (166 lb 8 oz)   09/03/19 72.6 kg (160 lb)       See doc flowsheet    NPO STATUS: see doc flowsheet    LABS:    BMP:  Recent Labs   Lab Test 07/03/19  1155      POTASSIUM 4.8   CHLORIDE 109   CO2 29   BUN 11   CR 0.80   GLC 95   TAMMIE 8.3*       LFTs:   Recent Labs   Lab Test 12/10/18  1231   PROTTOTAL 6.9   ALBUMIN 4.0   BILITOTAL 0.5   ALKPHOS 107   AST 19   ALT 23       CBC:   Recent Labs   Lab Test 12/10/18  1231   WBC 10.5   RBC 4.80   HGB 14.9   HCT 44.9   MCV 94   MCH 31.0   MCHC 33.2   RDW 12.9          Coags:  Recent Labs   Lab Test 10/07/14  1319   INR 1.11   PTT 31       Imaging:  No orders to display       Damian Palm MD  Anesthesiology Staff  Pager [phone]    9/4/2019  5:05 PM

## 2025-06-12 ENCOUNTER — TELEPHONE (OUTPATIENT)
Dept: GASTROENTEROLOGY | Facility: CLINIC | Age: 75
End: 2025-06-12

## 2025-06-12 NOTE — TELEPHONE ENCOUNTER
Bowel Prep Review:  Disclaimer: No call was made to the patient.     Standard Miralax bowel prep.    Recommended due to standard bowel prep.   Instructions were sent via [employer]      Vielka Campbell RN  Endoscopy Procedure Pre Assessment

## 2025-06-16 ENCOUNTER — TELEPHONE (OUTPATIENT)
Dept: GASTROENTEROLOGY | Facility: CLINIC | Age: 75
End: 2025-06-16
Payer: COMMERCIAL

## 2025-06-16 NOTE — TELEPHONE ENCOUNTER
Pre visit planning completed.      Procedure details:    Patient scheduled for Colonoscopy/Upper endoscopy (EGD) on 7/2/25.     Arrival time: 0930. Procedure time 1030    Facility location: [facility]; [address] [place] [address], [address] Floor, [place] [address]. Check in location: 5th Floor.    Sedation type: MAC    Pre op exam needed? No.    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 No    Recent diagnosis of diverticulitis within the last 6 weeks? No      Medication review:    Diabetic? No    Anticoagulants? No    Weight loss medication/injectable? No GLP-1 medication per patient's medication list. Nursing to verify with pre-assessment call.    Other medication HOLDING recommendations:  Cannabis/Marijuana: Stop night before procedure.      Prep for procedure:     Bowel prep recommendation: Standard Miralax.   Due to: standard bowel prep    ** Discuss bowel habits with patient, noted hx of constipation in past however denied during  assessment:     On a regular basis do you go 3 -5 days between bowel movements?  No    Procedure information and instructions sent via [employer]         Corinne Kliber, RN  Endoscopy Procedure Pre Assessment   [phone] option 3

## 2025-06-16 NOTE — PROGRESS NOTES
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  The site of origin is unclear, so at the current time, it can be considered carcinoma of unknown primary, very likely of gastrointestinal origin, but the primary site is not known.    GI Cancer Treatment to date: right hemicolectomy 04/13/2021     Surgeon: Dr. Rodríguze     Other cancers: bladder cancer, under the care of Dr. Guzman from our Urology team. Per his notes: \"recurrent bladder tumor. Has previously undergone BCG induction x 2. While office biopsy suggested possible HG disease, formal TURBT with only low-grade pathology. Has completed some BCG maintenance, but has since stopped.\" Followed by 6-month cystoscopies.    PCP: Dr. Preethi Quiroz    GI Oncology History:   He had a colonoscopy 03/30/2017 and another one 03/02/2021 that was delayed due to the COVID pandemic and other healthcare issues.  This was performed by Dr. Marin Steward on 03/02/2021 and the pathology was notable for tubular and tubulovillous adenomas.  Due to the extent and burden of the number of polyps, he was referred to Dr. Rodríguez for consideration of hemicolectomy.  A number of polyps in the distal transverse colon were tattooed.    Dr. Rodríguez took the patient to the operating room for right hemicolectomy 04/13/2021 due to this high polyp burden.    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        Tumor is located at the distal resection margin and situated within    the lamina propria/submucosa (distal   margin positive, proximal and deep margins are free of malignancy)        An overlying tubular adenoma is present at this distal resection   margin, does not appear to be the source   of " the underlying malignancy        Tumor origin is compatible with gastrointestinal site as it marks   positively with CK 20, pan cytokeratin   and CDX2 (negative for CK-7), see comment   - Multiple tubular adenomas (at least twenty-one), negative for high-grade    dysplasia, distal margin is   transected by a sessile tubular adenoma   - Benign appendix with focal surface serrated change, negative for   malignancy (submitted in its entirety)   - Unremarkable terminal ileum   - Sixteen benign lymph nodes (0/16)     COMMENT:   The origin of the adenocarcinoma is uncertain based on the current   resection however the immunoperoxidase   battery supports gastrointestinal origin.  Suggest clinical and   radiographic correlation to investigate   possible primary sites (including stomach, pancreaticobiliary and   remainder of the colon).  The appendix was   submitted in its entirety and does not appear to be the source of the   neoplasm.  Intradepartmental   consultation obtained, the case was discussed with Dr. Rodríguez on   4/19/21.     April 20, 2021--CT chest IMPRESSION:   In this patient with recently diagnosed colon cancer:  1. No evidence of metastatic disease in the chest.  2. Subcutaneous emphysema along the left lower chest/upper abdominal  wall may be secondary to transversus abdominis block done on  4/13/2021. No free intraperitoneal air or pneumothorax.  3. Coronary artery calcifications.  4. Sequelae of prior granulomatous disease.     [Access Center: Subcutaneous emphysema of the left lower chest/upper  abdominal wall, likely from transversus abdominis than on 4/13/2021.  No free intraperitoneal air or pneumothorax.]      April 22, 2021--colonoscopy FINAL DIAGNOSIS:   COLON, DESCENDING, POLYP:   - Tubular adenoma   - Negative for high grade dysplasia or malignancy     April 30 2021 PET CT IMPRESSION: In this patient with signet ring carcinoma of the colon  status post right hemicolectomy thought to be possibly  metastatic:  1. FDG at the colonic surgical anastomosis presumably postsurgical  inflammation.   2. No definite FDG uptake to suggest metastatic disease.   3. No definite primary significant ring carcinoma is identified.   There are 3 equivocal findings:   3a. Mild uptake in the gastric cardia, favor physiologic  3b. Loop of slightly narrowed thickened small bowel with FDG uptake,  favor physiologic or postoperative inflammation but cannot rule out a  primary.   3c. Extensive FDG uptake in the entire sigmoid colon, favored to be  physiologic.  3d. If clinically indicated, an MR enterography in this case may  better define bowel abnormalities. Alternatively attention to these  areas on follow-up imaging.  4. Incidentally noted bilateral hydroceles and right peritesticular  calcification.     May 3, 2021--initial medical oncology consultation, Dr. Sánchez.    May 6, 2021--EGD - unremarkable; no masses seen.    FINAL DIAGNOSIS:   A. ANTRUM, BIOPSY:   - Gastric antral type mucosa with features of reactive gastropathy   - No H. pylori like organisms identified on routine staining   - Negative for intestinal metaplasia or dysplasia     B. STOMACH, POLYP:   - Gastric body type mucosa with chronic gastritis   - No H. pylori like organisms identified on routine staining   - Negative for intestinal metaplasia, neoplastic polyp or dysplasia     C. STOMACH, POLYPS, BIOPSY:   - Fundic gland polyp in one fragment   - Gastric antral type mucosa with mild chronic gastritis   - No H. pylori like organisms identified on routine staining or by   immunohistochemistry   - Negative for intestinal metaplasia or dysplasia     D. STOMACH, BIOPSY:   - Chronic active gastritis with focal intestinal and pancreatic acinar   type metaplasia, see comment   - No H. pylori like organisms identified on routine staining or by   immunohistochemistry   - Negative for dysplasia, see comment       July 16, 2021 -- oncology follow-up/virtual visit,   [facility].  11/8/21-PET/CT--IMPRESSION: Hx. of 2 mm focus of poorly differentiated adenocarcinoma  with significant ring cell features at the tip of the appendix.  No  recurrent or metastatic disease.  1. Postsurgical changes of right hemicolectomy with mild residual  uptake along the surgical anastomosis which is below that of  background, decreased from prior, and favored to be inflammatory.   2. Slight progression of peripheral reticular opacities in the  dependent right lung with a basilar predominance, differential  atelectasis, edema, inflammatory, infectious, aspiration.  This is on  a background of emphysema.  2a. A 9 mm groundglass nodule right upper lobe peripherally unchanged  from prior exam, indeterminant. Continued attention on follow-up.    November 19, 2021 -- oncology follow-up/virtual visit, Dr. Sánchez.  The 04/22/2022 surveillance colonoscopy with Dr. Rodríguez resected several polyps, no evidence of malignancy.      Final Diagnosis   A.  COLON, ASCENDING, POLYP:  - Tubular adenoma  - No high-grade dysplasia or malignancy identified     B.  COLON, DESCENDING, POLYP:  - Tubular adenoma  - No high-grade dysplasia or malignancy identified         June 6, 2022--PET/CT: CT FINDINGS: Mild senescent intracranial changes. Postoperative change of the right lens. Mild carotid artery bifurcation calcification. Moderate to severe coronary artery calcium. Moderate upper lung predominant emphysema. Splenic granulomas. Right   hemicolectomy changes. Pelvic phleboliths. Multilevel degenerative changes of the spine. The lower extremities are unremarkable. IMPRESSION: No metabolic evidence of active neoplasm.    June 24, 2022 -- oncology follow-up/virtual visit, Dr. Sánchez.    3/23/23--EGD Impression:            - Normal examined duodenum.                          - Multiple gastric polyps. Resected and retrieved.                          - Esophagogastric landmarks identified.     3/23/23--surveillance  colonoscopy--Impression:            - Patent functional end-to-end ileo-colonic                          anastomosis, characterized by ulceration. Biopsied.                          - The examined portion of the ileum was normal.                          - Six 2 to 4 mm polyps at the recto-sigmoid colon and                          at the splenic flexure, removed with a cold snare.                          Resected and retrieved.                          - Diverticulosis in the sigmoid colon.                          - The examination was otherwise normal on direct and                          retroflexion views.   Recommendation:        - Return to referring physician.                          - Repeat colonoscopy in 2 years for surveillance based                          on pathology results.   Final Diagnosis   A. COLON, ANASTOMOSIS SITE, BIOPSY#1:  Enteric mucosa with hyperplastic/reactive changes, chronic inflammation and lamina propria fibrosis; negative for dysplasia or malignancy     B. COLON, ANASTOMOSIS SITE, BIOPSY#2:  Enteric mucosa with hyperplastic/reactive changes, chronic inflammation and lamina propria fibrosis; negative for dysplasia or malignancy     C. COLON, SPLENIC FLEXURE, POLYPS X2, BIOPSY:   -Tubulovillous adenoma; negative for high-grade dysplasia   -Hyperplastic polyp      D. COLON, LOWER LEFT, POLYPS X4, BIOPSY:  Tubular adenomas; negative for high-grade dysplasia      E. STOMACH, GREATER CURVATURE, POLYP, BIOPSY:  Gastric mucosa with chronic inactive gastritis with intestinal metaplasia (complete type); negative for dysplasia or malignancy       6/20/23--CT c/a/p--IMPRESSION:  No evidence of metastatic disease in the chest, abdomen, or pelvis.    June 23, 2023 -- oncology follow-up/virtual visit, Dr. Sánchez.    6/24/24--CT c/a/p--IMPRESSION: Compared to prior CT from 6/20/2023, the current scan  shows:  1. Postsurgical changes of right hemicolectomy. No recurrent mass.  2. Slight increase  in size of right hilar lymph node, consider  attention on follow-up  3. No new metastatic disease in the abdomen or pelvis  4. Similar additional incidental findings as above including sequelae  of granulomatous disease    June 28, 2024 -- oncology follow-up/virtual visit, Dr. Sánchez.     6/17/25--CT c/a/p--IMPRESSION:  1.  No evidence for metastatic disease through the chest, abdomen, and pelvis.  2.  Stable mildly enlarged right hilar lymph node.  3.  Stable likely fissural node along the right minor fissure.  4.  Right hemicolectomy with ileocolic anastomosis. No evidence for recurrent or metastatic disease.    June 20, 2025 -- oncology follow-up/in person visit, Dr. Sánchez.       Interim History/History Of Present Illness:    Mr. Tevin Guidry is a 74-year-old gentleman from [place].  He has a past medical history notable for cardiovascular disease, status post multivessel CABG 09/29/2014 and angioplasties.  He has hypertension, COPD from a history of smoking, which he quit in 2019.  He also has a history of bladder cancer which was a noninvasive high grade form of urothelial carcinoma diagnosed in the summer and early fall of 2020.  He is status post multiple injections of BCG.  He was diagnosed in the summer of 2019 with a recurrence in mid winter of 2020.  He also has a history of innumerable polyps over the years. He has had nearly a dozen colonoscopies to date, dating back to age 48.  He has had numerous colonoscopies, but none of them have shown evidence of a neoplastic or malignant disease or significant dysplasia but he had tubulovillous forms of adenoma.      His prior history is well documented of an incidental finding of a 2 mm, poorly differentiated carcinoma with signet ring features at the tip of the appendix.  He had surgery, and this was incidentally detected during the surgery comprising a right hemicolectomy initially for noncancerous reasons in 04/2021.  I have been performing active  surveillance since that time.     He joins me for in person follow-up today, more than 4 years since his right hemicolectomy performed in spring 2021.  He does not have many questions, but wanted to update me that his brother was diagnosed earlier this year with a bladder tumor that sounds like it was invasive and for which he underwent surgery [place] [facility].  Mr. Guidry himself also had a history of bladder cancer, and details from the other cancers from other members of his family that he has previously reviewed with me as well.  The June 17 scan shows no evidence of recurrent malignancy.  He asks me as he has at prior visits about CEA value testing, but aside from that has no other questions.    He is due for endoscopic evaluation, and states that he has upper and lower endoscopies scheduled already for July 2, coming up in a few weeks.       Latest Reference Range & Units 06/24/24 10:58 08/14/24 11:03 02/24/25 11:03 06/17/25 11:12   CEA ng/mL 6.5 7.1 6.0 5.7       Review Of Systems:  Comprehensive (14-point) ROS reviewed. Pertinent symptoms reviewed above per HPI.      Past medical, social, surgical, and family histories reviewed.  PAST MEDICAL HISTORY:  Notable for innumerable colon polyps.  It is not clear whether or not he has a germline or familial mutation that is causing this.  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  He has CAD, hypertension.  He he has had cardiac catheterization including serum 09/2014 when he had a CABG with LIMA to LAD, 09/29/2014.  He has another malignancy in the form of noninvasive bladder carcinoma, left lateral wall of the bladder.  He has treatments with BCG and other interventions by our Urology team since 2019 for that.  COPD due to remote history of smoking.    PAST SURGICAL HISTORY:  Most remarkable for the hemicolectomy performed by Dr Colvin 04/13/2021 per above and the interventions for the  bladder carcinoma in 2019 and 2020.  Please see the Urology note for details.    FAMILY HISTORY:  Most notable for him being 1 of 3 sons from his parents.  His mom was a smoker and diagnosed with stage IV gastric carcinoid at age 65 and  within 4 months of diagnosis.  Father was a smoker and had lung cancer at age 40 and  at age 50 of this disease.  One brother had gastric carcinoma and  around age 72 after initial diagnosis at age 62.  Another brother is living at age 69 and has never had a colonoscopy due to lack of insurance and other reasons.  A maternal aunt also had ovarian cancer.    SOCIAL HISTORY:  The patient lives in [place].  He has an associate degree in respiratory therapy  Due to social phobia, he has not been employed in that practice.  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  He is single, never , no children.    Tobacco:  He smoked 1-1/2 packs a day of cigarettes for 30 years, quit 2019.  Occasional use of marijuana.  Denied any alcohol use or other illicit drug use.    Allergies:  Allergies as of 2025 - Reviewed 2025   Allergen Reaction Noted    Codeine Nausea and Vomiting 10/14/2014       Current Medications:  Current Outpatient Medications   Medication Sig Dispense Refill    albuterol (PROAIR HFA/PROVENTIL HFA/VENTOLIN HFA) 108 (90 Base) MCG/ACT inhaler Inhale 2 puffs into the lungs every 6 hours as needed for shortness of breath or wheezing. 8.5 g 1    amLODIPine (NORVASC) 5 MG tablet TAKE ONE TABLET BY MOUTH EVERY NIGHT AT BEDTIME 90 tablet 3    aspirin 81 MG tablet Take 1 tablet (81 mg) by mouth daily 90 tablet 3    atorvastatin (LIPITOR) 80 MG tablet TAKE ONE TABLET BY MOUTH EVERY EVENING AT BEDTIME 90 tablet 3    buPROPion (WELLBUTRIN XL) 300 MG 24 hr tablet TAKE ONE TABLET BY MOUTH EVERY MORNING 90 tablet 1    ezetimibe (ZETIA) 10 MG tablet Take 1 tablet (10 mg) by mouth daily. 90  tablet 3    lisinopril (ZESTRIL) 20 MG tablet Take 1 tablet (20 mg) by mouth daily (with breakfast). 90 tablet 1    metoprolol succinate ER (TOPROL XL) 50 MG 24 hr tablet Take 1 tablet (50 mg) by mouth at bedtime. 90 tablet 3    PARoxetine (PAXIL) 40 MG tablet Take 1 tablet (40 mg) by mouth every morning. 90 tablet 3    umeclidinium-vilanterol (ANORO ELLIPTA) 62.5-25 MCG/ACT oral inhaler Inhale 1 puff into the lungs daily. 60 each 3    varenicline (CHANTIX) 0.5 MG tablet Take 1 tablet (0.5 mg) by mouth 2 times daily. 60 tablet 10        Physical Exam:  /75 (BP Location: Right arm, Patient Position: Sitting, Cuff Size: Adult Regular)   Pulse 80   Temp 97.5  F (36.4  C) (Oral)   Resp 16   Wt 75.6 kg (166 lb 11.2 oz)   SpO2 95%   BMI 25.12 kg/m    KPS 90    GENERAL:  In NAD, A and O x 3.  HEENT:  PERRLA, anicteric sclerae. Oropharynx clear, with no evidence of mucositis, thrush, or ulcerations; no jaundice of the skin or frenulum.  CV:  RRR, normal S1 S2.  No murmurs, gallops, or rubs.   LUNGS:  Clear to auscultation bilaterally.  No dullness to percussion.   ABDOMEN:  Soft, nontender, nondistended, no evident ascites or palpable masses. No apparent hepatosplenomegaly.   EXTREMITIES:  No clubbing, cyanosis, edema, or palpable cords.        Laboratory/Imaging Studies  No visits with results within 2 Week(s) from this visit.   Latest known visit with results is:   Virtual Visit on 05/05/2021   Component Date Value Ref Range Status    Copath Report 05/06/2021    Final                    Value:Patient Name: DICK LYONS  MR#: [id number]  Specimen #: [id number]  Collected: 5/6/2021  Received: 5/6/2021  Reported: 5/13/2021 10:06  Ordering Phy(s): MANNIE DAWKINS  Additional Phy(s): [facility]: [facility]    For improved result formatting, select 'View Enhanced Report Format' under   Linked Documents section.    SPECIMEN(S):  A: Antral biopsy  B: Gastric polyp  C: Gastric biopsy  D:  Gastric biopsy    FINAL DIAGNOSIS:  A. ANTRUM, BIOPSY:  - Gastric antral type mucosa with features of reactive gastropathy  - No H. pylori like organisms identified on routine staining  - Negative for intestinal metaplasia or dysplasia    B. STOMACH, POLYP:  - Gastric body type mucosa with chronic gastritis  - No H. pylori like organisms identified on routine staining  - Negative for intestinal metaplasia, neoplastic polyp or dysplasia    C. STOMACH, POLYPS, BIOPSY:  - Fundic gland polyp in one fragment  - Gastric antral type mucosa with mild chronic gastritis  - No H. pylor                          i like organisms identified on routine staining or by   immunohistochemistry  - Negative for intestinal metaplasia or dysplasia    D. STOMACH, BIOPSY:  - Chronic active gastritis with focal intestinal and pancreatic acinar   type metaplasia, see comment  - No H. pylori like organisms identified on routine staining or by   immunohistochemistry  - Negative for dysplasia, see comment    COMMENT:  D).  The biopsies show antral type mucosa with pancreatic acinar type   metaplasia and a minute focus of  intestinal metaplasia.  Immunohistochemistry was performed with   appropriate controls.  Chromogranin performed  on part D highlights linear enterochromaffin cell-like hyperplasia.    Gastrin stain shows rare positive cells  at the edge of one biopsy fragments.  If these biopsies were taken from   the fundus or body, these findings are  suggestive of autoimmune gastritis.  Clinical correlation is recommended.    I have personally reviewed all specimens and/or slides, including the   listed sp                          ecial stains, and used them  with my medical judgement to determine or confirm the final diagnosis.    Electronically signed out by:    Liliane Ta M.D., [facility]    CLINICAL HISTORY:  Screening endoscopy.  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    Striped erythema in gastric antrum.  Polyps in the  "gastric fundus.    GROSS:  A: The specimen is received in formalin with proper patient   identification, labeled \"gastric biopsy antrum\".  The specimen consists of 4 pieces of tan-pink tissue ranging from 0.2-0.5   cm in greatest dimension, submitted  in toto in cassette A1.    B: The specimen is received in formalin with proper patient   identification, labeled \"gastric polyps small\".  The specimen consists of 3 pieces of tan-pink tissue ranging from 0.2-0.5   cm in greatest dimension, submitted  in toto in cassette B1.    C: The specimen is received in formalin with proper patient   identification, labeled \"gastric biopsy large  polyps\".  The specimen consists of 5 pieces of                           tan-pink tissue ranging   from 0.2-0.6 cm in greatest dimension,  submitted in toto in cassette C 1.    D: The specimen is received in formalin with proper patient   identification, labeled \"gastric biopsy fundus\".  The specimen consists of 3 pieces of tan-pink tissue, each measuring   approximately 0.2 cm in greatest  dimension; submitted in toto in cassette D1. (Dictated by: MONICA 5/6/2021   02:53 PM)    MICROSCOPIC:  Microscopic examination was performed.  Immunohistochemistry was performed   with appropriate controls.  CK  AE1/AE3 performed on parts A1-D1 does not show evidence of signet ring   cell carcinoma.    The technical component of this testing was completed at the [facility], with the professional component performed   at the [facility], [address] [place] [phone] ([phone])    CPT Codes:  A: 51605-ZU1, 55365-LDC  B: 89745-AV7, 25419-SAT                            C: 75256-BW3, 62014-SGC, 83672-UXX  D: 25782-AJ9, 95246-XGC, 85552-JVM, 88728-LZN, 33783-WDUXVP7ND    COLLECTION SITE:  Client: [facility]  Location: [facility] " (B)    Resident  [id number]            RADIOLOGY:  During today's visit, I printed out copies of the pertinent radiology reports, reviewed the Impression and salient details verbatim and lay language with the patient during today's visit; by the end of today's visit, I provided the patient a printed copy of the radiology report(s) from the above scans.     ASSESSMENT/PLAN:  Mr. Hola Guidry is a 74-year-old gentleman with history of innumerable polyps.  He does not have a known germline mutation, although he has had multiple first-degree members of family with gastric carcinoma.  He had upper endoscopy by Dr. Tobar on 05/06/2021 to help clarify whether or not he had primary gastric carcinoma, which would have made sense in terms of signet cell features of the 2 mm focus in the distal appendix that was positive for adenocarcinoma; the evaluation was unremarkable.  Otherwise, the rest of the colon and terminal ileum were negative for carcinoma.  It is not out of the realm of possibility that signet ring cell features and type of histology can be found in small intestinal carcinomas including appendix, but more commonly, it is found in the upper GI tract.  Based on the location as well being more mucosa than through wall that there is consideration whether or not this represented an unusual form of metastasis to the appendiceal cavity rather than a primary appendiceal malignancy.  However, the PET CT was unremarkable and did not show any evidence of an alternate primary at that time.    It is difficult to ascertain the exact risk of recurrence of the appendix that was incidentally found at the tip of the appendix.  It was very minimal in size at 2 mm and did comprise some high-risk form of histology with signet ring subset.  At this time, he is more than FOUR years out from the initial incidental diagnosis during right hemicolectomy of appendiceal poorly differentiated signet ring carcinoma.     His next surveillance  colonoscopy is scheduled already for July 2, coming up in a few weeks.    He had a rise in CEA that may be explained by non-cancer related causes due to relatively low specificity of this marker, particularly in context of his unremarkable CT scan from 6/24/24 and ongoing cigarette use that can confound results.  He asked me about it again during today's visit, and we reviewed as we have at prior visits potential causes of false elevation of CEA.     Otherwise, in terms of his history of bladder cancer, he follows with Dr Guzman from Urology for [id number] cystoscopies, and for his CAD and his questions about severe coronary artery disease based on the CT scan report, I will defer to his Cardiology team and I did ask him specifically to connect with his Cardiology team and/or primary care doctor for any further questions on that topic.    I spent 15 minutes in consultation, including history and discussion with the patient including review of recent lab values and radiologic imaging results.  An additional 15 minutes was spent on the day of the visit, including reviewing pertinent medical notes and documentation from other physicians and APPs who have evaluated and treated this patient, pertinent lab values, pathology and imaging results, personal review of radiologic images, discussing the case with referring providers and/or nurse coordinator team, post-visit orders, and all post-visit documentation.    Tanner Sánchez MD, PhD       The above was transcribed using a voice recognition software.  While I reviewed and edited the transcription, I may miss errors.  Please let me know of any of serious errors and I will addend the note.

## 2025-06-17 ENCOUNTER — LAB (OUTPATIENT)
Dept: LAB | Facility: CLINIC | Age: 75
End: 2025-06-17
Attending: NURSE PRACTITIONER
Payer: COMMERCIAL

## 2025-06-17 ENCOUNTER — HOSPITAL ENCOUNTER (OUTPATIENT)
Dept: CT IMAGING | Facility: CLINIC | Age: 75
Discharge: HOME | End: 2025-06-17
Attending: NURSE PRACTITIONER
Payer: COMMERCIAL

## 2025-06-17 DIAGNOSIS — C18.1 ADENOCARCINOMA OF APPENDIX (H): ICD-10-CM

## 2025-06-17 DIAGNOSIS — D12.6 MYH-ASSOCIATED POLYPOSIS (MAP): ICD-10-CM

## 2025-06-17 LAB
CEA SERPL-MCNC: 5.7 NG/ML
CREAT BLD-MCNC: 1 MG/DL (ref 0.7–1.2)
EGFRCR SERPLBLD CKD-EPI 2021: >60 ML/MIN/1.73M2

## 2025-06-17 PROCEDURE — 82378 CARCINOEMBRYONIC ANTIGEN: CPT

## 2025-06-17 PROCEDURE — 74177 CT ABD & PELVIS W/CONTRAST: CPT

## 2025-06-17 PROCEDURE — 36415 COLL VENOUS BLD VENIPUNCTURE: CPT

## 2025-06-17 PROCEDURE — 82565 ASSAY OF CREATININE: CPT

## 2025-06-17 PROCEDURE — 250N000009 HC RX 250: Performed by: NURSE PRACTITIONER

## 2025-06-17 PROCEDURE — 250N000011 HC RX IP 250 OP 636: Performed by: NURSE PRACTITIONER

## 2025-06-17 RX ORDER — IOPAMIDOL 755 MG/ML
84 INJECTION, SOLUTION INTRAVASCULAR ONCE
Status: COMPLETED | OUTPATIENT
Start: 2025-06-17 | End: 2025-06-17

## 2025-06-17 RX ADMIN — IOPAMIDOL 84 ML: 755 INJECTION, SOLUTION INTRAVENOUS at 12:11

## 2025-06-17 RX ADMIN — SODIUM CHLORIDE 64 ML: 9 INJECTION, SOLUTION INTRAVENOUS at 12:11

## 2025-06-17 NOTE — TELEPHONE ENCOUNTER
Attempted to contact patient in order to complete pre assessment questions.     No answer. Left message to return call to [phone] option 3    Callback communication sent via [employer].      Tammy Barnes LPN  Endoscopy Procedure Pre Assessment

## 2025-06-17 NOTE — TELEPHONE ENCOUNTER
Pre assessment completed for upcoming procedure.   (Please see previous telephone encounter notes for complete details)    Patient returned call.       Procedure details:    Procedure date 07/02/2025, arrival time 0930 AM and facility location reviewed.    Pre op exam needed? No.    Designated  policy reviewed. Instructed to have someone stay 24  hours post procedure.       Medication review:    Medications reviewed. Please see supporting documentation below. Holding recommendations discussed (if applicable).   Cannabis/Marijuana : STOP night before procedure.      Prep for procedure:     Procedure prep instructions reviewed.        Any additional information needed:  N/A      Patient verbalized understanding and had no questions or concerns at this time.      Zahida Garcia LPN  Endoscopy Procedure Pre Assessment   [phone] option 3

## 2025-06-20 ENCOUNTER — ONCOLOGY VISIT (OUTPATIENT)
Dept: ONCOLOGY | Facility: CLINIC | Age: 75
End: 2025-06-20
Attending: NURSE PRACTITIONER
Payer: COMMERCIAL

## 2025-06-20 VITALS
BODY MASS INDEX: 25.12 KG/M2 | DIASTOLIC BLOOD PRESSURE: 75 MMHG | SYSTOLIC BLOOD PRESSURE: 132 MMHG | OXYGEN SATURATION: 95 % | TEMPERATURE: 97.5 F | RESPIRATION RATE: 16 BRPM | HEART RATE: 80 BPM | WEIGHT: 166.7 LBS

## 2025-06-20 DIAGNOSIS — C18.1 ADENOCARCINOMA OF APPENDIX (H): Primary | ICD-10-CM

## 2025-06-20 PROCEDURE — 99214 OFFICE O/P EST MOD 30 MIN: CPT | Performed by: INTERNAL MEDICINE

## 2025-06-20 PROCEDURE — G0463 HOSPITAL OUTPT CLINIC VISIT: HCPCS | Performed by: INTERNAL MEDICINE

## 2025-06-20 ASSESSMENT — PAIN SCALES - GENERAL: PAINLEVEL_OUTOF10: NO PAIN (0)

## 2025-06-20 NOTE — NURSING NOTE
"Oncology Rooming Note    June 20, 2025 9:37 AM   Tevin Guidry is a 74 year old male who presents for:    Chief Complaint   Patient presents with    Oncology Clinic Visit     Adenocarcinoma of appendix     Initial Vitals: /75 (BP Location: Right arm, Patient Position: Sitting, Cuff Size: Adult Regular)   Pulse 80   Temp 97.5  F (36.4  C) (Oral)   Resp 16   Wt 75.6 kg (166 lb 11.2 oz)   SpO2 95%   BMI 25.12 kg/m   Estimated body mass index is 25.12 kg/m  as calculated from the following:    Height as of 3/3/25: 1.735 m (5' 8.3\").    Weight as of this encounter: 75.6 kg (166 lb 11.2 oz). Body surface area is 1.91 meters squared.  No Pain (0) Comment: Data Unavailable   No LMP for male patient.  Allergies reviewed: Yes  Medications reviewed: Yes    Medications: Medication refills not needed today.  Pharmacy name entered into [facility]: [facility] - [place], [place] - [address] [place] [address]    Frailty Screening:   Is the patient here for a new oncology consult visit in cancer care? 2. No    PHQ9:  Did this patient require a PHQ9?: No      Clinical concerns: none      Keith Tanner              "

## 2025-06-20 NOTE — LETTER
"6/20/2025      Tevin uGidry  [address]  [facility] [address]      Dear Colleague,    Thank you for referring your patient, Tevin Guidry, to the [facility]. Please see a copy of my visit note below.    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  The site of origin is unclear, so at the current time, it can be considered carcinoma of unknown primary, very likely of gastrointestinal origin, but the primary site is not known.    GI Cancer Treatment to date: right hemicolectomy 04/13/2021     Surgeon: Dr. Rodríguez     Other cancers: bladder cancer, under the care of Dr. Guzman from our Urology team. Per his notes: \"recurrent bladder tumor. Has previously undergone BCG induction x 2. While office biopsy suggested possible HG disease, formal TURBT with only low-grade pathology. Has completed some BCG maintenance, but has since stopped.\" Followed by 6-month cystoscopies.    PCP: Dr. Preethi Quiroz    GI Oncology History:   He had a colonoscopy 03/30/2017 and another one 03/02/2021 that was delayed due to the COVID pandemic and other healthcare issues.  This was performed by Dr. Marin Steward on 03/02/2021 and the pathology was notable for tubular and tubulovillous adenomas.  Due to the extent and burden of the number of polyps, he was referred to Dr. Rodríguez for consideration of hemicolectomy.  A number of polyps in the distal transverse colon were tattooed.    Dr. Rodríguez took the patient to the operating room for right hemicolectomy 04/13/2021 due to this high polyp burden.    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        Tumor is located at the distal resection " margin and situated within    the lamina propria/submucosa (distal   margin positive, proximal and deep margins are free of malignancy)        An overlying tubular adenoma is present at this distal resection   margin, does not appear to be the source   of the underlying malignancy        Tumor origin is compatible with gastrointestinal site as it marks   positively with CK 20, pan cytokeratin   and CDX2 (negative for CK-7), see comment   - Multiple tubular adenomas (at least twenty-one), negative for high-grade    dysplasia, distal margin is   transected by a sessile tubular adenoma   - Benign appendix with focal surface serrated change, negative for   malignancy (submitted in its entirety)   - Unremarkable terminal ileum   - Sixteen benign lymph nodes (0/16)     COMMENT:   The origin of the adenocarcinoma is uncertain based on the current   resection however the immunoperoxidase   battery supports gastrointestinal origin.  Suggest clinical and   radiographic correlation to investigate   possible primary sites (including stomach, pancreaticobiliary and   remainder of the colon).  The appendix was   submitted in its entirety and does not appear to be the source of the   neoplasm.  Intradepartmental   consultation obtained, the case was discussed with Dr. Rodríguez on   4/19/21.     April 20, 2021--CT chest IMPRESSION:   In this patient with recently diagnosed colon cancer:  1. No evidence of metastatic disease in the chest.  2. Subcutaneous emphysema along the left lower chest/upper abdominal  wall may be secondary to transversus abdominis block done on  4/13/2021. No free intraperitoneal air or pneumothorax.  3. Coronary artery calcifications.  4. Sequelae of prior granulomatous disease.     [Access Center: Subcutaneous emphysema of the left lower chest/upper  abdominal wall, likely from transversus abdominis than on 4/13/2021.  No free intraperitoneal air or pneumothorax.]      April 22, 2021--colonoscopy FINAL  DIAGNOSIS:   COLON, DESCENDING, POLYP:   - Tubular adenoma   - Negative for high grade dysplasia or malignancy     April 30 2021 PET CT IMPRESSION: In this patient with signet ring carcinoma of the colon  status post right hemicolectomy thought to be possibly metastatic:  1. FDG at the colonic surgical anastomosis presumably postsurgical  inflammation.   2. No definite FDG uptake to suggest metastatic disease.   3. No definite primary significant ring carcinoma is identified.   There are 3 equivocal findings:   3a. Mild uptake in the gastric cardia, favor physiologic  3b. Loop of slightly narrowed thickened small bowel with FDG uptake,  favor physiologic or postoperative inflammation but cannot rule out a  primary.   3c. Extensive FDG uptake in the entire sigmoid colon, favored to be  physiologic.  3d. If clinically indicated, an MR enterography in this case may  better define bowel abnormalities. Alternatively attention to these  areas on follow-up imaging.  4. Incidentally noted bilateral hydroceles and right peritesticular  calcification.     May 3, 2021--initial medical oncology consultation, Dr. Sánchez.    May 6, 2021--EGD - unremarkable; no masses seen.    FINAL DIAGNOSIS:   A. ANTRUM, BIOPSY:   - Gastric antral type mucosa with features of reactive gastropathy   - No H. pylori like organisms identified on routine staining   - Negative for intestinal metaplasia or dysplasia     B. STOMACH, POLYP:   - Gastric body type mucosa with chronic gastritis   - No H. pylori like organisms identified on routine staining   - Negative for intestinal metaplasia, neoplastic polyp or dysplasia     C. STOMACH, POLYPS, BIOPSY:   - Fundic gland polyp in one fragment   - Gastric antral type mucosa with mild chronic gastritis   - No H. pylori like organisms identified on routine staining or by   immunohistochemistry   - Negative for intestinal metaplasia or dysplasia     D. STOMACH, BIOPSY:   - Chronic active gastritis with focal  intestinal and pancreatic acinar   type metaplasia, see comment   - No H. pylori like organisms identified on routine staining or by   immunohistochemistry   - Negative for dysplasia, see comment       July 16, 2021 -- oncology follow-up/virtual visit, Dr. Sánchez.  11/8/21-PET/CT--IMPRESSION: Hx. of 2 mm focus of poorly differentiated adenocarcinoma  with significant ring cell features at the tip of the appendix.  No  recurrent or metastatic disease.  1. Postsurgical changes of right hemicolectomy with mild residual  uptake along the surgical anastomosis which is below that of  background, decreased from prior, and favored to be inflammatory.   2. Slight progression of peripheral reticular opacities in the  dependent right lung with a basilar predominance, differential  atelectasis, edema, inflammatory, infectious, aspiration.  This is on  a background of emphysema.  2a. A 9 mm groundglass nodule right upper lobe peripherally unchanged  from prior exam, indeterminant. Continued attention on follow-up.    November 19, 2021 -- oncology follow-up/virtual visit, Dr. Sánchez.  The 04/22/2022 surveillance colonoscopy with Dr. Rodríguez resected several polyps, no evidence of malignancy.      Final Diagnosis   A.  COLON, ASCENDING, POLYP:  - Tubular adenoma  - No high-grade dysplasia or malignancy identified     B.  COLON, DESCENDING, POLYP:  - Tubular adenoma  - No high-grade dysplasia or malignancy identified         June 6, 2022--PET/CT: CT FINDINGS: Mild senescent intracranial changes. Postoperative change of the right lens. Mild carotid artery bifurcation calcification. Moderate to severe coronary artery calcium. Moderate upper lung predominant emphysema. Splenic granulomas. Right   hemicolectomy changes. Pelvic phleboliths. Multilevel degenerative changes of the spine. The lower extremities are unremarkable. IMPRESSION: No metabolic evidence of active neoplasm.    June 24, 2022 -- oncology follow-up/virtual visit,   Princess.    3/23/23--EGD Impression:            - Normal examined duodenum.                          - Multiple gastric polyps. Resected and retrieved.                          - Esophagogastric landmarks identified.     3/23/23--surveillance colonoscopy--Impression:            - Patent functional end-to-end ileo-colonic                          anastomosis, characterized by ulceration. Biopsied.                          - The examined portion of the ileum was normal.                          - Six 2 to 4 mm polyps at the recto-sigmoid colon and                          at the splenic flexure, removed with a cold snare.                          Resected and retrieved.                          - Diverticulosis in the sigmoid colon.                          - The examination was otherwise normal on direct and                          retroflexion views.   Recommendation:        - Return to referring physician.                          - Repeat colonoscopy in 2 years for surveillance based                          on pathology results.   Final Diagnosis   A. COLON, ANASTOMOSIS SITE, BIOPSY#1:  Enteric mucosa with hyperplastic/reactive changes, chronic inflammation and lamina propria fibrosis; negative for dysplasia or malignancy     B. COLON, ANASTOMOSIS SITE, BIOPSY#2:  Enteric mucosa with hyperplastic/reactive changes, chronic inflammation and lamina propria fibrosis; negative for dysplasia or malignancy     C. COLON, SPLENIC FLEXURE, POLYPS X2, BIOPSY:   -Tubulovillous adenoma; negative for high-grade dysplasia   -Hyperplastic polyp      D. COLON, LOWER LEFT, POLYPS X4, BIOPSY:  Tubular adenomas; negative for high-grade dysplasia      E. STOMACH, GREATER CURVATURE, POLYP, BIOPSY:  Gastric mucosa with chronic inactive gastritis with intestinal metaplasia (complete type); negative for dysplasia or malignancy       6/20/23--CT c/a/p--IMPRESSION:  No evidence of metastatic disease in the chest, abdomen, or pelvis.    June  23, 2023 -- oncology follow-up/virtual visit, Dr. Sánchez.    6/24/24--CT c/a/p--IMPRESSION: Compared to prior CT from 6/20/2023, the current scan  shows:  1. Postsurgical changes of right hemicolectomy. No recurrent mass.  2. Slight increase in size of right hilar lymph node, consider  attention on follow-up  3. No new metastatic disease in the abdomen or pelvis  4. Similar additional incidental findings as above including sequelae  of granulomatous disease    June 28, 2024 -- oncology follow-up/virtual visit, Dr. Sánchez.     6/17/25--CT c/a/p--IMPRESSION:  1.  No evidence for metastatic disease through the chest, abdomen, and pelvis.  2.  Stable mildly enlarged right hilar lymph node.  3.  Stable likely fissural node along the right minor fissure.  4.  Right hemicolectomy with ileocolic anastomosis. No evidence for recurrent or metastatic disease.    June 20, 2025 -- oncology follow-up/in person visit, Dr. Sánchez.       Interim History/History Of Present Illness:    Mr. Tevin Guidry is a 74-year-old gentleman from [place].  He has a past medical history notable for cardiovascular disease, status post multivessel CABG 09/29/2014 and angioplasties.  He has hypertension, COPD from a history of smoking, which he quit in 2019.  He also has a history of bladder cancer which was a noninvasive high grade form of urothelial carcinoma diagnosed in the summer and early fall of 2020.  He is status post multiple injections of BCG.  He was diagnosed in the summer of 2019 with a recurrence in mid winter of 2020.  He also has a history of innumerable polyps over the years. He has had nearly a dozen colonoscopies to date, dating back to age 48.  He has had numerous colonoscopies, but none of them have shown evidence of a neoplastic or malignant disease or significant dysplasia but he had tubulovillous forms of adenoma.      His prior history is well documented of an incidental finding of a 2 mm, poorly differentiated carcinoma with  signet ring features at the tip of the appendix.  He had surgery, and this was incidentally detected during the surgery comprising a right hemicolectomy initially for noncancerous reasons in 04/2021.  I have been performing active surveillance since that time.     He joins me for in person follow-up today, more than 4 years since his right hemicolectomy performed in spring 2021.  He does not have many questions, but wanted to update me that his brother was diagnosed earlier this year with a bladder tumor that sounds like it was invasive and for which he underwent [place] [facility] [place]  Mr. Guidry himself also had a history of bladder cancer, and details from the other cancers from other members of his family that he has previously reviewed with me as well.  The June 17 scan shows no evidence of recurrent malignancy.  He asks me as he has at prior visits about CEA value testing, but aside from that has no other questions.    He is due for endoscopic evaluation, and states that he has upper and lower endoscopies scheduled already for July 2, coming up in a few weeks.       Latest Reference Range & Units 06/24/24 10:58 08/14/24 11:03 02/24/25 11:03 06/17/25 11:12   CEA ng/mL 6.5 7.1 6.0 5.7       Review Of Systems:  Comprehensive (14-point) ROS reviewed. Pertinent symptoms reviewed above per HPI.      Past medical, social, surgical, and family histories reviewed.  PAST MEDICAL HISTORY:  Notable for innumerable colon polyps.  It is not clear whether or not he has a germline or familial mutation that is causing this.  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  He has CAD, hypertension.  He he has had cardiac catheterization including serum 09/2014 when he had a CABG with LIMA to LAD, 09/29/2014.  He has another malignancy in the form of noninvasive bladder carcinoma, left lateral wall of the bladder.  He has treatments with BCG and other  interventions by our Urology team since 2019 for that.  COPD due to remote history of smoking.    PAST SURGICAL HISTORY:  Most remarkable for the hemicolectomy performed by Dr Colvin 2021 per above and the interventions for the bladder carcinoma in 2019 and 2020.  Please see the Urology note for details.    FAMILY HISTORY:  Most notable for him being 1 of 3 sons from his parents.  His mom was a smoker and diagnosed with stage IV gastric carcinoid at age 65 and  within 4 months of diagnosis.  Father was a smoker and had lung cancer at age 40 and  at age 50 of this disease.  One brother had gastric carcinoma and  around age 72 after initial diagnosis at age 62.  Another brother is living at age 69 and has never had a colonoscopy due to lack of insurance and other reasons.  A maternal aunt also had ovarian cancer.    SOCIAL HISTORY:  The patient lives in [place].  He has an associate degree in respiratory therapy  Due to social phobia, he has not been employed in that practice.  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  He is single, never , no children.    Tobacco:  He smoked 1-1/2 packs a day of cigarettes for 30 years, quit 2019.  Occasional use of marijuana.  Denied any alcohol use or other illicit drug use.    Allergies:  Allergies as of 2025 - Reviewed 2025   Allergen Reaction Noted     Codeine Nausea and Vomiting 10/14/2014       Current Medications:  Current Outpatient Medications   Medication Sig Dispense Refill     albuterol (PROAIR HFA/PROVENTIL HFA/VENTOLIN HFA) 108 (90 Base) MCG/ACT inhaler Inhale 2 puffs into the lungs every 6 hours as needed for shortness of breath or wheezing. 8.5 g 1     amLODIPine (NORVASC) 5 MG tablet TAKE ONE TABLET BY MOUTH EVERY NIGHT AT BEDTIME 90 tablet 3     aspirin 81 MG tablet Take 1 tablet (81 mg) by mouth daily 90 tablet 3     atorvastatin (LIPITOR) 80 MG  tablet TAKE ONE TABLET BY MOUTH EVERY EVENING AT BEDTIME 90 tablet 3     buPROPion (WELLBUTRIN XL) 300 MG 24 hr tablet TAKE ONE TABLET BY MOUTH EVERY MORNING 90 tablet 1     ezetimibe (ZETIA) 10 MG tablet Take 1 tablet (10 mg) by mouth daily. 90 tablet 3     lisinopril (ZESTRIL) 20 MG tablet Take 1 tablet (20 mg) by mouth daily (with breakfast). 90 tablet 1     metoprolol succinate ER (TOPROL XL) 50 MG 24 hr tablet Take 1 tablet (50 mg) by mouth at bedtime. 90 tablet 3     PARoxetine (PAXIL) 40 MG tablet Take 1 tablet (40 mg) by mouth every morning. 90 tablet 3     umeclidinium-vilanterol (ANORO ELLIPTA) 62.5-25 MCG/ACT oral inhaler Inhale 1 puff into the lungs daily. 60 each 3     varenicline (CHANTIX) 0.5 MG tablet Take 1 tablet (0.5 mg) by mouth 2 times daily. 60 tablet 10        Physical Exam:  /75 (BP Location: Right arm, Patient Position: Sitting, Cuff Size: Adult Regular)   Pulse 80   Temp 97.5  F (36.4  C) (Oral)   Resp 16   Wt 75.6 kg (166 lb 11.2 oz)   SpO2 95%   BMI 25.12 kg/m    KPS 90    GENERAL:  In NAD, A and O x 3.  HEENT:  PERRLA, anicteric sclerae. Oropharynx clear, with no evidence of mucositis, thrush, or ulcerations; no jaundice of the skin or frenulum.  CV:  RRR, normal S1 S2.  No murmurs, gallops, or rubs.   LUNGS:  Clear to auscultation bilaterally.  No dullness to percussion.   ABDOMEN:  Soft, nontender, nondistended, no evident ascites or palpable masses. No apparent hepatosplenomegaly.   EXTREMITIES:  No clubbing, cyanosis, edema, or palpable cords.        Laboratory/Imaging Studies  No visits with results within 2 Week(s) from this visit.   Latest known visit with results is:   Virtual Visit on 05/05/2021   Component Date Value Ref Range Status     Copath Report 05/06/2021    Final                    Value:Patient Name: DICK LYONS  MR#: [id number]  Specimen #: [id number]  Collected: 5/6/2021  Received: 5/6/2021  Reported: 5/13/2021 10:06  Ordering Phy(s): MANNIE DALLAS  RUBEN FELICIANO  Additional Phy(s): [facility]: [facility]    For improved result formatting, select 'View Enhanced Report Format' under   Linked Documents section.    SPECIMEN(S):  A: Antral biopsy  B: Gastric polyp  C: Gastric biopsy  D: Gastric biopsy    FINAL DIAGNOSIS:  A. ANTRUM, BIOPSY:  - Gastric antral type mucosa with features of reactive gastropathy  - No H. pylori like organisms identified on routine staining  - Negative for intestinal metaplasia or dysplasia    B. STOMACH, POLYP:  - Gastric body type mucosa with chronic gastritis  - No H. pylori like organisms identified on routine staining  - Negative for intestinal metaplasia, neoplastic polyp or dysplasia    C. STOMACH, POLYPS, BIOPSY:  - Fundic gland polyp in one fragment  - Gastric antral type mucosa with mild chronic gastritis  - No H. pylor                          i like organisms identified on routine staining or by   immunohistochemistry  - Negative for intestinal metaplasia or dysplasia    D. STOMACH, BIOPSY:  - Chronic active gastritis with focal intestinal and pancreatic acinar   type metaplasia, see comment  - No H. pylori like organisms identified on routine staining or by   immunohistochemistry  - Negative for dysplasia, see comment    COMMENT:  D).  The biopsies show antral type mucosa with pancreatic acinar type   metaplasia and a minute focus of  intestinal metaplasia.  Immunohistochemistry was performed with   appropriate controls.  Chromogranin performed  on part D highlights linear enterochromaffin cell-like hyperplasia.    Gastrin stain shows rare positive cells  at the edge of one biopsy fragments.  If these biopsies were taken from   the fundus or body, these findings are  suggestive of autoimmune gastritis.  Clinical correlation is recommended.    I have personally reviewed all specimens and/or slides, including the   listed sp                          ecial stains, and used them  with my medical judgement to determine or  "confirm the final diagnosis.    Electronically signed out by:    Liliane Ta M.D., [facility]    CLINICAL HISTORY:  Screening endoscopy.  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    Striped erythema in gastric antrum.  Polyps in the gastric fundus.    GROSS:  A: The specimen is received in formalin with proper patient   identification, labeled \"gastric biopsy antrum\".  The specimen consists of 4 pieces of tan-pink tissue ranging from 0.2-0.5   cm in greatest dimension, submitted  in toto in cassette A1.    B: The specimen is received in formalin with proper patient   identification, labeled \"gastric polyps small\".  The specimen consists of 3 pieces of tan-pink tissue ranging from 0.2-0.5   cm in greatest dimension, submitted  in toto in cassette B1.    C: The specimen is received in formalin with proper patient   identification, labeled \"gastric biopsy large  polyps\".  The specimen consists of 5 pieces of                           tan-pink tissue ranging   from 0.2-0.6 cm in greatest dimension,  submitted in toto in cassette C 1.    D: The specimen is received in formalin with proper patient   identification, labeled \"gastric biopsy fundus\".  The specimen consists of 3 pieces of tan-pink tissue, each measuring   approximately 0.2 cm in greatest  dimension; submitted in toto in cassette D1. (Dictated by: MONICA 5/6/2021   02:53 PM)    MICROSCOPIC:  Microscopic examination was performed.  Immunohistochemistry was performed   with appropriate controls.  CK  AE1/AE3 performed on parts A1-D1 does not show evidence of signet ring   cell carcinoma.    The technical component of this testing was completed at the [facility], with the professional component performed   at the [facility], [address] [place] [id number] ([phone])    CPT Codes:  A: 16517-BX3, 28680-DKA  B: 58159-UF4, " 04217-QRV                            C: 58479-PL6, 65693-RXZ, 77218-LEB  D: 89297-NE7, 62360-UXH, 95286-CKT, 11826-VRT, 38104-YDXSED3IO    COLLECTION SITE:  Client[facility]  [facility]    Resident  [id number]            RADIOLOGY:  During today's visit, I printed out copies of the pertinent radiology reports, reviewed the Impression and salient details verbatim and lay language with the patient during today's visit; by the end of today's visit, I provided the patient a printed copy of the radiology report(s) from the above scans.     ASSESSMENT/PLAN:  Mr. Hola Guidry is a 74-year-old gentleman with history of innumerable polyps.  He does not have a known germline mutation, although he has had multiple first-degree members of family with gastric carcinoma.  He had upper endoscopy by Dr. Tobar on 05/06/2021 to help clarify whether or not he had primary gastric carcinoma, which would have made sense in terms of signet cell features of the 2 mm focus in the distal appendix that was positive for adenocarcinoma; the evaluation was unremarkable.  Otherwise, the rest of the colon and terminal ileum were negative for carcinoma.  It is not out of the realm of possibility that signet ring cell features and type of histology can be found in small intestinal carcinomas including appendix, but more commonly, it is found in the upper GI tract.  Based on the location as well being more mucosa than through wall that there is consideration whether or not this represented an unusual form of metastasis to the appendiceal cavity rather than a primary appendiceal malignancy.  However, the PET CT was unremarkable and did not show any evidence of an alternate primary at that time.    It is difficult to ascertain the exact risk of recurrence of the appendix that was incidentally found at the tip of the appendix.  It was very minimal in size at 2 mm and did comprise some high-risk form of  histology with signet ring subset.  At this time, he is more than FOUR years out from the initial incidental diagnosis during right hemicolectomy of appendiceal poorly differentiated signet ring carcinoma.     His next surveillance colonoscopy is scheduled already for July 2, coming up in a few weeks.    He had a rise in CEA that may be explained by non-cancer related causes due to relatively low specificity of this marker, particularly in context of his unremarkable CT scan from 6/24/24 and ongoing cigarette use that can confound results.  He asked me about it again during today's visit, and we reviewed as we have at prior visits potential causes of false elevation of CEA.     Otherwise, in terms of his history of bladder cancer, he follows with Dr Guzman from Urology for [id number] cystoscopies, and for his CAD and his questions about severe coronary artery disease based on the CT scan report, I will defer to his Cardiology team and I did ask him specifically to connect with his Cardiology team and/or primary care doctor for any further questions on that topic.    I spent 15 minutes in consultation, including history and discussion with the patient including review of recent lab values and radiologic imaging results.  An additional 15 minutes was spent on the day of the visit, including reviewing pertinent medical notes and documentation from other physicians and APPs who have evaluated and treated this patient, pertinent lab values, pathology and imaging results, personal review of radiologic images, discussing the case with referring providers and/or nurse coordinator team, post-visit orders, and all post-visit documentation.    Tanner Sánchez MD, PhD       The above was transcribed using a voice recognition software.  While I reviewed and edited the transcription, I may miss errors.  Please let me know of any of serious errors and I will addend the note.                   Again, thank you for allowing me to participate  in the care of your patient.        Sincerely,        Tanner Sánchez MD    Electronically signed

## 2025-06-26 NOTE — NURSING NOTE
Tevin Guidry is a 71 year old patient who comes in today for a Blood Pressure check.  Initial BP:  /72 (BP Location: Left arm, Patient Position: Chair, Cuff Size: Adult Regular)   Pulse 55      55  Disposition: follow-up as previously indicated by provider  Amy Granados MA     no chest pain/no palpitations/no dyspnea on exertion/no paroxysmal nocturnal dyspnea/no peripheral edema

## 2025-07-02 ENCOUNTER — ANESTHESIA (OUTPATIENT)
Dept: SURGERY | Facility: AMBULATORY SURGERY CENTER | Age: 75
End: 2025-07-02
Payer: COMMERCIAL

## 2025-07-02 ENCOUNTER — ANESTHESIA EVENT (OUTPATIENT)
Dept: SURGERY | Facility: AMBULATORY SURGERY CENTER | Age: 75
End: 2025-07-02
Payer: COMMERCIAL

## 2025-07-02 ENCOUNTER — HOSPITAL ENCOUNTER (OUTPATIENT)
Facility: AMBULATORY SURGERY CENTER | Age: 75
Discharge: HOME | End: 2025-07-02
Attending: INTERNAL MEDICINE
Payer: COMMERCIAL

## 2025-07-02 VITALS
OXYGEN SATURATION: 96 % | HEIGHT: 69 IN | HEART RATE: 73 BPM | DIASTOLIC BLOOD PRESSURE: 72 MMHG | BODY MASS INDEX: 24.44 KG/M2 | SYSTOLIC BLOOD PRESSURE: 115 MMHG | TEMPERATURE: 97.5 F | RESPIRATION RATE: 16 BRPM | WEIGHT: 165 LBS

## 2025-07-02 VITALS — HEART RATE: 78 BPM

## 2025-07-02 LAB
COLONOSCOPY: NORMAL
UPPER GI ENDOSCOPY: NORMAL

## 2025-07-02 PROCEDURE — 88305 TISSUE EXAM BY PATHOLOGIST: CPT | Mod: TC | Performed by: INTERNAL MEDICINE

## 2025-07-02 PROCEDURE — 88305 TISSUE EXAM BY PATHOLOGIST: CPT | Mod: 26 | Performed by: STUDENT IN AN ORGANIZED HEALTH CARE EDUCATION/TRAINING PROGRAM

## 2025-07-02 RX ORDER — ONDANSETRON 2 MG/ML
4 INJECTION INTRAMUSCULAR; INTRAVENOUS
Status: DISCONTINUED | OUTPATIENT
Start: 2025-07-02 | End: 2025-07-03 | Stop reason: HOSPADM

## 2025-07-02 RX ORDER — FLUMAZENIL 0.1 MG/ML
0.2 INJECTION, SOLUTION INTRAVENOUS
Status: CANCELLED | OUTPATIENT
Start: 2025-07-02 | End: 2025-07-02

## 2025-07-02 RX ORDER — ONDANSETRON 2 MG/ML
4 INJECTION INTRAMUSCULAR; INTRAVENOUS EVERY 6 HOURS PRN
Status: CANCELLED | OUTPATIENT
Start: 2025-07-02

## 2025-07-02 RX ORDER — PROPOFOL 10 MG/ML
INJECTION, EMULSION INTRAVENOUS PRN
Status: DISCONTINUED | OUTPATIENT
Start: 2025-07-02 | End: 2025-07-02

## 2025-07-02 RX ORDER — PROCHLORPERAZINE MALEATE 5 MG/1
5 TABLET ORAL EVERY 6 HOURS PRN
Status: CANCELLED | OUTPATIENT
Start: 2025-07-02

## 2025-07-02 RX ORDER — LIDOCAINE 40 MG/G
CREAM TOPICAL
Status: DISCONTINUED | OUTPATIENT
Start: 2025-07-02 | End: 2025-07-03 | Stop reason: HOSPADM

## 2025-07-02 RX ORDER — GLYCOPYRROLATE 0.2 MG/ML
INJECTION, SOLUTION INTRAMUSCULAR; INTRAVENOUS PRN
Status: DISCONTINUED | OUTPATIENT
Start: 2025-07-02 | End: 2025-07-02

## 2025-07-02 RX ORDER — LIDOCAINE HYDROCHLORIDE 20 MG/ML
INJECTION, SOLUTION INFILTRATION; PERINEURAL PRN
Status: DISCONTINUED | OUTPATIENT
Start: 2025-07-02 | End: 2025-07-02

## 2025-07-02 RX ORDER — ONDANSETRON 4 MG/1
4 TABLET, ORALLY DISINTEGRATING ORAL EVERY 6 HOURS PRN
Status: CANCELLED | OUTPATIENT
Start: 2025-07-02

## 2025-07-02 RX ORDER — NALOXONE HYDROCHLORIDE 0.4 MG/ML
0.4 INJECTION, SOLUTION INTRAMUSCULAR; INTRAVENOUS; SUBCUTANEOUS
Status: CANCELLED | OUTPATIENT
Start: 2025-07-02

## 2025-07-02 RX ORDER — SODIUM CHLORIDE, SODIUM LACTATE, POTASSIUM CHLORIDE, CALCIUM CHLORIDE 600; 310; 30; 20 MG/100ML; MG/100ML; MG/100ML; MG/100ML
INJECTION, SOLUTION INTRAVENOUS CONTINUOUS
Status: DISCONTINUED | OUTPATIENT
Start: 2025-07-02 | End: 2025-07-03 | Stop reason: HOSPADM

## 2025-07-02 RX ORDER — PROPOFOL 10 MG/ML
INJECTION, EMULSION INTRAVENOUS CONTINUOUS PRN
Status: DISCONTINUED | OUTPATIENT
Start: 2025-07-02 | End: 2025-07-02

## 2025-07-02 RX ORDER — NALOXONE HYDROCHLORIDE 0.4 MG/ML
0.2 INJECTION, SOLUTION INTRAMUSCULAR; INTRAVENOUS; SUBCUTANEOUS
Status: CANCELLED | OUTPATIENT
Start: 2025-07-02

## 2025-07-02 RX ADMIN — Medication 150 MCG: at 10:52

## 2025-07-02 RX ADMIN — PROPOFOL 50 MG: 10 INJECTION, EMULSION INTRAVENOUS at 10:27

## 2025-07-02 RX ADMIN — SODIUM CHLORIDE, SODIUM LACTATE, POTASSIUM CHLORIDE, CALCIUM CHLORIDE: 600; 310; 30; 20 INJECTION, SOLUTION INTRAVENOUS at 10:01

## 2025-07-02 RX ADMIN — GLYCOPYRROLATE 0.2 MG: 0.2 INJECTION, SOLUTION INTRAMUSCULAR; INTRAVENOUS at 10:52

## 2025-07-02 RX ADMIN — Medication 200 MCG: at 10:47

## 2025-07-02 RX ADMIN — LIDOCAINE HYDROCHLORIDE 60 MG: 20 INJECTION, SOLUTION INFILTRATION; PERINEURAL at 10:22

## 2025-07-02 RX ADMIN — Medication 200 MCG: at 11:10

## 2025-07-02 RX ADMIN — PROPOFOL 50 MG: 10 INJECTION, EMULSION INTRAVENOUS at 10:22

## 2025-07-02 RX ADMIN — SODIUM CHLORIDE, SODIUM LACTATE, POTASSIUM CHLORIDE, CALCIUM CHLORIDE: 600; 310; 30; 20 INJECTION, SOLUTION INTRAVENOUS at 10:16

## 2025-07-02 RX ADMIN — Medication 100 MCG: at 11:01

## 2025-07-02 RX ADMIN — Medication 100 MCG: at 10:32

## 2025-07-02 RX ADMIN — Medication 200 MCG: at 10:38

## 2025-07-02 RX ADMIN — PROPOFOL 200 MCG/KG/MIN: 10 INJECTION, EMULSION INTRAVENOUS at 10:20

## 2025-07-02 ASSESSMENT — LIFESTYLE VARIABLES: TOBACCO_USE: 0

## 2025-07-02 ASSESSMENT — COPD QUESTIONNAIRES
COPD: 1
CAT_SEVERITY: MODERATE

## 2025-07-02 NOTE — H&P
Tevin Guidry  [id number]  male  74 year old      Reason for procedure/surgery: egd and colonoscopy history of cancer and MUTYH mutation    Patient Active Problem List   Diagnosis    Social phobia    Hypertension goal BP (blood pressure) < 140/90    Dyslipidemia    Tubular adenoma of colon    Coronary artery disease involving native heart with angina pectoris, unspecified vessel or lesion type    Former smoker    S/P CABG (coronary artery bypass graft)    Chronic obstructive pulmonary disease, unspecified COPD type (H)    Bladder cancer (H)    Personal history of malignant neoplasm of bladder    Malignant neoplasm of overlapping sites of bladder (H)    Combined form of age-related cataract, both eyes    Total cataract of right eye    History of colonic polyps    Colon cancer (H)    Signet ring cell adenocarcinoma (H)    Autosomal recessive colorectal adenomatous polyposis associated with mutation in MUTYH gene       Past Surgical History:    Past Surgical History:   Procedure Laterality Date    CATARACT IOL, RT/LT      CLAVICLE SURGERY Right     fracture in childhood    COLONOSCOPY N/A 3/30/2017    Procedure: COLONOSCOPY;  Surgeon: Jie Onofre MD;  Location: [facility]    COLONOSCOPY N/A 3/2/2021    Procedure: COLONOSCOPY, WITH POLYPECTOMY, hot snare, lift with elaview and epi, clip for hemorrhage control, tattoo;  Surgeon: Ra Cardoso MD;  Location: [facility]    COLONOSCOPY N/A 4/22/2021    Procedure: Colonoscopy;  Surgeon: Jitendra Rodríguez MD;  [facility]    COLONOSCOPY N/A 3/22/2023    Procedure: COLONOSCOPY, WITH POLYPECTOMY AND BIOPSY;  Surgeon: Ra Cardoso MD;  Location: [facility]    CYSTOSCOPY, BIOPSY BLADDER INSTILL OPTICAL AGENT N/A 9/2/2020    Procedure: CYSTOSCOPY, WITH INSTILLATION OF OPTICAL IMAGING AGENT INTO BLADDER AND BIOPSY OF BLADDER (CYSVIEW);  Surgeon: Kirt Guzman MD;  [facility]    CYSTOSCOPY, TRANSURETHRAL RESECTION (TUR) TUMOR BLADDER, COMBINED  N/A 2019    Procedure: cystoscopy,Transurethral Resection Of Bladder Tumor;  Surgeon: Almas Sunshine MD;  [facility]    CYSTOSCOPY, TRANSURETHRAL RESECTION (TUR) TUMOR BLADDER, COMBINED N/A 2020    Procedure: CYSTOSCOPY, WITH TRANSURETHRAL RESECTION BLADDER TUMOR;  Surgeon: Almas Sunshine MD;  [facility]    DAVINCI BYPASS ARTERY CORONARY N/A 2014    Procedure: DAVINCI BYPASS ARTERY CORONARY;  Surgeon: Lam Solis MD;  [facility]    ESOPHAGOSCOPY, GASTROSCOPY, DUODENOSCOPY (EGD), COMBINED N/A 3/30/2017    Procedure: COMBINED ESOPHAGOSCOPY, GASTROSCOPY, DUODENOSCOPY (EGD);  Surgeon: Jie Onofre MD;  Location: [facility]    PHACOEMULSIFICATION CLEAR CORNEA WITH STANDARD INTRAOCULAR LENS IMPLANT Right 2020    Procedure: RIGHT EYE COMPLEX CATARACT REMOVAL WITH INTRAOCULAR LENS IMPLANT;  Surgeon: Aimee Marcus MD;  Location: [facility]    PHACOEMULSIFICATION CLEAR CORNEA WITH STANDARD INTRAOCULAR LENS IMPLANT Left 2023    Procedure: COMPLEX LEFT EYE PHACOEMULSIFICATION, CATARACT, WITH INTRAOCULAR LENS IMPLANT;  Surgeon: Guido Massey MD;  Location: [facility]    SURGICAL HISTORY OF -       right clavicular fracture with ORIF    ZZHC COLONOSCOPY THRU STOMA, DIAGNOSTIC      polyps due        Past Medical History:   Past Medical History:   Diagnosis Date    Bladder cancer (H)     Colon polyps     multiple may need colectomy (adenoma)    COPD (chronic obstructive pulmonary disease) (H)     Hyperlipidemia LDL goal <130     Hypertension goal BP (blood pressure) < 140/90     Social phobia        Social History:   Social History     Tobacco Use    Smoking status: Former     Current packs/day: 0.00     Types: Cigarettes     Quit date:      Years since quittin.5     Passive exposure: Past    Smokeless tobacco: Never    Tobacco comments:     Quit again now 2024.    Substance Use Topics    Alcohol use: No     Comment: quit 40  years ago       Family History:   Family History   Problem Relation Age of Onset    Cancer Mother         esog/stomach    Cancer Father         lung ,smoker    Hypertension Brother     Bladder Cancer Brother 73        dx 2/2025    Cerebrovascular Disease Brother 73        after chemo    Stomach Cancer Brother     Anesthesia Reaction No family hx of     Deep Vein Thrombosis (DVT) No family hx of     Glaucoma No family hx of     Macular Degeneration No family hx of        Allergies:   Allergies   Allergen Reactions    Codeine Nausea and Vomiting       Active Medications:   Current Outpatient Medications   Medication Sig Dispense Refill    aspirin 81 MG tablet Take 1 tablet (81 mg) by mouth daily 90 tablet 3    atorvastatin (LIPITOR) 80 MG tablet TAKE ONE TABLET BY MOUTH EVERY EVENING AT BEDTIME 90 tablet 3    buPROPion (WELLBUTRIN XL) 300 MG 24 hr tablet TAKE ONE TABLET BY MOUTH EVERY MORNING 90 tablet 1    ezetimibe (ZETIA) 10 MG tablet Take 1 tablet (10 mg) by mouth daily. 90 tablet 3    lisinopril (ZESTRIL) 20 MG tablet Take 1 tablet (20 mg) by mouth daily (with breakfast). 90 tablet 1    metoprolol succinate ER (TOPROL XL) 50 MG 24 hr tablet Take 1 tablet (50 mg) by mouth at bedtime. 90 tablet 3    PARoxetine (PAXIL) 40 MG tablet Take 1 tablet (40 mg) by mouth every morning. 90 tablet 3    umeclidinium-vilanterol (ANORO ELLIPTA) 62.5-25 MCG/ACT oral inhaler Inhale 1 puff into the lungs daily. 60 each 3    varenicline (CHANTIX) 0.5 MG tablet Take 1 tablet (0.5 mg) by mouth 2 times daily. 60 tablet 10    albuterol (PROAIR HFA/PROVENTIL HFA/VENTOLIN HFA) 108 (90 Base) MCG/ACT inhaler Inhale 2 puffs into the lungs every 6 hours as needed for shortness of breath or wheezing. 8.5 g 1    amLODIPine (NORVASC) 5 MG tablet TAKE ONE TABLET BY MOUTH EVERY NIGHT AT BEDTIME (Patient not taking: Reported on 7/2/2025) 90 tablet 3       Systemic Review:   CONSTITUTIONAL: NEGATIVE for fever, chills, change in weight  ENT/MOUTH:  "NEGATIVE for ear, mouth and throat problems  RESP: NEGATIVE for significant cough or SOB  CV: NEGATIVE for chest pain, palpitations or peripheral edema    Physical Examination:   Vital Signs: BP (!) 148/82   Pulse 59   Temp 97  F (36.1  C) (Temporal)   Resp 18   Ht 1.753 m (5' 9\")   Wt 74.8 kg (165 lb)   SpO2 96%   BMI 24.37 kg/m    GENERAL: healthy, alert and no distress  NECK: no adenopathy, no asymmetry, masses, or scars  RESP: lungs clear to auscultation - no rales, rhonchi or wheezes  CV: regular rate and rhythm, normal S1 S2, no S3 or S4, no murmur, click or rub, no peripheral edema and peripheral pulses strong  ABDOMEN: soft, nontender, no hepatosplenomegaly, no masses and bowel sounds normal  MS: no gross musculoskeletal defects noted, no edema    I examined patient s dentition and informed the patient that dental injuries are a risk of any anesthetic management. No concerns were noted with this patient's dentition. . The patient has consented to proceed with the procedure.    Plan: Appropriate to proceed as scheduled.      Bill Tinoco DO  7/2/2025    PCP:  Preethi Quiroz    "

## 2025-07-02 NOTE — ANESTHESIA PREPROCEDURE EVALUATION
Anesthesia Pre-Procedure Evaluation    Patient: Tevin Guidry   MRN: [id number] : 1950          Procedure : Procedure(s):  Esophagoscopy, gastroscopy, duodenoscopy (EGD), combined  Colonoscopy         Past Medical History:   Diagnosis Date    Bladder cancer (H)     Colon polyps     multiple may need colectomy (adenoma)    COPD (chronic obstructive pulmonary disease) (H)     Hyperlipidemia LDL goal <130     Hypertension goal BP (blood pressure) < 140/90     Social phobia       Past Surgical History:   Procedure Laterality Date    CATARACT IOL, RT/LT      CLAVICLE SURGERY Right     fracture in childhood    COLONOSCOPY N/A 3/30/2017    Procedure: COLONOSCOPY;  Surgeon: Jie Onofre MD;  Location: [facility]    COLONOSCOPY N/A 3/2/2021    Procedure: COLONOSCOPY, WITH POLYPECTOMY, hot snare, lift with elaview and epi, clip for hemorrhage control, tattoo;  Surgeon: Ra Cardoso MD;  Location: [facility]    COLONOSCOPY N/A 2021    Procedure: Colonoscopy;  Surgeon: Jitendra Rodríguez MD;  [facility]    COLONOSCOPY N/A 3/22/2023    Procedure: COLONOSCOPY, WITH POLYPECTOMY AND BIOPSY;  Surgeon: Ra Cardoso MD;  [facility]    CYSTOSCOPY, BIOPSY BLADDER INSTILL OPTICAL AGENT N/A 2020    Procedure: CYSTOSCOPY, WITH INSTILLATION OF OPTICAL IMAGING AGENT INTO BLADDER AND BIOPSY OF BLADDER (CYSVIEW);  Surgeon: Kirt Guzman MD;  [facility]    CYSTOSCOPY, TRANSURETHRAL RESECTION (TUR) TUMOR BLADDER, COMBINED N/A 2019    Procedure: cystoscopy,Transurethral Resection Of Bladder Tumor;  Surgeon: Almas Sunshine MD;  [facility]    CYSTOSCOPY, TRANSURETHRAL RESECTION (TUR) TUMOR BLADDER, COMBINED N/A 2020    Procedure: CYSTOSCOPY, WITH TRANSURETHRAL RESECTION BLADDER TUMOR;  Surgeon: Almas Sunshine MD;  [facility]    DAVINCI BYPASS ARTERY CORONARY N/A 2014    Procedure: DAVINCI BYPASS ARTERY CORONARY;  Surgeon: Lam Solis,  MD;  [facility]    ESOPHAGOSCOPY, GASTROSCOPY, DUODENOSCOPY (EGD), COMBINED N/A 3/30/2017    Procedure: COMBINED ESOPHAGOSCOPY, GASTROSCOPY, DUODENOSCOPY (EGD);  Surgeon: Jie Onofre MD;  [facility]: [facility]    PHACOEMULSIFICATION CLEAR CORNEA WITH STANDARD INTRAOCULAR LENS IMPLANT Right 2020    Procedure: RIGHT EYE COMPLEX CATARACT REMOVAL WITH INTRAOCULAR LENS IMPLANT;  Surgeon: Aimee Marcus MD;  Location: [facility]    PHACOEMULSIFICATION CLEAR CORNEA WITH STANDARD INTRAOCULAR LENS IMPLANT Left 2023    Procedure: COMPLEX LEFT EYE PHACOEMULSIFICATION, CATARACT, WITH INTRAOCULAR LENS IMPLANT;  Surgeon: Guido Massey MD;  Location: [facility]    SURGICAL HISTORY OF -       right clavicular fracture with ORIF    ZZHC COLONOSCOPY THRU STOMA, DIAGNOSTIC      polyps due       Allergies   Allergen Reactions    Codeine Nausea and Vomiting      Social History     Tobacco Use    Smoking status: Former     Current packs/day: 0.00     Types: Cigarettes     Quit date:      Years since quittin.5     Passive exposure: Past    Smokeless tobacco: Never    Tobacco comments:     Quit again now 2024.    Substance Use Topics    Alcohol use: No     Comment: quit 40 years ago      Wt Readings from Last 1 Encounters:   25 74.8 kg (165 lb)        Anesthesia Evaluation   Pt has had prior anesthetic. Type: MAC and General.    No history of anesthetic complications       ROS/MED HX  ENT/Pulmonary:     (+)                         moderate,  COPD,           (-) tobacco use and asthma   Neurologic:  - neg neurologic ROS     Cardiovascular:     (+)  hypertension- -  CAD angina-  - -                                      METS/Exercise Tolerance: 3 - Able to walk 1-2 blocks without stopping    Hematologic:     (+)      anemia,          Musculoskeletal:  - neg musculoskeletal ROS     GI/Hepatic:  - neg GI/hepatic ROS     Renal/Genitourinary:  - neg Renal ROS     Endo:  - neg endo  ROS     Psychiatric/Substance Use:     (+) psychiatric history anxiety and depression   Recreational drug usage: Cannabis.    Infectious Disease:  - neg infectious disease ROS     Malignancy:   (+) Malignancy, History of GI and Skin.    Other:  - neg other ROS            Physical Exam  Airway  Mallampati: II  TM distance: >3 FB  Mouth opening: >= 4 cm    Cardiovascular Rate: normal rate     Dental   (+) Modest Abnormalities - crowns, retainers, 1 or 2 missing teeth  Comments: Loose tooth on the right lower      Pulmonary - normal examBreath sounds clear to auscultation        Neurological - normal exam  He appears awake, alert and oriented x3.    Other Findings       OUTSIDE LABS:  CBC:   Lab Results   Component Value Date    WBC 9.5 07/22/2022    WBC 9.6 04/20/2021    HGB 14.0 07/22/2022    HGB 15.0 04/20/2021    HCT 43.0 07/22/2022    HCT 45.8 04/20/2021     07/22/2022     04/20/2021     BMP:   Lab Results   Component Value Date     09/03/2024     08/30/2023    POTASSIUM 4.6 09/03/2024    POTASSIUM 4.8 08/30/2023    CHLORIDE 103 09/03/2024    CHLORIDE 103 08/30/2023    CO2 27 09/03/2024    CO2 28 08/30/2023    BUN 13.1 09/03/2024    BUN 10.3 08/30/2023    CR 1.0 06/17/2025    CR 0.98 09/03/2024     (H) 09/03/2024     (H) 08/30/2023     COAGS:   Lab Results   Component Value Date    PTT 30 04/09/2021    INR 1.00 04/20/2021     POC:   Lab Results   Component Value Date     (H) 04/22/2021     HEPATIC:   Lab Results   Component Value Date    ALBUMIN 4.2 09/03/2024    PROTTOTAL 6.9 09/03/2024    ALT 19 09/03/2024    AST 24 09/03/2024    ALKPHOS 119 09/03/2024    BILITOTAL 0.3 09/03/2024     OTHER:   Lab Results   Component Value Date    PH 7.36 09/30/2014    LACT 0.6 (L) 09/29/2014    A1C 5.4 04/09/2021    TAMMIE 9.1 09/03/2024    PHOS 4.3 09/30/2014    MAG 2.1 04/14/2021    TSH 0.70 01/25/2017       Anesthesia Plan    ASA Status:  3      NPO Status: NPO Appropriate    Anesthesia Type: MAC.  Airway: oral.  Induction: intravenous.  Maintenance: TIVA.   Techniques and Equipment:       - Monitoring Plan: standard ASA monitoring     Consents    Anesthesia Plan(s) and associated risks, benefits, and realistic alternatives discussed. Questions answered and patient/representative(s) expressed understanding.     - Discussed: anesthesiologist     - Discussed with:         - Pt is DNR/DNI Status: no DNR          Postoperative Care    Pain management: non-narcotic analgesics, plan for postoperative opioid use.     Comments:                   Jasen Goldsmith MD    I have reviewed the pertinent notes and labs in the chart from the past 30 days and (re)examined the patient.  Any updates or changes from those notes are reflected in this note.    Clinically Significant Risk Factors Present on Admission                 # Drug Induced Platelet Defect: home medication list includes an antiplatelet medication   # Hypertension: Noted on problem list               # COPD: noted on problem list  # History of CABG: noted on surgical history

## 2025-07-02 NOTE — ANESTHESIA POSTPROCEDURE EVALUATION
Patient: Tevin Guidry    Procedure: Procedure(s):  ESOPHAGOGASTRODUODENOSCOPY, POLYPECTOMY WITH BIOPSY  COLONOSCOPY, WITH POLYPECTOMY       Anesthesia Type:  MAC    Note:  Disposition: Outpatient   Postop Pain Control: Uneventful            Sign Out: Well controlled pain   PONV: No   Neuro/Psych: Uneventful            Sign Out: Acceptable/Baseline neuro status   Airway/Respiratory: Uneventful            Sign Out: Acceptable/Baseline resp. status   CV/Hemodynamics: Uneventful            Sign Out: Acceptable CV status; No obvious hypovolemia; No obvious fluid overload   Other NRE: NONE   DID A NON-ROUTINE EVENT OCCUR? No           Last vitals:  Vitals Value Taken Time   /72 07/02/25 11:45   Temp 36.4  C (97.5  F) 07/02/25 11:45   Pulse 73 07/02/25 11:45   Resp 16 07/02/25 11:45   SpO2 100 % 07/02/25 11:46   Vitals shown include unfiled device data.    Electronically Signed By: Jasen Goldsmith MD  July 2, 2025  12:33 PM

## 2025-07-02 NOTE — ANESTHESIA CARE TRANSFER NOTE
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    Anesthesia Type:   MAC     Note:    Oropharynx: oropharynx clear of all foreign objects  Level of Consciousness: unresponsive  Oxygen Supplementation: room air    Independent Airway: airway patency satisfactory and stable  Dentition: dentition unchanged  Vital Signs Stable: post-procedure vital signs reviewed and stable  Report to RN Given: handoff report given  Patient transferred to: Phase II    Handoff Report: Identifed the Patient, Identified the Reponsible Provider, Reviewed the pertinent medical history, Discussed the surgical course, Reviewed Intra-OP anesthesia mangement and issues during anesthesia, Set expectations for post-procedure period and Allowed opportunity for questions and acknowledgement of understanding      Vitals:  Vitals Value Taken Time   BP 85/52 07/02/25 11:08   Temp 36.2  C (97.1  F) 07/02/25 11:08   Pulse 82 07/02/25 11:08   Resp 15 07/02/25 11:08   SpO2 95 % 07/02/25 11:09   Vitals shown include unfiled device data.    Electronically Signed By: FRANCISCA Warren CRNA  July 2, 2025  11:11 AM

## 2025-07-03 LAB
PATH REPORT.COMMENTS IMP SPEC: NORMAL
PATH REPORT.COMMENTS IMP SPEC: NORMAL
PATH REPORT.FINAL DX SPEC: NORMAL
PATH REPORT.GROSS SPEC: NORMAL
PATH REPORT.MICROSCOPIC SPEC OTHER STN: NORMAL
PATH REPORT.RELEVANT HX SPEC: NORMAL
PHOTO IMAGE: NORMAL

## 2025-07-08 ENCOUNTER — PATIENT OUTREACH (OUTPATIENT)
Dept: GASTROENTEROLOGY | Facility: CLINIC | Age: 75
End: 2025-07-08
Payer: COMMERCIAL

## 2025-07-08 ENCOUNTER — PREP FOR PROCEDURE (OUTPATIENT)
Dept: GASTROENTEROLOGY | Facility: CLINIC | Age: 75
End: 2025-07-08
Payer: COMMERCIAL

## 2025-07-08 DIAGNOSIS — K31.7 GASTRIC POLYP: ICD-10-CM

## 2025-07-08 DIAGNOSIS — K31.7 POLYP OF DUODENUM: Primary | ICD-10-CM

## 2025-07-08 NOTE — TELEPHONE ENCOUNTER
Contacted pt to discuss Dr Nguyen request and Dr Garvin's recommendations.     Procedure/Imaging/Clinic: EGD with EMR   Physician: Bharathi   Timing: next avail   Scope time needed: 45 min   Fluoro/C-arm needed: No   Anesthesia: MAC   Dx: duodenal polyp, gastric polyps   Tier: 3   Location: [place] or [facility]   OK to schedule while attending: yes   Header of letter for pt communication: EGD to remove polyps     July 24th at [place] [facility]o agreed upon.    Explained OR will call 1-2 days prior to procedure date with arrival time, will need a , someone to stay with them for 24 hours and should stay in town for 24 hours (within 45 min of Hospital) post procedure    Patient needs to get pre-op physical completed. If outside [facility] system will need physical faxed to number [phone]     If you do not get a preop physical, your procedure could be cancelled, patient voiced understanding*    Preop Plan: H & P completed on 7/2/25, this will be utilized by Dr Garvin.     Does patient have any history of gastric bypass/gastric surgery/altered panc/bili anatomy? none      Med Review    Blood thinner -   none  ASA -  none  Diabetic -  none  Any meds by injection or mouth for weight loss or diabetes-  none    Patient Education r/t procedure: mychart    A pre-op nurse will call 1-2 days prior to the procedure.    NPO/Prep:   Adults and Children of all ages may consume solids up to 8 hours prior to arrival time - may consume clear liquids up to 1 hour prior to arrival time.    Verbalized understanding of all instructions. All questions answered.     Procedure order placed, message routed to OR

## 2025-07-23 ENCOUNTER — ANESTHESIA EVENT (OUTPATIENT)
Dept: GASTROENTEROLOGY | Facility: CLINIC | Age: 75
End: 2025-07-23
Payer: COMMERCIAL

## 2025-07-23 ASSESSMENT — COPD QUESTIONNAIRES: COPD: 1

## 2025-07-23 NOTE — ANESTHESIA PREPROCEDURE EVALUATION
Anesthesia Pre-Procedure Evaluation    Patient: Tevin Guidry   MRN: [id number] : 1950          Procedure : Procedure(s):  ESOPHAGOGASTRODUODENOSCOPY, WITH MUCOSAL RESECTION         Past Medical History:   Diagnosis Date    Bladder cancer (H)     Colon polyps     multiple may need colectomy (adenoma)    COPD (chronic obstructive pulmonary disease) (H)     Hyperlipidemia LDL goal <130     Hypertension goal BP (blood pressure) < 140/90     Social phobia       Past Surgical History:   Procedure Laterality Date    CATARACT IOL, RT/LT      CLAVICLE SURGERY Right     fracture in childhood    COLONOSCOPY N/A 3/30/2017    Procedure: COLONOSCOPY;  Surgeon: Jie Onofre MD;  Location: [facility]    COLONOSCOPY N/A 3/2/2021    Procedure: COLONOSCOPY, WITH POLYPECTOMY, hot snare, lift with elaview and epi, clip for hemorrhage control, tattoo;  Surgeon: Ra Cardoso MD;  Location: [facility]    COLONOSCOPY N/A 2021    Procedure: Colonoscopy;  Surgeon: Jitendra Rodríguez MD;  Location: [facility]    COLONOSCOPY N/A 3/22/2023    Procedure: COLONOSCOPY, WITH POLYPECTOMY AND BIOPSY;  Surgeon: Ra Cardoso MD;  Location: [facility]    COLONOSCOPY N/A 2025    Procedure: COLONOSCOPY, WITH POLYPECTOMY;  Surgeon: Bill Tinoco DO;  Location: [facility]    CYSTOSCOPY, BIOPSY BLADDER INSTILL OPTICAL AGENT N/A 2020    Procedure: CYSTOSCOPY, WITH INSTILLATION OF OPTICAL IMAGING AGENT INTO BLADDER AND BIOPSY OF BLADDER (CYSVIEW);  Surgeon: Kirt Guzman MD;  [facility]    CYSTOSCOPY, TRANSURETHRAL RESECTION (TUR) TUMOR BLADDER, COMBINED N/A 2019    Procedure: cystoscopy,Transurethral Resection Of Bladder Tumor;  Surgeon: Almas Sunshine MD;  [facility]    CYSTOSCOPY, TRANSURETHRAL RESECTION (TUR) TUMOR BLADDER, COMBINED N/A 2020    Procedure: CYSTOSCOPY, WITH TRANSURETHRAL RESECTION BLADDER TUMOR;  Surgeon: Almas Sunshine MD;  [facility]    DAVINCI BYPASS ARTERY  CORONARY N/A 2014    Procedure: DAVINCI BYPASS ARTERY CORONARY;  Surgeon: Lam Solis MD;  Location: [facility]    ESOPHAGOSCOPY, GASTROSCOPY, DUODENOSCOPY (EGD), COMBINED N/A 3/30/2017    Procedure: COMBINED ESOPHAGOSCOPY, GASTROSCOPY, DUODENOSCOPY (EGD);  Surgeon: Jie Onofre MD;  Location: [facility]    ESOPHAGOSCOPY, GASTROSCOPY, DUODENOSCOPY (EGD), COMBINED N/A 2025    Procedure: ESOPHAGOGASTRODUODENOSCOPY, POLYPECTOMY WITH BIOPSY;  Surgeon: Bill Tinoco DO;  Location: [facility]    PHACOEMULSIFICATION CLEAR CORNEA WITH STANDARD INTRAOCULAR LENS IMPLANT Right 2020    Procedure: RIGHT EYE COMPLEX CATARACT REMOVAL WITH INTRAOCULAR LENS IMPLANT;  Surgeon: Aimee Marcus MD;  Location: [facility]    PHACOEMULSIFICATION CLEAR CORNEA WITH STANDARD INTRAOCULAR LENS IMPLANT Left 2023    Procedure: COMPLEX LEFT EYE PHACOEMULSIFICATION, CATARACT, WITH INTRAOCULAR LENS IMPLANT;  Surgeon: Guido Massey MD;  Location: [facility]    SURGICAL HISTORY OF -       right clavicular fracture with ORIF    ZZHC COLONOSCOPY THRU STOMA, DIAGNOSTIC      polyps due       Allergies   Allergen Reactions    Codeine Nausea and Vomiting      Social History     Tobacco Use    Smoking status: Former     Current packs/day: 0.00     Types: Cigarettes     Quit date:      Years since quittin.5     Passive exposure: Past    Smokeless tobacco: Never    Tobacco comments:     Quit again now 2024.    Substance Use Topics    Alcohol use: No     Comment: quit 40 years ago      Wt Readings from Last 1 Encounters:   25 74.8 kg (165 lb)        Anesthesia Evaluation    Type: General.        ROS/MED HX  ENT/Pulmonary:     (+)                          COPD,           (-) tobacco use, asthma and sleep apnea   Neurologic:     (+)    no peripheral neuropathy                         (-) no seizures and no CVA   Cardiovascular:     (+) Dyslipidemia hypertension- -  CAD -  CABG-date: .  -                                 Previous cardiac testing   Echo: Date: 1/11/23 Results:  Left ventricular systolic function is normal.  The visual ejection fraction is 60-65%.  The right ventricle is normal in structure, function and size.  Technically difficult, suboptimal study. No hemodynamically significant  valvular abnormalities on 2D or color flow imaging.      Stress Test:  Date: Results:    ECG Reviewed:  Date: Results:    Cath:  Date: Results:   (-) arrhythmias   METS/Exercise Tolerance:     Hematologic:       Musculoskeletal:       GI/Hepatic:    (-) GERD   Renal/Genitourinary:    (-) renal disease   Endo:    (-) Type II DM and thyroid disease   Psychiatric/Substance Use:       Infectious Disease:    (-) Recent Fever   Malignancy:   (+) Malignancy, History of GI.GI CA  status post Surgery.      Other:              Physical Exam  Airway  Mallampati: II  TM distance: >3 FB  Neck ROM: full    Cardiovascular - normal exam   Dental   (+) Multiple visibly decayed, broken teeth        Pulmonary - normal exam      Neurological   Other Findings       OUTSIDE LABS:  CBC:   Lab Results   Component Value Date    WBC 9.5 07/22/2022    WBC 9.6 04/20/2021    HGB 14.0 07/22/2022    HGB 15.0 04/20/2021    HCT 43.0 07/22/2022    HCT 45.8 04/20/2021     07/22/2022     04/20/2021     BMP:   Lab Results   Component Value Date     09/03/2024     08/30/2023    POTASSIUM 4.6 09/03/2024    POTASSIUM 4.8 08/30/2023    CHLORIDE 103 09/03/2024    CHLORIDE 103 08/30/2023    CO2 27 09/03/2024    CO2 28 08/30/2023    BUN 13.1 09/03/2024    BUN 10.3 08/30/2023    CR 1.0 06/17/2025    CR 0.98 09/03/2024     (H) 09/03/2024     (H) 08/30/2023     COAGS:   Lab Results   Component Value Date    PTT 30 04/09/2021    INR 1.00 04/20/2021     POC:   Lab Results   Component Value Date     (H) 04/22/2021     HEPATIC:   Lab Results   Component Value Date    ALBUMIN 4.2 09/03/2024    PROTTOTAL 6.9  09/03/2024    ALT 19 09/03/2024    AST 24 09/03/2024    ALKPHOS 119 09/03/2024    BILITOTAL 0.3 09/03/2024     OTHER:   Lab Results   Component Value Date    PH 7.36 09/30/2014    LACT 0.6 (L) 09/29/2014    A1C 5.4 04/09/2021    TAMMIE 9.1 09/03/2024    PHOS 4.3 09/30/2014    MAG 2.1 04/14/2021    TSH 0.70 01/25/2017       Anesthesia Plan    ASA Status:  3      NPO Status: NPO Appropriate   Anesthesia Type: MAC.   Techniques and Equipment:       - Monitoring Plan: standard ASA monitoring     Consents    Anesthesia Plan(s) and associated risks, benefits, and realistic alternatives discussed. Questions answered and patient/representative(s) expressed understanding.     - Discussed:     - Discussed with:  Patient (Discussed risks including, but not limited to, possibility of intraoperative awareness)               Postoperative Care         Comments:                   Chuckie Awad MD    I have reviewed the pertinent notes and labs in the chart from the past 30 days and (re)examined the patient.  Any updates or changes from those notes are reflected in this note.    Clinically Significant Risk Factors Present on Admission                   # Hypertension: Noted on problem list                # History of CABG: noted on surgical history

## 2025-07-24 ENCOUNTER — ANESTHESIA (OUTPATIENT)
Dept: GASTROENTEROLOGY | Facility: CLINIC | Age: 75
End: 2025-07-24
Payer: COMMERCIAL

## 2025-07-24 ENCOUNTER — HOSPITAL ENCOUNTER (OUTPATIENT)
Facility: CLINIC | Age: 75
Discharge: HOME | End: 2025-07-24
Attending: INTERNAL MEDICINE | Admitting: INTERNAL MEDICINE
Payer: COMMERCIAL

## 2025-07-24 VITALS
BODY MASS INDEX: 24.44 KG/M2 | SYSTOLIC BLOOD PRESSURE: 93 MMHG | WEIGHT: 165 LBS | HEIGHT: 69 IN | RESPIRATION RATE: 18 BRPM | HEART RATE: 56 BPM | OXYGEN SATURATION: 94 % | DIASTOLIC BLOOD PRESSURE: 57 MMHG

## 2025-07-24 DIAGNOSIS — D13.2 ADENOMATOUS DUODENAL POLYP: ICD-10-CM

## 2025-07-24 DIAGNOSIS — K31.7 GASTRIC POLYPS: Primary | ICD-10-CM

## 2025-07-24 LAB — UPPER GI ENDOSCOPY: NORMAL

## 2025-07-24 PROCEDURE — 43254 EGD ENDO MUCOSAL RESECTION: CPT | Mod: XS | Performed by: INTERNAL MEDICINE

## 2025-07-24 PROCEDURE — 250N000011 HC RX IP 250 OP 636: Performed by: NURSE ANESTHETIST, CERTIFIED REGISTERED

## 2025-07-24 PROCEDURE — 250N000013 HC RX MED GY IP 250 OP 250 PS 637: Performed by: INTERNAL MEDICINE

## 2025-07-24 PROCEDURE — 43251 EGD REMOVE LESION SNARE: CPT | Performed by: INTERNAL MEDICINE

## 2025-07-24 PROCEDURE — 250N000009 HC RX 250: Performed by: NURSE ANESTHETIST, CERTIFIED REGISTERED

## 2025-07-24 PROCEDURE — 999N000010 HC STATISTIC ANES STAT CODE-CRNA PER MINUTE: Performed by: INTERNAL MEDICINE

## 2025-07-24 PROCEDURE — 370N000017 HC ANESTHESIA TECHNICAL FEE, PER MIN: Performed by: INTERNAL MEDICINE

## 2025-07-24 PROCEDURE — 258N000003 HC RX IP 258 OP 636: Performed by: NURSE ANESTHETIST, CERTIFIED REGISTERED

## 2025-07-24 PROCEDURE — 88305 TISSUE EXAM BY PATHOLOGIST: CPT | Mod: TC | Performed by: INTERNAL MEDICINE

## 2025-07-24 RX ORDER — LIDOCAINE 40 MG/G
CREAM TOPICAL
Status: DISCONTINUED | OUTPATIENT
Start: 2025-07-24 | End: 2025-07-24 | Stop reason: HOSPADM

## 2025-07-24 RX ORDER — ONDANSETRON 2 MG/ML
4 INJECTION INTRAMUSCULAR; INTRAVENOUS
Status: DISCONTINUED | OUTPATIENT
Start: 2025-07-24 | End: 2025-07-24 | Stop reason: HOSPADM

## 2025-07-24 RX ORDER — SODIUM CHLORIDE, SODIUM LACTATE, POTASSIUM CHLORIDE, CALCIUM CHLORIDE 600; 310; 30; 20 MG/100ML; MG/100ML; MG/100ML; MG/100ML
INJECTION, SOLUTION INTRAVENOUS CONTINUOUS PRN
Status: DISCONTINUED | OUTPATIENT
Start: 2025-07-24 | End: 2025-07-24

## 2025-07-24 RX ORDER — OMEPRAZOLE 40 MG/1
40 CAPSULE, DELAYED RELEASE ORAL
Qty: 60 CAPSULE | Refills: 0 | Status: SHIPPED | OUTPATIENT
Start: 2025-07-24 | End: 2025-08-23

## 2025-07-24 RX ORDER — PROPOFOL 10 MG/ML
INJECTION, EMULSION INTRAVENOUS CONTINUOUS PRN
Status: DISCONTINUED | OUTPATIENT
Start: 2025-07-24 | End: 2025-07-24

## 2025-07-24 RX ORDER — LIDOCAINE HYDROCHLORIDE 20 MG/ML
INJECTION, SOLUTION INFILTRATION; PERINEURAL PRN
Status: DISCONTINUED | OUTPATIENT
Start: 2025-07-24 | End: 2025-07-24

## 2025-07-24 RX ORDER — SIMETHICONE 40MG/0.6ML
SUSPENSION, DROPS(FINAL DOSAGE FORM)(ML) ORAL PRN
Status: DISCONTINUED | OUTPATIENT
Start: 2025-07-24 | End: 2025-07-24 | Stop reason: HOSPADM

## 2025-07-24 RX ADMIN — PHENYLEPHRINE HYDROCHLORIDE 100 MCG: 10 INJECTION INTRAVENOUS at 13:00

## 2025-07-24 RX ADMIN — PHENYLEPHRINE HYDROCHLORIDE 100 MCG: 10 INJECTION INTRAVENOUS at 12:54

## 2025-07-24 RX ADMIN — PHENYLEPHRINE HYDROCHLORIDE 150 MCG: 10 INJECTION INTRAVENOUS at 13:05

## 2025-07-24 RX ADMIN — PHENYLEPHRINE HYDROCHLORIDE 100 MCG: 10 INJECTION INTRAVENOUS at 12:45

## 2025-07-24 RX ADMIN — PHENYLEPHRINE HYDROCHLORIDE 100 MCG: 10 INJECTION INTRAVENOUS at 12:41

## 2025-07-24 RX ADMIN — PHENYLEPHRINE HYDROCHLORIDE 100 MCG: 10 INJECTION INTRAVENOUS at 13:09

## 2025-07-24 RX ADMIN — PROPOFOL 200 MCG/KG/MIN: 10 INJECTION, EMULSION INTRAVENOUS at 12:21

## 2025-07-24 RX ADMIN — SODIUM CHLORIDE, SODIUM LACTATE, POTASSIUM CHLORIDE, AND CALCIUM CHLORIDE: .6; .31; .03; .02 INJECTION, SOLUTION INTRAVENOUS at 12:21

## 2025-07-24 RX ADMIN — PHENYLEPHRINE HYDROCHLORIDE 200 MCG: 10 INJECTION INTRAVENOUS at 12:50

## 2025-07-24 RX ADMIN — PHENYLEPHRINE HYDROCHLORIDE 100 MCG: 10 INJECTION INTRAVENOUS at 12:58

## 2025-07-24 RX ADMIN — LIDOCAINE HYDROCHLORIDE 40 MG: 20 INJECTION, SOLUTION INFILTRATION; PERINEURAL at 12:21

## 2025-07-24 RX ADMIN — DEXMEDETOMIDINE HYDROCHLORIDE 8 MCG: 100 INJECTION, SOLUTION INTRAVENOUS at 12:36

## 2025-07-24 ASSESSMENT — ACTIVITIES OF DAILY LIVING (ADL)
ADLS_ACUITY_SCORE: 53
ADLS_ACUITY_SCORE: 53

## 2025-07-24 ASSESSMENT — LIFESTYLE VARIABLES: TOBACCO_USE: 0

## 2025-07-24 ASSESSMENT — ENCOUNTER SYMPTOMS
SEIZURES: 0
DYSRHYTHMIAS: 0

## 2025-07-24 NOTE — ANESTHESIA CARE TRANSFER NOTE
Patient: Tevin Guidry    Procedure: Procedure(s):  ESOPHAGOGASTRODUODENOSCOPY, WITH MUCOSAL RESECTION  ESOPHAGOGASTRODUODENOSCOPY, WITH LESION REMOVAL USING SNARE       Diagnosis: Polyp of duodenum [K31.7]  Gastric polyp [K31.7]  Diagnosis Additional Information: No value filed.    Anesthesia Type:   MAC     Note:    Oropharynx: oropharynx clear of all foreign objects and spontaneously breathing  Level of Consciousness: drowsy  Oxygen Supplementation: face mask  Level of Supplemental Oxygen (L/min / FiO2): 6  Independent Airway: airway patency satisfactory and stable  Dentition: dentition unchanged  Vital Signs Stable: post-procedure vital signs reviewed and stable  Report to RN Given: handoff report given  Patient transferred to: PACU  Comments: Phenylephrine given in recovery bay, RN and MDA aware.   Handoff Report: Identifed the Patient, Identified the Reponsible Provider, Reviewed the pertinent medical history, Discussed the surgical course, Reviewed Intra-OP anesthesia mangement and issues during anesthesia, Set expectations for post-procedure period and Allowed opportunity for questions and acknowledgement of understanding      Vitals:  Vitals Value Taken Time   BP 88/57 07/24/25 13:10   Temp     Pulse 50 07/24/25 13:10   Resp 16 07/24/25 13:11   SpO2 100 % 07/24/25 13:11   Vitals shown include unfiled device data.    Electronically Signed By: FRANCISCA Mensah CRNA  July 24, 2025  1:11 PM

## 2025-07-24 NOTE — ANESTHESIA POSTPROCEDURE EVALUATION
Patient: Tevin Guidry    Procedure: Procedure(s):  ESOPHAGOGASTRODUODENOSCOPY, WITH MUCOSAL RESECTION  ESOPHAGOGASTRODUODENOSCOPY, WITH LESION REMOVAL USING SNARE       Anesthesia Type:  MAC    Note:  Disposition: Outpatient   Postop Pain Control: Uneventful            Sign Out: Well controlled pain   PONV: No   Neuro/Psych: Uneventful            Sign Out: Acceptable/Baseline neuro status   Airway/Respiratory: Uneventful            Sign Out: Acceptable/Baseline resp. status   CV/Hemodynamics: Uneventful            Sign Out: Acceptable CV status   Other NRE: NONE   DID A NON-ROUTINE EVENT OCCUR? No           Last vitals:  Vitals Value Taken Time   BP 93/57 07/24/25 13:30   Temp     Pulse 51 07/24/25 13:35   Resp 34 07/24/25 13:36   SpO2 96 % 07/24/25 13:36   Vitals shown include unfiled device data.    Electronically Signed By: Chuckie Awad MD  July 24, 2025  2:22 PM

## 2025-07-25 PROCEDURE — 88305 TISSUE EXAM BY PATHOLOGIST: CPT | Mod: 26 | Performed by: PATHOLOGY

## 2025-07-30 DIAGNOSIS — F17.200 TOBACCO DEPENDENCE: ICD-10-CM

## 2025-07-30 DIAGNOSIS — J44.9 CHRONIC OBSTRUCTIVE PULMONARY DISEASE, UNSPECIFIED COPD TYPE (H): ICD-10-CM

## 2025-07-30 RX ORDER — UMECLIDINIUM BROMIDE AND VILANTEROL TRIFENATATE 62.5; 25 UG/1; UG/1
1 POWDER RESPIRATORY (INHALATION) DAILY
Qty: 60 EACH | Refills: 0 | Status: SHIPPED | OUTPATIENT
Start: 2025-07-30

## 2025-07-30 RX ORDER — BUPROPION HYDROCHLORIDE 300 MG/1
300 TABLET ORAL EVERY MORNING
Qty: 90 TABLET | Refills: 1 | Status: SHIPPED | OUTPATIENT
Start: 2025-07-30

## 2025-07-30 NOTE — TELEPHONE ENCOUNTER
Filled the anoro allipta per prot.  Sending Wellbutrin to PCP. Failed associated dx.  Cee, Triage RN  [facility]/ [phone]

## 2025-08-14 ENCOUNTER — TRANSFERRED RECORDS (OUTPATIENT)
Dept: HEALTH INFORMATION MANAGEMENT | Facility: CLINIC | Age: 75
End: 2025-08-14
Payer: COMMERCIAL

## (undated) DEVICE — ENDO SYSTEM WATER BOTTLE & TUBING W/CO2 FILTER 00711549

## (undated) DEVICE — SPECIMEN CONTAINER 3OZ W/FORMALIN 59901

## (undated) DEVICE — ENDO SNARE POLYPECTOMY OVAL 15MM LOOP SD-240U-15

## (undated) DEVICE — KIT CONNECTOR FOR OLYMPUS ENDOSCOPES DEFENDO 100310

## (undated) DEVICE — CATH MALECOT 20FR LATEX 086020

## (undated) DEVICE — PAD CHUX UNDERPAD 30X30"

## (undated) DEVICE — CLIP HEMOCLIP ENDOSCOPIC INSTINCT 2.8X230CM INSC-7-230-SS

## (undated) DEVICE — SU PDS II 3-0 SH 27" Z316H

## (undated) DEVICE — TUBING SUCTION MEDI-VAC 1/4"X20' N620A

## (undated) DEVICE — EYE SHIELD PLASTIC

## (undated) DEVICE — DRSG TELFA 3X8" 1238

## (undated) DEVICE — SU PDS II 0 CT-1 27" Z340H

## (undated) DEVICE — LINEN TOWEL PACK X5 5464

## (undated) DEVICE — GOWN ISOLATION YELLOW UNIV NOT STERILE 3110PG

## (undated) DEVICE — NDL INSUFFLATION 13GA 150MM C2202

## (undated) DEVICE — SUCTION MANIFOLD NEPTUNE 2 SYS 4 PORT 0702-020-000

## (undated) DEVICE — SU SILK 3-0 TIE 12X30" A304H

## (undated) DEVICE — SYSTEM CLEARIFY VISUALIZATION 21-345

## (undated) DEVICE — ENDO SNARE POLYPECTOMY OVAL 10MM LOOP SD-240U-10

## (undated) DEVICE — PACK CATARACT CUSTOM ASC SEY15CPUMC

## (undated) DEVICE — ADH SKIN CLOSURE PREMIERPRO EXOFIN 1.0ML 3470

## (undated) DEVICE — SU VICRYL 3-0 SH 27" J316H

## (undated) DEVICE — WIPES FOLEY CARE SURESTEP PROVON DFC100

## (undated) DEVICE — SYR BULB IRRIG 50ML LATEX FREE 0035280

## (undated) DEVICE — EYE CANN IRR 27GA ANTERIOR CHAMBER 581280

## (undated) DEVICE — EYE PACK CUSTOM ANTERIOR 30DEG TIP CENTURION PPK6682-04

## (undated) DEVICE — GOWN IMPERVIOUS 2XL BLUE

## (undated) DEVICE — ENDO CAP AND TUBING STERILE FOR ENDOGATOR  100130

## (undated) DEVICE — SPECIMEN CONTAINER 5OZ STERILE 2600SA

## (undated) DEVICE — DRAPE LEGGINGS CLEAR 8430

## (undated) DEVICE — SOL WATER IRRIG 1000ML BOTTLE 2F7114

## (undated) DEVICE — SU MONOCRYL 4-0 PS-2 27" UND Y426H

## (undated) DEVICE — ENDO TROCAR FIRST ENTRY KII FIOS ADV FIX 12X100MM CFF73

## (undated) DEVICE — TUBING SUCTION 10'X3/16" N510

## (undated) DEVICE — PREP POVIDONE IODINE SOLUTION 10% 4OZ

## (undated) DEVICE — SUCTION MANIFOLD NEPTUNE 2 SYS 1 PORT 702-025-000

## (undated) DEVICE — ESU GROUND PAD ADULT W/CORD E7507

## (undated) DEVICE — PITCHER STERILE 1000ML  SSK9004A

## (undated) DEVICE — FORCEP BX OVAL CUP 3FRX115CM 220130

## (undated) DEVICE — SOL SORBITOL 3% IRRIG 3000ML BAG 2B7357

## (undated) DEVICE — ENDO TRAP POLYP E-TRAP 00711099

## (undated) DEVICE — GLOVE PROTEXIS W/NEU-THERA 7.5  2D73TE75

## (undated) DEVICE — SU SILK 2-0 TIE 12X30" A305H

## (undated) DEVICE — EYE KNIFE SLIT XSTAR VISITEC 2.6MM 45DEG 373726

## (undated) DEVICE — TUBING SUCTION 12"X1/4" N612

## (undated) DEVICE — EYE TIP IRRIGATION & ASPIRATION POLYMER 35D BENT 8065751511

## (undated) DEVICE — SOL NACL 0.9% IRRIG 500ML BOTTLE 2F7123

## (undated) DEVICE — PACK CYSTO CUSTOM ASC

## (undated) DEVICE — DRAPE IOBAN INCISE 23X17" 6650EZ

## (undated) DEVICE — SOL NACL 0.9% IRRIG 3000ML BAG 07972-08

## (undated) DEVICE — SU MONOCRYL 4-0 RB-1 27" Y214H

## (undated) DEVICE — NDL INSUFFLATION 13GA 120MM C2201

## (undated) DEVICE — EYE CANN IRR 25GA CYSTOTOME 581610

## (undated) DEVICE — TUBING INSUFFLATION W/FILTER CPC TO LUER 620-030-301

## (undated) DEVICE — ENDO TROCAR FIRST ENTRY KII FIOS ADV FIX 05X100MM CFF03

## (undated) DEVICE — SOL WATER IRRIG 3000ML BAG 2B7117

## (undated) DEVICE — PREP CHLORAPREP 26ML TINTED ORANGE  260815

## (undated) DEVICE — SU VICRYL 3-0 SH 27" UND J416H

## (undated) DEVICE — LINEN TOWEL PACK X30 5481

## (undated) DEVICE — SOL NACL 0.9% IRRIG 1000ML BOTTLE 2F7124

## (undated) DEVICE — ESU ELEC CLEANCOAT LAP L-HOOK 36CM E3773-36C

## (undated) DEVICE — ENDO TROCAR FIRST ENTRY KII FIOS Z-THRD 05X100MM CTF03

## (undated) DEVICE — KIT ENDO FIRST STEP DISINFECTANT 200ML W/POUCH EP-4

## (undated) DEVICE — EYE KNIFE STILETTO VISITEC 1.1MM ANG 45DEG SIDEPORT 376620

## (undated) DEVICE — ENDO TROCAR BLUNT TIP KII BALLOON 12X100MM C0R47

## (undated) DEVICE — GLOVE PROTEXIS MICRO 6.5  2D73PM65

## (undated) DEVICE — ENDO BITE BLOCK ADULT OMNI-BLOC

## (undated) DEVICE — ENDO SCOPE WARMER SEAL  C3101

## (undated) DEVICE — KIT ENDO TURNOVER/PROCEDURE CARRY-ON 101822

## (undated) DEVICE — ENDO SNARE POLYPECTOMY SPIRAL 20MM LOOP SD-230U-20

## (undated) DEVICE — SOL WATER IRRIG 500ML BOTTLE 2F7113

## (undated) DEVICE — Device

## (undated) DEVICE — STPL LINEAR CUT 75MM TLC75

## (undated) DEVICE — SPECIMEN TRAP POLYP 4 CHAMBER EZ710202 EZ710202

## (undated) DEVICE — GLOVE PROTEXIS MICRO 7.5  2D73PM75

## (undated) DEVICE — NDL SCLEROTHERAPY 25GA CARR-LOCK  00711811

## (undated) DEVICE — SU VICRYL 3-0 SH 8X18" UND J864D

## (undated) DEVICE — PROTECTOR ARM ONE-STEP TRENDELENBURG 40418

## (undated) DEVICE — DRSG GAUZE 4X4" TRAY 6939

## (undated) DEVICE — GLOVE PROTEXIS W/NEU-THERA 7.0  2D73TE70

## (undated) DEVICE — ESU LIGASURE MARYLAND LAPAROSCOPIC SLR/DVDR 5MMX37CM LF1937

## (undated) DEVICE — ESU LIGASURE SEALER/DIVIDER RETRACT L-HOOK 37CM LF5637

## (undated) DEVICE — STPL LINEAR 90 X 3.5MM TA9035S

## (undated) DEVICE — ENDO FORCEP ENDOJAW BIOPSY 2.8MMX230CM FB-220U

## (undated) DEVICE — SU SILK 0 TIE 6X18" A186H

## (undated) DEVICE — LINEN GOWN XLG 5407

## (undated) DEVICE — SOL WATER 3000ML BAG 7973-08

## (undated) DEVICE — SOL NACL 0.9% IRRIG 3000ML BAG 2B7477

## (undated) DEVICE — SU SILK 0 TIE 6X30" A306H

## (undated) DEVICE — ESU ERBE FIAPC PROBE 2.3MMX220CM

## (undated) DEVICE — PAD CHUX UNDERPAD 30X36" P3036C

## (undated) DEVICE — SUCTION TIP YANKAUER STR K87

## (undated) DEVICE — ENDO TROCAR SLEEVE KII Z-THREADED 05X100MM CTS02

## (undated) DEVICE — KIT PATIENT POSITIONING PIGAZZI LATEX FREE 40580

## (undated) DEVICE — CATH TRAY FOLEY SURESTEP 16FR W/URNE MTR STLK LATEX A303316A

## (undated) DEVICE — SUCTION TIP POOLE K770

## (undated) DEVICE — QUICK CLIP PRO

## (undated) DEVICE — ESU ELEC CUTTING LOOP 24/26FR .35MM BIPOLAR 27040GP1-S

## (undated) DEVICE — ENDO FORCEP BX CAPTURA PRO SPIKE G50696

## (undated) DEVICE — NDL 18GA 1.5" 305196

## (undated) DEVICE — ENDO MARKER SPOT 5ML

## (undated) DEVICE — DRAPE U SPLIT 74X120" 29440

## (undated) DEVICE — ESU HOLDER LAP INST DISP PURPLE LONG 330MM H-PRO-330

## (undated) DEVICE — CATH TRAY FOLEY 16FR W/URINE METER STATLOCK 303316A

## (undated) DEVICE — SU VICRYL 2-0 SH 27" UND J417H

## (undated) DEVICE — EYE TIP IRRIGATION & ASPIRATION POLYMER CVD 0.3MM 8065751512

## (undated) DEVICE — LINEN TOWEL PACK X6 WHITE 5487

## (undated) DEVICE — ENDO SNARE EXACTO COLD 9MM LOOP 2.4MMX230CM 00711115

## (undated) RX ORDER — LIDOCAINE HYDROCHLORIDE 20 MG/ML
INJECTION, SOLUTION EPIDURAL; INFILTRATION; INTRACAUDAL; PERINEURAL
Status: DISPENSED
Start: 2020-09-02

## (undated) RX ORDER — CEFAZOLIN SODIUM 1 G/3ML
INJECTION, POWDER, FOR SOLUTION INTRAMUSCULAR; INTRAVENOUS
Status: DISPENSED
Start: 2020-02-06

## (undated) RX ORDER — LIDOCAINE HYDROCHLORIDE 20 MG/ML
JELLY TOPICAL
Status: DISPENSED
Start: 2019-09-03

## (undated) RX ORDER — DEXAMETHASONE SODIUM PHOSPHATE 4 MG/ML
INJECTION, SOLUTION INTRA-ARTICULAR; INTRALESIONAL; INTRAMUSCULAR; INTRAVENOUS; SOFT TISSUE
Status: DISPENSED
Start: 2020-09-02

## (undated) RX ORDER — ONDANSETRON 2 MG/ML
INJECTION INTRAMUSCULAR; INTRAVENOUS
Status: DISPENSED
Start: 2020-09-02

## (undated) RX ORDER — FENTANYL CITRATE 50 UG/ML
INJECTION, SOLUTION INTRAMUSCULAR; INTRAVENOUS
Status: DISPENSED
Start: 2021-04-13

## (undated) RX ORDER — INDOCYANINE GREEN AND WATER 25 MG
KIT INJECTION
Status: DISPENSED
Start: 2021-04-13

## (undated) RX ORDER — PROPOFOL 10 MG/ML
INJECTION, EMULSION INTRAVENOUS
Status: DISPENSED
Start: 2020-12-11

## (undated) RX ORDER — ONDANSETRON 2 MG/ML
INJECTION INTRAMUSCULAR; INTRAVENOUS
Status: DISPENSED
Start: 2021-04-13

## (undated) RX ORDER — FENTANYL CITRATE 50 UG/ML
INJECTION, SOLUTION INTRAMUSCULAR; INTRAVENOUS
Status: DISPENSED
Start: 2019-09-05

## (undated) RX ORDER — LIDOCAINE HYDROCHLORIDE 20 MG/ML
INJECTION, SOLUTION EPIDURAL; INFILTRATION; INTRACAUDAL; PERINEURAL
Status: DISPENSED
Start: 2019-09-05

## (undated) RX ORDER — GLYCOPYRROLATE 0.2 MG/ML
INJECTION INTRAMUSCULAR; INTRAVENOUS
Status: DISPENSED
Start: 2020-12-11

## (undated) RX ORDER — PROPOFOL 10 MG/ML
INJECTION, EMULSION INTRAVENOUS
Status: DISPENSED
Start: 2019-09-05

## (undated) RX ORDER — SODIUM CHLORIDE, SODIUM LACTATE, POTASSIUM CHLORIDE, CALCIUM CHLORIDE 600; 310; 30; 20 MG/100ML; MG/100ML; MG/100ML; MG/100ML
INJECTION, SOLUTION INTRAVENOUS
Status: DISPENSED
Start: 2021-04-13

## (undated) RX ORDER — ACETAMINOPHEN 325 MG/1
TABLET ORAL
Status: DISPENSED
Start: 2020-02-06

## (undated) RX ORDER — ONDANSETRON 2 MG/ML
INJECTION INTRAMUSCULAR; INTRAVENOUS
Status: DISPENSED
Start: 2019-09-05

## (undated) RX ORDER — LIDOCAINE HYDROCHLORIDE 20 MG/ML
JELLY TOPICAL
Status: DISPENSED
Start: 2020-09-02

## (undated) RX ORDER — LIDOCAINE HYDROCHLORIDE 10 MG/ML
INJECTION, SOLUTION EPIDURAL; INFILTRATION; INTRACAUDAL; PERINEURAL
Status: DISPENSED
Start: 2020-07-14

## (undated) RX ORDER — LIDOCAINE HYDROCHLORIDE 20 MG/ML
JELLY TOPICAL
Status: DISPENSED
Start: 2021-11-16

## (undated) RX ORDER — FENTANYL CITRATE 50 UG/ML
INJECTION, SOLUTION INTRAMUSCULAR; INTRAVENOUS
Status: DISPENSED
Start: 2023-02-02

## (undated) RX ORDER — EPHEDRINE SULFATE 50 MG/ML
INJECTION, SOLUTION INTRAMUSCULAR; INTRAVENOUS; SUBCUTANEOUS
Status: DISPENSED
Start: 2019-09-05

## (undated) RX ORDER — FENTANYL CITRATE 50 UG/ML
INJECTION, SOLUTION INTRAMUSCULAR; INTRAVENOUS
Status: DISPENSED
Start: 2021-03-02

## (undated) RX ORDER — LIDOCAINE HYDROCHLORIDE 20 MG/ML
JELLY TOPICAL
Status: DISPENSED
Start: 2020-08-26

## (undated) RX ORDER — PROPOFOL 10 MG/ML
INJECTION, EMULSION INTRAVENOUS
Status: DISPENSED
Start: 2020-09-02

## (undated) RX ORDER — CEFAZOLIN SODIUM 1 G/3ML
INJECTION, POWDER, FOR SOLUTION INTRAMUSCULAR; INTRAVENOUS
Status: DISPENSED
Start: 2020-09-02

## (undated) RX ORDER — LIDOCAINE HYDROCHLORIDE 20 MG/ML
JELLY TOPICAL
Status: DISPENSED
Start: 2024-12-03

## (undated) RX ORDER — PROPOFOL 10 MG/ML
INJECTION, EMULSION INTRAVENOUS
Status: DISPENSED
Start: 2021-04-22

## (undated) RX ORDER — CEFAZOLIN SODIUM 2 G/50ML
SOLUTION INTRAVENOUS
Status: DISPENSED
Start: 2019-09-05

## (undated) RX ORDER — FENTANYL CITRATE-0.9 % NACL/PF 10 MCG/ML
PLASTIC BAG, INJECTION (ML) INTRAVENOUS
Status: DISPENSED
Start: 2020-09-02

## (undated) RX ORDER — PHENYLEPHRINE HCL IN 0.9% NACL 1 MG/10 ML
SYRINGE (ML) INTRAVENOUS
Status: DISPENSED
Start: 2019-09-05

## (undated) RX ORDER — PROPOFOL 10 MG/ML
INJECTION, EMULSION INTRAVENOUS
Status: DISPENSED
Start: 2020-02-06

## (undated) RX ORDER — FENTANYL CITRATE 50 UG/ML
INJECTION, SOLUTION INTRAMUSCULAR; INTRAVENOUS
Status: DISPENSED
Start: 2021-04-22

## (undated) RX ORDER — EPHEDRINE SULFATE 50 MG/ML
INJECTION, SOLUTION INTRAMUSCULAR; INTRAVENOUS; SUBCUTANEOUS
Status: DISPENSED
Start: 2020-02-06

## (undated) RX ORDER — CEFAZOLIN SODIUM 2 G/100ML
INJECTION, SOLUTION INTRAVENOUS
Status: DISPENSED
Start: 2021-04-22

## (undated) RX ORDER — ONDANSETRON 2 MG/ML
INJECTION INTRAMUSCULAR; INTRAVENOUS
Status: DISPENSED
Start: 2020-02-06

## (undated) RX ORDER — ACETAMINOPHEN 325 MG/1
TABLET ORAL
Status: DISPENSED
Start: 2021-04-13

## (undated) RX ORDER — GLYCOPYRROLATE 0.2 MG/ML
INJECTION INTRAMUSCULAR; INTRAVENOUS
Status: DISPENSED
Start: 2020-09-02

## (undated) RX ORDER — SIMETHICONE 40MG/0.6ML
SUSPENSION, DROPS(FINAL DOSAGE FORM)(ML) ORAL
Status: DISPENSED
Start: 2017-03-30

## (undated) RX ORDER — LIDOCAINE HYDROCHLORIDE 20 MG/ML
INJECTION, SOLUTION EPIDURAL; INFILTRATION; INTRACAUDAL; PERINEURAL
Status: DISPENSED
Start: 2021-04-22

## (undated) RX ORDER — FENTANYL CITRATE 50 UG/ML
INJECTION, SOLUTION INTRAMUSCULAR; INTRAVENOUS
Status: DISPENSED
Start: 2023-03-22

## (undated) RX ORDER — LIDOCAINE HYDROCHLORIDE 20 MG/ML
JELLY TOPICAL
Status: DISPENSED
Start: 2022-11-29

## (undated) RX ORDER — LIDOCAINE HYDROCHLORIDE 20 MG/ML
JELLY TOPICAL
Status: DISPENSED
Start: 2020-01-07

## (undated) RX ORDER — ONDANSETRON 2 MG/ML
INJECTION INTRAMUSCULAR; INTRAVENOUS
Status: DISPENSED
Start: 2021-04-22

## (undated) RX ORDER — LIDOCAINE HYDROCHLORIDE 20 MG/ML
JELLY TOPICAL
Status: DISPENSED
Start: 2024-05-28

## (undated) RX ORDER — PHENYLEPHRINE HCL IN 0.9% NACL 1 MG/10 ML
SYRINGE (ML) INTRAVENOUS
Status: DISPENSED
Start: 2020-02-06

## (undated) RX ORDER — LIDOCAINE HYDROCHLORIDE 20 MG/ML
JELLY TOPICAL
Status: DISPENSED
Start: 2022-05-17

## (undated) RX ORDER — HYDRALAZINE HYDROCHLORIDE 20 MG/ML
INJECTION INTRAMUSCULAR; INTRAVENOUS
Status: DISPENSED
Start: 2021-04-13

## (undated) RX ORDER — LIDOCAINE HYDROCHLORIDE 20 MG/ML
INJECTION, SOLUTION EPIDURAL; INFILTRATION; INTRACAUDAL; PERINEURAL
Status: DISPENSED
Start: 2020-02-06

## (undated) RX ORDER — ACETAMINOPHEN 325 MG/1
TABLET ORAL
Status: DISPENSED
Start: 2019-09-05

## (undated) RX ORDER — LIDOCAINE HYDROCHLORIDE 20 MG/ML
JELLY TOPICAL
Status: DISPENSED
Start: 2023-05-30

## (undated) RX ORDER — CIPROFLOXACIN 500 MG/1
TABLET, FILM COATED ORAL
Status: DISPENSED
Start: 2019-09-10

## (undated) RX ORDER — GABAPENTIN 300 MG/1
CAPSULE ORAL
Status: DISPENSED
Start: 2021-04-13

## (undated) RX ORDER — CIPROFLOXACIN 500 MG/1
TABLET, FILM COATED ORAL
Status: DISPENSED
Start: 2020-07-14

## (undated) RX ORDER — ACETAMINOPHEN 325 MG/1
TABLET ORAL
Status: DISPENSED
Start: 2021-04-22

## (undated) RX ORDER — ACETAMINOPHEN 325 MG/1
TABLET ORAL
Status: DISPENSED
Start: 2020-09-02

## (undated) RX ORDER — LIDOCAINE HYDROCHLORIDE 20 MG/ML
JELLY TOPICAL
Status: DISPENSED
Start: 2020-07-14

## (undated) RX ORDER — GABAPENTIN 300 MG/1
CAPSULE ORAL
Status: DISPENSED
Start: 2021-04-22

## (undated) RX ORDER — DEXAMETHASONE SODIUM PHOSPHATE 4 MG/ML
INJECTION, SOLUTION INTRA-ARTICULAR; INTRALESIONAL; INTRAMUSCULAR; INTRAVENOUS; SOFT TISSUE
Status: DISPENSED
Start: 2019-09-05

## (undated) RX ORDER — CEFAZOLIN SODIUM 2 G/100ML
INJECTION, SOLUTION INTRAVENOUS
Status: DISPENSED
Start: 2021-04-13

## (undated) RX ORDER — LIDOCAINE HYDROCHLORIDE 20 MG/ML
JELLY TOPICAL
Status: DISPENSED
Start: 2023-11-28

## (undated) RX ORDER — FENTANYL CITRATE 50 UG/ML
INJECTION, SOLUTION INTRAMUSCULAR; INTRAVENOUS
Status: DISPENSED
Start: 2020-02-06

## (undated) RX ORDER — CEFAZOLIN SODIUM 1 G/3ML
INJECTION, POWDER, FOR SOLUTION INTRAMUSCULAR; INTRAVENOUS
Status: DISPENSED
Start: 2021-04-13

## (undated) RX ORDER — DEXAMETHASONE SODIUM PHOSPHATE 4 MG/ML
INJECTION, SOLUTION INTRA-ARTICULAR; INTRALESIONAL; INTRAMUSCULAR; INTRAVENOUS; SOFT TISSUE
Status: DISPENSED
Start: 2020-02-06